# Patient Record
Sex: FEMALE | Race: WHITE | Employment: OTHER | URBAN - NONMETROPOLITAN AREA
[De-identification: names, ages, dates, MRNs, and addresses within clinical notes are randomized per-mention and may not be internally consistent; named-entity substitution may affect disease eponyms.]

---

## 2019-07-29 ENCOUNTER — APPOINTMENT (OUTPATIENT)
Dept: CT IMAGING | Age: 59
End: 2019-07-29
Payer: MEDICARE

## 2019-07-29 ENCOUNTER — APPOINTMENT (OUTPATIENT)
Dept: GENERAL RADIOLOGY | Age: 59
End: 2019-07-29
Payer: MEDICARE

## 2019-07-29 ENCOUNTER — HOSPITAL ENCOUNTER (EMERGENCY)
Age: 59
Discharge: ANOTHER HOSPITAL | End: 2019-07-30
Attending: EMERGENCY MEDICINE
Payer: MEDICARE

## 2019-07-29 DIAGNOSIS — D64.9 ANEMIA, UNSPECIFIED TYPE: ICD-10-CM

## 2019-07-29 DIAGNOSIS — L89.159 PRESSURE INJURY OF SKIN OF SACRAL REGION, UNSPECIFIED INJURY STAGE: ICD-10-CM

## 2019-07-29 DIAGNOSIS — M86.9 OSTEOMYELITIS, UNSPECIFIED SITE, UNSPECIFIED TYPE (HCC): ICD-10-CM

## 2019-07-29 DIAGNOSIS — A41.9 SEPTICEMIA (HCC): Primary | ICD-10-CM

## 2019-07-29 LAB
ALBUMIN SERPL-MCNC: 2.4 G/DL (ref 3.5–5.2)
ALP BLD-CCNC: 233 U/L (ref 35–104)
ALT SERPL-CCNC: 7 U/L (ref 5–33)
AMORPHOUS: ABNORMAL /HPF
ANION GAP SERPL CALCULATED.3IONS-SCNC: 15 MMOL/L (ref 7–19)
ANISOCYTOSIS: ABNORMAL
APTT: 21.2 SEC (ref 26–36.2)
AST SERPL-CCNC: 12 U/L (ref 5–32)
BACTERIA: ABNORMAL /HPF
BASOPHILS ABSOLUTE: 0.1 K/UL (ref 0–0.2)
BASOPHILS RELATIVE PERCENT: 0.5 % (ref 0–1)
BILIRUB SERPL-MCNC: 0.4 MG/DL (ref 0.2–1.2)
BILIRUBIN URINE: NEGATIVE
BLOOD, URINE: NEGATIVE
BUN BLDV-MCNC: 29 MG/DL (ref 6–20)
C-REACTIVE PROTEIN: 19.61 MG/DL (ref 0–0.5)
CALCIUM SERPL-MCNC: 8.4 MG/DL (ref 8.6–10)
CHLORIDE BLD-SCNC: 102 MMOL/L (ref 98–111)
CLARITY: ABNORMAL
CO2: 16 MMOL/L (ref 22–29)
COLOR: YELLOW
CREAT SERPL-MCNC: 1.1 MG/DL (ref 0.5–0.9)
CRYSTALS, UA: ABNORMAL
EOSINOPHILS ABSOLUTE: 0 K/UL (ref 0–0.6)
EOSINOPHILS RELATIVE PERCENT: 0.1 % (ref 0–5)
EPITHELIAL CELLS, UA: 3 /HPF (ref 0–5)
GFR NON-AFRICAN AMERICAN: 51
GLUCOSE BLD-MCNC: 151 MG/DL (ref 74–109)
GLUCOSE URINE: NEGATIVE MG/DL
HCT VFR BLD CALC: 25.8 % (ref 37–47)
HEMOGLOBIN: 7 G/DL (ref 12–16)
HYALINE CASTS: 32 /HPF (ref 0–8)
HYPOCHROMIA: ABNORMAL
INR BLD: 1.38 (ref 0.88–1.18)
KETONES, URINE: NEGATIVE MG/DL
LACTIC ACID: 1.8 MMOL/L (ref 0.5–1.9)
LEUKOCYTE ESTERASE, URINE: ABNORMAL
LYMPHOCYTES ABSOLUTE: 2.3 K/UL (ref 1.1–4.5)
LYMPHOCYTES RELATIVE PERCENT: 13.7 % (ref 20–40)
MCH RBC QN AUTO: 21.8 PG (ref 27–31)
MCHC RBC AUTO-ENTMCNC: 27.1 G/DL (ref 33–37)
MCV RBC AUTO: 80.4 FL (ref 81–99)
MICROCYTES: ABNORMAL
MONOCYTES ABSOLUTE: 1.3 K/UL (ref 0–0.9)
MONOCYTES RELATIVE PERCENT: 7.9 % (ref 0–10)
NEUTROPHILS ABSOLUTE: 12.8 K/UL (ref 1.5–7.5)
NEUTROPHILS RELATIVE PERCENT: 75.8 % (ref 50–65)
NITRITE, URINE: NEGATIVE
PDW BLD-RTO: 22.2 % (ref 11.5–14.5)
PH UA: 8.5 (ref 5–8)
PLATELET # BLD: 554 K/UL (ref 130–400)
PLATELET SLIDE REVIEW: ABNORMAL
PMV BLD AUTO: 9.9 FL (ref 9.4–12.3)
POLYCHROMASIA: ABNORMAL
POTASSIUM SERPL-SCNC: 5.1 MMOL/L (ref 3.5–5)
PROTEIN UA: 30 MG/DL
PROTHROMBIN TIME: 16.3 SEC (ref 12–14.6)
RBC # BLD: 3.21 M/UL (ref 4.2–5.4)
RBC UA: 2 /HPF (ref 0–4)
SEDIMENTATION RATE, ERYTHROCYTE: 113 MM/HR (ref 0–25)
SODIUM BLD-SCNC: 133 MMOL/L (ref 136–145)
SPECIFIC GRAVITY UA: 1.03 (ref 1–1.03)
TARGET CELLS: ABNORMAL
TOTAL PROTEIN: 6.3 G/DL (ref 6.6–8.7)
URINE REFLEX TO CULTURE: YES
UROBILINOGEN, URINE: 0.2 E.U./DL
WBC # BLD: 16.9 K/UL (ref 4.8–10.8)
WBC UA: 82 /HPF (ref 0–5)
WBC UA: ABNORMAL /HPF (ref 0–5)

## 2019-07-29 PROCEDURE — 86140 C-REACTIVE PROTEIN: CPT

## 2019-07-29 PROCEDURE — 87040 BLOOD CULTURE FOR BACTERIA: CPT

## 2019-07-29 PROCEDURE — 6360000002 HC RX W HCPCS: Performed by: EMERGENCY MEDICINE

## 2019-07-29 PROCEDURE — 87186 SC STD MICRODIL/AGAR DIL: CPT

## 2019-07-29 PROCEDURE — 87077 CULTURE AEROBIC IDENTIFY: CPT

## 2019-07-29 PROCEDURE — 36415 COLL VENOUS BLD VENIPUNCTURE: CPT

## 2019-07-29 PROCEDURE — 99285 EMERGENCY DEPT VISIT HI MDM: CPT

## 2019-07-29 PROCEDURE — 74177 CT ABD & PELVIS W/CONTRAST: CPT

## 2019-07-29 PROCEDURE — 36430 TRANSFUSION BLD/BLD COMPNT: CPT

## 2019-07-29 PROCEDURE — 83605 ASSAY OF LACTIC ACID: CPT

## 2019-07-29 PROCEDURE — 96368 THER/DIAG CONCURRENT INF: CPT

## 2019-07-29 PROCEDURE — 86403 PARTICLE AGGLUT ANTBDY SCRN: CPT

## 2019-07-29 PROCEDURE — 85610 PROTHROMBIN TIME: CPT

## 2019-07-29 PROCEDURE — 85025 COMPLETE CBC W/AUTO DIFF WBC: CPT

## 2019-07-29 PROCEDURE — 2580000003 HC RX 258: Performed by: EMERGENCY MEDICINE

## 2019-07-29 PROCEDURE — 96366 THER/PROPH/DIAG IV INF ADDON: CPT

## 2019-07-29 PROCEDURE — 71045 X-RAY EXAM CHEST 1 VIEW: CPT

## 2019-07-29 PROCEDURE — 96365 THER/PROPH/DIAG IV INF INIT: CPT

## 2019-07-29 PROCEDURE — 85730 THROMBOPLASTIN TIME PARTIAL: CPT

## 2019-07-29 PROCEDURE — 85652 RBC SED RATE AUTOMATED: CPT

## 2019-07-29 PROCEDURE — 96367 TX/PROPH/DG ADDL SEQ IV INF: CPT

## 2019-07-29 PROCEDURE — 6360000004 HC RX CONTRAST MEDICATION: Performed by: EMERGENCY MEDICINE

## 2019-07-29 PROCEDURE — 80053 COMPREHEN METABOLIC PANEL: CPT

## 2019-07-29 RX ORDER — 0.9 % SODIUM CHLORIDE 0.9 %
1000 INTRAVENOUS SOLUTION INTRAVENOUS ONCE
Status: DISCONTINUED | OUTPATIENT
Start: 2019-07-29 | End: 2019-07-29

## 2019-07-29 RX ORDER — PROMETHAZINE HYDROCHLORIDE 25 MG/1
25 TABLET ORAL EVERY 6 HOURS PRN
COMMUNITY
End: 2019-08-08 | Stop reason: DRUGHIGH

## 2019-07-29 RX ORDER — ACETAMINOPHEN 325 MG/1
650 TABLET ORAL ONCE
Status: DISCONTINUED | OUTPATIENT
Start: 2019-07-29 | End: 2019-07-30 | Stop reason: HOSPADM

## 2019-07-29 RX ORDER — 0.9 % SODIUM CHLORIDE 0.9 %
30 INTRAVENOUS SOLUTION INTRAVENOUS ONCE
Status: COMPLETED | OUTPATIENT
Start: 2019-07-29 | End: 2019-07-30

## 2019-07-29 RX ORDER — 0.9 % SODIUM CHLORIDE 0.9 %
250 INTRAVENOUS SOLUTION INTRAVENOUS ONCE
Status: COMPLETED | OUTPATIENT
Start: 2019-07-29 | End: 2019-07-30

## 2019-07-29 RX ORDER — BACLOFEN 10 MG/1
10 TABLET ORAL 3 TIMES DAILY
COMMUNITY

## 2019-07-29 RX ADMIN — IOPAMIDOL 90 ML: 755 INJECTION, SOLUTION INTRAVENOUS at 22:26

## 2019-07-29 RX ADMIN — VANCOMYCIN HYDROCHLORIDE 1250 MG: 10 INJECTION, POWDER, LYOPHILIZED, FOR SOLUTION INTRAVENOUS at 22:27

## 2019-07-29 RX ADMIN — PIPERACILLIN SODIUM,TAZOBACTAM SODIUM 3.38 G: 3; .375 INJECTION, POWDER, FOR SOLUTION INTRAVENOUS at 22:27

## 2019-07-29 RX ADMIN — SODIUM CHLORIDE 2000 ML: 9 INJECTION, SOLUTION INTRAVENOUS at 22:27

## 2019-07-29 ASSESSMENT — ENCOUNTER SYMPTOMS
BACK PAIN: 0
SORE THROAT: 0
SHORTNESS OF BREATH: 0
NAUSEA: 0
RHINORRHEA: 0
DIARRHEA: 0
VOMITING: 0
ABDOMINAL PAIN: 0

## 2019-07-30 VITALS
BODY MASS INDEX: 30.31 KG/M2 | TEMPERATURE: 98.6 F | HEIGHT: 68 IN | RESPIRATION RATE: 18 BRPM | SYSTOLIC BLOOD PRESSURE: 117 MMHG | OXYGEN SATURATION: 100 % | WEIGHT: 200 LBS | DIASTOLIC BLOOD PRESSURE: 55 MMHG | HEART RATE: 96 BPM

## 2019-07-30 LAB
ABO/RH: NORMAL
ABO/RH: NORMAL
ANTIBODY SCREEN: NORMAL
BLOOD BANK DISPENSE STATUS: NORMAL
BLOOD BANK DISPENSE STATUS: NORMAL
BLOOD BANK PRODUCT CODE: NORMAL
BLOOD BANK PRODUCT CODE: NORMAL
BPU ID: NORMAL
BPU ID: NORMAL
DESCRIPTION BLOOD BANK: NORMAL
DESCRIPTION BLOOD BANK: NORMAL
LACTIC ACID: 1.6 MMOL/L (ref 0.5–1.9)

## 2019-07-30 PROCEDURE — P9016 RBC LEUKOCYTES REDUCED: HCPCS

## 2019-07-30 PROCEDURE — 87077 CULTURE AEROBIC IDENTIFY: CPT

## 2019-07-30 PROCEDURE — 96366 THER/PROPH/DIAG IV INF ADDON: CPT

## 2019-07-30 PROCEDURE — 36600 WITHDRAWAL OF ARTERIAL BLOOD: CPT

## 2019-07-30 PROCEDURE — 86923 COMPATIBILITY TEST ELECTRIC: CPT

## 2019-07-30 PROCEDURE — 6360000002 HC RX W HCPCS: Performed by: EMERGENCY MEDICINE

## 2019-07-30 PROCEDURE — 99285 EMERGENCY DEPT VISIT HI MDM: CPT | Performed by: EMERGENCY MEDICINE

## 2019-07-30 PROCEDURE — 6370000000 HC RX 637 (ALT 250 FOR IP): Performed by: EMERGENCY MEDICINE

## 2019-07-30 PROCEDURE — 87205 SMEAR GRAM STAIN: CPT

## 2019-07-30 PROCEDURE — 36415 COLL VENOUS BLD VENIPUNCTURE: CPT

## 2019-07-30 PROCEDURE — 2580000003 HC RX 258: Performed by: EMERGENCY MEDICINE

## 2019-07-30 PROCEDURE — 86901 BLOOD TYPING SEROLOGIC RH(D): CPT

## 2019-07-30 PROCEDURE — 86850 RBC ANTIBODY SCREEN: CPT

## 2019-07-30 PROCEDURE — 86403 PARTICLE AGGLUT ANTBDY SCRN: CPT

## 2019-07-30 PROCEDURE — 87186 SC STD MICRODIL/AGAR DIL: CPT

## 2019-07-30 PROCEDURE — 87070 CULTURE OTHR SPECIMN AEROBIC: CPT

## 2019-07-30 PROCEDURE — 83605 ASSAY OF LACTIC ACID: CPT

## 2019-07-30 PROCEDURE — 81001 URINALYSIS AUTO W/SCOPE: CPT

## 2019-07-30 PROCEDURE — 86900 BLOOD TYPING SEROLOGIC ABO: CPT

## 2019-07-30 PROCEDURE — 87086 URINE CULTURE/COLONY COUNT: CPT

## 2019-07-30 RX ORDER — 0.9 % SODIUM CHLORIDE 0.9 %
250 INTRAVENOUS SOLUTION INTRAVENOUS ONCE
Status: COMPLETED | OUTPATIENT
Start: 2019-07-30 | End: 2019-07-30

## 2019-07-30 RX ORDER — PROMETHAZINE HYDROCHLORIDE 25 MG/ML
12.5 INJECTION, SOLUTION INTRAMUSCULAR; INTRAVENOUS ONCE
Status: DISCONTINUED | OUTPATIENT
Start: 2019-07-30 | End: 2019-07-30 | Stop reason: HOSPADM

## 2019-07-30 RX ORDER — PROMETHAZINE HYDROCHLORIDE 25 MG/1
12.5 TABLET ORAL ONCE
Status: COMPLETED | OUTPATIENT
Start: 2019-07-30 | End: 2019-07-30

## 2019-07-30 RX ADMIN — HYOSCYAMINE SULFATE: 16 SOLUTION at 02:14

## 2019-07-30 RX ADMIN — PROMETHAZINE HYDROCHLORIDE 12.5 MG: 25 TABLET ORAL at 05:39

## 2019-07-30 RX ADMIN — SODIUM CHLORIDE 250 ML: 9 INJECTION, SOLUTION INTRAVENOUS at 04:37

## 2019-07-30 RX ADMIN — SODIUM CHLORIDE 250 ML: 9 INJECTION, SOLUTION INTRAVENOUS at 02:14

## 2019-07-30 RX ADMIN — Medication 1000 MG: at 06:58

## 2019-07-30 NOTE — ED PROVIDER NOTES
[facility]  eMERGENCY dEPARTMENT eNCOUnter      Pt Name: Erik Lambert  MRN: [id number]  [place] 1960  Date of evaluation: 7/29/2019  Provider: Hannah Morrow MD    [address] [place] [address]       Chief Complaint   Patient presents with    Skin Ulcer         HISTORY OF PRESENT ILLNESS   (Location/Symptom, Timing/Onset,Context/Setting, Quality, Duration, Modifying Factors, Severity)  Note limiting factors. Erik Lambert is a 64 y.o. female who presents to the emergency department with large sacral decubitus ulcers that are tunneling. Patient also has had a fever to 101. She has a complicated past medical history. 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 She states that her urostomy tubes were leaking back in November and she developed an open wound on her sacrum. She then had bladder diversion surgery and has been having issues with her esophagus. She has frequent coughing to the point of vomiting and is supposed to have an upper GI study to evaluate this. The coughing and vomiting has irritated the wounds on her buttocks and they have significantly worsened. Really just treating with Dakin's solution. She has been managed in [place]. Has not been feeling well as she is had increased weakness and unable to do her transfers recently. She thinks she has a urinary tract infection as her urine in her urostomy bag is now dark and milky. HPI    NursingNotes were reviewed. REVIEW OF SYSTEMS    (2-9 systems for level 4, 10 or more for level 5)     Review of Systems   Constitutional: Positive for activity change, appetite change, chills, fatigue and fever. HENT: Negative for rhinorrhea and sore throat. Respiratory: Negative for shortness of breath. Cardiovascular: Positive for leg swelling. Negative for chest pain. Gastrointestinal: Negative for abdominal pain, diarrhea, nausea and vomiting. Genitourinary: Negative for difficulty urinating. following components:    Sodium 133 (*)     Potassium 5.1 (*)     CO2 16 (*)     Glucose 151 (*)     BUN 29 (*)     CREATININE 1.1 (*)     GFR Non-African American 51 (*)     Calcium 8.4 (*)     Total Protein 6.3 (*)     Alb 2.4 (*)     Alkaline Phosphatase 233 (*)     All other components within normal limits   URINE RT REFLEX TO CULTURE - Abnormal; Notable for the following components:    Clarity, UA TURBID (*)     pH, UA 8.5 (*)     Protein, UA 30 (*)     Leukocyte Esterase, Urine SMALL (*)     All other components within normal limits   SEDIMENTATION RATE - Abnormal; Notable for the following components:    Sed Rate 113 (*)     All other components within normal limits   C-REACTIVE PROTEIN - Abnormal; Notable for the following components:    CRP 19.61 (*)     All other components within normal limits   PROTIME-INR - Abnormal; Notable for the following components:    Protime 16.3 (*)     INR 1.38 (*)     All other components within normal limits   APTT - Abnormal; Notable for the following components:    aPTT 21.2 (*)     All other components within normal limits   CULTURE BLOOD #1   CULTURE BLOOD #2   WOUND CULTURE   URINE CULTURE   LACTIC ACID, PLASMA   LACTIC ACID, PLASMA   MICROSCOPIC URINALYSIS   TYPE AND SCREEN   PREPARE RBC (CROSSMATCH)       All other labs were within normal range or not returned as of this dictation. EMERGENCY DEPARTMENT COURSE and DIFFERENTIALDIAGNOSIS/MDM:   Vitals:    Vitals:    07/29/19 2014 07/29/19 2101 07/29/19 2224 07/29/19 2304   BP: 124/67 135/69 95/78 122/64   Pulse: 112 120 118 102   Resp: 18 20 20 18   Temp: 101.1 °F (38.4 °C) 98.6 °F (37 °C) 98.7 °F (37.1 °C)    TempSrc: Temporal Oral Oral    SpO2: 94%  97% 100%   Weight: 200 lb (90.7 kg)      Height: 5' 8\" (1.727 m)          MDM   Pt's Ct shows extensive decubitus ulcer with fistulas, osteomyelitis, necrotic mass. Has previously been treated by [facility]. D/w Simba Esquivel with [facility].  Pt will be accepted by

## 2019-07-30 NOTE — ED NOTES
Contacted [facility] to check weather with [employer].       Jasiel Hinojosa RN  07/30/19 8805

## 2019-07-30 NOTE — ED NOTES
Spoke with Eris Zhang at [facility].  Still does not have [address], RN  07/30/19 5825

## 2019-07-30 NOTE — ED NOTES
Wounds dressed with dakins solution and gauze. ABD pads placed over wounds.       Yunior Dunn RN  07/30/19 6073

## 2019-08-01 LAB
CULTURE, BLOOD 2: ABNORMAL
CULTURE, BLOOD 2: ABNORMAL
ORGANISM: ABNORMAL

## 2019-08-02 LAB
BLOOD CULTURE, ROUTINE: ABNORMAL
GRAM STAIN RESULT: ABNORMAL
ORGANISM: ABNORMAL
URINE CULTURE, ROUTINE: ABNORMAL
WOUND/ABSCESS: ABNORMAL

## 2019-08-08 ENCOUNTER — HOSPITAL ENCOUNTER (OUTPATIENT)
Dept: WOUND CARE | Age: 59
Discharge: HOME | End: 2019-08-08
Payer: MEDICARE

## 2019-08-08 VITALS
WEIGHT: 195 LBS | SYSTOLIC BLOOD PRESSURE: 125 MMHG | TEMPERATURE: 98.6 F | HEIGHT: 68 IN | BODY MASS INDEX: 29.55 KG/M2 | HEART RATE: 80 BPM | RESPIRATION RATE: 18 BRPM | DIASTOLIC BLOOD PRESSURE: 66 MMHG

## 2019-08-08 DIAGNOSIS — L89.154 PRESSURE INJURY OF SACRAL REGION, STAGE 4 (HCC): Primary | ICD-10-CM

## 2019-08-08 DIAGNOSIS — L89.613 PRESSURE INJURY OF RIGHT HEEL, STAGE 3 (HCC): Chronic | ICD-10-CM

## 2019-08-08 DIAGNOSIS — L89.324 PRESSURE INJURY OF LEFT PERINEAL ISCHIAL REGION, STAGE 4 (HCC): Chronic | ICD-10-CM

## 2019-08-08 DIAGNOSIS — L89.314 PRESSURE INJURY OF RIGHT ISCHIUM, STAGE 4 (HCC): Chronic | ICD-10-CM

## 2019-08-08 DIAGNOSIS — M86.49 CHRONIC OSTEOMYELITIS WITH DRAINING SINUS OF MULTIPLE SITES (HCC): Chronic | ICD-10-CM

## 2019-08-08 DIAGNOSIS — G82.20 PARAPLEGIA (HCC): Chronic | ICD-10-CM

## 2019-08-08 DIAGNOSIS — L89.324 DECUBITUS ULCER OF ISCHIAL AREA, LEFT, STAGE IV (HCC): Chronic | ICD-10-CM

## 2019-08-08 PROCEDURE — 11046 DBRDMT MUSC&/FSCA EA ADDL: CPT | Performed by: SURGERY

## 2019-08-08 PROCEDURE — 99215 OFFICE O/P EST HI 40 MIN: CPT

## 2019-08-08 PROCEDURE — 11043 DBRDMT MUSC&/FSCA 1ST 20/<: CPT | Performed by: SURGERY

## 2019-08-08 PROCEDURE — 11045 DBRDMT SUBQ TISS EACH ADDL: CPT

## 2019-08-08 PROCEDURE — 99203 OFFICE O/P NEW LOW 30 MIN: CPT | Performed by: SURGERY

## 2019-08-08 PROCEDURE — 11042 DBRDMT SUBQ TIS 1ST 20SQCM/<: CPT

## 2019-08-08 RX ORDER — M-VIT,TX,IRON,MINS/CALC/FOLIC 27MG-0.4MG
1 TABLET ORAL DAILY
COMMUNITY
End: 2021-03-29 | Stop reason: ALTCHOICE

## 2019-08-08 RX ORDER — OMEPRAZOLE 20 MG/1
20 CAPSULE, DELAYED RELEASE ORAL DAILY PRN
COMMUNITY

## 2019-08-08 RX ORDER — LEVOFLOXACIN 500 MG/1
500 TABLET, FILM COATED ORAL DAILY
COMMUNITY
Start: 2019-08-06 | End: 2019-08-15

## 2019-08-08 RX ORDER — DOXYCYCLINE HYCLATE 100 MG/1
100 CAPSULE ORAL 2 TIMES DAILY
COMMUNITY
Start: 2019-08-05 | End: 2019-08-14

## 2019-08-08 RX ORDER — PROMETHAZINE HYDROCHLORIDE 12.5 MG/1
12.5 TABLET ORAL EVERY 6 HOURS PRN
COMMUNITY
End: 2020-11-24

## 2019-08-08 RX ORDER — FOLIC ACID 1 MG/1
1 TABLET ORAL DAILY
COMMUNITY
End: 2020-11-24

## 2019-08-08 ASSESSMENT — PAIN DESCRIPTION - FREQUENCY: FREQUENCY: CONTINUOUS

## 2019-08-08 ASSESSMENT — PAIN DESCRIPTION - PAIN TYPE: TYPE: CHRONIC PAIN

## 2019-08-08 ASSESSMENT — PAIN SCALES - GENERAL: PAINLEVEL_OUTOF10: 5

## 2019-08-08 ASSESSMENT — PAIN DESCRIPTION - ORIENTATION: ORIENTATION: RIGHT

## 2019-08-08 ASSESSMENT — PAIN DESCRIPTION - LOCATION: LOCATION: SHOULDER

## 2019-08-08 NOTE — PROGRESS NOTES
Onset    Other Mother          of sepsis    Cancer Father         Lung Cancer    Diabetes Paternal Grandmother     Heart Attack Paternal Grandfather      Social History     Tobacco Use    Smoking status: Never Smoker    Smokeless tobacco: Never Used   Substance Use Topics    Alcohol use: Not Currently         Review of Systems    Review of Systems    All other review of systems are negative. Physical Exam    /66   Pulse 80   Temp 98.6 °F (37 °C) (Temporal)   Resp 18   Ht 5' 8\" (1.727 m)   Wt 195 lb (88.5 kg)   BMI 29.65 kg/m²     Physical Exam        Post Debridement Measurements and Assessment:  Wound 19 Heel Left;Posterior Wound 7, Pressure Stage 3, L. Posterior Heel (Active)   Wound Image     2019  2:51 PM   Wound Pressure Stage  3 2019  2:51 PM   Dressing Status Old drainage 2019  2:51 PM   Wound Cleansed Rinsed/Irrigated with saline 2019  2:51 PM   Wound Length (cm) 4 cm 2019  2:51 PM   Wound Width (cm) 3.3 cm 2019  2:51 PM   Wound Depth (cm) 0.1 cm 2019  2:51 PM   Wound Surface Area (cm^2) 13.2 cm^2 2019  2:51 PM   Wound Volume (cm^3) 1.32 cm^3 2019  2:51 PM   Distance Tunneling (cm) 0 cm 2019  2:51 PM   Tunneling Position ___ O'Clock 0 2019  2:51 PM   Undermining Starts ___ O'Clock 0 2019  2:51 PM   Undermining Ends___ O'Clock 0 2019  2:51 PM   Undermining Maxium Distance (cm) 0 2019  2:51 PM   Wound Assessment Slough; Yellow;Red;Pink;Black 2019  2:51 PM   Drainage Amount Small 2019  2:51 PM   Drainage Description Serosanguinous 2019  2:51 PM   Odor None 2019  2:51 PM   Margins Attached edges 2019  2:51 PM   Nellie-wound Assessment Ecchymosis; Red;Swelling 2019  2:51 PM   Non-staged Wound Description Not applicable 7566  3:26 PM   [place]%Wound Bed 20 2019  2:51 PM   Red%Wound Bed 30 2019  2:51 PM   Yellow%Wound Bed 40 2019  2:51 PM   Black%Wound Bed 10 2019  2:51 PM   Purple%Wound Bed Dry 8/8/2019  2:51 PM   Non-staged Wound Description Not applicable 1/0/3259  5:81 PM   [place]%Wound Bed 30 8/8/2019  2:51 PM   Red%Wound Bed 30 8/8/2019  2:51 PM   Yellow%Wound Bed 30 8/8/2019  2:51 PM   Black%Wound Bed 10 8/8/2019  2:51 PM   Number of days: 0      The patientspain isPain Level: 5 Pain Type: Chronic pain. Please refer to nursing measurements and assessment regarding wound pre and postdebridement. Wound 08/08/19 Heel Left;Posterior Wound 7, Pressure Stage 3, L. Posterior Heel (Active)   Wound Image     8/8/2019  2:51 PM   Wound Pressure Stage  3 8/8/2019  2:51 PM   Dressing Status Old drainage 8/8/2019  2:51 PM   Wound Cleansed Rinsed/Irrigated with saline 8/8/2019  2:51 PM   Wound Length (cm) 4 cm 8/8/2019  2:51 PM   Wound Width (cm) 3.3 cm 8/8/2019  2:51 PM   Wound Depth (cm) 0.1 cm 8/8/2019  2:51 PM   Wound Surface Area (cm^2) 13.2 cm^2 8/8/2019  2:51 PM   Wound Volume (cm^3) 1.32 cm^3 8/8/2019  2:51 PM   Distance Tunneling (cm) 0 cm 8/8/2019  2:51 PM   Tunneling Position ___ O'Clock 0 8/8/2019  2:51 PM   Undermining Starts ___ O'Clock 0 8/8/2019  2:51 PM   Undermining Ends___ O'Clock 0 8/8/2019  2:51 PM   Undermining Maxium Distance (cm) 0 8/8/2019  2:51 PM   Wound Assessment Slough; Yellow;Red;Pink;Black 8/8/2019  2:51 PM   Drainage Amount Small 8/8/2019  2:51 PM   Drainage Description Serosanguinous 8/8/2019  2:51 PM   Odor None 8/8/2019  2:51 PM   Margins Attached edges 8/8/2019  2:51 PM   Nellie-wound Assessment Ecchymosis; Red;Swelling 8/8/2019  2:51 PM   Non-staged Wound Description Not applicable 0/1/6835  5:58 PM   [place]%Wound Bed 20 8/8/2019  2:51 PM   Red%Wound Bed 30 8/8/2019  2:51 PM   Yellow%Wound Bed 40 8/8/2019  2:51 PM   Black%Wound Bed 10 8/8/2019  2:51 PM   Purple%Wound Bed 0 8/8/2019  2:51 PM   Other%Wound Bed 0 8/8/2019  2:51 PM   Number of days: 0       Wound 08/08/19 Foot Right;Plantar;Medial Wound 8, Neuropathic, R. Lateral Plantar Foot Medial (Active)   Wound Image per physician order  Treatment:     Compression : No   Offloading : Yes   Dressing : see AVS  Therapy : none   Discussed appropriate home care of this wound. Wound redressed. Patient instructions were given. Follow up: 2 week.   Recommend no smoking  Offloading instructions given

## 2019-08-09 ENCOUNTER — HOSPITAL ENCOUNTER (EMERGENCY)
Age: 59
Discharge: HOME | End: 2019-08-09
Attending: EMERGENCY MEDICINE
Payer: MEDICARE

## 2019-08-09 ENCOUNTER — LAB REQUISITION (OUTPATIENT)
Dept: LAB | Facility: HOSPITAL | Age: 59
End: 2019-08-09

## 2019-08-09 VITALS
RESPIRATION RATE: 19 BRPM | DIASTOLIC BLOOD PRESSURE: 63 MMHG | OXYGEN SATURATION: 96 % | SYSTOLIC BLOOD PRESSURE: 121 MMHG | TEMPERATURE: 98.2 F | HEART RATE: 84 BPM

## 2019-08-09 DIAGNOSIS — M86.9 OSTEOMYELITIS OF OTHER SITE, UNSPECIFIED TYPE (HCC): ICD-10-CM

## 2019-08-09 DIAGNOSIS — G82.20 PARAPLEGIA (HCC): ICD-10-CM

## 2019-08-09 DIAGNOSIS — Z00.00 ENCOUNTER FOR GENERAL ADULT MEDICAL EXAMINATION WITHOUT ABNORMAL FINDINGS: ICD-10-CM

## 2019-08-09 DIAGNOSIS — L89.159 PRESSURE INJURY OF SKIN OF SACRAL REGION, UNSPECIFIED INJURY STAGE: ICD-10-CM

## 2019-08-09 DIAGNOSIS — D64.9 ANEMIA, UNSPECIFIED TYPE: Primary | ICD-10-CM

## 2019-08-09 LAB
ABO/RH: NORMAL
ALBUMIN SERPL-MCNC: 1.9 G/DL (ref 3.5–5)
ALBUMIN SERPL-MCNC: 2.5 G/DL (ref 3.5–5.2)
ALP BLD-CCNC: 143 U/L (ref 35–104)
ALP SERPL-CCNC: 108 U/L (ref 24–120)
ALT SERPL W P-5'-P-CCNC: 22 U/L (ref 0–54)
ALT SERPL-CCNC: 6 U/L (ref 5–33)
ANION GAP SERPL CALCULATED.3IONS-SCNC: 11 MMOL/L (ref 7–19)
ANION GAP SERPL CALCULATED.3IONS-SCNC: 6 MMOL/L (ref 4–13)
ANISOCYTOSIS BLD QL: ABNORMAL
ANTIBODY SCREEN: NORMAL
ARTICHOKE IGE QN: 61 MG/DL (ref 0–99)
AST SERPL-CCNC: 10 U/L (ref 7–45)
AST SERPL-CCNC: 9 U/L (ref 5–32)
BILIRUB CONJ SERPL-MCNC: 0 MG/DL (ref 0–0.3)
BILIRUB INDIRECT SERPL-MCNC: 0 MG/DL (ref 0–1.1)
BILIRUB SERPL-MCNC: 0.3 MG/DL (ref 0.1–1)
BILIRUB SERPL-MCNC: <0.2 MG/DL (ref 0.2–1.2)
BILIRUBIN DIRECT: 0.1 MG/DL (ref 0–0.3)
BILIRUBIN, INDIRECT: 0.1 MG/DL (ref 0.1–1)
BUN BLD-MCNC: 33 MG/DL (ref 5–21)
BUN BLDV-MCNC: 33 MG/DL (ref 6–20)
BUN/CREAT SERPL: 41.3 (ref 7–25)
BURR CELLS BLD QL SMEAR: ABNORMAL
CALCIUM SERPL-MCNC: 8.6 MG/DL (ref 8.6–10)
CALCIUM SPEC-SCNC: 8.1 MG/DL (ref 8.4–10.4)
CHLORIDE BLD-SCNC: 115 MMOL/L (ref 98–111)
CHLORIDE SERPL-SCNC: 120 MMOL/L (ref 98–110)
CHOLEST SERPL-MCNC: 93 MG/DL (ref 130–200)
CO2 SERPL-SCNC: 15 MMOL/L (ref 24–31)
CO2: 17 MMOL/L (ref 22–29)
CREAT BLD-MCNC: 0.8 MG/DL (ref 0.5–1.4)
CREAT SERPL-MCNC: 0.9 MG/DL (ref 0.5–0.9)
DEPRECATED RDW RBC AUTO: 69 FL (ref 37–54)
ERYTHROCYTE [DISTWIDTH] IN BLOOD BY AUTOMATED COUNT: 23.9 % (ref 12.3–15.4)
GFR NON-AFRICAN AMERICAN: >60
GFR SERPL CREATININE-BSD FRML MDRD: 74 ML/MIN/1.73
GIANT PLATELETS: ABNORMAL
GLUCOSE BLD-MCNC: 107 MG/DL (ref 74–109)
GLUCOSE BLD-MCNC: 87 MG/DL (ref 70–100)
HBA1C MFR BLD: 5.5 % (ref 4.8–5.9)
HCT VFR BLD AUTO: 21 % (ref 34–46.6)
HCT VFR BLD CALC: 26.4 % (ref 37–47)
HDLC SERPL-MCNC: 28 MG/DL
HEMOGLOBIN: 7.5 G/DL (ref 12–16)
HGB BLD-MCNC: 6.4 G/DL (ref 12–15.9)
HYPOCHROMIA BLD QL: ABNORMAL
LDLC/HDLC SERPL: 1.62 {RATIO}
LYMPHOCYTES # BLD MANUAL: 1.65 10*3/MM3 (ref 0.7–3.1)
LYMPHOCYTES NFR BLD MANUAL: 3.6 % (ref 5–12)
LYMPHOCYTES NFR BLD MANUAL: 37.4 % (ref 19.6–45.3)
MCH RBC QN AUTO: 23.9 PG (ref 27–31)
MCH RBC QN AUTO: 24.2 PG (ref 26.6–33)
MCHC RBC AUTO-ENTMCNC: 28.4 G/DL (ref 33–37)
MCHC RBC AUTO-ENTMCNC: 30.5 G/DL (ref 31.5–35.7)
MCV RBC AUTO: 79.5 FL (ref 79–97)
MCV RBC AUTO: 84.1 FL (ref 81–99)
MICROCYTES BLD QL: ABNORMAL
MONOCYTES # BLD AUTO: 0.16 10*3/MM3 (ref 0.1–0.9)
NEUTROPHILS # BLD AUTO: 2.6 10*3/MM3 (ref 1.7–7)
NEUTROPHILS NFR BLD MANUAL: 57.9 % (ref 42.7–76)
NEUTS BAND NFR BLD MANUAL: 1 % (ref 0–5)
PDW BLD-RTO: 23.9 % (ref 11.5–14.5)
PLATELET # BLD AUTO: 274 10*3/MM3 (ref 140–450)
PLATELET # BLD: 315 K/UL (ref 130–400)
PMV BLD AUTO: 9 FL (ref 9.4–12.3)
PMV BLD AUTO: 9.2 FL (ref 6–12)
POIKILOCYTOSIS BLD QL SMEAR: ABNORMAL
POLYCHROMASIA BLD QL SMEAR: ABNORMAL
POTASSIUM BLD-SCNC: 4 MMOL/L (ref 3.5–5.3)
POTASSIUM REFLEX MAGNESIUM: 3.9 MMOL/L (ref 3.5–5)
PREALB SERPL-MCNC: 7.9 MG/DL (ref 18–36)
PROT SERPL-MCNC: 4.9 G/DL (ref 6.3–8.7)
RBC # BLD AUTO: 2.64 10*6/MM3 (ref 3.77–5.28)
RBC # BLD: 3.14 M/UL (ref 4.2–5.4)
SCHISTOCYTES BLD QL SMEAR: ABNORMAL
SODIUM BLD-SCNC: 141 MMOL/L (ref 135–145)
SODIUM BLD-SCNC: 143 MMOL/L (ref 136–145)
TOTAL PROTEIN: 5.6 G/DL (ref 6.6–8.7)
TRIGL SERPL-MCNC: 98 MG/DL (ref 0–149)
TSH SERPL DL<=0.05 MIU/L-ACNC: 6.35 MIU/ML (ref 0.47–4.68)
VIT B12 BLD-MCNC: 345 PG/ML (ref 239–931)
WBC # BLD: 6.5 K/UL (ref 4.8–10.8)
WBC MORPH BLD: NORMAL
WBC NRBC COR # BLD: 4.41 10*3/MM3 (ref 3.4–10.8)

## 2019-08-09 PROCEDURE — 80076 HEPATIC FUNCTION PANEL: CPT | Performed by: NURSE PRACTITIONER

## 2019-08-09 PROCEDURE — 85027 COMPLETE CBC AUTOMATED: CPT

## 2019-08-09 PROCEDURE — 86900 BLOOD TYPING SEROLOGIC ABO: CPT

## 2019-08-09 PROCEDURE — 84134 ASSAY OF PREALBUMIN: CPT | Performed by: NURSE PRACTITIONER

## 2019-08-09 PROCEDURE — 82607 VITAMIN B-12: CPT | Performed by: NURSE PRACTITIONER

## 2019-08-09 PROCEDURE — 36415 COLL VENOUS BLD VENIPUNCTURE: CPT

## 2019-08-09 PROCEDURE — 80048 BASIC METABOLIC PNL TOTAL CA: CPT | Performed by: NURSE PRACTITIONER

## 2019-08-09 PROCEDURE — 80076 HEPATIC FUNCTION PANEL: CPT

## 2019-08-09 PROCEDURE — 99284 EMERGENCY DEPT VISIT MOD MDM: CPT | Performed by: EMERGENCY MEDICINE

## 2019-08-09 PROCEDURE — 85025 COMPLETE CBC W/AUTO DIFF WBC: CPT | Performed by: NURSE PRACTITIONER

## 2019-08-09 PROCEDURE — 84443 ASSAY THYROID STIM HORMONE: CPT | Performed by: NURSE PRACTITIONER

## 2019-08-09 PROCEDURE — 36415 COLL VENOUS BLD VENIPUNCTURE: CPT | Performed by: NURSE PRACTITIONER

## 2019-08-09 PROCEDURE — 80061 LIPID PANEL: CPT | Performed by: NURSE PRACTITIONER

## 2019-08-09 PROCEDURE — 86901 BLOOD TYPING SEROLOGIC RH(D): CPT

## 2019-08-09 PROCEDURE — 83036 HEMOGLOBIN GLYCOSYLATED A1C: CPT | Performed by: NURSE PRACTITIONER

## 2019-08-09 PROCEDURE — 99283 EMERGENCY DEPT VISIT LOW MDM: CPT

## 2019-08-09 PROCEDURE — 86850 RBC ANTIBODY SCREEN: CPT

## 2019-08-09 PROCEDURE — 80048 BASIC METABOLIC PNL TOTAL CA: CPT

## 2019-08-09 ASSESSMENT — ENCOUNTER SYMPTOMS
VOICE CHANGE: 0
SHORTNESS OF BREATH: 0
EYE PAIN: 0
VOMITING: 0
DIARRHEA: 0
COUGH: 0
RHINORRHEA: 0
ABDOMINAL PAIN: 0
EYE REDNESS: 0

## 2019-08-09 NOTE — ED PROVIDER NOTES
Other Mother          of sepsis    Cancer Father         Lung Cancer    Diabetes Paternal Grandmother     Heart Attack Paternal Grandfather           SOCIAL HISTORY       Social History     Socioeconomic History    Marital status:      Spouse name: None    Number of children: None    Years of education: None    Highest education level: None   Occupational History    None   Social Needs    Financial resource strain: None    Food insecurity:     Worry: None     Inability: None    Transportation needs:     Medical: None     Non-medical: None   Tobacco Use    Smoking status: Never Smoker    Smokeless tobacco: Never Used   Substance and Sexual Activity    Alcohol use: Not Currently    Drug use: Never    Sexual activity: None   Lifestyle    Physical activity:     Days per week: None     Minutes per session: None    Stress: None   Relationships    Social connections:     Talks on phone: None     Gets together: None     Attends Shinto service: None     Active member of club or organization: None     Attends meetings of clubs or organizations: None     Relationship status: None    Intimate partner violence:     Fear of current or ex partner: None     Emotionally abused: None     Physically abused: None     Forced sexual activity: None   Other Topics Concern    None   Social History Narrative    None       SCREENINGS             PHYSICAL EXAM    (up to 7 for level 4, 8 or more for level 5)     ED Triage Vitals   BP Temp Temp src Pulse Resp SpO2 Height Weight   -- -- -- -- -- -- -- --       Physical Exam   Constitutional: She is oriented to person, place, and time. She appears well-developed and well-nourished. Non-toxic appearance. No distress. HENT:   Head: Normocephalic and atraumatic. Mouth/Throat: Oropharynx is clear and moist.   Eyes: Pupils are equal, round, and reactive to light. EOM are normal. Right eye exhibits no discharge. Left eye exhibits no discharge.  No scleral icterus. Neck: Normal range of motion. Cardiovascular: Normal rate and regular rhythm. Pulmonary/Chest: Effort normal. No stridor. No respiratory distress. Abdominal: She exhibits no distension. There is no tenderness. Urostomy present with no blood or other complications   Musculoskeletal: Normal range of motion. She exhibits no deformity. Neurological: She is alert and oriented to person, place, and time. No cranial nerve deficit. She exhibits abnormal muscle tone. GCS eye subscore is 4. GCS verbal subscore is 5. GCS motor subscore is 6. Atrophy of bilateral lower extremities with expected chronic weakness   Skin: Skin is warm and dry. She is not diaphoretic. Psychiatric: She has a normal mood and affect. Her behavior is normal. Judgment and thought content normal.   Nursing note and vitals reviewed. DIAGNOSTIC RESULTS       No orders to display         ED BEDSIDE ULTRASOUND:   Performed by ED Physician - none    LABS:  Labs Reviewed   CBC - Abnormal; Notable for the following components:       Result Value    RBC 3.14 (*)     Hemoglobin 7.5 (*)     Hematocrit 26.4 (*)     MCH 23.9 (*)     MCHC 28.4 (*)     RDW 23.9 (*)     MPV 9.0 (*)     All other components within normal limits   BASIC METABOLIC PANEL W/ REFLEX TO MG FOR LOW K - Abnormal; Notable for the following components:    Chloride 115 (*)     CO2 17 (*)     BUN 33 (*)     All other components within normal limits   HEPATIC FUNCTION PANEL - Abnormal; Notable for the following components: Total Protein 5.6 (*)     Alb 2.5 (*)     Alkaline Phosphatase 143 (*)     All other components within normal limits   TYPE AND SCREEN       All other labs were within normal range or not returned as of this dictation.     Medications - No data to display    EMERGENCY DEPARTMENT COURSE and DIFFERENTIALDIAGNOSIS/MDM:   Vitals:    Vitals:    08/09/19 1220 08/09/19 1400   BP: (!) 126/57 121/63   Pulse: 88 84   Resp: 19 19   Temp: 98.2 °F (36.8 °C) SpO2: 96% 96%       [facility]         CONSULTS:  None    PROCEDURES:  Unless otherwise notedbelow, none     Procedures    Labs here show hemoglobin of 7.5. No other findings to suggest patient requires transfusion or other intervention at this time. FINAL IMPRESSION     1. Anemia, unspecified type    2. Osteomyelitis of other site, unspecified type ([facility] [address]    3. Paraplegia ([facility] [address]    4. Pressure injury of skin of sacral region, unspecified injury stage          DISPOSITION/PLAN   DISPOSITION Decision To Discharge 08/09/2019 01:21:27 PM  Evaluation and work-up here revealed no signs of emergent or life-threatening pathology that would necessitate admission for further work-up or management at this time. It is felt to be stable for discharge home with return precautions for worsening of the condition or development of new concerning symptoms. Patient was encouraged to follow-up with her primary care doctor in the appropriate timeframe. Necessary prescriptions and information have been provided for treatment at home. Patient voices understanding and agreement with the plan.       PATIENT REFERRED TO:  [address] [place] [address] EMERGENCY DEPT  [facility]  [phone]    If symptoms worsen    Yanni Polanco [id number]      As needed      DISCHARGE MEDICATIONS:  Discharge Medication List as of 8/9/2019  3:19 PM             (Please note that portions of this note were completed with a voice recognition program.  Efforts were made to edit the dictations butoccasionally words are mis-transcribed.)    Angelica Watson MD (electronically signed)  AttendingEmergency Physician         Angelica Gomes MD  08/09/19 8070

## 2019-08-12 PROBLEM — L89.613 PRESSURE INJURY OF RIGHT HEEL, STAGE 3 (HCC): Chronic | Status: ACTIVE | Noted: 2019-08-12

## 2019-08-12 PROBLEM — L89.324: Chronic | Status: ACTIVE | Noted: 2019-08-12

## 2019-08-12 PROBLEM — G82.20 PARAPLEGIA (HCC): Chronic | Status: ACTIVE | Noted: 2019-08-12

## 2019-08-12 PROBLEM — M86.49 CHRONIC OSTEOMYELITIS WITH DRAINING SINUS OF MULTIPLE SITES (HCC): Chronic | Status: ACTIVE | Noted: 2019-08-12

## 2019-08-12 PROBLEM — L89.324 DECUBITUS ULCER OF ISCHIAL AREA, LEFT, STAGE IV (HCC): Chronic | Status: ACTIVE | Noted: 2019-08-12

## 2019-08-12 PROBLEM — L89.314 PRESSURE INJURY OF RIGHT ISCHIUM, STAGE 4 (HCC): Chronic | Status: ACTIVE | Noted: 2019-08-12

## 2019-08-14 ENCOUNTER — LAB REQUISITION (OUTPATIENT)
Dept: LAB | Facility: HOSPITAL | Age: 59
End: 2019-08-14

## 2019-08-14 DIAGNOSIS — Z00.00 ENCOUNTER FOR GENERAL ADULT MEDICAL EXAMINATION WITHOUT ABNORMAL FINDINGS: ICD-10-CM

## 2019-08-14 LAB
BASOPHILS # BLD AUTO: 0.09 10*3/MM3 (ref 0–0.2)
BASOPHILS NFR BLD AUTO: 1.4 % (ref 0–1.5)
DEPRECATED RDW RBC AUTO: 71.1 FL (ref 37–54)
EOSINOPHIL # BLD AUTO: 0.01 10*3/MM3 (ref 0–0.4)
EOSINOPHIL NFR BLD AUTO: 0.2 % (ref 0.3–6.2)
ERYTHROCYTE [DISTWIDTH] IN BLOOD BY AUTOMATED COUNT: 24.3 % (ref 12.3–15.4)
HCT VFR BLD AUTO: 23.3 % (ref 34–46.6)
HGB BLD-MCNC: 7 G/DL (ref 12–15.9)
IMM GRANULOCYTES # BLD AUTO: 0.03 10*3/MM3 (ref 0–0.05)
IMM GRANULOCYTES NFR BLD AUTO: 0.5 % (ref 0–0.5)
LYMPHOCYTES # BLD AUTO: 1.83 10*3/MM3 (ref 0.7–3.1)
LYMPHOCYTES NFR BLD AUTO: 28.8 % (ref 19.6–45.3)
MCH RBC QN AUTO: 24.4 PG (ref 26.6–33)
MCHC RBC AUTO-ENTMCNC: 30 G/DL (ref 31.5–35.7)
MCV RBC AUTO: 81.2 FL (ref 79–97)
MONOCYTES # BLD AUTO: 0.49 10*3/MM3 (ref 0.1–0.9)
MONOCYTES NFR BLD AUTO: 7.7 % (ref 5–12)
NEUTROPHILS # BLD AUTO: 3.9 10*3/MM3 (ref 1.7–7)
NEUTROPHILS NFR BLD AUTO: 61.4 % (ref 42.7–76)
NRBC BLD AUTO-RTO: 0 /100 WBC (ref 0–0.2)
PLATELET # BLD AUTO: 246 10*3/MM3 (ref 140–450)
PMV BLD AUTO: 9.7 FL (ref 6–12)
RBC # BLD AUTO: 2.87 10*6/MM3 (ref 3.77–5.28)
T4 FREE SERPL-MCNC: 1.39 NG/DL (ref 0.78–2.19)
TSH SERPL DL<=0.05 MIU/L-ACNC: 3.64 MIU/ML (ref 0.47–4.68)
WBC NRBC COR # BLD: 6.35 10*3/MM3 (ref 3.4–10.8)

## 2019-08-14 PROCEDURE — 84443 ASSAY THYROID STIM HORMONE: CPT | Performed by: NURSE PRACTITIONER

## 2019-08-14 PROCEDURE — 36415 COLL VENOUS BLD VENIPUNCTURE: CPT | Performed by: NURSE PRACTITIONER

## 2019-08-14 PROCEDURE — 84439 ASSAY OF FREE THYROXINE: CPT | Performed by: NURSE PRACTITIONER

## 2019-08-14 PROCEDURE — 85025 COMPLETE CBC W/AUTO DIFF WBC: CPT | Performed by: NURSE PRACTITIONER

## 2019-08-18 ENCOUNTER — APPOINTMENT (OUTPATIENT)
Dept: GENERAL RADIOLOGY | Age: 59
DRG: 981 | End: 2019-08-18
Payer: MEDICARE

## 2019-08-18 ENCOUNTER — HOSPITAL ENCOUNTER (EMERGENCY)
Age: 59
Discharge: HOME | DRG: 981 | End: 2019-08-18
Attending: EMERGENCY MEDICINE
Payer: MEDICARE

## 2019-08-18 VITALS
OXYGEN SATURATION: 95 % | HEART RATE: 89 BPM | SYSTOLIC BLOOD PRESSURE: 149 MMHG | TEMPERATURE: 97.8 F | DIASTOLIC BLOOD PRESSURE: 69 MMHG | RESPIRATION RATE: 18 BRPM

## 2019-08-18 DIAGNOSIS — R11.2 NAUSEA AND VOMITING, INTRACTABILITY OF VOMITING NOT SPECIFIED, UNSPECIFIED VOMITING TYPE: Primary | ICD-10-CM

## 2019-08-18 LAB
ALBUMIN SERPL-MCNC: 2.4 G/DL (ref 3.5–5.2)
ALP BLD-CCNC: 244 U/L (ref 35–104)
ALT SERPL-CCNC: 5 U/L (ref 5–33)
ANION GAP SERPL CALCULATED.3IONS-SCNC: 11 MMOL/L (ref 7–19)
ANISOCYTOSIS: ABNORMAL
AST SERPL-CCNC: 18 U/L (ref 5–32)
BASOPHILS ABSOLUTE: 0.1 K/UL (ref 0–0.2)
BASOPHILS RELATIVE PERCENT: 1.2 % (ref 0–1)
BILIRUB SERPL-MCNC: 0.5 MG/DL (ref 0.2–1.2)
BUN BLDV-MCNC: 32 MG/DL (ref 6–20)
CALCIUM SERPL-MCNC: 9 MG/DL (ref 8.6–10)
CHLORIDE BLD-SCNC: 107 MMOL/L (ref 98–111)
CO2: 15 MMOL/L (ref 22–29)
CREAT SERPL-MCNC: 1.1 MG/DL (ref 0.5–0.9)
EOSINOPHILS ABSOLUTE: 0 K/UL (ref 0–0.6)
EOSINOPHILS RELATIVE PERCENT: 0.1 % (ref 0–5)
GFR NON-AFRICAN AMERICAN: 51
GLUCOSE BLD-MCNC: 121 MG/DL (ref 74–109)
HCT VFR BLD CALC: 35.8 % (ref 37–47)
HEMOGLOBIN: 10.3 G/DL (ref 12–16)
HYPOCHROMIA: ABNORMAL
IMMATURE GRANULOCYTES #: 0.1 K/UL
LIPASE: 12 U/L (ref 13–60)
LYMPHOCYTES ABSOLUTE: 2.5 K/UL (ref 1.1–4.5)
LYMPHOCYTES RELATIVE PERCENT: 26.9 % (ref 20–40)
MCH RBC QN AUTO: 24.1 PG (ref 27–31)
MCHC RBC AUTO-ENTMCNC: 28.8 G/DL (ref 33–37)
MCV RBC AUTO: 83.8 FL (ref 81–99)
MONOCYTES ABSOLUTE: 0.9 K/UL (ref 0–0.9)
MONOCYTES RELATIVE PERCENT: 9.6 % (ref 0–10)
NEUTROPHILS ABSOLUTE: 5.8 K/UL (ref 1.5–7.5)
NEUTROPHILS RELATIVE PERCENT: 61.7 % (ref 50–65)
PDW BLD-RTO: 23.9 % (ref 11.5–14.5)
PLATELET # BLD: 327 K/UL (ref 130–400)
PLATELET SLIDE REVIEW: ADEQUATE
PMV BLD AUTO: 9.7 FL (ref 9.4–12.3)
POLYCHROMASIA: ABNORMAL
POTASSIUM SERPL-SCNC: 4.9 MMOL/L (ref 3.5–5)
RBC # BLD: 4.27 M/UL (ref 4.2–5.4)
SODIUM BLD-SCNC: 133 MMOL/L (ref 136–145)
TOTAL PROTEIN: 6.9 G/DL (ref 6.6–8.7)
WBC # BLD: 9.4 K/UL (ref 4.8–10.8)

## 2019-08-18 PROCEDURE — 80053 COMPREHEN METABOLIC PANEL: CPT

## 2019-08-18 PROCEDURE — 83690 ASSAY OF LIPASE: CPT

## 2019-08-18 PROCEDURE — 36415 COLL VENOUS BLD VENIPUNCTURE: CPT

## 2019-08-18 PROCEDURE — 71045 X-RAY EXAM CHEST 1 VIEW: CPT

## 2019-08-18 PROCEDURE — 99283 EMERGENCY DEPT VISIT LOW MDM: CPT

## 2019-08-18 PROCEDURE — 85025 COMPLETE CBC W/AUTO DIFF WBC: CPT

## 2019-08-18 RX ORDER — 0.9 % SODIUM CHLORIDE 0.9 %
500 INTRAVENOUS SOLUTION INTRAVENOUS ONCE
Status: DISCONTINUED | OUTPATIENT
Start: 2019-08-18 | End: 2019-08-18 | Stop reason: HOSPADM

## 2019-08-18 ASSESSMENT — ENCOUNTER SYMPTOMS
SORE THROAT: 0
VOMITING: 1
COUGH: 1
ABDOMINAL PAIN: 0
BACK PAIN: 0
DIARRHEA: 0
RHINORRHEA: 0
SHORTNESS OF BREATH: 0
NAUSEA: 1

## 2019-08-18 NOTE — ED PROVIDER NOTES
[facility]  eMERGENCY dEPARTMENT eNCOUnter      Pt Name: Shravan Brower  MRN: [id number]  [place] 1960  Date of evaluation: 8/18/2019  Provider: Amberly Barker MD    [address] [place] [address]       Chief Complaint   Patient presents with    Nausea     started yesterday, has had it intermittent x8 months         HISTORY OF PRESENT ILLNESS   (Location/Symptom, Timing/Onset,Context/Setting, Quality, Duration, Modifying Factors, Severity)  Note limiting factors. Shravan Brower is a 64 y.o. female who presents to the emergency department for concern of nausea and vomiting. The patient has been paraplegic for 40+ years after having a spinal tumor. Patient has a urostomy and she has chronic pressure ulcers she is currently residing at [facility] for wound care. She presents today because of a 8-month ongoing problem of posttussive emesis. The patient states that she gets to coughing so forcefully it but it causes her to vomit. She denies any abdominal pain. No diarrhea. She tells me that she had an appointment with GI on August 13 but was hospitalized at [address] [place] [address] but she is concerned that she is having progressive difficulty swallowing and decreased appetite. Appears she was hospitalized at [address] [place] [address] for her chronic wounds and osteomyelitis has recently been on oral antibiotic treatment and recently established with our wound care and has seen Dr. Norberto Rincon. She took herself off lisinopril 8 months ago with no relief in her symptoms. She is unsure if she has any history of reflux. Denies any chest pain or shortness of breath. No fevers. She has already had Phenergan and does not want any other medications for her nausea currently. HPI    NursingNotes were reviewed. REVIEW OF SYSTEMS    (2-9 systems for level 4, 10 or more for level 5)     Review of Systems   Constitutional: Positive for appetite change and fatigue. Negative for chills and fever. HENT: Negative for rhinorrhea and sore throat. Anisocytosis 1+ (*)     Polychromasia 1+ (*)     Hypochromia 2+ (*)     All other components within normal limits   COMPREHENSIVE METABOLIC PANEL - Abnormal; Notable for the following components:    Sodium 133 (*)     CO2 15 (*)     Glucose 121 (*)     BUN 32 (*)     CREATININE 1.1 (*)     GFR Non- 51 (*)     Alb 2.4 (*)     Alkaline Phosphatase 244 (*)     All other components within normal limits   LIPASE - Abnormal; Notable for the following components:    Lipase 12 (*)     All other components within normal limits       All other labs were within normal range or not returned as of this dictation. EMERGENCY DEPARTMENT COURSE and DIFFERENTIAL DIAGNOSIS/MDM:   Vitals:    Vitals:    08/18/19 1059   BP: (!) 149/69   Pulse: 89   Resp: 18   Temp: 97.8 °F (36.6 °C)   SpO2: 95%       MDM  Number of Diagnoses or Management Options     Amount and/or Complexity of Data Reviewed  Clinical lab tests: ordered and reviewed  Tests in the radiology section of CPT®: ordered and reviewed  Independent visualization of images, tracings, or specimens: yes      Here for chronic nausea and post tussive emesis, no episodes while here, work up reassuring today, ongoing for past 8 months, family arrived after patient was discharged were hoping she could get endoscopy done, patient had no sign of esophageal obstruction, patient has been in the hospital several times and hasnt been addressed family states her wounds seem to always take priority, family understanding, provided phone number to them for Dr. Dot Mckeon and can call office tomorrow      CONSULTS:  None    PROCEDURES:  Unless otherwise noted below, none     Procedures    FINAL IMPRESSION      1.  Nausea and vomiting, intractability of vomiting not specified, unspecified vomiting type          DISPOSITION/PLAN   DISPOSITION Decision To Discharge 08/18/2019 12:55:27 PM      PATIENT REFERRED TO:  Kayla Patel  [address] [place] [id number]  [phone]    Call in 1

## 2019-08-18 NOTE — ED TRIAGE NOTES
Pt from [facility]; here for nausea that has gotten worse over the past 8 months. Pt has multiple tunneling pressure sores to left ischium and pressure sores to bilateral heels.

## 2019-08-20 ENCOUNTER — APPOINTMENT (OUTPATIENT)
Dept: CT IMAGING | Age: 59
DRG: 981 | End: 2019-08-20
Payer: MEDICARE

## 2019-08-20 ENCOUNTER — HOSPITAL ENCOUNTER (INPATIENT)
Age: 59
LOS: 17 days | Discharge: ANOTHER HOSPITAL | DRG: 981 | End: 2019-09-06
Attending: EMERGENCY MEDICINE | Admitting: FAMILY MEDICINE
Payer: MEDICARE

## 2019-08-20 DIAGNOSIS — D64.9 ANEMIA, UNSPECIFIED TYPE: Primary | ICD-10-CM

## 2019-08-20 DIAGNOSIS — K92.2 GASTROINTESTINAL HEMORRHAGE, UNSPECIFIED GASTROINTESTINAL HEMORRHAGE TYPE: ICD-10-CM

## 2019-08-20 DIAGNOSIS — K92.2 GI BLEED: ICD-10-CM

## 2019-08-20 PROBLEM — E83.51 HYPOCALCEMIA: Status: ACTIVE | Noted: 2019-08-20

## 2019-08-20 PROBLEM — G93.40 ACUTE ENCEPHALOPATHY: Status: ACTIVE | Noted: 2019-08-20

## 2019-08-20 PROBLEM — E43 SEVERE PROTEIN-CALORIE MALNUTRITION (HCC): Status: ACTIVE | Noted: 2019-08-20

## 2019-08-20 PROBLEM — N17.9 ACUTE KIDNEY INJURY (HCC): Status: ACTIVE | Noted: 2019-08-20

## 2019-08-20 PROBLEM — D62 ANEMIA DUE TO BLOOD LOSS, ACUTE: Status: ACTIVE | Noted: 2019-08-20

## 2019-08-20 PROBLEM — E87.1 HYPONATREMIA: Status: ACTIVE | Noted: 2019-08-20

## 2019-08-20 PROBLEM — L89.629 PRESSURE INJURY OF SKIN OF LEFT HEEL: Status: ACTIVE | Noted: 2019-08-20

## 2019-08-20 LAB
ABO/RH: NORMAL
ALBUMIN SERPL-MCNC: 1.9 G/DL (ref 3.5–5.2)
ALP BLD-CCNC: 205 U/L (ref 35–104)
ALT SERPL-CCNC: <5 U/L (ref 5–33)
ANION GAP SERPL CALCULATED.3IONS-SCNC: 13 MMOL/L (ref 7–19)
ANISOCYTOSIS: ABNORMAL
ANTIBODY SCREEN: NORMAL
APTT: 32.9 SEC (ref 26–36.2)
AST SERPL-CCNC: 11 U/L (ref 5–32)
BACTERIA: ABNORMAL /HPF
BASOPHILS ABSOLUTE: 0.1 K/UL (ref 0–0.2)
BASOPHILS RELATIVE PERCENT: 0.9 % (ref 0–1)
BILIRUB SERPL-MCNC: 0.4 MG/DL (ref 0.2–1.2)
BILIRUBIN URINE: NEGATIVE
BLOOD, URINE: ABNORMAL
BUN BLDV-MCNC: 30 MG/DL (ref 6–20)
CALCIUM SERPL-MCNC: 8.3 MG/DL (ref 8.6–10)
CHLORIDE BLD-SCNC: 106 MMOL/L (ref 98–111)
CLARITY: ABNORMAL
CO2: 14 MMOL/L (ref 22–29)
COLOR: YELLOW
CREAT SERPL-MCNC: 1.1 MG/DL (ref 0.5–0.9)
EOSINOPHILS ABSOLUTE: 0 K/UL (ref 0–0.6)
EOSINOPHILS RELATIVE PERCENT: 0.1 % (ref 0–5)
EPITHELIAL CELLS, UA: ABNORMAL /HPF
GFR NON-AFRICAN AMERICAN: 51
GLUCOSE BLD-MCNC: 109 MG/DL (ref 74–109)
GLUCOSE URINE: NEGATIVE MG/DL
HCT VFR BLD CALC: 28 % (ref 37–47)
HEMOGLOBIN: 8.4 G/DL (ref 12–16)
HYPOCHROMIA: ABNORMAL
IMMATURE GRANULOCYTES #: 0 K/UL
INR BLD: 1.68 (ref 0.88–1.18)
KETONES, URINE: NEGATIVE MG/DL
LEUKOCYTE ESTERASE, URINE: NEGATIVE
LYMPHOCYTES ABSOLUTE: 2.3 K/UL (ref 1.1–4.5)
LYMPHOCYTES RELATIVE PERCENT: 31.1 % (ref 20–40)
MCH RBC QN AUTO: 25.1 PG (ref 27–31)
MCHC RBC AUTO-ENTMCNC: 30 G/DL (ref 33–37)
MCV RBC AUTO: 83.6 FL (ref 81–99)
MONOCYTES ABSOLUTE: 1.2 K/UL (ref 0–0.9)
MONOCYTES RELATIVE PERCENT: 16.1 % (ref 0–10)
NEUTROPHILS ABSOLUTE: 3.8 K/UL (ref 1.5–7.5)
NEUTROPHILS RELATIVE PERCENT: 51.4 % (ref 50–65)
NITRITE, URINE: NEGATIVE
PDW BLD-RTO: 22.5 % (ref 11.5–14.5)
PH UA: 7 (ref 5–8)
PLATELET # BLD: 277 K/UL (ref 130–400)
PMV BLD AUTO: 8.9 FL (ref 9.4–12.3)
POTASSIUM REFLEX MAGNESIUM: 4.4 MMOL/L (ref 3.5–5)
PROTEIN UA: NEGATIVE MG/DL
PROTHROMBIN TIME: 19.1 SEC (ref 12–14.6)
RBC # BLD: 3.35 M/UL (ref 4.2–5.4)
REASON FOR REJECTION: NORMAL
REJECTED TEST: NORMAL
SODIUM BLD-SCNC: 133 MMOL/L (ref 136–145)
SPECIFIC GRAVITY UA: 1.02 (ref 1–1.03)
TOTAL PROTEIN: 5.5 G/DL (ref 6.6–8.7)
TROPONIN: <0.01 NG/ML (ref 0–0.03)
URINE REFLEX TO CULTURE: ABNORMAL
UROBILINOGEN, URINE: 0.2 E.U./DL
WBC # BLD: 7.4 K/UL (ref 4.8–10.8)
WBC UA: ABNORMAL /HPF (ref 0–5)

## 2019-08-20 PROCEDURE — 96374 THER/PROPH/DIAG INJ IV PUSH: CPT

## 2019-08-20 PROCEDURE — C9113 INJ PANTOPRAZOLE SODIUM, VIA: HCPCS | Performed by: EMERGENCY MEDICINE

## 2019-08-20 PROCEDURE — 86850 RBC ANTIBODY SCREEN: CPT

## 2019-08-20 PROCEDURE — 6360000004 HC RX CONTRAST MEDICATION: Performed by: EMERGENCY MEDICINE

## 2019-08-20 PROCEDURE — 85610 PROTHROMBIN TIME: CPT

## 2019-08-20 PROCEDURE — 74177 CT ABD & PELVIS W/CONTRAST: CPT

## 2019-08-20 PROCEDURE — 81001 URINALYSIS AUTO W/SCOPE: CPT

## 2019-08-20 PROCEDURE — 86901 BLOOD TYPING SEROLOGIC RH(D): CPT

## 2019-08-20 PROCEDURE — 2580000003 HC RX 258: Performed by: FAMILY MEDICINE

## 2019-08-20 PROCEDURE — 85730 THROMBOPLASTIN TIME PARTIAL: CPT

## 2019-08-20 PROCEDURE — 6370000000 HC RX 637 (ALT 250 FOR IP): Performed by: FAMILY MEDICINE

## 2019-08-20 PROCEDURE — 6360000002 HC RX W HCPCS: Performed by: EMERGENCY MEDICINE

## 2019-08-20 PROCEDURE — 71260 CT THORAX DX C+: CPT

## 2019-08-20 PROCEDURE — 80053 COMPREHEN METABOLIC PANEL: CPT

## 2019-08-20 PROCEDURE — 70450 CT HEAD/BRAIN W/O DYE: CPT

## 2019-08-20 PROCEDURE — 93005 ELECTROCARDIOGRAM TRACING: CPT

## 2019-08-20 PROCEDURE — 2580000003 HC RX 258: Performed by: EMERGENCY MEDICINE

## 2019-08-20 PROCEDURE — 85025 COMPLETE CBC W/AUTO DIFF WBC: CPT

## 2019-08-20 PROCEDURE — 86900 BLOOD TYPING SEROLOGIC ABO: CPT

## 2019-08-20 PROCEDURE — 84484 ASSAY OF TROPONIN QUANT: CPT

## 2019-08-20 PROCEDURE — 2500000003 HC RX 250 WO HCPCS: Performed by: FAMILY MEDICINE

## 2019-08-20 PROCEDURE — 1210000000 HC MED SURG R&B

## 2019-08-20 PROCEDURE — 99285 EMERGENCY DEPT VISIT HI MDM: CPT

## 2019-08-20 PROCEDURE — 36415 COLL VENOUS BLD VENIPUNCTURE: CPT

## 2019-08-20 RX ORDER — MORPHINE SULFATE 2 MG/ML
2 INJECTION, SOLUTION INTRAMUSCULAR; INTRAVENOUS
Status: DISCONTINUED | OUTPATIENT
Start: 2019-08-20 | End: 2019-08-30

## 2019-08-20 RX ORDER — BACLOFEN 10 MG/1
10 TABLET ORAL 3 TIMES DAILY
Status: DISCONTINUED | OUTPATIENT
Start: 2019-08-20 | End: 2019-08-30

## 2019-08-20 RX ORDER — ONDANSETRON 2 MG/ML
4 INJECTION INTRAMUSCULAR; INTRAVENOUS EVERY 6 HOURS PRN
Status: DISCONTINUED | OUTPATIENT
Start: 2019-08-20 | End: 2019-08-29

## 2019-08-20 RX ORDER — ACETAMINOPHEN 325 MG/1
650 TABLET ORAL EVERY 4 HOURS PRN
Status: DISCONTINUED | OUTPATIENT
Start: 2019-08-20 | End: 2019-09-06 | Stop reason: HOSPADM

## 2019-08-20 RX ORDER — SODIUM CHLORIDE 0.9 % (FLUSH) 0.9 %
10 SYRINGE (ML) INJECTION PRN
Status: DISCONTINUED | OUTPATIENT
Start: 2019-08-20 | End: 2019-09-06 | Stop reason: HOSPADM

## 2019-08-20 RX ORDER — LORAZEPAM 2 MG/ML
1 INJECTION INTRAMUSCULAR ONCE
Status: COMPLETED | OUTPATIENT
Start: 2019-08-20 | End: 2019-08-20

## 2019-08-20 RX ORDER — ZINC SULFATE 50(220)MG
220 CAPSULE ORAL DAILY
Status: DISCONTINUED | OUTPATIENT
Start: 2019-08-20 | End: 2019-08-27

## 2019-08-20 RX ORDER — ASCORBIC ACID 500 MG
1000 TABLET ORAL 2 TIMES DAILY
Status: DISCONTINUED | OUTPATIENT
Start: 2019-08-20 | End: 2019-08-27

## 2019-08-20 RX ORDER — SODIUM CHLORIDE 9 MG/ML
INJECTION, SOLUTION INTRAVENOUS CONTINUOUS
Status: DISCONTINUED | OUTPATIENT
Start: 2019-08-20 | End: 2019-08-22

## 2019-08-20 RX ORDER — 0.9 % SODIUM CHLORIDE 0.9 %
1000 INTRAVENOUS SOLUTION INTRAVENOUS ONCE
Status: COMPLETED | OUTPATIENT
Start: 2019-08-20 | End: 2019-08-20

## 2019-08-20 RX ORDER — M-VIT,TX,IRON,MINS/CALC/FOLIC 27MG-0.4MG
1 TABLET ORAL DAILY
Status: DISCONTINUED | OUTPATIENT
Start: 2019-08-20 | End: 2019-09-06 | Stop reason: HOSPADM

## 2019-08-20 RX ORDER — SODIUM CHLORIDE 0.9 % (FLUSH) 0.9 %
10 SYRINGE (ML) INJECTION EVERY 12 HOURS SCHEDULED
Status: DISCONTINUED | OUTPATIENT
Start: 2019-08-20 | End: 2019-08-21 | Stop reason: SDUPTHER

## 2019-08-20 RX ORDER — MORPHINE SULFATE 2 MG/ML
4 INJECTION, SOLUTION INTRAMUSCULAR; INTRAVENOUS
Status: DISCONTINUED | OUTPATIENT
Start: 2019-08-20 | End: 2019-08-30

## 2019-08-20 RX ORDER — FOLIC ACID 1 MG/1
1 TABLET ORAL DAILY
Status: DISCONTINUED | OUTPATIENT
Start: 2019-08-20 | End: 2019-08-27

## 2019-08-20 RX ADMIN — SODIUM CHLORIDE 8 MG/HR: 9 INJECTION, SOLUTION INTRAVENOUS at 18:18

## 2019-08-20 RX ADMIN — IOPAMIDOL 90 ML: 755 INJECTION, SOLUTION INTRAVENOUS at 16:26

## 2019-08-20 RX ADMIN — OXYCODONE HYDROCHLORIDE AND ACETAMINOPHEN 1000 MG: 500 TABLET ORAL at 23:49

## 2019-08-20 RX ADMIN — SILVER SULFADIAZINE: 10 CREAM TOPICAL at 23:50

## 2019-08-20 RX ADMIN — LORAZEPAM 1 MG: 2 INJECTION INTRAMUSCULAR; INTRAVENOUS at 18:36

## 2019-08-20 RX ADMIN — FOLIC ACID 1 MG: 1 TABLET ORAL at 23:49

## 2019-08-20 RX ADMIN — Medication 220 MG: at 23:49

## 2019-08-20 RX ADMIN — SODIUM CHLORIDE: 9 INJECTION, SOLUTION INTRAVENOUS at 23:50

## 2019-08-20 RX ADMIN — MULTIPLE VITAMINS W/ MINERALS TAB 1 TABLET: TAB at 23:50

## 2019-08-20 RX ADMIN — BACLOFEN 10 MG: 10 TABLET ORAL at 23:49

## 2019-08-20 RX ADMIN — SODIUM CHLORIDE 1000 ML: 9 INJECTION, SOLUTION INTRAVENOUS at 14:14

## 2019-08-20 ASSESSMENT — ENCOUNTER SYMPTOMS
PHOTOPHOBIA: 0
DIARRHEA: 0
COUGH: 0
CHEST TIGHTNESS: 0
ABDOMINAL PAIN: 0
SHORTNESS OF BREATH: 0
VOMITING: 0
RHINORRHEA: 0
NAUSEA: 0
BACK PAIN: 0
EYE PAIN: 0
SORE THROAT: 0

## 2019-08-21 ENCOUNTER — ANESTHESIA (OUTPATIENT)
Dept: ONCOLOGY | Age: 59
DRG: 981 | End: 2019-08-21
Payer: MEDICARE

## 2019-08-21 ENCOUNTER — ANESTHESIA EVENT (OUTPATIENT)
Dept: ONCOLOGY | Age: 59
DRG: 981 | End: 2019-08-21
Payer: MEDICARE

## 2019-08-21 VITALS
OXYGEN SATURATION: 97 % | DIASTOLIC BLOOD PRESSURE: 43 MMHG | SYSTOLIC BLOOD PRESSURE: 91 MMHG | RESPIRATION RATE: 20 BRPM

## 2019-08-21 PROBLEM — K92.2 GI BLEED: Status: ACTIVE | Noted: 2019-08-20

## 2019-08-21 LAB
AMMONIA: 21 UMOL/L (ref 11–51)
ANION GAP SERPL CALCULATED.3IONS-SCNC: 17 MMOL/L (ref 7–19)
BUN BLDV-MCNC: 28 MG/DL (ref 6–20)
CALCIUM SERPL-MCNC: 8.6 MG/DL (ref 8.6–10)
CHLORIDE BLD-SCNC: 112 MMOL/L (ref 98–111)
CO2: 12 MMOL/L (ref 22–29)
CREAT SERPL-MCNC: 1.1 MG/DL (ref 0.5–0.9)
EKG P AXIS: 42 DEGREES
EKG P-R INTERVAL: 140 MS
EKG Q-T INTERVAL: 356 MS
EKG QRS DURATION: 78 MS
EKG QTC CALCULATION (BAZETT): 396 MS
EKG T AXIS: 19 DEGREES
GFR NON-AFRICAN AMERICAN: 51
GLUCOSE BLD-MCNC: 87 MG/DL (ref 74–109)
HCT VFR BLD CALC: 28.8 % (ref 37–47)
HCT VFR BLD CALC: 29.7 % (ref 37–47)
HCT VFR BLD CALC: 29.8 % (ref 37–47)
HCT VFR BLD CALC: 30.6 % (ref 37–47)
HCT VFR BLD CALC: 31 % (ref 37–47)
HEMOGLOBIN: 8.4 G/DL (ref 12–16)
HEMOGLOBIN: 8.5 G/DL (ref 12–16)
HEMOGLOBIN: 8.9 G/DL (ref 12–16)
HEMOGLOBIN: 9 G/DL (ref 12–16)
IRON SATURATION: 11 % (ref 14–50)
IRON: 11 UG/DL (ref 37–145)
MCH RBC QN AUTO: 24.5 PG (ref 27–31)
MCH RBC QN AUTO: 24.7 PG (ref 27–31)
MCH RBC QN AUTO: 24.9 PG (ref 27–31)
MCH RBC QN AUTO: 25 PG (ref 27–31)
MCHC RBC AUTO-ENTMCNC: 28.6 G/DL (ref 33–37)
MCHC RBC AUTO-ENTMCNC: 29 G/DL (ref 33–37)
MCHC RBC AUTO-ENTMCNC: 29.1 G/DL (ref 33–37)
MCHC RBC AUTO-ENTMCNC: 29.2 G/DL (ref 33–37)
MCV RBC AUTO: 84.8 FL (ref 81–99)
MCV RBC AUTO: 85.5 FL (ref 81–99)
MCV RBC AUTO: 85.6 FL (ref 81–99)
MCV RBC AUTO: 86.1 FL (ref 81–99)
OCCULT BLOOD QC: NORMAL
OCCULT BLOOD SCREENING: NORMAL
PDW BLD-RTO: 22.4 % (ref 11.5–14.5)
PDW BLD-RTO: 22.4 % (ref 11.5–14.5)
PDW BLD-RTO: 22.5 % (ref 11.5–14.5)
PDW BLD-RTO: 22.6 % (ref 11.5–14.5)
PLATELET # BLD: 303 K/UL (ref 130–400)
PLATELET # BLD: 312 K/UL (ref 130–400)
PLATELET # BLD: 320 K/UL (ref 130–400)
PLATELET # BLD: 351 K/UL (ref 130–400)
PMV BLD AUTO: 9.6 FL (ref 9.4–12.3)
PMV BLD AUTO: 9.8 FL (ref 9.4–12.3)
PMV BLD AUTO: 9.8 FL (ref 9.4–12.3)
PMV BLD AUTO: 9.9 FL (ref 9.4–12.3)
POTASSIUM REFLEX MAGNESIUM: 4.4 MMOL/L (ref 3.5–5)
RBC # BLD: 3.37 M/UL (ref 4.2–5.4)
RBC # BLD: 3.47 M/UL (ref 4.2–5.4)
RBC # BLD: 3.6 M/UL (ref 4.2–5.4)
RBC # BLD: 3.61 M/UL (ref 4.2–5.4)
RETICULOCYTE ABSOLUTE COUNT: 0.1 M/UL (ref 0.03–0.12)
RETICULOCYTE COUNT PCT: 2.98 % (ref 0.5–1.5)
SODIUM BLD-SCNC: 141 MMOL/L (ref 136–145)
TOTAL IRON BINDING CAPACITY: 96 UG/DL (ref 250–400)
TSH SERPL DL<=0.05 MIU/L-ACNC: 4.53 UIU/ML (ref 0.27–4.2)
VITAMIN B-12: 1095 PG/ML (ref 211–946)
WBC # BLD: 6 K/UL (ref 4.8–10.8)
WBC # BLD: 6.3 K/UL (ref 4.8–10.8)
WBC # BLD: 6.6 K/UL (ref 4.8–10.8)
WBC # BLD: 8 K/UL (ref 4.8–10.8)

## 2019-08-21 PROCEDURE — 82607 VITAMIN B-12: CPT

## 2019-08-21 PROCEDURE — 1210000000 HC MED SURG R&B

## 2019-08-21 PROCEDURE — 36569 INSJ PICC 5 YR+ W/O IMAGING: CPT

## 2019-08-21 PROCEDURE — C1751 CATH, INF, PER/CENT/MIDLINE: HCPCS

## 2019-08-21 PROCEDURE — 6360000002 HC RX W HCPCS: Performed by: FAMILY MEDICINE

## 2019-08-21 PROCEDURE — 6360000002 HC RX W HCPCS: Performed by: INTERNAL MEDICINE

## 2019-08-21 PROCEDURE — 0DJ08ZZ INSPECTION OF UPPER INTESTINAL TRACT, VIA NATURAL OR ARTIFICIAL OPENING ENDOSCOPIC: ICD-10-PCS | Performed by: INTERNAL MEDICINE

## 2019-08-21 PROCEDURE — 85045 AUTOMATED RETICULOCYTE COUNT: CPT

## 2019-08-21 PROCEDURE — 83550 IRON BINDING TEST: CPT

## 2019-08-21 PROCEDURE — G0328 FECAL BLOOD SCRN IMMUNOASSAY: HCPCS

## 2019-08-21 PROCEDURE — C9113 INJ PANTOPRAZOLE SODIUM, VIA: HCPCS | Performed by: FAMILY MEDICINE

## 2019-08-21 PROCEDURE — 76937 US GUIDE VASCULAR ACCESS: CPT

## 2019-08-21 PROCEDURE — 83540 ASSAY OF IRON: CPT

## 2019-08-21 PROCEDURE — 43235 EGD DIAGNOSTIC BRUSH WASH: CPT | Performed by: INTERNAL MEDICINE

## 2019-08-21 PROCEDURE — 7100000001 HC PACU RECOVERY - ADDTL 15 MIN: Performed by: INTERNAL MEDICINE

## 2019-08-21 PROCEDURE — 82140 ASSAY OF AMMONIA: CPT

## 2019-08-21 PROCEDURE — 6370000000 HC RX 637 (ALT 250 FOR IP): Performed by: INTERNAL MEDICINE

## 2019-08-21 PROCEDURE — 2500000003 HC RX 250 WO HCPCS: Performed by: HOSPITALIST

## 2019-08-21 PROCEDURE — 85027 COMPLETE CBC AUTOMATED: CPT

## 2019-08-21 PROCEDURE — B548ZZA ULTRASONOGRAPHY OF SUPERIOR VENA CAVA, GUIDANCE: ICD-10-PCS | Performed by: HOSPITALIST

## 2019-08-21 PROCEDURE — 36415 COLL VENOUS BLD VENIPUNCTURE: CPT

## 2019-08-21 PROCEDURE — 3609012800 HC EGD DIAGNOSTIC ONLY: Performed by: INTERNAL MEDICINE

## 2019-08-21 PROCEDURE — 99221 1ST HOSP IP/OBS SF/LOW 40: CPT | Performed by: INTERNAL MEDICINE

## 2019-08-21 PROCEDURE — 2500000003 HC RX 250 WO HCPCS: Performed by: NURSE ANESTHETIST, CERTIFIED REGISTERED

## 2019-08-21 PROCEDURE — 02HV33Z INSERTION OF INFUSION DEVICE INTO SUPERIOR VENA CAVA, PERCUTANEOUS APPROACH: ICD-10-PCS | Performed by: HOSPITALIST

## 2019-08-21 PROCEDURE — 6360000002 HC RX W HCPCS: Performed by: NURSE ANESTHETIST, CERTIFIED REGISTERED

## 2019-08-21 PROCEDURE — 6370000000 HC RX 637 (ALT 250 FOR IP): Performed by: HOSPITALIST

## 2019-08-21 PROCEDURE — 2580000003 HC RX 258: Performed by: FAMILY MEDICINE

## 2019-08-21 PROCEDURE — 84443 ASSAY THYROID STIM HORMONE: CPT

## 2019-08-21 PROCEDURE — 7100000000 HC PACU RECOVERY - FIRST 15 MIN: Performed by: INTERNAL MEDICINE

## 2019-08-21 PROCEDURE — 80048 BASIC METABOLIC PNL TOTAL CA: CPT

## 2019-08-21 PROCEDURE — 2580000003 HC RX 258: Performed by: NURSE ANESTHETIST, CERTIFIED REGISTERED

## 2019-08-21 PROCEDURE — 36592 COLLECT BLOOD FROM PICC: CPT

## 2019-08-21 PROCEDURE — 3700000000 HC ANESTHESIA ATTENDED CARE

## 2019-08-21 RX ORDER — ALPRAZOLAM 1 MG/1
1 TABLET ORAL 3 TIMES DAILY PRN
COMMUNITY

## 2019-08-21 RX ORDER — PROPOFOL 10 MG/ML
INJECTION, EMULSION INTRAVENOUS PRN
Status: DISCONTINUED | OUTPATIENT
Start: 2019-08-21 | End: 2019-08-21 | Stop reason: SDUPTHER

## 2019-08-21 RX ORDER — HYDROCODONE BITARTRATE AND ACETAMINOPHEN 10; 325 MG/1; MG/1
1 TABLET ORAL EVERY 6 HOURS PRN
COMMUNITY

## 2019-08-21 RX ORDER — LIDOCAINE HYDROCHLORIDE 10 MG/ML
INJECTION, SOLUTION EPIDURAL; INFILTRATION; INTRACAUDAL; PERINEURAL PRN
Status: DISCONTINUED | OUTPATIENT
Start: 2019-08-21 | End: 2019-08-21 | Stop reason: SDUPTHER

## 2019-08-21 RX ORDER — LORAZEPAM 0.5 MG/1
0.5 TABLET ORAL ONCE
Status: COMPLETED | OUTPATIENT
Start: 2019-08-21 | End: 2019-08-21

## 2019-08-21 RX ORDER — PHYTONADIONE 10 MG/ML
10 INJECTION, EMULSION INTRAMUSCULAR; INTRAVENOUS; SUBCUTANEOUS ONCE
Status: DISCONTINUED | OUTPATIENT
Start: 2019-08-21 | End: 2019-08-27

## 2019-08-21 RX ORDER — SODIUM CHLORIDE 0.9 % (FLUSH) 0.9 %
10 SYRINGE (ML) INJECTION EVERY 12 HOURS SCHEDULED
Status: DISCONTINUED | OUTPATIENT
Start: 2019-08-21 | End: 2019-08-21 | Stop reason: SDUPTHER

## 2019-08-21 RX ORDER — SODIUM CHLORIDE, SODIUM LACTATE, POTASSIUM CHLORIDE, CALCIUM CHLORIDE 600; 310; 30; 20 MG/100ML; MG/100ML; MG/100ML; MG/100ML
INJECTION, SOLUTION INTRAVENOUS CONTINUOUS
Status: DISCONTINUED | OUTPATIENT
Start: 2019-08-21 | End: 2019-08-21

## 2019-08-21 RX ORDER — SODIUM CHLORIDE, SODIUM LACTATE, POTASSIUM CHLORIDE, CALCIUM CHLORIDE 600; 310; 30; 20 MG/100ML; MG/100ML; MG/100ML; MG/100ML
INJECTION, SOLUTION INTRAVENOUS CONTINUOUS PRN
Status: DISCONTINUED | OUTPATIENT
Start: 2019-08-21 | End: 2019-08-21 | Stop reason: SDUPTHER

## 2019-08-21 RX ORDER — SODIUM HYPOCHLORITE 1.25 MG/ML
SOLUTION TOPICAL DAILY
Status: DISCONTINUED | OUTPATIENT
Start: 2019-08-21 | End: 2019-08-23

## 2019-08-21 RX ORDER — PANTOPRAZOLE SODIUM 40 MG/1
40 TABLET, DELAYED RELEASE ORAL
Status: DISCONTINUED | OUTPATIENT
Start: 2019-08-21 | End: 2019-08-29

## 2019-08-21 RX ORDER — 0.9 % SODIUM CHLORIDE 0.9 %
10 VIAL (ML) INJECTION PRN
Status: DISCONTINUED | OUTPATIENT
Start: 2019-08-21 | End: 2019-08-25 | Stop reason: SDUPTHER

## 2019-08-21 RX ORDER — SODIUM CHLORIDE 0.9 % (FLUSH) 0.9 %
10 SYRINGE (ML) INJECTION EVERY 12 HOURS SCHEDULED
Status: DISCONTINUED | OUTPATIENT
Start: 2019-08-21 | End: 2019-09-06 | Stop reason: HOSPADM

## 2019-08-21 RX ORDER — SODIUM CHLORIDE 0.9 % (FLUSH) 0.9 %
10 SYRINGE (ML) INJECTION PRN
Status: DISCONTINUED | OUTPATIENT
Start: 2019-08-21 | End: 2019-09-06 | Stop reason: HOSPADM

## 2019-08-21 RX ORDER — LIDOCAINE HYDROCHLORIDE 10 MG/ML
5 INJECTION, SOLUTION EPIDURAL; INFILTRATION; INTRACAUDAL; PERINEURAL ONCE
Status: COMPLETED | OUTPATIENT
Start: 2019-08-21 | End: 2019-08-21

## 2019-08-21 RX ADMIN — LIDOCAINE HYDROCHLORIDE 50 MG: 10 INJECTION, SOLUTION EPIDURAL; INFILTRATION; INTRACAUDAL; PERINEURAL at 14:53

## 2019-08-21 RX ADMIN — COLLAGENASE SANTYL: 250 OINTMENT TOPICAL at 17:09

## 2019-08-21 RX ADMIN — PROPOFOL 50 MG: 10 INJECTION, EMULSION INTRAVENOUS at 14:58

## 2019-08-21 RX ADMIN — SODIUM CHLORIDE 8 MG/HR: 9 INJECTION, SOLUTION INTRAVENOUS at 11:52

## 2019-08-21 RX ADMIN — OXYCODONE HYDROCHLORIDE AND ACETAMINOPHEN 1000 MG: 500 TABLET ORAL at 21:19

## 2019-08-21 RX ADMIN — PROPOFOL 50 MG: 10 INJECTION, EMULSION INTRAVENOUS at 15:01

## 2019-08-21 RX ADMIN — LORAZEPAM 0.5 MG: 0.5 TABLET ORAL at 00:49

## 2019-08-21 RX ADMIN — BACLOFEN 10 MG: 10 TABLET ORAL at 21:19

## 2019-08-21 RX ADMIN — PROPOFOL 50 MG: 10 INJECTION, EMULSION INTRAVENOUS at 14:53

## 2019-08-21 RX ADMIN — PANTOPRAZOLE SODIUM 40 MG: 40 TABLET, DELAYED RELEASE ORAL at 17:49

## 2019-08-21 RX ADMIN — MORPHINE SULFATE 2 MG: 2 INJECTION, SOLUTION INTRAMUSCULAR; INTRAVENOUS at 07:40

## 2019-08-21 RX ADMIN — SODIUM CHLORIDE, SODIUM LACTATE, POTASSIUM CHLORIDE, AND CALCIUM CHLORIDE: 600; 310; 30; 20 INJECTION, SOLUTION INTRAVENOUS at 14:51

## 2019-08-21 RX ADMIN — DAKIN'S SOLUTION 0.125% (QUARTER STRENGTH): 0.12 SOLUTION at 17:05

## 2019-08-21 RX ADMIN — LIDOCAINE HYDROCHLORIDE 5 ML: 10 INJECTION, SOLUTION EPIDURAL; INFILTRATION; INTRACAUDAL; PERINEURAL at 12:03

## 2019-08-21 ASSESSMENT — PAIN SCALES - GENERAL
PAINLEVEL_OUTOF10: 6
PAINLEVEL_OUTOF10: 0
PAINLEVEL_OUTOF10: 3
PAINLEVEL_OUTOF10: 0

## 2019-08-21 NOTE — ANESTHESIA PRE PROCEDURE
Department of Anesthesiology  Preprocedure Note       Name:  Chayo Newby   Age:  64 y.o.  :  1960                                          MRN:  [id number]         Date:  2019      Surgeon: Jessie Cummings):  Francoise Ibrahim MD    Procedure: EGD DIAGNOSTIC ONLY (N/A )    Medications prior to admission:   Prior to Admission medications    Medication Sig Start Date End Date Taking? Authorizing Provider   ALPRAZolam [place]) 1 MG tablet Take 1 mg by mouth 3 times daily as needed for Anxiety. Yes Historical Provider, MD   HYDROcodone-acetaminophen (NORCO)  MG per tablet Take 1 tablet by mouth every 6 hours as needed for Pain. Yes Historical Provider, MD   sodium hypochlorite (DAKINS) 0.125 % SOLN Irrigate with as directed 2 times daily   Yes Historical Provider, MD   ascorbic acid (VITAMIN C) 1000 MG tablet Take 1,000 mg by mouth 2 times daily   Yes Historical Provider, MD   folic acid (FOLVITE) 1 MG tablet Take 1 mg by mouth daily   Yes Historical Provider, MD   Multiple Vitamins-Minerals (THERAPEUTIC MULTIVITAMIN-MINERALS) tablet Take 1 tablet by mouth daily   Yes Historical Provider, MD   omeprazole (PRILOSEC) 20 MG delayed release capsule Take 20 mg by mouth daily   Yes Historical Provider, MD   promethazine (PHENERGAN) 12.5 MG tablet Take 12.5 mg by mouth every 6 hours as needed for Nausea   Yes Historical Provider, MD   silver sulfADIAZINE (SILVADENE) 1 % cream Apply topically daily Apply topically daily.    Yes Historical Provider, MD   zinc 50 MG CAPS Take 1 capsule by mouth daily   Yes Historical Provider, MD   baclofen (LIORESAL) 10 MG tablet Take 10 mg by mouth 3 times daily    Yes Historical Provider, MD       Current medications:    Current Facility-Administered Medications   Medication Dose Route Frequency Provider Last Rate Last Dose    [MAR Hold] sodium chloride flush 0.9 % injection 10 mL  10 mL Intravenous PRN Cat Dudley MD       [facility] Hold] sodium chloride flush 0.9 %

## 2019-08-21 NOTE — PROGRESS NOTES
4 Eyes Skin Assessment    Trino Casey is being assessed upon: Admission    I agree that I, [place], along with Karolyn Hatchet (either 2 RN's or 1 LPN and 1 RN) have performed a thorough Head to Toe Skin Assessment on the patient. ALL assessment sites listed below have been assessed. Areas assessed by both nurses:     [x]   Head, Face, and Ears   [x]   Shoulders, Back, and Chest  [x]   Arms, Elbows, and Hands   [x]   Coccyx, Sacrum, and Ischium  [x]   Legs, Feet, and Heels    Does the Patient have Skin Breakdown? Yes, wound(s) noted upon assessment. It is the responsibility of the Primary Nurse to assure that the following documentation, preventions, orders, and consults are complete on the above noted wound(s): Wound LDA initiated. LDA Flowsheet Documentation includes the Nellie-wound, Wound Assessment, Measurements, Dressing Treatment, Drainage, and Color.     Roger Prevention initiated: Yes  Wound Care Orders initiated: Yes    [address]  nurse consulted for Pressure Injury (Stage 3,4, Unstageable, DTI, NWPT, and Complex wounds) and New or Established Ostomies: Yes        Primary Nurse eSignature: [address], RN on 8/21/2019 at 6:17 AM      Co-Signer eSignature: Electronically signed by Miguel Aguero RN on 8/25/19 at 1:50 AM

## 2019-08-21 NOTE — BRIEF OP NOTE
Brief Postoperative Note  ______________________________________________________________    Patient: Chayo Newby  YOB: 1960  MRN: [id number]  Date of Procedure: 8/21/2019    Pre-Op Diagnosis: GI Bleed    Post-Op Diagnosis: Same       Procedure(s):  EGD DIAGNOSTIC ONLY    Anesthesia: TIVA    Surgeon(s):  Francoise Ibrahim MD    Assistant: Michelle JEFFERS    Estimated Blood Loss (mL): none    Complications: none    Specimens:   * No specimens in log *    Implants:  * No implants in log *      Drains: * No LDAs found *    Findings: Erosive esophagitis, no ulcer disease, no bleeding    Francoise Ibrahim MD  Date: 8/21/2019  Time: 3:41 PM

## 2019-08-21 NOTE — PROGRESS NOTES
Dressing changed with [facility] wound care nurse. New orders initiated. Pt transferred to [facility] bed without difficulty are dressing change.

## 2019-08-22 ENCOUNTER — APPOINTMENT (OUTPATIENT)
Dept: NUCLEAR MEDICINE | Age: 59
DRG: 981 | End: 2019-08-22
Payer: MEDICARE

## 2019-08-22 LAB
ANION GAP SERPL CALCULATED.3IONS-SCNC: 13 MMOL/L (ref 7–19)
BUN BLDV-MCNC: 25 MG/DL (ref 6–20)
CALCIUM SERPL-MCNC: 8.2 MG/DL (ref 8.6–10)
CHLORIDE BLD-SCNC: 112 MMOL/L (ref 98–111)
CO2: 14 MMOL/L (ref 22–29)
CREAT SERPL-MCNC: 0.8 MG/DL (ref 0.5–0.9)
GFR NON-AFRICAN AMERICAN: >60
GLUCOSE BLD-MCNC: 83 MG/DL (ref 74–109)
HCT VFR BLD CALC: 26.2 % (ref 37–47)
HCT VFR BLD CALC: 27 % (ref 37–47)
HCT VFR BLD CALC: 27.4 % (ref 37–47)
HCT VFR BLD CALC: 28.4 % (ref 37–47)
HEMOGLOBIN: 7.5 G/DL (ref 12–16)
HEMOGLOBIN: 7.9 G/DL (ref 12–16)
HEMOGLOBIN: 7.9 G/DL (ref 12–16)
HEMOGLOBIN: 8.2 G/DL (ref 12–16)
INR BLD: 1.58 (ref 0.88–1.18)
MCH RBC QN AUTO: 24.5 PG (ref 27–31)
MCH RBC QN AUTO: 24.6 PG (ref 27–31)
MCH RBC QN AUTO: 24.6 PG (ref 27–31)
MCH RBC QN AUTO: 24.8 PG (ref 27–31)
MCHC RBC AUTO-ENTMCNC: 28.6 G/DL (ref 33–37)
MCHC RBC AUTO-ENTMCNC: 28.8 G/DL (ref 33–37)
MCHC RBC AUTO-ENTMCNC: 28.9 G/DL (ref 33–37)
MCHC RBC AUTO-ENTMCNC: 29.3 G/DL (ref 33–37)
MCV RBC AUTO: 83.9 FL (ref 81–99)
MCV RBC AUTO: 85.4 FL (ref 81–99)
MCV RBC AUTO: 85.9 FL (ref 81–99)
MCV RBC AUTO: 86.1 FL (ref 81–99)
PDW BLD-RTO: 22 % (ref 11.5–14.5)
PDW BLD-RTO: 22.1 % (ref 11.5–14.5)
PDW BLD-RTO: 22.2 % (ref 11.5–14.5)
PDW BLD-RTO: 22.3 % (ref 11.5–14.5)
PLATELET # BLD: 266 K/UL (ref 130–400)
PLATELET # BLD: 291 K/UL (ref 130–400)
PLATELET # BLD: 314 K/UL (ref 130–400)
PLATELET # BLD: 327 K/UL (ref 130–400)
PMV BLD AUTO: 9.2 FL (ref 9.4–12.3)
PMV BLD AUTO: 9.6 FL (ref 9.4–12.3)
PMV BLD AUTO: 9.6 FL (ref 9.4–12.3)
PMV BLD AUTO: 9.7 FL (ref 9.4–12.3)
POTASSIUM REFLEX MAGNESIUM: 4.3 MMOL/L (ref 3.5–5)
PROTHROMBIN TIME: 18.2 SEC (ref 12–14.6)
RBC # BLD: 3.05 M/UL (ref 4.2–5.4)
RBC # BLD: 3.21 M/UL (ref 4.2–5.4)
RBC # BLD: 3.22 M/UL (ref 4.2–5.4)
RBC # BLD: 3.3 M/UL (ref 4.2–5.4)
SODIUM BLD-SCNC: 139 MMOL/L (ref 136–145)
WBC # BLD: 4.9 K/UL (ref 4.8–10.8)
WBC # BLD: 5.3 K/UL (ref 4.8–10.8)
WBC # BLD: 5.8 K/UL (ref 4.8–10.8)
WBC # BLD: 7.1 K/UL (ref 4.8–10.8)

## 2019-08-22 PROCEDURE — 2580000003 HC RX 258: Performed by: INTERNAL MEDICINE

## 2019-08-22 PROCEDURE — 3430000000 HC RX DIAGNOSTIC RADIOPHARMACEUTICAL: Performed by: INTERNAL MEDICINE

## 2019-08-22 PROCEDURE — 87040 BLOOD CULTURE FOR BACTERIA: CPT

## 2019-08-22 PROCEDURE — 6370000000 HC RX 637 (ALT 250 FOR IP): Performed by: INTERNAL MEDICINE

## 2019-08-22 PROCEDURE — 6370000000 HC RX 637 (ALT 250 FOR IP): Performed by: HOSPITALIST

## 2019-08-22 PROCEDURE — 78264 GASTRIC EMPTYING IMG STUDY: CPT

## 2019-08-22 PROCEDURE — 80048 BASIC METABOLIC PNL TOTAL CA: CPT

## 2019-08-22 PROCEDURE — 99232 SBSQ HOSP IP/OBS MODERATE 35: CPT | Performed by: INTERNAL MEDICINE

## 2019-08-22 PROCEDURE — 1210000000 HC MED SURG R&B

## 2019-08-22 PROCEDURE — 85610 PROTHROMBIN TIME: CPT

## 2019-08-22 PROCEDURE — 85027 COMPLETE CBC AUTOMATED: CPT

## 2019-08-22 PROCEDURE — 36592 COLLECT BLOOD FROM PICC: CPT

## 2019-08-22 PROCEDURE — A9541 TC99M SULFUR COLLOID: HCPCS | Performed by: INTERNAL MEDICINE

## 2019-08-22 RX ORDER — PHYTONADIONE 5 MG/1
10 TABLET ORAL DAILY
Status: DISCONTINUED | OUTPATIENT
Start: 2019-08-22 | End: 2019-08-27

## 2019-08-22 RX ORDER — 0.9 % SODIUM CHLORIDE 0.9 %
10 VIAL (ML) INJECTION PRN
Status: DISCONTINUED | OUTPATIENT
Start: 2019-08-22 | End: 2019-08-25 | Stop reason: SDUPTHER

## 2019-08-22 RX ORDER — SODIUM CHLORIDE 0.9 % (FLUSH) 0.9 %
10 SYRINGE (ML) INJECTION EVERY 12 HOURS SCHEDULED
Status: DISCONTINUED | OUTPATIENT
Start: 2019-08-22 | End: 2019-08-25 | Stop reason: SDUPTHER

## 2019-08-22 RX ADMIN — PANTOPRAZOLE SODIUM 40 MG: 40 TABLET, DELAYED RELEASE ORAL at 10:34

## 2019-08-22 RX ADMIN — SILVER SULFADIAZINE: 10 CREAM TOPICAL at 10:30

## 2019-08-22 RX ADMIN — BACLOFEN 10 MG: 10 TABLET ORAL at 10:29

## 2019-08-22 RX ADMIN — COLLAGENASE SANTYL: 250 OINTMENT TOPICAL at 03:17

## 2019-08-22 RX ADMIN — BISACODYL 10 MG: 5 TABLET, COATED ORAL at 21:33

## 2019-08-22 RX ADMIN — BACLOFEN 10 MG: 10 TABLET ORAL at 21:11

## 2019-08-22 RX ADMIN — BACLOFEN 10 MG: 10 TABLET ORAL at 15:21

## 2019-08-22 RX ADMIN — POLYETHYLENE GLYCOL-3350 AND ELECTROLYTES 4000 ML: 236; 6.74; 5.86; 2.97; 22.74 POWDER, FOR SOLUTION ORAL at 16:10

## 2019-08-22 RX ADMIN — DAKIN'S SOLUTION 0.125% (QUARTER STRENGTH): 0.12 SOLUTION at 10:31

## 2019-08-22 RX ADMIN — BISACODYL 10 MG: 5 TABLET, COATED ORAL at 15:21

## 2019-08-22 RX ADMIN — PANTOPRAZOLE SODIUM 40 MG: 40 TABLET, DELAYED RELEASE ORAL at 15:21

## 2019-08-22 RX ADMIN — FOLIC ACID 1 MG: 1 TABLET ORAL at 10:29

## 2019-08-22 RX ADMIN — Medication 10 ML: at 21:34

## 2019-08-22 RX ADMIN — Medication 220 MG: at 10:29

## 2019-08-22 RX ADMIN — Medication 2 MILLICURIE: at 09:20

## 2019-08-22 RX ADMIN — MULTIPLE VITAMINS W/ MINERALS TAB 1 TABLET: TAB at 10:29

## 2019-08-22 RX ADMIN — COLLAGENASE SANTYL: 250 OINTMENT TOPICAL at 15:22

## 2019-08-22 ASSESSMENT — PAIN SCALES - GENERAL: PAINLEVEL_OUTOF10: 0

## 2019-08-22 NOTE — PROGRESS NOTES
Value Ref Range Status   07/29/2019 (A)  Final     Bottle volume = <5 ml  Quality of results might be  affected with low volume. Bottle volume = 6 ml     07/29/2019   Final    No further workup  Refer to (Blood [email or web address] [email or web address]) for sensitivity results  Isolated from Aerobic and Anaerobic bottle     07/29/2019 (A)  Final    CONTACT PRECAUTIONS INDICATED  Isolated from Anaerobic bottle     , No components found for: LABURINE  Patient Active Problem List    Diagnosis Date Noted    Anemia     Gastrointestinal hemorrhage 08/20/2019    Pressure injury of skin of left heel 08/20/2019    Acute encephalopathy 08/20/2019    Anemia due to blood loss, acute 08/20/2019    Hyponatremia 08/20/2019    Hypocalcemia 08/20/2019    Severe protein-calorie malnutrition ([address] 08/20/2019    Acute kidney injury ([address] 08/20/2019    GI bleed 08/20/2019     Added automatically from request for surgery [id number]      Paraplegia ([address] 08/12/2019    Pressure injury of right ischium, stage 4 ([address] 08/12/2019    Decubitus ulcer of ischial area, left, stage IV ([address] 08/12/2019    Pressure injury of left perineal ischial region, stage 4 ([address] 08/12/2019    Chronic osteomyelitis with draining sinus of multiple sites ([address] 08/12/2019    Pressure injury of right heel, stage 3 ([address] 08/12/2019       Assessment/plan:   Principal Problem:    Gastrointestinal hemorrhage  Active Problems:    Paraplegia (HCC)    Pressure injury of right ischium, stage 4 (HCC)    Chronic osteomyelitis with draining sinus of multiple sites (HCC)    Pressure injury of right heel, stage 3 (HCC)    Pressure injury of skin of left heel    Acute encephalopathy    Anemia due to blood loss, acute    Hyponatremia    Hypocalcemia    Severe protein-calorie malnutrition ([address]    Acute kidney injury ([address]    GI bleed    Anemia  Resolved Problems:    * No resolved hospital problems.  *      Plan:     Admit to med/surg.     Gastroenterology contacted in ED,

## 2019-08-23 LAB
ANION GAP SERPL CALCULATED.3IONS-SCNC: 15 MMOL/L (ref 7–19)
BUN BLDV-MCNC: 21 MG/DL (ref 6–20)
CALCIUM SERPL-MCNC: 8 MG/DL (ref 8.6–10)
CHLORIDE BLD-SCNC: 111 MMOL/L (ref 98–111)
CO2: 13 MMOL/L (ref 22–29)
CREAT SERPL-MCNC: 0.8 MG/DL (ref 0.5–0.9)
GFR NON-AFRICAN AMERICAN: >60
GLUCOSE BLD-MCNC: 86 MG/DL (ref 74–109)
HCT VFR BLD CALC: 25.6 % (ref 37–47)
HCT VFR BLD CALC: 26 % (ref 37–47)
HCT VFR BLD CALC: 27.3 % (ref 37–47)
HEMOGLOBIN: 7.5 G/DL (ref 12–16)
HEMOGLOBIN: 7.5 G/DL (ref 12–16)
HEMOGLOBIN: 8 G/DL (ref 12–16)
MCH RBC QN AUTO: 24.4 PG (ref 27–31)
MCH RBC QN AUTO: 24.7 PG (ref 27–31)
MCH RBC QN AUTO: 24.8 PG (ref 27–31)
MCHC RBC AUTO-ENTMCNC: 28.8 G/DL (ref 33–37)
MCHC RBC AUTO-ENTMCNC: 29.3 G/DL (ref 33–37)
MCHC RBC AUTO-ENTMCNC: 29.3 G/DL (ref 33–37)
MCV RBC AUTO: 84.2 FL (ref 81–99)
MCV RBC AUTO: 84.5 FL (ref 81–99)
MCV RBC AUTO: 84.7 FL (ref 81–99)
PDW BLD-RTO: 21.6 % (ref 11.5–14.5)
PDW BLD-RTO: 21.7 % (ref 11.5–14.5)
PDW BLD-RTO: 21.7 % (ref 11.5–14.5)
PLATELET # BLD: 296 K/UL (ref 130–400)
PLATELET # BLD: 319 K/UL (ref 130–400)
PLATELET # BLD: 319 K/UL (ref 130–400)
PMV BLD AUTO: 8.9 FL (ref 9.4–12.3)
PMV BLD AUTO: 9.4 FL (ref 9.4–12.3)
PMV BLD AUTO: 9.7 FL (ref 9.4–12.3)
POTASSIUM REFLEX MAGNESIUM: 4 MMOL/L (ref 3.5–5)
RBC # BLD: 3.04 M/UL (ref 4.2–5.4)
RBC # BLD: 3.07 M/UL (ref 4.2–5.4)
RBC # BLD: 3.23 M/UL (ref 4.2–5.4)
SODIUM BLD-SCNC: 139 MMOL/L (ref 136–145)
WBC # BLD: 5.9 K/UL (ref 4.8–10.8)
WBC # BLD: 6 K/UL (ref 4.8–10.8)
WBC # BLD: 6.2 K/UL (ref 4.8–10.8)

## 2019-08-23 PROCEDURE — 1210000000 HC MED SURG R&B

## 2019-08-23 PROCEDURE — 6370000000 HC RX 637 (ALT 250 FOR IP): Performed by: INTERNAL MEDICINE

## 2019-08-23 PROCEDURE — 85027 COMPLETE CBC AUTOMATED: CPT

## 2019-08-23 PROCEDURE — 6370000000 HC RX 637 (ALT 250 FOR IP): Performed by: HOSPITALIST

## 2019-08-23 PROCEDURE — 99232 SBSQ HOSP IP/OBS MODERATE 35: CPT | Performed by: INTERNAL MEDICINE

## 2019-08-23 PROCEDURE — 2580000003 HC RX 258: Performed by: INTERNAL MEDICINE

## 2019-08-23 PROCEDURE — 36592 COLLECT BLOOD FROM PICC: CPT

## 2019-08-23 PROCEDURE — 80048 BASIC METABOLIC PNL TOTAL CA: CPT

## 2019-08-23 RX ORDER — SODIUM HYPOCHLORITE 1.25 MG/ML
SOLUTION TOPICAL 2 TIMES DAILY
Status: DISCONTINUED | OUTPATIENT
Start: 2019-08-23 | End: 2019-09-06 | Stop reason: HOSPADM

## 2019-08-23 RX ADMIN — BACLOFEN 10 MG: 10 TABLET ORAL at 17:34

## 2019-08-23 RX ADMIN — PANTOPRAZOLE SODIUM 40 MG: 40 TABLET, DELAYED RELEASE ORAL at 06:19

## 2019-08-23 RX ADMIN — BACLOFEN 10 MG: 10 TABLET ORAL at 10:51

## 2019-08-23 RX ADMIN — PANTOPRAZOLE SODIUM 40 MG: 40 TABLET, DELAYED RELEASE ORAL at 17:34

## 2019-08-23 RX ADMIN — FOLIC ACID 1 MG: 1 TABLET ORAL at 10:56

## 2019-08-23 RX ADMIN — Medication 10 ML: at 10:53

## 2019-08-23 RX ADMIN — Medication 220 MG: at 10:50

## 2019-08-23 RX ADMIN — DAKIN'S SOLUTION 0.125% (QUARTER STRENGTH): 0.12 SOLUTION at 23:52

## 2019-08-23 RX ADMIN — Medication 10 ML: at 10:55

## 2019-08-23 NOTE — PROGRESS NOTES
Orders: None  · ONS intake: Unable to assess  · Anthropometric Measures:  · Ht: 5' 8\" (172.7 cm)   · Current Body Wt:    · Admission Body Wt: 190 lb (86.2 kg)  · Usual Body Wt: 220 lb (99.8 kg)  · % Weight Change:  ,  13.6% wt loss  · Ideal Body Wt: 140 lb (63.5 kg),   · BMI Classification: BMI 25.0 - 29.9 Overweight    Nutrition Interventions:   Continue current diet, Continue current ONS  Continued Inpatient Monitoring    Nutrition Evaluation:   · Evaluation: Progressing toward goals   · Goals: PO 50% or more, wound improvement    · Monitoring: Nutrition Progression, Meal Intake, Supplement Intake, Weight, Pertinent Labs, Wound Healing      Electronically signed by Lonnie Casarez, MS, RD, LD on 8/23/19 at 1:34 PM    Contact Number: [phone]

## 2019-08-23 NOTE — PROGRESS NOTES
Pt continues to refuse Vitamin K. Pharmacy phoned me regarding this issue. Physician messaged at this time.

## 2019-08-23 NOTE — PROGRESS NOTES
[facility]      Patient:  Sherman Rosas  YOB: 1960  Date of Service: 8/23/2019  MRN: [id number]   Acct: [de-identified]   Primary Care Physician: Alonzo Franco  Advance Directive: Full Code  Admit Date: 8/20/2019       Hospital Day: 3    Chief Complaint:   Chief Complaint   Patient presents with    Altered Mental Status     lethargy       64year old white female brought to the emergency department with complaint of nausea with vomiting and poor appetite since her cystectomy and nephrostomy more than 2 months ago. She has reportedly lost greater than 40 pounds over this period of time due to inability to tolerate anything except water and 7-Up. She is a paraplegic secondary to spinal cord injury in the distant past. Early this morning, the patient woke in a panic convinced that her son had been in an accident. She called him at about 0300. She did not know where she was at the time and admits to some confusion this morning. She has chronic osteomyelitis at multiple sites as well as sacral and bilateral heel pressure ulcers. She has been referred to infectious diseases but has been unable to make it to an appointment. The patient had a transfusion about a week and a half ago and two days ago her hemoglobin was up to 10.3 g. It was down to 8.4 g today and thus a fecal occult blood test was performed, which was positive. Takes promethazine and PPI at SNF at which she resides, without relief of her nausea. No obvious source of bleeding according to the patient.  No exacerbating factors  8/21/19  Nursing home pt with paraplegia 2 to spinal cord injury with h/o weight loss,chronic osteomyelitis at multiple sites as well as sacral and bilateral heel pressure ulcers, admitted for MS change, anemia, Guaiac +ve,started PPI  GI on board, EGD today  , wound care, ID consulted  , CT head chest abdomen -ve, HB 9, UA not impressive, check B12 ammonia TSH   8/22/19  Appreciate GI, s/p EGD found Erosive

## 2019-08-23 NOTE — PROGRESS NOTES
looks comfortable  Abdomen nontender  Wounds not unpacked today    Lab Results:  CBC:   Recent Labs     08/22/19  1810 08/22/19  2335 08/23/19  0553   WBC 5.3 5.8 5.9   HGB 7.9* 7.9* 8.0*    291 296     BMP:  Recent Labs     08/21/19  0935 08/22/19  0307 08/23/19  0546    139 139   K 4.4 4.3 4.0   * 112* 111   CO2 12* 14* 13*   BUN 28* 25* 21*   CREATININE 1.1* 0.8 0.8   GLUCOSE 87 83 86     CultureResults: Blood cultures yesterday no growth    Radiology: None    Additional Studies Reviewed:  None    Impression:  1. Large ischial/sacral decubitus ulcerations  2. Extensive chronic osteomyelitis involving pelvis and likely femur  3. Paraplegia  4. History vesicovaginal fistula with cystectomy and urostomy  5. Dysphasia-erosive esophagitis on EGD  6. Weight loss  7. Anemia of chronic disease. Undergoing evaluation for GI blood loss as well. 8. MRSA bacteremia late July per chart review    Recommendations:  She is stable from ID standpoint at this time without fevers, chills, hemodynamic instability, or leukocytosis. She has extensive sacral/ischial wounds. Feel she needs a conference of approach to wound management for possibility of resolution. Feel necessary components would likely include debridement, deep culture, IV antibiotic treatment, nutritional support via feeding tube if she cannot maintain oral intake, offloading, local wound care, diverting colostomy, and eventual plan for flap closure. Feel ideally she could receive care at an LTAC or possibly a rehab facility that has all of the debridement/plastic surgery support and IV antibiotic treatment options to address all aspects of her care. She indicates colostomy was recommended to her as far back is January. From what she describes she had seen plastic surgery/wound care surgeon at [address] [place] [address] (Dr. Carlos Santizo she believes was his name). Would see if their records/recommnedations can be located.   Do not feel IV antibiotic treatment at present necessary unless it is combined with a more  comprehensive wound care plan.   Discussed with Dr. Thiago Henriquez  Will continue to follow    Eddie Young MD

## 2019-08-24 PROCEDURE — 6370000000 HC RX 637 (ALT 250 FOR IP): Performed by: INTERNAL MEDICINE

## 2019-08-24 PROCEDURE — 6370000000 HC RX 637 (ALT 250 FOR IP): Performed by: HOSPITALIST

## 2019-08-24 PROCEDURE — 1210000000 HC MED SURG R&B

## 2019-08-24 PROCEDURE — 6360000002 HC RX W HCPCS: Performed by: INTERNAL MEDICINE

## 2019-08-24 PROCEDURE — 2580000003 HC RX 258: Performed by: INTERNAL MEDICINE

## 2019-08-24 RX ADMIN — PANTOPRAZOLE SODIUM 40 MG: 40 TABLET, DELAYED RELEASE ORAL at 08:24

## 2019-08-24 RX ADMIN — ONDANSETRON 4 MG: 2 INJECTION INTRAMUSCULAR; INTRAVENOUS at 06:28

## 2019-08-24 RX ADMIN — BACLOFEN 10 MG: 10 TABLET ORAL at 21:35

## 2019-08-24 RX ADMIN — BACLOFEN 10 MG: 10 TABLET ORAL at 10:39

## 2019-08-24 RX ADMIN — DAKIN'S SOLUTION 0.125% (QUARTER STRENGTH): 0.12 SOLUTION at 16:55

## 2019-08-24 RX ADMIN — PANTOPRAZOLE SODIUM 40 MG: 40 TABLET, DELAYED RELEASE ORAL at 17:40

## 2019-08-24 RX ADMIN — FOLIC ACID 1 MG: 1 TABLET ORAL at 10:39

## 2019-08-24 RX ADMIN — SILVER SULFADIAZINE: 10 CREAM TOPICAL at 16:54

## 2019-08-24 RX ADMIN — MORPHINE SULFATE 2 MG: 2 INJECTION, SOLUTION INTRAMUSCULAR; INTRAVENOUS at 06:21

## 2019-08-24 RX ADMIN — Medication 10 ML: at 21:41

## 2019-08-24 RX ADMIN — BACLOFEN 10 MG: 10 TABLET ORAL at 13:53

## 2019-08-24 RX ADMIN — Medication 10 ML: at 10:40

## 2019-08-24 ASSESSMENT — PAIN SCALES - GENERAL
PAINLEVEL_OUTOF10: 2
PAINLEVEL_OUTOF10: 6

## 2019-08-24 NOTE — PROGRESS NOTES
intrathoracic abnormality. No parenchymal consolidation or   suspicious nodularity. 2. Nonunion of the posterior elements of the thoracolumbar vertebra   suggesting spina bifida. Hyperkyphosis and rotoscoliosis of the   thoracolumbar spine. 3. Multinodular thyroid with no dominant solid nodule visualized on CT   exam.   Signed by Dr Marah Lundberg on 8/20/2019 5:04 PM      CT ABDOMEN PELVIS W IV CONTRAST Additional Contrast? None   Final Result   1. Essentially stable appearance of the abdomen and pelvis compared   with the study from 7/29/2019. There is no evidence of bowel   obstruction or findings to explain the patient's persistent vomiting. Based on the volume of stool within the colon, constipation is likely. Signed by Dr Valencia Jorgensen on 8/20/2019 5:08 PM      CT Head WO Contrast   Final Result   Impression: No acute intracranial abnormality. Signed by Dr Valencia Jorgensen on 8/20/2019 4:57 PM        Micro:   Blood Culture, Routine   Date Value Ref Range Status   08/22/2019   Preliminary    No Growth to date.   Any change in status will be called.   , No components found for: LABURINE  Patient Active Problem List    Diagnosis Date Noted    Anemia     Gastrointestinal hemorrhage 08/20/2019    Pressure injury of skin of left heel 08/20/2019    Acute encephalopathy 08/20/2019    Anemia due to blood loss, acute 08/20/2019    Hyponatremia 08/20/2019    Hypocalcemia 08/20/2019    Severe protein-calorie malnutrition ([address] 08/20/2019    Acute kidney injury ([address] 08/20/2019    GI bleed 08/20/2019     Added automatically from request for surgery [id number]      Paraplegia ([address] 08/12/2019    Pressure injury of right ischium, stage 4 ([address] 08/12/2019    Decubitus ulcer of ischial area, left, stage IV ([address] 08/12/2019    Pressure injury of left perineal ischial region, stage 4 ([address] 08/12/2019    Chronic osteomyelitis with draining sinus of multiple sites ([address] 08/12/2019    Pressure injury

## 2019-08-25 LAB
ABO/RH: NORMAL
ANION GAP SERPL CALCULATED.3IONS-SCNC: 12 MMOL/L (ref 7–19)
ANTIBODY SCREEN: NORMAL
BUN BLDV-MCNC: 22 MG/DL (ref 6–20)
CALCIUM SERPL-MCNC: 7.7 MG/DL (ref 8.6–10)
CHLORIDE BLD-SCNC: 111 MMOL/L (ref 98–111)
CO2: 15 MMOL/L (ref 22–29)
CREAT SERPL-MCNC: 0.8 MG/DL (ref 0.5–0.9)
GFR NON-AFRICAN AMERICAN: >60
GLUCOSE BLD-MCNC: 86 MG/DL (ref 74–109)
HCT VFR BLD CALC: 24 % (ref 37–47)
HCT VFR BLD CALC: 28.8 % (ref 37–47)
HEMOGLOBIN: 6.9 G/DL (ref 12–16)
HEMOGLOBIN: 8.7 G/DL (ref 12–16)
MCH RBC QN AUTO: 24.4 PG (ref 27–31)
MCHC RBC AUTO-ENTMCNC: 28.8 G/DL (ref 33–37)
MCV RBC AUTO: 84.8 FL (ref 81–99)
PDW BLD-RTO: 21.2 % (ref 11.5–14.5)
PLATELET # BLD: 276 K/UL (ref 130–400)
PMV BLD AUTO: 9.2 FL (ref 9.4–12.3)
POTASSIUM REFLEX MAGNESIUM: 3.7 MMOL/L (ref 3.5–5)
RBC # BLD: 2.83 M/UL (ref 4.2–5.4)
SODIUM BLD-SCNC: 138 MMOL/L (ref 136–145)
WBC # BLD: 4.4 K/UL (ref 4.8–10.8)

## 2019-08-25 PROCEDURE — 85014 HEMATOCRIT: CPT

## 2019-08-25 PROCEDURE — 2580000003 HC RX 258: Performed by: HOSPITALIST

## 2019-08-25 PROCEDURE — P9016 RBC LEUKOCYTES REDUCED: HCPCS

## 2019-08-25 PROCEDURE — 6370000000 HC RX 637 (ALT 250 FOR IP): Performed by: INTERNAL MEDICINE

## 2019-08-25 PROCEDURE — 86900 BLOOD TYPING SEROLOGIC ABO: CPT

## 2019-08-25 PROCEDURE — 2580000003 HC RX 258: Performed by: INTERNAL MEDICINE

## 2019-08-25 PROCEDURE — 36430 TRANSFUSION BLD/BLD COMPNT: CPT

## 2019-08-25 PROCEDURE — 86901 BLOOD TYPING SEROLOGIC RH(D): CPT

## 2019-08-25 PROCEDURE — 36415 COLL VENOUS BLD VENIPUNCTURE: CPT

## 2019-08-25 PROCEDURE — 1210000000 HC MED SURG R&B

## 2019-08-25 PROCEDURE — 86923 COMPATIBILITY TEST ELECTRIC: CPT

## 2019-08-25 PROCEDURE — 85018 HEMOGLOBIN: CPT

## 2019-08-25 PROCEDURE — 6370000000 HC RX 637 (ALT 250 FOR IP): Performed by: HOSPITALIST

## 2019-08-25 PROCEDURE — 86850 RBC ANTIBODY SCREEN: CPT

## 2019-08-25 PROCEDURE — 80048 BASIC METABOLIC PNL TOTAL CA: CPT

## 2019-08-25 PROCEDURE — 85027 COMPLETE CBC AUTOMATED: CPT

## 2019-08-25 RX ORDER — 0.9 % SODIUM CHLORIDE 0.9 %
250 INTRAVENOUS SOLUTION INTRAVENOUS ONCE
Status: COMPLETED | OUTPATIENT
Start: 2019-08-25 | End: 2019-08-25

## 2019-08-25 RX ADMIN — DAKIN'S SOLUTION 0.125% (QUARTER STRENGTH) 473 ML: 0.12 SOLUTION at 00:30

## 2019-08-25 RX ADMIN — BACLOFEN 10 MG: 10 TABLET ORAL at 12:09

## 2019-08-25 RX ADMIN — BACLOFEN 10 MG: 10 TABLET ORAL at 17:47

## 2019-08-25 RX ADMIN — DAKIN'S SOLUTION 0.125% (QUARTER STRENGTH): 0.12 SOLUTION at 11:36

## 2019-08-25 RX ADMIN — COLLAGENASE SANTYL: 250 OINTMENT TOPICAL at 11:36

## 2019-08-25 RX ADMIN — PANTOPRAZOLE SODIUM 40 MG: 40 TABLET, DELAYED RELEASE ORAL at 17:47

## 2019-08-25 RX ADMIN — PANTOPRAZOLE SODIUM 40 MG: 40 TABLET, DELAYED RELEASE ORAL at 06:07

## 2019-08-25 RX ADMIN — FOLIC ACID 1 MG: 1 TABLET ORAL at 09:22

## 2019-08-25 RX ADMIN — COLLAGENASE SANTYL: 250 OINTMENT TOPICAL at 00:30

## 2019-08-25 RX ADMIN — SODIUM CHLORIDE 250 ML: 9 INJECTION, SOLUTION INTRAVENOUS at 12:49

## 2019-08-25 RX ADMIN — Medication 10 ML: at 09:23

## 2019-08-25 RX ADMIN — SILVER SULFADIAZINE: 10 CREAM TOPICAL at 11:36

## 2019-08-25 NOTE — PROGRESS NOTES
Infectious Diseases Progress Note    Patient:  Shefali Solorzano  YOB: 1960  MRN: [id number]   Admit date: 8/20/2019   Admitting Physician: Adela Mcneil MD  Primary Care Physician: Yanni Das    Chief Complaint/Interval History: She is without fever. Hemodynamically stable. She ate better today. She ate some Annika's. She feels her current combination of medicines is allowing her to eat. She is not describing dysphagia or odynophagia. She feels nausea that she had had previously is improved. I talked with her again at length with her nurse Jhonny Argueta present. Jhonny Argueta had done her dressing change short while ago. She felt the very large sacral/ischial wounds were clean. No odor. Wounds are in very close proximity to rectum. She felt her heel wound was clean as well without cellulitis. Patient indicates she had been followed by wound care/plastic surgery at [facility]. She had seen wound care closer to her home in [place] which indicates was a fairly small operation per her indication and was not able to manage her complexity. She is seen [facility] wound care on one occasion. She indicates she saw Dr. Luca Costa. She indicates [place] had talked with her about a colostomy at least back in January. She indicates she did not feel it was necessary as she was maintaining a bowel regimen. She is not sure when she was supposed to return to plastic surgery/wound care at [facility]. She indicated she would have to check her records. She indicates at her last appointment with them in June they had essentially released her to local wound care until she needed \"bone shaving\" or additional surgery.     In/Out    Intake/Output Summary (Last 24 hours) at 8/25/2019 1328  Last data filed at 8/24/2019 2349  Gross per 24 hour   Intake 300 ml   Output 600 ml   Net -300 ml     Allergies: No Active Allergies  Current Meds:   0.9 % sodium chloride bolus Once   sodium hypochlorite (DAKINS) 0.125 %

## 2019-08-26 LAB
ANION GAP SERPL CALCULATED.3IONS-SCNC: 12 MMOL/L (ref 7–19)
BUN BLDV-MCNC: 23 MG/DL (ref 6–20)
CALCIUM SERPL-MCNC: 7.7 MG/DL (ref 8.6–10)
CHLORIDE BLD-SCNC: 112 MMOL/L (ref 98–111)
CO2: 15 MMOL/L (ref 22–29)
CREAT SERPL-MCNC: 0.9 MG/DL (ref 0.5–0.9)
FERRITIN: 554.7 NG/ML (ref 13–150)
FOLATE: >20 NG/ML (ref 4.8–37.3)
GFR NON-AFRICAN AMERICAN: >60
GLUCOSE BLD-MCNC: 92 MG/DL (ref 74–109)
HCT VFR BLD CALC: 28.5 % (ref 37–47)
HEMOGLOBIN: 8.7 G/DL (ref 12–16)
IRON SATURATION: 18 % (ref 14–50)
IRON: 13 UG/DL (ref 37–145)
LACTIC ACID: 0.8 MMOL/L (ref 0.5–1.9)
MCH RBC QN AUTO: 25.9 PG (ref 27–31)
MCHC RBC AUTO-ENTMCNC: 30.5 G/DL (ref 33–37)
MCV RBC AUTO: 84.8 FL (ref 81–99)
PDW BLD-RTO: 20.3 % (ref 11.5–14.5)
PLATELET # BLD: 310 K/UL (ref 130–400)
PMV BLD AUTO: 9.4 FL (ref 9.4–12.3)
POTASSIUM REFLEX MAGNESIUM: 3.7 MMOL/L (ref 3.5–5)
RBC # BLD: 3.36 M/UL (ref 4.2–5.4)
SODIUM BLD-SCNC: 139 MMOL/L (ref 136–145)
TOTAL IRON BINDING CAPACITY: 72 UG/DL (ref 250–400)
VITAMIN B-12: 1339 PG/ML (ref 211–946)
WBC # BLD: 5.4 K/UL (ref 4.8–10.8)

## 2019-08-26 PROCEDURE — 83550 IRON BINDING TEST: CPT

## 2019-08-26 PROCEDURE — 83540 ASSAY OF IRON: CPT

## 2019-08-26 PROCEDURE — 6370000000 HC RX 637 (ALT 250 FOR IP): Performed by: INTERNAL MEDICINE

## 2019-08-26 PROCEDURE — 82728 ASSAY OF FERRITIN: CPT

## 2019-08-26 PROCEDURE — 6370000000 HC RX 637 (ALT 250 FOR IP): Performed by: HOSPITALIST

## 2019-08-26 PROCEDURE — 82746 ASSAY OF FOLIC ACID SERUM: CPT

## 2019-08-26 PROCEDURE — 83605 ASSAY OF LACTIC ACID: CPT

## 2019-08-26 PROCEDURE — 80048 BASIC METABOLIC PNL TOTAL CA: CPT

## 2019-08-26 PROCEDURE — 85027 COMPLETE CBC AUTOMATED: CPT

## 2019-08-26 PROCEDURE — 2580000003 HC RX 258: Performed by: INTERNAL MEDICINE

## 2019-08-26 PROCEDURE — 82607 VITAMIN B-12: CPT

## 2019-08-26 PROCEDURE — 1210000000 HC MED SURG R&B

## 2019-08-26 RX ORDER — SODIUM BICARBONATE 650 MG/1
650 TABLET ORAL 4 TIMES DAILY
Status: DISCONTINUED | OUTPATIENT
Start: 2019-08-26 | End: 2019-08-30

## 2019-08-26 RX ORDER — LORAZEPAM 0.5 MG/1
0.5 TABLET ORAL EVERY 4 HOURS PRN
Status: DISCONTINUED | OUTPATIENT
Start: 2019-08-26 | End: 2019-08-30

## 2019-08-26 RX ADMIN — PANTOPRAZOLE SODIUM 40 MG: 40 TABLET, DELAYED RELEASE ORAL at 06:16

## 2019-08-26 RX ADMIN — SODIUM BICARBONATE 650 MG: 650 TABLET ORAL at 09:31

## 2019-08-26 RX ADMIN — SODIUM BICARBONATE 650 MG: 650 TABLET ORAL at 12:27

## 2019-08-26 RX ADMIN — Medication 10 ML: at 09:36

## 2019-08-26 RX ADMIN — DAKIN'S SOLUTION 0.125% (QUARTER STRENGTH): 0.12 SOLUTION at 21:13

## 2019-08-26 RX ADMIN — BACLOFEN 10 MG: 10 TABLET ORAL at 17:05

## 2019-08-26 RX ADMIN — BACLOFEN 10 MG: 10 TABLET ORAL at 12:28

## 2019-08-26 RX ADMIN — DAKIN'S SOLUTION 0.125% (QUARTER STRENGTH): 0.12 SOLUTION at 09:37

## 2019-08-26 RX ADMIN — COLLAGENASE SANTYL: 250 OINTMENT TOPICAL at 09:37

## 2019-08-26 RX ADMIN — SILVER SULFADIAZINE: 10 CREAM TOPICAL at 09:39

## 2019-08-26 RX ADMIN — COLLAGENASE SANTYL: 250 OINTMENT TOPICAL at 21:13

## 2019-08-26 RX ADMIN — LORAZEPAM 0.5 MG: 0.5 TABLET ORAL at 15:35

## 2019-08-26 RX ADMIN — FOLIC ACID 1 MG: 1 TABLET ORAL at 09:33

## 2019-08-26 RX ADMIN — BACLOFEN 10 MG: 10 TABLET ORAL at 21:12

## 2019-08-26 RX ADMIN — PANTOPRAZOLE SODIUM 40 MG: 40 TABLET, DELAYED RELEASE ORAL at 17:06

## 2019-08-26 NOTE — PLAN OF CARE
Problem: HH SN OPEN WOUND CARE  Intervention: PROVIDE DAILY DRESSING CHANGES/WOUND CARE AS ORDER  Note:   Wound care provided as directed on MAR. Wound photos updated in epic.

## 2019-08-26 NOTE — PROGRESS NOTES
leukocytosis and remains hemodynamically stable. We get opinion from wound care nurse and physician as to their recommendations for comprehensive management. Spoke with Jaxson Martinez this morning. Patient is seen Dr. Vincent Banks on one occasion as an outpatient. If patient agreeable, feel diverting colostomy would be indicated and appears to have been recommended by her surgeons in [place] previously. If patient elects to proceed with colostomy, once completed she could potentially have transfer back to [place] facilitated where she could undergo additional debridement, antibiotic treatment, local care, offloading, nutritional support and eventual plan for closure-possibly care at an LTAC where they have comprehensive wound care/plastic surgery available.   Will continue to follow    Eddie Young MD

## 2019-08-26 NOTE — PROGRESS NOTES
Nutrition Supplement (ONS) Orders: Standard High Calorie Oral Supplement, Wound Healing Oral Supplement  · ONS intake: Unable to assess  · Anthropometric Measures:  · Ht: 5' 8\" (172.7 cm)   · Admission Body Wt: 190 lb (86.2 kg)  · Usual Body Wt: 220 lb (99.8 kg)  · % Weight Change:  ,  13.6% wt loss  · Ideal Body Wt: 140 lb (63.5 kg),  · BMI Classification: BMI 25.0 - 29.9 Overweight    Nutrition Interventions:   Continue current diet, Continue current ONS  Continued Inpatient Monitoring    Nutrition Evaluation:   · Evaluation: Progressing toward goals   · Goals: PO 50% or more, wound improvement    · Monitoring: Nutrition Progression, Meal Intake, Supplement Intake, Weight, Pertinent Labs, Wound Healing      Electronically signed by Randi Carlson, MS, RD, LD on 8/26/19 at 9:43 AM    Contact Number: [phone]

## 2019-08-26 NOTE — PLAN OF CARE
Problem: Nutrition  Goal: Optimal nutrition therapy  Description  Nutrition Problem: Severe malnutrition, In context of chronic illness  Intervention: Food and/or Nutrient Delivery: Continue current diet, Continue current ONS  Nutritional Goals: PO 50% or more, wound improvement           Outcome: Ongoing

## 2019-08-27 LAB
ANION GAP SERPL CALCULATED.3IONS-SCNC: 10 MMOL/L (ref 7–19)
APTT: 30.2 SEC (ref 26–36.2)
BLOOD CULTURE, ROUTINE: NORMAL
BUN BLDV-MCNC: 25 MG/DL (ref 6–20)
CALCIUM SERPL-MCNC: 7.7 MG/DL (ref 8.6–10)
CHLORIDE BLD-SCNC: 111 MMOL/L (ref 98–111)
CO2: 18 MMOL/L (ref 22–29)
CREAT SERPL-MCNC: 1.2 MG/DL (ref 0.5–0.9)
CULTURE, BLOOD 2: NORMAL
GFR NON-AFRICAN AMERICAN: 46
GLUCOSE BLD-MCNC: 101 MG/DL (ref 74–109)
HCT VFR BLD CALC: 26.2 % (ref 37–47)
HEMOGLOBIN: 7.8 G/DL (ref 12–16)
INR BLD: 1.22 (ref 0.88–1.18)
MCH RBC QN AUTO: 25.6 PG (ref 27–31)
MCHC RBC AUTO-ENTMCNC: 29.8 G/DL (ref 33–37)
MCV RBC AUTO: 85.9 FL (ref 81–99)
PDW BLD-RTO: 20.5 % (ref 11.5–14.5)
PLATELET # BLD: 263 K/UL (ref 130–400)
PMV BLD AUTO: 9 FL (ref 9.4–12.3)
POTASSIUM REFLEX MAGNESIUM: 3.6 MMOL/L (ref 3.5–5)
PROTHROMBIN TIME: 14.8 SEC (ref 12–14.6)
RBC # BLD: 3.05 M/UL (ref 4.2–5.4)
SODIUM BLD-SCNC: 139 MMOL/L (ref 136–145)
WBC # BLD: 5.7 K/UL (ref 4.8–10.8)

## 2019-08-27 PROCEDURE — 85027 COMPLETE CBC AUTOMATED: CPT

## 2019-08-27 PROCEDURE — 99222 1ST HOSP IP/OBS MODERATE 55: CPT | Performed by: SURGERY

## 2019-08-27 PROCEDURE — 2580000003 HC RX 258: Performed by: INTERNAL MEDICINE

## 2019-08-27 PROCEDURE — 6370000000 HC RX 637 (ALT 250 FOR IP): Performed by: INTERNAL MEDICINE

## 2019-08-27 PROCEDURE — 80048 BASIC METABOLIC PNL TOTAL CA: CPT

## 2019-08-27 PROCEDURE — 2580000003 HC RX 258: Performed by: HOSPITALIST

## 2019-08-27 PROCEDURE — 85610 PROTHROMBIN TIME: CPT

## 2019-08-27 PROCEDURE — 6370000000 HC RX 637 (ALT 250 FOR IP): Performed by: HOSPITALIST

## 2019-08-27 PROCEDURE — 85730 THROMBOPLASTIN TIME PARTIAL: CPT

## 2019-08-27 PROCEDURE — 1210000000 HC MED SURG R&B

## 2019-08-27 PROCEDURE — 6360000002 HC RX W HCPCS: Performed by: INTERNAL MEDICINE

## 2019-08-27 RX ORDER — SODIUM CHLORIDE 9 MG/ML
INJECTION, SOLUTION INTRAVENOUS CONTINUOUS
Status: DISCONTINUED | OUTPATIENT
Start: 2019-08-27 | End: 2019-08-30

## 2019-08-27 RX ADMIN — DAKIN'S SOLUTION 0.125% (QUARTER STRENGTH) 473 ML: 0.12 SOLUTION at 20:24

## 2019-08-27 RX ADMIN — SODIUM BICARBONATE 650 MG: 650 TABLET ORAL at 12:50

## 2019-08-27 RX ADMIN — LORAZEPAM 0.5 MG: 0.5 TABLET ORAL at 15:03

## 2019-08-27 RX ADMIN — OXYCODONE HYDROCHLORIDE AND ACETAMINOPHEN 1000 MG: 500 TABLET ORAL at 09:31

## 2019-08-27 RX ADMIN — Medication 10 ML: at 20:26

## 2019-08-27 RX ADMIN — SODIUM CHLORIDE: 9 INJECTION, SOLUTION INTRAVENOUS at 20:25

## 2019-08-27 RX ADMIN — BACLOFEN 10 MG: 10 TABLET ORAL at 20:26

## 2019-08-27 RX ADMIN — MORPHINE SULFATE 2 MG: 2 INJECTION, SOLUTION INTRAMUSCULAR; INTRAVENOUS at 22:10

## 2019-08-27 RX ADMIN — MULTIPLE VITAMINS W/ MINERALS TAB 1 TABLET: TAB at 09:31

## 2019-08-27 RX ADMIN — LORAZEPAM 0.5 MG: 0.5 TABLET ORAL at 09:31

## 2019-08-27 RX ADMIN — PANTOPRAZOLE SODIUM 40 MG: 40 TABLET, DELAYED RELEASE ORAL at 04:38

## 2019-08-27 RX ADMIN — COLLAGENASE SANTYL: 250 OINTMENT TOPICAL at 20:28

## 2019-08-27 RX ADMIN — BACLOFEN 10 MG: 10 TABLET ORAL at 16:55

## 2019-08-27 RX ADMIN — FOLIC ACID 1 MG: 1 TABLET ORAL at 09:33

## 2019-08-27 RX ADMIN — Medication 10 ML: at 09:36

## 2019-08-27 RX ADMIN — PANTOPRAZOLE SODIUM 40 MG: 40 TABLET, DELAYED RELEASE ORAL at 16:55

## 2019-08-27 RX ADMIN — SODIUM BICARBONATE 650 MG: 650 TABLET ORAL at 20:25

## 2019-08-27 RX ADMIN — SODIUM BICARBONATE 650 MG: 650 TABLET ORAL at 09:31

## 2019-08-27 RX ADMIN — SODIUM BICARBONATE 650 MG: 650 TABLET ORAL at 16:55

## 2019-08-27 RX ADMIN — BACLOFEN 10 MG: 10 TABLET ORAL at 12:50

## 2019-08-27 ASSESSMENT — PAIN SCALES - GENERAL
PAINLEVEL_OUTOF10: 2
PAINLEVEL_OUTOF10: 9

## 2019-08-27 NOTE — CONSULTS
normal.  Sclera nonicteric. Conjunctiva normal  Oropharynx without masses or lesions. Neck: Neck is supple without masses or thyromegaly    Chest: Lungs are clear to auscultation. Respiratory effort normal    Cardiac: Regular rate and rhythm without rubs, murmurs, or gallops    Abdomen: The abdomen is soft and nontender with no hepatosplenomegaly. There are no abdominal hernias noted. Urostomy appliance in the right lower quadrant. Extremities: She is paraplegic and has been so for 40 years. She has the skin changes appropriate for this. She has multiple huge decubiti on both sides of her buttocks and perineum. IMPRESSION: Paraplegic                           Multiple decubiti with fecal soilage                           Previous right lower quadrant urostomy  PLAN: Diverting colostomy. We can do this later today or 1 of my partners can do it in the next few days. She can also have this done at [facility] when she goes down for further wound management. She is thinking about it. I will plan to do this later today understanding that this may not occur.

## 2019-08-27 NOTE — PROGRESS NOTES
accumulation    Objective Information:  · Nutrition-Focused Physical Findings:    · Wound Type: Multiple, Pressure Ulcer, Stage II, Stage III, Stage IV  · Current Nutrition Therapies:  · Oral Diet Orders: General   · Oral Diet intake: 51-75%  · Oral Nutrition Supplement (ONS) Orders: Wound Healing Oral Supplement  · ONS intake: Unable to assess  · Anthropometric Measures:  · Ht: 5' 8\" (172.7 cm)   · Admission Body Wt: 190 lb (86.2 kg)  · Usual Body Wt: 220 lb (99.8 kg)  · % Weight Change:  ,  13.6% wt loss  · Ideal Body Wt: 140 lb (63.5 kg),   · BMI Classification: BMI 25.0 - 29.9 Overweight    Nutrition Interventions:   Continue current diet, Continue current ONS  Continued Inpatient Monitoring    Nutrition Evaluation:   · Evaluation: Progressing toward goals   · Goals: PO 50% or more, wound improvement    · Monitoring: Nutrition Progression, Meal Intake, Supplement Intake, Weight, Pertinent Labs, Wound Healing      Electronically signed by Rylie Freedman MS, RD, LD on 8/27/19 at 11:14 AM    Contact Number: [phone]

## 2019-08-28 ENCOUNTER — ANESTHESIA EVENT (OUTPATIENT)
Dept: OPERATING ROOM | Age: 59
DRG: 981 | End: 2019-08-28
Payer: MEDICARE

## 2019-08-28 ENCOUNTER — ANESTHESIA (OUTPATIENT)
Dept: OPERATING ROOM | Age: 59
DRG: 981 | End: 2019-08-28
Payer: MEDICARE

## 2019-08-28 VITALS
SYSTOLIC BLOOD PRESSURE: 105 MMHG | DIASTOLIC BLOOD PRESSURE: 57 MMHG | OXYGEN SATURATION: 100 % | RESPIRATION RATE: 2 BRPM | TEMPERATURE: 96.1 F

## 2019-08-28 LAB
ANION GAP SERPL CALCULATED.3IONS-SCNC: 10 MMOL/L (ref 7–19)
BUN BLDV-MCNC: 24 MG/DL (ref 6–20)
CALCIUM SERPL-MCNC: 7.8 MG/DL (ref 8.6–10)
CHLORIDE BLD-SCNC: 110 MMOL/L (ref 98–111)
CO2: 17 MMOL/L (ref 22–29)
CREAT SERPL-MCNC: 0.8 MG/DL (ref 0.5–0.9)
GFR NON-AFRICAN AMERICAN: >60
GLUCOSE BLD-MCNC: 89 MG/DL (ref 74–109)
HCT VFR BLD CALC: 29 % (ref 37–47)
HEMOGLOBIN: 8.7 G/DL (ref 12–16)
MCH RBC QN AUTO: 25.8 PG (ref 27–31)
MCHC RBC AUTO-ENTMCNC: 30 G/DL (ref 33–37)
MCV RBC AUTO: 86.1 FL (ref 81–99)
PDW BLD-RTO: 20.6 % (ref 11.5–14.5)
PLATELET # BLD: 319 K/UL (ref 130–400)
PMV BLD AUTO: 8.9 FL (ref 9.4–12.3)
POTASSIUM REFLEX MAGNESIUM: 3.9 MMOL/L (ref 3.5–5)
RBC # BLD: 3.37 M/UL (ref 4.2–5.4)
SODIUM BLD-SCNC: 137 MMOL/L (ref 136–145)
WBC # BLD: 7.7 K/UL (ref 4.8–10.8)

## 2019-08-28 PROCEDURE — 3600000014 HC SURGERY LEVEL 4 ADDTL 15MIN: Performed by: SURGERY

## 2019-08-28 PROCEDURE — 6360000002 HC RX W HCPCS: Performed by: SURGERY

## 2019-08-28 PROCEDURE — 2500000003 HC RX 250 WO HCPCS: Performed by: SURGERY

## 2019-08-28 PROCEDURE — 6360000002 HC RX W HCPCS: Performed by: INTERNAL MEDICINE

## 2019-08-28 PROCEDURE — 6370000000 HC RX 637 (ALT 250 FOR IP): Performed by: SURGERY

## 2019-08-28 PROCEDURE — 2580000003 HC RX 258: Performed by: ANESTHESIOLOGY

## 2019-08-28 PROCEDURE — 2709999900 HC NON-CHARGEABLE SUPPLY: Performed by: SURGERY

## 2019-08-28 PROCEDURE — 44320 COLOSTOMY: CPT | Performed by: SURGERY

## 2019-08-28 PROCEDURE — 1210000000 HC MED SURG R&B

## 2019-08-28 PROCEDURE — 85027 COMPLETE CBC AUTOMATED: CPT

## 2019-08-28 PROCEDURE — 6360000002 HC RX W HCPCS: Performed by: ANESTHESIOLOGY

## 2019-08-28 PROCEDURE — 7100000000 HC PACU RECOVERY - FIRST 15 MIN: Performed by: SURGERY

## 2019-08-28 PROCEDURE — 80048 BASIC METABOLIC PNL TOTAL CA: CPT

## 2019-08-28 PROCEDURE — 3700000001 HC ADD 15 MINUTES (ANESTHESIA): Performed by: SURGERY

## 2019-08-28 PROCEDURE — C1729 CATH, DRAINAGE: HCPCS | Performed by: SURGERY

## 2019-08-28 PROCEDURE — 2500000003 HC RX 250 WO HCPCS: Performed by: NURSE ANESTHETIST, CERTIFIED REGISTERED

## 2019-08-28 PROCEDURE — 3700000000 HC ANESTHESIA ATTENDED CARE: Performed by: SURGERY

## 2019-08-28 PROCEDURE — 7100000001 HC PACU RECOVERY - ADDTL 15 MIN: Performed by: SURGERY

## 2019-08-28 PROCEDURE — 3600000004 HC SURGERY LEVEL 4 BASE: Performed by: SURGERY

## 2019-08-28 PROCEDURE — 2580000003 HC RX 258: Performed by: SURGERY

## 2019-08-28 PROCEDURE — 44320 COLOSTOMY: CPT | Performed by: PHYSICIAN ASSISTANT

## 2019-08-28 PROCEDURE — 0D1N0Z4 BYPASS SIGMOID COLON TO CUTANEOUS, OPEN APPROACH: ICD-10-PCS | Performed by: SURGERY

## 2019-08-28 PROCEDURE — 6360000002 HC RX W HCPCS: Performed by: NURSE ANESTHETIST, CERTIFIED REGISTERED

## 2019-08-28 RX ORDER — SODIUM CHLORIDE 0.9 % (FLUSH) 0.9 %
10 SYRINGE (ML) INJECTION PRN
Status: DISCONTINUED | OUTPATIENT
Start: 2019-08-28 | End: 2019-08-28 | Stop reason: HOSPADM

## 2019-08-28 RX ORDER — ALPRAZOLAM 1 MG/1
1 TABLET ORAL 3 TIMES DAILY PRN
Status: DISCONTINUED | OUTPATIENT
Start: 2019-08-28 | End: 2019-08-30

## 2019-08-28 RX ORDER — MEPERIDINE HYDROCHLORIDE 50 MG/ML
12.5 INJECTION INTRAMUSCULAR; INTRAVENOUS; SUBCUTANEOUS EVERY 5 MIN PRN
Status: DISCONTINUED | OUTPATIENT
Start: 2019-08-28 | End: 2019-08-28 | Stop reason: HOSPADM

## 2019-08-28 RX ORDER — LABETALOL 20 MG/4 ML (5 MG/ML) INTRAVENOUS SYRINGE
5 EVERY 10 MIN PRN
Status: DISCONTINUED | OUTPATIENT
Start: 2019-08-28 | End: 2019-08-28 | Stop reason: HOSPADM

## 2019-08-28 RX ORDER — MIDAZOLAM HYDROCHLORIDE 1 MG/ML
2 INJECTION INTRAMUSCULAR; INTRAVENOUS
Status: COMPLETED | OUTPATIENT
Start: 2019-08-28 | End: 2019-08-28

## 2019-08-28 RX ORDER — MORPHINE SULFATE 2 MG/ML
4 INJECTION, SOLUTION INTRAMUSCULAR; INTRAVENOUS EVERY 5 MIN PRN
Status: DISCONTINUED | OUTPATIENT
Start: 2019-08-28 | End: 2019-08-28 | Stop reason: HOSPADM

## 2019-08-28 RX ORDER — ONDANSETRON 2 MG/ML
INJECTION INTRAMUSCULAR; INTRAVENOUS PRN
Status: DISCONTINUED | OUTPATIENT
Start: 2019-08-28 | End: 2019-08-28 | Stop reason: SDUPTHER

## 2019-08-28 RX ORDER — DIPHENHYDRAMINE HYDROCHLORIDE 50 MG/ML
12.5 INJECTION INTRAMUSCULAR; INTRAVENOUS
Status: DISCONTINUED | OUTPATIENT
Start: 2019-08-28 | End: 2019-08-28 | Stop reason: HOSPADM

## 2019-08-28 RX ORDER — CEFAZOLIN SODIUM 1 G/3ML
INJECTION, POWDER, FOR SOLUTION INTRAMUSCULAR; INTRAVENOUS PRN
Status: DISCONTINUED | OUTPATIENT
Start: 2019-08-28 | End: 2019-08-28 | Stop reason: SDUPTHER

## 2019-08-28 RX ORDER — BUPIVACAINE HYDROCHLORIDE 2.5 MG/ML
INJECTION, SOLUTION INFILTRATION; PERINEURAL PRN
Status: DISCONTINUED | OUTPATIENT
Start: 2019-08-28 | End: 2019-08-28 | Stop reason: HOSPADM

## 2019-08-28 RX ORDER — PROPOFOL 10 MG/ML
INJECTION, EMULSION INTRAVENOUS PRN
Status: DISCONTINUED | OUTPATIENT
Start: 2019-08-28 | End: 2019-08-28 | Stop reason: SDUPTHER

## 2019-08-28 RX ORDER — MORPHINE SULFATE 2 MG/ML
2 INJECTION, SOLUTION INTRAMUSCULAR; INTRAVENOUS EVERY 5 MIN PRN
Status: DISCONTINUED | OUTPATIENT
Start: 2019-08-28 | End: 2019-08-28 | Stop reason: HOSPADM

## 2019-08-28 RX ORDER — PROMETHAZINE HYDROCHLORIDE 25 MG/ML
6.25 INJECTION, SOLUTION INTRAMUSCULAR; INTRAVENOUS
Status: DISCONTINUED | OUTPATIENT
Start: 2019-08-28 | End: 2019-08-28 | Stop reason: HOSPADM

## 2019-08-28 RX ORDER — HYDRALAZINE HYDROCHLORIDE 20 MG/ML
5 INJECTION INTRAMUSCULAR; INTRAVENOUS EVERY 10 MIN PRN
Status: DISCONTINUED | OUTPATIENT
Start: 2019-08-28 | End: 2019-08-28 | Stop reason: HOSPADM

## 2019-08-28 RX ORDER — FENTANYL CITRATE 50 UG/ML
50 INJECTION, SOLUTION INTRAMUSCULAR; INTRAVENOUS
Status: DISCONTINUED | OUTPATIENT
Start: 2019-08-28 | End: 2019-08-28 | Stop reason: HOSPADM

## 2019-08-28 RX ORDER — ROCURONIUM BROMIDE 10 MG/ML
INJECTION, SOLUTION INTRAVENOUS PRN
Status: DISCONTINUED | OUTPATIENT
Start: 2019-08-28 | End: 2019-08-28 | Stop reason: SDUPTHER

## 2019-08-28 RX ORDER — LIDOCAINE HYDROCHLORIDE 10 MG/ML
1 INJECTION, SOLUTION EPIDURAL; INFILTRATION; INTRACAUDAL; PERINEURAL
Status: DISCONTINUED | OUTPATIENT
Start: 2019-08-28 | End: 2019-08-28 | Stop reason: HOSPADM

## 2019-08-28 RX ORDER — DEXAMETHASONE SODIUM PHOSPHATE 10 MG/ML
INJECTION INTRAMUSCULAR; INTRAVENOUS PRN
Status: DISCONTINUED | OUTPATIENT
Start: 2019-08-28 | End: 2019-08-28 | Stop reason: SDUPTHER

## 2019-08-28 RX ORDER — LIDOCAINE HYDROCHLORIDE 10 MG/ML
INJECTION, SOLUTION EPIDURAL; INFILTRATION; INTRACAUDAL; PERINEURAL PRN
Status: DISCONTINUED | OUTPATIENT
Start: 2019-08-28 | End: 2019-08-28 | Stop reason: SDUPTHER

## 2019-08-28 RX ORDER — SODIUM CHLORIDE, SODIUM LACTATE, POTASSIUM CHLORIDE, CALCIUM CHLORIDE 600; 310; 30; 20 MG/100ML; MG/100ML; MG/100ML; MG/100ML
INJECTION, SOLUTION INTRAVENOUS CONTINUOUS
Status: DISCONTINUED | OUTPATIENT
Start: 2019-08-28 | End: 2019-08-28

## 2019-08-28 RX ORDER — SODIUM CHLORIDE 0.9 % (FLUSH) 0.9 %
10 SYRINGE (ML) INJECTION EVERY 12 HOURS SCHEDULED
Status: DISCONTINUED | OUTPATIENT
Start: 2019-08-28 | End: 2019-08-28 | Stop reason: HOSPADM

## 2019-08-28 RX ORDER — HYDROCODONE BITARTRATE AND ACETAMINOPHEN 10; 325 MG/1; MG/1
1 TABLET ORAL EVERY 6 HOURS PRN
Status: DISCONTINUED | OUTPATIENT
Start: 2019-08-28 | End: 2019-08-30

## 2019-08-28 RX ORDER — MORPHINE SULFATE 4 MG/ML
4 INJECTION, SOLUTION INTRAMUSCULAR; INTRAVENOUS
Status: DISCONTINUED | OUTPATIENT
Start: 2019-08-28 | End: 2019-08-28 | Stop reason: HOSPADM

## 2019-08-28 RX ORDER — SCOLOPAMINE TRANSDERMAL SYSTEM 1 MG/1
1 PATCH, EXTENDED RELEASE TRANSDERMAL ONCE
Status: DISCONTINUED | OUTPATIENT
Start: 2019-08-28 | End: 2019-08-28 | Stop reason: HOSPADM

## 2019-08-28 RX ORDER — FENTANYL CITRATE 50 UG/ML
INJECTION, SOLUTION INTRAMUSCULAR; INTRAVENOUS PRN
Status: DISCONTINUED | OUTPATIENT
Start: 2019-08-28 | End: 2019-08-28 | Stop reason: SDUPTHER

## 2019-08-28 RX ORDER — EPHEDRINE SULFATE 50 MG/ML
INJECTION, SOLUTION INTRAVENOUS PRN
Status: DISCONTINUED | OUTPATIENT
Start: 2019-08-28 | End: 2019-08-28 | Stop reason: SDUPTHER

## 2019-08-28 RX ORDER — METOCLOPRAMIDE HYDROCHLORIDE 5 MG/ML
10 INJECTION INTRAMUSCULAR; INTRAVENOUS
Status: DISCONTINUED | OUTPATIENT
Start: 2019-08-28 | End: 2019-08-28 | Stop reason: HOSPADM

## 2019-08-28 RX ADMIN — SODIUM CHLORIDE, SODIUM LACTATE, POTASSIUM CHLORIDE, AND CALCIUM CHLORIDE: 600; 310; 30; 20 INJECTION, SOLUTION INTRAVENOUS at 10:10

## 2019-08-28 RX ADMIN — HYDROMORPHONE HYDROCHLORIDE 0.25 MG: 1 INJECTION, SOLUTION INTRAMUSCULAR; INTRAVENOUS; SUBCUTANEOUS at 13:03

## 2019-08-28 RX ADMIN — EPHEDRINE SULFATE 10 MG: 50 INJECTION INTRAMUSCULAR; INTRAVENOUS; SUBCUTANEOUS at 11:47

## 2019-08-28 RX ADMIN — PROPOFOL 150 MG: 10 INJECTION, EMULSION INTRAVENOUS at 11:27

## 2019-08-28 RX ADMIN — DAKIN'S SOLUTION 0.125% (QUARTER STRENGTH): 0.12 SOLUTION at 22:00

## 2019-08-28 RX ADMIN — SODIUM BICARBONATE 650 MG: 650 TABLET ORAL at 17:28

## 2019-08-28 RX ADMIN — MORPHINE SULFATE 4 MG: 2 INJECTION, SOLUTION INTRAMUSCULAR; INTRAVENOUS at 21:27

## 2019-08-28 RX ADMIN — MIDAZOLAM 2 MG: 1 INJECTION INTRAMUSCULAR; INTRAVENOUS at 10:16

## 2019-08-28 RX ADMIN — MORPHINE SULFATE 4 MG: 2 INJECTION, SOLUTION INTRAMUSCULAR; INTRAVENOUS at 15:16

## 2019-08-28 RX ADMIN — FENTANYL CITRATE 50 MCG: 50 INJECTION INTRAMUSCULAR; INTRAVENOUS at 11:27

## 2019-08-28 RX ADMIN — SUGAMMADEX 200 MG: 100 INJECTION, SOLUTION INTRAVENOUS at 12:35

## 2019-08-28 RX ADMIN — LIDOCAINE HYDROCHLORIDE 50 MG: 10 INJECTION, SOLUTION EPIDURAL; INFILTRATION; INTRACAUDAL; PERINEURAL at 11:27

## 2019-08-28 RX ADMIN — EPHEDRINE SULFATE 20 MG: 50 INJECTION INTRAMUSCULAR; INTRAVENOUS; SUBCUTANEOUS at 11:32

## 2019-08-28 RX ADMIN — DEXAMETHASONE SODIUM PHOSPHATE 10 MG: 10 INJECTION INTRAMUSCULAR; INTRAVENOUS at 11:45

## 2019-08-28 RX ADMIN — SODIUM CHLORIDE, SODIUM LACTATE, POTASSIUM CHLORIDE, AND CALCIUM CHLORIDE: 600; 310; 30; 20 INJECTION, SOLUTION INTRAVENOUS at 12:31

## 2019-08-28 RX ADMIN — MORPHINE SULFATE 4 MG: 2 INJECTION, SOLUTION INTRAMUSCULAR; INTRAVENOUS at 17:27

## 2019-08-28 RX ADMIN — BACLOFEN 10 MG: 10 TABLET ORAL at 17:28

## 2019-08-28 RX ADMIN — PANTOPRAZOLE SODIUM 40 MG: 40 TABLET, DELAYED RELEASE ORAL at 17:28

## 2019-08-28 RX ADMIN — EPHEDRINE SULFATE 20 MG: 50 INJECTION INTRAMUSCULAR; INTRAVENOUS; SUBCUTANEOUS at 11:50

## 2019-08-28 RX ADMIN — COLLAGENASE SANTYL: 250 OINTMENT TOPICAL at 22:00

## 2019-08-28 RX ADMIN — ONDANSETRON HYDROCHLORIDE 4 MG: 2 INJECTION, SOLUTION INTRAMUSCULAR; INTRAVENOUS at 12:32

## 2019-08-28 RX ADMIN — CEFAZOLIN 2000 MG: 330 INJECTION, POWDER, FOR SOLUTION INTRAMUSCULAR; INTRAVENOUS at 11:42

## 2019-08-28 RX ADMIN — ROCURONIUM BROMIDE 30 MG: 10 INJECTION INTRAVENOUS at 11:27

## 2019-08-28 RX ADMIN — PHENYLEPHRINE HYDROCHLORIDE 200 MCG: 10 INJECTION INTRAVENOUS at 12:08

## 2019-08-28 RX ADMIN — HYDROMORPHONE HYDROCHLORIDE 0.25 MG: 1 INJECTION, SOLUTION INTRAMUSCULAR; INTRAVENOUS; SUBCUTANEOUS at 13:17

## 2019-08-28 RX ADMIN — HYDROCODONE BITARTRATE AND ACETAMINOPHEN 1 TABLET: 10; 325 TABLET ORAL at 14:30

## 2019-08-28 RX ADMIN — ONDANSETRON 4 MG: 2 INJECTION INTRAMUSCULAR; INTRAVENOUS at 20:38

## 2019-08-28 RX ADMIN — MORPHINE SULFATE 2 MG: 2 INJECTION, SOLUTION INTRAMUSCULAR; INTRAVENOUS at 05:43

## 2019-08-28 ASSESSMENT — PAIN DESCRIPTION - ORIENTATION: ORIENTATION: LEFT

## 2019-08-28 ASSESSMENT — PAIN SCALES - GENERAL
PAINLEVEL_OUTOF10: 8
PAINLEVEL_OUTOF10: 10
PAINLEVEL_OUTOF10: 8
PAINLEVEL_OUTOF10: 9
PAINLEVEL_OUTOF10: 8
PAINLEVEL_OUTOF10: 8
PAINLEVEL_OUTOF10: 6
PAINLEVEL_OUTOF10: 2

## 2019-08-28 ASSESSMENT — PAIN DESCRIPTION - PAIN TYPE: TYPE: SURGICAL PAIN

## 2019-08-28 ASSESSMENT — LIFESTYLE VARIABLES: SMOKING_STATUS: 0

## 2019-08-28 ASSESSMENT — PAIN DESCRIPTION - PROGRESSION: CLINICAL_PROGRESSION: NOT CHANGED

## 2019-08-28 ASSESSMENT — ENCOUNTER SYMPTOMS: SHORTNESS OF BREATH: 0

## 2019-08-28 ASSESSMENT — PAIN - FUNCTIONAL ASSESSMENT: PAIN_FUNCTIONAL_ASSESSMENT: ACTIVITIES ARE NOT PREVENTED

## 2019-08-28 ASSESSMENT — PAIN DESCRIPTION - DESCRIPTORS: DESCRIPTORS: STABBING;SQUEEZING

## 2019-08-28 ASSESSMENT — PAIN DESCRIPTION - LOCATION: LOCATION: ABDOMEN

## 2019-08-28 ASSESSMENT — PAIN DESCRIPTION - ONSET: ONSET: ON-GOING

## 2019-08-28 ASSESSMENT — PAIN DESCRIPTION - FREQUENCY: FREQUENCY: CONTINUOUS

## 2019-08-28 NOTE — ANESTHESIA PRE PROCEDURE
°F (36.4 °C)   TempSrc: Temporal Temporal Temporal Temporal   SpO2: 96% 97% 99% 99%   Weight:       Height:                                                  BP Readings from Last 3 Encounters:   08/28/19 113/60   08/21/19 (!) 91/43   08/18/19 (!) 149/69       NPO Status: Time of last liquid consumption: 2350                        Time of last solid consumption: 2350                        Date of last liquid consumption: 08/20/19                        Date of last solid food consumption: 08/20/19    BMI:   Wt Readings from Last 3 Encounters:   08/20/19 190 lb (86.2 kg)   08/08/19 195 lb (88.5 kg)   07/29/19 200 lb (90.7 kg)     Body mass index is 28.89 kg/m². CBC:   Lab Results   Component Value Date    WBC 7.7 08/28/2019    RBC 3.37 08/28/2019    HGB 8.7 08/28/2019    HCT 29.0 08/28/2019    MCV 86.1 08/28/2019    RDW 20.6 08/28/2019     08/28/2019       CMP:   Lab Results   Component Value Date     08/28/2019    K 3.9 08/28/2019     08/28/2019    CO2 17 08/28/2019    BUN 24 08/28/2019    CREATININE 0.8 08/28/2019    LABGLOM >60 08/28/2019    GLUCOSE 89 08/28/2019    PROT 5.5 08/20/2019    CALCIUM 7.8 08/28/2019    BILITOT 0.4 08/20/2019    ALKPHOS 205 08/20/2019    AST 11 08/20/2019    ALT <5 08/20/2019       POC Tests: No results for input(s): POCGLU, POCNA, POCK, POCCL, POCBUN, POCHEMO, POCHCT in the last 72 hours.     Coags:   Lab Results   Component Value Date    PROTIME 14.8 08/27/2019    INR 1.22 08/27/2019    APTT 30.2 08/27/2019       HCG (If Applicable): No results found for: PREGTESTUR, PREGSERUM, HCG, HCGQUANT     ABGs: No results found for: PHART, PO2ART, YFI6EQN, XJP4RDK, BEART, L3QAMEWV     Type & Screen (If Applicable):  No results found for: LABABO, [address]    Anesthesia Evaluation  Patient summary reviewed and Nursing notes reviewed no history of anesthetic complications:   Airway: Mallampati: II  TM distance: >3 FB   Neck ROM: full  Mouth opening: > = 3 FB Dental: normal exam

## 2019-08-28 NOTE — BRIEF OP NOTE
Brief Postoperative Note      DATE OF PROCEDURE: 8/28/2019     SURGEON: Dahlia Mendes MD    PREOPERATIVE DIAGNOSIS: severe ischial decubiti . POSTOPERATIVE DIAGNOSIS: Same     OPERATION: Procedure(s): DIVERTING LOOP COLOSTOMY    ANESTHESIA: General    ESTIMATED BLOOD LOSS: Minimal    COMPLICATIONS: None. SPECIMENS: * No specimens in log *    DRAINS: None    The patient tolerated the procedure well.     Electronically signed by Dahlia Mendes MD  on 8/28/2019 at 1:01 PM

## 2019-08-29 LAB
ANION GAP SERPL CALCULATED.3IONS-SCNC: 12 MMOL/L (ref 7–19)
BLOOD BANK DISPENSE STATUS: NORMAL
BLOOD BANK DISPENSE STATUS: NORMAL
BLOOD BANK PRODUCT CODE: NORMAL
BLOOD BANK PRODUCT CODE: NORMAL
BPU ID: NORMAL
BPU ID: NORMAL
BUN BLDV-MCNC: 22 MG/DL (ref 6–20)
CALCIUM SERPL-MCNC: 8.1 MG/DL (ref 8.6–10)
CHLORIDE BLD-SCNC: 110 MMOL/L (ref 98–111)
CO2: 16 MMOL/L (ref 22–29)
CREAT SERPL-MCNC: 0.6 MG/DL (ref 0.5–0.9)
DESCRIPTION BLOOD BANK: NORMAL
DESCRIPTION BLOOD BANK: NORMAL
GFR NON-AFRICAN AMERICAN: >60
GLUCOSE BLD-MCNC: 130 MG/DL (ref 74–109)
HCT VFR BLD CALC: 29.9 % (ref 37–47)
HEMOGLOBIN: 8.9 G/DL (ref 12–16)
MCH RBC QN AUTO: 26 PG (ref 27–31)
MCHC RBC AUTO-ENTMCNC: 29.8 G/DL (ref 33–37)
MCV RBC AUTO: 87.4 FL (ref 81–99)
PDW BLD-RTO: 20.6 % (ref 11.5–14.5)
PLATELET # BLD: 329 K/UL (ref 130–400)
PMV BLD AUTO: 9.4 FL (ref 9.4–12.3)
POTASSIUM REFLEX MAGNESIUM: 4.3 MMOL/L (ref 3.5–5)
RBC # BLD: 3.42 M/UL (ref 4.2–5.4)
SODIUM BLD-SCNC: 138 MMOL/L (ref 136–145)
WBC # BLD: 8.9 K/UL (ref 4.8–10.8)

## 2019-08-29 PROCEDURE — 2580000003 HC RX 258: Performed by: SURGERY

## 2019-08-29 PROCEDURE — 99024 POSTOP FOLLOW-UP VISIT: CPT | Performed by: SURGERY

## 2019-08-29 PROCEDURE — 6360000002 HC RX W HCPCS: Performed by: SURGERY

## 2019-08-29 PROCEDURE — 6370000000 HC RX 637 (ALT 250 FOR IP): Performed by: SURGERY

## 2019-08-29 PROCEDURE — C9113 INJ PANTOPRAZOLE SODIUM, VIA: HCPCS | Performed by: HOSPITALIST

## 2019-08-29 PROCEDURE — 6360000002 HC RX W HCPCS: Performed by: HOSPITALIST

## 2019-08-29 PROCEDURE — 80048 BASIC METABOLIC PNL TOTAL CA: CPT

## 2019-08-29 PROCEDURE — 85027 COMPLETE CBC AUTOMATED: CPT

## 2019-08-29 PROCEDURE — 1210000000 HC MED SURG R&B

## 2019-08-29 RX ORDER — 0.9 % SODIUM CHLORIDE 0.9 %
10 VIAL (ML) INJECTION 2 TIMES DAILY
Status: DISCONTINUED | OUTPATIENT
Start: 2019-08-29 | End: 2019-09-06 | Stop reason: HOSPADM

## 2019-08-29 RX ORDER — PANTOPRAZOLE SODIUM 40 MG/10ML
40 INJECTION, POWDER, LYOPHILIZED, FOR SOLUTION INTRAVENOUS 2 TIMES DAILY
Status: DISCONTINUED | OUTPATIENT
Start: 2019-08-29 | End: 2019-09-06 | Stop reason: HOSPADM

## 2019-08-29 RX ORDER — METOCLOPRAMIDE HYDROCHLORIDE 5 MG/ML
5 INJECTION INTRAMUSCULAR; INTRAVENOUS EVERY 6 HOURS
Status: DISCONTINUED | OUTPATIENT
Start: 2019-08-29 | End: 2019-08-30

## 2019-08-29 RX ADMIN — MORPHINE SULFATE 4 MG: 2 INJECTION, SOLUTION INTRAMUSCULAR; INTRAVENOUS at 09:27

## 2019-08-29 RX ADMIN — MORPHINE SULFATE 4 MG: 2 INJECTION, SOLUTION INTRAMUSCULAR; INTRAVENOUS at 07:38

## 2019-08-29 RX ADMIN — DAKIN'S SOLUTION 0.125% (QUARTER STRENGTH) 473 ML: 0.12 SOLUTION at 07:35

## 2019-08-29 RX ADMIN — SODIUM CHLORIDE: 9 INJECTION, SOLUTION INTRAVENOUS at 03:13

## 2019-08-29 RX ADMIN — MORPHINE SULFATE 2 MG: 2 INJECTION, SOLUTION INTRAMUSCULAR; INTRAVENOUS at 20:00

## 2019-08-29 RX ADMIN — METOCLOPRAMIDE 5 MG: 5 INJECTION, SOLUTION INTRAMUSCULAR; INTRAVENOUS at 22:36

## 2019-08-29 RX ADMIN — Medication 10 ML: at 07:39

## 2019-08-29 RX ADMIN — Medication 10 ML: at 22:36

## 2019-08-29 RX ADMIN — DAKIN'S SOLUTION 0.125% (QUARTER STRENGTH) 473 ML: 0.12 SOLUTION at 22:37

## 2019-08-29 RX ADMIN — HYDROCODONE BITARTRATE AND ACETAMINOPHEN 1 TABLET: 10; 325 TABLET ORAL at 01:15

## 2019-08-29 RX ADMIN — PANTOPRAZOLE SODIUM 40 MG: 40 TABLET, DELAYED RELEASE ORAL at 07:34

## 2019-08-29 RX ADMIN — SILVER SULFADIAZINE: 10 CREAM TOPICAL at 07:34

## 2019-08-29 RX ADMIN — BACLOFEN 10 MG: 10 TABLET ORAL at 22:37

## 2019-08-29 RX ADMIN — SODIUM BICARBONATE 650 MG: 650 TABLET ORAL at 22:37

## 2019-08-29 RX ADMIN — ONDANSETRON 4 MG: 2 INJECTION INTRAMUSCULAR; INTRAVENOUS at 09:33

## 2019-08-29 RX ADMIN — BACLOFEN 10 MG: 10 TABLET ORAL at 16:47

## 2019-08-29 RX ADMIN — PANTOPRAZOLE SODIUM 40 MG: 40 INJECTION, POWDER, FOR SOLUTION INTRAVENOUS at 22:36

## 2019-08-29 RX ADMIN — MORPHINE SULFATE 4 MG: 2 INJECTION, SOLUTION INTRAMUSCULAR; INTRAVENOUS at 23:24

## 2019-08-29 RX ADMIN — SODIUM BICARBONATE 650 MG: 650 TABLET ORAL at 07:34

## 2019-08-29 RX ADMIN — SODIUM BICARBONATE 650 MG: 650 TABLET ORAL at 12:40

## 2019-08-29 RX ADMIN — COLLAGENASE SANTYL: 250 OINTMENT TOPICAL at 07:35

## 2019-08-29 RX ADMIN — BACLOFEN 10 MG: 10 TABLET ORAL at 01:15

## 2019-08-29 RX ADMIN — BACLOFEN 10 MG: 10 TABLET ORAL at 12:40

## 2019-08-29 RX ADMIN — MULTIPLE VITAMINS W/ MINERALS TAB 1 TABLET: TAB at 07:34

## 2019-08-29 RX ADMIN — METOCLOPRAMIDE 5 MG: 5 INJECTION, SOLUTION INTRAMUSCULAR; INTRAVENOUS at 16:48

## 2019-08-29 RX ADMIN — COLLAGENASE SANTYL: 250 OINTMENT TOPICAL at 22:38

## 2019-08-29 RX ADMIN — SODIUM BICARBONATE 650 MG: 650 TABLET ORAL at 16:47

## 2019-08-29 RX ADMIN — MORPHINE SULFATE 4 MG: 2 INJECTION, SOLUTION INTRAMUSCULAR; INTRAVENOUS at 03:13

## 2019-08-29 ASSESSMENT — PAIN SCALES - GENERAL
PAINLEVEL_OUTOF10: 6
PAINLEVEL_OUTOF10: 6
PAINLEVEL_OUTOF10: 3
PAINLEVEL_OUTOF10: 8
PAINLEVEL_OUTOF10: 6
PAINLEVEL_OUTOF10: 7
PAINLEVEL_OUTOF10: 1
PAINLEVEL_OUTOF10: 5

## 2019-08-29 ASSESSMENT — PAIN DESCRIPTION - FREQUENCY
FREQUENCY: CONTINUOUS

## 2019-08-29 ASSESSMENT — PAIN DESCRIPTION - ONSET: ONSET: ON-GOING

## 2019-08-29 ASSESSMENT — PAIN DESCRIPTION - LOCATION
LOCATION: ABDOMEN

## 2019-08-29 ASSESSMENT — PAIN DESCRIPTION - PAIN TYPE
TYPE: SURGICAL PAIN

## 2019-08-29 ASSESSMENT — PAIN DESCRIPTION - ORIENTATION
ORIENTATION: LEFT

## 2019-08-29 ASSESSMENT — PAIN DESCRIPTION - DESCRIPTORS
DESCRIPTORS: ACHING;SORE
DESCRIPTORS: SORE
DESCRIPTORS: SORE

## 2019-08-29 ASSESSMENT — PAIN - FUNCTIONAL ASSESSMENT: PAIN_FUNCTIONAL_ASSESSMENT: ACTIVITIES ARE NOT PREVENTED

## 2019-08-29 ASSESSMENT — PAIN DESCRIPTION - PROGRESSION: CLINICAL_PROGRESSION: NOT CHANGED

## 2019-08-29 NOTE — PROGRESS NOTES
area, left, stage IV (HCC)    Pressure injury of left perineal ischial region, stage 4 (HCC)    Chronic osteomyelitis with draining sinus of multiple sites (HCC)    Pressure injury of right heel, stage 3 (HCC)    Gastrointestinal hemorrhage    Pressure injury of skin of left heel    Acute encephalopathy    Anemia due to blood loss, acute    Hyponatremia    Hypocalcemia    Severe protein-calorie malnutrition (HCC)    Acute kidney injury ([facility]    GI bleed    Anemia   2. PLAN:     1. MAU  2. Work on placement  3. Deloris Miles M.D.  FACS  Electronically signed 8/29/2019 at 7:43 AM

## 2019-08-29 NOTE — CARE COORDINATION
Pt has been accepted at [address] [place] [address] Pending Pre-Cert approval. LTACH admissions will notify SW once Pre-Cert has cleared.    [facility]   X   F  Electronically signed by Pablo Petty on 8/29/2019 at 10:26 AM

## 2019-08-29 NOTE — PROGRESS NOTES
Infectious Diseases Progress Note    Patient:  Bethany Cano  YOB: 1960  MRN: [id number]   Admit date: 8/20/2019   Admitting Physician: Yvrose Yadav, *  Primary Care Physician: Summer Chaparro    Chief Complaint/Interval History: She is without fever. She underwent diverting colostomy yesterday. She is stable postoperatively. Interval notes reviewed. Discussed with Dr. Marek Gudino. In/Out    Intake/Output Summary (Last 24 hours) at 8/29/2019 1337  Last data filed at 8/29/2019 6286  Gross per 24 hour   Intake 543 ml   Output 750 ml   Net -207 ml     Allergies: No Known Allergies  Current Meds:   ALPRAZolam (XANAX) tablet 1 mg TID PRN   HYDROcodone-acetaminophen (NORCO)  MG per tablet 1 tablet Q6H PRN   0.9 % sodium chloride infusion Continuous   sodium bicarbonate tablet 650 mg 4x Daily   LORazepam (ATIVAN) tablet 0.5 mg Q4H PRN   sodium hypochlorite (DAKINS) 0.125 % external solution BID   sodium chloride flush 0.9 % injection 10 mL PRN   sodium chloride flush 0.9 % injection 10 mL 2 times per day   pantoprazole (PROTONIX) tablet 40 mg BID AC   collagenase ointment BID   baclofen (LIORESAL) tablet 10 mg TID   therapeutic multivitamin-minerals 1 tablet Daily   silver sulfADIAZINE (SILVADENE) 1 % cream Daily   sodium chloride flush 0.9 % injection 10 mL PRN   acetaminophen (TYLENOL) tablet 650 mg Q4H PRN   ondansetron (ZOFRAN) injection 4 mg Q6H PRN   morphine (PF) injection 2 mg Q2H PRN   Or    morphine (PF) injection 4 mg Q2H PRN     Review of Systems see HPI    VitalSigns:  /70   Pulse 87   Temp 98 °F (36.7 °C) (Temporal)   Resp 18   Ht 5' 8\" (1.727 m)   Wt 190 lb (86.2 kg)   SpO2 98%   BMI 28.89 kg/m²      Physical Exam  Line/IV site: No erythema, warmth, induration, or tenderness.   Ostomy pink  Lungs clear without crackles    Lab Results:  CBC:   Recent Labs     08/27/19  0420 08/28/19  0440 08/29/19  0305   WBC 5.7 7.7 8.9   HGB 7.8* 8.7* 8.9*    828 004 BMP:  Recent Labs     08/27/19  0420 08/28/19  0440 08/29/19  0305    137 138   K 3.6 3.9 4.3    110 110   CO2 18* 17* 16*   BUN 25* 24* 22*   CREATININE 1.2* 0.8 0.6   GLUCOSE 101 89 130*     Impression:  Large ischial/sacral decubitus ulceration. Wounds were in very close proximity to rectum. She has had diverting colostomy which will help minimize wound contamination. Feel to have a chance for cure she would need debridement, deep culture, ongoing local care, offloading, IV antibiotics, nutritional support if not maintaining adequate oral intake, and eventual plan for flap or some other type of closure. Feel she will need an opinion from wound surgeon who could manage a very large complex she will/sacral wound. Ideally she would be able to get care at a center where all of the above services could be offered. Nursing home or LTAC locally in [place] could likely manage her antibiotic treatment and wound care. I do not believe the LTAC in [place] has a plastic surgeon/wound surgeon that could plan for the possibility of flap or other type of closure. Recommendations:  Feel would be helpful to get an opinion from her wound surgeon at [facility] as to how to proceed at this point. She has had her diverting colostomy. We need some additional direction at this point as to how to manage antibiotics, wound care, debridement, nutritional support in conjunction with some type of plan for eventual attempted closure.     Stefani Rose MD

## 2019-08-29 NOTE — PROGRESS NOTES
dressing to left foot clean and intact  Lymphatic: No significant lymph node enlargement papable  Neurologic: Mental status: Alert, oriented, thought content appropriate        Assessment & Plan:    · Chronic ischial wounds- per ID  · Chronic left heel wound- -per ID  · Multiple debutitus with fecal soilage- s/p diverting colostomy   · Paraplegia (HCC)  · Recurrent anemia   · Metabolic acidosis- on bicarb     She will need offloading, local wound care, and muscle flap or other plan for closure- needs to be discussed with her plastic surgeon in [place] - need his name        Disposition: CARLIE Cruz

## 2019-08-30 ENCOUNTER — APPOINTMENT (OUTPATIENT)
Dept: GENERAL RADIOLOGY | Age: 59
DRG: 981 | End: 2019-08-30
Payer: MEDICARE

## 2019-08-30 ENCOUNTER — APPOINTMENT (OUTPATIENT)
Dept: CT IMAGING | Age: 59
DRG: 981 | End: 2019-08-30
Payer: MEDICARE

## 2019-08-30 LAB
AMMONIA: 19 UMOL/L (ref 11–51)
AMORPHOUS: ABNORMAL /HPF
ANION GAP SERPL CALCULATED.3IONS-SCNC: 13 MMOL/L (ref 7–19)
BACTERIA: ABNORMAL /HPF
BASE EXCESS ARTERIAL: -11.6 MMOL/L (ref -2–2)
BASE EXCESS ARTERIAL: -9.6 MMOL/L (ref -2–2)
BILIRUBIN URINE: NEGATIVE
BLOOD, URINE: NEGATIVE
BUN BLDV-MCNC: 25 MG/DL (ref 6–20)
CALCIUM SERPL-MCNC: 8.1 MG/DL (ref 8.6–10)
CARBOXYHEMOGLOBIN ARTERIAL: 2 % (ref 0–5)
CARBOXYHEMOGLOBIN ARTERIAL: 2.2 % (ref 0–5)
CHLORIDE BLD-SCNC: 111 MMOL/L (ref 98–111)
CLARITY: ABNORMAL
CO2: 15 MMOL/L (ref 22–29)
COLOR: ABNORMAL
CREAT SERPL-MCNC: 1.1 MG/DL (ref 0.5–0.9)
EKG P AXIS: -2 DEGREES
EKG P-R INTERVAL: 120 MS
EKG Q-T INTERVAL: 330 MS
EKG QRS DURATION: 106 MS
EKG QTC CALCULATION (BAZETT): 417 MS
EKG T AXIS: 37 DEGREES
EPITHELIAL CELLS, UA: ABNORMAL /HPF
GFR NON-AFRICAN AMERICAN: 51
GLUCOSE BLD-MCNC: 86 MG/DL (ref 70–99)
GLUCOSE BLD-MCNC: 87 MG/DL (ref 74–109)
GLUCOSE URINE: NEGATIVE MG/DL
HCO3 ARTERIAL: 14.4 MMOL/L (ref 22–26)
HCO3 ARTERIAL: 16.5 MMOL/L (ref 22–26)
HCT VFR BLD CALC: 29.3 % (ref 37–47)
HCT VFR BLD CALC: 33 % (ref 37–47)
HEMOGLOBIN, ART, EXTENDED: 10.2 G/DL (ref 12–16)
HEMOGLOBIN, ART, EXTENDED: 9.5 G/DL (ref 12–16)
HEMOGLOBIN: 8.7 G/DL (ref 12–16)
HEMOGLOBIN: 9.7 G/DL (ref 12–16)
KETONES, URINE: NEGATIVE MG/DL
LACTIC ACID: 0.7 MMOL/L (ref 0.5–1.9)
LEUKOCYTE ESTERASE, URINE: ABNORMAL
LV EF: 58 %
LVEF MODALITY: NORMAL
MCH RBC QN AUTO: 25.9 PG (ref 27–31)
MCH RBC QN AUTO: 26.2 PG (ref 27–31)
MCHC RBC AUTO-ENTMCNC: 29.4 G/DL (ref 33–37)
MCHC RBC AUTO-ENTMCNC: 29.7 G/DL (ref 33–37)
MCV RBC AUTO: 88.2 FL (ref 81–99)
MCV RBC AUTO: 88.3 FL (ref 81–99)
METHEMOGLOBIN ARTERIAL: 0.8 %
METHEMOGLOBIN ARTERIAL: 1.5 %
NITRITE, URINE: NEGATIVE
O2 CONTENT ARTERIAL: 12.8 ML/DL
O2 CONTENT ARTERIAL: 14 ML/DL
O2 SAT, ARTERIAL: 95.1 %
O2 SAT, ARTERIAL: 96.4 %
O2 THERAPY: ABNORMAL
O2 THERAPY: ABNORMAL
PCO2 ARTERIAL: 32 MMHG (ref 35–45)
PCO2 ARTERIAL: 36 MMHG (ref 35–45)
PDW BLD-RTO: 20.7 % (ref 11.5–14.5)
PDW BLD-RTO: 20.8 % (ref 11.5–14.5)
PERFORMED ON: NORMAL
PH ARTERIAL: 7.26 (ref 7.35–7.45)
PH ARTERIAL: 7.27 (ref 7.35–7.45)
PH UA: 7.5 (ref 5–8)
PLATELET # BLD: 214 K/UL (ref 130–400)
PLATELET # BLD: 306 K/UL (ref 130–400)
PMV BLD AUTO: 9 FL (ref 9.4–12.3)
PMV BLD AUTO: 9.3 FL (ref 9.4–12.3)
PO2 ARTERIAL: 87 MMHG (ref 80–100)
PO2 ARTERIAL: 96 MMHG (ref 80–100)
POTASSIUM REFLEX MAGNESIUM: 4.2 MMOL/L (ref 3.5–5)
POTASSIUM, WHOLE BLOOD: 4
POTASSIUM, WHOLE BLOOD: 4.3
PROTEIN UA: 100 MG/DL
RBC # BLD: 3.32 M/UL (ref 4.2–5.4)
RBC # BLD: 3.74 M/UL (ref 4.2–5.4)
RBC UA: ABNORMAL /HPF (ref 0–2)
SODIUM BLD-SCNC: 139 MMOL/L (ref 136–145)
SPECIFIC GRAVITY UA: 1.02 (ref 1–1.03)
TROPONIN: <0.01 NG/ML (ref 0–0.03)
URINE REFLEX TO CULTURE: YES
UROBILINOGEN, URINE: 0.2 E.U./DL
WBC # BLD: 15.9 K/UL (ref 4.8–10.8)
WBC # BLD: 17.3 K/UL (ref 4.8–10.8)
WBC UA: ABNORMAL /HPF (ref 0–5)

## 2019-08-30 PROCEDURE — 2580000003 HC RX 258: Performed by: SURGERY

## 2019-08-30 PROCEDURE — 6360000002 HC RX W HCPCS: Performed by: INTERNAL MEDICINE

## 2019-08-30 PROCEDURE — 99222 1ST HOSP IP/OBS MODERATE 55: CPT | Performed by: PSYCHIATRY & NEUROLOGY

## 2019-08-30 PROCEDURE — 70450 CT HEAD/BRAIN W/O DYE: CPT

## 2019-08-30 PROCEDURE — 81001 URINALYSIS AUTO W/SCOPE: CPT

## 2019-08-30 PROCEDURE — 6360000002 HC RX W HCPCS: Performed by: SURGERY

## 2019-08-30 PROCEDURE — 6370000000 HC RX 637 (ALT 250 FOR IP): Performed by: SURGERY

## 2019-08-30 PROCEDURE — 6360000002 HC RX W HCPCS: Performed by: HOSPITALIST

## 2019-08-30 PROCEDURE — 2100000000 HC CCU R&B

## 2019-08-30 PROCEDURE — 36600 WITHDRAWAL OF ARTERIAL BLOOD: CPT

## 2019-08-30 PROCEDURE — 6360000002 HC RX W HCPCS

## 2019-08-30 PROCEDURE — 83605 ASSAY OF LACTIC ACID: CPT

## 2019-08-30 PROCEDURE — 87086 URINE CULTURE/COLONY COUNT: CPT

## 2019-08-30 PROCEDURE — 82948 REAGENT STRIP/BLOOD GLUCOSE: CPT

## 2019-08-30 PROCEDURE — 71045 X-RAY EXAM CHEST 1 VIEW: CPT

## 2019-08-30 PROCEDURE — 2500000003 HC RX 250 WO HCPCS: Performed by: HOSPITALIST

## 2019-08-30 PROCEDURE — 93306 TTE W/DOPPLER COMPLETE: CPT

## 2019-08-30 PROCEDURE — 80048 BASIC METABOLIC PNL TOTAL CA: CPT

## 2019-08-30 PROCEDURE — 87040 BLOOD CULTURE FOR BACTERIA: CPT

## 2019-08-30 PROCEDURE — 84484 ASSAY OF TROPONIN QUANT: CPT

## 2019-08-30 PROCEDURE — C9113 INJ PANTOPRAZOLE SODIUM, VIA: HCPCS | Performed by: HOSPITALIST

## 2019-08-30 PROCEDURE — 36592 COLLECT BLOOD FROM PICC: CPT

## 2019-08-30 PROCEDURE — 74018 RADEX ABDOMEN 1 VIEW: CPT

## 2019-08-30 PROCEDURE — 87081 CULTURE SCREEN ONLY: CPT

## 2019-08-30 PROCEDURE — 85027 COMPLETE CBC AUTOMATED: CPT

## 2019-08-30 PROCEDURE — 93005 ELECTROCARDIOGRAM TRACING: CPT

## 2019-08-30 PROCEDURE — 2580000003 HC RX 258: Performed by: INTERNAL MEDICINE

## 2019-08-30 PROCEDURE — 2580000003 HC RX 258: Performed by: HOSPITALIST

## 2019-08-30 PROCEDURE — 82140 ASSAY OF AMMONIA: CPT

## 2019-08-30 PROCEDURE — 82803 BLOOD GASES ANY COMBINATION: CPT

## 2019-08-30 PROCEDURE — 84132 ASSAY OF SERUM POTASSIUM: CPT

## 2019-08-30 RX ORDER — ONDANSETRON 2 MG/ML
4 INJECTION INTRAMUSCULAR; INTRAVENOUS EVERY 6 HOURS PRN
Status: DISCONTINUED | OUTPATIENT
Start: 2019-08-30 | End: 2019-09-06 | Stop reason: HOSPADM

## 2019-08-30 RX ORDER — NALOXONE HYDROCHLORIDE 0.4 MG/ML
INJECTION, SOLUTION INTRAMUSCULAR; INTRAVENOUS; SUBCUTANEOUS
Status: DISCONTINUED
Start: 2019-08-30 | End: 2019-08-30

## 2019-08-30 RX ORDER — NALOXONE HYDROCHLORIDE 0.4 MG/ML
0.4 INJECTION, SOLUTION INTRAMUSCULAR; INTRAVENOUS; SUBCUTANEOUS PRN
Status: DISCONTINUED | OUTPATIENT
Start: 2019-08-30 | End: 2019-08-30

## 2019-08-30 RX ORDER — DIAZEPAM 2 MG/1
2 TABLET ORAL 3 TIMES DAILY
Status: DISCONTINUED | OUTPATIENT
Start: 2019-08-30 | End: 2019-08-30

## 2019-08-30 RX ORDER — LORAZEPAM 2 MG/ML
0.5 INJECTION INTRAMUSCULAR ONCE
Status: COMPLETED | OUTPATIENT
Start: 2019-08-30 | End: 2019-08-30

## 2019-08-30 RX ORDER — BACLOFEN 10 MG/1
5 TABLET ORAL 3 TIMES DAILY
Status: DISCONTINUED | OUTPATIENT
Start: 2019-08-30 | End: 2019-09-06 | Stop reason: HOSPADM

## 2019-08-30 RX ORDER — LORAZEPAM 2 MG/ML
INJECTION INTRAMUSCULAR
Status: COMPLETED
Start: 2019-08-30 | End: 2019-08-30

## 2019-08-30 RX ORDER — HYDROCODONE BITARTRATE AND ACETAMINOPHEN 5; 325 MG/1; MG/1
1 TABLET ORAL EVERY 4 HOURS PRN
Status: DISCONTINUED | OUTPATIENT
Start: 2019-08-30 | End: 2019-08-30

## 2019-08-30 RX ORDER — METOCLOPRAMIDE HYDROCHLORIDE 5 MG/ML
10 INJECTION INTRAMUSCULAR; INTRAVENOUS EVERY 6 HOURS
Status: DISCONTINUED | OUTPATIENT
Start: 2019-08-30 | End: 2019-08-30

## 2019-08-30 RX ADMIN — Medication 25 MEQ: at 20:51

## 2019-08-30 RX ADMIN — MEROPENEM 1 G: 1 INJECTION, POWDER, FOR SOLUTION INTRAVENOUS at 14:54

## 2019-08-30 RX ADMIN — Medication 10 ML: at 09:03

## 2019-08-30 RX ADMIN — COLLAGENASE SANTYL: 250 OINTMENT TOPICAL at 22:20

## 2019-08-30 RX ADMIN — Medication: at 22:19

## 2019-08-30 RX ADMIN — Medication 10 ML: at 09:02

## 2019-08-30 RX ADMIN — Medication: at 14:54

## 2019-08-30 RX ADMIN — SILVER SULFADIAZINE: 10 CREAM TOPICAL at 09:01

## 2019-08-30 RX ADMIN — COLLAGENASE SANTYL: 250 OINTMENT TOPICAL at 09:01

## 2019-08-30 RX ADMIN — Medication 10 ML: at 20:51

## 2019-08-30 RX ADMIN — DAKIN'S SOLUTION 0.125% (QUARTER STRENGTH): 0.12 SOLUTION at 22:20

## 2019-08-30 RX ADMIN — METOCLOPRAMIDE 5 MG: 5 INJECTION, SOLUTION INTRAMUSCULAR; INTRAVENOUS at 11:35

## 2019-08-30 RX ADMIN — MEROPENEM 1 G: 1 INJECTION, POWDER, FOR SOLUTION INTRAVENOUS at 22:19

## 2019-08-30 RX ADMIN — LORAZEPAM 0.5 MG: 2 INJECTION INTRAMUSCULAR; INTRAVENOUS at 13:48

## 2019-08-30 RX ADMIN — MORPHINE SULFATE 2 MG: 2 INJECTION, SOLUTION INTRAMUSCULAR; INTRAVENOUS at 11:36

## 2019-08-30 RX ADMIN — LORAZEPAM 0.5 MG: 2 INJECTION INTRAMUSCULAR at 13:48

## 2019-08-30 RX ADMIN — PANTOPRAZOLE SODIUM 40 MG: 40 INJECTION, POWDER, FOR SOLUTION INTRAVENOUS at 20:51

## 2019-08-30 RX ADMIN — DAKIN'S SOLUTION 0.125% (QUARTER STRENGTH) 473 ML: 0.12 SOLUTION at 09:01

## 2019-08-30 RX ADMIN — MORPHINE SULFATE 4 MG: 2 INJECTION, SOLUTION INTRAMUSCULAR; INTRAVENOUS at 06:48

## 2019-08-30 RX ADMIN — MORPHINE SULFATE 4 MG: 2 INJECTION, SOLUTION INTRAMUSCULAR; INTRAVENOUS at 04:27

## 2019-08-30 RX ADMIN — Medication 10 ML: at 21:00

## 2019-08-30 RX ADMIN — NALXONE HYDROCHLORIDE: 0.4 INJECTION INTRAMUSCULAR; INTRAVENOUS; SUBCUTANEOUS at 13:22

## 2019-08-30 RX ADMIN — ONDANSETRON 4 MG: 2 INJECTION INTRAMUSCULAR; INTRAVENOUS at 13:01

## 2019-08-30 RX ADMIN — METOCLOPRAMIDE 5 MG: 5 INJECTION, SOLUTION INTRAMUSCULAR; INTRAVENOUS at 11:24

## 2019-08-30 RX ADMIN — PANTOPRAZOLE SODIUM 40 MG: 40 INJECTION, POWDER, FOR SOLUTION INTRAVENOUS at 09:01

## 2019-08-30 RX ADMIN — MORPHINE SULFATE 4 MG: 2 INJECTION, SOLUTION INTRAMUSCULAR; INTRAVENOUS at 09:08

## 2019-08-30 RX ADMIN — MULTIPLE VITAMINS W/ MINERALS TAB 1 TABLET: TAB at 09:00

## 2019-08-30 RX ADMIN — Medication 25 MEQ: at 17:00

## 2019-08-30 RX ADMIN — METOCLOPRAMIDE 5 MG: 5 INJECTION, SOLUTION INTRAMUSCULAR; INTRAVENOUS at 04:26

## 2019-08-30 RX ADMIN — SODIUM CHLORIDE: 9 INJECTION, SOLUTION INTRAVENOUS at 05:53

## 2019-08-30 RX ADMIN — SODIUM BICARBONATE 650 MG: 650 TABLET ORAL at 09:01

## 2019-08-30 ASSESSMENT — PAIN DESCRIPTION - DESCRIPTORS: DESCRIPTORS: ACHING;DISCOMFORT;STABBING

## 2019-08-30 ASSESSMENT — PAIN SCALES - GENERAL
PAINLEVEL_OUTOF10: 0
PAINLEVEL_OUTOF10: 7
PAINLEVEL_OUTOF10: 8
PAINLEVEL_OUTOF10: 0
PAINLEVEL_OUTOF10: 7
PAINLEVEL_OUTOF10: 6

## 2019-08-30 ASSESSMENT — PAIN DESCRIPTION - LOCATION: LOCATION: BACK

## 2019-08-30 ASSESSMENT — PAIN DESCRIPTION - PAIN TYPE: TYPE: CHRONIC PAIN;SURGICAL PAIN

## 2019-08-30 ASSESSMENT — PAIN DESCRIPTION - PROGRESSION: CLINICAL_PROGRESSION: NOT CHANGED

## 2019-08-30 ASSESSMENT — PAIN DESCRIPTION - ORIENTATION: ORIENTATION: RIGHT

## 2019-08-30 ASSESSMENT — PAIN DESCRIPTION - FREQUENCY: FREQUENCY: CONTINUOUS

## 2019-08-30 ASSESSMENT — PAIN - FUNCTIONAL ASSESSMENT: PAIN_FUNCTIONAL_ASSESSMENT: ACTIVITIES ARE NOT PREVENTED

## 2019-08-30 ASSESSMENT — PAIN DESCRIPTION - ONSET: ONSET: ON-GOING

## 2019-08-30 NOTE — PROGRESS NOTES
Wound Care  Reviewed Dr. Shruthi Castillo progress note 8/29/19. Review care everywhere and noted Surgeon at [facility] Dr. Anil Cook and Jerman Zaidi APRN, no phone numbers were listed.

## 2019-08-30 NOTE — CONSULTS
[facility] Neurology Consult  ? ? Patient: Bart Newell  MR#: [id number]  Account Number: [de-identified]   Room: 36/-200   YOB: 1960  Date of Progress Note: 8/30/2019  Time of Note 3:09 PM  Attending Physician: Flaco Cohen, *  Consulting Physician: Ramos Alvarez M.D.  ?  ? CHIEF COMPLAINT: Altered mental status and tremors  ? HISTORY OF PRESENT ILLNESS:   This 64 y.o. female who has been admitted with a large sacral decubitus and left heel wound. Also has a colostomy and paraplegia. She has been found to have a leukocytosis over the last 24 hours and a metabolic acidosis with a pH of 7.2 today. She has been getting Dilaudid and morphine intermittently. She had a episode where her eyes rolled back into her head and she had some tremors of her arms but no true seizure activity. This seemed to improve with some Narcan. She was given some Ativan just prior to going on for CT. The latter came back unremarkable. Her pH is 7.2 this afternoon. Narcotics have been discontinued. She has been drowsy with a low respiratory rate but easily awakened and is oriented although is a little confused. REVIEW OF SYSTEMS:  Constitutional - No fever or chills. No diaphoresis or significant fatigue. HENT - No tinnitus or significant hearing loss. Eyes - no sudden vision change or eye pain  Respiratory - no significant shortness of breath or cough  Cardiovascular - no chest pain No palpitations or significant leg swelling  Gastrointestinal - no abdominal swelling or pain. Genitourinary - No difficulty urinating, dysuria  Musculoskeletal - no back pain or myalgia. Skin - no color change or rash  Neurologic - No seizures. No lateralizing weakness. Hematologic - no easy bruising or excessive bleeding. Psychiatric - no severe anxiety or nervousness. All other review of systems are negative. ?   Past Medical History:      Diagnosis Date    Blood circulation, collateral     Chronic kidney disease noted, no bony deformities  Extremities-no clubbing, cyanosis or edema  Skin - warm, dry, and intact. No rash, erythema, or pallor. Psychiatric - mood, affect, and behavior appear normal.   Neurological exam  Sleepy but alerts easily to voice. Follows simple commands but has some trouble with complex commands. She is oriented to place and time. Cranial Nerve Exam   CN II- Visual fields grossly unremarkable  CN III, IV,VI-EOMI, No nystagmus, conjugate eye movements, no ptosis  CN V-sensation intact to LT over face  CN VII-no facial assymetry  CN VIII-Hearing intact to voice  CN IX and X- Palate elevates in midline  CN XI-good shoulder shrug  CN XII-Tongue midline with no fasciculations or fibrillations  Motor Exam  Normal in the arms. No movement in the legs    Reflexes   Absent throughout  Tremors-occasional postural tremor noted in the arms  Gait  Not tested  Coordination  Finger to nose-unremarkable  ? ?  CBC:   Recent Labs     08/28/19  0440 08/29/19  0305 08/30/19  0400   WBC 7.7 8.9 17.3*   HGB 8.7* 8.9* 9.7*    329 306     BMP:   Recent Labs     08/28/19  0440 08/29/19  0305 08/30/19  0400 08/30/19  1350    138 139  --    K 3.9 4.3 4.2 4.3    110 111  --    CO2 17* 16* 15*  --    BUN 24* 22* 25*  --    CREATININE 0.8 0.6 1.1*  --    GLUCOSE 89 130* 87  --      Hepatic: No results for input(s): AST, ALT, ALB, BILITOT, ALKPHOS in the last 72 hours. Lipids: No results for input(s): CHOL, HDL in the last 72 hours. Invalid input(s): LDLCALCU  INR:   Recent Labs     08/27/19  1810   INR 1.22*     ?  ? Assessment and Plan   1. Suspect altered mental status and tremors are due to acidosis and/or narcotic medications. Agree with discontinuing the latter and with sodium bicarbonate. We will follow along with you. ?  ?       John Cerda MD  Board Certified in Neurology

## 2019-08-30 NOTE — PROGRESS NOTES
General Surgery -    Daily Progress Note    Pt Name: Cecilia Cordero  Medical Record Number: [id number]  Date of Birth 1960   Today's Date: 8/30/2019        SUBJECTIVE:   Patient has no new complaints. She has some flatus in her ostomy. MEDS:     Scheduled Meds:   metoclopramide  5 mg Intravenous Q6H    pantoprazole  40 mg Intravenous BID    And    sodium chloride (PF)  10 mL Intravenous BID    sodium bicarbonate  650 mg Oral 4x Daily    sodium hypochlorite   Irrigation BID    sodium chloride flush  10 mL Intravenous 2 times per day    collagenase   Topical BID    baclofen  10 mg Oral TID    therapeutic multivitamin-minerals  1 tablet Oral Daily    silver sulfADIAZINE   Topical Daily     Continuous Infusions:   sodium chloride 75 mL/hr at 08/30/19 0553     PRN Meds:  ALPRAZolam 1 mg TID PRN   HYDROcodone-acetaminophen 1 tablet Q6H PRN   LORazepam 0.5 mg Q4H PRN   sodium chloride flush 10 mL PRN   sodium chloride flush 10 mL PRN   acetaminophen 650 mg Q4H PRN   morphine 2 mg Q2H PRN   Or     morphine 4 mg Q2H PRN       OBJECTIVE:     Patient Vitals for the past 24 hrs:   BP Temp Temp src Pulse Resp SpO2   08/30/19 1051 110/60 95.9 °F (35.5 °C) Temporal 101 16 99 %   08/30/19 0917 -- -- -- 95 -- --   08/30/19 0717 113/70 96.2 °F (35.7 °C) Temporal 95 16 100 %   08/30/19 0254 124/68 96.4 °F (35.8 °C) Temporal 103 16 94 %   08/29/19 2357 118/64 96.9 °F (36.1 °C) Temporal 95 16 97 %   08/29/19 1903 95/60 96.8 °F (36 °C) Temporal 91 16 100 %         Intake/Output Summary (Last 24 hours) at 8/30/2019 1143  Last data filed at 8/30/2019 0955  Gross per 24 hour   Intake 520 ml   Output --   Net 520 ml       In: 320 [P.O.:320]  Out: -     I/O last 3 completed shifts:   In: 400 [P.O.:400]  Out: 200 [Urine:200]     Date 08/30/19 0000 - 08/30/19 2359   Shift 3298-5801 7900-3228 2833-0430 24 Hour Total   INTAKE   P.O.  120  120   Shift Total(mL/kg)  120(1.4)  120(1.4)   OUTPUT   Shift Total(mL/kg)

## 2019-08-30 NOTE — PROGRESS NOTES
Infectious Diseases Progress Note    Patient:  Alexis Jay  YOB: 1960  MRN: [id number]   Admit date: 8/20/2019   Admitting Physician: Hayley Sweeney, *  Primary Care Physician: Sung Mcdaniels    Chief Complaint/Interval History: She has not had fever. She is hemodynamically stable. She does not feel very good today. She feels a little woozy. No cardiopulmonary symptoms. Dressings change this morning per discussion with nurse. She was not given any report of purulence or worsening. Interval notes reviewed. Discussed with hospitalist again today.     In/Out    Intake/Output Summary (Last 24 hours) at 8/30/2019 1222  Last data filed at 8/30/2019 0955  Gross per 24 hour   Intake 520 ml   Output --   Net 520 ml     Allergies: No Known Allergies  Current Meds:   metoclopramide (REGLAN) injection 10 mg Q6H   ondansetron (ZOFRAN) injection 4 mg Q6H PRN   diazepam (VALIUM) tablet 2 mg TID   pantoprazole (PROTONIX) injection 40 mg BID   And    sodium chloride (PF) 0.9 % injection 10 mL BID   ALPRAZolam (XANAX) tablet 1 mg TID PRN   HYDROcodone-acetaminophen (NORCO)  MG per tablet 1 tablet Q6H PRN   0.9 % sodium chloride infusion Continuous   sodium bicarbonate tablet 650 mg 4x Daily   sodium hypochlorite (DAKINS) 0.125 % external solution BID   sodium chloride flush 0.9 % injection 10 mL PRN   sodium chloride flush 0.9 % injection 10 mL 2 times per day   collagenase ointment BID   baclofen (LIORESAL) tablet 10 mg TID   therapeutic multivitamin-minerals 1 tablet Daily   silver sulfADIAZINE (SILVADENE) 1 % cream Daily   sodium chloride flush 0.9 % injection 10 mL PRN   acetaminophen (TYLENOL) tablet 650 mg Q4H PRN   morphine (PF) injection 2 mg Q2H PRN   Or    morphine (PF) injection 4 mg Q2H PRN     Review of Systems see HPI    VitalSigns:  /60   Pulse 101   Temp 95.9 °F (35.5 °C) (Temporal)   Resp 16   Ht 5' 8\" (1.727 m)   Wt 190 lb (86.2 kg)   SpO2 99%   BMI 28.89 kg/m²

## 2019-08-30 NOTE — PROGRESS NOTES
Occupational Therapy  Dr. Kimi Conway asks PT/OT to hold for now due to recent medical issues. Patient now getting an EKG.   Electronically signed by Stan Melvin OT on 8/30/2019 at 2:10 PM

## 2019-08-31 ENCOUNTER — APPOINTMENT (OUTPATIENT)
Dept: ULTRASOUND IMAGING | Age: 59
DRG: 981 | End: 2019-08-31
Payer: MEDICARE

## 2019-08-31 LAB
ANION GAP SERPL CALCULATED.3IONS-SCNC: 14 MMOL/L (ref 7–19)
BACTERIA: ABNORMAL /HPF
BASE EXCESS ARTERIAL: -8.9 MMOL/L (ref -2–2)
BASE EXCESS ARTERIAL: -9.6 MMOL/L (ref -2–2)
BILIRUBIN URINE: NEGATIVE
BLOOD, URINE: ABNORMAL
BUN BLDV-MCNC: 26 MG/DL (ref 6–20)
C-REACTIVE PROTEIN: 16.69 MG/DL (ref 0–0.5)
CALCIUM SERPL-MCNC: 7.8 MG/DL (ref 8.6–10)
CARBOXYHEMOGLOBIN ARTERIAL: 2.1 % (ref 0–5)
CARBOXYHEMOGLOBIN ARTERIAL: 2.1 % (ref 0–5)
CHLORIDE BLD-SCNC: 112 MMOL/L (ref 98–111)
CLARITY: ABNORMAL
CO2: 17 MMOL/L (ref 22–29)
COLOR: YELLOW
CREAT SERPL-MCNC: 1.4 MG/DL (ref 0.5–0.9)
CREATININE URINE: 101.1 MG/DL (ref 4.2–622)
EOSINOPHIL,URINE: NORMAL
EPITHELIAL CELLS, UA: ABNORMAL /HPF
GFR NON-AFRICAN AMERICAN: 39
GLUCOSE BLD-MCNC: 87 MG/DL (ref 74–109)
GLUCOSE URINE: NEGATIVE MG/DL
HCO3 ARTERIAL: 16.5 MMOL/L (ref 22–26)
HCO3 ARTERIAL: 16.7 MMOL/L (ref 22–26)
HCT VFR BLD CALC: 30.5 % (ref 37–47)
HEMOGLOBIN, ART, EXTENDED: 9.1 G/DL (ref 12–16)
HEMOGLOBIN, ART, EXTENDED: 9.5 G/DL (ref 12–16)
HEMOGLOBIN: 8.9 G/DL (ref 12–16)
KETONES, URINE: ABNORMAL MG/DL
LEUKOCYTE ESTERASE, URINE: ABNORMAL
MCH RBC QN AUTO: 25.6 PG (ref 27–31)
MCHC RBC AUTO-ENTMCNC: 29.2 G/DL (ref 33–37)
MCV RBC AUTO: 87.6 FL (ref 81–99)
METHEMOGLOBIN ARTERIAL: 1.2 %
METHEMOGLOBIN ARTERIAL: 1.2 %
NITRITE, URINE: NEGATIVE
O2 CONTENT ARTERIAL: 12.2 ML/DL
O2 CONTENT ARTERIAL: 12.8 ML/DL
O2 SAT, ARTERIAL: 94.9 %
O2 SAT, ARTERIAL: 95.1 %
O2 THERAPY: ABNORMAL
O2 THERAPY: ABNORMAL
PCO2 ARTERIAL: 34 MMHG (ref 35–45)
PCO2 ARTERIAL: 36 MMHG (ref 35–45)
PDW BLD-RTO: 20.8 % (ref 11.5–14.5)
PH ARTERIAL: 7.27 (ref 7.35–7.45)
PH ARTERIAL: 7.3 (ref 7.35–7.45)
PH UA: 7 (ref 5–8)
PLATELET # BLD: 246 K/UL (ref 130–400)
PMV BLD AUTO: 9.4 FL (ref 9.4–12.3)
PO2 ARTERIAL: 78 MMHG (ref 80–100)
PO2 ARTERIAL: 82 MMHG (ref 80–100)
POTASSIUM REFLEX MAGNESIUM: 4.2 MMOL/L (ref 3.5–5)
POTASSIUM, WHOLE BLOOD: 3.9
POTASSIUM, WHOLE BLOOD: 4
PROTEIN PROTEIN: 178 MG/DL (ref 15–45)
PROTEIN UA: 100 MG/DL
RBC # BLD: 3.48 M/UL (ref 4.2–5.4)
RBC UA: ABNORMAL /HPF (ref 0–2)
SEDIMENTATION RATE, ERYTHROCYTE: 26 MM/HR (ref 0–25)
SODIUM BLD-SCNC: 143 MMOL/L (ref 136–145)
SODIUM URINE: 85 MMOL/L
SPECIFIC GRAVITY UA: 1.02 (ref 1–1.03)
UREA NITROGEN, UR: 204 MG/DL
UROBILINOGEN, URINE: 0.2 E.U./DL
WBC # BLD: 17.1 K/UL (ref 4.8–10.8)
WBC UA: ABNORMAL /HPF (ref 0–5)

## 2019-08-31 PROCEDURE — 84540 ASSAY OF URINE/UREA-N: CPT

## 2019-08-31 PROCEDURE — 84300 ASSAY OF URINE SODIUM: CPT

## 2019-08-31 PROCEDURE — 2500000003 HC RX 250 WO HCPCS: Performed by: INTERNAL MEDICINE

## 2019-08-31 PROCEDURE — 6360000002 HC RX W HCPCS: Performed by: INTERNAL MEDICINE

## 2019-08-31 PROCEDURE — 95816 EEG AWAKE AND DROWSY: CPT | Performed by: PSYCHIATRY & NEUROLOGY

## 2019-08-31 PROCEDURE — 84156 ASSAY OF PROTEIN URINE: CPT

## 2019-08-31 PROCEDURE — 81001 URINALYSIS AUTO W/SCOPE: CPT

## 2019-08-31 PROCEDURE — 2580000003 HC RX 258: Performed by: HOSPITALIST

## 2019-08-31 PROCEDURE — 87205 SMEAR GRAM STAIN: CPT

## 2019-08-31 PROCEDURE — 82803 BLOOD GASES ANY COMBINATION: CPT

## 2019-08-31 PROCEDURE — 84132 ASSAY OF SERUM POTASSIUM: CPT

## 2019-08-31 PROCEDURE — 80048 BASIC METABOLIC PNL TOTAL CA: CPT

## 2019-08-31 PROCEDURE — 36592 COLLECT BLOOD FROM PICC: CPT

## 2019-08-31 PROCEDURE — 2580000003 HC RX 258: Performed by: INTERNAL MEDICINE

## 2019-08-31 PROCEDURE — 85652 RBC SED RATE AUTOMATED: CPT

## 2019-08-31 PROCEDURE — 82570 ASSAY OF URINE CREATININE: CPT

## 2019-08-31 PROCEDURE — 95816 EEG AWAKE AND DROWSY: CPT

## 2019-08-31 PROCEDURE — 2100000000 HC CCU R&B

## 2019-08-31 PROCEDURE — 2580000003 HC RX 258: Performed by: SURGERY

## 2019-08-31 PROCEDURE — 6370000000 HC RX 637 (ALT 250 FOR IP): Performed by: HOSPITALIST

## 2019-08-31 PROCEDURE — 76770 US EXAM ABDO BACK WALL COMP: CPT

## 2019-08-31 PROCEDURE — 99232 SBSQ HOSP IP/OBS MODERATE 35: CPT | Performed by: PSYCHIATRY & NEUROLOGY

## 2019-08-31 PROCEDURE — 6360000002 HC RX W HCPCS: Performed by: HOSPITALIST

## 2019-08-31 PROCEDURE — 85027 COMPLETE CBC AUTOMATED: CPT

## 2019-08-31 PROCEDURE — 2500000003 HC RX 250 WO HCPCS: Performed by: HOSPITALIST

## 2019-08-31 PROCEDURE — C9113 INJ PANTOPRAZOLE SODIUM, VIA: HCPCS | Performed by: HOSPITALIST

## 2019-08-31 PROCEDURE — 86140 C-REACTIVE PROTEIN: CPT

## 2019-08-31 PROCEDURE — 36600 WITHDRAWAL OF ARTERIAL BLOOD: CPT

## 2019-08-31 RX ADMIN — MEROPENEM 1 G: 1 INJECTION, POWDER, FOR SOLUTION INTRAVENOUS at 14:42

## 2019-08-31 RX ADMIN — ONDANSETRON 4 MG: 2 INJECTION INTRAMUSCULAR; INTRAVENOUS at 20:31

## 2019-08-31 RX ADMIN — Medication 25 MEQ: at 01:58

## 2019-08-31 RX ADMIN — Medication: at 06:11

## 2019-08-31 RX ADMIN — MEROPENEM 1 G: 1 INJECTION, POWDER, FOR SOLUTION INTRAVENOUS at 21:30

## 2019-08-31 RX ADMIN — PANTOPRAZOLE SODIUM 40 MG: 40 INJECTION, POWDER, FOR SOLUTION INTRAVENOUS at 20:40

## 2019-08-31 RX ADMIN — Medication 10 ML: at 20:40

## 2019-08-31 RX ADMIN — PANTOPRAZOLE SODIUM 40 MG: 40 INJECTION, POWDER, FOR SOLUTION INTRAVENOUS at 10:14

## 2019-08-31 RX ADMIN — ONDANSETRON 4 MG: 2 INJECTION INTRAMUSCULAR; INTRAVENOUS at 14:43

## 2019-08-31 RX ADMIN — DAKIN'S SOLUTION 0.125% (QUARTER STRENGTH): 0.12 SOLUTION at 20:35

## 2019-08-31 RX ADMIN — COLLAGENASE SANTYL: 250 OINTMENT TOPICAL at 20:35

## 2019-08-31 RX ADMIN — Medication: at 19:00

## 2019-08-31 RX ADMIN — Medication 10 ML: at 10:15

## 2019-08-31 RX ADMIN — MEROPENEM 1 G: 1 INJECTION, POWDER, FOR SOLUTION INTRAVENOUS at 06:11

## 2019-08-31 RX ADMIN — BACLOFEN 5 MG: 10 TABLET ORAL at 21:29

## 2019-08-31 RX ADMIN — Medication 10 ML: at 20:37

## 2019-08-31 ASSESSMENT — PAIN SCALES - GENERAL
PAINLEVEL_OUTOF10: 4
PAINLEVEL_OUTOF10: 0
PAINLEVEL_OUTOF10: 0

## 2019-08-31 ASSESSMENT — PAIN DESCRIPTION - PAIN TYPE: TYPE: CHRONIC PAIN

## 2019-08-31 ASSESSMENT — PAIN DESCRIPTION - DESCRIPTORS: DESCRIPTORS: ACHING

## 2019-08-31 ASSESSMENT — PAIN DESCRIPTION - LOCATION: LOCATION: BACK

## 2019-08-31 NOTE — PROGRESS NOTES
4 Eyes Skin Assessment    Perla Jones is being assessed upon: Transfer to New Unit    I agree that Young Bernstein, along with Rocio Parra RN (either 2 RN's or 1 LPN and 1 RN) have performed a thorough Head to Toe Skin Assessment on the patient. ALL assessment sites listed below have been assessed. Areas assessed by both nurses:     [x]   Head, Face, and Ears   [x]   Shoulders, Back, and Chest  [x]   Arms, Elbows, and Hands   [x]   Coccyx, Sacrum, and Ischium  [x]   Legs, Feet, and Heels    Does the Patient have Skin Breakdown? Yes, wound(s) noted upon assessment. It is the responsibility of the Primary Nurse to assure that the following documentation, preventions, orders, and consults are complete on the above noted wound(s): Wound LDA initiated. LDA Flowsheet Documentation includes the Nellie-wound, Wound Assessment, Measurements, Dressing Treatment, Drainage, and Color.     Roger Prevention initiated: Yes  Wound Care Orders initiated: Yes    [address]  nurse consulted for Pressure Injury (Stage 3,4, Unstageable, DTI, NWPT, and Complex wounds) and New or Established Ostomies: Yes        Primary Nurse eSignature: May Archer RN on 8/30/2019 at 10:56 PM      Co-Signer eSignature: Electronically signed by Rocio Parra RN on 8/31/19 at 5:49 AM

## 2019-08-31 NOTE — PROGRESS NOTES
Patient bathed and dressing changed at [phone], re-assessment completed at 0000 patient resting with no signs of distress. ABG's repeated at 0100, pH 7.27 result called to  orders received and placed, medications administered as ordered. Will continue to monitor.   Electronically signed by Sosa Rowley RN on 8/31/2019 at 2:31 AM

## 2019-08-31 NOTE — PROGRESS NOTES
8/30/2019  8:17 PM - [facility] Incoming Lab Results From [facility]     Component Value Ref Range & Units Status Collected Lab   pH, Arterial 7.270Low Panic   7.350 - 7.450 Final 08/30/2019  8:14 PM [address] [place] [address] Lab   pCO2, Arterial 36.0  35.0 - 45.0 mmHg Final 08/30/2019  8:14 PM [facility] Lab   pO2, Arterial 87.0  80.0 - 100.0 mmHg Final 08/30/2019  8:14 PM [facility] Lab   HCO3, Arterial 16.5Low   22.0 - 26.0 mmol/L Final 08/30/2019  8:14 PM [facility] Excess, Arterial -9.6Low   -2.0 - 2.0 mmol/L Final 08/30/2019  8:14 PM [address] [place] [address] Lab   Hemoglobin, Art, Extended 9.5Low   12.0 - 16.0 g/dL Final 08/30/2019  8:14 PM [address] [place] [address] Lab   O2 Sat, Arterial 95.1  >92 % Final 08/30/2019  8:14 PM [address] [place] [address] Lab   Carboxyhgb, Arterial 2.0  0.0 - 5.0 % Final 08/30/2019  8:14 PM [facility] Lab        0.0-1.5   (Smokers 1.5-5.0)    Methemoglobin, Arterial 1.5  <1.5 % Final 08/30/2019  8:14 PM [address] [place] [address] Lab   O2 Content, Arterial 12.8  Not Established mL/dL Final 08/30/2019  8:14 PM [address] [place] [address] Lab   O2 Therapy Unknown   Final 08/30/2019  8:14 PM [address] [place] [address] Lab   Testing Performed By     [address] Name Director Address Valid Date Range   [address] - [address] LAB Vidya Eduardo  [facility],[address]  [address] [facility] [address] 08/30/17 0733-Present   Narrative   Performed by: Kim   tel. [phone], give to Ivanna Li RN  give to Ivanna Li RN, 08/30/2019 20:17, by FUOGR   Lab and Collection      air [address] AT+.

## 2019-09-01 LAB
ALBUMIN SERPL-MCNC: 1.8 G/DL (ref 3.5–5.2)
ALP BLD-CCNC: 186 U/L (ref 35–104)
ALT SERPL-CCNC: <5 U/L (ref 5–33)
ANION GAP SERPL CALCULATED.3IONS-SCNC: 15 MMOL/L (ref 7–19)
AST SERPL-CCNC: 14 U/L (ref 5–32)
BACTERIA: ABNORMAL /HPF
BACTERIA: NEGATIVE /HPF
BASOPHILS ABSOLUTE: 0 K/UL (ref 0–0.2)
BASOPHILS RELATIVE PERCENT: 0.2 % (ref 0–1)
BILIRUB SERPL-MCNC: 0.3 MG/DL (ref 0.2–1.2)
BILIRUBIN URINE: NEGATIVE
BLOOD, URINE: NEGATIVE
BUN BLDV-MCNC: 27 MG/DL (ref 6–20)
CALCIUM SERPL-MCNC: 7.6 MG/DL (ref 8.6–10)
CHLORIDE BLD-SCNC: 111 MMOL/L (ref 98–111)
CLARITY: ABNORMAL
CO2: 18 MMOL/L (ref 22–29)
COLOR: YELLOW
CREAT SERPL-MCNC: 1.2 MG/DL (ref 0.5–0.9)
EOSINOPHILS ABSOLUTE: 0 K/UL (ref 0–0.6)
EOSINOPHILS RELATIVE PERCENT: 0.2 % (ref 0–5)
EPITHELIAL CELLS, UA: ABNORMAL /HPF
EPITHELIAL CELLS, UA: ABNORMAL /HPF
GFR NON-AFRICAN AMERICAN: 46
GLUCOSE BLD-MCNC: 113 MG/DL (ref 74–109)
GLUCOSE URINE: NEGATIVE MG/DL
HCT VFR BLD CALC: 28 % (ref 37–47)
HEMOGLOBIN: 8.2 G/DL (ref 12–16)
IMMATURE GRANULOCYTES #: 0.2 K/UL
KETONES, URINE: NEGATIVE MG/DL
LEUKOCYTE ESTERASE, URINE: ABNORMAL
LYMPHOCYTES ABSOLUTE: 1.4 K/UL (ref 1.1–4.5)
LYMPHOCYTES RELATIVE PERCENT: 10.7 % (ref 20–40)
MAGNESIUM: 1.6 MG/DL (ref 1.6–2.6)
MCH RBC QN AUTO: 25.4 PG (ref 27–31)
MCHC RBC AUTO-ENTMCNC: 29.3 G/DL (ref 33–37)
MCV RBC AUTO: 86.7 FL (ref 81–99)
MONOCYTES ABSOLUTE: 0.9 K/UL (ref 0–0.9)
MONOCYTES RELATIVE PERCENT: 6.9 % (ref 0–10)
MRSA CULTURE ONLY: ABNORMAL
NEUTROPHILS ABSOLUTE: 10.8 K/UL (ref 1.5–7.5)
NEUTROPHILS RELATIVE PERCENT: 80.9 % (ref 50–65)
NITRITE, URINE: NEGATIVE
ORGANISM: ABNORMAL
ORGANISM: ABNORMAL
PARATHYROID HORMONE INTACT: 101.6 PG/ML (ref 15–65)
PDW BLD-RTO: 20.9 % (ref 11.5–14.5)
PH UA: 7 (ref 5–8)
PHOSPHORUS: 4.5 MG/DL (ref 2.5–4.5)
PLATELET # BLD: 200 K/UL (ref 130–400)
PMV BLD AUTO: 9.5 FL (ref 9.4–12.3)
POTASSIUM REFLEX MAGNESIUM: 3.8 MMOL/L (ref 3.5–5)
PROTEIN UA: 30 MG/DL
RBC # BLD: 3.23 M/UL (ref 4.2–5.4)
SODIUM BLD-SCNC: 144 MMOL/L (ref 136–145)
SPECIFIC GRAVITY UA: 1.02 (ref 1–1.03)
TOTAL PROTEIN: 4.9 G/DL (ref 6.6–8.7)
URIC ACID, SERUM: 7.7 MG/DL (ref 2.4–5.7)
URINE CULTURE, ROUTINE: ABNORMAL
URINE CULTURE, ROUTINE: ABNORMAL
UROBILINOGEN, URINE: 0.2 E.U./DL
VITAMIN D 25-HYDROXY: <5 NG/ML
WBC # BLD: 13.4 K/UL (ref 4.8–10.8)
WBC UA: ABNORMAL /HPF (ref 0–5)
WBC UA: ABNORMAL /HPF (ref 0–5)

## 2019-09-01 PROCEDURE — 2580000003 HC RX 258: Performed by: SURGERY

## 2019-09-01 PROCEDURE — 2100000000 HC CCU R&B

## 2019-09-01 PROCEDURE — 6360000002 HC RX W HCPCS: Performed by: INTERNAL MEDICINE

## 2019-09-01 PROCEDURE — 6370000000 HC RX 637 (ALT 250 FOR IP): Performed by: SURGERY

## 2019-09-01 PROCEDURE — 6370000000 HC RX 637 (ALT 250 FOR IP): Performed by: INTERNAL MEDICINE

## 2019-09-01 PROCEDURE — 2580000003 HC RX 258: Performed by: INTERNAL MEDICINE

## 2019-09-01 PROCEDURE — 6370000000 HC RX 637 (ALT 250 FOR IP): Performed by: HOSPITALIST

## 2019-09-01 PROCEDURE — 82306 VITAMIN D 25 HYDROXY: CPT

## 2019-09-01 PROCEDURE — 99232 SBSQ HOSP IP/OBS MODERATE 35: CPT | Performed by: PSYCHIATRY & NEUROLOGY

## 2019-09-01 PROCEDURE — 2500000003 HC RX 250 WO HCPCS: Performed by: INTERNAL MEDICINE

## 2019-09-01 PROCEDURE — 36592 COLLECT BLOOD FROM PICC: CPT

## 2019-09-01 PROCEDURE — 84550 ASSAY OF BLOOD/URIC ACID: CPT

## 2019-09-01 PROCEDURE — 36415 COLL VENOUS BLD VENIPUNCTURE: CPT

## 2019-09-01 PROCEDURE — 2580000003 HC RX 258: Performed by: HOSPITALIST

## 2019-09-01 PROCEDURE — 6370000000 HC RX 637 (ALT 250 FOR IP): Performed by: PSYCHIATRY & NEUROLOGY

## 2019-09-01 PROCEDURE — 80053 COMPREHEN METABOLIC PANEL: CPT

## 2019-09-01 PROCEDURE — 81001 URINALYSIS AUTO W/SCOPE: CPT

## 2019-09-01 PROCEDURE — 6360000002 HC RX W HCPCS: Performed by: HOSPITALIST

## 2019-09-01 PROCEDURE — 84100 ASSAY OF PHOSPHORUS: CPT

## 2019-09-01 PROCEDURE — 6360000002 HC RX W HCPCS: Performed by: PSYCHIATRY & NEUROLOGY

## 2019-09-01 PROCEDURE — 83735 ASSAY OF MAGNESIUM: CPT

## 2019-09-01 PROCEDURE — C9113 INJ PANTOPRAZOLE SODIUM, VIA: HCPCS | Performed by: HOSPITALIST

## 2019-09-01 PROCEDURE — 85025 COMPLETE CBC W/AUTO DIFF WBC: CPT

## 2019-09-01 PROCEDURE — 87086 URINE CULTURE/COLONY COUNT: CPT

## 2019-09-01 PROCEDURE — 83970 ASSAY OF PARATHORMONE: CPT

## 2019-09-01 RX ORDER — ALPRAZOLAM 0.5 MG/1
0.5 TABLET ORAL 3 TIMES DAILY PRN
Status: DISCONTINUED | OUTPATIENT
Start: 2019-09-01 | End: 2019-09-06 | Stop reason: HOSPADM

## 2019-09-01 RX ORDER — ERGOCALCIFEROL 1.25 MG/1
50000 CAPSULE ORAL WEEKLY
Status: DISCONTINUED | OUTPATIENT
Start: 2019-09-01 | End: 2019-09-06 | Stop reason: HOSPADM

## 2019-09-01 RX ORDER — LORAZEPAM 2 MG/ML
1 INJECTION INTRAMUSCULAR
Status: COMPLETED | OUTPATIENT
Start: 2019-09-01 | End: 2019-09-01

## 2019-09-01 RX ADMIN — ALPRAZOLAM 0.5 MG: 0.5 TABLET ORAL at 22:54

## 2019-09-01 RX ADMIN — DAKIN'S SOLUTION 0.125% (QUARTER STRENGTH) 473 ML: 0.12 SOLUTION at 18:11

## 2019-09-01 RX ADMIN — SILVER SULFADIAZINE: 10 CREAM TOPICAL at 18:10

## 2019-09-01 RX ADMIN — ALPRAZOLAM 0.5 MG: 0.5 TABLET ORAL at 08:11

## 2019-09-01 RX ADMIN — DAKIN'S SOLUTION 0.125% (QUARTER STRENGTH): 0.12 SOLUTION at 20:05

## 2019-09-01 RX ADMIN — ONDANSETRON 4 MG: 2 INJECTION INTRAMUSCULAR; INTRAVENOUS at 02:30

## 2019-09-01 RX ADMIN — ONDANSETRON 4 MG: 2 INJECTION INTRAMUSCULAR; INTRAVENOUS at 08:18

## 2019-09-01 RX ADMIN — Medication: at 20:02

## 2019-09-01 RX ADMIN — PANTOPRAZOLE SODIUM 40 MG: 40 INJECTION, POWDER, FOR SOLUTION INTRAVENOUS at 20:05

## 2019-09-01 RX ADMIN — MEROPENEM 1 G: 1 INJECTION, POWDER, FOR SOLUTION INTRAVENOUS at 14:10

## 2019-09-01 RX ADMIN — COLLAGENASE SANTYL: 250 OINTMENT TOPICAL at 20:05

## 2019-09-01 RX ADMIN — ERGOCALCIFEROL 50000 UNITS: 1.25 CAPSULE ORAL at 20:02

## 2019-09-01 RX ADMIN — Medication: at 02:30

## 2019-09-01 RX ADMIN — Medication 10 ML: at 20:05

## 2019-09-01 RX ADMIN — MEROPENEM 1 G: 1 INJECTION, POWDER, FOR SOLUTION INTRAVENOUS at 05:56

## 2019-09-01 RX ADMIN — BACLOFEN 5 MG: 10 TABLET ORAL at 22:44

## 2019-09-01 RX ADMIN — PANTOPRAZOLE SODIUM 40 MG: 40 INJECTION, POWDER, FOR SOLUTION INTRAVENOUS at 09:00

## 2019-09-01 RX ADMIN — MEROPENEM 1 G: 1 INJECTION, POWDER, FOR SOLUTION INTRAVENOUS at 22:54

## 2019-09-01 RX ADMIN — Medication: at 10:51

## 2019-09-01 RX ADMIN — Medication 10 ML: at 08:00

## 2019-09-01 RX ADMIN — ONDANSETRON 4 MG: 2 INJECTION INTRAMUSCULAR; INTRAVENOUS at 19:09

## 2019-09-01 RX ADMIN — Medication 10 ML: at 09:00

## 2019-09-01 RX ADMIN — COLLAGENASE SANTYL: 250 OINTMENT TOPICAL at 18:10

## 2019-09-01 RX ADMIN — LORAZEPAM 1 MG: 2 INJECTION INTRAMUSCULAR; INTRAVENOUS at 12:36

## 2019-09-01 ASSESSMENT — PAIN SCALES - GENERAL
PAINLEVEL_OUTOF10: 0

## 2019-09-01 NOTE — PROGRESS NOTES
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  Buttock years ago more than 20 years ago   [place] SMALL INTESTINE SURGERY N/A 2019    DIVERTING LOOP COLOSTOMY performed by Rian Blount MD at [address] History  Family History   Problem Relation Age of Onset    Other Mother          of sepsis    Cancer Father         Lung Cancer    Diabetes Paternal Grandmother     Heart Attack Paternal Grandfather        Social History  Social History     Socioeconomic History    Marital status:      Spouse name: Not on file    Number of children: Not on file    Years of education: Not on file    Highest education level: Not on file   Occupational History    Not on file   Social Needs    Financial resource strain: Not on file    Food insecurity:     Worry: Not on file     Inability: Not on file    Transportation needs:     Medical: Not on file     Non-medical: Not on file   Tobacco Use    Smoking status: Never Smoker    Smokeless tobacco: Never Used   Substance and Sexual Activity    Alcohol use: Not Currently    Drug use: Never    Sexual activity: Not on file   Lifestyle    Physical activity:     Days per week: Not on file     Minutes per session: Not on file    Stress: Not on file   Relationships    Social connections:     Talks on phone: Not on file     Gets together: Not on file     Attends Jew service: Not on file     Active member of club or organization: Not on file     Attends meetings of clubs or organizations: Not on file     Relationship status: Not on file    Intimate partner violence:     Fear of current or ex partner: Not on file     Emotionally abused: Not on file     Physically abused: Not on file     Forced sexual activity: Not on file   Other Topics Concern    Not on file   Social History Narrative    Not on file at 9/1/2019 1154  Last data filed at 9/1/2019 1000  Gross per 24 hour   Intake 3760 ml   Output 560 ml   Net 3200 ml     General: awake/alert   Chest:  clear to auscultation bilaterally without respiratory distress  CVS: IRRR  Abdominal: soft, nontender, normal bowel sounds  Extremities: Bilateral lower extremity pitting edema  Skin: warm and dry without rash      Labs:  BMP:   Recent Labs     08/30/19  0400  08/31/19  0113 08/31/19  0829 09/01/19  0210     --  143  --  144   K 4.2   < > 4.2 3.9 3.8     --  112*  --  111   CO2 15*  --  17*  --  18*   PHOS  --   --   --   --  4.5   BUN 25*  --  26*  --  27*   CREATININE 1.1*  --  1.4*  --  1.2*   CALCIUM 8.1*  --  7.8*  --  7.6*    < > = values in this interval not displayed. CBC:   Recent Labs     08/30/19  1921 08/31/19  0113 09/01/19 0210   WBC 15.9* 17.1* 13.4*   HGB 8.7* 8.9* 8.2*   HCT 29.3* 30.5* 28.0*   MCV 88.3 87.6 86.7    246 200     LIVER PROFILE:   Recent Labs     09/01/19 0210   AST 14   ALT <5*   BILITOT 0.3   ALKPHOS 186*     PT/INR: No results for input(s): PROTIME, INR in the last 72 hours. APTT: No results for input(s): APTT in the last 72 hours. BNP:  No results for input(s): BNP in the last 72 hours. Ionized Calcium:No results for input(s): IONCA in the last 72 hours. Magnesium:  Recent Labs     09/01/19  0210   MG 1.6     Phosphorus:  Recent Labs     09/01/19 0210   PHOS 4.5     HgbA1C: No results for input(s): LABA1C in the last 72 hours. Hepatic:   Recent Labs     09/01/19 0210   ALKPHOS 186*   ALT <5*   AST 14   PROT 4.9*   BILITOT 0.3   LABALBU 1.8*     Lactic Acid:   Recent Labs     08/30/19  1900   LACTA 0.7     Troponin: No results for input(s): CKTOTAL, CKMB, TROPONINT in the last 72 hours. ABGs: No results for input(s): PH, PCO2, PO2, HCO3, O2SAT in the last 72 hours.   CRP:    Recent Labs     08/31/19  0113   CRP 16.69*     Sed Rate:    Recent Labs     08/31/19 0113   SEDRATE 26*         Cultures:   No osteomyelitis  Status post diverting ostomies  Vitamin D deficiency  Secondary hyperparathyroidism  Anemia    Plan:  Continue IV fluids with bicarbonate for now, will need to wean soon  Recheck urinalysis today to monitor pH  Monitor labs  Add vitamin D  Discussed with patient, family, nursing, hospitalist      Thank you for the consult, we appreciate the opportunity to provide care to your patients. Feel free to contact me if I can be of any further assistance.       María Palacios MD  09/01/19  11:54 AM

## 2019-09-01 NOTE — PROGRESS NOTES
Patient:   Erik Lambert  MR#:    [id number]   Room:    [facility]   YOB: 1960  Date of Progress Note: 9/1/2019  Time of Note                           8:26 AM  Consulting Physician:   David Barrett M.D. Attending Physician:  Álvaro Jenkins MD     Chief complaint altered mental status  Interval history/subjective-patient admitted with large sacral decubitus and left heel wound. Has colostomy and paraplegia. Has leukocytosis and metabolic acidosis. Had altered mental status 8/30 felt due to morphine and Dilaudid. Remains lucid but now having panic attacks. Takes Xanax at home. Family in the room. REVIEW OF SYSTEMS:  Constitutional: No fevers No chills  Neck:No stiffness  Respiratory: No shortness of breath  Cardiovascular: No chest pain No palpitations  Gastrointestinal: No abdominal pain    Genitourinary: No Dysuria  Neurological: No headache, no confusion      PHYSICAL EXAM:  BP (!) 148/69   Pulse 94   Temp 97.1 °F (36.2 °C) (Temporal)   Resp 19   Ht 5' 8\" (1.727 m)   Wt 184 lb 3.2 oz (83.6 kg)   SpO2 97%   BMI 28.01 kg/m²     Constitutional: she appears well-developed and well-nourished. Eyes - conjunctiva normal.  Pupils react to light  Ear, nose, throat -hearing intact to voice. No scars, masses, or lesions over external nose or ears, no atrophy of tongue  Neck-symmetric, no masses noted, no jugular vein distension  Respiration- chest wall appears symmetric, good expansion,   normal effort without use of accessory muscles  Cardiovascular- RRR  Musculoskeletal - no significant wasting of muscles noted, no bony deformities, gait no gross ataxia  Extremities-no clubbing, cyanosis or edema  Skin - warm, dry, and intact. No rash, erythema, or pallor.   Psychiatric - mood, affect, and behavior appear normal.      Neurology  NEUROLOGICAL EXAM:      Mental status   Awake, alert, fluent oriented x 3 appropriate affect  Has difficulty following complex commands  Speech normal

## 2019-09-01 NOTE — PROGRESS NOTES
Pt refusing to let RN do head to toe assessment at this time. Only able to complete portions documented at this time in flowsheet. Will attempt again at later time when pt can tolerate and allows. Pt anxious and continues to state, \"please hurry\" repeatedly and \"cover me up. \"  Vital signs stable. Will continue to monitor.

## 2019-09-01 NOTE — PROGRESS NOTES
INFECTIOUS DISEASES PROGRESS NOTE    Patient:  Shravan Brower  YOB: 1960  MRN: [id number]   Admit date: 2019   Admitting Physician: Loren Roper MD  Primary Care Physician: Ruth Chavez    CHIEF COMPLAINT : \"I am anxious\"      Interval History: I spoke with the night shift nurse prior to going on. She reports that the patient complains of being nervous. She was convinced that she was going to have to have surgery. Her son is at bedside. When the patient is left alone she falls back to sleep. She does wake up startled at times. Patient was able to be calm down fairly easily when I was at bedside. Allergies: Allergies   Allergen Reactions    Morphine And Related Nausea Only     Caused severe altered mental status       Current Meds:   sodium bicarbonate 50 mEq in dextrose 5 % and 0.45 % NaCl 1,000 mL infusion Continuous   ondansetron (ZOFRAN) injection 4 mg Q6H PRN   baclofen (LIORESAL) tablet 5 mg TID   meropenem (MERREM) 1 g in sodium chloride 0.9 % 100 mL IVPB (mini-bag) Q8H   pantoprazole (PROTONIX) injection 40 mg BID   And    sodium chloride (PF) 0.9 % injection 10 mL BID   sodium hypochlorite (DAKINS) 0.125 % external solution BID   sodium chloride flush 0.9 % injection 10 mL PRN   sodium chloride flush 0.9 % injection 10 mL 2 times per day   collagenase ointment BID   therapeutic multivitamin-minerals 1 tablet Daily   silver sulfADIAZINE (SILVADENE) 1 % cream Daily   sodium chloride flush 0.9 % injection 10 mL PRN   acetaminophen (TYLENOL) tablet 650 mg Q4H PRN       Review of Systems   Constitutional: Negative for fever. Psychiatric/Behavioral: The patient is nervous/anxious.         Vital Signs:  BP (!) 148/69   Pulse 94   Temp 97.1 °F (36.2 °C) (Temporal)   Resp 19   Ht 5' 8\" (1.727 m)   Wt 184 lb 3.2 oz (83.6 kg)   SpO2 97%   BMI 28.01 kg/m²   Temp (24hrs), Av.2 °F (36.2 °C), Min:96.9 °F (36.1 °C), Max:97.3 °F (36.3 °C)      Physical Exam   General: Patient is nontoxic-appearing but somewhat anxious appearing in no respiratory distress  HEENT: Sclera anicteric and noninjected  Neck: Supple  Abdomen: Soft, bowel sounds are positive, only evaluated the stoma on the right as I did not realize patient had 2 separate diverting stomas. The one on the right was pink. Extremities-patient does have edema bilaterally  Psych somewhat anxious but easily calm down  Neuro: She was alert but when she was calm down she did appear to drift off to sleep fairly easily. Line/IV site: No erythema,warmth, induration, or tenderness. PICC line right upper extremity        LAB RESULTS:    CBC with DIFF:  Recent Labs     08/30/19  1921 08/31/19  0113 09/01/19  0210   WBC 15.9* 17.1* 13.4*   RBC 3.32* 3.48* 3.23*   HGB 8.7* 8.9* 8.2*   HCT 29.3* 30.5* 28.0*   MCV 88.3 87.6 86.7   MCH 26.2* 25.6* 25.4*   MCHC 29.7* 29.2* 29.3*   RDW 20.8* 20.8* 20.9*    246 200   MPV 9.0* 9.4 9.5   NEUTOPHILPCT  --   --  80.9*   LYMPHOPCT  --   --  10.7*   MONOPCT  --   --  6.9   EOSRELPCT  --   --  0.2   BASOPCT  --   --  0.2   NEUTROABS  --   --  10.8*   LYMPHSABS  --   --  1.4   MONOSABS  --   --  0.90   EOSABS  --   --  0.00   BASOSABS  --   --  0.00       CMP/BMP:  Recent Labs     08/30/19  0400  08/31/19  0113 08/31/19  0829 09/01/19  0210     --  143  --  144   K 4.2   < > 4.2 3.9 3.8     --  112*  --  111   CO2 15*  --  17*  --  18*   ANIONGAP 13  --  14  --  15   GLUCOSE 87  --  87  --  113*   BUN 25*  --  26*  --  27*   CREATININE 1.1*  --  1.4*  --  1.2*   LABGLOM 51*  --  39*  --  46*   CALCIUM 8.1*  --  7.8*  --  7.6*   PROT  --   --   --   --  4.9*   LABALBU  --   --   --   --  1.8*   BILITOT  --   --   --   --  0.3   ALKPHOS  --   --   --   --  186*   ALT  --   --   --   --  <5*   AST  --   --   --   --  14    < > = values in this interval not displayed. Culture Results:    No results for input(s): CXSURG in the last 720 hours.     Blood Culture Recent:  RECEIVED :  08/30/19 20:16        US RENAL COMPLETE [[id number]] Resulted: 08/31/19 1636     Order Status: Completed Updated: 08/31/19 1638     Narrative:       EXAM: US RENAL COMPLETE -- 8/31/2019 12:45 PM  HISTORY: 64 years, Female, acute kidney failure  COMPARISON: 7/29/2019  TECHNIQUE: Real-time ultrasound performed with representative images  saved to PACS. FINDINGS:  RIGHT KIDNEY. Not demonstrated by ultrasound. Previously demonstrated  to be severely atrophic on CT 7/29/2019. LEFT KIDNEY. Measures 12.3 x 5.1 x 5.9 cm. There appears to be renal  cortical scarring. No hydronephrosis. There is a simple cyst  demonstrated at the lower pole measuring 1.9 x 1.7 x 2.5 cm. The upper  pole renal lesions seen on prior CT 7/29/2019, are not demonstrated on  today's exam.  URINARY BLADDER. Nonvisualized. Correlate with surgical history.     Impression:       1. Right kidney are not visualized, atrophic on prior CT. 2. Left renal cortical scarring. No hydronephrosis. Signed by Dr Greg Palacio on 8/31/2019 4:36 PM           RADIOLOGY        Ct Head Wo Contrast    Result Date: 8/20/2019  EXAMINATION: CT HEAD WO CONTRAST 8/20/2019 4:56 PM HISTORY: Acute encephalopathy, twitching. DOSE: 726 mGycm. Automatic exposure control was utilized in an effort to use as little radiation as possible, without compromising image quality. REPORT: Spiral CT of the head was performed without intravenous contrast. Reconstructed coronal and sagittal images were also obtained. There are no comparison studies. There is mild patient motion artifact which degrades the examination. No evidence of intracranial hemorrhage, mass or mass-effect is identified. The ventricles and basal cisterns appear within normal limits. Bone windows are unremarkable. There is normal aeration of the visualized paranasal sinuses and mastoid air cells. Impression: No acute intracranial abnormality. Signed by Dr Omid Duncan.  Jameel on 8/20/2019 4:57 PM    Ct Chest

## 2019-09-02 ENCOUNTER — APPOINTMENT (OUTPATIENT)
Dept: GENERAL RADIOLOGY | Age: 59
DRG: 981 | End: 2019-09-02
Payer: MEDICARE

## 2019-09-02 LAB
ALBUMIN SERPL-MCNC: 1.5 G/DL (ref 3.5–5.2)
ALP BLD-CCNC: 158 U/L (ref 35–104)
ALT SERPL-CCNC: <5 U/L (ref 5–33)
ANION GAP SERPL CALCULATED.3IONS-SCNC: 10 MMOL/L (ref 7–19)
AST SERPL-CCNC: 13 U/L (ref 5–32)
BASE EXCESS ARTERIAL: 0.5 MMOL/L (ref -2–2)
BASOPHILS ABSOLUTE: 0 K/UL (ref 0–0.2)
BASOPHILS RELATIVE PERCENT: 0.1 % (ref 0–1)
BILIRUB SERPL-MCNC: <0.2 MG/DL (ref 0.2–1.2)
BUN BLDV-MCNC: 27 MG/DL (ref 6–20)
CALCIUM SERPL-MCNC: 7.2 MG/DL (ref 8.6–10)
CARBOXYHEMOGLOBIN ARTERIAL: 2.2 % (ref 0–5)
CHLORIDE BLD-SCNC: 112 MMOL/L (ref 98–111)
CO2: 22 MMOL/L (ref 22–29)
CREAT SERPL-MCNC: 1 MG/DL (ref 0.5–0.9)
EOSINOPHILS ABSOLUTE: 0.1 K/UL (ref 0–0.6)
EOSINOPHILS RELATIVE PERCENT: 1 % (ref 0–5)
GFR NON-AFRICAN AMERICAN: 57
GLUCOSE BLD-MCNC: 114 MG/DL (ref 74–109)
GLUCOSE BLD-MCNC: 89 MG/DL (ref 70–99)
GLUCOSE BLD-MCNC: 92 MG/DL (ref 70–99)
HCO3 ARTERIAL: 24.3 MMOL/L (ref 22–26)
HCT VFR BLD CALC: 23.9 % (ref 37–47)
HEMOGLOBIN, ART, EXTENDED: 9.3 G/DL (ref 12–16)
HEMOGLOBIN: 7.2 G/DL (ref 12–16)
IMMATURE GRANULOCYTES #: 0.1 K/UL
LYMPHOCYTES ABSOLUTE: 0.7 K/UL (ref 1.1–4.5)
LYMPHOCYTES RELATIVE PERCENT: 10.9 % (ref 20–40)
MAGNESIUM: 1.5 MG/DL (ref 1.6–2.6)
MCH RBC QN AUTO: 25.7 PG (ref 27–31)
MCHC RBC AUTO-ENTMCNC: 30.1 G/DL (ref 33–37)
MCV RBC AUTO: 85.4 FL (ref 81–99)
METHEMOGLOBIN ARTERIAL: 1.4 %
MONOCYTES ABSOLUTE: 0.3 K/UL (ref 0–0.9)
MONOCYTES RELATIVE PERCENT: 5.1 % (ref 0–10)
NEUTROPHILS ABSOLUTE: 5.5 K/UL (ref 1.5–7.5)
NEUTROPHILS RELATIVE PERCENT: 82.2 % (ref 50–65)
O2 CONTENT ARTERIAL: 12.4 ML/DL
O2 SAT, ARTERIAL: 94.2 %
O2 THERAPY: ABNORMAL
PCO2 ARTERIAL: 35 MMHG (ref 35–45)
PDW BLD-RTO: 21.2 % (ref 11.5–14.5)
PERFORMED ON: NORMAL
PERFORMED ON: NORMAL
PH ARTERIAL: 7.45 (ref 7.35–7.45)
PLATELET # BLD: 142 K/UL (ref 130–400)
PMV BLD AUTO: 9 FL (ref 9.4–12.3)
PO2 ARTERIAL: 72 MMHG (ref 80–100)
POTASSIUM REFLEX MAGNESIUM: 3.3 MMOL/L (ref 3.5–5)
POTASSIUM SERPL-SCNC: 3.3 MMOL/L (ref 3.5–5)
POTASSIUM, WHOLE BLOOD: 3.1
RBC # BLD: 2.8 M/UL (ref 4.2–5.4)
SODIUM BLD-SCNC: 144 MMOL/L (ref 136–145)
TOTAL PROTEIN: 4.1 G/DL (ref 6.6–8.7)
WBC # BLD: 6.7 K/UL (ref 4.8–10.8)

## 2019-09-02 PROCEDURE — 84132 ASSAY OF SERUM POTASSIUM: CPT

## 2019-09-02 PROCEDURE — 83735 ASSAY OF MAGNESIUM: CPT

## 2019-09-02 PROCEDURE — 36592 COLLECT BLOOD FROM PICC: CPT

## 2019-09-02 PROCEDURE — 97166 OT EVAL MOD COMPLEX 45 MIN: CPT

## 2019-09-02 PROCEDURE — 2500000003 HC RX 250 WO HCPCS: Performed by: CLINICAL NURSE SPECIALIST

## 2019-09-02 PROCEDURE — 97530 THERAPEUTIC ACTIVITIES: CPT

## 2019-09-02 PROCEDURE — 6370000000 HC RX 637 (ALT 250 FOR IP): Performed by: PSYCHIATRY & NEUROLOGY

## 2019-09-02 PROCEDURE — 2100000000 HC CCU R&B

## 2019-09-02 PROCEDURE — 6360000002 HC RX W HCPCS: Performed by: INTERNAL MEDICINE

## 2019-09-02 PROCEDURE — 2580000003 HC RX 258: Performed by: SURGERY

## 2019-09-02 PROCEDURE — 71045 X-RAY EXAM CHEST 1 VIEW: CPT

## 2019-09-02 PROCEDURE — C9113 INJ PANTOPRAZOLE SODIUM, VIA: HCPCS | Performed by: HOSPITALIST

## 2019-09-02 PROCEDURE — 2580000003 HC RX 258: Performed by: INTERNAL MEDICINE

## 2019-09-02 PROCEDURE — 2580000003 HC RX 258: Performed by: CLINICAL NURSE SPECIALIST

## 2019-09-02 PROCEDURE — 97162 PT EVAL MOD COMPLEX 30 MIN: CPT

## 2019-09-02 PROCEDURE — 2580000003 HC RX 258: Performed by: HOSPITALIST

## 2019-09-02 PROCEDURE — 6360000002 HC RX W HCPCS: Performed by: HOSPITALIST

## 2019-09-02 PROCEDURE — 85025 COMPLETE CBC W/AUTO DIFF WBC: CPT

## 2019-09-02 PROCEDURE — 97110 THERAPEUTIC EXERCISES: CPT

## 2019-09-02 PROCEDURE — 36600 WITHDRAWAL OF ARTERIAL BLOOD: CPT

## 2019-09-02 PROCEDURE — 97750 PHYSICAL PERFORMANCE TEST: CPT

## 2019-09-02 PROCEDURE — 99232 SBSQ HOSP IP/OBS MODERATE 35: CPT | Performed by: PSYCHIATRY & NEUROLOGY

## 2019-09-02 PROCEDURE — 82948 REAGENT STRIP/BLOOD GLUCOSE: CPT

## 2019-09-02 PROCEDURE — 82803 BLOOD GASES ANY COMBINATION: CPT

## 2019-09-02 PROCEDURE — 6370000000 HC RX 637 (ALT 250 FOR IP): Performed by: SURGERY

## 2019-09-02 PROCEDURE — 80053 COMPREHEN METABOLIC PANEL: CPT

## 2019-09-02 RX ORDER — BUSPIRONE HYDROCHLORIDE 10 MG/1
10 TABLET ORAL 3 TIMES DAILY
Status: DISCONTINUED | OUTPATIENT
Start: 2019-09-02 | End: 2019-09-06 | Stop reason: HOSPADM

## 2019-09-02 RX ADMIN — MEROPENEM 1 G: 1 INJECTION, POWDER, FOR SOLUTION INTRAVENOUS at 06:19

## 2019-09-02 RX ADMIN — Medication 10 ML: at 21:06

## 2019-09-02 RX ADMIN — PANTOPRAZOLE SODIUM 40 MG: 40 INJECTION, POWDER, FOR SOLUTION INTRAVENOUS at 21:06

## 2019-09-02 RX ADMIN — DAKIN'S SOLUTION 0.125% (QUARTER STRENGTH) 473 ML: 0.12 SOLUTION at 10:45

## 2019-09-02 RX ADMIN — Medication 10 ML: at 08:06

## 2019-09-02 RX ADMIN — MEROPENEM 1 G: 1 INJECTION, POWDER, FOR SOLUTION INTRAVENOUS at 13:56

## 2019-09-02 RX ADMIN — ONDANSETRON 4 MG: 2 INJECTION INTRAMUSCULAR; INTRAVENOUS at 13:57

## 2019-09-02 RX ADMIN — BUMETANIDE 1 MG/HR: 0.25 INJECTION INTRAMUSCULAR; INTRAVENOUS at 00:57

## 2019-09-02 RX ADMIN — DAKIN'S SOLUTION 0.125% (QUARTER STRENGTH) 473 ML: 0.12 SOLUTION at 21:07

## 2019-09-02 RX ADMIN — BUSPIRONE HYDROCHLORIDE 10 MG: 10 TABLET ORAL at 13:56

## 2019-09-02 RX ADMIN — SILVER SULFADIAZINE: 10 CREAM TOPICAL at 10:45

## 2019-09-02 RX ADMIN — COLLAGENASE SANTYL: 250 OINTMENT TOPICAL at 21:06

## 2019-09-02 RX ADMIN — Medication 10 ML: at 21:08

## 2019-09-02 RX ADMIN — COLLAGENASE SANTYL: 250 OINTMENT TOPICAL at 10:44

## 2019-09-02 RX ADMIN — BUMETANIDE 1 MG/HR: 0.25 INJECTION INTRAMUSCULAR; INTRAVENOUS at 23:01

## 2019-09-02 RX ADMIN — MULTIPLE VITAMINS W/ MINERALS TAB 1 TABLET: TAB at 08:05

## 2019-09-02 RX ADMIN — BUMETANIDE 1 MG/HR: 0.25 INJECTION INTRAMUSCULAR; INTRAVENOUS at 12:15

## 2019-09-02 RX ADMIN — MEROPENEM 1 G: 1 INJECTION, POWDER, FOR SOLUTION INTRAVENOUS at 22:53

## 2019-09-02 RX ADMIN — PANTOPRAZOLE SODIUM 40 MG: 40 INJECTION, POWDER, FOR SOLUTION INTRAVENOUS at 08:05

## 2019-09-02 RX ADMIN — BUSPIRONE HYDROCHLORIDE 10 MG: 10 TABLET ORAL at 08:05

## 2019-09-02 RX ADMIN — BUSPIRONE HYDROCHLORIDE 10 MG: 10 TABLET ORAL at 21:06

## 2019-09-02 ASSESSMENT — PAIN DESCRIPTION - LOCATION
LOCATION: GENERALIZED
LOCATION: BACK;LEG

## 2019-09-02 ASSESSMENT — PAIN DESCRIPTION - PAIN TYPE
TYPE: CHRONIC PAIN

## 2019-09-02 ASSESSMENT — PAIN SCALES - GENERAL
PAINLEVEL_OUTOF10: 0
PAINLEVEL_OUTOF10: 0

## 2019-09-02 ASSESSMENT — PAIN SCALES - WONG BAKER
WONGBAKER_NUMERICALRESPONSE: 4
WONGBAKER_NUMERICALRESPONSE: 6
WONGBAKER_NUMERICALRESPONSE: 4
WONGBAKER_NUMERICALRESPONSE: 6
WONGBAKER_NUMERICALRESPONSE: 4
WONGBAKER_NUMERICALRESPONSE: 2
WONGBAKER_NUMERICALRESPONSE: 4
WONGBAKER_NUMERICALRESPONSE: 2
WONGBAKER_NUMERICALRESPONSE: 4
WONGBAKER_NUMERICALRESPONSE: 2
WONGBAKER_NUMERICALRESPONSE: 2

## 2019-09-02 ASSESSMENT — PAIN DESCRIPTION - DESCRIPTORS
DESCRIPTORS: SORE;TIGHTNESS
DESCRIPTORS: ACHING

## 2019-09-02 ASSESSMENT — ENCOUNTER SYMPTOMS
DIARRHEA: 0
SHORTNESS OF BREATH: 0
BACK PAIN: 0
COUGH: 0
NAUSEA: 0
CONSTIPATION: 0
VOMITING: 0

## 2019-09-02 ASSESSMENT — PAIN DESCRIPTION - ORIENTATION: ORIENTATION: LEFT;RIGHT

## 2019-09-02 NOTE — PROGRESS NOTES
Patient:   Oneil Saldana  MR#:    [id number]   Room:    [address] [place] [address]   YOB: 1960  Date of Progress Note: 9/2/2019  Time of Note                           7:09 AM  Consulting Physician:   Denisha Cronin M.D. Attending Physician:  Kailey Vegas DO     Chief complaint altered mental status  Interval history/subjective-patient admitted with large sacral decubitus and left heel wound. Has colostomy and paraplegia. Has leukocytosis and metabolic acidosis. Had altered mental status 8/30 felt due to morphine and Dilaudid. Still anxious. Trouble tolerating ativan d/t low bp. REVIEW OF SYSTEMS:  Constitutional: No fevers No chills  Neck:No stiffness  Respiratory: No shortness of breath  Cardiovascular: No chest pain No palpitations  Gastrointestinal: No abdominal pain    Genitourinary: No Dysuria  Neurological: No headache, no confusion      PHYSICAL EXAM:  /74   Pulse 104   Temp 97.1 °F (36.2 °C) (Temporal)   Resp 16   Ht 5' 8\" (1.727 m)   Wt 191 lb 1.6 oz (86.7 kg)   SpO2 99%   BMI 29.06 kg/m²     Constitutional: she appears well-developed and well-nourished. Eyes - conjunctiva normal.  Pupils react to light  Ear, nose, throat -hearing intact to voice. No scars, masses, or lesions over external nose or ears, no atrophy of tongue  Neck-symmetric, no masses noted, no jugular vein distension  Respiration- chest wall appears symmetric, good expansion,   normal effort without use of accessory muscles  Cardiovascular- RRR  Musculoskeletal - no significant wasting of muscles noted, no bony deformities, gait no gross ataxia  Extremities-no clubbing, cyanosis or edema  Skin - warm, dry, and intact. No rash, erythema, or pallor.   Psychiatric - mood, affect, and behavior appear normal.      Neurology  NEUROLOGICAL EXAM:      Mental status   Awake, alert, fluent oriented x 3 appropriate affect  Has difficulty following complex commands  Speech normal without dysarthria  No clear

## 2019-09-02 NOTE — PROGRESS NOTES
Weekly Renita Osorio MD   50,000 Units at 09/01/19 2002    sodium bicarbonate 50 mEq in dextrose 5 % and 0.45 % NaCl 1,000 mL infusion   Intravenous Continuous NELLIE Vidal 100 mL/hr at 09/02/19 0057      ondansetron (ZOFRAN) injection 4 mg  4 mg Intravenous Q6H PRN Hilary Stanford MD   4 mg at 09/01/19 1909    baclofen (LIORESAL) tablet 5 mg  5 mg Oral TID Hilary Stanford MD   5 mg at 09/01/19 2244    meropenem (MERREM) 1 g in sodium chloride 0.9 % 100 mL IVPB (mini-bag)  1 g Intravenous Q8H Urbano Ramirez MD   Stopped at 09/02/19 0649    pantoprazole (PROTONIX) injection 40 mg  40 mg Intravenous BID Hilary Stanford MD   40 mg at 09/02/19 0805    And    sodium chloride (PF) 0.9 % injection 10 mL  10 mL Intravenous BID Hilary Stanford MD   10 mL at 09/02/19 0806    sodium hypochlorite (DAKINS) 0.125 % external solution   Irrigation BID Erendira Abarca MD   473 mL at 09/02/19 1045    sodium chloride flush 0.9 % injection 10 mL  10 mL Intravenous PRN Erendira Abarca MD        sodium chloride flush 0.9 % injection 10 mL  10 mL Intravenous 2 times per day Erendira Abarca MD   10 mL at 09/02/19 0806    collagenase ointment   Topical BID Erendira Abarca MD        therapeutic multivitamin-minerals 1 tablet  1 tablet Oral Daily Erendira Abarca MD   1 tablet at 09/02/19 0805    silver sulfADIAZINE (SILVADENE) 1 % cream   Topical Daily Erendira Abarca MD        sodium chloride flush 0.9 % injection 10 mL  10 mL Intravenous PRN Erendira Abarca MD        acetaminophen (TYLENOL) tablet 650 mg  650 mg Oral Q4H PRN Erendira Abarca MD           Past Medical History:  Past Medical History:   Diagnosis Date    Blood circulation, collateral     Chronic kidney disease     GERD (gastroesophageal reflux disease)     History of blood transfusion     History of urostomy     [facility]rom[facility] disorder [address] [address]     Osteomyelitis ([facility] [address]     Pressure ulcer     to left ischium and bilat heels       Past Surgical History:  Past Surgical History:   Procedure Laterality Date    BACK SURGERY  1979    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 Mendieta Later removed part of bone    OTHER SURGICAL HISTORY Left     Skin Flap to L.  Buttock years ago more than 20 years ago   Lissa Lemon SMALL INTESTINE SURGERY N/A 2019    DIVERTING LOOP COLOSTOMY performed by Verner Ripper, MD at [address] History  Family History   Problem Relation Age of Onset    Other Mother          of sepsis    Cancer Father         Lung Cancer    Diabetes Paternal Grandmother     Heart Attack Paternal Grandfather        Social History  Social History     Socioeconomic History    Marital status:      Spouse name: Not on file    Number of children: Not on file    Years of education: Not on file    Highest education level: Not on file   Occupational History    Not on file   Social Needs    Financial resource strain: Not on file    Food insecurity:     Worry: Not on file     Inability: Not on file    Transportation needs:     Medical: Not on file     Non-medical: Not on file   Tobacco Use    Smoking status: Never Smoker    Smokeless tobacco: Never Used   Substance and Sexual Activity    Alcohol use: Not Currently    Drug use: Never    Sexual activity: Not on file   Lifestyle    Physical activity:     Days per week: Not on file     Minutes per session: Not on file    Stress: Not on file   Relationships    Social connections:     Talks on phone: Not on file     Gets together: Not on file     Attends Yazidism service: Not on file     Active member of club or organization: Not on file     Attends meetings of clubs or

## 2019-09-02 NOTE — PROGRESS NOTES
more than 20 years ago   Vance Lara SMALL INTESTINE SURGERY N/A 2019    DIVERTING LOOP COLOSTOMY performed by Marie Giron MD at [address] History  Family History   Problem Relation Age of Onset    Other Mother          of sepsis    Cancer Father         Lung Cancer    Diabetes Paternal Grandmother     Heart Attack Paternal Grandfather        Social History  Social History     Socioeconomic History    Marital status:      Spouse name: Not on file    Number of children: Not on file    Years of education: Not on file    Highest education level: Not on file   Occupational History    Not on file   Social Needs    Financial resource strain: Not on file    Food insecurity:     Worry: Not on file     Inability: Not on file    Transportation needs:     Medical: Not on file     Non-medical: Not on file   Tobacco Use    Smoking status: Never Smoker    Smokeless tobacco: Never Used   Substance and Sexual Activity    Alcohol use: Not Currently    Drug use: Never    Sexual activity: Not on file   Lifestyle    Physical activity:     Days per week: Not on file     Minutes per session: Not on file    Stress: Not on file   Relationships    Social connections:     Talks on phone: Not on file     Gets together: Not on file     Attends [employer] service: Not on file     Active member of club or organization: Not on file     Attends meetings of clubs or organizations: Not on file     Relationship status: Not on file    Intimate partner violence:     Fear of current or ex partner: Not on file     Emotionally abused: Not on file     Physically abused: Not on file     Forced sexual activity: Not on file   Other Topics Concern    Not on file   Social History Narrative    Not on file         Review of Systems:    Review of Systems   Constitutional: Negative for activity change and fatigue. Respiratory: Negative for cough and shortness of breath. Cardiovascular: Positive for leg swelling.

## 2019-09-02 NOTE — PROGRESS NOTES
Physical Therapy    Facility/Department: [facility] CORONARY CARE UNIT  Initial Assessment    NAME: Trino Casey  : 1960  MRN: [id number]    Date of Service: 2019    Discharge Recommendations:  Continue to assess pending progress, Patient would benefit from continued therapy after discharge        Assessment   Body structures, Functions, Activity limitations: Decreased functional mobility ; Decreased ROM; Decreased balance;Decreased cognition;Decreased safe awareness;Decreased sensation;Decreased endurance;Decreased strength; Increased Pain  Assessment: Pt. will benefit from PT to decrease impairments. Pt. has a lot of swelling, is lethargic, has a lot of pain at this point, and multiple wounds limiting pt's tolerance of therapy. Anticipate pt. will benefit from [facility] stay to cont. therapy. Will begin with ROM, positioning and rolling and progress to EOB sitting as able. Treatment Diagnosis: impaired mobility  Prognosis: Guarded  Decision Making: Medium Complexity  PT Education: Goals;PT Role;Plan of Care;General Safety;Precautions; Family Education; Functional Mobility Training  Patient Education: bed mobility, rolling  Barriers to Learning: lethargy  REQUIRES PT FOLLOW UP: Yes  Activity Tolerance  Activity Tolerance: Patient limited by fatigue;Patient limited by cognitive status; Patient limited by endurance; Patient limited by pain;Treatment limited secondary to medical complications (free text)  Activity Tolerance: wounds       Patient Diagnosis(es): The primary encounter diagnosis was Anemia, unspecified type. Diagnoses of Gastrointestinal hemorrhage, unspecified gastrointestinal hemorrhage type and GI bleed were also pertinent to this visit. has a past medical history of Blood circulation, collateral, Chronic kidney disease, GERD (gastroesophageal reflux disease), History of blood transfusion, History of urostomy, Neuromuscular disorder ([facility] [address] Osteomyelitis ([facility] [address] and Pressure ulcer.    has a past surgical history that includes Bladder surgery (2019);  section (); Leg Surgery (Right, ); other surgical history (Left); IVC filter insertion (); back surgery (1979); Endoscopy, colon, diagnostic; and Small intestine surgery (N/A, 2019). Restrictions  Restrictions/Precautions  Restrictions/Precautions: Fall Risk, Contact Precautions  Position Activity Restriction  Other position/activity restrictions: MRSA, pt. is a paraplegic  Vision/Hearing  Vision: Within Functional Limits  Hearing: Within functional limits     Subjective  General  Chart Reviewed: Yes  Patient assessed for rehabilitation services?: Yes  Additional Pertinent Hx: pt. recently at [facility] with multiple wounds  Response To Previous Treatment: Not applicable  Family / Caregiver Present: Yes(pt's son and pt's sister present)  Referring Practitioner: Carole Sheikh MD  Referral Date : 19  Diagnosis: chronic OM, paraplegic, chronic ischial wounds, AMS, acute encephalopathy due to overdose with morphine  Follows Commands: Impaired  Other (Comment): needs extra time and repeptition of v. cues, pt. falling asleep during PT. General Comment  Comments: RN okayed PT. diverting loop colostomy 19  Subjective  Subjective: Pt. states she likes a pillow down to her low back. States she is NOT going to [facility]. She states they did not take good care of her there.   Pain Screening  Patient Currently in Pain: Yes  Pain Assessment  Pain Assessment: Faces  Benites-Baker Pain Rating: Hurts even more  Pain Type: Chronic pain  Pain Location: Back;Leg  Pain Orientation: Left;Right  Pain Descriptors: Sore;Tightness  Non-Pharmaceutical Pain Intervention(s): Repositioned  Response to Pain Intervention: Patient Satisfied  Vital Signs  Patient Currently in Pain: Yes  Pre Treatment Pain Screening  Intervention List: Patient able to continue with treatment  Comments / Details: pt. drifts off to sleep during

## 2019-09-03 LAB
ALBUMIN SERPL-MCNC: 1.8 G/DL (ref 3.5–5.2)
ALP BLD-CCNC: 195 U/L (ref 35–104)
ALT SERPL-CCNC: 6 U/L (ref 5–33)
ANION GAP SERPL CALCULATED.3IONS-SCNC: 13 MMOL/L (ref 7–19)
AST SERPL-CCNC: 17 U/L (ref 5–32)
BASOPHILS ABSOLUTE: 0 K/UL (ref 0–0.2)
BASOPHILS RELATIVE PERCENT: 0.5 % (ref 0–1)
BILIRUB SERPL-MCNC: 0.3 MG/DL (ref 0.2–1.2)
BUN BLDV-MCNC: 24 MG/DL (ref 6–20)
CALCIUM SERPL-MCNC: 7.3 MG/DL (ref 8.6–10)
CHLORIDE BLD-SCNC: 106 MMOL/L (ref 98–111)
CO2: 25 MMOL/L (ref 22–29)
CREAT SERPL-MCNC: 0.8 MG/DL (ref 0.5–0.9)
EOSINOPHILS ABSOLUTE: 0.2 K/UL (ref 0–0.6)
EOSINOPHILS RELATIVE PERCENT: 2.1 % (ref 0–5)
GFR NON-AFRICAN AMERICAN: >60
GLUCOSE BLD-MCNC: 116 MG/DL (ref 74–109)
HCT VFR BLD CALC: 27.6 % (ref 37–47)
HEMOGLOBIN: 8.5 G/DL (ref 12–16)
IMMATURE GRANULOCYTES #: 0.1 K/UL
LYMPHOCYTES ABSOLUTE: 1.5 K/UL (ref 1.1–4.5)
LYMPHOCYTES RELATIVE PERCENT: 20.5 % (ref 20–40)
MAGNESIUM: 1.2 MG/DL (ref 1.6–2.6)
MCH RBC QN AUTO: 25.8 PG (ref 27–31)
MCHC RBC AUTO-ENTMCNC: 30.8 G/DL (ref 33–37)
MCV RBC AUTO: 83.6 FL (ref 81–99)
MONOCYTES ABSOLUTE: 0.5 K/UL (ref 0–0.9)
MONOCYTES RELATIVE PERCENT: 7.4 % (ref 0–10)
NEUTROPHILS ABSOLUTE: 5 K/UL (ref 1.5–7.5)
NEUTROPHILS RELATIVE PERCENT: 68.3 % (ref 50–65)
ORGANISM: ABNORMAL
PDW BLD-RTO: 20.9 % (ref 11.5–14.5)
PLATELET # BLD: 162 K/UL (ref 130–400)
PMV BLD AUTO: 9.6 FL (ref 9.4–12.3)
POTASSIUM SERPL-SCNC: 2.7 MMOL/L (ref 3.5–5)
POTASSIUM SERPL-SCNC: 2.9 MMOL/L (ref 3.5–5)
RBC # BLD: 3.3 M/UL (ref 4.2–5.4)
SODIUM BLD-SCNC: 144 MMOL/L (ref 136–145)
TOTAL PROTEIN: 4.7 G/DL (ref 6.6–8.7)
URINE CULTURE, ROUTINE: ABNORMAL
URINE CULTURE, ROUTINE: ABNORMAL
WBC # BLD: 7.3 K/UL (ref 4.8–10.8)

## 2019-09-03 PROCEDURE — 6360000002 HC RX W HCPCS: Performed by: INTERNAL MEDICINE

## 2019-09-03 PROCEDURE — 2100000000 HC CCU R&B

## 2019-09-03 PROCEDURE — 6360000002 HC RX W HCPCS: Performed by: HOSPITALIST

## 2019-09-03 PROCEDURE — 84132 ASSAY OF SERUM POTASSIUM: CPT

## 2019-09-03 PROCEDURE — 2500000003 HC RX 250 WO HCPCS: Performed by: INTERNAL MEDICINE

## 2019-09-03 PROCEDURE — 85025 COMPLETE CBC W/AUTO DIFF WBC: CPT

## 2019-09-03 PROCEDURE — 6370000000 HC RX 637 (ALT 250 FOR IP): Performed by: HOSPITALIST

## 2019-09-03 PROCEDURE — 6370000000 HC RX 637 (ALT 250 FOR IP): Performed by: SURGERY

## 2019-09-03 PROCEDURE — 36415 COLL VENOUS BLD VENIPUNCTURE: CPT

## 2019-09-03 PROCEDURE — 83735 ASSAY OF MAGNESIUM: CPT

## 2019-09-03 PROCEDURE — C9113 INJ PANTOPRAZOLE SODIUM, VIA: HCPCS | Performed by: HOSPITALIST

## 2019-09-03 PROCEDURE — 99232 SBSQ HOSP IP/OBS MODERATE 35: CPT | Performed by: PSYCHIATRY & NEUROLOGY

## 2019-09-03 PROCEDURE — 6370000000 HC RX 637 (ALT 250 FOR IP): Performed by: INTERNAL MEDICINE

## 2019-09-03 PROCEDURE — 6370000000 HC RX 637 (ALT 250 FOR IP): Performed by: PSYCHIATRY & NEUROLOGY

## 2019-09-03 PROCEDURE — 2580000003 HC RX 258: Performed by: SURGERY

## 2019-09-03 PROCEDURE — 2580000003 HC RX 258: Performed by: INTERNAL MEDICINE

## 2019-09-03 PROCEDURE — 97530 THERAPEUTIC ACTIVITIES: CPT

## 2019-09-03 PROCEDURE — 80053 COMPREHEN METABOLIC PANEL: CPT

## 2019-09-03 PROCEDURE — 2580000003 HC RX 258: Performed by: HOSPITALIST

## 2019-09-03 RX ORDER — BUMETANIDE 0.25 MG/ML
1 INJECTION, SOLUTION INTRAMUSCULAR; INTRAVENOUS 2 TIMES DAILY
Status: DISCONTINUED | OUTPATIENT
Start: 2019-09-03 | End: 2019-09-04

## 2019-09-03 RX ORDER — POTASSIUM CHLORIDE 7.45 MG/ML
10 INJECTION INTRAVENOUS
Status: COMPLETED | OUTPATIENT
Start: 2019-09-03 | End: 2019-09-03

## 2019-09-03 RX ORDER — SPIRONOLACTONE 25 MG/1
50 TABLET ORAL DAILY
Status: DISCONTINUED | OUTPATIENT
Start: 2019-09-03 | End: 2019-09-04

## 2019-09-03 RX ORDER — SODIUM CHLORIDE 9 MG/ML
INJECTION, SOLUTION INTRAVENOUS CONTINUOUS
Status: DISCONTINUED | OUTPATIENT
Start: 2019-09-03 | End: 2019-09-03

## 2019-09-03 RX ORDER — MAGNESIUM SULFATE IN WATER 40 MG/ML
4 INJECTION, SOLUTION INTRAVENOUS ONCE
Status: COMPLETED | OUTPATIENT
Start: 2019-09-03 | End: 2019-09-03

## 2019-09-03 RX ORDER — MAGNESIUM SULFATE IN WATER 40 MG/ML
2 INJECTION, SOLUTION INTRAVENOUS ONCE
Status: COMPLETED | OUTPATIENT
Start: 2019-09-03 | End: 2019-09-03

## 2019-09-03 RX ADMIN — MAGNESIUM SULFATE HEPTAHYDRATE 4 G: 40 INJECTION, SOLUTION INTRAVENOUS at 15:44

## 2019-09-03 RX ADMIN — PANTOPRAZOLE SODIUM 40 MG: 40 INJECTION, POWDER, FOR SOLUTION INTRAVENOUS at 07:48

## 2019-09-03 RX ADMIN — SPIRONOLACTONE 50 MG: 25 TABLET ORAL at 13:25

## 2019-09-03 RX ADMIN — MEROPENEM 1 G: 1 INJECTION, POWDER, FOR SOLUTION INTRAVENOUS at 05:56

## 2019-09-03 RX ADMIN — BUSPIRONE HYDROCHLORIDE 10 MG: 10 TABLET ORAL at 20:49

## 2019-09-03 RX ADMIN — POTASSIUM CHLORIDE 10 MEQ: 7.46 INJECTION, SOLUTION INTRAVENOUS at 06:37

## 2019-09-03 RX ADMIN — DAKIN'S SOLUTION 0.125% (QUARTER STRENGTH): 0.12 SOLUTION at 11:00

## 2019-09-03 RX ADMIN — PANTOPRAZOLE SODIUM 40 MG: 40 INJECTION, POWDER, FOR SOLUTION INTRAVENOUS at 20:50

## 2019-09-03 RX ADMIN — Medication 10 ML: at 20:50

## 2019-09-03 RX ADMIN — SODIUM CHLORIDE: 9 INJECTION, SOLUTION INTRAVENOUS at 04:15

## 2019-09-03 RX ADMIN — COLLAGENASE SANTYL: 250 OINTMENT TOPICAL at 11:00

## 2019-09-03 RX ADMIN — COLLAGENASE SANTYL: 250 OINTMENT TOPICAL at 20:51

## 2019-09-03 RX ADMIN — BUMETANIDE 1 MG: 0.25 INJECTION INTRAMUSCULAR; INTRAVENOUS at 11:26

## 2019-09-03 RX ADMIN — BACLOFEN 5 MG: 10 TABLET ORAL at 13:25

## 2019-09-03 RX ADMIN — BUSPIRONE HYDROCHLORIDE 10 MG: 10 TABLET ORAL at 14:57

## 2019-09-03 RX ADMIN — MEROPENEM 1 G: 1 INJECTION, POWDER, FOR SOLUTION INTRAVENOUS at 22:52

## 2019-09-03 RX ADMIN — Medication 10 ML: at 07:49

## 2019-09-03 RX ADMIN — POTASSIUM CHLORIDE 10 MEQ: 7.46 INJECTION, SOLUTION INTRAVENOUS at 07:49

## 2019-09-03 RX ADMIN — ALPRAZOLAM 0.5 MG: 0.5 TABLET ORAL at 21:32

## 2019-09-03 RX ADMIN — BUSPIRONE HYDROCHLORIDE 10 MG: 10 TABLET ORAL at 07:48

## 2019-09-03 RX ADMIN — SILVER SULFADIAZINE: 10 CREAM TOPICAL at 11:00

## 2019-09-03 RX ADMIN — BUMETANIDE 1 MG: 0.25 INJECTION INTRAMUSCULAR; INTRAVENOUS at 20:49

## 2019-09-03 RX ADMIN — MEROPENEM 1 G: 1 INJECTION, POWDER, FOR SOLUTION INTRAVENOUS at 14:57

## 2019-09-03 RX ADMIN — POTASSIUM BICARBONATE 40 MEQ: 782 TABLET, EFFERVESCENT ORAL at 17:14

## 2019-09-03 RX ADMIN — DAKIN'S SOLUTION 0.125% (QUARTER STRENGTH): 0.12 SOLUTION at 20:51

## 2019-09-03 RX ADMIN — ACETAMINOPHEN 650 MG: 325 TABLET ORAL at 06:42

## 2019-09-03 RX ADMIN — BACLOFEN 5 MG: 10 TABLET ORAL at 20:49

## 2019-09-03 RX ADMIN — Medication 10 ML: at 07:50

## 2019-09-03 RX ADMIN — POTASSIUM CHLORIDE 10 MEQ: 7.46 INJECTION, SOLUTION INTRAVENOUS at 04:16

## 2019-09-03 RX ADMIN — MAGNESIUM SULFATE HEPTAHYDRATE 2 G: 40 INJECTION, SOLUTION INTRAVENOUS at 05:31

## 2019-09-03 ASSESSMENT — ENCOUNTER SYMPTOMS
NAUSEA: 0
BACK PAIN: 0
DIARRHEA: 0
SHORTNESS OF BREATH: 0
COUGH: 0
CONSTIPATION: 0
VOMITING: 0

## 2019-09-03 ASSESSMENT — PAIN SCALES - GENERAL
PAINLEVEL_OUTOF10: 0
PAINLEVEL_OUTOF10: 5

## 2019-09-03 ASSESSMENT — PAIN SCALES - WONG BAKER: WONGBAKER_NUMERICALRESPONSE: 4

## 2019-09-03 NOTE — PROGRESS NOTES
slightly improved. Sagittal images demonstrate severe hyperlordosis of the thoracolumbar spine. This is stable. 1. Essentially stable appearance of the abdomen and pelvis compared with the study from 7/29/2019. There is no evidence of bowel obstruction or findings to explain the patient's persistent vomiting. Based on the volume of stool within the colon, constipation is likely. Signed by Dr Shamika Carlson. Jameel on 8/20/2019 5:08 PM       Assessment   Status post diverting colostomy. Acute kidney injury-resolved. Hypokalemia. Continue replacement. Hypomagnesemia. Plan:  Continue with nutritional support. Replace potassium and magnesium. Replace vitamin D. Continue supportive care.     Summer Galvin,

## 2019-09-03 NOTE — CARE COORDINATION
RADHA spoke with Ad Stephen at [facility] to confirm Dr Kendra Tam at [facility] can provide surgery services to pt, which Ad Stephen reports she has confirmed with Dr Kendra Tam ability to provide bedside debridement services for pt while at [facility]. Ophelia agreed to reach out to Dr Benjie Pedroza to inform of services available.

## 2019-09-03 NOTE — PROGRESS NOTES
Assumed care of pt at [address]. Assessment is completed and medications are administered. Pt's dressing to phi bottom and L heel were changed per ordered and pt tolerated well. Pt refused dressing change to her R foot stating \"it's healing now\". Residual in gastric was checked to be 0ml around 2250, tube feeding was advanced 25ml/h to 35ml/hr. Pt now lying in bed comfortably and denies any needs or issues. Call light placed within reach, bed alarm on. Will continue to monitor.

## 2019-09-03 NOTE — PROGRESS NOTES
intestine surgery (N/A, 8/28/2019). Restrictions  Restrictions/Precautions  Restrictions/Precautions: Contact Precautions, Fall Risk  Position Activity Restriction  Other position/activity restrictions: MRSA; pt has multiple stage IV pressure ulcers at DENISSE ischial tuberosities and sacrum requiring full skin clearance with mobility; T10 SCI/paraplegia  Subjective   General  Chart Reviewed: Yes  Subjective  Subjective: Pt semi-supine in bed upon arrival, agreeable to PT with moderate encouragement. Pt appears drowsy. NG tube in place. General Comment  Comments: RN aware of and okays therapy session.   Pain Screening  Patient Currently in Pain: Denies  Pain Assessment  Pain Assessment: Faces  Benites-Baker Pain Rating: Hurts little more  Non-Pharmaceutical Pain Intervention(s): Repositioned  Vital Signs  Pulse: 86  Heart Rate Source: Monitor  Patient Currently in Pain: Denies  Oxygen Therapy  SpO2: 97 %       Orientation  Orientation  Overall Orientation Status: Impaired  Orientation Level: Oriented X4(lethargic)  Cognition      Objective   Bed mobility  Rolling to Left: Maximum assistance;2 Person assistance  Rolling to Right: Maximum assistance;2 Person assistance  Comment: semi-supine to long sit transition: max A of 2           Balance  Posture: Poor  Sitting - Static: Poor  Comments: long sitting balance, x2 reps for 30-60 sec each in bed with moderate A of 2 to maintain position; pt encouraged to lean forward, lift head                           G-Code     OutComes Score                                                     AM-PAC Score             Goals  Short term goals  Time Frame for Short term goals: 2 wks  Short term goal 1: supine to sit Min A (if cleared by skin care nurse), ongoing  Short term goal 2: rolling to R Min A, ongoing  Short term goal 3: rolling to L Min A, ongoing  Short term goal 4: tolerate sitting EOB x 10 mins CGA, ongoing  Patient Goals   Patient goals : go get therapy, eventually get

## 2019-09-03 NOTE — PROGRESS NOTES
Nephrology ([address]) Progress Note      Patient:  Otis Jackson  YOB: 1960  Date of Service: 9/3/2019  MRN: [id number]   Acct: [de-identified]   Primary Care Physician: Shereen Hicks  Advance Directive: Full Code  Admit Date: 8/20/2019       Hospital Day: 14  Referring Provider: Nela Maynard DO    Patient independently seen and examined, Chart, Consults, Notes, Operative notes, Labs, Cardiology, and Radiology studies reviewed as able. Subjective:  Otis Jackson is a 64 y.o. female  whom we were consulted for metabolic acidosis. Chart review shows the patient has never had a normal CO2 level on her serum chemistries. Only blood gases are during the last 24 hours prior to consult which show a mild acidemia. We have 2 urinalyses in the system both of which have a inappropriately elevated urine pH. She is also noted to have an acute kidney injury. There is history of chronic paraplegia with sacral wounds being followed by general surgery. Patient had morphine with an episode of decreased responsiveness was taken to the ICU for further evaluation. At this point she complains of weakness, fatigue, malaise. She notes no prior renal issues. She denies chest pain, shortness of air. She recalls no NSAID usage. Today, patient was awake and stronger. She had an NGTube placed and was started on feeding.      Allergies:  Morphine and related    Medicines:  Current Facility-Administered Medications   Medication Dose Route Frequency Provider Last Rate Last Dose    spironolactone (ALDACTONE) tablet 50 mg  50 mg Per NG tube Daily Mary Montilla MD        [facility]) injection 1 mg  1 mg Intravenous BID Mary Montilla MD        busPIRone (BUSPAR) tablet 10 mg  10 mg Oral TID Susan Root MD   10 mg at 09/03/19 0791    ALPRAZolam Margorie Clunes) tablet 0.5 mg  0.5 mg Oral TID PRN Susan Root MD   0.5 mg at 09/01/19 7866    vitamin D (ERGOCALCIFEROL) capsule 50,000 Units  50,000 Units Oral Weekly Nicole Castellanos MD   50,000 Units at 09/01/19 2002    [facility]ron [facility]) injection 4 mg  4 mg Intravenous Q6H PRN Arturo George MD   4 mg at 09/02/19 1357    baclofen (LIORESAL) tablet 5 mg  5 mg Oral TID Arturo George MD   Stopped at 09/02/19 1253    meropenem (MERREM) 1 g in sodium chloride 0.9 % 100 mL IVPB (mini-bag)  1 g Intravenous Q8H Hebert Rockwell MD   Stopped at 09/03/19 0630    pantoprazole (PROTONIX) injection 40 mg  40 mg Intravenous BID Arturo George MD   40 mg at 09/03/19 8281    And    sodium chloride (PF) 0.9 % injection 10 mL  10 mL Intravenous BID Arturo George MD   10 mL at 09/03/19 0750    sodium hypochlorite (DAKINS) 0.125 % external solution   Irrigation BID Talita Bertrand MD        sodium chloride flush 0.9 % injection 10 mL  10 mL Intravenous PRN Talita Bertrand MD        sodium chloride flush 0.9 % injection 10 mL  10 mL Intravenous 2 times per day Talita Bertrand MD   10 mL at 09/03/19 0749    collagenase ointment   Topical BID Talita Bertrand MD        therapeutic multivitamin-minerals 1 tablet  1 tablet Oral Daily Talita Bertrand MD   1 tablet at 09/02/19 0805    silver sulfADIAZINE (SILVADENE) 1 % cream   Topical Daily Talita Bertrand MD        sodium chloride flush 0.9 % injection 10 mL  10 mL Intravenous PRN Talita Bertrand MD        acetaminophen (TYLENOL) tablet 650 mg  650 mg Oral Q4H PRN Talita Bertrand MD   650 mg at 09/03/19 4279       Past Medical History:  Past Medical History:   Diagnosis Date    Blood circulation, collateral     Chronic kidney disease     GERD (gastroesophageal reflux disease)     History of blood transfusion     History of urostomy     [facility]rom[facility] disorder ([facility] [address]     Osteomyelitis ([facility] [address]     Pressure ulcer     to left ischium and bilat heels       Past Surgical History:  Past Surgical History:   Procedure Laterality Date    BACK partner: Not on file     Emotionally abused: Not on file     Physically abused: Not on file     Forced sexual activity: Not on file   Other Topics Concern    Not on file   Social History Narrative    Not on file         Review of Systems:  History obtained from chart review and the patient  General ROS: No fever or chills  Respiratory ROS: No cough,+ shortness of breath,- wheezing  Cardiovascular ROS: No chest pain or palpitations  Gastrointestinal ROS: No abdominal pain or melena  Genito-Urinary ROS: No dysuria or hematuria  Musculoskeletal ROS: No joint pain but swelling       Objective:  Patient Vitals for the past 24 hrs:   BP Temp Temp src Pulse Resp SpO2 Weight   09/03/19 1100 129/69 -- -- 99 13 97 % --   09/03/19 1000 (!) 130/56 -- -- 92 14 96 % --   09/03/19 0900 125/62 -- -- 93 15 97 % --   09/03/19 0800 126/62 -- -- 92 15 95 % --   09/03/19 0700 (!) 110/47 96.8 °F (36 °C) Oral 93 14 94 % --   09/03/19 0600 (!) 141/61 -- -- 98 18 96 % 183 lb 1.6 oz (83.1 kg)   09/03/19 0500 (!) 119/56 -- -- 102 16 98 % --   09/03/19 0400 138/65 97.2 °F (36.2 °C) Temporal 98 17 98 % --   09/03/19 0000 -- 98.7 °F (37.1 °C) -- -- -- -- --   09/02/19 2000 (!) 128/56 97.8 °F (36.6 °C) -- 89 15 98 % --   09/02/19 1800 (!) 124/59 -- -- 91 12 98 % --   09/02/19 1700 (!) 118/44 -- -- 92 13 98 % --   09/02/19 1600 119/62 97.2 °F (36.2 °C) Temporal 97 15 98 % --   09/02/19 1500 (!) 105/55 -- -- 94 13 99 % --   09/02/19 1400 118/60 -- -- 99 14 99 % --   09/02/19 1300 113/65 -- -- 102 15 98 % --   09/02/19 1200 112/70 97.8 °F (36.6 °C) Temporal 106 18 99 % --       Intake/Output Summary (Last 24 hours) at 9/3/2019 1103  Last data filed at 9/3/2019 0959  Gross per 24 hour   Intake 1723 ml   Output 6675 ml   Net -4952 ml     General: awake/alert   Chest:  clear to auscultation bilaterally without respiratory distress  CVS: +/- reg S1S2  Abdominal: soft, nontender, normal bowel sounds  Extremities: Bilateral lower extremity 2+ leg

## 2019-09-03 NOTE — PROGRESS NOTES
New feeding tube supplies changed at this time with new bottle of feeding solution. Residual checked twice during shift and both volumes 0ml.   Electronically signed by Isabela Lagunas RN on 9/3/2019 at 6:23 PM

## 2019-09-03 NOTE — PROGRESS NOTES
Nutrition Assessment (Enteral Nutrition)    Type and Reason for Visit: Reassess    Nutrition Recommendations: work toward goal rate of 59ml/hr    Nutrition Assessment: Pt now receiving TF-Vital AF 1.2 at 45ml/hr. Also sending Yoandy in 120ml water as tf flush BID. No residuals noted and pt not c/o any nausea or abd. discomfort. Continues to remain at severe malnutrition    Malnutrition Assessment:  · Malnutrition Status: Meets the criteria for severe malnutrition  · Context: Chronic illness  · Findings of the 6 clinical characteristics of malnutrition (Minimum of 2 out of 6 clinical characteristics is required to make the diagnosis of moderate or severe Protein Calorie Malnutrition based on AND/ASPEN Guidelines):  1. Energy Intake-Less than or equal to 75% of estimated energy requirement, Greater than or equal to 1 month    2. Weight Loss-10% loss or greater, in 3 months  3. Fat Loss-Mild subcutaneous fat loss,    4. Muscle Loss-No significant muscle mass loss,    5. Fluid Accumulation-No significant fluid accumulation, Extremities  6.   Strength-Not measured    Nutrition Risk Level: High    Nutrition Needs:  · Estimated Daily Total Kcal: 8636-7685(20-25 kcal/kg)  · Estimated Daily Protein (g): 76-95(1.2-1.5 g/kg)  · Estimated Daily Fluid (ml/day): 1461-5042    Nutrition Diagnosis:   · Problem: Severe malnutrition, In context of chronic illness  · Etiology: related to Nausea, Early satiety, Insufficient energy/nutrient consumption     Signs and symptoms:  as evidenced by Diet history of poor intake, Weight loss greater than or equal to 10% in 6 months, Localized or generalized fluid accumulation    Objective Information:  · Nutrition-Focused Physical Findings:    · Wound Type: Multiple, Pressure Ulcer, Stage II, Stage III, Stage IV  · Current Nutrition Therapies:  · Oral Diet Orders: NPO   · Oral Diet intake: NPO  · Oral Nutrition Supplement (ONS) Orders: None  · ONS intake: NPO  · Tube Feeding (TF)

## 2019-09-03 NOTE — PROGRESS NOTES
°F (37.1 °C)      August 22nd 2019        Physical Exam   General: Patient is nontoxic-appearing and somewhat sleepy appearing with friend at bedside. Neck: Supple  Abdomen: Soft, bowel sounds are positive  Extremities-patient does have edema bilaterally. Lower extremities are dressed. I did unwrap the right foot since she declined with the nurse to earlier. There is no definite wound on the heel. Patient does have a small hole in the lateral edge of the plantar surface. There is no surrounding erythema or drainage. It probes about 2 mm. Psych does not and cooperative  Neuro: She was alert, somewhat slow to answer questions but speech was clear. Line/IV site: No erythema,warmth, induration, or tenderness.   PICC line right upper extremity        LAB RESULTS:    CBC with DIFF:  Recent Labs     09/01/19 0210 09/02/19 0120 09/03/19 0222   WBC 13.4* 6.7 7.3   RBC 3.23* 2.80* 3.30*   HGB 8.2* 7.2* 8.5*   HCT 28.0* 23.9* 27.6*   MCV 86.7 85.4 83.6   MCH 25.4* 25.7* 25.8*   MCHC 29.3* 30.1* 30.8*   RDW 20.9* 21.2* 20.9*    142 162   MPV 9.5 9.0* 9.6   NEUTOPHILPCT 80.9* 82.2* 68.3*   LYMPHOPCT 10.7* 10.9* 20.5   MONOPCT 6.9 5.1 7.4   EOSRELPCT 0.2 1.0 2.1   BASOPCT 0.2 0.1 0.5   NEUTROABS 10.8* 5.5 5.0   LYMPHSABS 1.4 0.7* 1.5   MONOSABS 0.90 0.30 0.50   EOSABS 0.00 0.10 0.20   BASOSABS 0.00 0.00 0.00       CMP/BMP:  Recent Labs     09/01/19 0210 09/02/19  0120 09/02/19  1205 09/03/19  0222 09/03/19  1210    144  --  144  --    K 3.8 3.3*  3.3* 3.1 2.7* 2.9*    112*  --  106  --    CO2 18* 22  --  25  --    ANIONGAP 15 10  --  13  --    GLUCOSE 113* 114*  --  116*  --    BUN 27* 27*  --  24*  --    CREATININE 1.2* 1.0*  --  0.8  --    LABGLOM 46* 57*  --  >60  --    CALCIUM 7.6* 7.2*  --  7.3*  --    PROT 4.9* 4.1*  --  4.7*  --    LABALBU 1.8* 1.5*  --  1.8*  --    BILITOT 0.3 <0.2  --  0.3  --    ALKPHOS 186* 158*  --  195*  --    ALT <5* <5*  --  6  --    AST 14 13  --  17  -- Culture Results:  Urine Culture [[id number]] (Abnormal) Collected: 09/01/19 1945   Order Status: Completed Specimen: Urine, clean catch Updated: 09/03/19 1154    Urine Culture, Routine >100,000 CFU/mlAbnormal     Organism Yeast, not C. albicansAbnormal     Urine Culture, Routine --    Moderate growth   No further workup    Narrative:     ORDER#: [id number]                          ORDERED BY: PORSCHE COWAN  SOURCE: Urine Clean Catch                  COLLECTED:  09/01/19 19:45  ANTIBIOTICS AT [facility]. :                      RECEIVED :  09/01/19 22:13   MRSA Screening Culture Only [[id number]] (Abnormal) Collected: 08/30/19 1958   Order Status: Completed Specimen: Nares Updated: 09/01/19 0724    Organism Staph aureus MRSAAbnormal     MRSA Culture Only --Abnormal     POSITIVE FOR   CONTACT PRECAUTIONS INDICATED   Abnormal    Narrative:     ORDER#: [id number]                          ORDERED BY: Jan Bryant  SOURCE: Nares                              COLLECTED:  08/30/19 19:58  ANTIBIOTICS AT [facility]. :                      RECEIVED :  08/30/19 21:00   Culture Blood #1 [[id number]] Collected: 08/30/19 1900   Order Status: Completed Specimen: Blood Updated: 08/31/19 2114    Blood Culture, Routine No Growth to date.  Any change in status will be called. Narrative:     ORDER#: [id number]                          ORDERED BY: Jan Bryant  SOURCE: Blood LT AC                        COLLECTED:  08/30/19 19:00  ANTIBIOTICS AT [facility]. :                      RECEIVED :  08/30/19 19:44   Culture Blood #2 [[id number]] Collected: 08/30/19 1900   Order Status: Completed Specimen: Blood Updated: 08/31/19 2114    Culture, Blood 2 No Growth to date.  Any change in status will be called. Narrative:     ORDER#: [id number]                          ORDERED BY: Jan Bryant  SOURCE: Blood Picc                         COLLECTED:  08/30/19 19:00  ANTIBIOTICS AT [facility]. :                      RECEIVED :  08/30/19 19:21 unremarkable. There is normal aeration of the visualized paranasal sinuses and mastoid air cells. Impression: No acute intracranial abnormality. Signed by Dr Severiano Jorgensen on 8/20/2019 4:57 PM    Ct Chest W Contrast    Result Date: 8/20/2019  CT chest 8/20/2019 1:00 PM HISTORY: Cough, vomiting TECHNIQUE: Axial images of the chest were obtained following IV contrast. . Coronal and sagittal reformatted images are reconstructed and reviewed. COMPARISON: None. DLP: 1508 mGy cm Automated exposure control was utilized to minimize patient radiation dose. FINDINGS: Multinodular thyroid gland is noted with no dominant thyroid nodule localized. There is no pathologic intrathoracic or axillary lymphadenopathy. Heart is upper limits of normal in size. There are no pericardial or pleural effusions. There is mild thoracic aortic calcification but no aneurysm or dissection. There is no pericardial or pleural effusions. Limited images the upper abdomen demonstrate no adrenal nodules. Please refer to CT abdomen pelvis report for potential findings below the hemidiaphragms. There is no pulmonary consolidation. There are no suspicious pulmonary nodules. There is no pneumothorax. There are no endobronchial lesions. Laminectomy changes versus spina bifida of the thoracolumbar spine. Developmental buckling are old anterior rib injuries are considered. There is a rotoscoliosis as well as hyperkyphosis of the thoracolumbar spine. 1. No acute intrathoracic abnormality. No parenchymal consolidation or suspicious nodularity. 2. Nonunion of the posterior elements of the thoracolumbar vertebra suggesting spina bifida. Hyperkyphosis and rotoscoliosis of the thoracolumbar spine.  3. Multinodular thyroid with no dominant solid nodule visualized on CT exam. Signed by Dr Jaqueline Rangel on 8/20/2019 5:04 PM    Ct Abdomen Pelvis W Iv Contrast Additional Contrast? None    Result Date: 8/20/2019  EXAMINATION: CT ABDOMEN PELVIS W IV CONTRAST looking into either [place] or LTAC at [facility]. I do not think they would have the plastic surgical/debridement services necessary to manage her at [facility], but she could potentially get wound care and IV antibiotics there until she sees plastic surgery at another location.    Would verify with Dr. Emerita Salvador at [facility] regarding this plan    René Luna MD

## 2019-09-03 NOTE — CARE COORDINATION
Spoke with Ophelia from [facility] who reports pt received precert approval last week, but the pt was not stable for dc at that time. The pt is now transferred to [facility] is to review clinicals and will refax to insurance for approval to dc to the facility this week. Awaiting further response at this time.      [facility]  Ph: [phone] ([address] [place] [address])  Fa: [phone]

## 2019-09-04 LAB
ALBUMIN SERPL-MCNC: 1.6 G/DL (ref 3.5–5.2)
ALP BLD-CCNC: 190 U/L (ref 35–104)
ALT SERPL-CCNC: <5 U/L (ref 5–33)
ANION GAP SERPL CALCULATED.3IONS-SCNC: 12 MMOL/L (ref 7–19)
AST SERPL-CCNC: 12 U/L (ref 5–32)
BASOPHILS ABSOLUTE: 0 K/UL (ref 0–0.2)
BASOPHILS RELATIVE PERCENT: 0.3 % (ref 0–1)
BILIRUB SERPL-MCNC: <0.2 MG/DL (ref 0.2–1.2)
BLOOD CULTURE, ROUTINE: NORMAL
BUN BLDV-MCNC: 26 MG/DL (ref 6–20)
CALCIUM SERPL-MCNC: 7 MG/DL (ref 8.6–10)
CHLORIDE BLD-SCNC: 105 MMOL/L (ref 98–111)
CO2: 29 MMOL/L (ref 22–29)
CREAT SERPL-MCNC: 0.7 MG/DL (ref 0.5–0.9)
CULTURE, BLOOD 2: NORMAL
EOSINOPHILS ABSOLUTE: 0.2 K/UL (ref 0–0.6)
EOSINOPHILS RELATIVE PERCENT: 2.5 % (ref 0–5)
GFR NON-AFRICAN AMERICAN: >60
GLUCOSE BLD-MCNC: 135 MG/DL (ref 74–109)
HCT VFR BLD CALC: 25.1 % (ref 37–47)
HEMOGLOBIN: 7.8 G/DL (ref 12–16)
IMMATURE GRANULOCYTES #: 0.1 K/UL
LYMPHOCYTES ABSOLUTE: 1.8 K/UL (ref 1.1–4.5)
LYMPHOCYTES RELATIVE PERCENT: 26.5 % (ref 20–40)
MAGNESIUM: 2.2 MG/DL (ref 1.6–2.6)
MCH RBC QN AUTO: 25.9 PG (ref 27–31)
MCHC RBC AUTO-ENTMCNC: 31.1 G/DL (ref 33–37)
MCV RBC AUTO: 83.4 FL (ref 81–99)
MONOCYTES ABSOLUTE: 0.6 K/UL (ref 0–0.9)
MONOCYTES RELATIVE PERCENT: 9 % (ref 0–10)
NEUTROPHILS ABSOLUTE: 4.1 K/UL (ref 1.5–7.5)
NEUTROPHILS RELATIVE PERCENT: 60.2 % (ref 50–65)
PDW BLD-RTO: 20.4 % (ref 11.5–14.5)
PLATELET # BLD: 115 K/UL (ref 130–400)
PMV BLD AUTO: 9.1 FL (ref 9.4–12.3)
POTASSIUM SERPL-SCNC: 2.6 MMOL/L (ref 3.5–5)
RBC # BLD: 3.01 M/UL (ref 4.2–5.4)
SODIUM BLD-SCNC: 146 MMOL/L (ref 136–145)
TOTAL PROTEIN: 4.3 G/DL (ref 6.6–8.7)
WBC # BLD: 6.8 K/UL (ref 4.8–10.8)

## 2019-09-04 PROCEDURE — 80053 COMPREHEN METABOLIC PANEL: CPT

## 2019-09-04 PROCEDURE — 36592 COLLECT BLOOD FROM PICC: CPT | Performed by: NURSE PRACTITIONER

## 2019-09-04 PROCEDURE — 83735 ASSAY OF MAGNESIUM: CPT

## 2019-09-04 PROCEDURE — 2580000003 HC RX 258: Performed by: INTERNAL MEDICINE

## 2019-09-04 PROCEDURE — 6360000002 HC RX W HCPCS: Performed by: INTERNAL MEDICINE

## 2019-09-04 PROCEDURE — 6370000000 HC RX 637 (ALT 250 FOR IP): Performed by: INTERNAL MEDICINE

## 2019-09-04 PROCEDURE — C9113 INJ PANTOPRAZOLE SODIUM, VIA: HCPCS | Performed by: INTERNAL MEDICINE

## 2019-09-04 PROCEDURE — 1210000000 HC MED SURG R&B

## 2019-09-04 PROCEDURE — 97530 THERAPEUTIC ACTIVITIES: CPT

## 2019-09-04 PROCEDURE — 97535 SELF CARE MNGMENT TRAINING: CPT

## 2019-09-04 PROCEDURE — 6370000000 HC RX 637 (ALT 250 FOR IP): Performed by: PSYCHIATRY & NEUROLOGY

## 2019-09-04 PROCEDURE — 85025 COMPLETE CBC W/AUTO DIFF WBC: CPT

## 2019-09-04 PROCEDURE — 97110 THERAPEUTIC EXERCISES: CPT

## 2019-09-04 RX ORDER — SPIRONOLACTONE 25 MG/1
50 TABLET ORAL 2 TIMES DAILY
Status: DISCONTINUED | OUTPATIENT
Start: 2019-09-04 | End: 2019-09-06 | Stop reason: HOSPADM

## 2019-09-04 RX ORDER — POTASSIUM CHLORIDE 29.8 MG/ML
20 INJECTION INTRAVENOUS
Status: COMPLETED | OUTPATIENT
Start: 2019-09-04 | End: 2019-09-04

## 2019-09-04 RX ORDER — POTASSIUM CHLORIDE 29.8 MG/ML
20 INJECTION INTRAVENOUS ONCE
Status: DISCONTINUED | OUTPATIENT
Start: 2019-09-04 | End: 2019-09-04

## 2019-09-04 RX ORDER — POTASSIUM CHLORIDE 29.8 MG/ML
20 INJECTION INTRAVENOUS
Status: DISCONTINUED | OUTPATIENT
Start: 2019-09-04 | End: 2019-09-04

## 2019-09-04 RX ORDER — POTASSIUM CHLORIDE 7.45 MG/ML
10 INJECTION INTRAVENOUS
Status: COMPLETED | OUTPATIENT
Start: 2019-09-04 | End: 2019-09-04

## 2019-09-04 RX ADMIN — BUSPIRONE HYDROCHLORIDE 10 MG: 10 TABLET ORAL at 16:17

## 2019-09-04 RX ADMIN — Medication 10 ML: at 09:19

## 2019-09-04 RX ADMIN — POTASSIUM CHLORIDE 20 MEQ: 400 INJECTION, SOLUTION INTRAVENOUS at 20:29

## 2019-09-04 RX ADMIN — POTASSIUM BICARBONATE 40 MEQ: 782 TABLET, EFFERVESCENT ORAL at 16:17

## 2019-09-04 RX ADMIN — PANTOPRAZOLE SODIUM 40 MG: 40 INJECTION, POWDER, FOR SOLUTION INTRAVENOUS at 09:19

## 2019-09-04 RX ADMIN — Medication 10 ML: at 20:35

## 2019-09-04 RX ADMIN — POTASSIUM BICARBONATE 40 MEQ: 782 TABLET, EFFERVESCENT ORAL at 09:18

## 2019-09-04 RX ADMIN — SPIRONOLACTONE 50 MG: 25 TABLET ORAL at 09:18

## 2019-09-04 RX ADMIN — COLLAGENASE SANTYL: 250 OINTMENT TOPICAL at 09:18

## 2019-09-04 RX ADMIN — BACLOFEN 5 MG: 10 TABLET ORAL at 16:17

## 2019-09-04 RX ADMIN — POTASSIUM CHLORIDE 20 MEQ: 400 INJECTION, SOLUTION INTRAVENOUS at 22:39

## 2019-09-04 RX ADMIN — SILVER SULFADIAZINE: 10 CREAM TOPICAL at 09:18

## 2019-09-04 RX ADMIN — ALPRAZOLAM 0.5 MG: 0.5 TABLET ORAL at 05:46

## 2019-09-04 RX ADMIN — DAKIN'S SOLUTION 0.125% (QUARTER STRENGTH): 0.12 SOLUTION at 09:18

## 2019-09-04 RX ADMIN — POTASSIUM CHLORIDE 10 MEQ: 7.46 INJECTION, SOLUTION INTRAVENOUS at 03:54

## 2019-09-04 RX ADMIN — POTASSIUM CHLORIDE 20 MEQ: 400 INJECTION, SOLUTION INTRAVENOUS at 23:16

## 2019-09-04 RX ADMIN — MULTIPLE VITAMINS W/ MINERALS TAB 1 TABLET: TAB at 09:18

## 2019-09-04 RX ADMIN — BUSPIRONE HYDROCHLORIDE 10 MG: 10 TABLET ORAL at 09:18

## 2019-09-04 RX ADMIN — MEROPENEM 1 G: 1 INJECTION, POWDER, FOR SOLUTION INTRAVENOUS at 05:46

## 2019-09-04 RX ADMIN — BUSPIRONE HYDROCHLORIDE 10 MG: 10 TABLET ORAL at 20:28

## 2019-09-04 RX ADMIN — POTASSIUM CHLORIDE 10 MEQ: 7.46 INJECTION, SOLUTION INTRAVENOUS at 05:47

## 2019-09-04 RX ADMIN — BACLOFEN 5 MG: 10 TABLET ORAL at 20:28

## 2019-09-04 RX ADMIN — POTASSIUM CHLORIDE 20 MEQ: 400 INJECTION, SOLUTION INTRAVENOUS at 21:01

## 2019-09-04 RX ADMIN — MEROPENEM 1 G: 1 INJECTION, POWDER, FOR SOLUTION INTRAVENOUS at 18:54

## 2019-09-04 RX ADMIN — SPIRONOLACTONE 50 MG: 25 TABLET ORAL at 17:12

## 2019-09-04 RX ADMIN — DAKIN'S SOLUTION 0.125% (QUARTER STRENGTH) 473 ML: 0.12 SOLUTION at 20:36

## 2019-09-04 RX ADMIN — COLLAGENASE SANTYL: 250 OINTMENT TOPICAL at 20:36

## 2019-09-04 RX ADMIN — PANTOPRAZOLE SODIUM 40 MG: 40 INJECTION, POWDER, FOR SOLUTION INTRAVENOUS at 20:28

## 2019-09-04 ASSESSMENT — ENCOUNTER SYMPTOMS
CONSTIPATION: 0
DIARRHEA: 0
VOMITING: 0
BACK PAIN: 0
COUGH: 0
NAUSEA: 0
SHORTNESS OF BREATH: 0

## 2019-09-04 ASSESSMENT — PAIN DESCRIPTION - LOCATION: LOCATION: ABDOMEN

## 2019-09-04 ASSESSMENT — PAIN SCALES - GENERAL
PAINLEVEL_OUTOF10: 0
PAINLEVEL_OUTOF10: 3

## 2019-09-04 NOTE — CARE COORDINATION
Confirmed with Dr Kelsea Bradshaw the pt is to transfer to floor today and is ready to dc when precer tis approved. SW requested Ophelia with LTAC request precert and Ophelia confirmed.      Spoke with pt to inform and will keep posted on insurance precert approval.     [facility]  Ph: [phone] (Javad Wilson)  Fa: [phone]

## 2019-09-04 NOTE — PROGRESS NOTES
General: awake/alert   Chest:  clear to auscultation bilaterally without respiratory distress  CVS: +/- reg S1S2  Abdominal: soft, nontender, normal bowel sounds  Extremities: Bilateral lower extremity 2+ leg edema  Skin: skin erythema over left leg      Labs:  BMP:   Recent Labs     09/02/19  0120 09/03/19 0222 09/03/19  1210 09/04/19 0218     --  144  --  146*   K 3.3*  3.3*   < > 2.7* 2.9* 2.6*   *  --  106  --  105   CO2 22  --  25  --  29   BUN 27*  --  24*  --  26*   CREATININE 1.0*  --  0.8  --  0.7   CALCIUM 7.2*  --  7.3*  --  7.0*    < > = values in this interval not displayed. CBC:   Recent Labs     09/02/19 0120 09/03/19 0222 09/04/19 0218   WBC 6.7 7.3 6.8   HGB 7.2* 8.5* 7.8*   HCT 23.9* 27.6* 25.1*   MCV 85.4 83.6 83.4    162 115*     LIVER PROFILE:   Recent Labs     09/02/19 0120 09/03/19 0222 09/04/19 0218   AST 13 17 12   ALT <5* 6 <5*   BILITOT <0.2 0.3 <0.2   ALKPHOS 158* 195* 190*     PT/INR: No results for input(s): PROTIME, INR in the last 72 hours. APTT: No results for input(s): APTT in the last 72 hours. BNP:  No results for input(s): BNP in the last 72 hours. Ionized Calcium:No results for input(s): IONCA in the last 72 hours. Magnesium:  Recent Labs     09/02/19 0120 09/03/19 0222 09/04/19 0218   MG 1.5* 1.2* 2.2     Phosphorus:  No results for input(s): PHOS in the last 72 hours. HgbA1C: No results for input(s): LABA1C in the last 72 hours. Hepatic:   Recent Labs     09/02/19  0120 09/03/19  0222 09/04/19  0218   ALKPHOS 158* 195* 190*   ALT <5* 6 <5*   AST 13 17 12   PROT 4.1* 4.7* 4.3*   BILITOT <0.2 0.3 <0.2   LABALBU 1.5* 1.8* 1.6*     Lactic Acid:   No results for input(s): LACTA in the last 72 hours. Troponin: No results for input(s): CKTOTAL, CKMB, TROPONINT in the last 72 hours. ABGs: No results for input(s): PH, PCO2, PO2, HCO3, O2SAT in the last 72 hours. CRP:    No results for input(s): CRP in the last 72 hours.   Sed Rate:    No are within normal limits. There is severe atrophy of the right kidney. Left renal cortex is a lobular contour as before. There is a small cyst in the medial cortex of the left kidney appears benign, measuring approximately 2 cm. This is stable. There is mild fullness of the collecting structures of the left kidney, as well as the left renal pelvis, however the left ureter is decompressed. Previous bladder resection is noted, an ileostomy is present in the right anterior abdominal wall. Bowel loops are normal in caliber, moderate amount stool is present without evidence of bowel obstruction. There are large decubitus ulcers over the sacral and gluteal regions, with destructive changes of the posterior ischia as before. This also involves the left femoral head with destruction and gas is noted within the left hip joint as before. There are destructive changes of the pubic symphysis compatible with chronic osteomyelitis. No drainable abscess is identified. Enhancing venous collaterals are noted within the abdominal wall, there is persistent thrombus in the left external iliac vein. This appears slightly improved. Sagittal images demonstrate severe hyperlordosis of the thoracolumbar spine. This is stable. 1. Essentially stable appearance of the abdomen and pelvis compared with the study from 7/29/2019. There is no evidence of bowel obstruction or findings to explain the patient's persistent vomiting. Based on the volume of stool within the colon, constipation is likely. Signed by Dr Ken Carter.  Jameel on 8/20/2019 5:08 PM       Assessment   -Acute kidney injury-resolved  -Metabolic acidosis-suspect combination of renal tubular acidosis and bicarbonate loss from ongoing GI issues including history of diarrhea and surgical anatomy with acute worsening of the compensatory respiratory mechanisms from respiratory suppression of narcotics  -Multiple pressure wounds  -Chronic osteomyelitis  -Status post diverting

## 2019-09-04 NOTE — PROGRESS NOTES
exam.  URINARY BLADDER. Nonvisualized. Correlate with surgical history.     Impression:       1. Right kidney are not visualized, atrophic on prior CT. 2. Left renal cortical scarring. No hydronephrosis. Signed by Dr Lauren Ambrose on 8/31/2019 4:36 PM           RADIOLOGY        Ct Head Wo Contrast    Result Date: 8/20/2019  EXAMINATION: CT HEAD WO CONTRAST 8/20/2019 4:56 PM HISTORY: Acute encephalopathy, twitching. DOSE: 726 mGycm. Automatic exposure control was utilized in an effort to use as little radiation as possible, without compromising image quality. REPORT: Spiral CT of the head was performed without intravenous contrast. Reconstructed coronal and sagittal images were also obtained. There are no comparison studies. There is mild patient motion artifact which degrades the examination. No evidence of intracranial hemorrhage, mass or mass-effect is identified. The ventricles and basal cisterns appear within normal limits. Bone windows are unremarkable. There is normal aeration of the visualized paranasal sinuses and mastoid air cells. Impression: No acute intracranial abnormality. Signed by Dr Sally Garcia. [place] on 8/20/2019 4:57 PM    Ct Chest W Contrast    Result Date: 8/20/2019  CT chest 8/20/2019 1:00 PM HISTORY: Cough, vomiting TECHNIQUE: Axial images of the chest were obtained following IV contrast. . Coronal and sagittal reformatted images are reconstructed and reviewed. COMPARISON: None. DLP: 1508 mGy cm Automated exposure control was utilized to minimize patient radiation dose. FINDINGS: Multinodular thyroid gland is noted with no dominant thyroid nodule localized. There is no pathologic intrathoracic or axillary lymphadenopathy. Heart is upper limits of normal in size. There are no pericardial or pleural effusions. There is mild thoracic aortic calcification but no aneurysm or dissection. There is no pericardial or pleural effusions.  Limited images the upper abdomen demonstrate no adrenal

## 2019-09-04 NOTE — PROGRESS NOTES
osteomyelitis surgery and MRSA Dr. Anuradha Cornell removed part of bone    OTHER SURGICAL HISTORY Left     Skin Flap to L.  Buttock years ago more than 20 years ago   [employer] SMALL INTESTINE SURGERY N/A 2019    DIVERTING LOOP COLOSTOMY performed by Shira Reinoso MD at [address] History  Family History   Problem Relation Age of Onset    Other Mother          of sepsis    Cancer Father         Lung Cancer    Diabetes Paternal Grandmother     Heart Attack Paternal Grandfather        Social History  Social History     Socioeconomic History    Marital status:      Spouse name: Not on file    Number of children: Not on file    Years of education: Not on file    Highest education level: Not on file   Occupational History    Not on file   Social Needs    Financial resource strain: Not on file    Food insecurity:     Worry: Not on file     Inability: Not on file    Transportation needs:     Medical: Not on file     Non-medical: Not on file   Tobacco Use    Smoking status: Never Smoker    Smokeless tobacco: Never Used   Substance and Sexual Activity    Alcohol use: Not Currently    Drug use: Never    Sexual activity: Not on file   Lifestyle    Physical activity:     Days per week: Not on file     Minutes per session: Not on file    Stress: Not on file   Relationships    Social connections:     Talks on phone: Not on file     Gets together: Not on file     Attends Protestant service: Not on file     Active member of club or organization: Not on file     Attends meetings of clubs or organizations: Not on file     Relationship status: Not on file    Intimate partner violence:     Fear of current or ex partner: Not on file     Emotionally abused: Not on file     Physically abused: Not on file     Forced sexual activity: Not on file   Other Topics Concern    Not on file   Social History Narrative    Not on file         Review of Systems:    Review of Systems   Constitutional: Negative for which degrades the examination. No evidence of intracranial hemorrhage, mass or mass-effect is identified. The ventricles and basal cisterns appear within normal limits. Bone windows are unremarkable. There is normal aeration of the visualized paranasal sinuses and mastoid air cells. Impression: No acute intracranial abnormality. Signed by Dr Dilcia Jorgensen on 8/20/2019 4:57 PM    Ct Chest W Contrast    Result Date: 8/20/2019  CT chest 8/20/2019 1:00 PM HISTORY: Cough, vomiting TECHNIQUE: Axial images of the chest were obtained following IV contrast. . Coronal and sagittal reformatted images are reconstructed and reviewed. COMPARISON: None. DLP: 1508 mGy cm Automated exposure control was utilized to minimize patient radiation dose. FINDINGS: Multinodular thyroid gland is noted with no dominant thyroid nodule localized. There is no pathologic intrathoracic or axillary lymphadenopathy. Heart is upper limits of normal in size. There are no pericardial or pleural effusions. There is mild thoracic aortic calcification but no aneurysm or dissection. There is no pericardial or pleural effusions. Limited images the upper abdomen demonstrate no adrenal nodules. Please refer to CT abdomen pelvis report for potential findings below the hemidiaphragms. There is no pulmonary consolidation. There are no suspicious pulmonary nodules. There is no pneumothorax. There are no endobronchial lesions. Laminectomy changes versus spina bifida of the thoracolumbar spine. Developmental buckling are old anterior rib injuries are considered. There is a rotoscoliosis as well as hyperkyphosis of the thoracolumbar spine. 1. No acute intrathoracic abnormality. No parenchymal consolidation or suspicious nodularity. 2. Nonunion of the posterior elements of the thoracolumbar vertebra suggesting spina bifida. Hyperkyphosis and rotoscoliosis of the thoracolumbar spine.  3. Multinodular thyroid with no dominant solid nodule visualized on CT

## 2019-09-04 NOTE — PROGRESS NOTES
Physical Therapy  Name: Ruben Lopes  MRN:  [id number]  Date of service:  9/4/2019 09/04/19 1434   Restrictions/Precautions   Restrictions/Precautions Contact Precautions; Fall Risk   Position Activity Restriction   Other position/activity restrictions MRSA; pt has multiple stage IV pressure ulcers at DENISSE ischial tuberosities and sacrum requiring full skin clearance with mobility; T10 SCI/paraplegia   Subjective   Subjective Patient agreeable to work with therapy   Pain Screening   Patient Currently in Pain Yes   Pain Assessment   Pain Assessment 0-10   Pain Level 3   Pain Location Abdomen   Non-Pharmaceutical Pain Intervention(s) Repositioned  (modified activity)   Response to Pain Intervention Patient Satisfied   Transfers   Comment pt. does not stand   Ambulation   Ambulation? No   Balance   Comments Worked on long sitting in the bed. Patient able to maintain balance, at first by holding onto the bedrails, but also able to let go of the bedrails and use UE's without losing balance. Performed reaching activities and arm stretches without LOB. Patient doing better with this. Exercises   Comments PROM bilateral LE's, patient very tight, limited range   Patient Goals    Patient goals  go get therapy, eventually get back home   Short term goals   Time Frame for Short term goals 2 wks   Short term goal 1 supine to sit Min A (if cleared by skin care nurse), ongoing   Short term goal 2 rolling to R Min A, ongoing   Short term goal 3 rolling to L Min A, ongoing   Short term goal 4 tolerate sitting EOB x 10 mins CGA, ongoing   Conditions Requiring Skilled Therapeutic Intervention   Body structures, Functions, Activity limitations Decreased functional mobility ; Decreased ROM; Decreased balance;Decreased cognition;Decreased safe awareness;Decreased sensation;Decreased endurance;Decreased strength; Increased Pain   Assessment Assisted patient back into semi fowlers, worked on positioning to achieve maximum comfort. Patient likes for bilateral heels to be floated.    Treatment Diagnosis impaired mobility   Prognosis Guarded       Electronically signed by Conrado Bocanegra PTA on 9/4/2019 at 5:33 PM

## 2019-09-05 LAB
ALBUMIN SERPL-MCNC: 1.7 G/DL (ref 3.5–5.2)
ALP BLD-CCNC: 189 U/L (ref 35–104)
ALT SERPL-CCNC: <5 U/L (ref 5–33)
ANION GAP SERPL CALCULATED.3IONS-SCNC: 7 MMOL/L (ref 7–19)
AST SERPL-CCNC: 13 U/L (ref 5–32)
BASOPHILS ABSOLUTE: 0 K/UL (ref 0–0.2)
BASOPHILS RELATIVE PERCENT: 0.4 % (ref 0–1)
BILIRUB SERPL-MCNC: 0.3 MG/DL (ref 0.2–1.2)
BUN BLDV-MCNC: 30 MG/DL (ref 6–20)
CALCIUM SERPL-MCNC: 7.7 MG/DL (ref 8.6–10)
CHLORIDE BLD-SCNC: 103 MMOL/L (ref 98–111)
CO2: 33 MMOL/L (ref 22–29)
CREAT SERPL-MCNC: 0.6 MG/DL (ref 0.5–0.9)
EOSINOPHILS ABSOLUTE: 0.2 K/UL (ref 0–0.6)
EOSINOPHILS RELATIVE PERCENT: 2.6 % (ref 0–5)
GFR NON-AFRICAN AMERICAN: >60
GLUCOSE BLD-MCNC: 121 MG/DL (ref 74–109)
HCT VFR BLD CALC: 26.2 % (ref 37–47)
HEMOGLOBIN: 7.8 G/DL (ref 12–16)
IMMATURE GRANULOCYTES #: 0.1 K/UL
LYMPHOCYTES ABSOLUTE: 2.3 K/UL (ref 1.1–4.5)
LYMPHOCYTES RELATIVE PERCENT: 24.8 % (ref 20–40)
MCH RBC QN AUTO: 25 PG (ref 27–31)
MCHC RBC AUTO-ENTMCNC: 29.8 G/DL (ref 33–37)
MCV RBC AUTO: 84 FL (ref 81–99)
MONOCYTES ABSOLUTE: 0.7 K/UL (ref 0–0.9)
MONOCYTES RELATIVE PERCENT: 7.6 % (ref 0–10)
NEUTROPHILS ABSOLUTE: 5.7 K/UL (ref 1.5–7.5)
NEUTROPHILS RELATIVE PERCENT: 63.1 % (ref 50–65)
PDW BLD-RTO: 20.3 % (ref 11.5–14.5)
PLATELET # BLD: 125 K/UL (ref 130–400)
PMV BLD AUTO: 10 FL (ref 9.4–12.3)
POTASSIUM SERPL-SCNC: 4 MMOL/L (ref 3.5–5)
RBC # BLD: 3.12 M/UL (ref 4.2–5.4)
SODIUM BLD-SCNC: 143 MMOL/L (ref 136–145)
TOTAL PROTEIN: 4.7 G/DL (ref 6.6–8.7)
WBC # BLD: 9.1 K/UL (ref 4.8–10.8)

## 2019-09-05 PROCEDURE — 6370000000 HC RX 637 (ALT 250 FOR IP): Performed by: INTERNAL MEDICINE

## 2019-09-05 PROCEDURE — 6360000002 HC RX W HCPCS: Performed by: INTERNAL MEDICINE

## 2019-09-05 PROCEDURE — 2580000003 HC RX 258: Performed by: INTERNAL MEDICINE

## 2019-09-05 PROCEDURE — C9113 INJ PANTOPRAZOLE SODIUM, VIA: HCPCS | Performed by: INTERNAL MEDICINE

## 2019-09-05 PROCEDURE — 1210000000 HC MED SURG R&B

## 2019-09-05 PROCEDURE — 36592 COLLECT BLOOD FROM PICC: CPT

## 2019-09-05 PROCEDURE — 85025 COMPLETE CBC W/AUTO DIFF WBC: CPT

## 2019-09-05 PROCEDURE — 80053 COMPREHEN METABOLIC PANEL: CPT

## 2019-09-05 RX ADMIN — SPIRONOLACTONE 50 MG: 25 TABLET ORAL at 08:53

## 2019-09-05 RX ADMIN — Medication 10 ML: at 08:53

## 2019-09-05 RX ADMIN — ONDANSETRON 4 MG: 2 INJECTION INTRAMUSCULAR; INTRAVENOUS at 12:49

## 2019-09-05 RX ADMIN — BUSPIRONE HYDROCHLORIDE 10 MG: 10 TABLET ORAL at 08:53

## 2019-09-05 RX ADMIN — PANTOPRAZOLE SODIUM 40 MG: 40 INJECTION, POWDER, FOR SOLUTION INTRAVENOUS at 21:53

## 2019-09-05 RX ADMIN — Medication 10 ML: at 21:53

## 2019-09-05 RX ADMIN — MEROPENEM 1 G: 1 INJECTION, POWDER, FOR SOLUTION INTRAVENOUS at 03:47

## 2019-09-05 RX ADMIN — Medication 10 ML: at 08:57

## 2019-09-05 RX ADMIN — PANTOPRAZOLE SODIUM 40 MG: 40 INJECTION, POWDER, FOR SOLUTION INTRAVENOUS at 08:53

## 2019-09-05 RX ADMIN — BACLOFEN 5 MG: 10 TABLET ORAL at 18:00

## 2019-09-05 RX ADMIN — ONDANSETRON 4 MG: 2 INJECTION INTRAMUSCULAR; INTRAVENOUS at 08:56

## 2019-09-05 RX ADMIN — BUSPIRONE HYDROCHLORIDE 10 MG: 10 TABLET ORAL at 21:48

## 2019-09-05 RX ADMIN — DAKIN'S SOLUTION 0.125% (QUARTER STRENGTH): 0.12 SOLUTION at 13:24

## 2019-09-05 RX ADMIN — COLLAGENASE SANTYL: 250 OINTMENT TOPICAL at 13:26

## 2019-09-05 RX ADMIN — SILVER SULFADIAZINE: 10 CREAM TOPICAL at 13:26

## 2019-09-05 RX ADMIN — SPIRONOLACTONE 50 MG: 25 TABLET ORAL at 17:59

## 2019-09-05 RX ADMIN — DAKIN'S SOLUTION 0.125% (QUARTER STRENGTH) 473 ML: 0.12 SOLUTION at 21:49

## 2019-09-05 RX ADMIN — COLLAGENASE SANTYL: 250 OINTMENT TOPICAL at 21:49

## 2019-09-05 RX ADMIN — MULTIPLE VITAMINS W/ MINERALS TAB 1 TABLET: TAB at 08:53

## 2019-09-05 RX ADMIN — MEROPENEM 1 G: 1 INJECTION, POWDER, FOR SOLUTION INTRAVENOUS at 17:59

## 2019-09-05 RX ADMIN — BACLOFEN 5 MG: 10 TABLET ORAL at 21:48

## 2019-09-05 RX ADMIN — MEROPENEM 1 G: 1 INJECTION, POWDER, FOR SOLUTION INTRAVENOUS at 13:24

## 2019-09-05 NOTE — PROGRESS NOTES
Nephrology ([facility]) Progress Note      Patient:  Shefali Solorzano  YOB: 1960  Date of Service: 9/5/2019  MRN: [id number]   Acct: [de-identified]   Primary Care Physician: Yanni Das  Advance Directive: Full Code  Admit Date: 8/20/2019       Hospital Day: 16  Referring Provider: [facility], DO    Patient independently seen and examined, Chart, Consults, Notes, Operative notes, Labs, Cardiology, and Radiology studies reviewed as able. Subjective:  Shefali Solorzano is a 64 y.o. female  whom we were consulted for metabolic acidosis. Chart review shows the patient has never had a normal CO2 level on her serum chemistries. Only blood gases are during the last 24 hours prior to consult which show a mild acidemia. We have 2 urinalyses in the system both of which have a inappropriately elevated urine pH. She is also noted to have an acute kidney injury. There is history of chronic paraplegia with sacral wounds being followed by general surgery. Patient had morphine with an episode of decreased responsiveness was taken to the ICU for further evaluation. At this point she complains of weakness, fatigue, malaise. She notes no prior renal issues. She denies chest pain, shortness of air. She recalls no NSAID usage. Ppatient was transferred to the medical floor. She has an NGTube and is getting feeding. She was nauseated today.      Allergies:  Morphine and related    Medicines:  Current Facility-Administered Medications   Medication Dose Route Frequency Provider Last Rate Last Dose    spironolactone (ALDACTONE) tablet 50 mg  50 mg Per NG tube BID Cabrera Curia, DO   50 mg at 09/05/19 2136    busPIRone (BUSPAR) tablet 10 mg  10 mg Oral TID Cabrera Curia, DO   10 mg at 09/05/19 9659    ALPRAZolam Donato Hail) tablet 0.5 mg  0.5 mg Oral TID PRN Cabrera Curia, DO   0.5 mg at 09/04/19 0546    vitamin D (ERGOCALCIFEROL) capsule 50,000 Units  50,000 Units Oral partner: Not on file     Emotionally abused: Not on file     Physically abused: Not on file     Forced sexual activity: Not on file   Other Topics Concern    Not on file   Social History Narrative    Not on file         Review of Systems:  History obtained from chart review and the patient  General ROS: No fever or chills  Respiratory ROS: No cough,+ shortness of breath,- wheezing  Cardiovascular ROS: No chest pain or palpitations  Gastrointestinal ROS: No abdominal pain or melena  Genito-Urinary ROS: No dysuria or hematuria  Musculoskeletal ROS: No joint pain but swelling       Objective:  Patient Vitals for the past 24 hrs:   BP Temp Temp src Pulse Resp SpO2   09/05/19 0650 102/65 97.3 °F (36.3 °C) Temporal 96 14 95 %   09/04/19 1834 106/68 97.3 °F (36.3 °C) Temporal 88 18 97 %   09/04/19 1350 122/77 97.3 °F (36.3 °C) Temporal 98 18 97 %       Intake/Output Summary (Last 24 hours) at 9/5/2019 1005  Last data filed at 9/5/2019 0528  Gross per 24 hour   Intake 1030 ml   Output 2150 ml   Net -1120 ml     General: awake/alert   Chest:  clear to auscultation bilaterally without respiratory distress  CVS: +/- reg S1S2  Abdominal: soft, nontender, normal bowel sounds  Extremities: Bilateral lower extremity 2+ leg edema  Skin: skin erythema over left leg      Labs:  BMP:   Recent Labs     09/03/19 0222 09/03/19  1210 09/04/19 0218 09/05/19  0445     --  146* 143   K 2.7* 2.9* 2.6* 4.0     --  105 103   CO2 25  --  29 33*   BUN 24*  --  26* 30*   CREATININE 0.8  --  0.7 0.6   CALCIUM 7.3*  --  7.0* 7.7*     CBC:   Recent Labs     09/03/19 0222 09/04/19 0218 09/05/19  0445   WBC 7.3 6.8 9.1   HGB 8.5* 7.8* 7.8*   HCT 27.6* 25.1* 26.2*   MCV 83.6 83.4 84.0    115* 125*     LIVER PROFILE:   Recent Labs     09/03/19 0222 09/04/19 0218 09/05/19  0445   AST 17 12 13   ALT 6 <5* <5*   BILITOT 0.3 <0.2 0.3   ALKPHOS 195* 190* 189*     PT/INR: No results for input(s): PROTIME, INR in the last 72

## 2019-09-05 NOTE — PROGRESS NOTES
Irrigation BID Mozella , DO        sodium chloride flush 0.9 % injection 10 mL  10 mL Intravenous PRN Mozella , DO        sodium chloride flush 0.9 % injection 10 mL  10 mL Intravenous 2 times per day Mozella , DO   10 mL at 09/05/19 1269    collagenase ointment   Topical BID Mozella , DO        therapeutic multivitamin-minerals 1 tablet  1 tablet Oral Daily Mozella , DO   1 tablet at 09/05/19 7044    silver sulfADIAZINE (SILVADENE) 1 % cream   Topical Daily Mozella , DO        sodium chloride flush 0.9 % injection 10 mL  10 mL Intravenous PRN Mozella , DO        acetaminophen (TYLENOL) tablet 650 mg  650 mg Oral Q4H PRN Mozella , DO   650 mg at 09/03/19 5552        Labs:     Recent Labs     09/03/19 0222 09/04/19 0218 09/05/19  0445   WBC 7.3 6.8 9.1   RBC 3.30* 3.01* 3.12*   HGB 8.5* 7.8* 7.8*   HCT 27.6* 25.1* 26.2*   MCV 83.6 83.4 84.0   MCH 25.8* 25.9* 25.0*   MCHC 30.8* 31.1* 29.8*    115* 125*     Recent Labs     09/03/19 0222 09/03/19  1210 09/04/19 0218 09/05/19  0445     --  146* 143   K 2.7* 2.9* 2.6* 4.0   ANIONGAP 13  --  12 7     --  105 103   CO2 25  --  29 33*   BUN 24*  --  26* 30*   CREATININE 0.8  --  0.7 0.6   GLUCOSE 116*  --  135* 121*   CALCIUM 7.3*  --  7.0* 7.7*     Recent Labs     09/03/19 0222 09/04/19  0218   MG 1.2* 2.2     Recent Labs     09/03/19 0222 09/04/19  0218 09/05/19  0445   AST 17 12 13   ALT 6 <5* <5*   BILITOT 0.3 <0.2 0.3   ALKPHOS 195* 190* 189*     ABGs:No results for input(s): PH, PO2, PCO2, HCO3, BE, O2SAT in the last 72 hours. Troponin T: No results for input(s): TROPONINI in the last 72 hours. INR: No results for input(s): INR in the last 72 hours. Lactic Acid: No results for input(s): LACTA in the last 72 hours.     Objective:   Vitals: /65   Pulse 96   Temp 97.3 °F (36.3 °C) (Temporal)   Resp 14   Ht 5' 8\" (1.727 m)   Wt 183 lb 1.6 oz

## 2019-09-06 ENCOUNTER — HOSPITAL ENCOUNTER (OUTPATIENT)
Facility: HOSPITAL | Age: 59
Discharge: HOME HEALTH | End: 2019-10-24
Attending: INTERNAL MEDICINE | Admitting: INTERNAL MEDICINE

## 2019-09-06 ENCOUNTER — APPOINTMENT (OUTPATIENT)
Dept: GENERAL RADIOLOGY | Facility: HOSPITAL | Age: 59
End: 2019-09-06

## 2019-09-06 VITALS
RESPIRATION RATE: 18 BRPM | SYSTOLIC BLOOD PRESSURE: 114 MMHG | HEART RATE: 78 BPM | HEIGHT: 68 IN | WEIGHT: 183.5 LBS | TEMPERATURE: 98.4 F | OXYGEN SATURATION: 93 % | DIASTOLIC BLOOD PRESSURE: 60 MMHG | BODY MASS INDEX: 27.81 KG/M2

## 2019-09-06 DIAGNOSIS — Z74.09 IMPAIRED MOBILITY AND ADLS: ICD-10-CM

## 2019-09-06 DIAGNOSIS — Z78.9 IMPAIRED MOBILITY AND ADLS: ICD-10-CM

## 2019-09-06 PROBLEM — N17.9 ACUTE KIDNEY INJURY (HCC): Status: RESOLVED | Noted: 2019-08-20 | Resolved: 2019-09-06

## 2019-09-06 PROBLEM — E87.1 HYPONATREMIA: Status: RESOLVED | Noted: 2019-08-20 | Resolved: 2019-09-06

## 2019-09-06 LAB
ALBUMIN SERPL-MCNC: 1.6 G/DL (ref 3.5–5.2)
ALP BLD-CCNC: 156 U/L (ref 35–104)
ALT SERPL-CCNC: <5 U/L (ref 5–33)
ANION GAP SERPL CALCULATED.3IONS-SCNC: 6 MMOL/L (ref 7–19)
AST SERPL-CCNC: 10 U/L (ref 5–32)
BASOPHILS ABSOLUTE: 0.1 K/UL (ref 0–0.2)
BASOPHILS RELATIVE PERCENT: 0.8 % (ref 0–1)
BILIRUB SERPL-MCNC: <0.2 MG/DL (ref 0.2–1.2)
BUN BLDV-MCNC: 33 MG/DL (ref 6–20)
CALCIUM SERPL-MCNC: 7.9 MG/DL (ref 8.6–10)
CHLORIDE BLD-SCNC: 105 MMOL/L (ref 98–111)
CO2: 34 MMOL/L (ref 22–29)
CREAT SERPL-MCNC: 0.6 MG/DL (ref 0.5–0.9)
EOSINOPHILS ABSOLUTE: 0.3 K/UL (ref 0–0.6)
EOSINOPHILS RELATIVE PERCENT: 3.3 % (ref 0–5)
GFR NON-AFRICAN AMERICAN: >60
GLUCOSE BLD-MCNC: 122 MG/DL (ref 74–109)
HCT VFR BLD CALC: 23.2 % (ref 37–47)
HEMOGLOBIN: 6.8 G/DL (ref 12–16)
IMMATURE GRANULOCYTES #: 0.1 K/UL
LYMPHOCYTES ABSOLUTE: 2.1 K/UL (ref 1.1–4.5)
LYMPHOCYTES RELATIVE PERCENT: 27.7 % (ref 20–40)
MCH RBC QN AUTO: 25.4 PG (ref 27–31)
MCHC RBC AUTO-ENTMCNC: 29.3 G/DL (ref 33–37)
MCV RBC AUTO: 86.6 FL (ref 81–99)
MONOCYTES ABSOLUTE: 0.7 K/UL (ref 0–0.9)
MONOCYTES RELATIVE PERCENT: 8.8 % (ref 0–10)
NEUTROPHILS ABSOLUTE: 4.4 K/UL (ref 1.5–7.5)
NEUTROPHILS RELATIVE PERCENT: 58 % (ref 50–65)
PDW BLD-RTO: 20.3 % (ref 11.5–14.5)
PLATELET # BLD: 109 K/UL (ref 130–400)
PMV BLD AUTO: 11 FL (ref 9.4–12.3)
POTASSIUM SERPL-SCNC: 3.8 MMOL/L (ref 3.5–5)
RBC # BLD: 2.68 M/UL (ref 4.2–5.4)
SODIUM BLD-SCNC: 145 MMOL/L (ref 136–145)
TOTAL PROTEIN: 4.5 G/DL (ref 6.6–8.7)
WBC # BLD: 7.6 K/UL (ref 4.8–10.8)

## 2019-09-06 PROCEDURE — C9113 INJ PANTOPRAZOLE SODIUM, VIA: HCPCS | Performed by: INTERNAL MEDICINE

## 2019-09-06 PROCEDURE — 2580000003 HC RX 258: Performed by: INTERNAL MEDICINE

## 2019-09-06 PROCEDURE — 74018 RADEX ABDOMEN 1 VIEW: CPT

## 2019-09-06 PROCEDURE — 6370000000 HC RX 637 (ALT 250 FOR IP): Performed by: INTERNAL MEDICINE

## 2019-09-06 PROCEDURE — 6360000002 HC RX W HCPCS: Performed by: INTERNAL MEDICINE

## 2019-09-06 PROCEDURE — 85025 COMPLETE CBC W/AUTO DIFF WBC: CPT

## 2019-09-06 PROCEDURE — 25010000002 MEROPENEM: Performed by: INTERNAL MEDICINE

## 2019-09-06 PROCEDURE — 82962 GLUCOSE BLOOD TEST: CPT

## 2019-09-06 PROCEDURE — 80053 COMPREHEN METABOLIC PANEL: CPT

## 2019-09-06 RX ORDER — SPIRONOLACTONE 50 MG/1
50 TABLET, FILM COATED ORAL
Status: DISCONTINUED | OUTPATIENT
Start: 2019-09-06 | End: 2019-10-10

## 2019-09-06 RX ORDER — FOLIC ACID 1 MG/1
1 TABLET ORAL DAILY
Status: DISCONTINUED | OUTPATIENT
Start: 2019-09-07 | End: 2019-10-24 | Stop reason: HOSPADM

## 2019-09-06 RX ORDER — SACCHAROMYCES BOULARDII 250 MG
250 CAPSULE ORAL 2 TIMES DAILY
Status: DISCONTINUED | OUTPATIENT
Start: 2019-09-06 | End: 2019-10-24 | Stop reason: HOSPADM

## 2019-09-06 RX ORDER — MULTIVIT,CALC,MINS/IRON/FOLIC 9MG-400MCG
1 TABLET ORAL DAILY
Status: DISCONTINUED | OUTPATIENT
Start: 2019-09-07 | End: 2019-10-24 | Stop reason: HOSPADM

## 2019-09-06 RX ORDER — ERGOCALCIFEROL 1.25 MG/1
50000 CAPSULE ORAL
Status: DISCONTINUED | OUTPATIENT
Start: 2019-09-08 | End: 2019-10-24 | Stop reason: HOSPADM

## 2019-09-06 RX ORDER — BUSPIRONE HYDROCHLORIDE 10 MG/1
10 TABLET ORAL
Status: DISCONTINUED | OUTPATIENT
Start: 2019-09-06 | End: 2019-10-24 | Stop reason: HOSPADM

## 2019-09-06 RX ORDER — ONDANSETRON 4 MG/1
4 TABLET, FILM COATED ORAL EVERY 6 HOURS PRN
Status: DISCONTINUED | OUTPATIENT
Start: 2019-09-06 | End: 2019-10-24 | Stop reason: HOSPADM

## 2019-09-06 RX ORDER — SODIUM CHLORIDE 0.9 % (FLUSH) 0.9 %
20 SYRINGE (ML) INJECTION AS NEEDED
Status: DISCONTINUED | OUTPATIENT
Start: 2019-09-06 | End: 2019-10-24 | Stop reason: HOSPADM

## 2019-09-06 RX ORDER — SPIRONOLACTONE 50 MG/1
50 TABLET, FILM COATED ORAL 2 TIMES DAILY
Qty: 30 TABLET | Refills: 0 | OUTPATIENT
Start: 2019-09-06 | End: 2020-11-24

## 2019-09-06 RX ORDER — ASCORBIC ACID 500 MG
1000 TABLET ORAL DAILY
Status: DISCONTINUED | OUTPATIENT
Start: 2019-09-07 | End: 2019-10-24 | Stop reason: HOSPADM

## 2019-09-06 RX ORDER — SODIUM CHLORIDE 0.9 % (FLUSH) 0.9 %
10 SYRINGE (ML) INJECTION AS NEEDED
Status: DISCONTINUED | OUTPATIENT
Start: 2019-09-06 | End: 2019-10-24 | Stop reason: HOSPADM

## 2019-09-06 RX ORDER — PANTOPRAZOLE SODIUM 40 MG/1
40 TABLET, DELAYED RELEASE ORAL
Status: DISCONTINUED | OUTPATIENT
Start: 2019-09-07 | End: 2019-09-24

## 2019-09-06 RX ORDER — ZINC GLUCONATE 50 MG
50 TABLET ORAL DAILY
Status: DISCONTINUED | OUTPATIENT
Start: 2019-09-07 | End: 2019-10-24 | Stop reason: HOSPADM

## 2019-09-06 RX ORDER — ACETAMINOPHEN 650 MG/1
650 SUPPOSITORY RECTAL EVERY 4 HOURS PRN
Status: DISCONTINUED | OUTPATIENT
Start: 2019-09-06 | End: 2019-10-24 | Stop reason: HOSPADM

## 2019-09-06 RX ORDER — ERGOCALCIFEROL (VITAMIN D2) 1250 MCG
50000 CAPSULE ORAL WEEKLY
Qty: 5 CAPSULE | Refills: 0 | OUTPATIENT
Start: 2019-09-08 | End: 2020-11-24

## 2019-09-06 RX ORDER — ONDANSETRON 2 MG/ML
4 INJECTION INTRAMUSCULAR; INTRAVENOUS EVERY 6 HOURS PRN
Status: DISCONTINUED | OUTPATIENT
Start: 2019-09-06 | End: 2019-10-24 | Stop reason: HOSPADM

## 2019-09-06 RX ORDER — ACETAMINOPHEN 325 MG/1
650 TABLET ORAL EVERY 4 HOURS PRN
Status: DISCONTINUED | OUTPATIENT
Start: 2019-09-06 | End: 2019-10-24 | Stop reason: HOSPADM

## 2019-09-06 RX ORDER — SODIUM CHLORIDE 0.9 % (FLUSH) 0.9 %
10 SYRINGE (ML) INJECTION EVERY 12 HOURS SCHEDULED
Status: DISCONTINUED | OUTPATIENT
Start: 2019-09-06 | End: 2019-10-24 | Stop reason: HOSPADM

## 2019-09-06 RX ORDER — ALPRAZOLAM 0.5 MG/1
1 TABLET ORAL 3 TIMES DAILY PRN
Status: ACTIVE | OUTPATIENT
Start: 2019-09-06 | End: 2019-09-16

## 2019-09-06 RX ORDER — BUSPIRONE HYDROCHLORIDE 10 MG/1
10 TABLET ORAL 3 TIMES DAILY
Qty: 90 TABLET | Refills: 0 | OUTPATIENT
Start: 2019-09-06 | End: 2020-11-24

## 2019-09-06 RX ORDER — BACLOFEN 10 MG/1
5 TABLET ORAL 3 TIMES DAILY
Status: DISCONTINUED | OUTPATIENT
Start: 2019-09-06 | End: 2019-09-09

## 2019-09-06 RX ADMIN — Medication 10 ML: at 09:09

## 2019-09-06 RX ADMIN — BUSPIRONE HYDROCHLORIDE 10 MG: 10 TABLET ORAL at 09:09

## 2019-09-06 RX ADMIN — BACLOFEN 5 MG: 10 TABLET ORAL at 12:07

## 2019-09-06 RX ADMIN — Medication 10 ML: at 09:00

## 2019-09-06 RX ADMIN — PANTOPRAZOLE SODIUM 40 MG: 40 INJECTION, POWDER, FOR SOLUTION INTRAVENOUS at 09:09

## 2019-09-06 RX ADMIN — MEROPENEM 1 G: 1 INJECTION, POWDER, FOR SOLUTION INTRAVENOUS at 03:37

## 2019-09-06 RX ADMIN — SPIRONOLACTONE 50 MG: 25 TABLET ORAL at 09:09

## 2019-09-06 RX ADMIN — MEROPENEM 1 G: 1 INJECTION, POWDER, FOR SOLUTION INTRAVENOUS at 12:07

## 2019-09-07 LAB
ABO GROUP BLD: NORMAL
ALBUMIN SERPL-MCNC: 1.8 G/DL (ref 3.5–5)
ALBUMIN/GLOB SERPL: 0.8 G/DL (ref 1.1–2.5)
ALP SERPL-CCNC: 144 U/L (ref 24–120)
ALT SERPL W P-5'-P-CCNC: 18 U/L (ref 0–54)
ANION GAP SERPL CALCULATED.3IONS-SCNC: 3 MMOL/L (ref 4–13)
AST SERPL-CCNC: 16 U/L (ref 7–45)
BASOPHILS # BLD AUTO: 0.04 10*3/MM3 (ref 0–0.2)
BASOPHILS # BLD AUTO: 0.06 10*3/MM3 (ref 0–0.2)
BASOPHILS NFR BLD AUTO: 0.5 % (ref 0–1.5)
BASOPHILS NFR BLD AUTO: 0.7 % (ref 0–1.5)
BILIRUB SERPL-MCNC: 0.2 MG/DL (ref 0.1–1)
BLD GP AB SCN SERPL QL: NEGATIVE
BUN BLD-MCNC: 30 MG/DL (ref 5–21)
BUN/CREAT SERPL: 50.8 (ref 7–25)
CALCIUM SPEC-SCNC: 7.9 MG/DL (ref 8.4–10.4)
CHLORIDE SERPL-SCNC: 103 MMOL/L (ref 98–110)
CO2 SERPL-SCNC: 33 MMOL/L (ref 24–31)
CREAT BLD-MCNC: 0.59 MG/DL (ref 0.5–1.4)
DEPRECATED RDW RBC AUTO: 53 FL (ref 37–54)
DEPRECATED RDW RBC AUTO: 60.8 FL (ref 37–54)
EOSINOPHIL # BLD AUTO: 0.28 10*3/MM3 (ref 0–0.4)
EOSINOPHIL # BLD AUTO: 0.36 10*3/MM3 (ref 0–0.4)
EOSINOPHIL NFR BLD AUTO: 3.2 % (ref 0.3–6.2)
EOSINOPHIL NFR BLD AUTO: 4.2 % (ref 0.3–6.2)
ERYTHROCYTE [DISTWIDTH] IN BLOOD BY AUTOMATED COUNT: 17.4 % (ref 12.3–15.4)
ERYTHROCYTE [DISTWIDTH] IN BLOOD BY AUTOMATED COUNT: 19.9 % (ref 12.3–15.4)
GFR SERPL CREATININE-BSD FRML MDRD: 105 ML/MIN/1.73
GLOBULIN UR ELPH-MCNC: 2.4 GM/DL
GLUCOSE BLD-MCNC: 107 MG/DL (ref 70–100)
GLUCOSE BLDC GLUCOMTR-MCNC: 132 MG/DL (ref 70–130)
HCT VFR BLD AUTO: 22.2 % (ref 34–46.6)
HCT VFR BLD AUTO: 29.6 % (ref 34–46.6)
HGB BLD-MCNC: 6.7 G/DL (ref 12–15.9)
HGB BLD-MCNC: 9.5 G/DL (ref 12–15.9)
IMM GRANULOCYTES # BLD AUTO: 0.09 10*3/MM3 (ref 0–0.05)
IMM GRANULOCYTES # BLD AUTO: 0.1 10*3/MM3 (ref 0–0.05)
IMM GRANULOCYTES NFR BLD AUTO: 1.1 % (ref 0–0.5)
IMM GRANULOCYTES NFR BLD AUTO: 1.1 % (ref 0–0.5)
LYMPHOCYTES # BLD AUTO: 1.91 10*3/MM3 (ref 0.7–3.1)
LYMPHOCYTES # BLD AUTO: 2.04 10*3/MM3 (ref 0.7–3.1)
LYMPHOCYTES NFR BLD AUTO: 21.9 % (ref 19.6–45.3)
LYMPHOCYTES NFR BLD AUTO: 23.8 % (ref 19.6–45.3)
MCH RBC QN AUTO: 25.4 PG (ref 26.6–33)
MCH RBC QN AUTO: 26.7 PG (ref 26.6–33)
MCHC RBC AUTO-ENTMCNC: 30.2 G/DL (ref 31.5–35.7)
MCHC RBC AUTO-ENTMCNC: 32.1 G/DL (ref 31.5–35.7)
MCV RBC AUTO: 83.1 FL (ref 79–97)
MCV RBC AUTO: 84.1 FL (ref 79–97)
MONOCYTES # BLD AUTO: 0.62 10*3/MM3 (ref 0.1–0.9)
MONOCYTES # BLD AUTO: 0.7 10*3/MM3 (ref 0.1–0.9)
MONOCYTES NFR BLD AUTO: 7.2 % (ref 5–12)
MONOCYTES NFR BLD AUTO: 8 % (ref 5–12)
NEUTROPHILS # BLD AUTO: 5.41 10*3/MM3 (ref 1.7–7)
NEUTROPHILS # BLD AUTO: 5.67 10*3/MM3 (ref 1.7–7)
NEUTROPHILS NFR BLD AUTO: 63.2 % (ref 42.7–76)
NEUTROPHILS NFR BLD AUTO: 65.1 % (ref 42.7–76)
NRBC BLD AUTO-RTO: 0 /100 WBC (ref 0–0.2)
NRBC BLD AUTO-RTO: 0 /100 WBC (ref 0–0.2)
PLATELET # BLD AUTO: 130 10*3/MM3 (ref 140–450)
PLATELET # BLD AUTO: 142 10*3/MM3 (ref 140–450)
PMV BLD AUTO: 10.1 FL (ref 6–12)
PMV BLD AUTO: 10.6 FL (ref 6–12)
POTASSIUM BLD-SCNC: 3.9 MMOL/L (ref 3.5–5.3)
PREALB SERPL-MCNC: 11.3 MG/DL (ref 18–36)
PROT SERPL-MCNC: 4.2 G/DL (ref 6.3–8.7)
RBC # BLD AUTO: 2.64 10*6/MM3 (ref 3.77–5.28)
RBC # BLD AUTO: 3.56 10*6/MM3 (ref 3.77–5.28)
RH BLD: POSITIVE
SODIUM BLD-SCNC: 139 MMOL/L (ref 135–145)
T&S EXPIRATION DATE: NORMAL
WBC NRBC COR # BLD: 8.56 10*3/MM3 (ref 3.4–10.8)
WBC NRBC COR # BLD: 8.72 10*3/MM3 (ref 3.4–10.8)

## 2019-09-07 PROCEDURE — 85025 COMPLETE CBC W/AUTO DIFF WBC: CPT | Performed by: INTERNAL MEDICINE

## 2019-09-07 PROCEDURE — 86901 BLOOD TYPING SEROLOGIC RH(D): CPT | Performed by: INTERNAL MEDICINE

## 2019-09-07 PROCEDURE — 25010000002 MEROPENEM: Performed by: INTERNAL MEDICINE

## 2019-09-07 PROCEDURE — 86920 COMPATIBILITY TEST SPIN: CPT

## 2019-09-07 PROCEDURE — 88304 TISSUE EXAM BY PATHOLOGIST: CPT | Performed by: PLASTIC SURGERY

## 2019-09-07 PROCEDURE — 86850 RBC ANTIBODY SCREEN: CPT | Performed by: INTERNAL MEDICINE

## 2019-09-07 PROCEDURE — 97606 NEG PRS WND THER DME>50 SQCM: CPT | Performed by: PHYSICAL THERAPIST

## 2019-09-07 PROCEDURE — 86900 BLOOD TYPING SEROLOGIC ABO: CPT

## 2019-09-07 PROCEDURE — 80053 COMPREHEN METABOLIC PANEL: CPT | Performed by: INTERNAL MEDICINE

## 2019-09-07 PROCEDURE — 86900 BLOOD TYPING SEROLOGIC ABO: CPT | Performed by: INTERNAL MEDICINE

## 2019-09-07 PROCEDURE — P9016 RBC LEUKOCYTES REDUCED: HCPCS

## 2019-09-07 PROCEDURE — 97163 PT EVAL HIGH COMPLEX 45 MIN: CPT | Performed by: PHYSICAL THERAPIST

## 2019-09-07 PROCEDURE — 84134 ASSAY OF PREALBUMIN: CPT | Performed by: INTERNAL MEDICINE

## 2019-09-07 RX ORDER — DIAPER,BRIEF,INFANT-TODD,DISP
EACH MISCELLANEOUS
Status: DISCONTINUED | OUTPATIENT
Start: 2019-09-07 | End: 2019-09-24

## 2019-09-07 RX ORDER — GINSENG 100 MG
CAPSULE ORAL EVERY 12 HOURS SCHEDULED
Status: DISCONTINUED | OUTPATIENT
Start: 2019-09-07 | End: 2019-10-22

## 2019-09-08 LAB
ABO + RH BLD: NORMAL
ABO + RH BLD: NORMAL
BH BB BLOOD EXPIRATION DATE: NORMAL
BH BB BLOOD EXPIRATION DATE: NORMAL
BH BB BLOOD TYPE BARCODE: 6200
BH BB BLOOD TYPE BARCODE: 6200
BH BB DISPENSE STATUS: NORMAL
BH BB DISPENSE STATUS: NORMAL
BH BB PRODUCT CODE: NORMAL
BH BB PRODUCT CODE: NORMAL
BH BB UNIT NUMBER: NORMAL
BH BB UNIT NUMBER: NORMAL
CROSSMATCH INTERPRETATION: NORMAL
CROSSMATCH INTERPRETATION: NORMAL
UNIT  ABO: NORMAL
UNIT  ABO: NORMAL
UNIT  RH: NORMAL
UNIT  RH: NORMAL

## 2019-09-08 PROCEDURE — 25010000002 FUROSEMIDE PER 20 MG: Performed by: INTERNAL MEDICINE

## 2019-09-08 PROCEDURE — 25010000002 MEROPENEM: Performed by: INTERNAL MEDICINE

## 2019-09-08 RX ORDER — FUROSEMIDE 10 MG/ML
20 INJECTION INTRAMUSCULAR; INTRAVENOUS ONCE
Status: DISCONTINUED | OUTPATIENT
Start: 2019-09-08 | End: 2019-09-13

## 2019-09-09 LAB
ANION GAP SERPL CALCULATED.3IONS-SCNC: 0 MMOL/L (ref 4–13)
BASOPHILS # BLD AUTO: 0.08 10*3/MM3 (ref 0–0.2)
BASOPHILS NFR BLD AUTO: 1.1 % (ref 0–1.5)
BUN BLD-MCNC: 28 MG/DL (ref 5–21)
BUN/CREAT SERPL: 48.3 (ref 7–25)
CALCIUM SPEC-SCNC: 8.3 MG/DL (ref 8.4–10.4)
CHLORIDE SERPL-SCNC: 101 MMOL/L (ref 98–110)
CO2 SERPL-SCNC: 36 MMOL/L (ref 24–31)
CREAT BLD-MCNC: 0.58 MG/DL (ref 0.5–1.4)
CRP SERPL-MCNC: 4.32 MG/DL (ref 0–0.99)
DEPRECATED RDW RBC AUTO: 55.5 FL (ref 37–54)
EOSINOPHIL # BLD AUTO: 0.23 10*3/MM3 (ref 0–0.4)
EOSINOPHIL NFR BLD AUTO: 3.2 % (ref 0.3–6.2)
ERYTHROCYTE [DISTWIDTH] IN BLOOD BY AUTOMATED COUNT: 17.9 % (ref 12.3–15.4)
ERYTHROCYTE [SEDIMENTATION RATE] IN BLOOD: 30 MM/HR (ref 0–20)
GFR SERPL CREATININE-BSD FRML MDRD: 107 ML/MIN/1.73
GLUCOSE BLD-MCNC: 91 MG/DL (ref 70–100)
HCT VFR BLD AUTO: 29 % (ref 34–46.6)
HGB BLD-MCNC: 9.3 G/DL (ref 12–15.9)
IMM GRANULOCYTES # BLD AUTO: 0.06 10*3/MM3 (ref 0–0.05)
IMM GRANULOCYTES NFR BLD AUTO: 0.8 % (ref 0–0.5)
IRON 24H UR-MRATE: 19 MCG/DL (ref 42–180)
IRON SATN MFR SERPL: 13 % (ref 20–45)
LYMPHOCYTES # BLD AUTO: 1.76 10*3/MM3 (ref 0.7–3.1)
LYMPHOCYTES NFR BLD AUTO: 24.2 % (ref 19.6–45.3)
MCH RBC QN AUTO: 27.5 PG (ref 26.6–33)
MCHC RBC AUTO-ENTMCNC: 32.1 G/DL (ref 31.5–35.7)
MCV RBC AUTO: 85.8 FL (ref 79–97)
MONOCYTES # BLD AUTO: 0.59 10*3/MM3 (ref 0.1–0.9)
MONOCYTES NFR BLD AUTO: 8.1 % (ref 5–12)
NEUTROPHILS # BLD AUTO: 4.54 10*3/MM3 (ref 1.7–7)
NEUTROPHILS NFR BLD AUTO: 62.6 % (ref 42.7–76)
NRBC BLD AUTO-RTO: 0 /100 WBC (ref 0–0.2)
PLATELET # BLD AUTO: 185 10*3/MM3 (ref 140–450)
PMV BLD AUTO: 9.9 FL (ref 6–12)
POTASSIUM BLD-SCNC: 4.2 MMOL/L (ref 3.5–5.3)
PREALB SERPL-MCNC: 12.5 MG/DL (ref 18–36)
RBC # BLD AUTO: 3.38 10*6/MM3 (ref 3.77–5.28)
SODIUM BLD-SCNC: 137 MMOL/L (ref 135–145)
TIBC SERPL-MCNC: 141 MCG/DL (ref 225–420)
WBC NRBC COR # BLD: 7.26 10*3/MM3 (ref 3.4–10.8)

## 2019-09-09 PROCEDURE — 97605 NEG PRS WND THER DME<=50SQCM: CPT

## 2019-09-09 PROCEDURE — 80048 BASIC METABOLIC PNL TOTAL CA: CPT | Performed by: INTERNAL MEDICINE

## 2019-09-09 PROCEDURE — 97166 OT EVAL MOD COMPLEX 45 MIN: CPT | Performed by: OCCUPATIONAL THERAPIST

## 2019-09-09 PROCEDURE — 83540 ASSAY OF IRON: CPT | Performed by: INTERNAL MEDICINE

## 2019-09-09 PROCEDURE — 83550 IRON BINDING TEST: CPT | Performed by: INTERNAL MEDICINE

## 2019-09-09 PROCEDURE — 84134 ASSAY OF PREALBUMIN: CPT | Performed by: INTERNAL MEDICINE

## 2019-09-09 PROCEDURE — 86140 C-REACTIVE PROTEIN: CPT | Performed by: INTERNAL MEDICINE

## 2019-09-09 PROCEDURE — 85651 RBC SED RATE NONAUTOMATED: CPT | Performed by: INTERNAL MEDICINE

## 2019-09-09 PROCEDURE — 97162 PT EVAL MOD COMPLEX 30 MIN: CPT | Performed by: PHYSICAL THERAPIST

## 2019-09-09 PROCEDURE — 85025 COMPLETE CBC W/AUTO DIFF WBC: CPT | Performed by: INTERNAL MEDICINE

## 2019-09-09 PROCEDURE — 25010000002 IRON SUCROSE PER 1 MG: Performed by: NURSE PRACTITIONER

## 2019-09-09 PROCEDURE — 25010000002 MEROPENEM: Performed by: INTERNAL MEDICINE

## 2019-09-09 RX ORDER — BACLOFEN 10 MG/1
5 TABLET ORAL 3 TIMES DAILY
Status: DISCONTINUED | OUTPATIENT
Start: 2019-09-09 | End: 2019-09-20

## 2019-09-10 LAB
CYTO UR: NORMAL
LAB AP CASE REPORT: NORMAL
PATH REPORT.FINAL DX SPEC: NORMAL
PATH REPORT.GROSS SPEC: NORMAL

## 2019-09-10 PROCEDURE — 97110 THERAPEUTIC EXERCISES: CPT

## 2019-09-10 PROCEDURE — 25010000002 MEROPENEM: Performed by: INTERNAL MEDICINE

## 2019-09-10 PROCEDURE — 97530 THERAPEUTIC ACTIVITIES: CPT

## 2019-09-10 PROCEDURE — 25010000002 IRON SUCROSE PER 1 MG: Performed by: NURSE PRACTITIONER

## 2019-09-11 PROCEDURE — 25010000002 MEROPENEM: Performed by: INTERNAL MEDICINE

## 2019-09-11 PROCEDURE — 97605 NEG PRS WND THER DME<=50SQCM: CPT

## 2019-09-11 PROCEDURE — 97168 OT RE-EVAL EST PLAN CARE: CPT

## 2019-09-11 PROCEDURE — 25010000002 IRON SUCROSE PER 1 MG: Performed by: NURSE PRACTITIONER

## 2019-09-11 RX ORDER — DIAPER,BRIEF,INFANT-TODD,DISP
EACH MISCELLANEOUS EVERY 8 HOURS SCHEDULED
Status: DISCONTINUED | OUTPATIENT
Start: 2019-09-11 | End: 2019-09-27

## 2019-09-12 ENCOUNTER — HOSPITAL ENCOUNTER (OUTPATIENT)
Dept: WOUND CARE | Age: 59
Discharge: HOME | End: 2019-09-12

## 2019-09-12 LAB
ANION GAP SERPL CALCULATED.3IONS-SCNC: 7 MMOL/L (ref 5–15)
BASOPHILS # BLD AUTO: 0.11 10*3/MM3 (ref 0–0.2)
BASOPHILS NFR BLD AUTO: 1.5 % (ref 0–1.5)
BUN BLD-MCNC: 20 MG/DL (ref 6–20)
BUN/CREAT SERPL: 40 (ref 7–25)
CALCIUM SPEC-SCNC: 7.8 MG/DL (ref 8.6–10.5)
CHLORIDE SERPL-SCNC: 108 MMOL/L (ref 98–107)
CO2 SERPL-SCNC: 28 MMOL/L (ref 22–29)
CREAT BLD-MCNC: 0.5 MG/DL (ref 0.57–1)
CRP SERPL-MCNC: 5.22 MG/DL (ref 0–0.5)
DEPRECATED RDW RBC AUTO: 56.6 FL (ref 37–54)
EOSINOPHIL # BLD AUTO: 0.52 10*3/MM3 (ref 0–0.4)
EOSINOPHIL NFR BLD AUTO: 7.3 % (ref 0.3–6.2)
ERYTHROCYTE [DISTWIDTH] IN BLOOD BY AUTOMATED COUNT: 17.7 % (ref 12.3–15.4)
ERYTHROCYTE [SEDIMENTATION RATE] IN BLOOD: 28 MM/HR (ref 0–20)
GFR SERPL CREATININE-BSD FRML MDRD: 127 ML/MIN/1.73
GLUCOSE BLD-MCNC: 97 MG/DL (ref 65–99)
HCT VFR BLD AUTO: 28.8 % (ref 34–46.6)
HGB BLD-MCNC: 8.9 G/DL (ref 12–15.9)
IMM GRANULOCYTES # BLD AUTO: 0.04 10*3/MM3 (ref 0–0.05)
IMM GRANULOCYTES NFR BLD AUTO: 0.6 % (ref 0–0.5)
LYMPHOCYTES # BLD AUTO: 1.62 10*3/MM3 (ref 0.7–3.1)
LYMPHOCYTES NFR BLD AUTO: 22.8 % (ref 19.6–45.3)
MCH RBC QN AUTO: 27 PG (ref 26.6–33)
MCHC RBC AUTO-ENTMCNC: 30.9 G/DL (ref 31.5–35.7)
MCV RBC AUTO: 87.3 FL (ref 79–97)
MONOCYTES # BLD AUTO: 0.66 10*3/MM3 (ref 0.1–0.9)
MONOCYTES NFR BLD AUTO: 9.3 % (ref 5–12)
NEUTROPHILS # BLD AUTO: 4.15 10*3/MM3 (ref 1.7–7)
NEUTROPHILS NFR BLD AUTO: 58.5 % (ref 42.7–76)
NRBC BLD AUTO-RTO: 0 /100 WBC (ref 0–0.2)
PLATELET # BLD AUTO: 292 10*3/MM3 (ref 140–450)
PMV BLD AUTO: 9.8 FL (ref 6–12)
POTASSIUM BLD-SCNC: 4.3 MMOL/L (ref 3.5–5.2)
RBC # BLD AUTO: 3.3 10*6/MM3 (ref 3.77–5.28)
SODIUM BLD-SCNC: 143 MMOL/L (ref 136–145)
WBC NRBC COR # BLD: 7.1 10*3/MM3 (ref 3.4–10.8)

## 2019-09-12 PROCEDURE — 86140 C-REACTIVE PROTEIN: CPT | Performed by: INTERNAL MEDICINE

## 2019-09-12 PROCEDURE — 85651 RBC SED RATE NONAUTOMATED: CPT | Performed by: INTERNAL MEDICINE

## 2019-09-12 PROCEDURE — 25010000002 MEROPENEM: Performed by: INTERNAL MEDICINE

## 2019-09-12 PROCEDURE — 88304 TISSUE EXAM BY PATHOLOGIST: CPT | Performed by: PLASTIC SURGERY

## 2019-09-12 PROCEDURE — 80048 BASIC METABOLIC PNL TOTAL CA: CPT | Performed by: INTERNAL MEDICINE

## 2019-09-12 PROCEDURE — 85025 COMPLETE CBC W/AUTO DIFF WBC: CPT | Performed by: INTERNAL MEDICINE

## 2019-09-12 PROCEDURE — 97535 SELF CARE MNGMENT TRAINING: CPT

## 2019-09-12 PROCEDURE — 97530 THERAPEUTIC ACTIVITIES: CPT

## 2019-09-12 PROCEDURE — 25010000002 IRON SUCROSE PER 1 MG: Performed by: NURSE PRACTITIONER

## 2019-09-12 RX ORDER — FERROUS GLUCONATE 324(37.5)
324 TABLET ORAL 2 TIMES DAILY WITH MEALS
Status: DISCONTINUED | OUTPATIENT
Start: 2019-09-13 | End: 2019-09-12

## 2019-09-12 RX ORDER — FERROUS SULFATE 325(65) MG
325 TABLET ORAL 2 TIMES DAILY WITH MEALS
Status: DISCONTINUED | OUTPATIENT
Start: 2019-09-13 | End: 2019-10-24 | Stop reason: HOSPADM

## 2019-09-13 PROCEDURE — 87205 SMEAR GRAM STAIN: CPT | Performed by: PLASTIC SURGERY

## 2019-09-13 PROCEDURE — 63710000001 DIPHENHYDRAMINE PER 50 MG: Performed by: INTERNAL MEDICINE

## 2019-09-13 PROCEDURE — 97110 THERAPEUTIC EXERCISES: CPT

## 2019-09-13 PROCEDURE — 87070 CULTURE OTHR SPECIMN AEROBIC: CPT | Performed by: PLASTIC SURGERY

## 2019-09-13 PROCEDURE — 97605 NEG PRS WND THER DME<=50SQCM: CPT

## 2019-09-13 PROCEDURE — 25010000002 MEROPENEM: Performed by: INTERNAL MEDICINE

## 2019-09-13 PROCEDURE — 97606 NEG PRS WND THER DME>50 SQCM: CPT

## 2019-09-13 PROCEDURE — 25010000002 IRON SUCROSE PER 1 MG: Performed by: NURSE PRACTITIONER

## 2019-09-13 RX ORDER — DIPHENHYDRAMINE HCL 25 MG
25 CAPSULE ORAL EVERY 6 HOURS PRN
Status: DISCONTINUED | OUTPATIENT
Start: 2019-09-13 | End: 2019-10-24 | Stop reason: HOSPADM

## 2019-09-14 PROCEDURE — 97110 THERAPEUTIC EXERCISES: CPT

## 2019-09-14 PROCEDURE — 97530 THERAPEUTIC ACTIVITIES: CPT

## 2019-09-14 PROCEDURE — 63710000001 DIPHENHYDRAMINE PER 50 MG: Performed by: INTERNAL MEDICINE

## 2019-09-15 LAB
BACTERIA SPEC AEROBE CULT: NORMAL
GRAM STN SPEC: NORMAL

## 2019-09-15 PROCEDURE — 63710000001 DIPHENHYDRAMINE PER 50 MG: Performed by: INTERNAL MEDICINE

## 2019-09-15 PROCEDURE — 97530 THERAPEUTIC ACTIVITIES: CPT

## 2019-09-16 LAB
ANION GAP SERPL CALCULATED.3IONS-SCNC: 7 MMOL/L (ref 5–15)
BASOPHILS # BLD AUTO: 0.1 10*3/MM3 (ref 0–0.2)
BASOPHILS NFR BLD AUTO: 1.6 % (ref 0–1.5)
BUN BLD-MCNC: 21 MG/DL (ref 6–20)
BUN/CREAT SERPL: 33.3 (ref 7–25)
CALCIUM SPEC-SCNC: 8.1 MG/DL (ref 8.6–10.5)
CHLORIDE SERPL-SCNC: 111 MMOL/L (ref 98–107)
CO2 SERPL-SCNC: 25 MMOL/L (ref 22–29)
CREAT BLD-MCNC: 0.63 MG/DL (ref 0.57–1)
CRP SERPL-MCNC: 3.99 MG/DL (ref 0–0.5)
CYTO UR: NORMAL
DEPRECATED RDW RBC AUTO: 57.4 FL (ref 37–54)
EOSINOPHIL # BLD AUTO: 0.6 10*3/MM3 (ref 0–0.4)
EOSINOPHIL NFR BLD AUTO: 9.6 % (ref 0.3–6.2)
ERYTHROCYTE [DISTWIDTH] IN BLOOD BY AUTOMATED COUNT: 17.5 % (ref 12.3–15.4)
ERYTHROCYTE [SEDIMENTATION RATE] IN BLOOD: 32 MM/HR (ref 0–20)
GFR SERPL CREATININE-BSD FRML MDRD: 97 ML/MIN/1.73
GLUCOSE BLD-MCNC: 99 MG/DL (ref 65–99)
HCT VFR BLD AUTO: 27.9 % (ref 34–46.6)
HGB BLD-MCNC: 8.6 G/DL (ref 12–15.9)
IMM GRANULOCYTES # BLD AUTO: 0.06 10*3/MM3 (ref 0–0.05)
IMM GRANULOCYTES NFR BLD AUTO: 1 % (ref 0–0.5)
LAB AP CASE REPORT: NORMAL
LAB AP CLINICAL INFORMATION: NORMAL
LYMPHOCYTES # BLD AUTO: 1.79 10*3/MM3 (ref 0.7–3.1)
LYMPHOCYTES NFR BLD AUTO: 28.6 % (ref 19.6–45.3)
MCH RBC QN AUTO: 27.5 PG (ref 26.6–33)
MCHC RBC AUTO-ENTMCNC: 30.8 G/DL (ref 31.5–35.7)
MCV RBC AUTO: 89.1 FL (ref 79–97)
MONOCYTES # BLD AUTO: 0.49 10*3/MM3 (ref 0.1–0.9)
MONOCYTES NFR BLD AUTO: 7.8 % (ref 5–12)
NEUTROPHILS # BLD AUTO: 3.21 10*3/MM3 (ref 1.7–7)
NEUTROPHILS NFR BLD AUTO: 51.4 % (ref 42.7–76)
NRBC BLD AUTO-RTO: 0 /100 WBC (ref 0–0.2)
NT-PROBNP SERPL-MCNC: 735.3 PG/ML (ref 5–900)
PATH REPORT.FINAL DX SPEC: NORMAL
PATH REPORT.GROSS SPEC: NORMAL
PLATELET # BLD AUTO: 361 10*3/MM3 (ref 140–450)
PMV BLD AUTO: 9.8 FL (ref 6–12)
POTASSIUM BLD-SCNC: 4.4 MMOL/L (ref 3.5–5.2)
RBC # BLD AUTO: 3.13 10*6/MM3 (ref 3.77–5.28)
SODIUM BLD-SCNC: 143 MMOL/L (ref 136–145)
WBC NRBC COR # BLD: 6.25 10*3/MM3 (ref 3.4–10.8)

## 2019-09-16 PROCEDURE — 97605 NEG PRS WND THER DME<=50SQCM: CPT

## 2019-09-16 PROCEDURE — 86140 C-REACTIVE PROTEIN: CPT | Performed by: NURSE PRACTITIONER

## 2019-09-16 PROCEDURE — 63710000001 DIPHENHYDRAMINE PER 50 MG: Performed by: INTERNAL MEDICINE

## 2019-09-16 PROCEDURE — 85651 RBC SED RATE NONAUTOMATED: CPT | Performed by: NURSE PRACTITIONER

## 2019-09-16 PROCEDURE — 88307 TISSUE EXAM BY PATHOLOGIST: CPT | Performed by: PLASTIC SURGERY

## 2019-09-16 PROCEDURE — 80048 BASIC METABOLIC PNL TOTAL CA: CPT | Performed by: NURSE PRACTITIONER

## 2019-09-16 PROCEDURE — 88311 DECALCIFY TISSUE: CPT | Performed by: PLASTIC SURGERY

## 2019-09-16 PROCEDURE — 97608 NEG PRS WND THER NDME>50SQCM: CPT

## 2019-09-16 PROCEDURE — 97110 THERAPEUTIC EXERCISES: CPT

## 2019-09-16 PROCEDURE — 85025 COMPLETE CBC W/AUTO DIFF WBC: CPT | Performed by: NURSE PRACTITIONER

## 2019-09-16 PROCEDURE — 83880 ASSAY OF NATRIURETIC PEPTIDE: CPT | Performed by: NURSE PRACTITIONER

## 2019-09-16 PROCEDURE — 97535 SELF CARE MNGMENT TRAINING: CPT

## 2019-09-17 LAB
CYTO UR: NORMAL
LAB AP CASE REPORT: NORMAL
LAB AP CLINICAL INFORMATION: NORMAL
PATH REPORT.FINAL DX SPEC: NORMAL
PATH REPORT.GROSS SPEC: NORMAL

## 2019-09-17 PROCEDURE — 25010000002 MEROPENEM: Performed by: INTERNAL MEDICINE

## 2019-09-17 PROCEDURE — 63710000001 DIPHENHYDRAMINE PER 50 MG: Performed by: INTERNAL MEDICINE

## 2019-09-17 PROCEDURE — 97164 PT RE-EVAL EST PLAN CARE: CPT | Performed by: PHYSICAL THERAPIST

## 2019-09-17 PROCEDURE — 97110 THERAPEUTIC EXERCISES: CPT

## 2019-09-17 PROCEDURE — 97605 NEG PRS WND THER DME<=50SQCM: CPT

## 2019-09-17 PROCEDURE — 97535 SELF CARE MNGMENT TRAINING: CPT

## 2019-09-17 RX ORDER — ALPRAZOLAM 0.5 MG/1
1 TABLET ORAL 3 TIMES DAILY PRN
Status: ACTIVE | OUTPATIENT
Start: 2019-09-17 | End: 2019-09-27

## 2019-09-18 PROCEDURE — 97110 THERAPEUTIC EXERCISES: CPT

## 2019-09-18 PROCEDURE — 97606 NEG PRS WND THER DME>50 SQCM: CPT

## 2019-09-18 PROCEDURE — 63710000001 DIPHENHYDRAMINE PER 50 MG: Performed by: INTERNAL MEDICINE

## 2019-09-18 PROCEDURE — 25010000002 MEROPENEM: Performed by: INTERNAL MEDICINE

## 2019-09-18 PROCEDURE — 97605 NEG PRS WND THER DME<=50SQCM: CPT

## 2019-09-19 LAB
ANION GAP SERPL CALCULATED.3IONS-SCNC: 8 MMOL/L (ref 5–15)
BASOPHILS # BLD AUTO: 0.1 10*3/MM3 (ref 0–0.2)
BASOPHILS NFR BLD AUTO: 2 % (ref 0–1.5)
BUN BLD-MCNC: 18 MG/DL (ref 6–20)
BUN/CREAT SERPL: 28.1 (ref 7–25)
CALCIUM SPEC-SCNC: 8.1 MG/DL (ref 8.6–10.5)
CHLORIDE SERPL-SCNC: 110 MMOL/L (ref 98–107)
CO2 SERPL-SCNC: 25 MMOL/L (ref 22–29)
CREAT BLD-MCNC: 0.64 MG/DL (ref 0.57–1)
CRP SERPL-MCNC: 8.02 MG/DL (ref 0–0.5)
DEPRECATED RDW RBC AUTO: 56.2 FL (ref 37–54)
EOSINOPHIL # BLD AUTO: 0.52 10*3/MM3 (ref 0–0.4)
EOSINOPHIL NFR BLD AUTO: 10.7 % (ref 0.3–6.2)
ERYTHROCYTE [DISTWIDTH] IN BLOOD BY AUTOMATED COUNT: 17.5 % (ref 12.3–15.4)
ERYTHROCYTE [SEDIMENTATION RATE] IN BLOOD: 52 MM/HR (ref 0–20)
GFR SERPL CREATININE-BSD FRML MDRD: 96 ML/MIN/1.73
GLUCOSE BLD-MCNC: 122 MG/DL (ref 65–99)
HCT VFR BLD AUTO: 28.6 % (ref 34–46.6)
HGB BLD-MCNC: 8.9 G/DL (ref 12–15.9)
IMM GRANULOCYTES # BLD AUTO: 0.03 10*3/MM3 (ref 0–0.05)
IMM GRANULOCYTES NFR BLD AUTO: 0.6 % (ref 0–0.5)
LYMPHOCYTES # BLD AUTO: 1.57 10*3/MM3 (ref 0.7–3.1)
LYMPHOCYTES NFR BLD AUTO: 32.2 % (ref 19.6–45.3)
MCH RBC QN AUTO: 27.2 PG (ref 26.6–33)
MCHC RBC AUTO-ENTMCNC: 31.1 G/DL (ref 31.5–35.7)
MCV RBC AUTO: 87.5 FL (ref 79–97)
MONOCYTES # BLD AUTO: 0.54 10*3/MM3 (ref 0.1–0.9)
MONOCYTES NFR BLD AUTO: 11.1 % (ref 5–12)
NEUTROPHILS # BLD AUTO: 2.12 10*3/MM3 (ref 1.7–7)
NEUTROPHILS NFR BLD AUTO: 43.4 % (ref 42.7–76)
NRBC BLD AUTO-RTO: 0 /100 WBC (ref 0–0.2)
PLATELET # BLD AUTO: 352 10*3/MM3 (ref 140–450)
PMV BLD AUTO: 9.5 FL (ref 6–12)
POTASSIUM BLD-SCNC: 4.3 MMOL/L (ref 3.5–5.2)
RBC # BLD AUTO: 3.27 10*6/MM3 (ref 3.77–5.28)
SODIUM BLD-SCNC: 143 MMOL/L (ref 136–145)
WBC NRBC COR # BLD: 4.88 10*3/MM3 (ref 3.4–10.8)

## 2019-09-19 PROCEDURE — 85651 RBC SED RATE NONAUTOMATED: CPT | Performed by: INTERNAL MEDICINE

## 2019-09-19 PROCEDURE — 25010000002 MEROPENEM: Performed by: INTERNAL MEDICINE

## 2019-09-19 PROCEDURE — 97605 NEG PRS WND THER DME<=50SQCM: CPT

## 2019-09-19 PROCEDURE — 86140 C-REACTIVE PROTEIN: CPT | Performed by: INTERNAL MEDICINE

## 2019-09-19 PROCEDURE — 97110 THERAPEUTIC EXERCISES: CPT

## 2019-09-19 PROCEDURE — 97530 THERAPEUTIC ACTIVITIES: CPT

## 2019-09-19 PROCEDURE — 80048 BASIC METABOLIC PNL TOTAL CA: CPT | Performed by: INTERNAL MEDICINE

## 2019-09-19 PROCEDURE — 85025 COMPLETE CBC W/AUTO DIFF WBC: CPT | Performed by: INTERNAL MEDICINE

## 2019-09-20 PROCEDURE — 63710000001 DIPHENHYDRAMINE PER 50 MG: Performed by: INTERNAL MEDICINE

## 2019-09-20 PROCEDURE — 25010000002 MEROPENEM: Performed by: INTERNAL MEDICINE

## 2019-09-20 PROCEDURE — 97606 NEG PRS WND THER DME>50 SQCM: CPT | Performed by: PHYSICAL THERAPIST

## 2019-09-20 PROCEDURE — 25010000002 VANCOMYCIN: Performed by: INTERNAL MEDICINE

## 2019-09-20 PROCEDURE — 97605 NEG PRS WND THER DME<=50SQCM: CPT

## 2019-09-20 PROCEDURE — 97164 PT RE-EVAL EST PLAN CARE: CPT | Performed by: PHYSICAL THERAPIST

## 2019-09-20 PROCEDURE — 97110 THERAPEUTIC EXERCISES: CPT

## 2019-09-20 RX ORDER — BACLOFEN 10 MG/1
10 TABLET ORAL 3 TIMES DAILY
Status: DISCONTINUED | OUTPATIENT
Start: 2019-09-20 | End: 2019-10-24 | Stop reason: HOSPADM

## 2019-09-21 PROCEDURE — 25010000002 MEROPENEM: Performed by: INTERNAL MEDICINE

## 2019-09-21 PROCEDURE — 25010000002 VANCOMYCIN: Performed by: INTERNAL MEDICINE

## 2019-09-22 LAB
CREAT BLD-MCNC: 0.54 MG/DL (ref 0.57–1)
GFR SERPL CREATININE-BSD FRML MDRD: 116 ML/MIN/1.73
VANCOMYCIN TROUGH SERPL-MCNC: 25.5 MCG/ML (ref 5–20)

## 2019-09-22 PROCEDURE — 80202 ASSAY OF VANCOMYCIN: CPT | Performed by: INTERNAL MEDICINE

## 2019-09-22 PROCEDURE — 25010000002 MEROPENEM: Performed by: INTERNAL MEDICINE

## 2019-09-22 PROCEDURE — 25010000002 VANCOMYCIN: Performed by: INTERNAL MEDICINE

## 2019-09-22 PROCEDURE — 82565 ASSAY OF CREATININE: CPT | Performed by: INTERNAL MEDICINE

## 2019-09-22 PROCEDURE — 25010000002 ONDANSETRON PER 1 MG: Performed by: INTERNAL MEDICINE

## 2019-09-22 PROCEDURE — 97530 THERAPEUTIC ACTIVITIES: CPT

## 2019-09-22 PROCEDURE — 97605 NEG PRS WND THER DME<=50SQCM: CPT

## 2019-09-23 LAB
ALBUMIN SERPL-MCNC: 2.2 G/DL (ref 3.5–5.2)
ALBUMIN/GLOB SERPL: 0.6 G/DL
ALP SERPL-CCNC: 116 U/L (ref 39–117)
ALT SERPL W P-5'-P-CCNC: 9 U/L (ref 1–33)
ANION GAP SERPL CALCULATED.3IONS-SCNC: 8 MMOL/L (ref 5–15)
AST SERPL-CCNC: 10 U/L (ref 1–32)
BASOPHILS # BLD AUTO: 0.08 10*3/MM3 (ref 0–0.2)
BASOPHILS NFR BLD AUTO: 1.5 % (ref 0–1.5)
BILIRUB SERPL-MCNC: 0.2 MG/DL (ref 0.2–1.2)
BUN BLD-MCNC: 16 MG/DL (ref 6–20)
BUN/CREAT SERPL: 30.8 (ref 7–25)
CALCIUM SPEC-SCNC: 8.3 MG/DL (ref 8.6–10.5)
CHLORIDE SERPL-SCNC: 111 MMOL/L (ref 98–107)
CO2 SERPL-SCNC: 25 MMOL/L (ref 22–29)
CREAT BLD-MCNC: 0.52 MG/DL (ref 0.57–1)
CRP SERPL-MCNC: 2.69 MG/DL (ref 0–0.5)
DEPRECATED RDW RBC AUTO: 54.3 FL (ref 37–54)
EOSINOPHIL # BLD AUTO: 0.8 10*3/MM3 (ref 0–0.4)
EOSINOPHIL NFR BLD AUTO: 14.6 % (ref 0.3–6.2)
ERYTHROCYTE [DISTWIDTH] IN BLOOD BY AUTOMATED COUNT: 16.8 % (ref 12.3–15.4)
ERYTHROCYTE [SEDIMENTATION RATE] IN BLOOD: 64 MM/HR (ref 0–20)
GFR SERPL CREATININE-BSD FRML MDRD: 122 ML/MIN/1.73
GLOBULIN UR ELPH-MCNC: 3.5 GM/DL
GLUCOSE BLD-MCNC: 108 MG/DL (ref 65–99)
HCT VFR BLD AUTO: 29.6 % (ref 34–46.6)
HGB BLD-MCNC: 9.1 G/DL (ref 12–15.9)
IMM GRANULOCYTES # BLD AUTO: 0.1 10*3/MM3 (ref 0–0.05)
IMM GRANULOCYTES NFR BLD AUTO: 1.8 % (ref 0–0.5)
LYMPHOCYTES # BLD AUTO: 1.97 10*3/MM3 (ref 0.7–3.1)
LYMPHOCYTES NFR BLD AUTO: 36 % (ref 19.6–45.3)
MCH RBC QN AUTO: 27.2 PG (ref 26.6–33)
MCHC RBC AUTO-ENTMCNC: 30.7 G/DL (ref 31.5–35.7)
MCV RBC AUTO: 88.6 FL (ref 79–97)
MONOCYTES # BLD AUTO: 0.45 10*3/MM3 (ref 0.1–0.9)
MONOCYTES NFR BLD AUTO: 8.2 % (ref 5–12)
NEUTROPHILS # BLD AUTO: 2.07 10*3/MM3 (ref 1.7–7)
NEUTROPHILS NFR BLD AUTO: 37.9 % (ref 42.7–76)
NRBC BLD AUTO-RTO: 0 /100 WBC (ref 0–0.2)
NT-PROBNP SERPL-MCNC: 300.9 PG/ML (ref 5–900)
PLATELET # BLD AUTO: 346 10*3/MM3 (ref 140–450)
PMV BLD AUTO: 9.8 FL (ref 6–12)
POTASSIUM BLD-SCNC: 5 MMOL/L (ref 3.5–5.2)
PROT SERPL-MCNC: 5.7 G/DL (ref 6–8.5)
RBC # BLD AUTO: 3.34 10*6/MM3 (ref 3.77–5.28)
SODIUM BLD-SCNC: 144 MMOL/L (ref 136–145)
WBC NRBC COR # BLD: 5.47 10*3/MM3 (ref 3.4–10.8)

## 2019-09-23 PROCEDURE — 85025 COMPLETE CBC W/AUTO DIFF WBC: CPT | Performed by: INTERNAL MEDICINE

## 2019-09-23 PROCEDURE — 25010000002 ONDANSETRON PER 1 MG: Performed by: INTERNAL MEDICINE

## 2019-09-23 PROCEDURE — 97605 NEG PRS WND THER DME<=50SQCM: CPT

## 2019-09-23 PROCEDURE — 83880 ASSAY OF NATRIURETIC PEPTIDE: CPT | Performed by: INTERNAL MEDICINE

## 2019-09-23 PROCEDURE — 80053 COMPREHEN METABOLIC PANEL: CPT | Performed by: INTERNAL MEDICINE

## 2019-09-23 PROCEDURE — 25010000002 MEROPENEM: Performed by: INTERNAL MEDICINE

## 2019-09-23 PROCEDURE — 85651 RBC SED RATE NONAUTOMATED: CPT | Performed by: INTERNAL MEDICINE

## 2019-09-23 PROCEDURE — 97606 NEG PRS WND THER DME>50 SQCM: CPT

## 2019-09-23 PROCEDURE — 25010000002 VANCOMYCIN: Performed by: INTERNAL MEDICINE

## 2019-09-23 PROCEDURE — 86140 C-REACTIVE PROTEIN: CPT | Performed by: INTERNAL MEDICINE

## 2019-09-24 LAB — VANCOMYCIN TROUGH SERPL-MCNC: 30.2 MCG/ML (ref 5–20)

## 2019-09-24 PROCEDURE — 25010000002 ONDANSETRON PER 1 MG: Performed by: INTERNAL MEDICINE

## 2019-09-24 PROCEDURE — 97168 OT RE-EVAL EST PLAN CARE: CPT | Performed by: OCCUPATIONAL THERAPIST

## 2019-09-24 PROCEDURE — 97530 THERAPEUTIC ACTIVITIES: CPT

## 2019-09-24 PROCEDURE — 25010000002 VANCOMYCIN: Performed by: INTERNAL MEDICINE

## 2019-09-24 PROCEDURE — 80202 ASSAY OF VANCOMYCIN: CPT | Performed by: INTERNAL MEDICINE

## 2019-09-24 PROCEDURE — 97535 SELF CARE MNGMENT TRAINING: CPT | Performed by: OCCUPATIONAL THERAPIST

## 2019-09-24 PROCEDURE — 25010000002 MEROPENEM: Performed by: INTERNAL MEDICINE

## 2019-09-24 RX ORDER — PANTOPRAZOLE SODIUM 40 MG/1
40 TABLET, DELAYED RELEASE ORAL
Status: DISCONTINUED | OUTPATIENT
Start: 2019-09-24 | End: 2019-10-24 | Stop reason: HOSPADM

## 2019-09-25 LAB — VANCOMYCIN SERPL-MCNC: 19.5 MCG/ML (ref 5–40)

## 2019-09-25 PROCEDURE — 25010000002 ONDANSETRON PER 1 MG: Performed by: INTERNAL MEDICINE

## 2019-09-25 PROCEDURE — 97110 THERAPEUTIC EXERCISES: CPT

## 2019-09-25 PROCEDURE — 80202 ASSAY OF VANCOMYCIN: CPT | Performed by: INTERNAL MEDICINE

## 2019-09-25 PROCEDURE — 97606 NEG PRS WND THER DME>50 SQCM: CPT

## 2019-09-25 PROCEDURE — 97535 SELF CARE MNGMENT TRAINING: CPT

## 2019-09-25 PROCEDURE — 97605 NEG PRS WND THER DME<=50SQCM: CPT

## 2019-09-25 PROCEDURE — 25010000002 VANCOMYCIN: Performed by: INTERNAL MEDICINE

## 2019-09-25 PROCEDURE — 25010000002 MEROPENEM: Performed by: INTERNAL MEDICINE

## 2019-09-26 LAB
ALBUMIN SERPL-MCNC: 2.4 G/DL (ref 3.5–5.2)
ANION GAP SERPL CALCULATED.3IONS-SCNC: 11 MMOL/L (ref 5–15)
ANION GAP SERPL CALCULATED.3IONS-SCNC: 7 MMOL/L (ref 5–15)
BASOPHILS # BLD AUTO: 0.11 10*3/MM3 (ref 0–0.2)
BASOPHILS NFR BLD AUTO: 1.7 % (ref 0–1.5)
BUN BLD-MCNC: 13 MG/DL (ref 6–20)
BUN BLD-MCNC: 15 MG/DL (ref 6–20)
BUN/CREAT SERPL: 19.7 (ref 7–25)
BUN/CREAT SERPL: 28.8 (ref 7–25)
CALCIUM SPEC-SCNC: 8.6 MG/DL (ref 8.6–10.5)
CALCIUM SPEC-SCNC: 8.9 MG/DL (ref 8.6–10.5)
CHLORIDE SERPL-SCNC: 105 MMOL/L (ref 98–107)
CHLORIDE SERPL-SCNC: 105 MMOL/L (ref 98–107)
CO2 SERPL-SCNC: 25 MMOL/L (ref 22–29)
CO2 SERPL-SCNC: 29 MMOL/L (ref 22–29)
CREAT BLD-MCNC: 0.52 MG/DL (ref 0.57–1)
CREAT BLD-MCNC: 0.66 MG/DL (ref 0.57–1)
CRP SERPL-MCNC: 4.39 MG/DL (ref 0–0.5)
DEPRECATED RDW RBC AUTO: 51.8 FL (ref 37–54)
EOSINOPHIL # BLD AUTO: 0.81 10*3/MM3 (ref 0–0.4)
EOSINOPHIL NFR BLD AUTO: 12.4 % (ref 0.3–6.2)
ERYTHROCYTE [DISTWIDTH] IN BLOOD BY AUTOMATED COUNT: 16.3 % (ref 12.3–15.4)
ERYTHROCYTE [SEDIMENTATION RATE] IN BLOOD: 54 MM/HR (ref 0–20)
GFR SERPL CREATININE-BSD FRML MDRD: 122 ML/MIN/1.73
GFR SERPL CREATININE-BSD FRML MDRD: 92 ML/MIN/1.73
GLUCOSE BLD-MCNC: 106 MG/DL (ref 65–99)
GLUCOSE BLD-MCNC: 117 MG/DL (ref 65–99)
HCT VFR BLD AUTO: 29.4 % (ref 34–46.6)
HGB BLD-MCNC: 9.3 G/DL (ref 12–15.9)
IMM GRANULOCYTES # BLD AUTO: 0.11 10*3/MM3 (ref 0–0.05)
IMM GRANULOCYTES NFR BLD AUTO: 1.7 % (ref 0–0.5)
LYMPHOCYTES # BLD AUTO: 2.08 10*3/MM3 (ref 0.7–3.1)
LYMPHOCYTES NFR BLD AUTO: 31.9 % (ref 19.6–45.3)
MCH RBC QN AUTO: 27.5 PG (ref 26.6–33)
MCHC RBC AUTO-ENTMCNC: 31.6 G/DL (ref 31.5–35.7)
MCV RBC AUTO: 87 FL (ref 79–97)
MONOCYTES # BLD AUTO: 0.62 10*3/MM3 (ref 0.1–0.9)
MONOCYTES NFR BLD AUTO: 9.5 % (ref 5–12)
NEUTROPHILS # BLD AUTO: 2.8 10*3/MM3 (ref 1.7–7)
NEUTROPHILS NFR BLD AUTO: 42.8 % (ref 42.7–76)
NRBC BLD AUTO-RTO: 0 /100 WBC (ref 0–0.2)
PHOSPHATE SERPL-MCNC: 3.5 MG/DL (ref 2.5–4.5)
PLATELET # BLD AUTO: 299 10*3/MM3 (ref 140–450)
PMV BLD AUTO: 9.7 FL (ref 6–12)
POTASSIUM BLD-SCNC: 4.7 MMOL/L (ref 3.5–5.2)
POTASSIUM BLD-SCNC: 4.9 MMOL/L (ref 3.5–5.2)
RBC # BLD AUTO: 3.38 10*6/MM3 (ref 3.77–5.28)
SODIUM BLD-SCNC: 141 MMOL/L (ref 136–145)
SODIUM BLD-SCNC: 141 MMOL/L (ref 136–145)
VANCOMYCIN SERPL-MCNC: 17.2 MCG/ML (ref 5–40)
WBC NRBC COR # BLD: 6.53 10*3/MM3 (ref 3.4–10.8)

## 2019-09-26 PROCEDURE — 85025 COMPLETE CBC W/AUTO DIFF WBC: CPT | Performed by: INTERNAL MEDICINE

## 2019-09-26 PROCEDURE — 97530 THERAPEUTIC ACTIVITIES: CPT

## 2019-09-26 PROCEDURE — 80069 RENAL FUNCTION PANEL: CPT | Performed by: INTERNAL MEDICINE

## 2019-09-26 PROCEDURE — 85651 RBC SED RATE NONAUTOMATED: CPT | Performed by: INTERNAL MEDICINE

## 2019-09-26 PROCEDURE — 25010000002 VANCOMYCIN: Performed by: INTERNAL MEDICINE

## 2019-09-26 PROCEDURE — 25010000002 MEROPENEM: Performed by: INTERNAL MEDICINE

## 2019-09-26 PROCEDURE — 86140 C-REACTIVE PROTEIN: CPT | Performed by: INTERNAL MEDICINE

## 2019-09-26 PROCEDURE — 80048 BASIC METABOLIC PNL TOTAL CA: CPT | Performed by: INTERNAL MEDICINE

## 2019-09-26 PROCEDURE — 80202 ASSAY OF VANCOMYCIN: CPT | Performed by: INTERNAL MEDICINE

## 2019-09-27 PROCEDURE — 97535 SELF CARE MNGMENT TRAINING: CPT

## 2019-09-27 PROCEDURE — 25010000002 MEROPENEM: Performed by: INTERNAL MEDICINE

## 2019-09-27 PROCEDURE — 97606 NEG PRS WND THER DME>50 SQCM: CPT

## 2019-09-27 PROCEDURE — 97530 THERAPEUTIC ACTIVITIES: CPT

## 2019-09-27 PROCEDURE — 97605 NEG PRS WND THER DME<=50SQCM: CPT

## 2019-09-27 PROCEDURE — 25010000002 VANCOMYCIN: Performed by: INTERNAL MEDICINE

## 2019-09-28 LAB — VANCOMYCIN TROUGH SERPL-MCNC: 16.1 MCG/ML (ref 5–20)

## 2019-09-28 PROCEDURE — 25010000002 MEROPENEM: Performed by: INTERNAL MEDICINE

## 2019-09-28 PROCEDURE — 97530 THERAPEUTIC ACTIVITIES: CPT

## 2019-09-28 PROCEDURE — 80202 ASSAY OF VANCOMYCIN: CPT | Performed by: INTERNAL MEDICINE

## 2019-09-28 PROCEDURE — 25010000002 VANCOMYCIN: Performed by: INTERNAL MEDICINE

## 2019-09-28 RX ORDER — SENNA AND DOCUSATE SODIUM 50; 8.6 MG/1; MG/1
1 TABLET, FILM COATED ORAL 2 TIMES DAILY PRN
Status: DISCONTINUED | OUTPATIENT
Start: 2019-09-28 | End: 2019-10-24 | Stop reason: HOSPADM

## 2019-09-28 RX ORDER — ALPRAZOLAM 0.5 MG/1
1 TABLET ORAL 3 TIMES DAILY PRN
Status: DISPENSED | OUTPATIENT
Start: 2019-09-28 | End: 2019-10-08

## 2019-09-29 PROCEDURE — 97535 SELF CARE MNGMENT TRAINING: CPT

## 2019-09-29 PROCEDURE — 97606 NEG PRS WND THER DME>50 SQCM: CPT

## 2019-09-29 PROCEDURE — 25010000002 MEROPENEM: Performed by: INTERNAL MEDICINE

## 2019-09-29 PROCEDURE — 25010000002 VANCOMYCIN: Performed by: INTERNAL MEDICINE

## 2019-09-30 LAB
ANION GAP SERPL CALCULATED.3IONS-SCNC: 9 MMOL/L (ref 5–15)
BASOPHILS # BLD AUTO: 0.07 10*3/MM3 (ref 0–0.2)
BASOPHILS NFR BLD AUTO: 1.1 % (ref 0–1.5)
BUN BLD-MCNC: 21 MG/DL (ref 6–20)
BUN/CREAT SERPL: 31.3 (ref 7–25)
CALCIUM SPEC-SCNC: 8.7 MG/DL (ref 8.6–10.5)
CHLORIDE SERPL-SCNC: 107 MMOL/L (ref 98–107)
CO2 SERPL-SCNC: 27 MMOL/L (ref 22–29)
CREAT BLD-MCNC: 0.67 MG/DL (ref 0.57–1)
CRP SERPL-MCNC: 3.38 MG/DL (ref 0–0.5)
DEPRECATED RDW RBC AUTO: 51.4 FL (ref 37–54)
EOSINOPHIL # BLD AUTO: 0.6 10*3/MM3 (ref 0–0.4)
EOSINOPHIL NFR BLD AUTO: 9.5 % (ref 0.3–6.2)
ERYTHROCYTE [DISTWIDTH] IN BLOOD BY AUTOMATED COUNT: 15.9 % (ref 12.3–15.4)
ERYTHROCYTE [SEDIMENTATION RATE] IN BLOOD: 43 MM/HR (ref 0–20)
GFR SERPL CREATININE-BSD FRML MDRD: 91 ML/MIN/1.73
GLUCOSE BLD-MCNC: 112 MG/DL (ref 65–99)
HCT VFR BLD AUTO: 27.9 % (ref 34–46.6)
HGB BLD-MCNC: 8.7 G/DL (ref 12–15.9)
IMM GRANULOCYTES # BLD AUTO: 0.04 10*3/MM3 (ref 0–0.05)
IMM GRANULOCYTES NFR BLD AUTO: 0.6 % (ref 0–0.5)
LYMPHOCYTES # BLD AUTO: 2.18 10*3/MM3 (ref 0.7–3.1)
LYMPHOCYTES NFR BLD AUTO: 34.6 % (ref 19.6–45.3)
MCH RBC QN AUTO: 27.4 PG (ref 26.6–33)
MCHC RBC AUTO-ENTMCNC: 31.2 G/DL (ref 31.5–35.7)
MCV RBC AUTO: 88 FL (ref 79–97)
MONOCYTES # BLD AUTO: 0.51 10*3/MM3 (ref 0.1–0.9)
MONOCYTES NFR BLD AUTO: 8.1 % (ref 5–12)
NEUTROPHILS # BLD AUTO: 2.9 10*3/MM3 (ref 1.7–7)
NEUTROPHILS NFR BLD AUTO: 46.1 % (ref 42.7–76)
NRBC BLD AUTO-RTO: 0 /100 WBC (ref 0–0.2)
PLATELET # BLD AUTO: 234 10*3/MM3 (ref 140–450)
PMV BLD AUTO: 9.4 FL (ref 6–12)
POTASSIUM BLD-SCNC: 4.8 MMOL/L (ref 3.5–5.2)
RBC # BLD AUTO: 3.17 10*6/MM3 (ref 3.77–5.28)
SODIUM BLD-SCNC: 143 MMOL/L (ref 136–145)
WBC NRBC COR # BLD: 6.3 10*3/MM3 (ref 3.4–10.8)

## 2019-09-30 PROCEDURE — 25010000002 MEROPENEM: Performed by: INTERNAL MEDICINE

## 2019-09-30 PROCEDURE — 25010000002 VANCOMYCIN: Performed by: INTERNAL MEDICINE

## 2019-09-30 PROCEDURE — 97535 SELF CARE MNGMENT TRAINING: CPT

## 2019-09-30 PROCEDURE — 97605 NEG PRS WND THER DME<=50SQCM: CPT

## 2019-09-30 PROCEDURE — 80048 BASIC METABOLIC PNL TOTAL CA: CPT | Performed by: INTERNAL MEDICINE

## 2019-09-30 PROCEDURE — 86140 C-REACTIVE PROTEIN: CPT | Performed by: INTERNAL MEDICINE

## 2019-09-30 PROCEDURE — 63710000001 DIPHENHYDRAMINE PER 50 MG: Performed by: INTERNAL MEDICINE

## 2019-09-30 PROCEDURE — 85025 COMPLETE CBC W/AUTO DIFF WBC: CPT | Performed by: INTERNAL MEDICINE

## 2019-09-30 PROCEDURE — 85651 RBC SED RATE NONAUTOMATED: CPT | Performed by: INTERNAL MEDICINE

## 2019-09-30 PROCEDURE — 97606 NEG PRS WND THER DME>50 SQCM: CPT

## 2019-10-01 LAB
CREAT BLD-MCNC: 0.85 MG/DL (ref 0.57–1)
GFR SERPL CREATININE-BSD FRML MDRD: 69 ML/MIN/1.73
VANCOMYCIN TROUGH SERPL-MCNC: 18.4 MCG/ML (ref 5–20)

## 2019-10-01 PROCEDURE — 97530 THERAPEUTIC ACTIVITIES: CPT | Performed by: OCCUPATIONAL THERAPIST

## 2019-10-01 PROCEDURE — 97530 THERAPEUTIC ACTIVITIES: CPT

## 2019-10-01 PROCEDURE — 82565 ASSAY OF CREATININE: CPT | Performed by: INTERNAL MEDICINE

## 2019-10-01 PROCEDURE — 97535 SELF CARE MNGMENT TRAINING: CPT | Performed by: OCCUPATIONAL THERAPIST

## 2019-10-01 PROCEDURE — 80202 ASSAY OF VANCOMYCIN: CPT | Performed by: INTERNAL MEDICINE

## 2019-10-01 PROCEDURE — 25010000002 MEROPENEM: Performed by: INTERNAL MEDICINE

## 2019-10-01 PROCEDURE — 25010000002 VANCOMYCIN: Performed by: INTERNAL MEDICINE

## 2019-10-01 NOTE — PROGRESS NOTES
[facility]  CONNOR Rivera M.D.  MAREN Hernandez APRN      Internal Medicine Progress Note    10/1/2019   1:47 PM    Name:  Jessica Alvarenga  MRN:    [id number]     Acct:     [id number]   Room:  [address] [place] [address] Day: 0     Admit Date: 9/6/2019  5:34 PM  PCP: Ponce Orozco MD    Subjective:     C/C: wound care and antibiotic therapy    Interval History: Status: Improved. Resting in bed. No family at bedside. Tolerating wound care. Afebrile. Nausea decreased.      Review of Systems   Constitution: Positive for weakness and weight loss. Negative for decreased appetite and malaise/fatigue.   HENT: Negative for congestion, ear pain, hoarse voice, nosebleeds and sore throat.    Eyes: Negative for blurred vision, double vision and pain.   Cardiovascular: Negative for chest pain, dyspnea on exertion, leg swelling and near-syncope.   Respiratory: Negative for cough, shortness of breath and sputum production.    Skin: Positive for poor wound healing. Negative for dry skin, itching and rash.   Musculoskeletal: Positive for muscle weakness. Negative for back pain, joint pain and joint swelling.   Gastrointestinal: Negative for abdominal pain, constipation, diarrhea and vomiting.   Genitourinary: Negative for flank pain and hematuria.   Neurological: Positive for paresthesias (paraplegia ). Negative for difficulty with concentration, dizziness, focal weakness, headaches and seizures.   Psychiatric/Behavioral: Negative for altered mental status and depression. The patient is nervous/anxious. The patient does not have insomnia.        Medications:     Allergies:   Allergies   Allergen Reactions   • Morphine Mental Status Change       Current Meds:   Current Facility-Administered Medications:   •  acetaminophen (TYLENOL) tablet 650 mg, 650 mg, Oral, Q4H PRN **OR** acetaminophen (TYLENOL) suppository 650 mg, 650 mg, Rectal, Q4H PRN, Joseph Dawkins MD  •  ALPRAZolam  (XANAX) tablet 1 mg, 1 mg, Oral, TID PRN, Joseph Dawkins MD  •  bacitracin ointment, , Topical, Q12H, Lisa Emerson, APRN  •  baclofen (LIORESAL) tablet 10 mg, 10 mg, Oral, TID, Joseph Dawkins MD  •  busPIRone (BUSPAR) tablet 10 mg, 10 mg, Oral, TID With Meals, Joseph Dawkins MD  •  diphenhydrAMINE (BENADRYL) capsule 25 mg, 25 mg, Oral, Q6H PRN, Joseph Dawkins MD  •  ferrous sulfate tablet 325 mg, 325 mg, Oral, BID With Meals, Joseph Dawkins MD  •  folic acid (FOLVITE) tablet 1 mg, 1 mg, Oral, Daily, Joseph Dawkins MD  •  meropenem (MERREM) 1 g/100 mL 0.9% NS VTB (mbp), 1 g, Intravenous, Q8H, Mendoza Herrera MD  •  multivitamin w/minerals tablet 1 tablet, 1 tablet, Oral, Daily, Joseph Dawkins MD  •  ondansetron (ZOFRAN) tablet 4 mg, 4 mg, Oral, Q6H PRN **OR** ondansetron (ZOFRAN) injection 4 mg, 4 mg, Intravenous, Q6H PRN, Joseph Dawkins MD  •  pantoprazole (PROTONIX) EC tablet 40 mg, 40 mg, Oral, BID TREY, Maricruz Bruner, APRN  •  saccharomyces boulardii (FLORASTOR) capsule 250 mg, 250 mg, Oral, BID, Joseph Dawkins MD  •  sennosides-docusate sodium (SENOKOT-S) 8.6-50 MG tablet 1 tablet, 1 tablet, Oral, BID PRN, Joseph Dawkins MD  •  sodium chloride 0.9 % flush 10 mL, 10 mL, Intravenous, Q12H, Jsoeph Dawkins MD  •  sodium chloride 0.9 % flush 10 mL, 10 mL, Intravenous, PRN, Joseph Dawkins MD  •  sodium chloride 0.9 % flush 20 mL, 20 mL, Intravenous, PRN, Joseph Dawkins MD  •  spironolactone (ALDACTONE) tablet 50 mg, 50 mg, Oral, BID, Joseph Dawkins MD  •  vancomycin 750 mg/250 mL 0.9% NS IVPB ([facility]), 750 mg, Intravenous, Q24H, Mendoza Herrera MD  •  vitamin C (ASCORBIC ACID) tablet 1,000 mg, 1,000 mg, Oral, Daily, Joseph Dawkins MD  •  vitamin D (ERGOCALCIFEROL) capsule 50,000 Units, 50,000 Units, Oral, Q7 Days, Joseph Dawkins MD  •  Salina's amazing butt cream, , Topical, TID,  "Lisa Emerson APRN  •  Salina's amazing butt cream, , Topical, PRN, Lisa Emerson APRN  •  zinc gluconate tablet 50 mg, 50 mg, Oral, Daily, Joseph Dawkins MD    Data:     Code Status:    There are no questions and answers to display.       No family history on file.    Social History     Socioeconomic History   • Marital status: Unknown     Spouse name: Not on file   • Number of children: Not on file   • Years of education: Not on file   • Highest education level: Not on file       Vitals:  Ht 172.7 cm (68\")   Wt 76.2 kg (168 lb)   BMI 25.54 kg/m²     T 98.0 P 78 R 16 /51 Sp02 97% (room air)      I/O (24Hr):  No intake or output data in the 24 hours ending 10/01/19 1347    Labs and imaging:      Lab Results (last 24 hours)     Procedure Component Value Units Date/Time    Vancomycin, Trough Please call results to Pharmacy prior to administering next dose [[id number]] Collected:  10/01/19 1333    Specimen:  Blood Updated:  10/01/19 1343    Creatinine, Serum [[id number]] Collected:  10/01/19 1333    Specimen:  Blood Updated:  10/01/19 1343            Xr Abdomen Kub    Result Date: 9/6/2019  Narrative: EXAMINATION:  XR ABDOMEN KUB-  9/6/2019 7:13 PM CDT  HISTORY: NG placement verification  COMPARISON: No comparison study.  TECHNIQUE: Single view: KUB  FINDINGS:  Feeding tube is identified with the tip projecting in the proximal stomach.   This report was finalized on 09/06/2019 19:29 by Dr. Rahul Childs MD.      Physical Examination:        Physical Exam   Constitutional: She is oriented to person, place, and time. She appears well-developed and well-nourished. No distress.   HENT:   Head: Normocephalic and atraumatic.   Right Ear: External ear normal.   Left Ear: External ear normal.   Nose: Nose normal.   Mouth/Throat: Oropharynx is clear and moist.   Eyes: Conjunctivae and EOM are normal. Pupils are equal, round, and reactive to light.   Neck: Normal range of motion. Neck supple. "   Cardiovascular: Normal rate, regular rhythm, normal heart sounds and intact distal pulses.   Pulmonary/Chest: Effort normal and breath sounds normal.   Abdominal: Soft. Bowel sounds are normal.   urostomy appliance intact  Colostomy appliance intact   Genitourinary:   Genitourinary Comments: Urostomy  Colostomy   Musculoskeletal: Normal range of motion.   Paraplegia      Neurological: She is alert and oriented to person, place, and time.   paraplegia   Skin: Skin is warm and dry.   Wound VAC intact  Dressing c.d.i bilateral feet  Bruising to right forearm   Psychiatric: She has a normal mood and affect. Her behavior is normal. Judgment and thought content normal.   Anxious at times    Nursing note and vitals reviewed.        Assessment:             * No active hospital problems. *    No past medical history on file.     Plan:        1. Stage 4 Pressure wound Right ischium  2. Stage 3 pressure wound right heel  3. Pressure wound left heel  4. Chronic osteomyelitis with MRSA positive drainage to multiple sites  5. Gastrointestinal hemorrhage  6. Paraplegia, long term from previous Spinal tumor  7. Anemia due to blood loss  8. Severe protein calorie malnutrition, tolerating tube feedings and oral intake  9. Insomnia  10. Anxiety  11. Iron deficiency Anemia    Continue current treatment. Labs Thursday. Monitor counts. Encourage adequate intake.  Wound care per wound care team.  D/w surgeon on going plan.    Electronically signed by Joseph Dawkins MD on 10/1/2019 at 1:47 PM

## 2019-10-02 ENCOUNTER — TELEPHONE (OUTPATIENT)
Dept: SURGERY | Age: 59
End: 2019-10-02

## 2019-10-02 PROCEDURE — 63710000001 DIPHENHYDRAMINE PER 50 MG: Performed by: INTERNAL MEDICINE

## 2019-10-02 PROCEDURE — 97605 NEG PRS WND THER DME<=50SQCM: CPT

## 2019-10-02 PROCEDURE — 25010000002 ONDANSETRON PER 1 MG: Performed by: INTERNAL MEDICINE

## 2019-10-02 PROCEDURE — 25010000002 VANCOMYCIN: Performed by: INTERNAL MEDICINE

## 2019-10-02 PROCEDURE — 97606 NEG PRS WND THER DME>50 SQCM: CPT

## 2019-10-02 PROCEDURE — 25010000002 MEROPENEM: Performed by: INTERNAL MEDICINE

## 2019-10-02 NOTE — PROGRESS NOTES
[facility]  CONNOR Rivera M.D.  MAREN Hernandez APRN      Internal Medicine Progress Note    10/2/2019   12:39 PM    Name:  Jessica Alvarenga  MRN:    [id number]     Acct:     [id number]   Room:  [address] [place] [address] Day: 0     Admit Date: 9/6/2019  5:34 PM  PCP: Ponce Orozco MD    Subjective:     C/C: wound care and antibiotic therapy    Interval History: Status: Improved. Resting in bed. No family at bedside. Woke from sleep. Tolerating wound care. Afebrile. Tolerating treatment thus far. No new concerns.      Review of Systems   Constitution: Positive for weakness and weight loss. Negative for decreased appetite and malaise/fatigue.   HENT: Negative for congestion, ear pain, hoarse voice, nosebleeds and sore throat.    Eyes: Negative for blurred vision, double vision and pain.   Cardiovascular: Negative for chest pain, dyspnea on exertion, leg swelling and near-syncope.   Respiratory: Negative for cough, shortness of breath and sputum production.    Skin: Positive for poor wound healing. Negative for dry skin, itching and rash.   Musculoskeletal: Positive for muscle weakness. Negative for back pain, joint pain and joint swelling.   Gastrointestinal: Negative for abdominal pain, constipation, diarrhea and vomiting.   Genitourinary: Negative for flank pain and hematuria.   Neurological: Positive for paresthesias (paraplegia ). Negative for difficulty with concentration, dizziness, focal weakness, headaches and seizures.   Psychiatric/Behavioral: Negative for altered mental status and depression. The patient is nervous/anxious. The patient does not have insomnia.        Medications:     Allergies:   Allergies   Allergen Reactions   • Morphine Mental Status Change       Current Meds:   Current Facility-Administered Medications:   •  acetaminophen (TYLENOL) tablet 650 mg, 650 mg, Oral, Q4H PRN **OR** acetaminophen (TYLENOL) suppository 650 mg, 650 mg, Rectal, Q4H  PRN, Joseph Dawkins MD  •  ALPRAZolam (XANAX) tablet 1 mg, 1 mg, Oral, TID PRN, Joseph Dawkins MD  •  bacitracin ointment, , Topical, Q12H, Lisa Emerson APRN  •  baclofen (LIORESAL) tablet 10 mg, 10 mg, Oral, TID, Joseph Dawkins MD  •  busPIRone (BUSPAR) tablet 10 mg, 10 mg, Oral, TID With Meals, Joseph Dawkins MD  •  diphenhydrAMINE (BENADRYL) capsule 25 mg, 25 mg, Oral, Q6H PRN, Joseph Dawkins MD  •  ferrous sulfate tablet 325 mg, 325 mg, Oral, BID With Meals, Joseph Dawkins MD  •  folic acid (FOLVITE) tablet 1 mg, 1 mg, Oral, Daily, Joseph Dawkins MD  •  meropenem (MERREM) 1 g/100 mL 0.9% NS VTB (mbp), 1 g, Intravenous, Q8H, Mendoza Herrera MD  •  multivitamin w/minerals tablet 1 tablet, 1 tablet, Oral, Daily, Joseph Dawkins MD  •  ondansetron (ZOFRAN) tablet 4 mg, 4 mg, Oral, Q6H PRN **OR** ondansetron (ZOFRAN) injection 4 mg, 4 mg, Intravenous, Q6H PRN, Joseph Dawkins MD  •  pantoprazole (PROTONIX) EC tablet 40 mg, 40 mg, Oral, BID TREY, Maricruz Bruner, APRN  •  saccharomyces boulardii (FLORASTOR) capsule 250 mg, 250 mg, Oral, BID, Joseph Dawkins MD  •  sennosides-docusate sodium (SENOKOT-S) 8.6-50 MG tablet 1 tablet, 1 tablet, Oral, BID PRN, Joseph Dawkins MD  •  sodium chloride 0.9 % flush 10 mL, 10 mL, Intravenous, Q12H, Joseph Dawkins MD  •  sodium chloride 0.9 % flush 10 mL, 10 mL, Intravenous, PRN, Joseph Dawkins MD  •  sodium chloride 0.9 % flush 20 mL, 20 mL, Intravenous, PRN, Joseph Dawkins MD  •  spironolactone (ALDACTONE) tablet 50 mg, 50 mg, Oral, BID, Joseph Dawkins MD  •  vancomycin 750 mg/250 mL 0.9% NS IVPB ([facility]), 750 mg, Intravenous, Q24H, Joseph Dawkins MD  •  vitamin C (ASCORBIC ACID) tablet 1,000 mg, 1,000 mg, Oral, Daily, Joseph Dawkins MD  •  vitamin D (ERGOCALCIFEROL) capsule 50,000 Units, 50,000 Units, Oral, Q7 Days, Joseph Dawkins,  "MD  •  Salina's amazing butt cream, , Topical, TID, Lisa Emerson APRN  •  Salina's amazing butt cream, , Topical, PRN, Lisa Emerson APRN  •  zinc gluconate tablet 50 mg, 50 mg, Oral, Daily, Joseph Dawkins MD    Data:     Code Status:    There are no questions and answers to display.       No family history on file.    Social History     Socioeconomic History   • Marital status: Unknown     Spouse name: Not on file   • Number of children: Not on file   • Years of education: Not on file   • Highest education level: Not on file       Vitals:  Ht 172.7 cm (68\")   Wt 76.2 kg (168 lb)   BMI 25.54 kg/m²     T 98.1 P 84 R 16 /62 Sp02 97% (room air)      I/O (24Hr):  No intake or output data in the 24 hours ending 10/02/19 1239    Labs and imaging:      Lab Results (last 24 hours)     Procedure Component Value Units Date/Time    Vancomycin, Trough Please call results to Pharmacy prior to administering next dose [[id number]]  (Normal) Collected:  10/01/19 1333    Specimen:  Blood Updated:  10/01/19 1439     Vancomycin Trough 18.40 mcg/mL     Creatinine, Serum [[id number]]  (Normal) Collected:  10/01/19 1333    Specimen:  Blood Updated:  10/01/19 1433     Creatinine 0.85 mg/dL      eGFR Non African Amer 69 mL/min/1.73     Narrative:       GFR Normal >60  Chronic Kidney Disease <60  Kidney Failure <15            Xr Abdomen Kub    Result Date: 9/6/2019  Narrative: EXAMINATION:  XR ABDOMEN KUB-  9/6/2019 7:13 PM CDT  HISTORY: NG placement verification  COMPARISON: No comparison study.  TECHNIQUE: Single view: KUB  FINDINGS:  Feeding tube is identified with the tip projecting in the proximal stomach.   This report was finalized on 09/06/2019 19:29 by Dr. Rahul Childs MD.      Physical Examination:        Physical Exam   Constitutional: She is oriented to person, place, and time. She appears well-developed and well-nourished. No distress.   HENT:   Head: Normocephalic and atraumatic.   Right Ear: " External ear normal.   Left Ear: External ear normal.   Nose: Nose normal.   Mouth/Throat: Oropharynx is clear and moist.   Eyes: Conjunctivae and EOM are normal. Pupils are equal, round, and reactive to light.   Neck: Normal range of motion. Neck supple.   Cardiovascular: Normal rate, regular rhythm, normal heart sounds and intact distal pulses.   Pulmonary/Chest: Effort normal and breath sounds normal.   Abdominal: Soft. Bowel sounds are normal.   urostomy appliance intact  Colostomy appliance intact  Drain intact   Genitourinary:   Genitourinary Comments: Urostomy  Colostomy   Musculoskeletal: Normal range of motion.   Paraplegia      Neurological: She is alert and oriented to person, place, and time.   paraplegia   Skin: Skin is warm and dry.   Wound VAC intact  Dressing c.d.i bilateral feet  Bruising to right forearm   Psychiatric: She has a normal mood and affect. Her behavior is normal. Judgment and thought content normal.   Anxious at times    Nursing note and vitals reviewed.        Assessment:             * No active hospital problems. *    No past medical history on file.     Plan:        1. Stage 4 Pressure wound Right ischium  2. Stage 3 pressure wound right heel  3. Pressure wound left heel  4. Chronic osteomyelitis with MRSA positive drainage to multiple sites  5. Gastrointestinal hemorrhage  6. Paraplegia, long term from previous Spinal tumor  7. Anemia due to blood loss  8. Severe protein calorie malnutrition, tolerating tube feedings and oral intake  9. Insomnia  10. Anxiety  11. Iron deficiency Anemia    Continue current treatment. Labs in am. Monitor counts. Encourage adequate intake.  Wound care per wound care team.    Electronically signed by MAREN Jackson on 10/2/2019 at 12:39 PM   I have discussed the care of Jessica Alvarenga, including pertinent history and exam findings, with the nurse practitioner.    I have seen and examined the patient and the key elements of all parts of the  encounter have been performed by me.  I agree with the assessment, plan and orders as documented by MAREN Hernandez, after I modified the exam findings and the plan of treatments and the final version is my approved version of the assessment.        Electronically signed by Joseph Dawkins MD on 10/2/2019 at 5:51 PM

## 2019-10-02 NOTE — PROGRESS NOTES
"Infectious Diseases Progress Note    Patient:  Jessica Alvarenga  YOB: 1960  MRN: [id number]   Admit date: 9/6/2019   Admitting Physician: Joseph Dawkins MD  Primary Care Physician: Ponce Orozco MD    Chief Complaint/Interval History: She is without new symptoms.  She is without fevers or chills.  Ostomy functioning.  Some partially formed stool in bag.  No rash or skin itching with antibiotic treatment.  No pain at PICC line site.    No intake or output data in the 24 hours ending 10/02/19 1344  Allergies:   Allergies   Allergen Reactions   • Morphine Mental Status Change     Current Scheduled Medications:     bacitracin  Topical Q12H   baclofen 10 mg Oral TID   busPIRone 10 mg Oral TID With Meals   ferrous sulfate 325 mg Oral BID With Meals   folic acid 1 mg Oral Daily   meropenem 1 g Intravenous Q8H   multivitamin w/minerals 1 tablet Oral Daily   pantoprazole 40 mg Oral BID AC   saccharomyces boulardii 250 mg Oral BID   sodium chloride 10 mL Intravenous Q12H   spironolactone 50 mg Oral BID   vancomycin 750 mg Intravenous Q24H   vitamin C 1,000 mg Oral Daily   vitamin D 50,000 Units Oral Q7 Days   jennifer's amazing butt  Topical TID   zinc gluconate 50 mg Oral Daily     Current PRN Medications:  •  acetaminophen **OR** acetaminophen  •  ALPRAZolam  •  diphenhydrAMINE  •  ondansetron **OR** ondansetron  •  sennosides-docusate sodium  •  sodium chloride  •  sodium chloride  •  jennifer's amazing butt    Review of Systems see HPI    Vital Signs:  Ht 172.7 cm (68\")   Wt 76.2 kg (168 lb)   BMI 25.54 kg/m²     Physical Exam  Vital signs - reviewed.  Line/IV site - No erythema, warmth, induration, or tenderness.  Urostomy and colostomy functioning  Ischio wound with wound VAC in place    Lab Results:  CBC: Results from last 7 days   Lab Units 09/30/19  0402 09/26/19  0435   WBC 10*3/mm3 6.30 6.53   HEMOGLOBIN g/dL 8.7* 9.3*   HEMATOCRIT % 27.9* 29.4*   PLATELETS 10*3/mm3 234 299     BMP:  Results " from last 7 days   Lab Units 10/01/19  1333 09/30/19  0402 09/26/19  1326 09/26/19  0435   SODIUM mmol/L  --  143 141 141   POTASSIUM mmol/L  --  4.8 4.9 4.7   CHLORIDE mmol/L  --  107 105 105   CO2 mmol/L  --  27.0 29.0 25.0   BUN mg/dL  --  21* 13 15   CREATININE mg/dL 0.85 0.67 0.66 0.52*   GLUCOSE mg/dL  --  112* 106* 117*   CALCIUM mg/dL  --  8.7 8.9 8.6     Vancomycin trough yesterday 18.4  C-reactive protein September 30, 2019 was 3.3  Sedimentation rate September 30, 2019 was 43    Culture Results: No new culture results    Radiology: None    Additional Studies Reviewed: None    Impression:   Bilateral ischial decubitus ulcers  Pelvic osteomyelitis  Paraplegia  Diverting colostomy  Urostomy    Recommendations:   She has now completed greater than 4 weeks of antibiotic treatment  She previously had seen plastic surgery/wound care at [facility]  Their last appointment was in June  At that appointment they indicated she did not need follow-up with them until she had had diverting colostomy  She has had her diverting colostomy, urostomy, and is approaching 6 weeks of intravenous broad antimicrobial therapy  Feel it would be an appropriate time to get her back to plastic surgery at [facility] to help them evaluate additional recommendations for wound management and possible closure  Discussed with hospitalist service and discharge coordinator on floor    Mendoza Lyon MD

## 2019-10-03 LAB
ANION GAP SERPL CALCULATED.3IONS-SCNC: 9 MMOL/L (ref 5–15)
BASOPHILS # BLD AUTO: 0.1 10*3/MM3 (ref 0–0.2)
BASOPHILS NFR BLD AUTO: 1.3 % (ref 0–1.5)
BUN BLD-MCNC: 23 MG/DL (ref 6–20)
BUN/CREAT SERPL: 33.3 (ref 7–25)
CALCIUM SPEC-SCNC: 8.5 MG/DL (ref 8.6–10.5)
CHLORIDE SERPL-SCNC: 108 MMOL/L (ref 98–107)
CO2 SERPL-SCNC: 25 MMOL/L (ref 22–29)
CREAT BLD-MCNC: 0.69 MG/DL (ref 0.57–1)
CRP SERPL-MCNC: 2.93 MG/DL (ref 0–0.5)
DEPRECATED RDW RBC AUTO: 50.2 FL (ref 37–54)
EOSINOPHIL # BLD AUTO: 0.66 10*3/MM3 (ref 0–0.4)
EOSINOPHIL NFR BLD AUTO: 8.8 % (ref 0.3–6.2)
ERYTHROCYTE [DISTWIDTH] IN BLOOD BY AUTOMATED COUNT: 15.9 % (ref 12.3–15.4)
ERYTHROCYTE [SEDIMENTATION RATE] IN BLOOD: 49 MM/HR (ref 0–20)
GFR SERPL CREATININE-BSD FRML MDRD: 88 ML/MIN/1.73
GLUCOSE BLD-MCNC: 98 MG/DL (ref 65–99)
HCT VFR BLD AUTO: 28.2 % (ref 34–46.6)
HGB BLD-MCNC: 8.9 G/DL (ref 12–15.9)
IMM GRANULOCYTES # BLD AUTO: 0.05 10*3/MM3 (ref 0–0.05)
IMM GRANULOCYTES NFR BLD AUTO: 0.7 % (ref 0–0.5)
LYMPHOCYTES # BLD AUTO: 1.73 10*3/MM3 (ref 0.7–3.1)
LYMPHOCYTES NFR BLD AUTO: 22.9 % (ref 19.6–45.3)
MCH RBC QN AUTO: 27.5 PG (ref 26.6–33)
MCHC RBC AUTO-ENTMCNC: 31.6 G/DL (ref 31.5–35.7)
MCV RBC AUTO: 87 FL (ref 79–97)
MONOCYTES # BLD AUTO: 0.59 10*3/MM3 (ref 0.1–0.9)
MONOCYTES NFR BLD AUTO: 7.8 % (ref 5–12)
NEUTROPHILS # BLD AUTO: 4.41 10*3/MM3 (ref 1.7–7)
NEUTROPHILS NFR BLD AUTO: 58.5 % (ref 42.7–76)
NRBC BLD AUTO-RTO: 0 /100 WBC (ref 0–0.2)
PLATELET # BLD AUTO: 243 10*3/MM3 (ref 140–450)
PMV BLD AUTO: 9.7 FL (ref 6–12)
POTASSIUM BLD-SCNC: 5.7 MMOL/L (ref 3.5–5.2)
RBC # BLD AUTO: 3.24 10*6/MM3 (ref 3.77–5.28)
SODIUM BLD-SCNC: 142 MMOL/L (ref 136–145)
WBC NRBC COR # BLD: 7.54 10*3/MM3 (ref 3.4–10.8)

## 2019-10-03 PROCEDURE — 97164 PT RE-EVAL EST PLAN CARE: CPT | Performed by: PHYSICAL THERAPIST

## 2019-10-03 PROCEDURE — 85025 COMPLETE CBC W/AUTO DIFF WBC: CPT | Performed by: INTERNAL MEDICINE

## 2019-10-03 PROCEDURE — 25010000002 VANCOMYCIN: Performed by: INTERNAL MEDICINE

## 2019-10-03 PROCEDURE — 86140 C-REACTIVE PROTEIN: CPT | Performed by: INTERNAL MEDICINE

## 2019-10-03 PROCEDURE — 85651 RBC SED RATE NONAUTOMATED: CPT | Performed by: INTERNAL MEDICINE

## 2019-10-03 PROCEDURE — 97530 THERAPEUTIC ACTIVITIES: CPT

## 2019-10-03 PROCEDURE — 97530 THERAPEUTIC ACTIVITIES: CPT | Performed by: PHYSICAL THERAPIST

## 2019-10-03 PROCEDURE — 97535 SELF CARE MNGMENT TRAINING: CPT

## 2019-10-03 PROCEDURE — 25010000002 MEROPENEM: Performed by: INTERNAL MEDICINE

## 2019-10-03 PROCEDURE — 80048 BASIC METABOLIC PNL TOTAL CA: CPT | Performed by: INTERNAL MEDICINE

## 2019-10-03 RX ORDER — SODIUM POLYSTYRENE SULFONATE 15 G/60ML
30 SUSPENSION ORAL; RECTAL
Status: DISPENSED | OUTPATIENT
Start: 2019-10-03 | End: 2019-10-04

## 2019-10-03 NOTE — PROGRESS NOTES
[facility]  CONNOR Rivera M.D.  MAREN Hernandez APRN      Internal Medicine Progress Note    10/3/2019   12:03 PM    Name:  eJssica Alvarenga  MRN:    [id number]     Acct:     [id number]   Room:  [address] [place] [address] Day: 0     Admit Date: 9/6/2019  5:34 PM  PCP: Ponce Orozco MD    Subjective:     C/C: wound care and antibiotic therapy    Interval History: Status: Improved. Resting in bed. No family at bedside. Tolerating wound care. Afebrile. Tolerating treatment thus far. Had some increased pain with drain and drain removal yesterday. Potassium elevated. No new concerns.      Review of Systems   Constitution: Positive for weakness and weight loss. Negative for decreased appetite and malaise/fatigue.   HENT: Negative for congestion, ear pain, hoarse voice, nosebleeds and sore throat.    Eyes: Negative for blurred vision, double vision and pain.   Cardiovascular: Negative for chest pain, dyspnea on exertion, leg swelling and near-syncope.   Respiratory: Negative for cough, shortness of breath and sputum production.    Skin: Positive for poor wound healing. Negative for dry skin, itching and rash.   Musculoskeletal: Positive for muscle weakness. Negative for back pain, joint pain and joint swelling.   Gastrointestinal: Negative for abdominal pain, constipation, diarrhea and vomiting.   Genitourinary: Negative for flank pain and hematuria.   Neurological: Positive for paresthesias (paraplegia ). Negative for difficulty with concentration, dizziness, focal weakness, headaches and seizures.   Psychiatric/Behavioral: Negative for altered mental status and depression. The patient is nervous/anxious. The patient does not have insomnia.        Medications:     Allergies:   Allergies   Allergen Reactions   • Morphine Mental Status Change       Current Meds:   Current Facility-Administered Medications:   •  acetaminophen (TYLENOL) tablet 650 mg, 650 mg, Oral, Q4H PRN **OR**  acetaminophen (TYLENOL) suppository 650 mg, 650 mg, Rectal, Q4H PRN, Joseph Dawkins MD  •  ALPRAZolam (XANAX) tablet 1 mg, 1 mg, Oral, TID PRN, Joseph Dawkins MD  •  bacitracin ointment, , Topical, Q12H, Lisa Emerson APRN  •  baclofen (LIORESAL) tablet 10 mg, 10 mg, Oral, TID, Joseph Dawkins MD  •  busPIRone (BUSPAR) tablet 10 mg, 10 mg, Oral, TID With Meals, Joseph Dawkins MD  •  diphenhydrAMINE (BENADRYL) capsule 25 mg, 25 mg, Oral, Q6H PRN, Joseph Dawkins MD  •  ferrous sulfate tablet 325 mg, 325 mg, Oral, BID With Meals, Joseph Dawkins MD  •  folic acid (FOLVITE) tablet 1 mg, 1 mg, Oral, Daily, Joseph Dawkins MD  •  meropenem (MERREM) 1 g/100 mL 0.9% NS VTB (mbp), 1 g, Intravenous, Q8H, Mendoza Herrera MD  •  multivitamin w/minerals tablet 1 tablet, 1 tablet, Oral, Daily, Joseph Dawkins MD  •  ondansetron (ZOFRAN) tablet 4 mg, 4 mg, Oral, Q6H PRN **OR** ondansetron (ZOFRAN) injection 4 mg, 4 mg, Intravenous, Q6H PRN, Joseph Dawkins MD  •  pantoprazole (PROTONIX) EC tablet 40 mg, 40 mg, Oral, BID TREY, Maricruz Bruner, MAREN  •  saccharomyces boulardii (FLORASTOR) capsule 250 mg, 250 mg, Oral, BID, Joseph Dawkins MD  •  sennosides-docusate sodium (SENOKOT-S) 8.6-50 MG tablet 1 tablet, 1 tablet, Oral, BID PRN, Joseph Dawkins MD  •  sodium chloride 0.9 % flush 10 mL, 10 mL, Intravenous, Q12H, Joseph Dawkins MD  •  sodium chloride 0.9 % flush 10 mL, 10 mL, Intravenous, PRN, Joseph Dawkins MD  •  sodium chloride 0.9 % flush 20 mL, 20 mL, Intravenous, PRN, Joseph Dawkins MD  •  spironolactone (ALDACTONE) tablet 50 mg, 50 mg, Oral, BID, Joseph Dawkins MD  •  vancomycin 750 mg/250 mL 0.9% NS IVPB ([facility]), 750 mg, Intravenous, Q24H, Joseph Dawkins MD  •  vitamin C (ASCORBIC ACID) tablet 1,000 mg, 1,000 mg, Oral, Daily, Joseph Dawkins MD  •  vitamin D (ERGOCALCIFEROL) capsule  "50,000 Units, 50,000 Units, Oral, Q7 Days, Joseph Dawkins MD  •  Salina's amazing butt cream, , Topical, TID, Lisa Emerson, APRN  •  Salina's amazing butt cream, , Topical, PRN, Lisa Emerson, APRN  •  Salina's amazing butt cream, , Topical, TID, Lisa Emerson, APRN  •  Salina's amazing butt cream, , Topical, PRN, Lisa Emerson, APRN  •  zinc gluconate tablet 50 mg, 50 mg, Oral, Daily, Joseph Dawkins MD    Data:     Code Status:    There are no questions and answers to display.       No family history on file.    Social History     Socioeconomic History   • Marital status: Unknown     Spouse name: Not on file   • Number of children: Not on file   • Years of education: Not on file   • Highest education level: Not on file       Vitals:  Ht 172.7 cm (68\")   Wt 76.2 kg (168 lb)   BMI 25.54 kg/m²     T 98.2 P 86 R 18 /53 Sp02 97% (room air)      I/O (24Hr):  No intake or output data in the 24 hours ending 10/03/19 1203    Labs and imaging:      Lab Results (last 24 hours)     Procedure Component Value Units Date/Time    Basic Metabolic Panel [[id number]]  (Abnormal) Collected:  10/03/19 0751    Specimen:  Blood Updated:  10/03/19 0835     Glucose 98 mg/dL      BUN 23 mg/dL      Creatinine 0.69 mg/dL      Sodium 142 mmol/L      Potassium 5.7 mmol/L      Chloride 108 mmol/L      CO2 25.0 mmol/L      Calcium 8.5 mg/dL      eGFR Non African Amer 88 mL/min/1.73      BUN/Creatinine Ratio 33.3     Anion Gap 9.0 mmol/L     Narrative:       GFR Normal >60  Chronic Kidney Disease <60  Kidney Failure <15    C-reactive Protein [[id number]]  (Abnormal) Collected:  10/03/19 0751    Specimen:  Blood Updated:  10/03/19 0835     C-Reactive Protein 2.93 mg/dL     Sedimentation Rate [[id number]]  (Abnormal) Collected:  10/03/19 0751    Specimen:  Blood Updated:  10/03/19 0817     Sed Rate 49 mm/hr     CBC & Differential [[id number]] Collected:  10/03/19 0751    Specimen:  Blood Updated:  10/03/19 " 0809    Narrative:       The following orders were created for panel order CBC & Differential.  Procedure                               Abnormality         Status                     ---------                               -----------         ------                     CBC Auto Differential[[id number]]        Abnormal            Final result                 Please view results for these tests on the individual orders.    CBC Auto Differential [[id number]]  (Abnormal) Collected:  10/03/19 0751    Specimen:  Blood Updated:  10/03/19 0809     WBC 7.54 10*3/mm3      RBC 3.24 10*6/mm3      Hemoglobin 8.9 g/dL      Hematocrit 28.2 %      MCV 87.0 fL      MCH 27.5 pg      MCHC 31.6 g/dL      RDW 15.9 %      RDW-SD 50.2 fl      MPV 9.7 fL      Platelets 243 10*3/mm3      Neutrophil % 58.5 %      Lymphocyte % 22.9 %      Monocyte % 7.8 %      Eosinophil % 8.8 %      Basophil % 1.3 %      Immature Grans % 0.7 %      Neutrophils, Absolute 4.41 10*3/mm3      Lymphocytes, Absolute 1.73 10*3/mm3      Monocytes, Absolute 0.59 10*3/mm3      Eosinophils, Absolute 0.66 10*3/mm3      Basophils, Absolute 0.10 10*3/mm3      Immature Grans, Absolute 0.05 10*3/mm3      nRBC 0.0 /100 WBC             Xr Abdomen Kub    Result Date: 9/6/2019  Narrative: EXAMINATION:  XR ABDOMEN KUB-  9/6/2019 7:13 PM CDT  HISTORY: NG placement verification  COMPARISON: No comparison study.  TECHNIQUE: Single view: KUB  FINDINGS:  Feeding tube is identified with the tip projecting in the proximal stomach.   This report was finalized on 09/06/2019 19:29 by Dr. Rahul Childs MD.      Physical Examination:        Physical Exam   Constitutional: She is oriented to person, place, and time. She appears well-developed and well-nourished. No distress.   HENT:   Head: Normocephalic and atraumatic.   Right Ear: External ear normal.   Left Ear: External ear normal.   Nose: Nose normal.   Mouth/Throat: Oropharynx is clear and moist.   Eyes: Conjunctivae and EOM are normal.  Pupils are equal, round, and reactive to light.   Neck: Normal range of motion. Neck supple.   Cardiovascular: Normal rate, regular rhythm, normal heart sounds and intact distal pulses.   Pulmonary/Chest: Effort normal and breath sounds normal.   Abdominal: Soft. Bowel sounds are normal.   urostomy appliance intact  Colostomy appliance intact     Genitourinary:   Genitourinary Comments: Urostomy  Colostomy   Musculoskeletal: Normal range of motion.   Paraplegia      Neurological: She is alert and oriented to person, place, and time.   paraplegia   Skin: Skin is warm and dry.   Wound VAC intact  Dressing c.d.i bilateral feet  Bruising to right forearm   Psychiatric: She has a normal mood and affect. Her behavior is normal. Judgment and thought content normal.   Anxious at times    Nursing note and vitals reviewed.        Assessment:             * No active hospital problems. *    No past medical history on file.     Plan:        1. Stage 4 Pressure wound Right ischium  2. Stage 3 pressure wound right heel  3. Pressure wound left heel  4. Chronic osteomyelitis with MRSA positive drainage to multiple sites  5. Gastrointestinal hemorrhage  6. Paraplegia, long term from previous Spinal tumor  7. Anemia due to blood loss  8. Severe protein calorie malnutrition, tolerating tube feedings and oral intake  9. Insomnia  10. Anxiety  11. Iron deficiency Anemia    Continue current treatment. Labs Monday. Monitor counts. Encourage adequate intake.  Wound care per wound care team.  Kayexalate.   Electronically signed by MAREN Jackson on 10/3/2019 at 12:03 PM   I have discussed the care of Jessica Alvarenga, including pertinent history and exam findings, with the nurse practitioner.    I have seen and examined the patient and the key elements of all parts of the encounter have been performed by me.  I agree with the assessment, plan and orders as documented by MAREN Hernandez, after I modified the exam findings and  the plan of treatments and the final version is my approved version of the assessment.        Electronically signed by Joseph Dawkins MD on 10/3/2019 at 10:19 PM

## 2019-10-04 LAB
ANION GAP SERPL CALCULATED.3IONS-SCNC: 8 MMOL/L (ref 5–15)
BUN BLD-MCNC: 19 MG/DL (ref 6–20)
BUN/CREAT SERPL: 33.9 (ref 7–25)
CALCIUM SPEC-SCNC: 8.5 MG/DL (ref 8.6–10.5)
CHLORIDE SERPL-SCNC: 108 MMOL/L (ref 98–107)
CO2 SERPL-SCNC: 28 MMOL/L (ref 22–29)
CREAT BLD-MCNC: 0.56 MG/DL (ref 0.57–1)
GFR SERPL CREATININE-BSD FRML MDRD: 112 ML/MIN/1.73
GLUCOSE BLD-MCNC: 103 MG/DL (ref 65–99)
POTASSIUM BLD-SCNC: 5.1 MMOL/L (ref 3.5–5.2)
SODIUM BLD-SCNC: 144 MMOL/L (ref 136–145)

## 2019-10-04 PROCEDURE — 25010000002 VANCOMYCIN: Performed by: INTERNAL MEDICINE

## 2019-10-04 PROCEDURE — 25010000002 MEROPENEM: Performed by: INTERNAL MEDICINE

## 2019-10-04 PROCEDURE — 63710000001 DIPHENHYDRAMINE PER 50 MG: Performed by: INTERNAL MEDICINE

## 2019-10-04 PROCEDURE — 97110 THERAPEUTIC EXERCISES: CPT

## 2019-10-04 PROCEDURE — 97605 NEG PRS WND THER DME<=50SQCM: CPT | Performed by: PHYSICAL THERAPIST

## 2019-10-04 PROCEDURE — 80048 BASIC METABOLIC PNL TOTAL CA: CPT | Performed by: INTERNAL MEDICINE

## 2019-10-04 PROCEDURE — 97606 NEG PRS WND THER DME>50 SQCM: CPT

## 2019-10-04 PROCEDURE — 97164 PT RE-EVAL EST PLAN CARE: CPT | Performed by: PHYSICAL THERAPIST

## 2019-10-04 RX ORDER — GABAPENTIN 100 MG/1
100 CAPSULE ORAL NIGHTLY
Status: DISCONTINUED | OUTPATIENT
Start: 2019-10-04 | End: 2019-10-06

## 2019-10-04 RX ORDER — GABAPENTIN 100 MG/1
100 CAPSULE ORAL DAILY PRN
Status: DISCONTINUED | OUTPATIENT
Start: 2019-10-04 | End: 2019-10-19

## 2019-10-04 NOTE — PROGRESS NOTES
"Infectious Diseases Progress Note    Patient:  Jessica Alvarenga  YOB: 1960  MRN: [id number]   Admit date: 9/6/2019   Admitting Physician: Joseph Dawkins MD  Primary Care Physician: Ponce Orozco MD    Chief Complaint/Interval History: She seems to be improving.  Oral intake remains improved.  She is without fever.  She is very pleased with progress.    No intake or output data in the 24 hours ending 10/04/19 1257  Allergies:   Allergies   Allergen Reactions   • Morphine Mental Status Change     Current Scheduled Medications:     bacitracin  Topical Q12H   baclofen 10 mg Oral TID   busPIRone 10 mg Oral TID With Meals   ferrous sulfate 325 mg Oral BID With Meals   folic acid 1 mg Oral Daily   meropenem 1 g Intravenous Q8H   multivitamin w/minerals 1 tablet Oral Daily   pantoprazole 40 mg Oral BID AC   saccharomyces boulardii 250 mg Oral BID   sodium chloride 10 mL Intravenous Q12H   spironolactone 50 mg Oral BID   vancomycin 750 mg Intravenous Q24H   vitamin C 1,000 mg Oral Daily   vitamin D 50,000 Units Oral Q7 Days   jennifer's amazing butt  Topical TID   jennifer's amazing butt  Topical TID   zinc gluconate 50 mg Oral Daily     Current PRN Medications:  •  acetaminophen **OR** acetaminophen  •  ALPRAZolam  •  diphenhydrAMINE  •  ondansetron **OR** ondansetron  •  sennosides-docusate sodium  •  sodium chloride  •  sodium chloride  •  jennifer's amazing butt  •  jennifer's amazing butt    Review of Systems her urostomy and colostomy are functioning.    Vital Signs:  Ht 172.7 cm (68\")   Wt 76.2 kg (168 lb)   BMI 25.54 kg/m²     Physical Exam  Vital signs - reviewed.  Line/IV (PIC) site - No erythema, warmth, induration, or tenderness.  Wound VAC remains in place  Lungs clear to auscultation without crackles  Abdomen soft and nontender    Lab Results:  CBC: Results from last 7 days   Lab Units 10/03/19  0751 09/30/19  0402   WBC 10*3/mm3 7.54 6.30   HEMOGLOBIN g/dL 8.9* 8.7*   HEMATOCRIT % 28.2* 27.9* "   PLATELETS 10*3/mm3 243 234     BMP:  Results from last 7 days   Lab Units 10/04/19  0505 10/03/19  0751 10/01/19  1333 09/30/19  0402   SODIUM mmol/L 144 142  --  143   POTASSIUM mmol/L 5.1 5.7*  --  4.8   CHLORIDE mmol/L 108* 108*  --  107   CO2 mmol/L 28.0 25.0  --  27.0   BUN mg/dL 19 23*  --  21*   CREATININE mg/dL 0.56* 0.69 0.85 0.67   GLUCOSE mg/dL 103* 98  --  112*   CALCIUM mg/dL 8.5* 8.5*  --  8.7     Culture Results: No new results  Radiology: None  Additional Studies Reviewed: None    Impression:   Bilateral ischial decubitus ulcers-improving  Pelvic osteomyelitis  Paraplegia  Diverting colostomy  Urostomy    Recommendations:   We will continue treatment with vancomycin and meropenem  Would suggest continuing antibiotic therapy through October 14, 2019  She was completed greater than 6 weeks of IV antibiotic therapy at that time  Would suggest arrangements for her to see plastic surgeon she is seen in [place] previously  Hopefully she could see them sometime the week after next for further planning for any attempts at surgical management/closure of her wounds  Will see again early next week  Please call in the interim if any questions for infectious diseases    Mendoza Lyon MD

## 2019-10-04 NOTE — PROGRESS NOTES
"[facility]  Avel Dawkins M.D.  CHRISTOPHER Dawkins M.D.  MAREN Hernandez APRN      Internal Medicine Progress Note    10/4/2019   11:43 AM    Name:  Jessica Alvarenga  MRN:    [id number]     Acct:     [id number]   Room:  [address] [place] [address] Day: 0     Admit Date: 9/6/2019  5:34 PM  PCP: Ponce Orozco MD    Subjective:     C/C: wound care and antibiotic therapy    Interval History: Status: Improved. Resting in bed. No family at bedside. Tolerating wound care - undergoing wound vac change.  Afebrile. Tolerating treatment thus far. Potassium corrected. Patient with increased \"electric shock\" pains across her abdomen. Has had similar pains before near level of loss of sensation, but not this intense or prolonged.       Review of Systems   Constitution: Positive for weakness and weight loss. Negative for decreased appetite and malaise/fatigue.   HENT: Negative for congestion, ear pain, hoarse voice, nosebleeds and sore throat.    Eyes: Negative for blurred vision, double vision and pain.   Cardiovascular: Negative for chest pain, dyspnea on exertion, leg swelling and near-syncope.   Respiratory: Negative for cough, shortness of breath and sputum production.    Skin: Positive for poor wound healing. Negative for dry skin, itching and rash.   Musculoskeletal: Positive for muscle weakness. Negative for back pain, joint pain and joint swelling.   Gastrointestinal: Negative for abdominal pain, constipation, diarrhea and vomiting.   Genitourinary: Negative for flank pain and hematuria.   Neurological: Positive for paresthesias (paraplegia ). Negative for difficulty with concentration, dizziness, focal weakness, headaches and seizures.   Psychiatric/Behavioral: Negative for altered mental status and depression. The patient is nervous/anxious. The patient does not have insomnia.        Medications:     Allergies:   Allergies   Allergen Reactions   • Morphine Mental Status Change       Current Meds: "   Current Facility-Administered Medications:   •  acetaminophen (TYLENOL) tablet 650 mg, 650 mg, Oral, Q4H PRN **OR** acetaminophen (TYLENOL) suppository 650 mg, 650 mg, Rectal, Q4H PRN, Joseph Dawkins MD  •  ALPRAZolam (XANAX) tablet 1 mg, 1 mg, Oral, TID PRN, Joseph Dawkins MD  •  bacitracin ointment, , Topical, Q12H, Lisa Emerson, APRN  •  baclofen (LIORESAL) tablet 10 mg, 10 mg, Oral, TID, Joseph Dawkins MD  •  busPIRone (BUSPAR) tablet 10 mg, 10 mg, Oral, TID With Meals, Joseph Dawkins MD  •  diphenhydrAMINE (BENADRYL) capsule 25 mg, 25 mg, Oral, Q6H PRN, Joseph Dawkins MD  •  ferrous sulfate tablet 325 mg, 325 mg, Oral, BID With Meals, Joseph Dawkins MD  •  folic acid (FOLVITE) tablet 1 mg, 1 mg, Oral, Daily, Joseph Dawkins MD  •  meropenem (MERREM) 1 g/100 mL 0.9% NS VTB (mbp), 1 g, Intravenous, Q8H, Mendoza Herrera MD  •  multivitamin w/minerals tablet 1 tablet, 1 tablet, Oral, Daily, Joseph Dawkins MD  •  ondansetron (ZOFRAN) tablet 4 mg, 4 mg, Oral, Q6H PRN **OR** ondansetron (ZOFRAN) injection 4 mg, 4 mg, Intravenous, Q6H PRN, Joseph Dawkins MD  •  pantoprazole (PROTONIX) EC tablet 40 mg, 40 mg, Oral, BID TREY, Maricruz Bruner, APRN  •  saccharomyces boulardii (FLORASTOR) capsule 250 mg, 250 mg, Oral, BID, Joseph Dawkins MD  •  sennosides-docusate sodium (SENOKOT-S) 8.6-50 MG tablet 1 tablet, 1 tablet, Oral, BID PRN, Joseph Dawkins MD  •  sodium chloride 0.9 % flush 10 mL, 10 mL, Intravenous, Q12H, Joseph Dawkins MD  •  sodium chloride 0.9 % flush 10 mL, 10 mL, Intravenous, PRN, Joseph Dawkins MD  •  sodium chloride 0.9 % flush 20 mL, 20 mL, Intravenous, PRN, Joseph Dawkins MD  •  spironolactone (ALDACTONE) tablet 50 mg, 50 mg, Oral, BID, Joseph Dawkins MD  •  vancomycin 750 mg/250 mL 0.9% NS IVPB ([facility]), 750 mg, Intravenous, Q24H, Joseph Dawkins MD  •  vitamin C  "(ASCORBIC ACID) tablet 1,000 mg, 1,000 mg, Oral, Daily, Joseph Dawkins MD  •  vitamin D (ERGOCALCIFEROL) capsule 50,000 Units, 50,000 Units, Oral, Q7 Days, Joseph Dawkins MD  •  Salina's amazing butt cream, , Topical, TID, Ki, Crystal L, APRN  •  Salina's amazing butt cream, , Topical, PRN, Ki, Crystal L, APRN  •  Salina's amazing butt cream, , Topical, TID, Ki, Crystal L, APRN  •  Salina's amazing butt cream, , Topical, PRN, Ki, Crystal L, APRN  •  zinc gluconate tablet 50 mg, 50 mg, Oral, Daily, Joseph Dawkins MD    Data:     Code Status:    There are no questions and answers to display.       No family history on file.    Social History     Socioeconomic History   • Marital status: Unknown     Spouse name: Not on file   • Number of children: Not on file   • Years of education: Not on file   • Highest education level: Not on file       Vitals:  Ht 172.7 cm (68\")   Wt 76.2 kg (168 lb)   BMI 25.54 kg/m²     T 98.2 P 86 R 18 /53 Sp02 97% (room air)      I/O (24Hr):  No intake or output data in the 24 hours ending 10/04/19 1143    Labs and imaging:      Lab Results (last 24 hours)     Procedure Component Value Units Date/Time    Basic Metabolic Panel [[id number]]  (Abnormal) Collected:  10/04/19 0505    Specimen:  Blood Updated:  10/04/19 0607     Glucose 103 mg/dL      BUN 19 mg/dL      Creatinine 0.56 mg/dL      Sodium 144 mmol/L      Potassium 5.1 mmol/L      Chloride 108 mmol/L      CO2 28.0 mmol/L      Calcium 8.5 mg/dL      eGFR Non African Amer 112 mL/min/1.73      BUN/Creatinine Ratio 33.9     Anion Gap 8.0 mmol/L     Narrative:       GFR Normal >60  Chronic Kidney Disease <60  Kidney Failure <15            Xr Abdomen Kub    Result Date: 9/6/2019  Narrative: EXAMINATION:  XR ABDOMEN KUB-  9/6/2019 7:13 PM CDT  HISTORY: NG placement verification  COMPARISON: No comparison study.  TECHNIQUE: Single view: KUB  FINDINGS:  Feeding tube is identified with the tip " projecting in the proximal stomach.   This report was finalized on 09/06/2019 19:29 by Dr. Rahul Childs MD.      Physical Examination:        Physical Exam   Constitutional: She is oriented to person, place, and time. She appears well-developed and well-nourished. No distress.   HENT:   Head: Normocephalic and atraumatic.   Right Ear: External ear normal.   Left Ear: External ear normal.   Nose: Nose normal.   Mouth/Throat: Oropharynx is clear and moist.   Eyes: Conjunctivae and EOM are normal. Pupils are equal, round, and reactive to light.   Neck: Normal range of motion. Neck supple.   Cardiovascular: Normal rate, regular rhythm, normal heart sounds and intact distal pulses.   Pulmonary/Chest: Effort normal and breath sounds normal.   Abdominal: Soft. Bowel sounds are normal.   urostomy appliance intact  Colostomy appliance intact     Genitourinary:   Genitourinary Comments: Urostomy  Colostomy   Musculoskeletal: Normal range of motion.   Paraplegia      Neurological: She is alert and oriented to person, place, and time.   paraplegia   Skin: Skin is warm and dry.   Sacral wound with moist, pink wound bed - no surrounding erythema or drainage  Dressing c.d.i bilateral feet  Bruising to right forearm   Psychiatric: She has a normal mood and affect. Her behavior is normal. Judgment and thought content normal.   Anxious at times    Nursing note and vitals reviewed.        Assessment:             * No active hospital problems. *    No past medical history on file.     Plan:        1. Stage 4 Pressure wound Right ischium  2. Stage 3 pressure wound right heel  3. Pressure wound left heel  4. Chronic osteomyelitis with MRSA positive drainage to multiple sites  5. Gastrointestinal hemorrhage  6. Paraplegia, long term from previous Spinal tumor  7. Anemia due to blood loss  8. Severe protein calorie malnutrition, tolerating tube feedings and oral intake  9. Insomnia  10. Anxiety  11. Iron deficiency Anemia    Continue  current treatment. Labs Monday. Monitor counts. Encourage adequate intake. Wound care per wound care team. Begin low dose gabapentin.   Electronically signed by MAREN Jackson on 10/4/2019 at 11:43 AM

## 2019-10-05 LAB
CREAT BLD-MCNC: 0.69 MG/DL (ref 0.57–1)
GFR SERPL CREATININE-BSD FRML MDRD: 88 ML/MIN/1.73
VANCOMYCIN TROUGH SERPL-MCNC: 17.6 MCG/ML (ref 5–20)

## 2019-10-05 PROCEDURE — 80202 ASSAY OF VANCOMYCIN: CPT | Performed by: INTERNAL MEDICINE

## 2019-10-05 PROCEDURE — 97530 THERAPEUTIC ACTIVITIES: CPT

## 2019-10-05 PROCEDURE — 25010000002 MEROPENEM: Performed by: INTERNAL MEDICINE

## 2019-10-05 PROCEDURE — 82565 ASSAY OF CREATININE: CPT | Performed by: INTERNAL MEDICINE

## 2019-10-05 PROCEDURE — 25010000002 VANCOMYCIN: Performed by: INTERNAL MEDICINE

## 2019-10-05 PROCEDURE — 63710000001 DIPHENHYDRAMINE PER 50 MG: Performed by: INTERNAL MEDICINE

## 2019-10-05 NOTE — PROGRESS NOTES
"Pharmacy Dosing Service  Pharmacokinetics  Vancomycin Follow-up Evaluation    Assessment/Action/Plan:  Vancomycin trough evaluated 10-1-19; no changes made. Ordered a follow-up trough & SCr value prior to tonight's dose. Pharmacy will continue to monitor renal function and adjust dose accordingly.     Subjective:  Jessica Alvarenga is a 59 y.o. female currently on Vancomycin 750 mg IV every 24 hours for the treatment of bone/joint infection, day 16 of therapy (Current vancomycin end date: 10-10-19).    Objective:  Ht: 172.7 cm (68\"); Wt: 76.2 kg (168 lb)  Estimated Creatinine Clearance: 133.3 mL/min (A) (by C-G formula based on SCr of 0.56 mg/dL (L)).   Lab Results   Component Value Date    CREATININE 0.56 (L) 10/04/2019    CREATININE 0.69 10/03/2019    CREATININE 0.85 10/01/2019    CREATININE 0.6 09/06/2019    CREATININE 0.6 09/05/2019    CREATININE 0.7 09/04/2019      Lab Results   Component Value Date    WBC 7.54 10/03/2019    WBC 6.30 09/30/2019    WBC 6.53 09/26/2019         Lab Results   Component Value Date    VANCOTROUGH 18.40 10/01/2019    VANCORANDOM 17.20 09/26/2019       Culture Results:  Microbiology Results (last 10 days)       ** No results found for the last 240 hours. **            Abhishek Quinones, PharmD   10/05/19 8:44 AM    "

## 2019-10-05 NOTE — PROGRESS NOTES
[facility]  CONNOR Rivera M.D.  MAREN Hernandez APRN      Internal Medicine Progress Note    10/5/2019   2:45 PM    Name:  Jessica Alvarenga  MRN:    [id number]     Acct:     [id number]   Room:  [address] [place] [address] Day: 0     Admit Date: 9/6/2019  5:34 PM  PCP: Ponce Orozco MD    Subjective:     C/C: wound care and antibiotic therapy    Interval History: Status: Improved. Resting in bed. No family at bedside. Woke from sleep.  Afebrile. Tolerating treatment thus far. Pain seems to be improved with gabapentin.       Review of Systems   Constitution: Positive for weakness and weight loss. Negative for decreased appetite and malaise/fatigue.   HENT: Negative for congestion, ear pain, hoarse voice, nosebleeds and sore throat.    Eyes: Negative for blurred vision, double vision and pain.   Cardiovascular: Negative for chest pain, dyspnea on exertion, leg swelling and near-syncope.   Respiratory: Negative for cough, shortness of breath and sputum production.    Skin: Positive for poor wound healing. Negative for dry skin, itching and rash.   Musculoskeletal: Positive for muscle weakness. Negative for back pain, joint pain and joint swelling.   Gastrointestinal: Negative for abdominal pain, constipation, diarrhea and vomiting.   Genitourinary: Negative for flank pain and hematuria.   Neurological: Positive for paresthesias (paraplegia ). Negative for difficulty with concentration, dizziness, focal weakness, headaches and seizures.   Psychiatric/Behavioral: Negative for altered mental status and depression. The patient is nervous/anxious. The patient does not have insomnia.        Medications:     Allergies:   Allergies   Allergen Reactions   • Morphine Mental Status Change       Current Meds:   Current Facility-Administered Medications:   •  acetaminophen (TYLENOL) tablet 650 mg, 650 mg, Oral, Q4H PRN **OR** acetaminophen (TYLENOL) suppository 650 mg, 650 mg, Rectal,  Q4H PRN, Joseph Dawkins MD  •  ALPRAZolam (XANAX) tablet 1 mg, 1 mg, Oral, TID PRN, Joseph Dawkins MD  •  bacitracin ointment, , Topical, Q12H, Lisa Emerson APRN  •  baclofen (LIORESAL) tablet 10 mg, 10 mg, Oral, TID, Joseph Dawkins MD  •  busPIRone (BUSPAR) tablet 10 mg, 10 mg, Oral, TID With Meals, Joseph Dawkins MD  •  diphenhydrAMINE (BENADRYL) capsule 25 mg, 25 mg, Oral, Q6H PRN, Joseph Dawkins MD  •  ferrous sulfate tablet 325 mg, 325 mg, Oral, BID With Meals, Joseph Dawkins MD  •  folic acid (FOLVITE) tablet 1 mg, 1 mg, Oral, Daily, Joseph Dawkins MD  •  gabapentin (NEURONTIN) capsule 100 mg, 100 mg, Oral, Nightly, Joseph Dawkins MD  •  gabapentin (NEURONTIN) capsule 100 mg, 100 mg, Oral, Daily PRN, Joseph Dawkins MD  •  meropenem (MERREM) 1 g/100 mL 0.9% NS VTB (mbp), 1 g, Intravenous, Q8H, Mendoza Herrera MD  •  multivitamin w/minerals tablet 1 tablet, 1 tablet, Oral, Daily, Joseph Dawkins MD  •  ondansetron (ZOFRAN) tablet 4 mg, 4 mg, Oral, Q6H PRN **OR** ondansetron (ZOFRAN) injection 4 mg, 4 mg, Intravenous, Q6H PRN, Joseph Dawkins MD  •  pantoprazole (PROTONIX) EC tablet 40 mg, 40 mg, Oral, BID AC, Maricruz Bruner, APRSIL  •  saccharomyces boulardii (FLORASTOR) capsule 250 mg, 250 mg, Oral, BID, Joseph Dawkins MD  •  sennosides-docusate sodium (SENOKOT-S) 8.6-50 MG tablet 1 tablet, 1 tablet, Oral, BID PRN, Joseph Dawkins MD  •  sodium chloride 0.9 % flush 10 mL, 10 mL, Intravenous, Q12H, Joseph Dawkins MD  •  sodium chloride 0.9 % flush 10 mL, 10 mL, Intravenous, PRN, Joseph Dawkins MD  •  sodium chloride 0.9 % flush 20 mL, 20 mL, Intravenous, PRN, Joseph Dawkins MD  •  spironolactone (ALDACTONE) tablet 50 mg, 50 mg, Oral, BID, Joseph Dawkins MD  •  vancomycin 750 mg/250 mL 0.9% NS IVPB (BHS), 750 mg, Intravenous, Q24H, Joseph Dawkins MD  •  vitamin C  "(ASCORBIC ACID) tablet 1,000 mg, 1,000 mg, Oral, Daily, Joseph Dawkins MD  •  vitamin D (ERGOCALCIFEROL) capsule 50,000 Units, 50,000 Units, Oral, Q7 Days, Joseph Dawkins MD  •  Salina's amazing butt cream, , Topical, TID, Lisa Emerson L, APRN  •  Salina's amazing butt cream, , Topical, PRN, Ki, Lisa L, APRN  •  Salina's amazing butt cream, , Topical, TID, Lisa Emerson L, APRN  •  Salina's amazing butt cream, , Topical, PRN, Ki, Lisa L, APRN  •  zinc gluconate tablet 50 mg, 50 mg, Oral, Daily, Joseph Dawkins MD    Data:     Code Status:    There are no questions and answers to display.       No family history on file.    Social History     Socioeconomic History   • Marital status: Unknown     Spouse name: Not on file   • Number of children: Not on file   • Years of education: Not on file   • Highest education level: Not on file       Vitals:  Ht 172.7 cm (68\")   Wt 76.2 kg (168 lb)   BMI 25.54 kg/m²     T 98 P 91 R 16 /60 Sp02 97% (room air)      I/O (24Hr):  No intake or output data in the 24 hours ending 10/05/19 1445    Labs and imaging:      Lab Results (last 24 hours)     ** No results found for the last 24 hours. **            Xr Abdomen Kub    Result Date: 9/6/2019  Narrative: EXAMINATION:  XR ABDOMEN KUB-  9/6/2019 7:13 PM CDT  HISTORY: NG placement verification  COMPARISON: No comparison study.  TECHNIQUE: Single view: KUB  FINDINGS:  Feeding tube is identified with the tip projecting in the proximal stomach.   This report was finalized on 09/06/2019 19:29 by Dr. Rahul Childs MD.      Physical Examination:        Physical Exam   Constitutional: She is oriented to person, place, and time. She appears well-developed and well-nourished. No distress.   HENT:   Head: Normocephalic and atraumatic.   Right Ear: External ear normal.   Left Ear: External ear normal.   Nose: Nose normal.   Mouth/Throat: Oropharynx is clear and moist.   Eyes: Conjunctivae and " EOM are normal. Pupils are equal, round, and reactive to light.   Neck: Normal range of motion. Neck supple.   Cardiovascular: Normal rate, regular rhythm, normal heart sounds and intact distal pulses.   Pulmonary/Chest: Effort normal and breath sounds normal.   Abdominal: Soft. Bowel sounds are normal.   urostomy appliance intact  Colostomy appliance intact     Genitourinary:   Genitourinary Comments: Urostomy  Colostomy   Musculoskeletal: Normal range of motion.   Paraplegia      Neurological: She is alert and oriented to person, place, and time.   paraplegia   Skin: Skin is warm and dry.   Sacral wound with moist, pink wound bed - no surrounding erythema or drainage  Dressing c.d.i bilateral feet  Bruising to right forearm   Psychiatric: She has a normal mood and affect. Her behavior is normal. Judgment and thought content normal.   Anxious at times    Nursing note and vitals reviewed.        Assessment:             * No active hospital problems. *    No past medical history on file.     Plan:        1. Stage 4 Pressure wound Right ischium  2. Stage 3 pressure wound right heel  3. Pressure wound left heel  4. Chronic osteomyelitis with MRSA positive drainage to multiple sites  5. Gastrointestinal hemorrhage  6. Paraplegia, long term from previous Spinal tumor  7. Anemia due to blood loss  8. Severe protein calorie malnutrition, tolerating tube feedings and oral intake  9. Insomnia  10. Anxiety  11. Iron deficiency Anemia    Continue current treatment. Labs Monday. Monitor counts. Encourage adequate intake. Wound care per wound care team. Continue gabapentin.    Electronically signed by MAREN Jackson on 10/5/2019 at 2:45 PM   I have discussed the care of Jessica Alvarenga, including pertinent history and exam findings, with the nurse practitioner.    I have seen and examined the patient and the key elements of all parts of the encounter have been performed by me.  I agree with the assessment, plan and  orders as documented by MAREN Hernandez, after I modified the exam findings and the plan of treatments and the final version is my approved version of the assessment.        Electronically signed by Joseph Dawkins MD on 10/5/2019 at 9:11 PM

## 2019-10-06 PROCEDURE — 63710000001 DIPHENHYDRAMINE PER 50 MG: Performed by: INTERNAL MEDICINE

## 2019-10-06 PROCEDURE — 25010000002 VANCOMYCIN: Performed by: INTERNAL MEDICINE

## 2019-10-06 PROCEDURE — 97530 THERAPEUTIC ACTIVITIES: CPT

## 2019-10-06 PROCEDURE — 25010000002 MEROPENEM: Performed by: INTERNAL MEDICINE

## 2019-10-06 RX ORDER — GABAPENTIN 300 MG/1
300 CAPSULE ORAL NIGHTLY
Status: DISCONTINUED | OUTPATIENT
Start: 2019-10-06 | End: 2019-10-24 | Stop reason: HOSPADM

## 2019-10-06 NOTE — PROGRESS NOTES
[facility]  CONNOR Rivera M.D.  MAREN Hernandez APRN      Internal Medicine Progress Note    10/6/2019   12:14 PM    Name:  Jessica Alvarenga  MRN:    [id number]     Acct:     [id number]   Room:  [address] [place] [address] Day: 0     Admit Date: 9/6/2019  5:34 PM  PCP: Ponce Orozco MD    Subjective:     C/C: wound care and antibiotic therapy    Interval History: Status: Improved. Resting in bed. No family at bedside.  Afebrile. Tolerating treatment thus far. Patient states that she hasn't noticed much improvement with neurontin Otherwise, no new concerns.       Review of Systems   Constitution: Positive for weakness and weight loss. Negative for decreased appetite and malaise/fatigue.   HENT: Negative for congestion, ear pain, hoarse voice, nosebleeds and sore throat.    Eyes: Negative for blurred vision, double vision and pain.   Cardiovascular: Negative for chest pain, dyspnea on exertion, leg swelling and near-syncope.   Respiratory: Negative for cough, shortness of breath and sputum production.    Skin: Positive for poor wound healing. Negative for dry skin, itching and rash.   Musculoskeletal: Positive for muscle weakness. Negative for back pain, joint pain and joint swelling.   Gastrointestinal: Negative for abdominal pain, constipation, diarrhea and vomiting.   Genitourinary: Negative for flank pain and hematuria.   Neurological: Positive for paresthesias (paraplegia ). Negative for difficulty with concentration, dizziness, focal weakness, headaches and seizures.   Psychiatric/Behavioral: Negative for altered mental status and depression. The patient is nervous/anxious. The patient does not have insomnia.        Medications:     Allergies:   Allergies   Allergen Reactions   • Morphine Mental Status Change       Current Meds:   Current Facility-Administered Medications:   •  acetaminophen (TYLENOL) tablet 650 mg, 650 mg, Oral, Q4H PRN **OR** acetaminophen  (TYLENOL) suppository 650 mg, 650 mg, Rectal, Q4H PRN, Joseph Dawkins MD  •  ALPRAZolam (XANAX) tablet 1 mg, 1 mg, Oral, TID PRN, Joseph Dawkins MD  •  bacitracin ointment, , Topical, Q12H, Lisa Emerson, APRN  •  baclofen (LIORESAL) tablet 10 mg, 10 mg, Oral, TID, Joseph Dawkins MD  •  busPIRone (BUSPAR) tablet 10 mg, 10 mg, Oral, TID With Meals, Joseph Dawkins MD  •  diphenhydrAMINE (BENADRYL) capsule 25 mg, 25 mg, Oral, Q6H PRN, Joseph Dawkins MD  •  ferrous sulfate tablet 325 mg, 325 mg, Oral, BID With Meals, Joseph Dawkins MD  •  folic acid (FOLVITE) tablet 1 mg, 1 mg, Oral, Daily, Joseph Dawkins MD  •  gabapentin (NEURONTIN) capsule 100 mg, 100 mg, Oral, Daily PRN, Joseph Dawkins MD  •  gabapentin (NEURONTIN) capsule 300 mg, 300 mg, Oral, Nightly, Maricruz Bruner, APRN  •  meropenem (MERREM) 1 g/100 mL 0.9% NS VTB (mbp), 1 g, Intravenous, Q8H, Mendoza Herrera MD  •  multivitamin w/minerals tablet 1 tablet, 1 tablet, Oral, Daily, Joseph Dawkins MD  •  ondansetron (ZOFRAN) tablet 4 mg, 4 mg, Oral, Q6H PRN **OR** ondansetron (ZOFRAN) injection 4 mg, 4 mg, Intravenous, Q6H PRN, Joseph Dawkins MD  •  pantoprazole (PROTONIX) EC tablet 40 mg, 40 mg, Oral, BID AC, Maricruz Bruner, MAREN  •  saccharomyces boulardii (FLORASTOR) capsule 250 mg, 250 mg, Oral, BID, Joseph Dawkins MD  •  sennosides-docusate sodium (SENOKOT-S) 8.6-50 MG tablet 1 tablet, 1 tablet, Oral, BID PRN, Joseph Dawkins MD  •  sodium chloride 0.9 % flush 10 mL, 10 mL, Intravenous, Q12H, Joseph Dawkins MD  •  sodium chloride 0.9 % flush 10 mL, 10 mL, Intravenous, PRN, Joseph Dawkins MD  •  sodium chloride 0.9 % flush 20 mL, 20 mL, Intravenous, PRN, Joseph Dawkins MD  •  spironolactone (ALDACTONE) tablet 50 mg, 50 mg, Oral, BID, Joseph Dawkins MD  •  vancomycin 750 mg/250 mL 0.9% NS IVPB ([facility]), 750 mg,  "Intravenous, Q24H, Mendoza Herrera MD  •  vitamin C (ASCORBIC ACID) tablet 1,000 mg, 1,000 mg, Oral, Daily, Joseph Dawkins MD  •  vitamin D (ERGOCALCIFEROL) capsule 50,000 Units, 50,000 Units, Oral, Q7 Days, Joseph Dawkins MD  •  Salina's amazing butt cream, , Topical, TID, Ki, Crystal L, APRN  •  Salina's amazing butt cream, , Topical, PRN, Ki, Crystal L, APRN  •  Salina's amazing butt cream, , Topical, TID, Ki, Crystal L, APRN  •  Salina's amazing butt cream, , Topical, PRN, Ki, Crystal L, APRN  •  zinc gluconate tablet 50 mg, 50 mg, Oral, Daily, Joseph Dawkins MD    Data:     Code Status:    There are no questions and answers to display.       No family history on file.    Social History     Socioeconomic History   • Marital status: Unknown     Spouse name: Not on file   • Number of children: Not on file   • Years of education: Not on file   • Highest education level: Not on file       Vitals:  Ht 172.7 cm (68\")   Wt 76.2 kg (168 lb)   BMI 25.54 kg/m²     T 97.2 P 82 R 16 /54 Sp02 96% (room air)      I/O (24Hr):  No intake or output data in the 24 hours ending 10/06/19 1214    Labs and imaging:      Lab Results (last 24 hours)     Procedure Component Value Units Date/Time    Vancomycin, Trough Please call results to Pharmacy prior to administering the next dose [[id number]]  (Normal) Collected:  10/05/19 1731    Specimen:  Blood Updated:  10/05/19 1809     Vancomycin Trough 17.60 mcg/mL     Creatinine, Serum [[id number]]  (Normal) Collected:  10/05/19 1731    Specimen:  Blood Updated:  10/05/19 1758     Creatinine 0.69 mg/dL      eGFR Non African Amer 88 mL/min/1.73     Narrative:       GFR Normal >60  Chronic Kidney Disease <60  Kidney Failure <15            Xr Abdomen Kub    Result Date: 9/6/2019  Narrative: EXAMINATION:  XR ABDOMEN KUB-  9/6/2019 7:13 PM CDT  HISTORY: NG placement verification  COMPARISON: No comparison study.  TECHNIQUE: Single view: KUB  " FINDINGS:  Feeding tube is identified with the tip projecting in the proximal stomach.   This report was finalized on 09/06/2019 19:29 by Dr. Rahul Childs MD.      Physical Examination:        Physical Exam   Constitutional: She is oriented to person, place, and time. She appears well-developed and well-nourished. No distress.   HENT:   Head: Normocephalic and atraumatic.   Right Ear: External ear normal.   Left Ear: External ear normal.   Nose: Nose normal.   Mouth/Throat: Oropharynx is clear and moist.   Eyes: Conjunctivae and EOM are normal. Pupils are equal, round, and reactive to light.   Neck: Normal range of motion. Neck supple.   Cardiovascular: Normal rate, regular rhythm, normal heart sounds and intact distal pulses.   Pulmonary/Chest: Effort normal and breath sounds normal.   Abdominal: Soft. Bowel sounds are normal.   urostomy appliance intact  Colostomy appliance intact     Genitourinary:   Genitourinary Comments: Urostomy  Colostomy   Musculoskeletal: Normal range of motion.   Paraplegia      Neurological: She is alert and oriented to person, place, and time.   paraplegia   Skin: Skin is warm and dry.   Wound vac intact  Dressing c.d.i bilateral feet     Psychiatric: She has a normal mood and affect. Her behavior is normal. Judgment and thought content normal.   Anxious at times    Nursing note and vitals reviewed.        Assessment:             * No active hospital problems. *    No past medical history on file.     Plan:        1. Stage 4 Pressure wound Right ischium  2. Stage 3 pressure wound right heel  3. Pressure wound left heel  4. Chronic osteomyelitis with MRSA positive drainage to multiple sites  5. Gastrointestinal hemorrhage  6. Paraplegia, long term from previous Spinal tumor  7. Anemia due to blood loss  8. Severe protein calorie malnutrition, tolerating tube feedings and oral intake  9. Insomnia  10. Anxiety  11. Iron deficiency Anemia    Continue current treatment. Labs in am. Monitor  counts. Encourage adequate intake. Wound care per wound care team. Continue gabapentin - increase dose.     Electronically signed by MAREN Jackson on 10/6/2019 at 12:14 PM

## 2019-10-07 LAB
ALBUMIN SERPL-MCNC: 2.7 G/DL (ref 3.5–5.2)
ANION GAP SERPL CALCULATED.3IONS-SCNC: 8 MMOL/L (ref 5–15)
BASOPHILS # BLD AUTO: 0.08 10*3/MM3 (ref 0–0.2)
BASOPHILS NFR BLD AUTO: 1.4 % (ref 0–1.5)
BUN BLD-MCNC: 24 MG/DL (ref 6–20)
BUN/CREAT SERPL: 47.1 (ref 7–25)
CALCIUM SPEC-SCNC: 9 MG/DL (ref 8.6–10.5)
CHLORIDE SERPL-SCNC: 107 MMOL/L (ref 98–107)
CO2 SERPL-SCNC: 28 MMOL/L (ref 22–29)
CREAT BLD-MCNC: 0.51 MG/DL (ref 0.57–1)
CRP SERPL-MCNC: 4.41 MG/DL (ref 0–0.5)
DEPRECATED RDW RBC AUTO: 49.1 FL (ref 37–54)
EOSINOPHIL # BLD AUTO: 0.55 10*3/MM3 (ref 0–0.4)
EOSINOPHIL NFR BLD AUTO: 9.3 % (ref 0.3–6.2)
ERYTHROCYTE [DISTWIDTH] IN BLOOD BY AUTOMATED COUNT: 15.4 % (ref 12.3–15.4)
ERYTHROCYTE [SEDIMENTATION RATE] IN BLOOD: 44 MM/HR (ref 0–20)
GFR SERPL CREATININE-BSD FRML MDRD: 124 ML/MIN/1.73
GLUCOSE BLD-MCNC: 102 MG/DL (ref 65–99)
HCT VFR BLD AUTO: 27.8 % (ref 34–46.6)
HGB BLD-MCNC: 8.6 G/DL (ref 12–15.9)
IMM GRANULOCYTES # BLD AUTO: 0.02 10*3/MM3 (ref 0–0.05)
IMM GRANULOCYTES NFR BLD AUTO: 0.3 % (ref 0–0.5)
LYMPHOCYTES # BLD AUTO: 1.99 10*3/MM3 (ref 0.7–3.1)
LYMPHOCYTES NFR BLD AUTO: 33.7 % (ref 19.6–45.3)
MCH RBC QN AUTO: 27.3 PG (ref 26.6–33)
MCHC RBC AUTO-ENTMCNC: 30.9 G/DL (ref 31.5–35.7)
MCV RBC AUTO: 88.3 FL (ref 79–97)
MONOCYTES # BLD AUTO: 0.6 10*3/MM3 (ref 0.1–0.9)
MONOCYTES NFR BLD AUTO: 10.2 % (ref 5–12)
NEUTROPHILS # BLD AUTO: 2.67 10*3/MM3 (ref 1.7–7)
NEUTROPHILS NFR BLD AUTO: 45.1 % (ref 42.7–76)
NRBC BLD AUTO-RTO: 0 /100 WBC (ref 0–0.2)
PLATELET # BLD AUTO: 247 10*3/MM3 (ref 140–450)
PMV BLD AUTO: 9.9 FL (ref 6–12)
POTASSIUM BLD-SCNC: 5.1 MMOL/L (ref 3.5–5.2)
PREALB SERPL-MCNC: 17 MG/DL (ref 20–40)
RBC # BLD AUTO: 3.15 10*6/MM3 (ref 3.77–5.28)
SODIUM BLD-SCNC: 143 MMOL/L (ref 136–145)
WBC NRBC COR # BLD: 5.91 10*3/MM3 (ref 3.4–10.8)

## 2019-10-07 PROCEDURE — 82040 ASSAY OF SERUM ALBUMIN: CPT | Performed by: INTERNAL MEDICINE

## 2019-10-07 PROCEDURE — 80048 BASIC METABOLIC PNL TOTAL CA: CPT | Performed by: INTERNAL MEDICINE

## 2019-10-07 PROCEDURE — 85651 RBC SED RATE NONAUTOMATED: CPT | Performed by: INTERNAL MEDICINE

## 2019-10-07 PROCEDURE — 97606 NEG PRS WND THER DME>50 SQCM: CPT

## 2019-10-07 PROCEDURE — 97530 THERAPEUTIC ACTIVITIES: CPT | Performed by: OCCUPATIONAL THERAPIST

## 2019-10-07 PROCEDURE — 84134 ASSAY OF PREALBUMIN: CPT | Performed by: INTERNAL MEDICINE

## 2019-10-07 PROCEDURE — 97168 OT RE-EVAL EST PLAN CARE: CPT | Performed by: OCCUPATIONAL THERAPIST

## 2019-10-07 PROCEDURE — 85025 COMPLETE CBC W/AUTO DIFF WBC: CPT | Performed by: INTERNAL MEDICINE

## 2019-10-07 PROCEDURE — 86140 C-REACTIVE PROTEIN: CPT | Performed by: INTERNAL MEDICINE

## 2019-10-07 PROCEDURE — 63710000001 DIPHENHYDRAMINE PER 50 MG: Performed by: INTERNAL MEDICINE

## 2019-10-07 PROCEDURE — 25010000002 VANCOMYCIN: Performed by: INTERNAL MEDICINE

## 2019-10-07 PROCEDURE — 25010000002 MEROPENEM: Performed by: INTERNAL MEDICINE

## 2019-10-07 NOTE — PROGRESS NOTES
"Infectious Diseases Progress Note    Patient:  Jessica Alvarenga  YOB: 1960  MRN: [id number]   Admit date: 9/6/2019   Admitting Physician: Joseph Dawkins MD  Primary Care Physician: Ponce Orozco MD    Chief Complaint/Interval History: She seems to be continuing to do well.  She is eating.  Her ostomy is functioning.  She is having no rash or skin itching.  No pain at right arm PICC line site.  She has no new problems.  She is in good spirits.  Discussed with Dr. Mathis this morning.    No intake or output data in the 24 hours ending 10/07/19 1152  Allergies:   Allergies   Allergen Reactions   • Morphine Mental Status Change     Current Scheduled Medications:     bacitracin  Topical Q12H   baclofen 10 mg Oral TID   busPIRone 10 mg Oral TID With Meals   ferrous sulfate 325 mg Oral BID With Meals   folic acid 1 mg Oral Daily   gabapentin 300 mg Oral Nightly   meropenem 1 g Intravenous Q8H   multivitamin w/minerals 1 tablet Oral Daily   pantoprazole 40 mg Oral BID AC   saccharomyces boulardii 250 mg Oral BID   sodium chloride 10 mL Intravenous Q12H   spironolactone 50 mg Oral BID   vancomycin 750 mg Intravenous Q24H   vitamin C 1,000 mg Oral Daily   vitamin D 50,000 Units Oral Q7 Days   jennifer's amazing butt  Topical TID   jennifer's amazing butt  Topical TID   zinc gluconate 50 mg Oral Daily     Current PRN Medications:  •  acetaminophen **OR** acetaminophen  •  ALPRAZolam  •  diphenhydrAMINE  •  gabapentin  •  ondansetron **OR** ondansetron  •  sennosides-docusate sodium  •  sodium chloride  •  sodium chloride  •  jennifer's amazing butt  •  jennifer's amazing butt    Review of Systems see HPI    Vital Signs:  Ht 172.7 cm (68\")   Wt 79.7 kg (175 lb 11.2 oz)   BMI 26.72 kg/m²      Physical Exam  Vital signs - reviewed.  Line/IV (right arm PICC line) site - No erythema, warmth, induration, or tenderness.  Ischial decubitus ulcer with wound vacs in place  Abdomen soft and nontender  Colostomy and " urostomy functioning.    Lab Results:  CBC: Results from last 7 days   Lab Units 10/07/19  0508 10/03/19  0751   WBC 10*3/mm3 5.91 7.54   HEMOGLOBIN g/dL 8.6* 8.9*   HEMATOCRIT % 27.8* 28.2*   PLATELETS 10*3/mm3 247 243     BMP:  Results from last 7 days   Lab Units 10/07/19  0508 10/05/19  1731 10/04/19  0505 10/03/19  0751  10/01/19  1333   SODIUM mmol/L 143  --  144 142  --   --    POTASSIUM mmol/L 5.1  --  5.1 5.7*  --   --    CHLORIDE mmol/L 107  --  108* 108*  --   --    CO2 mmol/L 28.0  --  28.0 25.0  --   --    BUN mg/dL 24*  --  19 23*  --   --    CREATININE mg/dL 0.51* 0.69 0.56* 0.69  --  0.85   GLUCOSE mg/dL 102*  --  103* 98   < >  --    CALCIUM mg/dL 9.0  --  8.5* 8.5*  --   --     < > = values in this interval not displayed.   C-reactive protein 4.4  Sedimentation rate 44    Vancomycin trough done on 10/5/19 17.6    She has seen plastic surgery at [facility] previously.  Their last appointment with her was in June.  They had suggested follow-up with a local wound care center until she decided to have diverting colostomy.  She last saw a nurse practitioner at that clinic.  She has seen a surgeon there previously.  Contact number for plastic surgery at [facility] as outlined below.  06/13/2019 Follow-Up [facility]    [address]    [address]    [place], [place] [id number] [phone]   Willie Menon, APRN    [address] [place] [address] [phone] (Fax)   Pressure injury of left ischium, stage 4 (CMS/HCC) (Primary Dx);   Pressure injury of right ischium, stage 4 (CMS/HCC)     Impression:   1. Ischial decubitus ulcers-she has had diverting colostomy.  She has had over 6 weeks of meropenem treatment.  She has had close to 6 weeks of IV vancomycin therapy in addition to treatment with meropenem.  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  2.  Pelvic  osteomyelitis  3.  Paraplegia  4.  Diverting colostomy  5.  Urostomy     Recommendations:   Continue treatment with vancomycin and meropenem  With suggest contacting [facility].  Contact phone numbers are located just above the impression portion of this note.  Feel it would be beneficial to get their input as to how to coordinate follow-up with them in terms of a plan for any debridement and flap closure.  Per their last note in June 2019 they were going to see her back until she underwent diverting colostomy.  That was completed late August at [facility] and she is at the end of 6 weeks of empiric vancomycin/meropenem treatment.  She seems to be eating much better.  She seems better from a nutritional standpoint.  Have added albumin and prealbumin laboratory work done today.  Okay with me to work toward discharge home or transfer to [facility].    Mendoza Lyon MD

## 2019-10-07 NOTE — PROGRESS NOTES
Adult Nutrition  Assessment/PES    Patient Name:  Jessica Alvarenga  YOB: 1960  MRN: [id number]  Admit Date:  9/6/2019    Assessment Date:  10/7/2019    Comments:  PO intake is good, 92% avg of 3 meals. No new complaints. Monitor pts wt, 10.5# loss compared to admission wt. Will continue to follow.     Reason for Assessment     Row Name 10/07/19 1504          Reason for Assessment    Reason For Assessment  follow-up protocol         Nutrition/Diet History     Row Name 10/07/19 1504          Nutrition/Diet History    Typical Food/Fluid Intake  pt continues to eat well. no issues noted. supplements were adjusted at last assessment.          Anthropometrics     Row Name 10/07/19 1506          Usual Body Weight (UBW)    Weight Loss Time Frame  10.5# loss compared to admission wt        Body Mass Index (BMI)    BMI Assessment  BMI 25-29.9: overweight         Labs/Tests/Procedures/Meds     Row Name 10/07/19 1507          Labs/Procedures/Meds    Lab Results Reviewed  reviewed, pertinent     Lab Results Comments  BUN; Cr; albumin; CRP        Diagnostic Tests/Procedures    Diagnostic Test/Procedure Reviewed  reviewed        Medications    Pertinent Medications Reviewed  reviewed, pertinent         Physical Findings     Row Name 10/07/19 1507          Physical Findings    Overall Physical Appearance  generalized wasting;loss of muscle mass;loss of subcutaneous fat     Gastrointestinal  colostomy urostomy     Skin  pressure injury;non-healing wound(s);other (see comments) roberto carlos 18           Nutrition Prescription Ordered     Row Name 10/07/19 1504          Nutrition Prescription PO    Current PO Diet  Regular     Supplement  Other (comment);Boost Plus (Ensure Enlive, Ensure Plus) milkshake     Supplement Frequency  Daily         Evaluation of Received Nutrient/Fluid Intake     Row Name 10/07/19 1502          Nutrient/Fluid Evaluation    Number of Days Evaluated  2 days     Additional Documentation  Fluid Intake  Evaluation (Group)        Fluid Intake Evaluation    Oral Fluid (mL)  1260     IV Fluid (mL)  375        PO Evaluation    Number of Days PO Intake Evaluated  1 day     Number of Meals  3     % PO Intake  92               Problem/Interventions:  Problem 1     Row Name 10/07/19 1509          Nutrition Diagnoses Problem 1    Problem 1  Malnutrition     Etiology (related to)  Medical Diagnosis     Nutrition related  Increased nutrition needs     Gastrointestinal  Colostomy     Infectious Disease  MRSA;Other (comment) osteomyelitis     Neurological  Paraparesis;Spinal cord injury     Skin  Non healing wound;Pressure injury;Surgical wound     Signs/Symptoms (evidenced by)  Unintended Weight Change;% UBW;Report/Observation                 Intervention Goal     Row Name 10/07/19 1510          Intervention Goal    General  Maintain nutrition;Reduce/improve symptoms;Meet nutritional needs for age/condition;Disease management/therapy     PO  Maintain intake;Continue positive trend;Meet estimated needs     Weight  Maintain weight         Nutrition Intervention     Row Name 10/07/19 1510          Nutrition Intervention    RD/Tech Action  Follow Tx progress;Care plan reviewd;Encourage intake           Education/Evaluation     Row Name 10/07/19 1510          Education    Education  No discharge needs identified at this time        Monitor/Evaluation    Monitor  Per protocol           Electronically signed by:  Amaris Dash  10/07/19 3:10 PM

## 2019-10-07 NOTE — PROGRESS NOTES
[facility]  CONNOR Rivera M.D.  MAREN Hernandez APRN      Internal Medicine Progress Note    10/7/2019   4:56 PM    Name:  Jessica Alvarenga  MRN:    [id number]     Acct:     [id number]   Room:  [address] [place] [address] Day: 0     Admit Date: 9/6/2019  5:34 PM  PCP: Ponce Orozco MD    Subjective:     C/C: wound care and antibiotic therapy    Interval History: Status: Improved. Resting in bed. No family at bedside. Tolerating treatment thus far. Patient states that she hasn't noticed much improvement with neurontin.  Left shoulder pain with movement and trying to pull self up. Labs stable.        Review of Systems   Constitution: Positive for weakness and weight loss. Negative for decreased appetite and malaise/fatigue.   HENT: Negative for congestion, ear pain, hoarse voice, nosebleeds and sore throat.    Eyes: Negative for blurred vision, double vision and pain.   Cardiovascular: Negative for chest pain, dyspnea on exertion, leg swelling and near-syncope.   Respiratory: Negative for cough, shortness of breath and sputum production.    Skin: Positive for poor wound healing. Negative for dry skin, itching and rash.   Musculoskeletal: Positive for muscle weakness. Negative for back pain, joint pain and joint swelling.   Gastrointestinal: Negative for abdominal pain, constipation, diarrhea and vomiting.   Genitourinary: Negative for flank pain and hematuria.   Neurological: Positive for paresthesias (paraplegia ). Negative for difficulty with concentration, dizziness, focal weakness, headaches and seizures.   Psychiatric/Behavioral: Negative for altered mental status and depression. The patient is nervous/anxious. The patient does not have insomnia.        Medications:     Allergies:   Allergies   Allergen Reactions   • Morphine Mental Status Change       Current Meds:   Current Facility-Administered Medications:   •  acetaminophen (TYLENOL) tablet 650 mg, 650 mg, Oral,  Q4H PRN **OR** acetaminophen (TYLENOL) suppository 650 mg, 650 mg, Rectal, Q4H PRN, Joseph Dawkins MD  •  ALPRAZolam (XANAX) tablet 1 mg, 1 mg, Oral, TID PRN, Joseph Dawkins MD  •  bacitracin ointment, , Topical, Q12H, Lisa Emerson, APRN  •  baclofen (LIORESAL) tablet 10 mg, 10 mg, Oral, TID, Joseph Dawkins MD  •  busPIRone (BUSPAR) tablet 10 mg, 10 mg, Oral, TID With Meals, Joseph Dawkins MD  •  diphenhydrAMINE (BENADRYL) capsule 25 mg, 25 mg, Oral, Q6H PRN, Joseph Dawkins MD  •  ferrous sulfate tablet 325 mg, 325 mg, Oral, BID With Meals, Joseph Dawkins MD  •  folic acid (FOLVITE) tablet 1 mg, 1 mg, Oral, Daily, Joseph Dawkins MD  •  gabapentin (NEURONTIN) capsule 100 mg, 100 mg, Oral, Daily PRN, Joseph Dawkins MD  •  gabapentin (NEURONTIN) capsule 300 mg, 300 mg, Oral, Nightly, Maricruz Bruner, APRN  •  meropenem (MERREM) 1 g/100 mL 0.9% NS VTB (mbp), 1 g, Intravenous, Q8H, Mendoza Herrera MD  •  multivitamin w/minerals tablet 1 tablet, 1 tablet, Oral, Daily, Joseph Dawkins MD  •  ondansetron (ZOFRAN) tablet 4 mg, 4 mg, Oral, Q6H PRN **OR** ondansetron (ZOFRAN) injection 4 mg, 4 mg, Intravenous, Q6H PRN, Joseph Dawkins MD  •  pantoprazole (PROTONIX) EC tablet 40 mg, 40 mg, Oral, BID AC, Maricruz Bruner, MAREN  •  saccharomyces boulardii (FLORASTOR) capsule 250 mg, 250 mg, Oral, BID, Joseph Dawkins MD  •  sennosides-docusate sodium (SENOKOT-S) 8.6-50 MG tablet 1 tablet, 1 tablet, Oral, BID PRN, Joseph Dawkins MD  •  sodium chloride 0.9 % flush 10 mL, 10 mL, Intravenous, Q12H, Joseph Dawkins MD  •  sodium chloride 0.9 % flush 10 mL, 10 mL, Intravenous, PRN, Joseph Dawkins MD  •  sodium chloride 0.9 % flush 20 mL, 20 mL, Intravenous, PRN, Joseph Dawkins MD  •  spironolactone (ALDACTONE) tablet 50 mg, 50 mg, Oral, BID, Joseph Dawkins MD  •  vancomycin 750 mg/250 mL 0.9% NS  "IVPB ([facility]), 750 mg, Intravenous, Q24H, Mendoza Herrera MD  •  vitamin C (ASCORBIC ACID) tablet 1,000 mg, 1,000 mg, Oral, Daily, Joseph Dawkins MD  •  vitamin D (ERGOCALCIFEROL) capsule 50,000 Units, 50,000 Units, Oral, Q7 Days, Joseph Dawkins MD  •  Salina's amazing butt cream, , Topical, TID, Lisa Emerson, APRN  •  Salina's amazing butt cream, , Topical, PRN, Lisa Emerson, APRN  •  Salina's amazing butt cream, , Topical, TID, Lisa Emerson, APRN  •  Salina's amazing butt cream, , Topical, PRN, Lisa Emerson, APRN  •  zinc gluconate tablet 50 mg, 50 mg, Oral, Daily, Joseph Dawkins MD    Data:     Code Status:    There are no questions and answers to display.       No family history on file.    Social History     Socioeconomic History   • Marital status: Unknown     Spouse name: Not on file   • Number of children: Not on file   • Years of education: Not on file   • Highest education level: Not on file       Vitals:  Ht 172.7 cm (68\")   Wt 79.7 kg (175 lb 11.2 oz)   BMI 26.72 kg/m²     T 97.6 P 71 R 18 /53 Sp02 98% (room air)      I/O (24Hr):  No intake or output data in the 24 hours ending 10/07/19 1656    Labs and imaging:      Lab Results (last 24 hours)     Procedure Component Value Units Date/Time    Albumin [[id number]]  (Abnormal) Collected:  10/07/19 1247    Specimen:  Blood Updated:  10/07/19 1336     Albumin 2.70 g/dL     Prealbumin [[id number]] Collected:  10/07/19 1247    Specimen:  Blood Updated:  10/07/19 1306    Basic Metabolic Panel [[id number]]  (Abnormal) Collected:  10/07/19 0508    Specimen:  Blood Updated:  10/07/19 0612     Glucose 102 mg/dL      BUN 24 mg/dL      Creatinine 0.51 mg/dL      Sodium 143 mmol/L      Potassium 5.1 mmol/L      Chloride 107 mmol/L      CO2 28.0 mmol/L      Calcium 9.0 mg/dL      eGFR Non African Amer 124 mL/min/1.73      BUN/Creatinine Ratio 47.1     Anion Gap 8.0 mmol/L     Narrative:       GFR Normal >60  Chronic " Kidney Disease <60  Kidney Failure <15    C-reactive Protein [[id number]]  (Abnormal) Collected:  10/07/19 0508    Specimen:  Blood Updated:  10/07/19 0612     C-Reactive Protein 4.41 mg/dL     Sedimentation Rate [[id number]]  (Abnormal) Collected:  10/07/19 0508    Specimen:  Blood Updated:  10/07/19 0608     Sed Rate 44 mm/hr     CBC & Differential [[id number]] Collected:  10/07/19 0508    Specimen:  Blood Updated:  10/07/19 0551    Narrative:       The following orders were created for panel order CBC & Differential.  Procedure                               Abnormality         Status                     ---------                               -----------         ------                     CBC Auto Differential[[id number]]        Abnormal            Final result                 Please view results for these tests on the individual orders.    CBC Auto Differential [[id number]]  (Abnormal) Collected:  10/07/19 0508    Specimen:  Blood Updated:  10/07/19 0551     WBC 5.91 10*3/mm3      RBC 3.15 10*6/mm3      Hemoglobin 8.6 g/dL      Hematocrit 27.8 %      MCV 88.3 fL      MCH 27.3 pg      MCHC 30.9 g/dL      RDW 15.4 %      RDW-SD 49.1 fl      MPV 9.9 fL      Platelets 247 10*3/mm3      Neutrophil % 45.1 %      Lymphocyte % 33.7 %      Monocyte % 10.2 %      Eosinophil % 9.3 %      Basophil % 1.4 %      Immature Grans % 0.3 %      Neutrophils, Absolute 2.67 10*3/mm3      Lymphocytes, Absolute 1.99 10*3/mm3      Monocytes, Absolute 0.60 10*3/mm3      Eosinophils, Absolute 0.55 10*3/mm3      Basophils, Absolute 0.08 10*3/mm3      Immature Grans, Absolute 0.02 10*3/mm3      nRBC 0.0 /100 WBC             No results found.    Physical Examination:        Physical Exam   Constitutional: She is oriented to person, place, and time. She appears well-developed and well-nourished. No distress.   HENT:   Head: Normocephalic and atraumatic.   Right Ear: External ear normal.   Left Ear: External ear normal.   Nose: Nose normal.    Mouth/Throat: Oropharynx is clear and moist.   Eyes: Conjunctivae and EOM are normal. Pupils are equal, round, and reactive to light.   Neck: Normal range of motion. Neck supple.   Cardiovascular: Normal rate, regular rhythm, normal heart sounds and intact distal pulses.   Pulmonary/Chest: Effort normal and breath sounds normal.   Abdominal: Soft. Bowel sounds are normal.   urostomy appliance intact  Colostomy appliance intact     Genitourinary:   Genitourinary Comments: Urostomy  Colostomy   Musculoskeletal: Normal range of motion.   Paraplegia      Neurological: She is alert and oriented to person, place, and time.   paraplegia   Skin: Skin is warm and dry.   Wound vac intact  Dressing c.d.i bilateral feet     Psychiatric: She has a normal mood and affect. Her behavior is normal. Judgment and thought content normal.   Anxious at times    Nursing note and vitals reviewed.        Assessment:             * No active hospital problems. *    No past medical history on file.     Plan:        1. Stage 4 Pressure wound Right ischium  2. Stage 3 pressure wound right heel  3. Pressure wound left heel  4. Chronic osteomyelitis with MRSA positive drainage to multiple sites  5. Gastrointestinal hemorrhage  6. Paraplegia, long term from previous Spinal tumor  7. Anemia due to blood loss  8. Severe protein calorie malnutrition, tolerating tube feedings and oral intake  9. Insomnia  10. Anxiety  11. Iron deficiency Anemia  12. Left shoulder strain     Continue current treatment. Labs Thursday. Monitor counts. Encourage adequate intake. Wound care per wound care team. Continue gabapentin.  Ibuprofen as needed twice daily for shoulder pain.     Electronically signed by MAREN Clinton on 10/7/2019 at 4:56 PM   I have discussed the care of Jessica Alvarenga, including pertinent history and exam findings, with the nurse practitioner.    I have seen and examined the patient and the key elements of all parts of the encounter  have been performed by me.  I agree with the assessment, plan and orders as documented by MAREN Clinton, after I modified the exam findings and the plan of treatments and the final version is my approved version of the assessment.        Electronically signed by Joseph Dawkins MD on 10/7/2019 at 7:09 PM

## 2019-10-08 PROCEDURE — 25010000002 VANCOMYCIN: Performed by: INTERNAL MEDICINE

## 2019-10-08 PROCEDURE — 97530 THERAPEUTIC ACTIVITIES: CPT

## 2019-10-08 PROCEDURE — 25010000002 MEROPENEM: Performed by: INTERNAL MEDICINE

## 2019-10-08 PROCEDURE — 97110 THERAPEUTIC EXERCISES: CPT

## 2019-10-08 PROCEDURE — 63710000001 DIPHENHYDRAMINE PER 50 MG: Performed by: INTERNAL MEDICINE

## 2019-10-08 RX ORDER — IBUPROFEN 400 MG/1
800 TABLET ORAL 2 TIMES DAILY PRN
Status: DISCONTINUED | OUTPATIENT
Start: 2019-10-08 | End: 2019-10-24 | Stop reason: HOSPADM

## 2019-10-08 RX ORDER — DOCUSATE SODIUM 100 MG/1
200 CAPSULE, LIQUID FILLED ORAL DAILY
Status: DISCONTINUED | OUTPATIENT
Start: 2019-10-08 | End: 2019-10-24 | Stop reason: HOSPADM

## 2019-10-08 NOTE — PROGRESS NOTES
[facility]  CONNOR Rivera M.D.  MAREN Hernandez APRN      Internal Medicine Progress Note    10/8/2019   11:50 AM    Name:  Jessica Alvarenga  MRN:    [id number]     Acct:     [id number]   Room:  [address] [place] [address] Day: 0     Admit Date: 9/6/2019  5:34 PM  PCP: Ponce Orozco MD    Subjective:     C/C: wound care and antibiotic therapy    Interval History: Status: Improved. Resting in bed. No family at bedside. Tolerating treatment thus far. Noted harder stools, irritating around stoma of colostomy.  Request stool softener.  Encouraged increased oral fluids.       Review of Systems   Constitution: Positive for weakness and weight loss. Negative for decreased appetite and malaise/fatigue.   HENT: Negative for congestion, ear pain, hoarse voice, nosebleeds and sore throat.    Eyes: Negative for blurred vision, double vision and pain.   Cardiovascular: Negative for chest pain, dyspnea on exertion, leg swelling and near-syncope.   Respiratory: Negative for cough, shortness of breath and sputum production.    Skin: Positive for poor wound healing. Negative for dry skin, itching and rash.   Musculoskeletal: Positive for muscle weakness. Negative for back pain, joint pain and joint swelling.   Gastrointestinal: Negative for abdominal pain, constipation, diarrhea and vomiting.   Genitourinary: Negative for flank pain and hematuria.   Neurological: Positive for paresthesias (paraplegia ). Negative for difficulty with concentration, dizziness, focal weakness, headaches and seizures.   Psychiatric/Behavioral: Negative for altered mental status and depression. The patient is nervous/anxious. The patient does not have insomnia.        Medications:     Allergies:   Allergies   Allergen Reactions   • Morphine Mental Status Change       Current Meds:   Current Facility-Administered Medications:   •  acetaminophen (TYLENOL) tablet 650 mg, 650 mg, Oral, Q4H PRN **OR** acetaminophen  (TYLENOL) suppository 650 mg, 650 mg, Rectal, Q4H PRN, Joseph Dawkins MD  •  ALPRAZolam (XANAX) tablet 1 mg, 1 mg, Oral, TID PRN, Joseph Dawkins MD  •  bacitracin ointment, , Topical, Q12H, Lisa Emerson, APRN  •  baclofen (LIORESAL) tablet 10 mg, 10 mg, Oral, TID, Joseph Dawkins MD  •  busPIRone (BUSPAR) tablet 10 mg, 10 mg, Oral, TID With Meals, Joseph Dawkins MD  •  diphenhydrAMINE (BENADRYL) capsule 25 mg, 25 mg, Oral, Q6H PRN, Joseph Dawkins MD  •  ferrous sulfate tablet 325 mg, 325 mg, Oral, BID With Meals, Joseph Dawkins MD  •  folic acid (FOLVITE) tablet 1 mg, 1 mg, Oral, Daily, Joseph Dawkins MD  •  gabapentin (NEURONTIN) capsule 100 mg, 100 mg, Oral, Daily PRN, Joseph Dawkins MD  •  gabapentin (NEURONTIN) capsule 300 mg, 300 mg, Oral, Nightly, Maricruz Bruner, APRN  •  ibuprofen (ADVIL,MOTRIN) tablet 800 mg, 800 mg, Oral, BID PRN, Brenda Christiansen, APRN  •  meropenem (MERREM) 1 g/100 mL 0.9% NS VTB (mbp), 1 g, Intravenous, Q8H, Mendoza Herrera MD  •  multivitamin w/minerals tablet 1 tablet, 1 tablet, Oral, Daily, Joseph Dawkins MD  •  ondansetron (ZOFRAN) tablet 4 mg, 4 mg, Oral, Q6H PRN **OR** ondansetron (ZOFRAN) injection 4 mg, 4 mg, Intravenous, Q6H PRN, Joseph Dawkins MD  •  pantoprazole (PROTONIX) EC tablet 40 mg, 40 mg, Oral, BID AC, Maricruz Bruner, APRN  •  saccharomyces boulardii (FLORASTOR) capsule 250 mg, 250 mg, Oral, BID, Joseph Dawkins MD  •  sennosides-docusate sodium (SENOKOT-S) 8.6-50 MG tablet 1 tablet, 1 tablet, Oral, BID PRN, Joseph Dawkins MD  •  sodium chloride 0.9 % flush 10 mL, 10 mL, Intravenous, Q12H, Joseph Dawkins MD  •  sodium chloride 0.9 % flush 10 mL, 10 mL, Intravenous, PRN, Joseph Dawkins MD  •  sodium chloride 0.9 % flush 20 mL, 20 mL, Intravenous, PRN, Joseph Dawkins MD  •  spironolactone (ALDACTONE) tablet 50 mg, 50 mg, Oral, BID,  "Joseph Dawkins MD  •  vancomycin 750 mg/250 mL 0.9% NS IVPB ([facility]), 750 mg, Intravenous, Q24H, Mendoza Herrera MD  •  vitamin C (ASCORBIC ACID) tablet 1,000 mg, 1,000 mg, Oral, Daily, Joseph Dawkins MD  •  vitamin D (ERGOCALCIFEROL) capsule 50,000 Units, 50,000 Units, Oral, Q7 Days, Joseph Dawkins MD  •  Salina's amazing butt cream, , Topical, TID, Lisa Emerson, APRN  •  Salina's amazing butt cream, , Topical, PRN, Lisa Emerson, APRN  •  Salina's amazing butt cream, , Topical, TID, Lisa Emerson, APRN  •  Salina's amazing butt cream, , Topical, PRN, Lisa Emerson, APRN  •  zinc gluconate tablet 50 mg, 50 mg, Oral, Daily, Joseph Dawkins MD    Data:     Code Status:    There are no questions and answers to display.       No family history on file.    Social History     Socioeconomic History   • Marital status: Unknown     Spouse name: Not on file   • Number of children: Not on file   • Years of education: Not on file   • Highest education level: Not on file       Vitals:  Ht 172.7 cm (68\")   Wt 79.7 kg (175 lb 11.2 oz)   BMI 26.72 kg/m²     T 97.8 P 75 R 17 /56 Sp02 98% (room air)      I/O (24Hr):  No intake or output data in the 24 hours ending 10/08/19 1150    Labs and imaging:      Lab Results (last 24 hours)     Procedure Component Value Units Date/Time    Prealbumin [[id number]]  (Abnormal) Collected:  10/07/19 1247    Specimen:  Blood Updated:  10/07/19 2048     Prealbumin 17.0 mg/dL     Albumin [[id number]]  (Abnormal) Collected:  10/07/19 1247    Specimen:  Blood Updated:  10/07/19 1336     Albumin 2.70 g/dL             No results found.    Physical Examination:        Physical Exam   Constitutional: She is oriented to person, place, and time. She appears well-developed and well-nourished. No distress.   HENT:   Head: Normocephalic and atraumatic.   Right Ear: External ear normal.   Left Ear: External ear normal.   Nose: Nose normal.   Mouth/Throat: " Oropharynx is clear and moist.   Eyes: Conjunctivae and EOM are normal. Pupils are equal, round, and reactive to light.   Neck: Normal range of motion. Neck supple.   Cardiovascular: Normal rate, regular rhythm, normal heart sounds and intact distal pulses.   Pulmonary/Chest: Effort normal and breath sounds normal.   Abdominal: Soft. Bowel sounds are normal.   urostomy appliance intact  Colostomy appliance intact     Genitourinary:   Genitourinary Comments: Urostomy  Colostomy   Musculoskeletal: Normal range of motion.   Paraplegia      Neurological: She is alert and oriented to person, place, and time.   paraplegia   Skin: Skin is warm and dry.   Wound vac intact  Dressing c.d.i bilateral feet     Psychiatric: She has a normal mood and affect. Her behavior is normal. Judgment and thought content normal.   Anxious at times    Nursing note and vitals reviewed.        Assessment:             * No active hospital problems. *    No past medical history on file.     Plan:        1. Stage 4 Pressure wound Right ischium  2. Stage 3 pressure wound right heel  3. Pressure wound left heel  4. Chronic osteomyelitis with MRSA positive drainage to multiple sites  5. Gastrointestinal hemorrhage  6. Paraplegia, long term from previous Spinal tumor  7. Anemia due to blood loss  8. Severe protein calorie malnutrition, tolerating tube feedings and oral intake  9. Insomnia  10. Anxiety  11. Iron deficiency Anemia  12. Left shoulder strain     Continue current treatment. Labs Thursday. Monitor counts. Encourage adequate intake. Wound care per wound care team. Continue gabapentin.  Colace daily.     Electronically signed by MAREN Clinton on 10/8/2019 at 11:50 AM

## 2019-10-09 LAB — VANCOMYCIN TROUGH SERPL-MCNC: 20.9 MCG/ML (ref 5–20)

## 2019-10-09 PROCEDURE — 80202 ASSAY OF VANCOMYCIN: CPT | Performed by: INTERNAL MEDICINE

## 2019-10-09 PROCEDURE — 97110 THERAPEUTIC EXERCISES: CPT

## 2019-10-09 PROCEDURE — 25010000002 MEROPENEM: Performed by: INTERNAL MEDICINE

## 2019-10-09 PROCEDURE — 97530 THERAPEUTIC ACTIVITIES: CPT

## 2019-10-09 PROCEDURE — 63710000001 DIPHENHYDRAMINE PER 50 MG: Performed by: INTERNAL MEDICINE

## 2019-10-09 PROCEDURE — 97606 NEG PRS WND THER DME>50 SQCM: CPT

## 2019-10-09 PROCEDURE — 25010000002 VANCOMYCIN: Performed by: INTERNAL MEDICINE

## 2019-10-09 NOTE — PROGRESS NOTES
[facility]  CONNOR Rivera M.D.  MAREN Hernandez APRN      Internal Medicine Progress Note    10/9/2019   11:50 AM    Name:  Jessica Alvarenga  MRN:    [id number]     Acct:     [id number]   Room:  [address] [place] [address] Day: 0     Admit Date: 9/6/2019  5:34 PM  PCP: Ponce Orozco MD    Subjective:     C/C: wound care and antibiotic therapy    Interval History: Status: Improved. Resting in bed. No family at bedside. Tolerating treatment thus far, currently having Wound vac changed.     Review of Systems   Constitution: Positive for weakness and weight loss. Negative for decreased appetite and malaise/fatigue.   HENT: Negative for congestion, ear pain, hoarse voice, nosebleeds and sore throat.    Eyes: Negative for blurred vision, double vision and pain.   Cardiovascular: Negative for chest pain, dyspnea on exertion, leg swelling and near-syncope.   Respiratory: Negative for cough, shortness of breath and sputum production.    Skin: Positive for poor wound healing. Negative for dry skin, itching and rash.   Musculoskeletal: Positive for muscle weakness. Negative for back pain, joint pain and joint swelling.   Gastrointestinal: Negative for abdominal pain, constipation, diarrhea and vomiting.   Genitourinary: Negative for flank pain and hematuria.   Neurological: Positive for paresthesias (paraplegia ). Negative for difficulty with concentration, dizziness, focal weakness, headaches and seizures.   Psychiatric/Behavioral: Negative for altered mental status and depression. The patient is nervous/anxious. The patient does not have insomnia.        Medications:     Allergies:   Allergies   Allergen Reactions   • Morphine Mental Status Change       Current Meds:   Current Facility-Administered Medications:   •  acetaminophen (TYLENOL) tablet 650 mg, 650 mg, Oral, Q4H PRN **OR** acetaminophen (TYLENOL) suppository 650 mg, 650 mg, Rectal, Q4H PRN, Joseph Dawkins MD  •   bacitracin ointment, , Topical, Q12H, Lisa Emerson, APRN  •  baclofen (LIORESAL) tablet 10 mg, 10 mg, Oral, TID, Joseph Dawkins MD  •  busPIRone (BUSPAR) tablet 10 mg, 10 mg, Oral, TID With Meals, Joseph Dawkins MD  •  diphenhydrAMINE (BENADRYL) capsule 25 mg, 25 mg, Oral, Q6H PRN, Joseph Dawkins MD  •  docusate sodium (COLACE) capsule 200 mg, 200 mg, Oral, Daily, Brenda Christiansen, APRN  •  ferrous sulfate tablet 325 mg, 325 mg, Oral, BID With Meals, Joseph Dawkins MD  •  folic acid (FOLVITE) tablet 1 mg, 1 mg, Oral, Daily, Joseph Dawkins MD  •  gabapentin (NEURONTIN) capsule 100 mg, 100 mg, Oral, Daily PRN, Joseph Dawkins MD  •  gabapentin (NEURONTIN) capsule 300 mg, 300 mg, Oral, Nightly, Maricruz Bruner, APRN  •  ibuprofen (ADVIL,MOTRIN) tablet 800 mg, 800 mg, Oral, BID PRN, Brenda Christiansen, APRN  •  meropenem (MERREM) 1 g/100 mL 0.9% NS VTB (mbp), 1 g, Intravenous, Q8H, Mendoza Herrera MD  •  multivitamin w/minerals tablet 1 tablet, 1 tablet, Oral, Daily, Joseph Dawkins MD  •  ondansetron (ZOFRAN) tablet 4 mg, 4 mg, Oral, Q6H PRN **OR** ondansetron (ZOFRAN) injection 4 mg, 4 mg, Intravenous, Q6H PRN, Joseph Dawkins MD  •  pantoprazole (PROTONIX) EC tablet 40 mg, 40 mg, Oral, BID AC, Maricruz Bruner, MAREN  •  saccharomyces boulardii (FLORASTOR) capsule 250 mg, 250 mg, Oral, BID, Joseph Dawkins MD  •  sennosides-docusate sodium (SENOKOT-S) 8.6-50 MG tablet 1 tablet, 1 tablet, Oral, BID PRN, Joseph Dawkins MD  •  sodium chloride 0.9 % flush 10 mL, 10 mL, Intravenous, Q12H, Joseph Dawkins MD  •  sodium chloride 0.9 % flush 10 mL, 10 mL, Intravenous, PRN, Joesph Dawkins MD  •  sodium chloride 0.9 % flush 20 mL, 20 mL, Intravenous, PRN, Joseph Dawkins MD  •  spironolactone (ALDACTONE) tablet 50 mg, 50 mg, Oral, BID, Joseph Dawkins MD  •  vancomycin 750 mg/250 mL 0.9% NS IVPB ([facility]), 750 mg,  "Intravenous, Q24H, Mendoza Herrera MD  •  vitamin C (ASCORBIC ACID) tablet 1,000 mg, 1,000 mg, Oral, Daily, Joseph Dawkins MD  •  vitamin D (ERGOCALCIFEROL) capsule 50,000 Units, 50,000 Units, Oral, Q7 Days, Joseph Dawkins MD  •  Salina's amazing butt cream, , Topical, TID, Ki, Crystal L, APRN  •  Salina's amazing butt cream, , Topical, PRN, Ki, Crystal L, APRN  •  Salina's amazing butt cream, , Topical, TID, Ki, Crystal L, APRN  •  Salina's amazing butt cream, , Topical, PRN, Ki, Crystal L, APRN  •  zinc gluconate tablet 50 mg, 50 mg, Oral, Daily, Joseph Dawkins MD    Data:     Code Status:    There are no questions and answers to display.       No family history on file.    Social History     Socioeconomic History   • Marital status: Unknown     Spouse name: Not on file   • Number of children: Not on file   • Years of education: Not on file   • Highest education level: Not on file       Vitals:  Ht 172.7 cm (68\")   Wt 79.7 kg (175 lb 11.2 oz)   BMI 26.72 kg/m²     T 97.3 P 71 R 16 /59 Sp02 100% (room air)      I/O (24Hr):  No intake or output data in the 24 hours ending 10/09/19 1150    Labs and imaging:      Lab Results (last 24 hours)     ** No results found for the last 24 hours. **            No results found.    Physical Examination:        Physical Exam   Constitutional: She is oriented to person, place, and time. She appears well-developed and well-nourished. No distress.   HENT:   Head: Normocephalic and atraumatic.   Right Ear: External ear normal.   Left Ear: External ear normal.   Nose: Nose normal.   Mouth/Throat: Oropharynx is clear and moist.   Eyes: Conjunctivae and EOM are normal. Pupils are equal, round, and reactive to light.   Neck: Normal range of motion. Neck supple.   Cardiovascular: Normal rate, regular rhythm, normal heart sounds and intact distal pulses.   Pulmonary/Chest: Effort normal and breath sounds normal.   Abdominal: Soft. Bowel " sounds are normal.   urostomy appliance intact  Colostomy appliance intact     Genitourinary:   Genitourinary Comments: Urostomy  Colostomy   Musculoskeletal: Normal range of motion.   Paraplegia      Neurological: She is alert and oriented to person, place, and time.   paraplegia   Skin: Skin is warm and dry.   Wound vac intact  Dressing c.d.i bilateral feet     Psychiatric: She has a normal mood and affect. Her behavior is normal. Judgment and thought content normal.   Anxious at times    Nursing note and vitals reviewed.        Assessment:             * No active hospital problems. *    No past medical history on file.     Plan:        1. Stage 4 Pressure wound Right ischium  2. Stage 3 pressure wound right heel  3. Pressure wound left heel  4. Chronic osteomyelitis with MRSA positive drainage to multiple sites  5. Gastrointestinal hemorrhage  6. Paraplegia, long term from previous Spinal tumor  7. Anemia due to blood loss  8. Severe protein calorie malnutrition, tolerating tube feedings and oral intake  9. Insomnia  10. Anxiety  11. Iron deficiency Anemia  12. Left shoulder strain     Continue current treatment. Labs in am. Monitor counts. Encourage adequate intake. Wound care per wound care team. Encourage activity.      Electronically signed by MAREN Clinton on 10/9/2019 at 11:50 AM   I have discussed the care of Jessica Alvarenga, including pertinent history and exam findings, with the nurse practitioner.    I have seen and examined the patient and the key elements of all parts of the encounter have been performed by me.  I agree with the assessment, plan and orders as documented by MAREN Clinton, after I modified the exam findings and the plan of treatments and the final version is my approved version of the assessment.        Electronically signed by Joseph Dawkins MD on 10/9/2019 at 5:03 PM

## 2019-10-10 LAB
ANION GAP SERPL CALCULATED.3IONS-SCNC: 10 MMOL/L (ref 5–15)
BASOPHILS # BLD AUTO: 0.08 10*3/MM3 (ref 0–0.2)
BASOPHILS NFR BLD AUTO: 1.4 % (ref 0–1.5)
BUN BLD-MCNC: 30 MG/DL (ref 6–20)
BUN/CREAT SERPL: 38.5 (ref 7–25)
CALCIUM SPEC-SCNC: 9.1 MG/DL (ref 8.6–10.5)
CHLORIDE SERPL-SCNC: 107 MMOL/L (ref 98–107)
CO2 SERPL-SCNC: 25 MMOL/L (ref 22–29)
CREAT BLD-MCNC: 0.78 MG/DL (ref 0.57–1)
CRP SERPL-MCNC: 2.63 MG/DL (ref 0–0.5)
DEPRECATED RDW RBC AUTO: 48.6 FL (ref 37–54)
EOSINOPHIL # BLD AUTO: 0.59 10*3/MM3 (ref 0–0.4)
EOSINOPHIL NFR BLD AUTO: 10 % (ref 0.3–6.2)
ERYTHROCYTE [DISTWIDTH] IN BLOOD BY AUTOMATED COUNT: 15.3 % (ref 12.3–15.4)
ERYTHROCYTE [SEDIMENTATION RATE] IN BLOOD: 54 MM/HR (ref 0–20)
GFR SERPL CREATININE-BSD FRML MDRD: 76 ML/MIN/1.73
GLUCOSE BLD-MCNC: 92 MG/DL (ref 65–99)
HCT VFR BLD AUTO: 29 % (ref 34–46.6)
HGB BLD-MCNC: 8.9 G/DL (ref 12–15.9)
IMM GRANULOCYTES # BLD AUTO: 0.03 10*3/MM3 (ref 0–0.05)
IMM GRANULOCYTES NFR BLD AUTO: 0.5 % (ref 0–0.5)
LYMPHOCYTES # BLD AUTO: 1.54 10*3/MM3 (ref 0.7–3.1)
LYMPHOCYTES NFR BLD AUTO: 26.1 % (ref 19.6–45.3)
MCH RBC QN AUTO: 26.9 PG (ref 26.6–33)
MCHC RBC AUTO-ENTMCNC: 30.7 G/DL (ref 31.5–35.7)
MCV RBC AUTO: 87.6 FL (ref 79–97)
MONOCYTES # BLD AUTO: 0.44 10*3/MM3 (ref 0.1–0.9)
MONOCYTES NFR BLD AUTO: 7.5 % (ref 5–12)
NEUTROPHILS # BLD AUTO: 3.22 10*3/MM3 (ref 1.7–7)
NEUTROPHILS NFR BLD AUTO: 54.5 % (ref 42.7–76)
NRBC BLD AUTO-RTO: 0 /100 WBC (ref 0–0.2)
PLATELET # BLD AUTO: 272 10*3/MM3 (ref 140–450)
PMV BLD AUTO: 9.8 FL (ref 6–12)
POTASSIUM BLD-SCNC: 6.3 MMOL/L (ref 3.5–5.2)
RBC # BLD AUTO: 3.31 10*6/MM3 (ref 3.77–5.28)
SODIUM BLD-SCNC: 142 MMOL/L (ref 136–145)
WBC NRBC COR # BLD: 5.9 10*3/MM3 (ref 3.4–10.8)

## 2019-10-10 PROCEDURE — 86140 C-REACTIVE PROTEIN: CPT | Performed by: INTERNAL MEDICINE

## 2019-10-10 PROCEDURE — 85025 COMPLETE CBC W/AUTO DIFF WBC: CPT | Performed by: INTERNAL MEDICINE

## 2019-10-10 PROCEDURE — 97606 NEG PRS WND THER DME>50 SQCM: CPT

## 2019-10-10 PROCEDURE — 25010000002 MEROPENEM: Performed by: INTERNAL MEDICINE

## 2019-10-10 PROCEDURE — 25010000002 VANCOMYCIN: Performed by: INTERNAL MEDICINE

## 2019-10-10 PROCEDURE — 63710000001 DIPHENHYDRAMINE PER 50 MG: Performed by: INTERNAL MEDICINE

## 2019-10-10 PROCEDURE — 80048 BASIC METABOLIC PNL TOTAL CA: CPT | Performed by: INTERNAL MEDICINE

## 2019-10-10 PROCEDURE — 85651 RBC SED RATE NONAUTOMATED: CPT | Performed by: INTERNAL MEDICINE

## 2019-10-10 NOTE — PROGRESS NOTES
"Pharmacy Dosing Service  Pharmacokinetics  Vancomycin Follow-up Evaluation    Assessment/Action/Plan:  Vancomycin level ordered for today that resulted back at 20.9. Patient's renal function hasn't changed since last BMP, another BMP is ordered for tomorrow. Although level seems to be slightly supratherapeutic, expect true trough to be less than 20. Due to patient's clinical status continuing to improve and nearing the end of therapy with Vancomycin will continue on with Vancomycin 750 mg IV every 24 hours. Will re-evaluate tomorrow's renal function and adjust dose accordingly if deemed appropriate. Pharmacy will continue to monitor renal function and adjust dose accordingly.     Subjective:  Jessica Alvarenga is a 59 y.o. female currently on Vancomycin 750 mg IV every 24 hours for the treatment of bone/joint infection (Current vancomycin end date: 10/15/2019).    Objective:  Ht: 172.7 cm (68\"); Wt: 79.7 kg (175 lb 11.2 oz)  Estimated Creatinine Clearance: 134.9 mL/min (A) (by C-G formula based on SCr of 0.51 mg/dL (L)).   Lab Results   Component Value Date    CREATININE 0.51 (L) 10/07/2019    CREATININE 0.69 10/05/2019    CREATININE 0.56 (L) 10/04/2019    CREATININE 0.6 09/06/2019    CREATININE 0.6 09/05/2019    CREATININE 0.7 09/04/2019      Lab Results   Component Value Date    WBC 5.91 10/07/2019    WBC 7.54 10/03/2019    WBC 6.30 09/30/2019         Lab Results   Component Value Date    VANCOTROUGH 20.90 (H) 10/09/2019    VANCORANDOM 17.20 09/26/2019       Culture Results:  Microbiology Results (last 10 days)       ** No results found for the last 240 hours. **            Julia Sen, PharmD   10/09/19 9:17 PM    "

## 2019-10-10 NOTE — PROGRESS NOTES
"Infectious Diseases Progress Note    Patient:  Jessica Alvarenga  YOB: 1960  MRN: [id number]   Admit date: 9/6/2019   Admitting Physician: Joseph Dawkins MD  Primary Care Physician: Ponce Orozco MD    Chief Complaint/Interval History: She feels okay.  She is without new symptoms.  She feels she continues to eat better than she had previously.  She has no change in ostomy output.  No rash or skin itching.  No pain at her PICC line site.    No intake or output data in the 24 hours ending 10/10/19 0554  Allergies:   Allergies   Allergen Reactions   • Morphine Mental Status Change     Current Scheduled Medications:     bacitracin  Topical Q12H   baclofen 10 mg Oral TID   busPIRone 10 mg Oral TID With Meals   docusate sodium 200 mg Oral Daily   ferrous sulfate 325 mg Oral BID With Meals   folic acid 1 mg Oral Daily   gabapentin 300 mg Oral Nightly   meropenem 1 g Intravenous Q8H   multivitamin w/minerals 1 tablet Oral Daily   pantoprazole 40 mg Oral BID AC   saccharomyces boulardii 250 mg Oral BID   sodium chloride 10 mL Intravenous Q12H   spironolactone 50 mg Oral BID   vancomycin 750 mg Intravenous Q24H   vitamin C 1,000 mg Oral Daily   vitamin D 50,000 Units Oral Q7 Days   jennifer's amazing butt  Topical TID   jennifer's amazing butt  Topical TID   zinc gluconate 50 mg Oral Daily     Current PRN Medications:  •  acetaminophen **OR** acetaminophen  •  diphenhydrAMINE  •  gabapentin  •  ibuprofen  •  ondansetron **OR** ondansetron  •  sennosides-docusate sodium  •  sodium chloride  •  sodium chloride  •  jennifer's amazing butt  •  jennifer's amazing butt    Review of Systems See HPI    Vital Signs:  Ht 172.7 cm (68\")   Wt 79.7 kg (175 lb 11.2 oz)   BMI 26.72 kg/m²     Physical Exam  Vital signs - reviewed.  Line/IV (PICC line) site - No erythema, warmth, induration, or tenderness.  Ischial wounds with wound VAC in place  Ostomy pink and functioning  Urostomy in place  Skin without drug rash    Lab " Results:  CBC: Results from last 7 days   Lab Units 10/07/19  0508 10/03/19  0751   WBC 10*3/mm3 5.91 7.54   HEMOGLOBIN g/dL 8.6* 8.9*   HEMATOCRIT % 27.8* 28.2*   PLATELETS 10*3/mm3 247 243     BMP:  Results from last 7 days   Lab Units 10/07/19  0508 10/05/19  1731 10/04/19  0505 10/03/19  0751   SODIUM mmol/L 143  --  144 142   POTASSIUM mmol/L 5.1  --  5.1 5.7*   CHLORIDE mmol/L 107  --  108* 108*   CO2 mmol/L 28.0  --  28.0 25.0   BUN mg/dL 24*  --  19 23*   CREATININE mg/dL 0.51* 0.69 0.56* 0.69   GLUCOSE mg/dL 102*  --  103* 98   CALCIUM mg/dL 9.0  --  8.5* 8.5*     CRP 2.6  Sedimentation rate 54  BUN 30 and creatinine 0.7  White blood cell count 5.9, hemoglobin 8.9, and platelet 272  Vancomycin trough yesterday 20.9    Last progress note from [facility] was in mid June 2019:  She has seen plastic surgery at [facility] previously.  Their last appointment with her was in June.  They had suggested follow-up with a local wound care center until she decided to have diverting colostomy.  She last saw a nurse practitioner at that clinic.  She has seen a surgeon there previously.  Contact number for plastic surgery at [facility] as outlined below.  06/13/2019 Follow-Up [facility]    [address]    [address]    [place] [address]    [phone]   Willie Menon, APRN    [address] [place] [id number] [phone] (Fax)   Pressure injury of left ischium, stage 4 (CMS/HCC) (Primary Dx);   Pressure injury of right ischium, stage 4 (CMS/HCC)     Impression:     1.  Bilateral ischial decubitus ulcers.  She has had diverting colostomy and has had previous urostomy placement.  She is stable to improving on antibiotic treatment.  2.  Pelvic osteomyelitis  3.  Paraplegia  4.  Diverting colostomy  5.  Urostomy    Recommendations:   Continue treatment with vancomycin and meropenem  We will plan discontinuation  of intravenous antibiotics after last doses on Tuesday, October 15, 2019  Suggest beginning arrangements for transition of care next week either to home, nursing home, or back to [facility] for evaluation.  She has seen [facility] previously.  Their contact phone number is above the impression section of this note.  Will likely plan transition to oral antibiotic therapy on Wednesday, October 16, 2019  We will see again Monday or Tuesday of next week  Dr. Parker covering-please call if new problems or questions for infectious diseases in the interim.    Mendoza Lyon MD

## 2019-10-10 NOTE — PROGRESS NOTES
[facility]  Avel Dawkins M.D.  CHRISTOPHER Dawkins M.D.  MAREN Hernandez APRN      Internal Medicine Progress Note    10/10/2019   10:47 AM    Name:  Jessica Alvarenga  MRN:    [id number]     Acct:     [id number]   Room:  [address] [place] [address] Day: 0     Admit Date: 9/6/2019  5:34 PM  PCP: Ponce Orozco MD    Subjective:     C/C: wound care and antibiotic therapy    Interval History: Status: Improved. Resting in bed. No family at bedside. Tolerating treatment thus far.  K+ 6.3, will DC Aldactone and monitor.  Improved counts.      Review of Systems   Constitution: Positive for weakness. Negative for decreased appetite and malaise/fatigue.   HENT: Negative for congestion, ear pain, hoarse voice, nosebleeds and sore throat.    Eyes: Negative for blurred vision, double vision and pain.   Cardiovascular: Negative for chest pain, dyspnea on exertion, leg swelling and near-syncope.   Respiratory: Negative for cough, shortness of breath and sputum production.    Skin: Positive for poor wound healing. Negative for dry skin, itching and rash.   Musculoskeletal: Positive for muscle weakness. Negative for back pain, joint pain and joint swelling.   Gastrointestinal: Negative for abdominal pain, constipation, diarrhea and vomiting.   Genitourinary: Negative for flank pain and hematuria.   Neurological: Positive for paresthesias (paraplegia ). Negative for difficulty with concentration, dizziness, focal weakness, headaches and seizures.   Psychiatric/Behavioral: Negative for altered mental status and depression. The patient does not have insomnia and is not nervous/anxious.        Medications:     Allergies:   Allergies   Allergen Reactions   • Morphine Mental Status Change       Current Meds:   Current Facility-Administered Medications:   •  acetaminophen (TYLENOL) tablet 650 mg, 650 mg, Oral, Q4H PRN **OR** acetaminophen (TYLENOL) suppository 650 mg, 650 mg, Rectal, Q4H PRN, Joseph Dawkins,  MD  •  bacitracin ointment, , Topical, Q12H, Lisa Emerson, APRN  •  baclofen (LIORESAL) tablet 10 mg, 10 mg, Oral, TID, Joseph Dawkins MD  •  busPIRone (BUSPAR) tablet 10 mg, 10 mg, Oral, TID With Meals, Joseph Dawkins MD  •  diphenhydrAMINE (BENADRYL) capsule 25 mg, 25 mg, Oral, Q6H PRN, Joseph Dawkins MD  •  docusate sodium (COLACE) capsule 200 mg, 200 mg, Oral, Daily, Brenda Christiansen, APRN  •  ferrous sulfate tablet 325 mg, 325 mg, Oral, BID With Meals, Joseph Dawkins MD  •  folic acid (FOLVITE) tablet 1 mg, 1 mg, Oral, Daily, Joseph Dawkins MD  •  gabapentin (NEURONTIN) capsule 100 mg, 100 mg, Oral, Daily PRN, Joseph Dawkins MD  •  gabapentin (NEURONTIN) capsule 300 mg, 300 mg, Oral, Nightly, Maricruz Bruner, APRN  •  ibuprofen (ADVIL,MOTRIN) tablet 800 mg, 800 mg, Oral, BID PRN, Brenda Christiansen, APRN  •  meropenem (MERREM) 1 g/100 mL 0.9% NS VTB (mbp), 1 g, Intravenous, Q8H, Mendoza Herrera MD  •  multivitamin w/minerals tablet 1 tablet, 1 tablet, Oral, Daily, Joseph Dawkins MD  •  ondansetron (ZOFRAN) tablet 4 mg, 4 mg, Oral, Q6H PRN **OR** ondansetron (ZOFRAN) injection 4 mg, 4 mg, Intravenous, Q6H PRN, Joseph Dawkins MD  •  pantoprazole (PROTONIX) EC tablet 40 mg, 40 mg, Oral, BID AC, Maricruz Bruner, APRSIL  •  saccharomyces boulardii (FLORASTOR) capsule 250 mg, 250 mg, Oral, BID, Joseph Dawkins MD  •  sennosides-docusate sodium (SENOKOT-S) 8.6-50 MG tablet 1 tablet, 1 tablet, Oral, BID PRN, Joseph Dawkins MD  •  sodium chloride 0.9 % flush 10 mL, 10 mL, Intravenous, Q12H, Joseph Dawkins MD  •  sodium chloride 0.9 % flush 10 mL, 10 mL, Intravenous, PRN, Joseph Dawkins MD  •  sodium chloride 0.9 % flush 20 mL, 20 mL, Intravenous, PRN, Joseph Dawkins MD  •  spironolactone (ALDACTONE) tablet 50 mg, 50 mg, Oral, BID, Joseph Dawkins MD  •  vancomycin 750 mg/250 mL 0.9% NS IVPB ([facility]),  "750 mg, Intravenous, Q24H, Mendoza Herrera MD  •  vitamin C (ASCORBIC ACID) tablet 1,000 mg, 1,000 mg, Oral, Daily, Joseph Dawkins MD  •  vitamin D (ERGOCALCIFEROL) capsule 50,000 Units, 50,000 Units, Oral, Q7 Days, Joseph Dawkins MD  •  Salina's amazing butt cream, , Topical, TID, Lisa Emerson, APRN  •  Salina's amazing butt cream, , Topical, PRN, Lisa Emerson, APRN  •  Salina's amazing butt cream, , Topical, TID, Lisa Emerson, APRN  •  Salina's amazing butt cream, , Topical, PRN, Lisa Emerson, APRN  •  zinc gluconate tablet 50 mg, 50 mg, Oral, Daily, Joseph Dawkins MD    Data:     Code Status:    There are no questions and answers to display.       No family history on file.    Social History     Socioeconomic History   • Marital status: Unknown     Spouse name: Not on file   • Number of children: Not on file   • Years of education: Not on file   • Highest education level: Not on file       Vitals:  Ht 172.7 cm (68\")   Wt 79.7 kg (175 lb 11.2 oz)   BMI 26.72 kg/m²     T 98.3 P 89 R 17 /57 Sp02 97% (room air)      I/O (24Hr):  No intake or output data in the 24 hours ending 10/10/19 1047    Labs and imaging:      Lab Results (last 24 hours)     Procedure Component Value Units Date/Time    Sedimentation Rate [[id number]]  (Abnormal) Collected:  10/10/19 0603    Specimen:  Blood Updated:  10/10/19 0715     Sed Rate 54 mm/hr     Basic Metabolic Panel [[id number]]  (Abnormal) Collected:  10/10/19 0603    Specimen:  Blood Updated:  10/10/19 0713     Glucose 92 mg/dL      BUN 30 mg/dL      Creatinine 0.78 mg/dL      Sodium 142 mmol/L      Potassium 6.3 mmol/L      Chloride 107 mmol/L      CO2 25.0 mmol/L      Calcium 9.1 mg/dL      eGFR Non African Amer 76 mL/min/1.73      BUN/Creatinine Ratio 38.5     Anion Gap 10.0 mmol/L     Narrative:       GFR Normal >60  Chronic Kidney Disease <60  Kidney Failure <15    C-reactive Protein [[id number]]  (Abnormal) Collected:  " 10/10/19 0603    Specimen:  Blood Updated:  10/10/19 0709     C-Reactive Protein 2.63 mg/dL     CBC & Differential [[id number]] Collected:  10/10/19 0603    Specimen:  Blood Updated:  10/10/19 0651    Narrative:       The following orders were created for panel order CBC & Differential.  Procedure                               Abnormality         Status                     ---------                               -----------         ------                     CBC Auto Differential[[id number]]        Abnormal            Final result                 Please view results for these tests on the individual orders.    CBC Auto Differential [[id number]]  (Abnormal) Collected:  10/10/19 0603    Specimen:  Blood Updated:  10/10/19 0651     WBC 5.90 10*3/mm3      RBC 3.31 10*6/mm3      Hemoglobin 8.9 g/dL      Hematocrit 29.0 %      MCV 87.6 fL      MCH 26.9 pg      MCHC 30.7 g/dL      RDW 15.3 %      RDW-SD 48.6 fl      MPV 9.8 fL      Platelets 272 10*3/mm3      Neutrophil % 54.5 %      Lymphocyte % 26.1 %      Monocyte % 7.5 %      Eosinophil % 10.0 %      Basophil % 1.4 %      Immature Grans % 0.5 %      Neutrophils, Absolute 3.22 10*3/mm3      Lymphocytes, Absolute 1.54 10*3/mm3      Monocytes, Absolute 0.44 10*3/mm3      Eosinophils, Absolute 0.59 10*3/mm3      Basophils, Absolute 0.08 10*3/mm3      Immature Grans, Absolute 0.03 10*3/mm3      nRBC 0.0 /100 WBC     Vancomycin, Trough [[id number]]  (Abnormal) Collected:  10/09/19 1713    Specimen:  Blood Updated:  10/09/19 1751     Vancomycin Trough 20.90 mcg/mL             No results found.    Physical Examination:        Physical Exam   Constitutional: She is oriented to person, place, and time. She appears well-developed and well-nourished. No distress.   HENT:   Head: Normocephalic and atraumatic.   Right Ear: External ear normal.   Left Ear: External ear normal.   Nose: Nose normal.   Mouth/Throat: Oropharynx is clear and moist.   Eyes: Conjunctivae and EOM are normal.  Pupils are equal, round, and reactive to light.   Neck: Normal range of motion. Neck supple.   Cardiovascular: Normal rate, regular rhythm, normal heart sounds and intact distal pulses.   Pulmonary/Chest: Effort normal and breath sounds normal.   Abdominal: Soft. Bowel sounds are normal.   urostomy appliance intact  Colostomy appliance intact     Genitourinary:   Genitourinary Comments: Urostomy  Colostomy   Musculoskeletal: Normal range of motion.   Paraplegia      Neurological: She is alert and oriented to person, place, and time.   paraplegia   Skin: Skin is warm and dry.   Wound vac intact  Dressing c.d.i bilateral feet     Psychiatric: She has a normal mood and affect. Her behavior is normal. Judgment and thought content normal.   Anxious at times    Nursing note and vitals reviewed.        Assessment:             * No active hospital problems. *    No past medical history on file.     Plan:        1. Stage 4 Pressure wound Right ischium  2. Stage 3 pressure wound right heel  3. Pressure wound left heel  4. Chronic osteomyelitis with MRSA positive drainage to multiple sites  5. Gastrointestinal hemorrhage  6. Paraplegia, long term from previous Spinal tumor  7. Anemia due to blood loss  8. Severe protein calorie malnutrition, tolerating tube feedings and oral intake  9. Insomnia  10. Anxiety  11. Iron deficiency Anemia  12. Left shoulder strain     Continue current treatment. Labs Monday. Monitor counts. Encourage adequate intake. Wound care per wound care team. Encourage activity.  Monitor potassium closely.  LOTUS Aldactone.  BMP in am.     Electronically signed by MAREN Clinton on 10/10/2019 at 10:47 AM

## 2019-10-11 LAB
ANION GAP SERPL CALCULATED.3IONS-SCNC: 9 MMOL/L (ref 5–15)
BUN BLD-MCNC: 35 MG/DL (ref 6–20)
BUN/CREAT SERPL: 49.3 (ref 7–25)
CALCIUM SPEC-SCNC: 9.1 MG/DL (ref 8.6–10.5)
CHLORIDE SERPL-SCNC: 109 MMOL/L (ref 98–107)
CO2 SERPL-SCNC: 25 MMOL/L (ref 22–29)
CREAT BLD-MCNC: 0.71 MG/DL (ref 0.57–1)
GFR SERPL CREATININE-BSD FRML MDRD: 85 ML/MIN/1.73
GLUCOSE BLD-MCNC: 105 MG/DL (ref 65–99)
POTASSIUM BLD-SCNC: 6.8 MMOL/L (ref 3.5–5.2)
SODIUM BLD-SCNC: 143 MMOL/L (ref 136–145)

## 2019-10-11 PROCEDURE — 97606 NEG PRS WND THER DME>50 SQCM: CPT

## 2019-10-11 PROCEDURE — 25010000002 VANCOMYCIN: Performed by: INTERNAL MEDICINE

## 2019-10-11 PROCEDURE — 25010000002 MEROPENEM: Performed by: INTERNAL MEDICINE

## 2019-10-11 PROCEDURE — 80048 BASIC METABOLIC PNL TOTAL CA: CPT | Performed by: INTERNAL MEDICINE

## 2019-10-11 PROCEDURE — 63710000001 DIPHENHYDRAMINE PER 50 MG: Performed by: INTERNAL MEDICINE

## 2019-10-11 RX ORDER — SODIUM POLYSTYRENE SULFONATE 15 G/60ML
15 SUSPENSION ORAL; RECTAL ONCE
Status: DISCONTINUED | OUTPATIENT
Start: 2019-10-11 | End: 2019-10-13 | Stop reason: SDUPTHER

## 2019-10-11 NOTE — PROGRESS NOTES
[facility]  CONNOR Rivera M.D.  MAREN Hernandez APRN      Internal Medicine Progress Note    10/11/2019   9:33 AM    Name:  Jessica Alvarenga  MRN:    [id number]     Acct:     [id number]   Room:  [address] [place] [address] Day: 0     Admit Date: 9/6/2019  5:34 PM  PCP: Ponce Orozco MD    Subjective:     C/C: wound care and antibiotic therapy    Interval History: Status: Improved. Resting in bed. No family at bedside. Tolerating treatment thus far.  K+ 6.8, will treat and monitor.       Review of Systems   Constitution: Positive for weakness. Negative for decreased appetite and malaise/fatigue.   HENT: Negative for congestion, ear pain, hoarse voice, nosebleeds and sore throat.    Eyes: Negative for blurred vision, double vision and pain.   Cardiovascular: Negative for chest pain, dyspnea on exertion, leg swelling and near-syncope.   Respiratory: Negative for cough, shortness of breath and sputum production.    Skin: Positive for poor wound healing. Negative for dry skin, itching and rash.   Musculoskeletal: Positive for muscle weakness. Negative for back pain, joint pain and joint swelling.   Gastrointestinal: Negative for abdominal pain, constipation, diarrhea and vomiting.   Genitourinary: Negative for flank pain and hematuria.   Neurological: Positive for paresthesias (paraplegia ). Negative for difficulty with concentration, dizziness, focal weakness, headaches and seizures.   Psychiatric/Behavioral: Negative for altered mental status and depression. The patient does not have insomnia and is not nervous/anxious.        Medications:     Allergies:   Allergies   Allergen Reactions   • Morphine Mental Status Change       Current Meds:   Current Facility-Administered Medications:   •  acetaminophen (TYLENOL) tablet 650 mg, 650 mg, Oral, Q4H PRN **OR** acetaminophen (TYLENOL) suppository 650 mg, 650 mg, Rectal, Q4H PRN, Joseph Dawkins MD  •  bacitracin ointment,  , Topical, Q12H, Lisa Emerson, APRN  •  baclofen (LIORESAL) tablet 10 mg, 10 mg, Oral, TID, Joseph Dawkins MD  •  busPIRone (BUSPAR) tablet 10 mg, 10 mg, Oral, TID With Meals, Joseph Dawkins MD  •  diphenhydrAMINE (BENADRYL) capsule 25 mg, 25 mg, Oral, Q6H PRN, Joseph Dawkins MD  •  docusate sodium (COLACE) capsule 200 mg, 200 mg, Oral, Daily, Brenda Christiansen, APRN  •  ferrous sulfate tablet 325 mg, 325 mg, Oral, BID With Meals, Joseph Dawkins MD  •  folic acid (FOLVITE) tablet 1 mg, 1 mg, Oral, Daily, Joseph Dawknis MD  •  gabapentin (NEURONTIN) capsule 100 mg, 100 mg, Oral, Daily PRN, Joseph Dawkins MD  •  gabapentin (NEURONTIN) capsule 300 mg, 300 mg, Oral, Nightly, Maricruz Bruner, APRN  •  ibuprofen (ADVIL,MOTRIN) tablet 800 mg, 800 mg, Oral, BID PRN, Brenda Christiansen, APRN  •  meropenem (MERREM) 1 g/100 mL 0.9% NS VTB (mbp), 1 g, Intravenous, Q8H, Mendoza Herrera MD  •  multivitamin w/minerals tablet 1 tablet, 1 tablet, Oral, Daily, Joseph Dawkins MD  •  ondansetron (ZOFRAN) tablet 4 mg, 4 mg, Oral, Q6H PRN **OR** ondansetron (ZOFRAN) injection 4 mg, 4 mg, Intravenous, Q6H PRN, Joseph Dawkins MD  •  pantoprazole (PROTONIX) EC tablet 40 mg, 40 mg, Oral, BID AC, Maricruz Bruner, MAREN  •  saccharomyces boulardii (FLORASTOR) capsule 250 mg, 250 mg, Oral, BID, Joseph Dawkins MD  •  sennosides-docusate sodium (SENOKOT-S) 8.6-50 MG tablet 1 tablet, 1 tablet, Oral, BID PRN, Joseph Dawkins MD  •  sodium chloride 0.9 % flush 10 mL, 10 mL, Intravenous, Q12H, Joseph Dawkins MD  •  sodium chloride 0.9 % flush 10 mL, 10 mL, Intravenous, PRN, Joseph Dawkins MD  •  sodium chloride 0.9 % flush 20 mL, 20 mL, Intravenous, PRN, Joseph Dawkins MD  •  vancomycin 750 mg/250 mL 0.9% NS IVPB ([facility]), 750 mg, Intravenous, Q24H, Mendoza Herrera MD  •  vitamin C (ASCORBIC ACID) tablet 1,000 mg, 1,000 mg, Oral, Daily,  "Joseph Dawkins MD  •  vitamin D (ERGOCALCIFEROL) capsule 50,000 Units, 50,000 Units, Oral, Q7 Days, Joseph Dawkins MD  •  Salina's amazing butt cream, , Topical, TID, Lisa Emerson, APRN  •  Salina's amazing butt cream, , Topical, PRN, Lisa Emerson, APRN  •  Salina's amazing butt cream, , Topical, TID, Lisa Emerson, APRN  •  Salina's amazing butt cream, , Topical, PRN, Lisa Emerson, APRN  •  zinc gluconate tablet 50 mg, 50 mg, Oral, Daily, Joseph Dawkins MD    Data:     Code Status:    There are no questions and answers to display.       No family history on file.    Social History     Socioeconomic History   • Marital status: Unknown     Spouse name: Not on file   • Number of children: Not on file   • Years of education: Not on file   • Highest education level: Not on file       Vitals:  Ht 172.7 cm (68\")   Wt 79.7 kg (175 lb 11.2 oz)   BMI 26.72 kg/m²     T 97.4 P 86 R 14 /56 Sp02 97% (room air)      I/O (24Hr):  No intake or output data in the 24 hours ending 10/11/19 0933    Labs and imaging:      Lab Results (last 24 hours)     Procedure Component Value Units Date/Time    Basic Metabolic Panel [[id number]]  (Abnormal) Collected:  10/11/19 0623    Specimen:  Blood Updated:  10/11/19 0659     Glucose 105 mg/dL      BUN 35 mg/dL      Creatinine 0.71 mg/dL      Sodium 143 mmol/L      Potassium 6.8 mmol/L      Chloride 109 mmol/L      CO2 25.0 mmol/L      Calcium 9.1 mg/dL      eGFR Non African Amer 85 mL/min/1.73      BUN/Creatinine Ratio 49.3     Anion Gap 9.0 mmol/L     Narrative:       GFR Normal >60  Chronic Kidney Disease <60  Kidney Failure <15            No results found.    Physical Examination:        Physical Exam   Constitutional: She is oriented to person, place, and time. She appears well-developed and well-nourished. No distress.   HENT:   Head: Normocephalic and atraumatic.   Right Ear: External ear normal.   Left Ear: External ear normal.   Nose: " Nose normal.   Mouth/Throat: Oropharynx is clear and moist.   Eyes: Conjunctivae and EOM are normal. Pupils are equal, round, and reactive to light.   Neck: Normal range of motion. Neck supple.   Cardiovascular: Normal rate, regular rhythm, normal heart sounds and intact distal pulses.   Pulmonary/Chest: Effort normal and breath sounds normal.   Abdominal: Soft. Bowel sounds are normal.   urostomy appliance intact  Colostomy appliance intact     Genitourinary:   Genitourinary Comments: Urostomy  Colostomy   Musculoskeletal: Normal range of motion.   Paraplegia      Neurological: She is alert and oriented to person, place, and time.   paraplegia   Skin: Skin is warm and dry.   Wound vac intact  Dressing c.d.i bilateral feet     Psychiatric: She has a normal mood and affect. Her behavior is normal. Judgment and thought content normal.   Anxious at times    Nursing note and vitals reviewed.        Assessment:             * No active hospital problems. *    No past medical history on file.     Plan:        1. Stage 4 Pressure wound Right ischium  2. Stage 3 pressure wound right heel  3. Pressure wound left heel  4. Chronic osteomyelitis with MRSA positive drainage to multiple sites  5. Gastrointestinal hemorrhage  6. Paraplegia, long term from previous Spinal tumor  7. Anemia due to blood loss  8. Severe protein calorie malnutrition, tolerating tube feedings and oral intake  9. Insomnia  10. Anxiety  11. Iron deficiency Anemia  12. Left shoulder strain     Continue current treatment. Labs Monday. Monitor counts. Encourage adequate intake. Wound care per wound care team. Encourage activity.  Monitor potassium closely. Kayexalate.     Electronically signed by MAREN Clinton on 10/11/2019 at 9:33 AM

## 2019-10-12 LAB
ANION GAP SERPL CALCULATED.3IONS-SCNC: 11 MMOL/L (ref 5–15)
BUN BLD-MCNC: 28 MG/DL (ref 6–20)
BUN/CREAT SERPL: 37.8 (ref 7–25)
CALCIUM SPEC-SCNC: 9.3 MG/DL (ref 8.6–10.5)
CHLORIDE SERPL-SCNC: 107 MMOL/L (ref 98–107)
CO2 SERPL-SCNC: 26 MMOL/L (ref 22–29)
CREAT BLD-MCNC: 0.74 MG/DL (ref 0.57–1)
GFR SERPL CREATININE-BSD FRML MDRD: 81 ML/MIN/1.73
GLUCOSE BLD-MCNC: 100 MG/DL (ref 65–99)
POTASSIUM BLD-SCNC: 6.1 MMOL/L (ref 3.5–5.2)
SODIUM BLD-SCNC: 144 MMOL/L (ref 136–145)

## 2019-10-12 PROCEDURE — 80048 BASIC METABOLIC PNL TOTAL CA: CPT | Performed by: INTERNAL MEDICINE

## 2019-10-12 PROCEDURE — 97530 THERAPEUTIC ACTIVITIES: CPT

## 2019-10-12 PROCEDURE — 93010 ELECTROCARDIOGRAM REPORT: CPT | Performed by: INTERNAL MEDICINE

## 2019-10-12 PROCEDURE — 25010000002 VANCOMYCIN: Performed by: INTERNAL MEDICINE

## 2019-10-12 PROCEDURE — 63710000001 DIPHENHYDRAMINE PER 50 MG: Performed by: INTERNAL MEDICINE

## 2019-10-12 PROCEDURE — 93005 ELECTROCARDIOGRAM TRACING: CPT | Performed by: INTERNAL MEDICINE

## 2019-10-12 PROCEDURE — 25010000002 MEROPENEM: Performed by: INTERNAL MEDICINE

## 2019-10-12 RX ORDER — SODIUM POLYSTYRENE SULFONATE 15 G/60ML
30 SUSPENSION ORAL; RECTAL
Status: DISPENSED | OUTPATIENT
Start: 2019-10-12 | End: 2019-10-13

## 2019-10-12 NOTE — PROGRESS NOTES
[facility]  CONNOR Rivera M.D.  MAREN Hernandez APRN      Internal Medicine Progress Note    10/12/2019   8:19 AM    Name:  Jessica Alvarenga  MRN:    [id number]     Acct:     [id number]   Room:  [address] [place] [address] Day: 0     Admit Date: 9/6/2019  5:34 PM  PCP: Ponce rOozco MD    Subjective:     C/C: wound care and antibiotic therapy    Interval History: Status: Improved. Resting in bed. No family at bedside. Tolerating treatment thus far.  K+ 6.1 after receiving Kayexalate x1, give additional dose of Kayexalate.  Asymptomatic denying any chest pain or shortness of air.  ECG documented as abnormal, revealed sinus rhythm with premature atrial complexes with inferior infarct of age-indeterminate and cannot rule out anterior infarct.  Jessica declined to wear a Holter monitor for routine monitoring.       Review of Systems   Constitution: Positive for weakness. Negative for decreased appetite and malaise/fatigue.   HENT: Negative for congestion, ear pain, hoarse voice, nosebleeds and sore throat.    Eyes: Negative for blurred vision, double vision and pain.   Cardiovascular: Negative for chest pain, dyspnea on exertion, leg swelling and near-syncope.   Respiratory: Negative for cough, shortness of breath and sputum production.    Skin: Positive for poor wound healing. Negative for dry skin, itching and rash.   Musculoskeletal: Positive for muscle weakness. Negative for back pain, joint pain and joint swelling.   Gastrointestinal: Negative for abdominal pain, constipation, diarrhea and vomiting.        Lower abdomen tenderness   Genitourinary: Negative for flank pain and hematuria.   Neurological: Positive for paresthesias (paraplegia ). Negative for difficulty with concentration, dizziness, focal weakness, headaches and seizures.   Psychiatric/Behavioral: Negative for altered mental status and depression. The patient does not have insomnia and is not  nervous/anxious.        Medications:     Allergies:   Allergies   Allergen Reactions   • Morphine Mental Status Change       Current Meds:   Current Facility-Administered Medications:   •  acetaminophen (TYLENOL) tablet 650 mg, 650 mg, Oral, Q4H PRN **OR** acetaminophen (TYLENOL) suppository 650 mg, 650 mg, Rectal, Q4H PRN, Joseph Dawkins MD  •  bacitracin ointment, , Topical, Q12H, Lisa Emerson, APRN  •  baclofen (LIORESAL) tablet 10 mg, 10 mg, Oral, TID, Joseph Dawkins MD  •  busPIRone (BUSPAR) tablet 10 mg, 10 mg, Oral, TID With Meals, Joseph Dawkins MD  •  diphenhydrAMINE (BENADRYL) capsule 25 mg, 25 mg, Oral, Q6H PRN, Joseph Dawkins MD  •  docusate sodium (COLACE) capsule 200 mg, 200 mg, Oral, Daily, Brenda Christiansen, APRN  •  ferrous sulfate tablet 325 mg, 325 mg, Oral, BID With Meals, Joseph Dawkins MD  •  folic acid (FOLVITE) tablet 1 mg, 1 mg, Oral, Daily, Joseph Dawkins MD  •  gabapentin (NEURONTIN) capsule 100 mg, 100 mg, Oral, Daily PRN, Joseph Dawkins MD  •  gabapentin (NEURONTIN) capsule 300 mg, 300 mg, Oral, Nightly, Maricruz Bruner, APRN  •  ibuprofen (ADVIL,MOTRIN) tablet 800 mg, 800 mg, Oral, BID PRN, Brenda Christiansen, APRN  •  meropenem (MERREM) 1 g/100 mL 0.9% NS VTB (mbp), 1 g, Intravenous, Q8H, Mendoza Herrera MD  •  multivitamin w/minerals tablet 1 tablet, 1 tablet, Oral, Daily, Joseph Dawkins MD  •  ondansetron (ZOFRAN) tablet 4 mg, 4 mg, Oral, Q6H PRN **OR** ondansetron (ZOFRAN) injection 4 mg, 4 mg, Intravenous, Q6H PRN, Joseph Dawkins MD  •  pantoprazole (PROTONIX) EC tablet 40 mg, 40 mg, Oral, BID AC, Maricruz Bruenr, APRN  •  saccharomyces boulardii (FLORASTOR) capsule 250 mg, 250 mg, Oral, BID, Joseph Dawkins MD  •  sennosides-docusate sodium (SENOKOT-S) 8.6-50 MG tablet 1 tablet, 1 tablet, Oral, BID PRN, Joseph Dawkins MD  •  sodium chloride 0.9 % flush 10 mL, 10 mL, Intravenous,  "Q12H, Joseph Dawkins MD  •  sodium chloride 0.9 % flush 10 mL, 10 mL, Intravenous, PRN, Joseph Dawkins MD  •  sodium chloride 0.9 % flush 20 mL, 20 mL, Intravenous, PRN, Joseph Dawkins MD  •  sodium polystyrene (KAYEXALATE) 15 GM/60ML suspension 15 g, 15 g, Oral, Once, Brenda Christiansen APRN  •  vancomycin 750 mg/250 mL 0.9% NS IVPB ([facility]), 750 mg, Intravenous, Q24H, Mendoza Herrera MD  •  vitamin C (ASCORBIC ACID) tablet 1,000 mg, 1,000 mg, Oral, Daily, Joseph Dawkins MD  •  vitamin D (ERGOCALCIFEROL) capsule 50,000 Units, 50,000 Units, Oral, Q7 Days, Joseph Dawkins MD  •  Salina's amazing butt cream, , Topical, TID, Ki Crystal L, APRN  •  Salina's amazing butt cream, , Topical, PRN, Ki Crystal L, APRN  •  Salina's amazing butt cream, , Topical, TID, Ki, Crystal L, APRN  •  Salina's amazing butt cream, , Topical, PRN, Ki Crystal L, APRN  •  zinc gluconate tablet 50 mg, 50 mg, Oral, Daily, Joseph Dawkins MD    Data:     Code Status:    There are no questions and answers to display.       No family history on file.    Social History     Socioeconomic History   • Marital status: Unknown     Spouse name: Not on file   • Number of children: Not on file   • Years of education: Not on file   • Highest education level: Not on file       Vitals:  Ht 172.7 cm (68\")   Wt 79.7 kg (175 lb 11.2 oz)   BMI 26.72 kg/m²     T 98.2 P 77 R 14 /59 Sp02 99% (room air)      I/O (24Hr):  No intake or output data in the 24 hours ending 10/12/19 0819    Labs and imaging:      Lab Results (last 24 hours)     ** No results found for the last 24 hours. **            No results found.    Physical Examination:        Physical Exam   Constitutional: She is oriented to person, place, and time. She appears well-developed and well-nourished. No distress.   HENT:   Head: Normocephalic and atraumatic.   Right Ear: External ear normal.   Left Ear: External ear normal.   Nose: " Nose normal.   Mouth/Throat: Oropharynx is clear and moist.   Eyes: Conjunctivae and EOM are normal. Pupils are equal, round, and reactive to light.   Neck: Normal range of motion. Neck supple.   Cardiovascular: Normal rate, regular rhythm, normal heart sounds and intact distal pulses.  Extrasystoles are present.   Pulmonary/Chest: Effort normal and breath sounds normal.   Abdominal: Soft. Bowel sounds are normal.   urostomy appliance intact  Colostomy appliance intact     Genitourinary:   Genitourinary Comments: Urostomy  Colostomy   Musculoskeletal: Normal range of motion.   Paraplegia      Neurological: She is alert and oriented to person, place, and time.   paraplegia   Skin: Skin is warm and dry.   Wound vac intact  Dressing c.d.i bilateral feet     Psychiatric: She has a normal mood and affect. Her behavior is normal. Judgment and thought content normal.   Anxious at times    Nursing note and vitals reviewed.        Assessment:             * No active hospital problems. *    No past medical history on file.     Plan:        1. Stage 4 Pressure wound Right ischium  2. Stage 3 pressure wound right heel  3. Pressure wound left heel  4. Chronic osteomyelitis with MRSA positive drainage to multiple sites  5. Gastrointestinal hemorrhage  6. Paraplegia, long term from previous Spinal tumor  7. Anemia due to blood loss  8. Severe protein calorie malnutrition, tolerating tube feedings and oral intake  9. Insomnia  10. Anxiety  11. Iron deficiency Anemia  12. Left shoulder strain     Continue current treatment. Labs Monday. Monitor counts. Encourage adequate intake. Wound care per wound care team. Encourage activity.  Monitor potassium closely. Kayexalate 30 g again today.  BMP in a.m.. Jessica declined cardiac Holter monitor for close monitoring.    Electronically signed by MAREN Gonzalez on 10/12/2019 at 8:19 AM     I have discussed the care of Jessica Alvarenga, including pertinent history and exam findings, with  the nurse practitioner.    I have seen and examined the patient and the key elements of all parts of the encounter have been performed by me.  I agree with the assessment, plan and orders as documented by MAREN Sosa, after I modified the exam findings and the plan of treatments and the final version is my approved version of the assessment.        Electronically signed by Joseph Dawkins MD on 10/12/2019 at 4:35 PM

## 2019-10-13 LAB
ANION GAP SERPL CALCULATED.3IONS-SCNC: 12 MMOL/L (ref 5–15)
BUN BLD-MCNC: 27 MG/DL (ref 6–20)
BUN/CREAT SERPL: 47.4 (ref 7–25)
CALCIUM SPEC-SCNC: 9.1 MG/DL (ref 8.6–10.5)
CHLORIDE SERPL-SCNC: 106 MMOL/L (ref 98–107)
CO2 SERPL-SCNC: 25 MMOL/L (ref 22–29)
CREAT BLD-MCNC: 0.57 MG/DL (ref 0.57–1)
GFR SERPL CREATININE-BSD FRML MDRD: 109 ML/MIN/1.73
GLUCOSE BLD-MCNC: 105 MG/DL (ref 65–99)
POTASSIUM BLD-SCNC: 5.8 MMOL/L (ref 3.5–5.2)
SODIUM BLD-SCNC: 143 MMOL/L (ref 136–145)

## 2019-10-13 PROCEDURE — 25010000002 MEROPENEM: Performed by: INTERNAL MEDICINE

## 2019-10-13 PROCEDURE — 80048 BASIC METABOLIC PNL TOTAL CA: CPT | Performed by: INTERNAL MEDICINE

## 2019-10-13 PROCEDURE — 63710000001 DIPHENHYDRAMINE PER 50 MG: Performed by: INTERNAL MEDICINE

## 2019-10-13 PROCEDURE — 25010000002 VANCOMYCIN: Performed by: INTERNAL MEDICINE

## 2019-10-13 RX ORDER — SODIUM POLYSTYRENE SULFONATE 15 G/60ML
30 SUSPENSION ORAL; RECTAL
Status: DISPENSED | OUTPATIENT
Start: 2019-10-13 | End: 2019-10-14

## 2019-10-13 NOTE — PROGRESS NOTES
[facility]  Avel Dawkins M.D.  CHRISTOPHER Dawkins M.D.  MAREN Hernandez APRN      Internal Medicine Progress Note    10/13/2019   10:01 AM    Name:  Jessica Alvarenga  MRN:    [id number]     Acct:     [id number]   Room:  [address] [place] [address] Day: 0     Admit Date: 9/6/2019  5:34 PM  PCP: Ponce Orozco MD    Subjective:     C/C: wound care and antibiotic therapy    Interval History: Status: Improved. Resting in bed. No family at bedside. Tolerating treatment thus far.  K+ 5.8 after receiving second dose of Kayexalate, will give additional dose of Kayexalate.  Asymptomatic denying any chest pain or shortness of air.  ECG on 10/12/2019 documented as abnormal, revealed sinus rhythm with premature atrial complexes with inferior infarct of age-indeterminate and cannot rule out anterior infarct.  Jessica team used to decline to wear a Holter monitor for routine monitoring.   No new complaints    Review of Systems   Constitution: Positive for weakness. Negative for decreased appetite and malaise/fatigue.   HENT: Negative for congestion, ear pain, hoarse voice, nosebleeds and sore throat.    Eyes: Negative for blurred vision, double vision and pain.   Cardiovascular: Negative for chest pain, dyspnea on exertion, leg swelling and near-syncope.   Respiratory: Negative for cough, shortness of breath and sputum production.    Skin: Positive for poor wound healing. Negative for dry skin, itching and rash.   Musculoskeletal: Positive for muscle weakness. Negative for back pain, joint pain and joint swelling.   Gastrointestinal: Negative for abdominal pain, constipation, diarrhea and vomiting.        Lower abdomen tenderness   Genitourinary: Negative for flank pain and hematuria.   Neurological: Positive for paresthesias (paraplegia ). Negative for difficulty with concentration, dizziness, focal weakness, headaches and seizures.   Psychiatric/Behavioral: Negative for altered mental status and depression.  The patient does not have insomnia and is not nervous/anxious.        Medications:     Allergies:   Allergies   Allergen Reactions   • Morphine Mental Status Change       Current Meds:   Current Facility-Administered Medications:   •  acetaminophen (TYLENOL) tablet 650 mg, 650 mg, Oral, Q4H PRN **OR** acetaminophen (TYLENOL) suppository 650 mg, 650 mg, Rectal, Q4H PRN, Joseph Dawkins MD  •  bacitracin ointment, , Topical, Q12H, Lisa Emerson, APRN  •  baclofen (LIORESAL) tablet 10 mg, 10 mg, Oral, TID, Joseph Dawkins MD  •  busPIRone (BUSPAR) tablet 10 mg, 10 mg, Oral, TID With Meals, Joseph Dawkins MD  •  diphenhydrAMINE (BENADRYL) capsule 25 mg, 25 mg, Oral, Q6H PRN, Joseph Dawkins MD  •  docusate sodium (COLACE) capsule 200 mg, 200 mg, Oral, Daily, Brenda Christiansen, APRN  •  ferrous sulfate tablet 325 mg, 325 mg, Oral, BID With Meals, Joseph Dawkins MD  •  folic acid (FOLVITE) tablet 1 mg, 1 mg, Oral, Daily, Joseph Dawkins MD  •  gabapentin (NEURONTIN) capsule 100 mg, 100 mg, Oral, Daily PRN, Joseph Dawkins MD  •  gabapentin (NEURONTIN) capsule 300 mg, 300 mg, Oral, Nightly, Maricruz Bruner, APRN  •  ibuprofen (ADVIL,MOTRIN) tablet 800 mg, 800 mg, Oral, BID PRN, Brenda Christiansen, APRSIL  •  meropenem (MERREM) 1 g/100 mL 0.9% NS VTB (mbp), 1 g, Intravenous, Q8H, Mendoza Herrera MD  •  multivitamin w/minerals tablet 1 tablet, 1 tablet, Oral, Daily, Joseph Dawkins MD  •  ondansetron (ZOFRAN) tablet 4 mg, 4 mg, Oral, Q6H PRN **OR** ondansetron (ZOFRAN) injection 4 mg, 4 mg, Intravenous, Q6H PRN, Joseph Dawkins MD  •  pantoprazole (PROTONIX) EC tablet 40 mg, 40 mg, Oral, BID AC, Maricruz Bruner, MAREN  •  saccharomyces boulardii (FLORASTOR) capsule 250 mg, 250 mg, Oral, BID, Joseph Dawkins MD  •  sennosides-docusate sodium (SENOKOT-S) 8.6-50 MG tablet 1 tablet, 1 tablet, Oral, BID PRN, Joseph Dawkins MD  •  sodium  "chloride 0.9 % flush 10 mL, 10 mL, Intravenous, Q12H, Joseph Dawkins MD  •  sodium chloride 0.9 % flush 10 mL, 10 mL, Intravenous, PRN, Joseph Dawkins MD  •  sodium chloride 0.9 % flush 20 mL, 20 mL, Intravenous, PRN, Joseph Dawkins MD  •  sodium polystyrene (KAYEXALATE) 15 GM/60ML suspension 15 g, 15 g, Oral, Once, Brenda Christiansen APRN  •  vancomycin 750 mg/250 mL 0.9% NS IVPB ([facility]), 750 mg, Intravenous, Q24H, Mendoza Herrera MD  •  vitamin C (ASCORBIC ACID) tablet 1,000 mg, 1,000 mg, Oral, Daily, Joseph Dawkins MD  •  vitamin D (ERGOCALCIFEROL) capsule 50,000 Units, 50,000 Units, Oral, Q7 Days, Joseph Dawkins MD  •  Salina's amazing butt cream, , Topical, TID, Lisa Emerson, APRN  •  Salina's amazing butt cream, , Topical, PRN, Lisa Emerson, APRN  •  Salina's amazing butt cream, , Topical, TID, Lisa Emerson, APRN  •  Salina's amazing butt cream, , Topical, PRN, Lisa Emerson, APRN  •  zinc gluconate tablet 50 mg, 50 mg, Oral, Daily, Joseph Dawkins MD    Data:     Code Status:    There are no questions and answers to display.       No family history on file.    Social History     Socioeconomic History   • Marital status: Unknown     Spouse name: Not on file   • Number of children: Not on file   • Years of education: Not on file   • Highest education level: Not on file       Vitals:  Ht 172.7 cm (68\")   Wt 79.7 kg (175 lb 11.2 oz)   BMI 26.72 kg/m²     T 97.4 P 79 R 18 /74 Sp02 100% (room air)      I/O (24Hr):  No intake or output data in the 24 hours ending 10/13/19 1001    Labs and imaging:      Lab Results (last 24 hours)     Procedure Component Value Units Date/Time    Basic Metabolic Panel [[id number]]  (Abnormal) Collected:  10/13/19 0718    Specimen:  Blood Updated:  10/13/19 0827     Glucose 105 mg/dL      BUN 27 mg/dL      Creatinine 0.57 mg/dL      Sodium 143 mmol/L      Potassium 5.8 mmol/L      Chloride 106 mmol/L      CO2 " 25.0 mmol/L      Calcium 9.1 mg/dL      eGFR Non African Amer 109 mL/min/1.73      BUN/Creatinine Ratio 47.4     Anion Gap 12.0 mmol/L     Narrative:       GFR Normal >60  Chronic Kidney Disease <60  Kidney Failure <15    Basic Metabolic Panel [[id number]]  (Abnormal) Collected:  10/12/19 0943    Specimen:  Blood Updated:  10/12/19 1104     Glucose 100 mg/dL      BUN 28 mg/dL      Creatinine 0.74 mg/dL      Sodium 144 mmol/L      Potassium 6.1 mmol/L      Chloride 107 mmol/L      CO2 26.0 mmol/L      Calcium 9.3 mg/dL      eGFR Non African Amer 81 mL/min/1.73      BUN/Creatinine Ratio 37.8     Anion Gap 11.0 mmol/L     Narrative:       GFR Normal >60  Chronic Kidney Disease <60  Kidney Failure <15            No results found.    Physical Examination:        Physical Exam   Constitutional: She is oriented to person, place, and time. She appears well-developed and well-nourished. No distress.   HENT:   Head: Normocephalic and atraumatic.   Right Ear: External ear normal.   Left Ear: External ear normal.   Nose: Nose normal.   Mouth/Throat: Oropharynx is clear and moist.   Eyes: Conjunctivae and EOM are normal. Pupils are equal, round, and reactive to light.   Neck: Normal range of motion. Neck supple.   Cardiovascular: Normal rate, regular rhythm, normal heart sounds and intact distal pulses.  Extrasystoles are present.   Pulmonary/Chest: Effort normal and breath sounds normal.   Abdominal: Soft. Bowel sounds are normal.   urostomy appliance intact  Colostomy appliance intact     Genitourinary:   Genitourinary Comments: Urostomy  Colostomy   Musculoskeletal: Normal range of motion.   Paraplegia      Neurological: She is alert and oriented to person, place, and time.   paraplegia   Skin: Skin is warm and dry.   Wound vac intact  Dressing c.d.i bilateral feet     Psychiatric: She has a normal mood and affect. Her behavior is normal. Judgment and thought content normal.   Anxious at times    Nursing note and vitals  reviewed.        Assessment:             * No active hospital problems. *    No past medical history on file.     Plan:        1. Stage 4 Pressure wound Right ischium  2. Stage 3 pressure wound right heel  3. Pressure wound left heel  4. Chronic osteomyelitis with MRSA positive drainage to multiple sites  5. Gastrointestinal hemorrhage  6. Paraplegia, long term from previous Spinal tumor  7. Anemia due to blood loss  8. Severe protein calorie malnutrition, tolerating tube feedings and oral intake  9. Insomnia  10. Anxiety  11. Iron deficiency Anemia  12. Left shoulder strain     Continue current treatment. Labs Monday. Monitor counts. Encourage adequate intake. Wound care per wound care team. Encourage activity.  Monitor potassium closely. Kayexalate 30 g again today.  Jessica whitlock cardiac Holter monitor for close monitoring again today.    Electronically signed by MAREN Gonzalez on 10/13/2019 at 10:01 AM

## 2019-10-14 VITALS — WEIGHT: 174.3 LBS | HEIGHT: 68 IN | BODY MASS INDEX: 26.42 KG/M2

## 2019-10-14 LAB
ANION GAP SERPL CALCULATED.3IONS-SCNC: 10 MMOL/L (ref 5–15)
BASOPHILS # BLD AUTO: 0.09 10*3/MM3 (ref 0–0.2)
BASOPHILS NFR BLD AUTO: 1.6 % (ref 0–1.5)
BUN BLD-MCNC: 25 MG/DL (ref 6–20)
BUN/CREAT SERPL: 37.3 (ref 7–25)
CALCIUM SPEC-SCNC: 8.9 MG/DL (ref 8.6–10.5)
CHLORIDE SERPL-SCNC: 105 MMOL/L (ref 98–107)
CO2 SERPL-SCNC: 27 MMOL/L (ref 22–29)
CREAT BLD-MCNC: 0.67 MG/DL (ref 0.57–1)
CRP SERPL-MCNC: 2.93 MG/DL (ref 0–0.5)
DEPRECATED RDW RBC AUTO: 47.8 FL (ref 37–54)
EOSINOPHIL # BLD AUTO: 0.5 10*3/MM3 (ref 0–0.4)
EOSINOPHIL NFR BLD AUTO: 8.8 % (ref 0.3–6.2)
ERYTHROCYTE [DISTWIDTH] IN BLOOD BY AUTOMATED COUNT: 14.9 % (ref 12.3–15.4)
ERYTHROCYTE [SEDIMENTATION RATE] IN BLOOD: 44 MM/HR (ref 0–20)
GFR SERPL CREATININE-BSD FRML MDRD: 91 ML/MIN/1.73
GLUCOSE BLD-MCNC: 116 MG/DL (ref 65–99)
HCT VFR BLD AUTO: 29.1 % (ref 34–46.6)
HGB BLD-MCNC: 9 G/DL (ref 12–15.9)
IMM GRANULOCYTES # BLD AUTO: 0.04 10*3/MM3 (ref 0–0.05)
IMM GRANULOCYTES NFR BLD AUTO: 0.7 % (ref 0–0.5)
LYMPHOCYTES # BLD AUTO: 1.71 10*3/MM3 (ref 0.7–3.1)
LYMPHOCYTES NFR BLD AUTO: 29.9 % (ref 19.6–45.3)
MCH RBC QN AUTO: 27.1 PG (ref 26.6–33)
MCHC RBC AUTO-ENTMCNC: 30.9 G/DL (ref 31.5–35.7)
MCV RBC AUTO: 87.7 FL (ref 79–97)
MONOCYTES # BLD AUTO: 0.48 10*3/MM3 (ref 0.1–0.9)
MONOCYTES NFR BLD AUTO: 8.4 % (ref 5–12)
NEUTROPHILS # BLD AUTO: 2.89 10*3/MM3 (ref 1.7–7)
NEUTROPHILS NFR BLD AUTO: 50.6 % (ref 42.7–76)
NRBC BLD AUTO-RTO: 0 /100 WBC (ref 0–0.2)
PLATELET # BLD AUTO: 297 10*3/MM3 (ref 140–450)
PMV BLD AUTO: 9.4 FL (ref 6–12)
POTASSIUM BLD-SCNC: 5.4 MMOL/L (ref 3.5–5.2)
RBC # BLD AUTO: 3.32 10*6/MM3 (ref 3.77–5.28)
SODIUM BLD-SCNC: 142 MMOL/L (ref 136–145)
WBC NRBC COR # BLD: 5.71 10*3/MM3 (ref 3.4–10.8)

## 2019-10-14 PROCEDURE — 63710000001 DIPHENHYDRAMINE PER 50 MG: Performed by: INTERNAL MEDICINE

## 2019-10-14 PROCEDURE — 85651 RBC SED RATE NONAUTOMATED: CPT | Performed by: INTERNAL MEDICINE

## 2019-10-14 PROCEDURE — 25010000002 MEROPENEM: Performed by: INTERNAL MEDICINE

## 2019-10-14 PROCEDURE — 25010000002 VANCOMYCIN: Performed by: INTERNAL MEDICINE

## 2019-10-14 PROCEDURE — 97535 SELF CARE MNGMENT TRAINING: CPT

## 2019-10-14 PROCEDURE — 80048 BASIC METABOLIC PNL TOTAL CA: CPT | Performed by: INTERNAL MEDICINE

## 2019-10-14 PROCEDURE — 97606 NEG PRS WND THER DME>50 SQCM: CPT

## 2019-10-14 PROCEDURE — 86140 C-REACTIVE PROTEIN: CPT | Performed by: INTERNAL MEDICINE

## 2019-10-14 PROCEDURE — 85025 COMPLETE CBC W/AUTO DIFF WBC: CPT | Performed by: INTERNAL MEDICINE

## 2019-10-14 RX ORDER — TROLAMINE SALICYLATE 10 G/100G
CREAM TOPICAL 2 TIMES DAILY
Status: DISCONTINUED | OUTPATIENT
Start: 2019-10-14 | End: 2019-10-24 | Stop reason: HOSPADM

## 2019-10-14 RX ORDER — SODIUM POLYSTYRENE SULFONATE 15 G/60ML
15 SUSPENSION ORAL; RECTAL
Status: DISPENSED | OUTPATIENT
Start: 2019-10-14 | End: 2019-10-15

## 2019-10-14 NOTE — PROGRESS NOTES
Adult Nutrition  Assessment/PES    Patient Name:  Jessica Alvarenga  YOB: 1960  MRN: [id number]  Admit Date:  9/6/2019    Assessment Date:  10/14/2019    Comments:  PO intake is good, 100% of last 5 documented meals. Adjusted supplements. Pertinent labs reviewed, K has been elevated. Will continue to follow.     Reason for Assessment     Row Name 10/14/19 1340          Reason for Assessment    Reason For Assessment  follow-up protocol           Anthropometrics     Row Name 10/14/19 1350          Usual Body Weight (UBW)    Weight Loss  unintentional     Weight Loss Time Frame  9# loss compared to admission wt        Body Mass Index (BMI)    BMI Assessment  BMI 25-29.9: overweight         Labs/Tests/Procedures/Meds     Row Name 10/14/19 1353          Labs/Procedures/Meds    Lab Results Reviewed  reviewed, pertinent     Lab Results Comments  K; CRP        Diagnostic Tests/Procedures    Diagnostic Test/Procedure Reviewed  reviewed        Medications    Pertinent Medications Reviewed  reviewed, pertinent         Physical Findings     Row Name 10/14/19 1353          Physical Findings    Overall Physical Appearance  generalized wasting;loss of muscle mass;loss of subcutaneous fat     Gastrointestinal  colostomy urostomy     Skin  pressure injury;non-healing wound(s);other (see comments) roberto carlos Salas           Nutrition Prescription Ordered     Row Name 10/14/19 1356          Nutrition Prescription PO    Current PO Diet  Regular     Supplement  Other (comment) milkshake     Supplement Frequency  Daily         Evaluation of Received Nutrient/Fluid Intake     Row Name 10/14/19 1357          Nutrient/Fluid Evaluation    Number of Days Evaluated  3 days     Additional Documentation  Fluid Intake Evaluation (Group)        Fluid Intake Evaluation    Oral Fluid (mL)  1353     IV Fluid (mL)  367        PO Evaluation    Number of Days PO Intake Evaluated  2 days     Number of Meals  5     % PO Intake  100                Problem/Interventions:  Problem 1     Row Name 10/14/19 1357          Nutrition Diagnoses Problem 1    Problem 1  Malnutrition     Etiology (related to)  Medical Diagnosis     Nutrition related  Increased nutrition needs     Gastrointestinal  Colostomy     Infectious Disease  MRSA;Other (comment) osteomyelitis     Neurological  Paraparesis;Spinal cord injury     Skin  Non healing wound;Pressure injury;Surgical wound     Signs/Symptoms (evidenced by)  Unintended Weight Change;% UBW;Report/Observation                 Intervention Goal     Row Name 10/14/19 3208          Intervention Goal    General  Maintain nutrition;Reduce/improve symptoms;Meet nutritional needs for age/condition     PO  Maintain intake;Continue positive trend;Meet estimated needs     Weight  Maintain weight         Nutrition Intervention     Row Name 10/14/19 1352          Nutrition Intervention    RD/Tech Action  Follow Tx progress;Care plan reviewd;Adjusted supplement         Nutrition Prescription     Row Name 10/14/19 1358          Nutrition Prescription PO    PO Prescription  Begin/change supplement     Supplement  Boost Plus     Supplement Frequency  Daily     New PO Prescription Ordered?  Yes         Education/Evaluation     Row Name 10/14/19 2585          Education    Education  No discharge needs identified at this time        Monitor/Evaluation    Monitor  Per protocol           Electronically signed by:  Amaris Dash  10/14/19 2:01 PM

## 2019-10-14 NOTE — PROGRESS NOTES
Infectious Diseases Progress Note    Patient:  Jessica Alvarenga  YOB: 1960  MRN: [id number]   Admit date: 9/6/2019   Admitting Physician: Joseph Dawkins MD  Primary Care Physician: Ponce Orozco MD    Chief Complaint/Interval History: She is without fever.  Discussed with wound care.  She has an appointment on the 24th in [place].  She will likely stay here until discharge.  They indicated [place] plastic surgery wanted to see her before making any additional plans for possible surgery.  She is without new symptoms.  She is without fever.  No pain at her PICC line site.  She was hoping to go home for a few days prior to [place].  Her care needs may be such that she will require nursing home placement.  Those details are being worked on with her at present.    No intake or output data in the 24 hours ending 10/14/19 1552  Allergies:   Allergies   Allergen Reactions   • Morphine Mental Status Change     Current Scheduled Medications:     bacitracin  Topical Q12H   baclofen 10 mg Oral TID   busPIRone 10 mg Oral TID With Meals   docusate sodium 200 mg Oral Daily   ferrous sulfate 325 mg Oral BID With Meals   folic acid 1 mg Oral Daily   gabapentin 300 mg Oral Nightly   meropenem 1 g Intravenous Q8H   multivitamin w/minerals 1 tablet Oral Daily   pantoprazole 40 mg Oral BID AC   saccharomyces boulardii 250 mg Oral BID   sodium chloride 10 mL Intravenous Q12H   vancomycin 750 mg Intravenous Q24H   vitamin C 1,000 mg Oral Daily   vitamin D 50,000 Units Oral Q7 Days   jennifer's amazing butt  Topical TID   jennifer's amazing butt  Topical TID   zinc gluconate 50 mg Oral Daily     Current PRN Medications:  •  acetaminophen **OR** acetaminophen  •  diphenhydrAMINE  •  gabapentin  •  ibuprofen  •  ondansetron **OR** ondansetron  •  sennosides-docusate sodium  •  sodium chloride  •  sodium chloride  •  jennifer's amazing butt  •  jennifer's amazing butt    Review of Systems see HPI    Vital Signs:  Ht 172.7  "cm (68\")   Wt 79.1 kg (174 lb 4.8 oz)   BMI 26.50 kg/m²      Physical Exam  Vital signs - reviewed.  Line/IV (PIC) site - No erythema, warmth, induration, or tenderness.  Both large ischial decubitus ulcers were examined.  No purulence or odor.  Both seem to extend to ischial bone.  There are some patchy areas of granulation.  No cellulitis.    Lab Results:  CBC: Results from last 7 days   Lab Units 10/14/19  0451 10/10/19  0603   WBC 10*3/mm3 5.71 5.90   HEMOGLOBIN g/dL 9.0* 8.9*   HEMATOCRIT % 29.1* 29.0*   PLATELETS 10*3/mm3 297 272     BMP:  Results from last 7 days   Lab Units 10/14/19  0451 10/13/19  0718 10/12/19  0943 10/11/19  0623 10/10/19  0603   SODIUM mmol/L 142 143 144 143 142   POTASSIUM mmol/L 5.4* 5.8* 6.1* 6.8* 6.3*   CHLORIDE mmol/L 105 106 107 109* 107   CO2 mmol/L 27.0 25.0 26.0 25.0 25.0   BUN mg/dL 25* 27* 28* 35* 30*   CREATININE mg/dL 0.67 0.57 0.74 0.71 0.78   GLUCOSE mg/dL 116* 105* 100* 105* 92   CALCIUM mg/dL 8.9 9.1 9.3 9.1 9.1     C-reactive protein 2.9  Sedimentation rate 44    Impression:   1.  Bilateral ischial decubitus ulcers.  Previous diverting colostomy and urostomy.  2.  Pelvic osteomyelitis  3.  Paraplegia    Recommendations:   Stable from ID standpoint  Per report she has an appointment scheduled to see plastic surgery and [place]  I am going to continue IV antibiotics while she remains hospitalized on LTAC  Plans per primary service, wound care, patient, and infectious diseases regarding discharge planning ongoing    Mendoza Lyon MD  "

## 2019-10-14 NOTE — PROGRESS NOTES
[facility]  CONNOR Rivera M.D.  MAREN Hernandez APRN      Internal Medicine Progress Note    10/14/2019   12:01 PM    Name:  Jessica Alvarenga  MRN:    [id number]     Acct:     [id number]   Room:  [address] [place] [address] Day: 0     Admit Date: 9/6/2019  5:34 PM  PCP: Ponce Orozco MD    Subjective:     C/C: wound care and antibiotic therapy    Interval History: Status: Improved. Resting in bed. No family at bedside. Patient with complaints of left shoulder pain - feels that it is muscle related and has worsened since working with physical therapy. Does not feel that ibuprofen is helping.     Review of Systems   Constitution: Positive for weakness. Negative for decreased appetite and malaise/fatigue.   HENT: Negative for congestion, ear pain, hoarse voice, nosebleeds and sore throat.    Eyes: Negative for blurred vision, double vision and pain.   Cardiovascular: Negative for chest pain, dyspnea on exertion, leg swelling and near-syncope.   Respiratory: Negative for cough, shortness of breath and sputum production.    Endocrine: Negative for cold intolerance.   Skin: Positive for poor wound healing. Negative for dry skin, itching and rash.   Musculoskeletal: Positive for joint pain and muscle weakness. Negative for back pain and joint swelling.   Gastrointestinal: Negative for abdominal pain, constipation, diarrhea and vomiting.        Lower abdomen tenderness   Genitourinary: Negative for flank pain and hematuria.   Neurological: Positive for paresthesias (paraplegia ). Negative for difficulty with concentration, dizziness, focal weakness, headaches and seizures.   Psychiatric/Behavioral: Negative for altered mental status and depression. The patient does not have insomnia and is not nervous/anxious.        Medications:     Allergies:   Allergies   Allergen Reactions   • Morphine Mental Status Change       Current Meds:   Current Facility-Administered Medications:   •   acetaminophen (TYLENOL) tablet 650 mg, 650 mg, Oral, Q4H PRN **OR** acetaminophen (TYLENOL) suppository 650 mg, 650 mg, Rectal, Q4H PRN, Joseph Dawkins MD  •  bacitracin 500 UNIT/GM ointment, , Topical, Q12H, Lisa Emerson, APRN  •  baclofen (LIORESAL) tablet 10 mg, 10 mg, Oral, TID, Joseph Dawkins MD  •  busPIRone (BUSPAR) tablet 10 mg, 10 mg, Oral, TID With Meals, Joseph Dawkins MD  •  diphenhydrAMINE (BENADRYL) capsule 25 mg, 25 mg, Oral, Q6H PRN, Joseph Dawkins MD  •  docusate sodium (COLACE) capsule 200 mg, 200 mg, Oral, Daily, Brenda Christiansen, APRN  •  ferrous sulfate tablet 325 mg, 325 mg, Oral, BID With Meals, Joseph Dawkins MD  •  folic acid (FOLVITE) tablet 1 mg, 1 mg, Oral, Daily, Joseph Dawkins MD  •  gabapentin (NEURONTIN) capsule 100 mg, 100 mg, Oral, Daily PRN, Joseph Dawkins MD  •  gabapentin (NEURONTIN) capsule 300 mg, 300 mg, Oral, Nightly, Maricruz Bruner, APRSIL  •  ibuprofen (ADVIL,MOTRIN) tablet 800 mg, 800 mg, Oral, BID PRN, Brenda Christiansen, APRN  •  meropenem (MERREM) 1 g/100 mL 0.9% NS VTB (mbp), 1 g, Intravenous, Q8H, Mendoza Herrera MD  •  multivitamin w/minerals tablet 1 tablet, 1 tablet, Oral, Daily, Joseph Dawkins MD  •  ondansetron (ZOFRAN) tablet 4 mg, 4 mg, Oral, Q6H PRN **OR** ondansetron (ZOFRAN) injection 4 mg, 4 mg, Intravenous, Q6H PRN, Joseph Dawkins MD  •  pantoprazole (PROTONIX) EC tablet 40 mg, 40 mg, Oral, BID AC, Maricruz Bruner, APRN  •  saccharomyces boulardii (FLORASTOR) capsule 250 mg, 250 mg, Oral, BID, Joseph Dawkins MD  •  sennosides-docusate sodium (SENOKOT-S) 8.6-50 MG tablet 1 tablet, 1 tablet, Oral, BID PRN, Joseph Dawkins MD  •  sodium chloride 0.9 % flush 10 mL, 10 mL, Intravenous, Q12H, Joseph Dawkins MD  •  sodium chloride 0.9 % flush 10 mL, 10 mL, Intravenous, PRN, Joseph Dawkins MD  •  sodium chloride 0.9 % flush 20 mL, 20 mL,  "Intravenous, PRN, Joseph Dawkins MD  •  vancomycin 750 mg/250 mL 0.9% NS IVPB ([facility]), 750 mg, Intravenous, Q24H, Mendoza Herrera MD  •  vitamin C (ASCORBIC ACID) tablet 1,000 mg, 1,000 mg, Oral, Daily, Joseph Dawkins MD  •  vitamin D (ERGOCALCIFEROL) capsule 50,000 Units, 50,000 Units, Oral, Q7 Days, Joseph Dawkins MD  •  Salina's amazing butt cream, , Topical, TID, Lisa Emerson, APRN  •  Salina's amazing butt cream, , Topical, PRN, Lisa Emerson, APRN  •  Salina's amazing butt cream, , Topical, TID, Lisa Emerson, APRN  •  Salina's amazing butt cream, , Topical, PRN, Lisa Emerson, APRN  •  zinc gluconate tablet 50 mg, 50 mg, Oral, Daily, Joseph Dawkins MD    Data:     Code Status:    There are no questions and answers to display.       No family history on file.    Social History     Socioeconomic History   • Marital status: Unknown     Spouse name: Not on file   • Number of children: Not on file   • Years of education: Not on file   • Highest education level: Not on file       Vitals:  Ht 172.7 cm (68\")   Wt 79.1 kg (174 lb 4.8 oz)   BMI 26.50 kg/m²     T 98.3 P 89 R 18 /56 Sp02 96% (room air)      I/O (24Hr):  No intake or output data in the 24 hours ending 10/14/19 1201    Labs and imaging:      Lab Results (last 24 hours)     Procedure Component Value Units Date/Time    Basic Metabolic Panel [[id number]]  (Abnormal) Collected:  10/14/19 0451    Specimen:  Blood Updated:  10/14/19 0547     Glucose 116 mg/dL      BUN 25 mg/dL      Creatinine 0.67 mg/dL      Sodium 142 mmol/L      Potassium 5.4 mmol/L      Chloride 105 mmol/L      CO2 27.0 mmol/L      Calcium 8.9 mg/dL      eGFR Non African Amer 91 mL/min/1.73      BUN/Creatinine Ratio 37.3     Anion Gap 10.0 mmol/L     Narrative:       GFR Normal >60  Chronic Kidney Disease <60  Kidney Failure <15    C-reactive Protein [[id number]]  (Abnormal) Collected:  10/14/19 0451    Specimen:  Blood Updated:  " 10/14/19 0547     C-Reactive Protein 2.93 mg/dL     Sedimentation Rate [[id number]]  (Abnormal) Collected:  10/14/19 0451    Specimen:  Blood Updated:  10/14/19 0538     Sed Rate 44 mm/hr     CBC & Differential [[id number]] Collected:  10/14/19 0451    Specimen:  Blood Updated:  10/14/19 0523    Narrative:       The following orders were created for panel order CBC & Differential.  Procedure                               Abnormality         Status                     ---------                               -----------         ------                     CBC Auto Differential[[id number]]        Abnormal            Final result                 Please view results for these tests on the individual orders.    CBC Auto Differential [[id number]]  (Abnormal) Collected:  10/14/19 0451    Specimen:  Blood Updated:  10/14/19 0523     WBC 5.71 10*3/mm3      RBC 3.32 10*6/mm3      Hemoglobin 9.0 g/dL      Hematocrit 29.1 %      MCV 87.7 fL      MCH 27.1 pg      MCHC 30.9 g/dL      RDW 14.9 %      RDW-SD 47.8 fl      MPV 9.4 fL      Platelets 297 10*3/mm3      Neutrophil % 50.6 %      Lymphocyte % 29.9 %      Monocyte % 8.4 %      Eosinophil % 8.8 %      Basophil % 1.6 %      Immature Grans % 0.7 %      Neutrophils, Absolute 2.89 10*3/mm3      Lymphocytes, Absolute 1.71 10*3/mm3      Monocytes, Absolute 0.48 10*3/mm3      Eosinophils, Absolute 0.50 10*3/mm3      Basophils, Absolute 0.09 10*3/mm3      Immature Grans, Absolute 0.04 10*3/mm3      nRBC 0.0 /100 WBC             No results found.    Physical Examination:        Physical Exam   Constitutional: She is oriented to person, place, and time. She appears well-developed and well-nourished. No distress.   HENT:   Head: Normocephalic and atraumatic.   Right Ear: External ear normal.   Left Ear: External ear normal.   Nose: Nose normal.   Mouth/Throat: Oropharynx is clear and moist.   Eyes: Conjunctivae and EOM are normal. Pupils are equal, round, and reactive to light.   Neck:  Normal range of motion. Neck supple.   Cardiovascular: Normal rate, regular rhythm, normal heart sounds and intact distal pulses.   Pulmonary/Chest: Effort normal and breath sounds normal.   Abdominal: Soft. Bowel sounds are normal.   urostomy appliance intact  Colostomy appliance intact     Genitourinary:   Genitourinary Comments: Urostomy  Colostomy   Musculoskeletal: Normal range of motion.   Paraplegia      Neurological: She is alert and oriented to person, place, and time.   paraplegia   Skin: Skin is warm and dry.   Wound vac intact  Dressing c.d.i bilateral feet     Psychiatric: She has a normal mood and affect. Her behavior is normal. Judgment and thought content normal.   Anxious at times    Nursing note and vitals reviewed.        Assessment:             * No active hospital problems. *    No past medical history on file.     Plan:        1. Stage 4 Pressure wound Right ischium  2. Stage 3 pressure wound right heel  3. Pressure wound left heel  4. Chronic osteomyelitis with MRSA positive drainage to multiple sites  5. Gastrointestinal hemorrhage  6. Paraplegia, long term from previous Spinal tumor  7. Anemia due to blood loss  8. Severe protein calorie malnutrition, tolerating tube feedings and oral intake  9. Insomnia  10. Anxiety  11. Iron deficiency Anemia  12. Left shoulder strain     Continue current treatment. Labs Thursday. Monitor counts. Encourage adequate intake. Wound care per wound care team. Encourage activity.  Monitor potassium closely. Change to low potassium diet. Aspercreme to lef t shoulder.     Electronically signed by MAREN Jackson on 10/14/2019 at 12:01 PM     I have discussed the care of Jessica Alvarenga, including pertinent history and exam findings, with the nurse practitioner.    I have seen and examined the patient and the key elements of all parts of the encounter have been performed by me.  I agree with the assessment, plan and orders as documented by Maricruz  MAREN Bruner, after I modified the exam findings and the plan of treatments and the final version is my approved version of the assessment.        Electronically signed by Joseph Dawkins MD on 10/14/2019 at 9:50 PM

## 2019-10-15 LAB
ANION GAP SERPL CALCULATED.3IONS-SCNC: 10 MMOL/L (ref 5–15)
BUN BLD-MCNC: 25 MG/DL (ref 6–20)
BUN/CREAT SERPL: 33.8 (ref 7–25)
CALCIUM SPEC-SCNC: 8.8 MG/DL (ref 8.6–10.5)
CHLORIDE SERPL-SCNC: 104 MMOL/L (ref 98–107)
CO2 SERPL-SCNC: 27 MMOL/L (ref 22–29)
CREAT BLD-MCNC: 0.74 MG/DL (ref 0.57–1)
GFR SERPL CREATININE-BSD FRML MDRD: 81 ML/MIN/1.73
GLUCOSE BLD-MCNC: 109 MG/DL (ref 65–99)
POTASSIUM BLD-SCNC: 5.2 MMOL/L (ref 3.5–5.2)
SODIUM BLD-SCNC: 141 MMOL/L (ref 136–145)

## 2019-10-15 PROCEDURE — 63710000001 DIPHENHYDRAMINE PER 50 MG: Performed by: INTERNAL MEDICINE

## 2019-10-15 PROCEDURE — 97110 THERAPEUTIC EXERCISES: CPT

## 2019-10-15 PROCEDURE — 25010000002 VANCOMYCIN: Performed by: INTERNAL MEDICINE

## 2019-10-15 PROCEDURE — 25010000002 MEROPENEM: Performed by: INTERNAL MEDICINE

## 2019-10-15 PROCEDURE — 97605 NEG PRS WND THER DME<=50SQCM: CPT

## 2019-10-15 PROCEDURE — 80048 BASIC METABOLIC PNL TOTAL CA: CPT | Performed by: INTERNAL MEDICINE

## 2019-10-15 PROCEDURE — 97530 THERAPEUTIC ACTIVITIES: CPT

## 2019-10-15 NOTE — PROGRESS NOTES
[facility]  CONNOR Rivera M.D.  MAREN Hernandez APRN      Internal Medicine Progress Note    10/15/2019   11:05 AM    Name:  Jessica Alvarenga  MRN:    [id number]     Acct:     [id number]   Room:  [address] [place] [address] Day: 0     Admit Date: 9/6/2019  5:34 PM  PCP: Ponce Orozco MD    Subjective:     C/C: wound care and antibiotic therapy    Interval History: Status: Improved. Resting in bed. No family at bedside. Patient taking benadryl - c/o itching. No rash noted. Concerned about potassium level - discussed low potassium diet that was ordered yesterday and patient with two orange juices and potatoes on breakfast tray. Potassium level at 5.3 today.     Review of Systems   Constitution: Positive for weakness. Negative for decreased appetite and malaise/fatigue.   HENT: Negative for congestion, ear pain, hoarse voice, nosebleeds and sore throat.    Eyes: Negative for blurred vision, double vision and pain.   Cardiovascular: Negative for chest pain, dyspnea on exertion, leg swelling and near-syncope.   Respiratory: Negative for cough, shortness of breath and sputum production.    Endocrine: Negative for cold intolerance.   Skin: Positive for poor wound healing. Negative for dry skin, itching and rash.   Musculoskeletal: Positive for joint pain and muscle weakness. Negative for back pain and joint swelling.   Gastrointestinal: Negative for abdominal pain, constipation, diarrhea and vomiting.        Lower abdomen tenderness   Genitourinary: Negative for flank pain and hematuria.   Neurological: Positive for paresthesias (paraplegia ). Negative for difficulty with concentration, dizziness, focal weakness, headaches and seizures.   Psychiatric/Behavioral: Negative for altered mental status and depression. The patient does not have insomnia and is not nervous/anxious.        Medications:     Allergies:   Allergies   Allergen Reactions   • Morphine Mental Status Change        Current Meds:   Current Facility-Administered Medications:   •  acetaminophen (TYLENOL) tablet 650 mg, 650 mg, Oral, Q4H PRN **OR** acetaminophen (TYLENOL) suppository 650 mg, 650 mg, Rectal, Q4H PRN, Joseph Dawkins MD  •  bacitracin 500 UNIT/GM ointment, , Topical, Q12H, Lisa Emerson APRN  •  baclofen (LIORESAL) tablet 10 mg, 10 mg, Oral, TID, Joseph Dawkins MD  •  busPIRone (BUSPAR) tablet 10 mg, 10 mg, Oral, TID With Meals, Joseph Dawkins MD  •  diphenhydrAMINE (BENADRYL) capsule 25 mg, 25 mg, Oral, Q6H PRN, oJseph Dawkins MD  •  docusate sodium (COLACE) capsule 200 mg, 200 mg, Oral, Daily, Brenda Christiansen, APRSIL  •  ferrous sulfate tablet 325 mg, 325 mg, Oral, BID With Meals, Joseph Dawkins MD  •  folic acid (FOLVITE) tablet 1 mg, 1 mg, Oral, Daily, Joseph Dawkins MD  •  gabapentin (NEURONTIN) capsule 100 mg, 100 mg, Oral, Daily PRN, Joseph Dawkins MD  •  gabapentin (NEURONTIN) capsule 300 mg, 300 mg, Oral, Nightly, Maricruz Bruner, MAREN  •  ibuprofen (ADVIL,MOTRIN) tablet 800 mg, 800 mg, Oral, BID PRN, Brenda Christiansen, MAREN  •  meropenem (MERREM) 1 g/100 mL 0.9% NS VTB (mbp), 1 g, Intravenous, Q8H, Mendoza Herrera MD  •  multivitamin w/minerals tablet 1 tablet, 1 tablet, Oral, Daily, Joseph Dawkins MD  •  ondansetron (ZOFRAN) tablet 4 mg, 4 mg, Oral, Q6H PRN **OR** ondansetron (ZOFRAN) injection 4 mg, 4 mg, Intravenous, Q6H PRN, Joseph Dawkins MD  •  pantoprazole (PROTONIX) EC tablet 40 mg, 40 mg, Oral, BID AC, Maricruz Bruner APRN  •  saccharomyces boulardii (FLORASTOR) capsule 250 mg, 250 mg, Oral, BID, Joseph Dawkins MD  •  sennosides-docusate sodium (SENOKOT-S) 8.6-50 MG tablet 1 tablet, 1 tablet, Oral, BID PRN, Joseph Dawkins MD  •  sodium chloride 0.9 % flush 10 mL, 10 mL, Intravenous, Q12H, Joseph Dawkins MD  •  sodium chloride 0.9 % flush 10 mL, 10 mL, Intravenous, PRN, Juancho,  "Joseph Purcell MD  •  sodium chloride 0.9 % flush 20 mL, 20 mL, Intravenous, PRN, Joseph Dawkins MD  •  trolamine salicylate (ASPERCREME) 10 % cream, , Topical, BID, Joseph Dawkins MD  •  vancomycin 750 mg/250 mL 0.9% NS IVPB ([facility]), 750 mg, Intravenous, Q24H, Mendoza Herrera MD  •  vancomycin 750 mg/250 mL 0.9% NS IVPB ([facility]), 750 mg, Intravenous, Q24H, Mendoza Herrera MD  •  vitamin C (ASCORBIC ACID) tablet 1,000 mg, 1,000 mg, Oral, Daily, Joseph Dawkins MD  •  vitamin D (ERGOCALCIFEROL) capsule 50,000 Units, 50,000 Units, Oral, Q7 Days, Joseph Dawkins MD  •  Salina's amazing butt cream, , Topical, TID, Ki, Crystal L, APRN  •  Salina's amazing butt cream, , Topical, PRN, Ki Crystal L, APRN  •  Salina's amazing butt cream, , Topical, TID, Ki, Crystal L, APRN  •  Salina's amazing butt cream, , Topical, PRN, Ki, Crystal L, APRN  •  zinc gluconate tablet 50 mg, 50 mg, Oral, Daily, Joseph Dawkins MD    Data:     Code Status:    There are no questions and answers to display.       No family history on file.    Social History     Socioeconomic History   • Marital status: Unknown     Spouse name: Not on file   • Number of children: Not on file   • Years of education: Not on file   • Highest education level: Not on file       Vitals:  Ht 172.7 cm (68\")   Wt 79.1 kg (174 lb 4.8 oz)   BMI 26.50 kg/m²     T 98.2 P 92 R 16 /57 Sp02 97% (room air)      I/O (24Hr):  No intake or output data in the 24 hours ending 10/15/19 1105    Labs and imaging:      Lab Results (last 24 hours)     Procedure Component Value Units Date/Time    Basic Metabolic Panel [[id number]]  (Abnormal) Collected:  10/15/19 0503    Specimen:  Blood Updated:  10/15/19 0548     Glucose 109 mg/dL      BUN 25 mg/dL      Creatinine 0.74 mg/dL      Sodium 141 mmol/L      Potassium 5.2 mmol/L      Chloride 104 mmol/L      CO2 27.0 mmol/L      Calcium 8.8 mg/dL      eGFR Non  Amer 81 " mL/min/1.73      BUN/Creatinine Ratio 33.8     Anion Gap 10.0 mmol/L     Narrative:       GFR Normal >60  Chronic Kidney Disease <60  Kidney Failure <15            No results found.    Physical Examination:        Physical Exam   Constitutional: She is oriented to person, place, and time. She appears well-developed and well-nourished. No distress.   HENT:   Head: Normocephalic and atraumatic.   Right Ear: External ear normal.   Left Ear: External ear normal.   Nose: Nose normal.   Mouth/Throat: Oropharynx is clear and moist.   Eyes: Conjunctivae and EOM are normal. Pupils are equal, round, and reactive to light.   Neck: Normal range of motion. Neck supple.   Cardiovascular: Normal rate, regular rhythm, normal heart sounds and intact distal pulses.   Pulmonary/Chest: Effort normal and breath sounds normal.   Abdominal: Soft. Bowel sounds are normal.   urostomy appliance intact  Colostomy appliance intact     Genitourinary:   Genitourinary Comments: Urostomy  Colostomy   Musculoskeletal: Normal range of motion.   Paraplegia      Neurological: She is alert and oriented to person, place, and time.   paraplegia   Skin: Skin is warm and dry.   Wound vac intact  Dressing c.d.i bilateral feet     Psychiatric: She has a normal mood and affect. Her behavior is normal. Judgment and thought content normal.   Anxious at times    Nursing note and vitals reviewed.        Assessment:             * No active hospital problems. *    No past medical history on file.     Plan:        1. Stage 4 Pressure wound Right ischium  2. Stage 3 pressure wound right heel  3. Pressure wound left heel  4. Chronic osteomyelitis with MRSA positive drainage to multiple sites  5. Gastrointestinal hemorrhage  6. Paraplegia, long term from previous Spinal tumor  7. Anemia due to blood loss  8. Severe protein calorie malnutrition, tolerating tube feedings and oral intake  9. Insomnia  10. Anxiety  11. Iron deficiency Anemia  12. Left shoulder strain      Continue current treatment. Labs Thursday. Monitor counts. Encourage adequate intake. Wound care per wound care team. Encourage activity. Monitor potassium closely.  Continue antibiotics under direction of ID (through 10/21).   Electronically signed by MAREN Jackson on 10/15/2019 at 11:05 AM

## 2019-10-15 NOTE — PROGRESS NOTES
"Pharmacy Dosing Service  Pharmacokinetics  Vancomycin Follow-up Evaluation    Assessment/Action/Plan:  Patient is currently receiving Vancomycin 750 mg IV every 24 hours for the treatment of osteomyelitis, day 25 of therapy. 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 Last vancomycin trough on 10/9/19 was 20.9 (~20.25 hours post-dose), expected to be therapeutic. Current vancomycin end date: 10/21/19. Labs/dose reviewed. Renal function remains around baseline. Will defer trough level at this time. Continue current dosing. Pharmacy will continue to monitor renal function and adjust dose accordingly.     Subjective:  Jessica Alvarenga is a 59 y.o. female    Objective:  Ht: 172.7 cm (68\"); Wt: 79.1 kg (174 lb 4.8 oz)  Estimated Creatinine Clearance: 92.7 mL/min (by C-G formula based on SCr of 0.74 mg/dL).   Lab Results   Component Value Date    CREATININE 0.74 10/15/2019    CREATININE 0.67 10/14/2019    CREATININE 0.57 10/13/2019    CREATININE 0.6 09/06/2019    CREATININE 0.6 09/05/2019    CREATININE 0.7 09/04/2019      Lab Results   Component Value Date    WBC 5.71 10/14/2019    WBC 5.90 10/10/2019    WBC 5.91 10/07/2019         Lab Results   Component Value Date    VANCOTROUGH 20.90 (H) 10/09/2019    VANCORANDOM 17.20 09/26/2019       Culture Results:  Microbiology Results (last 10 days)       ** No results found for the last 240 hours. **          Antimicrobials:  Anti-Infectives (From admission, onward)      Ordered     Dose/Rate Route Frequency Start Stop    10/15/19 0819  vancomycin 750 mg/250 mL 0.9% NS IVPB ([facility])     Ordering Provider:  Mendoza Herrera MD    750 mg  over 60 Minutes Intravenous Every 24 Hours 10/15/19 2100 10/22/19 2059    10/15/19 0819  meropenem (MERREM) 1 g/100 mL 0.9% NS VTB (mbp)     Ordering Provider:  Mendoza Herrera MD    1 g  over 3 Hours Intravenous Every 8 Hours 10/15/19 1000 10/29/19 0959    " 10/01/19 1606  vancomycin 750 mg/250 mL 0.9% NS IVPB ([facility])     Abhishek Quinones, PharmD let the order  on 10/06/19 0845.   Ordering Provider:  Mendoza Herrera MD    750 mg  over 60 Minutes Intravenous Every 24 Hours 10/01/19 1800 10/15/19 2059    19 1626  meropenem (MERREM) 1 g/100 mL 0.9% NS VTB (mbp)     Abhishek Quinones, PharmD let the order  on 10/06/19 0845.   Ordering Provider:  Mendoza Herrera MD    1 g  over 3 Hours Intravenous Every 8 Hours Scheduled 19 2300 10/14/19 2359    19 1752  meropenem (MERREM) 1 g/100 mL 0.9% NS VTB (mbp)     Ordering Provider:  Joseph Dawkins MD    1 g  over 3 Hours Intravenous Every 8 Hours 19 1900 19 1859            Vikas Ferro, CollinD   10/15/19 8:21 AM

## 2019-10-15 NOTE — PROGRESS NOTES
"Infectious Diseases Progress Note    Patient:  Jessica Alvarenga  YOB: 1960  MRN: [id number]   Admit date: 9/6/2019   Admitting Physician: Joseph Dawkins MD  Primary Care Physician: Ponce Orozco MD    Chief Complaint/Interval History: She is without fever.  No new symptoms.  She had been on Aldactone which likely contributed to elevated potassium.  Aldactone has been discontinued.  Potassium today 5.2.  She will likely remain in hospital until she goes to her outpatient plastic surgery evaluation at [place].  Going to resume her intravenous antibiotic therapy until discharge.  Discussed with hospitalist.    No intake or output data in the 24 hours ending 10/15/19 1306  Allergies:   Allergies   Allergen Reactions   • Morphine Mental Status Change     Current Scheduled Medications:     bacitracin  Topical Q12H   baclofen 10 mg Oral TID   busPIRone 10 mg Oral TID With Meals   docusate sodium 200 mg Oral Daily   ferrous sulfate 325 mg Oral BID With Meals   folic acid 1 mg Oral Daily   gabapentin 300 mg Oral Nightly   meropenem 1 g Intravenous Q8H   multivitamin w/minerals 1 tablet Oral Daily   pantoprazole 40 mg Oral BID AC   saccharomyces boulardii 250 mg Oral BID   sodium chloride 10 mL Intravenous Q12H   trolamine salicylate  Topical BID   vancomycin 750 mg Intravenous Q24H   vancomycin 750 mg Intravenous Q24H   vitamin C 1,000 mg Oral Daily   vitamin D 50,000 Units Oral Q7 Days   jennifer's amazing butt  Topical TID   jennifer's amazing butt  Topical TID   zinc gluconate 50 mg Oral Daily     Current PRN Medications:  •  acetaminophen **OR** acetaminophen  •  diphenhydrAMINE  •  gabapentin  •  ibuprofen  •  ondansetron **OR** ondansetron  •  sennosides-docusate sodium  •  sodium chloride  •  sodium chloride  •  jennifer's amazing butt  •  jennifer's amazing butt    Review of Systems no change in ostomy output    Vital Signs:  Ht 172.7 cm (68\")   Wt 79.1 kg (174 lb 4.8 oz)   BMI 26.50 kg/m² "     Physical Exam  Vital signs - reviewed.  Line/IV (PIC) site - No erythema, warmth, induration, or tenderness.  Ischial decubitus ulcers with wound VAC in place    Lab Results:  CBC: Results from last 7 days   Lab Units 10/14/19  0451 10/10/19  0603   WBC 10*3/mm3 5.71 5.90   HEMOGLOBIN g/dL 9.0* 8.9*   HEMATOCRIT % 29.1* 29.0*   PLATELETS 10*3/mm3 297 272     BMP:  Results from last 7 days   Lab Units 10/15/19  0509 10/14/19  0451 10/13/19  0718 10/12/19  0943 10/11/19  0623 10/10/19  0603   SODIUM mmol/L 141 142 143 144 143 142   POTASSIUM mmol/L 5.2 5.4* 5.8* 6.1* 6.8* 6.3*   CHLORIDE mmol/L 104 105 106 107 109* 107   CO2 mmol/L 27.0 27.0 25.0 26.0 25.0 25.0   BUN mg/dL 25* 25* 27* 28* 35* 30*   CREATININE mg/dL 0.74 0.67 0.57 0.74 0.71 0.78   GLUCOSE mg/dL 109* 116* 105* 100* 105* 92   CALCIUM mg/dL 8.8 8.9 9.1 9.3 9.1 9.1     Culture Results:    Radiology: None  Additional Studies Reviewed: None    Impression:   1.  Bilateral ischial decubitus ulcers  2.  Previous diverting colostomy/urostomy  3.  Pelvic osteomyelitis  4.  Paraplegia    Recommendations:   Remain stable from ID standpoint.  CBC and renal function stable.  Potassium has improved.  Continue current antibiotic treatment vancomycin and meropenem    Mendoza Lyon MD

## 2019-10-16 PROCEDURE — 63710000001 DIPHENHYDRAMINE PER 50 MG: Performed by: INTERNAL MEDICINE

## 2019-10-16 PROCEDURE — 25010000002 VANCOMYCIN: Performed by: INTERNAL MEDICINE

## 2019-10-16 PROCEDURE — 25010000002 MEROPENEM: Performed by: INTERNAL MEDICINE

## 2019-10-16 PROCEDURE — 97530 THERAPEUTIC ACTIVITIES: CPT

## 2019-10-16 PROCEDURE — 97605 NEG PRS WND THER DME<=50SQCM: CPT

## 2019-10-16 NOTE — PROGRESS NOTES
[facility]  CONNOR Rivera M.D.  MAREN Hernandez APRN      Internal Medicine Progress Note    10/16/2019   10:58 AM    Name:  Jessica Alvarenga  MRN:    [id number]     Acct:     [id number]   Room:  [address] [place] [address] Day: 0     Admit Date: 9/6/2019  5:34 PM  PCP: Ponce Orozco MD    Subjective:     C/C: wound care and antibiotic therapy    Interval History: Status: Improved. Up to chair. No family at bedside. Potassium level WNL. Tolerating treatment thus far. Denies pain at present. Slow progress with therapy.     Review of Systems   Constitution: Positive for weakness. Negative for decreased appetite and malaise/fatigue.   HENT: Negative for congestion, ear pain, hoarse voice, nosebleeds and sore throat.    Eyes: Negative for blurred vision, double vision and pain.   Cardiovascular: Negative for chest pain, dyspnea on exertion, leg swelling and near-syncope.   Respiratory: Negative for cough, shortness of breath and sputum production.    Endocrine: Negative for cold intolerance.   Skin: Positive for poor wound healing. Negative for dry skin, itching and rash.   Musculoskeletal: Positive for joint pain and muscle weakness. Negative for back pain and joint swelling.   Gastrointestinal: Negative for abdominal pain, constipation, diarrhea and vomiting.        Lower abdomen tenderness   Genitourinary: Negative for flank pain and hematuria.   Neurological: Positive for paresthesias (paraplegia ). Negative for difficulty with concentration, dizziness, focal weakness, headaches and seizures.   Psychiatric/Behavioral: Negative for altered mental status and depression. The patient does not have insomnia and is not nervous/anxious.        Medications:     Allergies:   Allergies   Allergen Reactions   • Morphine Mental Status Change       Current Meds:   Current Facility-Administered Medications:   •  acetaminophen (TYLENOL) tablet 650 mg, 650 mg, Oral, Q4H PRN **OR**  acetaminophen (TYLENOL) suppository 650 mg, 650 mg, Rectal, Q4H PRN, Joseph Dawkins MD  •  bacitracin 500 UNIT/GM ointment, , Topical, Q12H, Lisa Emerson, APRSIL  •  baclofen (LIORESAL) tablet 10 mg, 10 mg, Oral, TID, Joseph Dawkins MD  •  busPIRone (BUSPAR) tablet 10 mg, 10 mg, Oral, TID With Meals, Joseph Dawkins MD  •  diphenhydrAMINE (BENADRYL) capsule 25 mg, 25 mg, Oral, Q6H PRN, Joseph Dawkins MD  •  docusate sodium (COLACE) capsule 200 mg, 200 mg, Oral, Daily, Brenda Christiansen APRN  •  ferrous sulfate tablet 325 mg, 325 mg, Oral, BID With Meals, Joseph Dawkins MD  •  folic acid (FOLVITE) tablet 1 mg, 1 mg, Oral, Daily, Joseph Dawkins MD  •  gabapentin (NEURONTIN) capsule 100 mg, 100 mg, Oral, Daily PRN, Joseph Dawkins MD  •  gabapentin (NEURONTIN) capsule 300 mg, 300 mg, Oral, Nightly, Maricruz Bruner, APRN  •  ibuprofen (ADVIL,MOTRIN) tablet 800 mg, 800 mg, Oral, BID PRN, Brenda Christiansen, MAREN  •  meropenem (MERREM) 1 g/100 mL 0.9% NS VTB (mbp), 1 g, Intravenous, Q8H, Mendoza Herrera MD  •  multivitamin w/minerals tablet 1 tablet, 1 tablet, Oral, Daily, Joseph Dawkins MD  •  ondansetron (ZOFRAN) tablet 4 mg, 4 mg, Oral, Q6H PRN **OR** ondansetron (ZOFRAN) injection 4 mg, 4 mg, Intravenous, Q6H PRN, Joseph Dawkins MD  •  pantoprazole (PROTONIX) EC tablet 40 mg, 40 mg, Oral, BID AC, Maricruz Bruner, APRN  •  saccharomyces boulardii (FLORASTOR) capsule 250 mg, 250 mg, Oral, BID, Joseph Dawkins MD  •  sennosides-docusate sodium (SENOKOT-S) 8.6-50 MG tablet 1 tablet, 1 tablet, Oral, BID PRN, Joseph Dawkins MD  •  sodium chloride 0.9 % flush 10 mL, 10 mL, Intravenous, Q12H, Joseph Dawkins MD  •  sodium chloride 0.9 % flush 10 mL, 10 mL, Intravenous, PRN, Joseph Dawkins MD  •  sodium chloride 0.9 % flush 20 mL, 20 mL, Intravenous, PRN, Joseph Dawkins MD  •  trolamine salicylate  "(ASPERCREME) 10 % cream, , Topical, BID, Joseph Dawkins MD  •  vancomycin 750 mg/250 mL 0.9% NS IVPB ([facility]), 750 mg, Intravenous, Q24H, Mendoza Herrera MD  •  vitamin C (ASCORBIC ACID) tablet 1,000 mg, 1,000 mg, Oral, Daily, Joseph Dawkins MD  •  vitamin D (ERGOCALCIFEROL) capsule 50,000 Units, 50,000 Units, Oral, Q7 Days, Joseph Dawkins MD  •  Salina's amazing butt cream, , Topical, TID, Lisa Emerson, APRN  •  Salina's amazing butt cream, , Topical, PRN, Lisa Emerson, APRN  •  Salina's amazing butt cream, , Topical, TID, Lisa Emerson, APRN  •  Salina's amazing butt cream, , Topical, PRN, Lisa Emerson, APRN  •  zinc gluconate tablet 50 mg, 50 mg, Oral, Daily, Joseph Dawkins MD    Data:     Code Status:    There are no questions and answers to display.       No family history on file.    Social History     Socioeconomic History   • Marital status: Unknown     Spouse name: Not on file   • Number of children: Not on file   • Years of education: Not on file   • Highest education level: Not on file       Vitals:  Ht 172.7 cm (68\")   Wt 79.1 kg (174 lb 4.8 oz)   BMI 26.50 kg/m²     T 98 P 73 R 20 /44 Sp02 97% (room air)      I/O (24Hr):  No intake or output data in the 24 hours ending 10/16/19 1058    Labs and imaging:      Lab Results (last 24 hours)     ** No results found for the last 24 hours. **            No results found.    Physical Examination:        Physical Exam   Constitutional: She is oriented to person, place, and time. She appears well-developed and well-nourished. No distress.   HENT:   Head: Normocephalic and atraumatic.   Right Ear: External ear normal.   Left Ear: External ear normal.   Nose: Nose normal.   Mouth/Throat: Oropharynx is clear and moist.   Eyes: Conjunctivae and EOM are normal. Pupils are equal, round, and reactive to light.   Neck: Normal range of motion. Neck supple.   Cardiovascular: Normal rate, regular rhythm, normal " heart sounds and intact distal pulses.   Pulmonary/Chest: Effort normal and breath sounds normal.   Abdominal: Soft. Bowel sounds are normal.   urostomy appliance intact  Colostomy appliance intact     Genitourinary:   Genitourinary Comments: Urostomy  Colostomy   Musculoskeletal: Normal range of motion.   Paraplegia      Neurological: She is alert and oriented to person, place, and time.   paraplegia   Skin: Skin is warm and dry.   Wound vac intact  Dressing c.d.i bilateral feet     Psychiatric: She has a normal mood and affect. Her behavior is normal. Judgment and thought content normal.   Anxious at times    Nursing note and vitals reviewed.        Assessment:             * No active hospital problems. *    No past medical history on file.     Plan:        1. Stage 4 Pressure wound Right ischium  2. Stage 3 pressure wound right heel  3. Pressure wound left heel  4. Chronic osteomyelitis with MRSA positive drainage to multiple sites  5. Gastrointestinal hemorrhage  6. Paraplegia, long term from previous Spinal tumor  7. Anemia due to blood loss  8. Severe protein calorie malnutrition, tolerating tube feedings and oral intake  9. Insomnia  10. Anxiety  11. Iron deficiency Anemia  12. Left shoulder strain     Continue current treatment. Labs in am. Monitor counts. Encourage adequate intake. Wound care per wound care team. Encourage activity. Monitor potassium closely. Continue antibiotics under direction of ID.  Electronically signed by MAREN Jackson on 10/16/2019 at 10:58 AM     I have discussed the care of Jessica Alvarenga, including pertinent history and exam findings, with the nurse practitioner.    I have seen and examined the patient and the key elements of all parts of the encounter have been performed by me.  I agree with the assessment, plan and orders as documented by MAREN Hernandez, after I modified the exam findings and the plan of treatments and the final version is my approved  version of the assessment.        Electronically signed by Joseph Dawkins MD on 10/16/2019 at 9:09 PM

## 2019-10-17 ENCOUNTER — APPOINTMENT (OUTPATIENT)
Dept: GENERAL RADIOLOGY | Facility: HOSPITAL | Age: 59
End: 2019-10-17

## 2019-10-17 LAB
ANION GAP SERPL CALCULATED.3IONS-SCNC: 10 MMOL/L (ref 5–15)
BASOPHILS # BLD AUTO: 0.08 10*3/MM3 (ref 0–0.2)
BASOPHILS NFR BLD AUTO: 1.5 % (ref 0–1.5)
BUN BLD-MCNC: 30 MG/DL (ref 6–20)
BUN/CREAT SERPL: 53.6 (ref 7–25)
CALCIUM SPEC-SCNC: 9.2 MG/DL (ref 8.6–10.5)
CHLORIDE SERPL-SCNC: 106 MMOL/L (ref 98–107)
CO2 SERPL-SCNC: 28 MMOL/L (ref 22–29)
CREAT BLD-MCNC: 0.56 MG/DL (ref 0.57–1)
CRP SERPL-MCNC: 4.73 MG/DL (ref 0–0.5)
DEPRECATED RDW RBC AUTO: 47.4 FL (ref 37–54)
EOSINOPHIL # BLD AUTO: 0.58 10*3/MM3 (ref 0–0.4)
EOSINOPHIL NFR BLD AUTO: 10.5 % (ref 0.3–6.2)
ERYTHROCYTE [DISTWIDTH] IN BLOOD BY AUTOMATED COUNT: 14.6 % (ref 12.3–15.4)
ERYTHROCYTE [SEDIMENTATION RATE] IN BLOOD: 58 MM/HR (ref 0–20)
GFR SERPL CREATININE-BSD FRML MDRD: 112 ML/MIN/1.73
GLUCOSE BLD-MCNC: 102 MG/DL (ref 65–99)
HCT VFR BLD AUTO: 27.8 % (ref 34–46.6)
HGB BLD-MCNC: 8.6 G/DL (ref 12–15.9)
IMM GRANULOCYTES # BLD AUTO: 0.03 10*3/MM3 (ref 0–0.05)
IMM GRANULOCYTES NFR BLD AUTO: 0.5 % (ref 0–0.5)
LYMPHOCYTES # BLD AUTO: 1.63 10*3/MM3 (ref 0.7–3.1)
LYMPHOCYTES NFR BLD AUTO: 29.6 % (ref 19.6–45.3)
MCH RBC QN AUTO: 27 PG (ref 26.6–33)
MCHC RBC AUTO-ENTMCNC: 30.9 G/DL (ref 31.5–35.7)
MCV RBC AUTO: 87.4 FL (ref 79–97)
MONOCYTES # BLD AUTO: 0.45 10*3/MM3 (ref 0.1–0.9)
MONOCYTES NFR BLD AUTO: 8.2 % (ref 5–12)
NEUTROPHILS # BLD AUTO: 2.73 10*3/MM3 (ref 1.7–7)
NEUTROPHILS NFR BLD AUTO: 49.7 % (ref 42.7–76)
NRBC BLD AUTO-RTO: 0 /100 WBC (ref 0–0.2)
PLATELET # BLD AUTO: 258 10*3/MM3 (ref 140–450)
PMV BLD AUTO: 9.6 FL (ref 6–12)
POTASSIUM BLD-SCNC: 4.7 MMOL/L (ref 3.5–5.2)
RBC # BLD AUTO: 3.18 10*6/MM3 (ref 3.77–5.28)
SODIUM BLD-SCNC: 144 MMOL/L (ref 136–145)
WBC NRBC COR # BLD: 5.5 10*3/MM3 (ref 3.4–10.8)

## 2019-10-17 PROCEDURE — 25010000002 MEROPENEM: Performed by: INTERNAL MEDICINE

## 2019-10-17 PROCEDURE — 80048 BASIC METABOLIC PNL TOTAL CA: CPT | Performed by: INTERNAL MEDICINE

## 2019-10-17 PROCEDURE — 97164 PT RE-EVAL EST PLAN CARE: CPT | Performed by: PHYSICAL THERAPIST

## 2019-10-17 PROCEDURE — 85651 RBC SED RATE NONAUTOMATED: CPT | Performed by: INTERNAL MEDICINE

## 2019-10-17 PROCEDURE — 73030 X-RAY EXAM OF SHOULDER: CPT

## 2019-10-17 PROCEDURE — 86140 C-REACTIVE PROTEIN: CPT | Performed by: INTERNAL MEDICINE

## 2019-10-17 PROCEDURE — 63710000001 DIPHENHYDRAMINE PER 50 MG: Performed by: INTERNAL MEDICINE

## 2019-10-17 PROCEDURE — 97530 THERAPEUTIC ACTIVITIES: CPT | Performed by: PHYSICAL THERAPIST

## 2019-10-17 PROCEDURE — 85025 COMPLETE CBC W/AUTO DIFF WBC: CPT | Performed by: INTERNAL MEDICINE

## 2019-10-17 PROCEDURE — 97535 SELF CARE MNGMENT TRAINING: CPT

## 2019-10-17 PROCEDURE — 25010000002 VANCOMYCIN: Performed by: INTERNAL MEDICINE

## 2019-10-17 PROCEDURE — 97606 NEG PRS WND THER DME>50 SQCM: CPT

## 2019-10-17 RX ORDER — MELOXICAM 7.5 MG/1
15 TABLET ORAL DAILY
Status: DISCONTINUED | OUTPATIENT
Start: 2019-10-17 | End: 2019-10-24 | Stop reason: HOSPADM

## 2019-10-17 NOTE — PROGRESS NOTES
[facility]  CONNOR Rivera M.D.  MAREN Hernandez APRN      Internal Medicine Progress Note    10/17/2019   9:34 AM    Name:  Jessica Alvarenga  MRN:    [id number]     Acct:     [id number]   Room:  [address] [place] [address] Day: 0     Admit Date: 9/6/2019  5:34 PM  PCP: Ponce Orozco MD    Subjective:     C/C: wound care and antibiotic therapy    Interval History: Status: Improved. Resting in bed. No family at bedside. Potassium WNL. Requesting orange juice/liberating diet. Still with complaints of left shoulder pain. Counts stable.     Review of Systems   Constitution: Positive for weakness. Negative for decreased appetite and malaise/fatigue.   HENT: Negative for congestion, ear pain, hoarse voice, nosebleeds and sore throat.    Eyes: Negative for blurred vision, double vision and pain.   Cardiovascular: Negative for chest pain, dyspnea on exertion, leg swelling and near-syncope.   Respiratory: Negative for cough, shortness of breath and sputum production.    Endocrine: Negative for cold intolerance.   Skin: Positive for poor wound healing. Negative for dry skin, itching and rash.   Musculoskeletal: Positive for joint pain and muscle weakness. Negative for back pain and joint swelling.   Gastrointestinal: Negative for abdominal pain, constipation, diarrhea and vomiting.        Lower abdomen tenderness   Genitourinary: Negative for flank pain and hematuria.   Neurological: Positive for paresthesias (paraplegia ). Negative for difficulty with concentration, dizziness, focal weakness, headaches and seizures.   Psychiatric/Behavioral: Negative for altered mental status and depression. The patient does not have insomnia and is not nervous/anxious.        Medications:     Allergies:   Allergies   Allergen Reactions   • Morphine Mental Status Change       Current Meds:   Current Facility-Administered Medications:   •  acetaminophen (TYLENOL) tablet 650 mg, 650 mg, Oral, Q4H  PRN **OR** acetaminophen (TYLENOL) suppository 650 mg, 650 mg, Rectal, Q4H PRN, Joseph Dawkins MD  •  bacitracin 500 UNIT/GM ointment, , Topical, Q12H, Lisa Emerson APRN  •  baclofen (LIORESAL) tablet 10 mg, 10 mg, Oral, TID, Joseph Dawkins MD  •  busPIRone (BUSPAR) tablet 10 mg, 10 mg, Oral, TID With Meals, Joseph Dawkins MD  •  diphenhydrAMINE (BENADRYL) capsule 25 mg, 25 mg, Oral, Q6H PRN, Joseph Dawkins MD  •  docusate sodium (COLACE) capsule 200 mg, 200 mg, Oral, Daily, Brenda Christiansen APRN  •  ferrous sulfate tablet 325 mg, 325 mg, Oral, BID With Meals, Joseph Dawkins MD  •  folic acid (FOLVITE) tablet 1 mg, 1 mg, Oral, Daily, Joseph Dawkins MD  •  gabapentin (NEURONTIN) capsule 100 mg, 100 mg, Oral, Daily PRN, Joseph Dawkins MD  •  gabapentin (NEURONTIN) capsule 300 mg, 300 mg, Oral, Nightly, Maricruz Bruner, APRN  •  ibuprofen (ADVIL,MOTRIN) tablet 800 mg, 800 mg, Oral, BID PRN, Brenda Christiansen, MAREN  •  meropenem (MERREM) 1 g/100 mL 0.9% NS VTB (mbp), 1 g, Intravenous, Q8H, Mendoza Herrera MD  •  multivitamin w/minerals tablet 1 tablet, 1 tablet, Oral, Daily, Joseph Dawkins MD  •  ondansetron (ZOFRAN) tablet 4 mg, 4 mg, Oral, Q6H PRN **OR** ondansetron (ZOFRAN) injection 4 mg, 4 mg, Intravenous, Q6H PRN, Joseph Dawkins MD  •  pantoprazole (PROTONIX) EC tablet 40 mg, 40 mg, Oral, BID AC, Maricruz Bruner, APRN  •  saccharomyces boulardii (FLORASTOR) capsule 250 mg, 250 mg, Oral, BID, Joseph Dawkins MD  •  sennosides-docusate sodium (SENOKOT-S) 8.6-50 MG tablet 1 tablet, 1 tablet, Oral, BID PRN, Joseph Dawkins MD  •  sodium chloride 0.9 % flush 10 mL, 10 mL, Intravenous, Q12H, Joseph Dawkins MD  •  sodium chloride 0.9 % flush 10 mL, 10 mL, Intravenous, PRN, Joseph Dawkins MD  •  sodium chloride 0.9 % flush 20 mL, 20 mL, Intravenous, PRN, Joseph Dawkins MD  •  trolamine salicylate  "(ASPERCREME) 10 % cream, , Topical, BID, Joseph Dawkins MD  •  vancomycin 750 mg/250 mL 0.9% NS IVPB ([facility]), 750 mg, Intravenous, Q24H, Mendoza Herrera MD  •  vitamin C (ASCORBIC ACID) tablet 1,000 mg, 1,000 mg, Oral, Daily, Joseph Dawkins MD  •  vitamin D (ERGOCALCIFEROL) capsule 50,000 Units, 50,000 Units, Oral, Q7 Days, Joseph Dawkins MD  •  Salina's amazing butt cream, , Topical, TID, Lisa Emerson, APRN  •  Salina's amazing butt cream, , Topical, PRN, Lisa Emerson, APRN  •  Salina's amazing butt cream, , Topical, TID, Lisa Emerson, APRN  •  Salina's amazing butt cream, , Topical, PRN, Lisa Emerson, APRN  •  zinc gluconate tablet 50 mg, 50 mg, Oral, Daily, Joseph Dawkins MD    Data:     Code Status:    There are no questions and answers to display.       No family history on file.    Social History     Socioeconomic History   • Marital status: Unknown     Spouse name: Not on file   • Number of children: Not on file   • Years of education: Not on file   • Highest education level: Not on file       Vitals:  Ht 172.7 cm (68\")   Wt 79.1 kg (174 lb 4.8 oz)   BMI 26.50 kg/m²     T 97.7 P 79 R 14 /57 Sp02 100% (room air)      I/O (24Hr):  No intake or output data in the 24 hours ending 10/17/19 0934    Labs and imaging:      Lab Results (last 24 hours)     Procedure Component Value Units Date/Time    Basic Metabolic Panel [[id number]]  (Abnormal) Collected:  10/17/19 0659    Specimen:  Blood Updated:  10/17/19 0809     Glucose 102 mg/dL      BUN 30 mg/dL      Creatinine 0.56 mg/dL      Sodium 144 mmol/L      Potassium 4.7 mmol/L      Chloride 106 mmol/L      CO2 28.0 mmol/L      Calcium 9.2 mg/dL      eGFR Non African Amer 112 mL/min/1.73      BUN/Creatinine Ratio 53.6     Anion Gap 10.0 mmol/L     Narrative:       GFR Normal >60  Chronic Kidney Disease <60  Kidney Failure <15    C-reactive Protein [[id number]]  (Abnormal) Collected:  10/17/19 0659    " Specimen:  Blood Updated:  10/17/19 0809     C-Reactive Protein 4.73 mg/dL     Sedimentation Rate [[id number]]  (Abnormal) Collected:  10/17/19 0659    Specimen:  Blood Updated:  10/17/19 0755     Sed Rate 58 mm/hr     CBC & Differential [[id number]] Collected:  10/17/19 0659    Specimen:  Blood Updated:  10/17/19 0741    Narrative:       The following orders were created for panel order CBC & Differential.  Procedure                               Abnormality         Status                     ---------                               -----------         ------                     CBC Auto Differential[[id number]]        Abnormal            Final result                 Please view results for these tests on the individual orders.    CBC Auto Differential [[id number]]  (Abnormal) Collected:  10/17/19 0659    Specimen:  Blood Updated:  10/17/19 0741     WBC 5.50 10*3/mm3      RBC 3.18 10*6/mm3      Hemoglobin 8.6 g/dL      Hematocrit 27.8 %      MCV 87.4 fL      MCH 27.0 pg      MCHC 30.9 g/dL      RDW 14.6 %      RDW-SD 47.4 fl      MPV 9.6 fL      Platelets 258 10*3/mm3      Neutrophil % 49.7 %      Lymphocyte % 29.6 %      Monocyte % 8.2 %      Eosinophil % 10.5 %      Basophil % 1.5 %      Immature Grans % 0.5 %      Neutrophils, Absolute 2.73 10*3/mm3      Lymphocytes, Absolute 1.63 10*3/mm3      Monocytes, Absolute 0.45 10*3/mm3      Eosinophils, Absolute 0.58 10*3/mm3      Basophils, Absolute 0.08 10*3/mm3      Immature Grans, Absolute 0.03 10*3/mm3      nRBC 0.0 /100 WBC             No results found.    Physical Examination:        Physical Exam   Constitutional: She is oriented to person, place, and time. She appears well-developed and well-nourished. No distress.   HENT:   Head: Normocephalic and atraumatic.   Right Ear: External ear normal.   Left Ear: External ear normal.   Nose: Nose normal.   Mouth/Throat: Oropharynx is clear and moist.   Eyes: Conjunctivae and EOM are normal. Pupils are equal, round, and  reactive to light.   Neck: Normal range of motion. Neck supple.   Cardiovascular: Normal rate, regular rhythm, normal heart sounds and intact distal pulses.   Pulmonary/Chest: Effort normal and breath sounds normal.   Abdominal: Soft. Bowel sounds are normal.   urostomy appliance intact  Colostomy appliance intact     Genitourinary:   Genitourinary Comments: Urostomy  Colostomy   Musculoskeletal: Normal range of motion.   Paraplegia      Neurological: She is alert and oriented to person, place, and time.   paraplegia   Skin: Skin is warm and dry.   Wound vac intact  Dressing c.d.i bilateral feet     Psychiatric: She has a normal mood and affect. Her behavior is normal. Judgment and thought content normal.   Anxious at times    Nursing note and vitals reviewed.        Assessment:             * No active hospital problems. *    No past medical history on file.     Plan:        1. Stage 4 Pressure wound Right ischium  2. Stage 3 pressure wound right heel  3. Pressure wound left heel  4. Chronic osteomyelitis with MRSA positive drainage to multiple sites  5. Gastrointestinal hemorrhage  6. Paraplegia, long term from previous Spinal tumor  7. Anemia due to blood loss  8. Severe protein calorie malnutrition, tolerating tube feedings and oral intake  9. Insomnia  10. Anxiety  11. Iron deficiency Anemia  12. Left shoulder strain     Continue current treatment. Labs Monday. Monitor counts. Encourage adequate intake. Wound care per wound care team. Encourage activity. Monitor potassium closely. Continue antibiotics under direction of ID. Xr left shoulder. Add mobic. Ok for orange juice with breakfast, but continue potassium restricted diet, otherwise.   Electronically signed by MAREN Jackson on 10/17/2019 at 9:34 AM

## 2019-10-18 LAB — VANCOMYCIN TROUGH SERPL-MCNC: 17.6 MCG/ML (ref 5–20)

## 2019-10-18 PROCEDURE — 97110 THERAPEUTIC EXERCISES: CPT

## 2019-10-18 PROCEDURE — 97605 NEG PRS WND THER DME<=50SQCM: CPT

## 2019-10-18 PROCEDURE — 25010000002 MEROPENEM: Performed by: INTERNAL MEDICINE

## 2019-10-18 PROCEDURE — 80202 ASSAY OF VANCOMYCIN: CPT | Performed by: INTERNAL MEDICINE

## 2019-10-18 PROCEDURE — 63710000001 DIPHENHYDRAMINE PER 50 MG: Performed by: INTERNAL MEDICINE

## 2019-10-18 PROCEDURE — 97164 PT RE-EVAL EST PLAN CARE: CPT

## 2019-10-18 PROCEDURE — 25010000002 VANCOMYCIN: Performed by: INTERNAL MEDICINE

## 2019-10-18 NOTE — PROGRESS NOTES
Infectious Diseases Progress Note    Patient:  Jessica Alvarenga  YOB: 1960  MRN: [id number]   Admit date: 9/6/2019   Admitting Physician: Joseph Dawkins MD  Primary Care Physician: Ponce Orozco MD    Chief Complaint/Interval History: She is without new symptoms.  She indicates that she is going to [place] for plastic surgery/wound care appointment on Thursday of next week.  She indicates she would like to go home following that appointment.  She indicates she has support between a friend/neighbor and home health to be able to manage at home.  She is aware that there are concerns that wound management and care at home will be difficult and she may need skilled nursing facility.  She wants to try at home.  Nursing staff and discharge planning has put in a lot of effort to try and make sure things are in place for her at home.  She would like to transition to a general diet as opposed to a low potassium diet.  Reviewed with discharge planning-they reviewed what plans were in place for discharge.    No intake or output data in the 24 hours ending 10/18/19 0814  Allergies:   Allergies   Allergen Reactions   • Morphine Mental Status Change     Current Scheduled Medications:     bacitracin  Topical Q12H   baclofen 10 mg Oral TID   busPIRone 10 mg Oral TID With Meals   docusate sodium 200 mg Oral Daily   ferrous sulfate 325 mg Oral BID With Meals   folic acid 1 mg Oral Daily   gabapentin 300 mg Oral Nightly   meloxicam 15 mg Oral Daily   meropenem 1 g Intravenous Q8H   multivitamin w/minerals 1 tablet Oral Daily   pantoprazole 40 mg Oral BID AC   saccharomyces boulardii 250 mg Oral BID   sodium chloride 10 mL Intravenous Q12H   trolamine salicylate  Topical BID   vancomycin 750 mg Intravenous Q24H   vitamin C 1,000 mg Oral Daily   vitamin D 50,000 Units Oral Q7 Days   jennifer's amazing butt  Topical TID   jennifer's amazing butt  Topical TID   zinc gluconate 50 mg Oral Daily     Current PRN  "Medications:  •  acetaminophen **OR** acetaminophen  •  diphenhydrAMINE  •  gabapentin  •  ibuprofen  •  ondansetron **OR** ondansetron  •  sennosides-docusate sodium  •  sodium chloride  •  sodium chloride  •  jennifer's amazing butt  •  jennifer's amazing butt    Review of Systems see HPI     Vital Signs:  Ht 172.7 cm (68\")   Wt 79.1 kg (174 lb 4.8 oz)   BMI 26.50 kg/m²     Physical Exam  Vital signs - reviewed.  Line/IV (right arm PIC) site - No erythema, warmth, induration, or tenderness.  Bilateral ischio wound vacs in place    Lab Results:  CBC: Results from last 7 days   Lab Units 10/17/19  0659 10/14/19  0451   WBC 10*3/mm3 5.50 5.71   HEMOGLOBIN g/dL 8.6* 9.0*   HEMATOCRIT % 27.8* 29.1*   PLATELETS 10*3/mm3 258 297     BMP:  Results from last 7 days   Lab Units 10/17/19  0659 10/15/19  0509 10/14/19  0451 10/13/19  0718 10/12/19  0943   SODIUM mmol/L 144 141 142 143 144   POTASSIUM mmol/L 4.7 5.2 5.4* 5.8* 6.1*   CHLORIDE mmol/L 106 104 105 106 107   CO2 mmol/L 28.0 27.0 27.0 25.0 26.0   BUN mg/dL 30* 25* 25* 27* 28*   CREATININE mg/dL 0.56* 0.74 0.67 0.57 0.74   GLUCOSE mg/dL 102* 109* 116* 105* 100*   CALCIUM mg/dL 9.2 8.8 8.9 9.1 9.3     Radiology: None  Additional Studies Reviewed: None    Impression:   1.  Bilateral ischial decubitus ulcers  2.  Pelvic osteomyelitis  3.  Diverting colostomy  4.  Urostomy  5.  Paraplegia    Recommendations:   We will discontinue Vanco mycin and meropenem after the morning doses on October 23, 2019  Discontinue PICC line after completion of her antibiotic treatment  Okay with me for discharge for outpatient wound care/plastic surgery appointment in [place] on October 24, 2019  Would make sure she has arrangements for follow-up at a local wound care center after discharge (either [facility] or wound care center close to where she lives)  Will see again next week  Please call in the interim if new problems or additional questions for infectious diseases    Mendoza " KAYLEY Lyon MD

## 2019-10-18 NOTE — PROGRESS NOTES
[facility]  CONNOR Rivera M.D.  MAREN Hernandez APRN      Internal Medicine Progress Note    10/18/2019   2:28 PM    Name:  Jessica Alvarenga  MRN:    [id number]     Acct:     [id number]   Room:  [address] [place] [address] Day: 0     Admit Date: 9/6/2019  5:34 PM  PCP: Ponce Orozco MD    Subjective:     C/C: wound care and antibiotic therapy    Interval History: Status: Improved. Resting in bed. No family at bedside. Less shoulder pain and increased range of motion today. Tolerating treatment thus far. Discharge planning underway.     Review of Systems   Constitution: Positive for weakness. Negative for decreased appetite and malaise/fatigue.   HENT: Negative for congestion, ear pain, hoarse voice, nosebleeds and sore throat.    Eyes: Negative for blurred vision, double vision and pain.   Cardiovascular: Negative for chest pain, dyspnea on exertion, leg swelling and near-syncope.   Respiratory: Negative for cough, shortness of breath and sputum production.    Endocrine: Negative for cold intolerance.   Hematologic/Lymphatic: Negative for adenopathy.   Skin: Positive for poor wound healing. Negative for dry skin, itching and rash.   Musculoskeletal: Positive for joint pain and muscle weakness. Negative for back pain and joint swelling.   Gastrointestinal: Negative for abdominal pain, constipation, diarrhea and vomiting.        Lower abdomen tenderness   Genitourinary: Negative for flank pain and hematuria.   Neurological: Positive for paresthesias (paraplegia ). Negative for difficulty with concentration, dizziness, focal weakness, headaches and seizures.   Psychiatric/Behavioral: Negative for altered mental status and depression. The patient does not have insomnia and is not nervous/anxious.        Medications:     Allergies:   Allergies   Allergen Reactions   • Morphine Mental Status Change       Current Meds:   Current Facility-Administered Medications:   •   acetaminophen (TYLENOL) tablet 650 mg, 650 mg, Oral, Q4H PRN **OR** acetaminophen (TYLENOL) suppository 650 mg, 650 mg, Rectal, Q4H PRN, Joseph Dawkins MD  •  bacitracin 500 UNIT/GM ointment, , Topical, Q12H, Lisa Emerson, APRN  •  baclofen (LIORESAL) tablet 10 mg, 10 mg, Oral, TID, Joseph Dawkins MD  •  busPIRone (BUSPAR) tablet 10 mg, 10 mg, Oral, TID With Meals, Joseph Dawkins MD  •  diphenhydrAMINE (BENADRYL) capsule 25 mg, 25 mg, Oral, Q6H PRN, Joseph Dawkins MD  •  docusate sodium (COLACE) capsule 200 mg, 200 mg, Oral, Daily, Brenda Christiansen, APRN  •  ferrous sulfate tablet 325 mg, 325 mg, Oral, BID With Meals, Joseph Dawkins MD  •  folic acid (FOLVITE) tablet 1 mg, 1 mg, Oral, Daily, Joseph Dawkins MD  •  gabapentin (NEURONTIN) capsule 100 mg, 100 mg, Oral, Daily PRN, Joseph Dawkins MD  •  gabapentin (NEURONTIN) capsule 300 mg, 300 mg, Oral, Nightly, Maricruz Bruner, MAREN  •  ibuprofen (ADVIL,MOTRIN) tablet 800 mg, 800 mg, Oral, BID PRN, Brenda Christiansen, APRN  •  meloxicam (MOBIC) tablet 15 mg, 15 mg, Oral, Daily, oJseph Dawkins MD  •  meropenem (MERREM) 1 g/100 mL 0.9% NS VTB (mbp), 1 g, Intravenous, Q8H, Mendoza Herrera MD  •  multivitamin w/minerals tablet 1 tablet, 1 tablet, Oral, Daily, Joseph Dawkins MD  •  ondansetron (ZOFRAN) tablet 4 mg, 4 mg, Oral, Q6H PRN **OR** ondansetron (ZOFRAN) injection 4 mg, 4 mg, Intravenous, Q6H PRN, Joseph Dawkins MD  •  pantoprazole (PROTONIX) EC tablet 40 mg, 40 mg, Oral, BID AC, Maricruz Bruner, MAREN  •  saccharomyces boulardii (FLORASTOR) capsule 250 mg, 250 mg, Oral, BID, Joseph Dawkins MD  •  sennosides-docusate sodium (SENOKOT-S) 8.6-50 MG tablet 1 tablet, 1 tablet, Oral, BID PRN, Joseph Dawkins MD  •  sodium chloride 0.9 % flush 10 mL, 10 mL, Intravenous, Q12H, Joseph Dawkins MD  •  sodium chloride 0.9 % flush 10 mL, 10 mL, Intravenous, PRN,  "Joseph Dawkins MD  •  sodium chloride 0.9 % flush 20 mL, 20 mL, Intravenous, PRN, Joseph Dawkins MD  •  trolamine salicylate (ASPERCREME) 10 % cream, , Topical, BID, Joseph Dawkins MD  •  vancomycin 750 mg/250 mL 0.9% NS IVPB ([facility]), 750 mg, Intravenous, Q24H, Mendoza Herrera MD  •  vitamin C (ASCORBIC ACID) tablet 1,000 mg, 1,000 mg, Oral, Daily, Joseph Dawkins MD  •  vitamin D (ERGOCALCIFEROL) capsule 50,000 Units, 50,000 Units, Oral, Q7 Days, Joseph Dawkins MD  •  Salina's amazing butt cream, , Topical, TID, Lisa Emerson, APRN  •  Salina's amazing butt cream, , Topical, PRN, Lisa Emerson, APRN  •  Salina's amazing butt cream, , Topical, TID, Lisa Emerson, APRN  •  Salina's amazing butt cream, , Topical, PRN, Lisa Emerson, APRN  •  zinc gluconate tablet 50 mg, 50 mg, Oral, Daily, Joseph Dawkins MD    Data:     Code Status:    There are no questions and answers to display.       No family history on file.    Social History     Socioeconomic History   • Marital status: Unknown     Spouse name: Not on file   • Number of children: Not on file   • Years of education: Not on file   • Highest education level: Not on file       Vitals:  Ht 172.7 cm (68\")   Wt 79.1 kg (174 lb 4.8 oz)   BMI 26.50 kg/m²     T 97.6 P 72 R 16 /60 Sp02 100% (room air)      I/O (24Hr):  No intake or output data in the 24 hours ending 10/18/19 1428    Labs and imaging:      Lab Results (last 24 hours)     ** No results found for the last 24 hours. **            Xr Shoulder 2+ View Left    Result Date: 10/17/2019  Narrative: EXAMINATION:  XR SHOULDER 2+ VW LEFT-  10/17/2019 11:18 AM CDT  HISTORY: pain  COMPARISON: No comparison study.  TECHNIQUE: 3 views: AP imaging in internal and external rotation. Scapular Y-view.  FINDINGS:  The acromioclavicular and glenohumeral joints are maintained without fracture.  Mild AC joint arthropathy observed mild bony proliferation and " spurring.  There are no focal blastic or lytic lesions or periostitis.      Impression: 1. No acute osseous abnormality. This report was finalized on 10/17/2019 11:47 by Dr. Rahul Childs MD.      Physical Examination:        Physical Exam   Constitutional: She is oriented to person, place, and time. She appears well-developed and well-nourished. No distress.   HENT:   Head: Normocephalic and atraumatic.   Right Ear: External ear normal.   Left Ear: External ear normal.   Nose: Nose normal.   Mouth/Throat: Oropharynx is clear and moist.   Eyes: Conjunctivae and EOM are normal. Pupils are equal, round, and reactive to light.   Neck: Normal range of motion. Neck supple.   Cardiovascular: Normal rate, regular rhythm, normal heart sounds and intact distal pulses.   Pulmonary/Chest: Effort normal and breath sounds normal.   Abdominal: Soft. Bowel sounds are normal.   urostomy appliance intact  Colostomy appliance intact     Genitourinary:   Genitourinary Comments: Urostomy  Colostomy   Musculoskeletal: Normal range of motion.   Paraplegia      Neurological: She is alert and oriented to person, place, and time.   paraplegia   Skin: Skin is warm and dry.   Wound vac intact  Dressing c.d.i bilateral feet     Psychiatric: She has a normal mood and affect. Her behavior is normal. Judgment and thought content normal.   Anxious at times    Nursing note and vitals reviewed.        Assessment:             * No active hospital problems. *    No past medical history on file.     Plan:        1. Stage 4 Pressure wound Right ischium  2. Stage 3 pressure wound right heel  3. Pressure wound left heel  4. Chronic osteomyelitis with MRSA positive drainage to multiple sites  5. Gastrointestinal hemorrhage  6. Paraplegia, long term from previous Spinal tumor  7. Anemia due to blood loss  8. Severe protein calorie malnutrition, tolerating tube feedings and oral intake  9. Insomnia  10. Anxiety  11. Iron deficiency Anemia  12. Left shoulder  strain     Continue current treatment. Labs Monday. Monitor counts. Encourage adequate intake. Wound care per wound care team. Encourage activity. Monitor potassium closely. Continue antibiotics under direction of ID. Continue anti-inflammatories. Discharge planning.   Electronically signed by MAREN Jackson on 10/18/2019 at 2:28 PM

## 2019-10-19 PROCEDURE — 25010000002 VANCOMYCIN: Performed by: INTERNAL MEDICINE

## 2019-10-19 PROCEDURE — 63710000001 DIPHENHYDRAMINE PER 50 MG: Performed by: INTERNAL MEDICINE

## 2019-10-19 PROCEDURE — 25010000002 MEROPENEM: Performed by: INTERNAL MEDICINE

## 2019-10-19 PROCEDURE — 97530 THERAPEUTIC ACTIVITIES: CPT

## 2019-10-19 RX ORDER — GABAPENTIN 100 MG/1
100 CAPSULE ORAL 2 TIMES DAILY
Status: DISCONTINUED | OUTPATIENT
Start: 2019-10-19 | End: 2019-10-24 | Stop reason: HOSPADM

## 2019-10-19 NOTE — PROGRESS NOTES
[facility]  CONNOR Rivera M.D.  MAREN Hernandez APRN      Internal Medicine Progress Note    10/19/2019   8:45 AM    Name:  Jessica Alvarenga  MRN:    [id number]     Acct:     [id number]   Room:  [address] [place] [address] Day: 0     Admit Date: 9/6/2019  5:34 PM  PCP: Ponce Orozco MD    Subjective:     C/C: wound care and antibiotic therapy    Interval History: Status: Improved. Resting in bed. No family at bedside.  Reports shoulder pain has significantly improved with Mobic.  Continues to complain of nerve pain around ostomy site, reports no significant effect from Neurontin. Tolerating treatment thus far. Discharge planning underway.  Potassium 4.7 on 10/17/2019.    Review of Systems   Constitution: Positive for weakness. Negative for decreased appetite and malaise/fatigue.   HENT: Negative for congestion, ear pain, hoarse voice, nosebleeds and sore throat.    Eyes: Negative for blurred vision, double vision and pain.   Cardiovascular: Negative for chest pain, dyspnea on exertion, leg swelling and near-syncope.   Respiratory: Negative for cough, shortness of breath and sputum production.    Endocrine: Negative for cold intolerance.   Hematologic/Lymphatic: Negative for adenopathy.   Skin: Positive for poor wound healing. Negative for dry skin, itching and rash.   Musculoskeletal: Positive for joint pain and muscle weakness. Negative for back pain and joint swelling.   Gastrointestinal: Negative for abdominal pain, constipation, diarrhea and vomiting.        Lower abdomen tenderness   Genitourinary: Negative for flank pain and hematuria.   Neurological: Positive for paresthesias (paraplegia ). Negative for difficulty with concentration, dizziness, focal weakness, headaches and seizures.   Psychiatric/Behavioral: Negative for altered mental status and depression. The patient does not have insomnia and is not nervous/anxious.        Medications:     Allergies:    Allergies   Allergen Reactions   • Morphine Mental Status Change       Current Meds:   Current Facility-Administered Medications:   •  acetaminophen (TYLENOL) tablet 650 mg, 650 mg, Oral, Q4H PRN **OR** acetaminophen (TYLENOL) suppository 650 mg, 650 mg, Rectal, Q4H PRN, Joseph Dawkins MD  •  bacitracin 500 UNIT/GM ointment, , Topical, Q12H, Lisa Emerson, APRN  •  baclofen (LIORESAL) tablet 10 mg, 10 mg, Oral, TID, Joseph Dawkins MD  •  busPIRone (BUSPAR) tablet 10 mg, 10 mg, Oral, TID With Meals, Joseph Dawkins MD  •  diphenhydrAMINE (BENADRYL) capsule 25 mg, 25 mg, Oral, Q6H PRN, Joseph Dawkins MD  •  docusate sodium (COLACE) capsule 200 mg, 200 mg, Oral, Daily, Brenda Christiansen, APRN  •  ferrous sulfate tablet 325 mg, 325 mg, Oral, BID With Meals, Joseph Dawkins MD  •  folic acid (FOLVITE) tablet 1 mg, 1 mg, Oral, Daily, Joseph Dawkins MD  •  gabapentin (NEURONTIN) capsule 100 mg, 100 mg, Oral, Daily PRN, Joseph Dawkins MD  •  gabapentin (NEURONTIN) capsule 300 mg, 300 mg, Oral, Nightly, Maricruz Bruner, APRN  •  ibuprofen (ADVIL,MOTRIN) tablet 800 mg, 800 mg, Oral, BID PRN, Brenda Christiansen, APRN  •  meloxicam (MOBIC) tablet 15 mg, 15 mg, Oral, Daily, Joseph Dawkins MD  •  meropenem (MERREM) 1 g/100 mL 0.9% NS VTB (mbp), 1 g, Intravenous, Q8H, Mendoza Herrera MD  •  multivitamin w/minerals tablet 1 tablet, 1 tablet, Oral, Daily, Joseph Dawkins MD  •  ondansetron (ZOFRAN) tablet 4 mg, 4 mg, Oral, Q6H PRN **OR** ondansetron (ZOFRAN) injection 4 mg, 4 mg, Intravenous, Q6H PRN, Joseph Dawkins MD  •  pantoprazole (PROTONIX) EC tablet 40 mg, 40 mg, Oral, BID AC, Maricruz Bruner APRN  •  saccharomyces boulardii (FLORASTOR) capsule 250 mg, 250 mg, Oral, BID, Joseph Dawkins MD  •  sennosides-docusate sodium (SENOKOT-S) 8.6-50 MG tablet 1 tablet, 1 tablet, Oral, BID PRN, Joseph Dawkins MD  •  sodium chloride  "0.9 % flush 10 mL, 10 mL, Intravenous, Q12H, Joseph Dawkins MD  •  sodium chloride 0.9 % flush 10 mL, 10 mL, Intravenous, PRN, Joseph Dawkins MD  •  sodium chloride 0.9 % flush 20 mL, 20 mL, Intravenous, PRN, Joseph Dawkins MD  •  trolamine salicylate (ASPERCREME) 10 % cream, , Topical, BID, Joseph Dawkins MD  •  vancomycin 750 mg/250 mL 0.9% NS IVPB ([facility]), 750 mg, Intravenous, Q24H, Mendoza Herrera MD  •  vitamin C (ASCORBIC ACID) tablet 1,000 mg, 1,000 mg, Oral, Daily, Joseph Dawkins MD  •  vitamin D (ERGOCALCIFEROL) capsule 50,000 Units, 50,000 Units, Oral, Q7 Days, Joseph Dawkins MD  •  Salina's amazing butt cream, , Topical, TID, Lisa Emerson, APRN  •  Salina's amazing butt cream, , Topical, PRN, Lisa Emerson, APRN  •  Salina's amazing butt cream, , Topical, TID, Lisa Emerson, APRN  •  Salina's amazing butt cream, , Topical, PRN, Lisa Emerson, APRN  •  zinc gluconate tablet 50 mg, 50 mg, Oral, Daily, Joseph Dawkins MD    Data:     Code Status:    There are no questions and answers to display.       No family history on file.    Social History     Socioeconomic History   • Marital status: Unknown     Spouse name: Not on file   • Number of children: Not on file   • Years of education: Not on file   • Highest education level: Not on file       Vitals:  Ht 172.7 cm (68\")   Wt 79.1 kg (174 lb 4.8 oz)   BMI 26.50 kg/m²     T 97.6 P 67 R 18 /55 Sp02 100% (room air)      I/O (24Hr):  No intake or output data in the 24 hours ending 10/19/19 0845    Labs and imaging:      Lab Results (last 24 hours)     Procedure Component Value Units Date/Time    Vancomycin, Trough Please draw trough approximately 30-60 minutes prior to tonight's 2200 Vanc dose [[id number]]  (Normal) Collected:  10/18/19 2115    Specimen:  Blood Updated:  10/18/19 2143     Vancomycin Trough 17.60 mcg/mL             Xr Shoulder 2+ View Left    Result Date: " 10/17/2019  Narrative: EXAMINATION:  XR SHOULDER 2+ VW LEFT-  10/17/2019 11:18 AM CDT  HISTORY: pain  COMPARISON: No comparison study.  TECHNIQUE: 3 views: AP imaging in internal and external rotation. Scapular Y-view.  FINDINGS:  The acromioclavicular and glenohumeral joints are maintained without fracture.  Mild AC joint arthropathy observed mild bony proliferation and spurring.  There are no focal blastic or lytic lesions or periostitis.      Impression: 1. No acute osseous abnormality. This report was finalized on 10/17/2019 11:47 by Dr. Rahul Childs MD.      Physical Examination:        Physical Exam   Constitutional: She is oriented to person, place, and time. She appears well-developed and well-nourished. No distress.   HENT:   Head: Normocephalic and atraumatic.   Right Ear: External ear normal.   Left Ear: External ear normal.   Nose: Nose normal.   Mouth/Throat: Oropharynx is clear and moist.   Eyes: Conjunctivae and EOM are normal. Pupils are equal, round, and reactive to light.   Neck: Normal range of motion. Neck supple.   Cardiovascular: Normal rate, regular rhythm, normal heart sounds and intact distal pulses.   Pulmonary/Chest: Effort normal and breath sounds normal.   Abdominal: Soft. Bowel sounds are normal.   urostomy appliance intact  Colostomy appliance intact     Genitourinary:   Genitourinary Comments: Urostomy  Colostomy   Musculoskeletal: Normal range of motion.   Paraplegia      Neurological: She is alert and oriented to person, place, and time.   paraplegia   Skin: Skin is warm and dry.   Wound vac intact  Dressing c.d.i bilateral feet     Psychiatric: She has a normal mood and affect. Her behavior is normal. Judgment and thought content normal.   Anxious at times    Nursing note and vitals reviewed.        Assessment:             * No active hospital problems. *    No past medical history on file.     Plan:        1. Stage 4 Pressure wound Right ischium  2. Stage 3 pressure wound right  heel  3. Pressure wound left heel  4. Chronic osteomyelitis with MRSA positive drainage to multiple sites  5. Gastrointestinal hemorrhage  6. Paraplegia, long term from previous Spinal tumor  7. Anemia due to blood loss  8. Severe protein calorie malnutrition, tolerating tube feedings and oral intake  9. Insomnia  10. Anxiety  11. Iron deficiency Anemia  12. Left shoulder strain     Continue current treatment. Labs Monday. Monitor counts. Encourage adequate intake. Wound care per wound care team. Encourage activity. Monitor potassium closely. Continue antibiotics under direction of ID. Continue anti-inflammatories.  Change gabapentin to 100 mg at 9 AM and 1300 scheduled and continue 300 mg dose at 2100.  Discharge planning.     Electronically signed by MAREN Gonzalez on 10/19/2019 at 8:45 AM     I have discussed the care of Jessica Alvarenga, including pertinent history and exam findings, with the nurse practitioner.    I have seen and examined the patient and the key elements of all parts of the encounter have been performed by me.  I agree with the assessment, plan and orders as documented by MAREN Sosa, after I modified the exam findings and the plan of treatments and the final version is my approved version of the assessment.        Electronically signed by Joseph Dawkins MD on 10/19/2019 at 10:57 AM

## 2019-10-19 NOTE — PROGRESS NOTES
"Pharmacy Dosing Service  Antimicrobial  Vancomycin    Assessment/Action/Plan:  Current order: Vancomycin 750 mg IVPB every 24 hours  Current end date: 10/23/19    10/18/19 vancomycin trough returned within therapeutic range (= 17.6 mcg/mL @21:15). No dosage adjustment needed at this time.    Pharmacy will continue to follow daily.     Subjective:  Jessica Alvarenga is currently receiving Vancomcyin for the treatment of osteomyelitis, day #30. 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    Past Medical / Surgical / Social History reviewed.     Objective:  59 y.o. female Ht: 172.7 cm (68\"); Wt: 79.1 kg (174 lb 4.8 oz) Body mass index is 26.5 kg/m².  Estimated Creatinine Clearance: <60 mL/min (A) (by C-G formula based on SCr of 0.56 mg/dL (L)).    Lab Results   Component Value Date    BUN 30 (H) 10/17/2019    BUN 25 (H) 10/15/2019    BUN 25 (H) 10/14/2019    BUN 33 (H) 09/06/2019    BUN 30 (H) 09/05/2019    BUN 26 (H) 09/04/2019      Lab Results   Component Value Date    CREATININE 0.56 (L) 10/17/2019    CREATININE 0.74 10/15/2019    CREATININE 0.67 10/14/2019    CREATININE 0.6 09/06/2019    CREATININE 0.6 09/05/2019    CREATININE 0.7 09/04/2019      Lab Results   Component Value Date    WBC 5.50 10/17/2019    WBC 5.71 10/14/2019    WBC 5.90 10/10/2019        Lab Results   Component Value Date    VANCOTROUGH 17.60 10/18/2019    VANCORANDOM 17.20 09/26/2019         Culture Results:  Microbiology Results (last 10 days)     ** No results found for the last 240 hours. **          Grayson Vargas, PharmD  10/19/19 11:23 AM    "

## 2019-10-20 PROCEDURE — 25010000002 MEROPENEM: Performed by: INTERNAL MEDICINE

## 2019-10-20 PROCEDURE — 63710000001 DIPHENHYDRAMINE PER 50 MG: Performed by: INTERNAL MEDICINE

## 2019-10-20 PROCEDURE — 25010000002 VANCOMYCIN: Performed by: INTERNAL MEDICINE

## 2019-10-20 PROCEDURE — 97530 THERAPEUTIC ACTIVITIES: CPT

## 2019-10-20 NOTE — PROGRESS NOTES
[facility]  CONNOR Rivera M.D.  MAREN Hernandez APRN      Internal Medicine Progress Note    10/20/2019   9:57 AM    Name:  Jessica Alvarenga  MRN:    [id number]     Acct:     [id number]   Room:  [address] [place] [address] Day: 0     Admit Date: 9/6/2019  5:34 PM  PCP: Ponce Orozco MD    Subjective:     C/C: wound care and antibiotic therapy    Interval History: Status: Improved. Resting in bed. No family at bedside. Continues to complain of nerve pain around ostomy site, Neurontin seen changed on 10/19/2019. Tolerating treatment thus far. Discharge planning underway. Potassium 4.7 on 10/17/2019.    Review of Systems   Constitution: Positive for weakness. Negative for decreased appetite and malaise/fatigue.   HENT: Negative for congestion, ear pain, hoarse voice, nosebleeds and sore throat.    Eyes: Negative for blurred vision, double vision and pain.   Cardiovascular: Negative for chest pain, dyspnea on exertion, leg swelling and near-syncope.   Respiratory: Negative for cough, shortness of breath and sputum production.    Endocrine: Negative for cold intolerance.   Hematologic/Lymphatic: Negative for adenopathy.   Skin: Positive for poor wound healing. Negative for dry skin, itching and rash.   Musculoskeletal: Positive for joint pain and muscle weakness. Negative for back pain and joint swelling.   Gastrointestinal: Negative for abdominal pain, constipation, diarrhea and vomiting.        Lower abdomen tenderness   Genitourinary: Negative for flank pain and hematuria.   Neurological: Positive for paresthesias (paraplegia ). Negative for difficulty with concentration, dizziness, focal weakness, headaches and seizures.   Psychiatric/Behavioral: Negative for altered mental status and depression. The patient does not have insomnia and is not nervous/anxious.        Medications:     Allergies:   Allergies   Allergen Reactions   • Morphine Mental Status Change       Current  Meds:   Current Facility-Administered Medications:   •  acetaminophen (TYLENOL) tablet 650 mg, 650 mg, Oral, Q4H PRN **OR** acetaminophen (TYLENOL) suppository 650 mg, 650 mg, Rectal, Q4H PRN, Joseph Dawkins MD  •  bacitracin 500 UNIT/GM ointment, , Topical, Q12H, Lisa Emerson, APRN  •  baclofen (LIORESAL) tablet 10 mg, 10 mg, Oral, TID, Joseph Dawkins MD  •  busPIRone (BUSPAR) tablet 10 mg, 10 mg, Oral, TID With Meals, Joseph Dawkins MD  •  diphenhydrAMINE (BENADRYL) capsule 25 mg, 25 mg, Oral, Q6H PRN, Joseph Dawkins MD  •  docusate sodium (COLACE) capsule 200 mg, 200 mg, Oral, Daily, Brenda Christiansen, APRN  •  ferrous sulfate tablet 325 mg, 325 mg, Oral, BID With Meals, Joseph Dawkins MD  •  folic acid (FOLVITE) tablet 1 mg, 1 mg, Oral, Daily, Joseph Dawkins MD  •  gabapentin (NEURONTIN) capsule 100 mg, 100 mg, Oral, BID, Joseph Dawkins MD  •  gabapentin (NEURONTIN) capsule 300 mg, 300 mg, Oral, Nightly, Maricruz Bruner, MAREN  •  ibuprofen (ADVIL,MOTRIN) tablet 800 mg, 800 mg, Oral, BID PRN, Brenda Christiansen, APRN  •  meloxicam (MOBIC) tablet 15 mg, 15 mg, Oral, Daily, Joseph Dawkins MD  •  meropenem (MERREM) 1 g/100 mL 0.9% NS VTB (mbp), 1 g, Intravenous, Q8H, Mendoza Herrera MD  •  multivitamin w/minerals tablet 1 tablet, 1 tablet, Oral, Daily, Joseph Dawkins MD  •  ondansetron (ZOFRAN) tablet 4 mg, 4 mg, Oral, Q6H PRN **OR** ondansetron (ZOFRAN) injection 4 mg, 4 mg, Intravenous, Q6H PRN, Joseph Dawkins MD  •  pantoprazole (PROTONIX) EC tablet 40 mg, 40 mg, Oral, BID AC, Maricruz Bruner APRN  •  saccharomyces boulardii (FLORASTOR) capsule 250 mg, 250 mg, Oral, BID, Joseph Dawkins MD  •  sennosides-docusate sodium (SENOKOT-S) 8.6-50 MG tablet 1 tablet, 1 tablet, Oral, BID PRN, Joseph Dawkins MD  •  sodium chloride 0.9 % flush 10 mL, 10 mL, Intravenous, Q12H, Joseph Dawkins MD  •  sodium  "chloride 0.9 % flush 10 mL, 10 mL, Intravenous, PRN, Joseph Dawkins MD  •  sodium chloride 0.9 % flush 20 mL, 20 mL, Intravenous, PRN, Joseph Dawkins MD  •  trolamine salicylate (ASPERCREME) 10 % cream, , Topical, BID, Joseph Dawkins MD  •  vancomycin 750 mg/250 mL 0.9% NS IVPB ([facility]), 750 mg, Intravenous, Q24H, Mendoza Herrera MD  •  vitamin C (ASCORBIC ACID) tablet 1,000 mg, 1,000 mg, Oral, Daily, Joseph Dawkins MD  •  vitamin D (ERGOCALCIFEROL) capsule 50,000 Units, 50,000 Units, Oral, Q7 Days, Joseph Dawkins MD  •  Salina's amazing butt cream, , Topical, TID, Lisa Emerson L, APRN  •  Salina's amazing butt cream, , Topical, PRN, Lisa Emerson L, APRN  •  Salina's amazing butt cream, , Topical, TID, Lisa Emerson L, APRN  •  Salina's amazing butt cream, , Topical, PRN, Lisa Emerson L, APRN  •  zinc gluconate tablet 50 mg, 50 mg, Oral, Daily, Joseph Dawkins MD    Data:     Code Status:    There are no questions and answers to display.       No family history on file.    Social History     Socioeconomic History   • Marital status: Unknown     Spouse name: Not on file   • Number of children: Not on file   • Years of education: Not on file   • Highest education level: Not on file       Vitals:  Ht 172.7 cm (68\")   Wt 79.1 kg (174 lb 4.8 oz)   BMI 26.50 kg/m²     T 98.2 P 78 R 18 /52 Sp02 97% (room air)      I/O (24Hr):  No intake or output data in the 24 hours ending 10/20/19 0957    Labs and imaging:      Lab Results (last 24 hours)     ** No results found for the last 24 hours. **            Xr Shoulder 2+ View Left    Result Date: 10/17/2019  Narrative: EXAMINATION:  XR SHOULDER 2+ VW LEFT-  10/17/2019 11:18 AM CDT  HISTORY: pain  COMPARISON: No comparison study.  TECHNIQUE: 3 views: AP imaging in internal and external rotation. Scapular Y-view.  FINDINGS:  The acromioclavicular and glenohumeral joints are maintained without fracture.  Mild AC " joint arthropathy observed mild bony proliferation and spurring.  There are no focal blastic or lytic lesions or periostitis.      Impression: 1. No acute osseous abnormality. This report was finalized on 10/17/2019 11:47 by Dr. Rahul Childs MD.      Physical Examination:        Physical Exam   Constitutional: She is oriented to person, place, and time. She appears well-developed and well-nourished. No distress.   HENT:   Head: Normocephalic and atraumatic.   Right Ear: External ear normal.   Left Ear: External ear normal.   Nose: Nose normal.   Mouth/Throat: Oropharynx is clear and moist.   Eyes: Conjunctivae and EOM are normal. Pupils are equal, round, and reactive to light.   Neck: Normal range of motion. Neck supple.   Cardiovascular: Normal rate, regular rhythm, normal heart sounds and intact distal pulses.   Pulmonary/Chest: Effort normal and breath sounds normal.   Abdominal: Soft. Bowel sounds are normal.   urostomy appliance intact  Colostomy appliance intact     Genitourinary:   Genitourinary Comments: Urostomy  Colostomy   Musculoskeletal: Normal range of motion.   Paraplegia      Neurological: She is alert and oriented to person, place, and time.   paraplegia   Skin: Skin is warm and dry.   Wound vac intact  Dressing c.d.i bilateral feet     Psychiatric: She has a normal mood and affect. Her behavior is normal. Judgment and thought content normal.   Anxious at times    Nursing note and vitals reviewed.        Assessment:             * No active hospital problems. *    No past medical history on file.     Plan:        1. Stage 4 Pressure wound Right ischium  2. Stage 3 pressure wound right heel  3. Pressure wound left heel  4. Chronic osteomyelitis with MRSA positive drainage to multiple sites  5. Gastrointestinal hemorrhage  6. Paraplegia, long term from previous Spinal tumor  7. Anemia due to blood loss  8. Severe protein calorie malnutrition, tolerating tube feedings and oral  intake  9. Insomnia  10. Anxiety  11. Iron deficiency Anemia  12. Left shoulder strain     Continue current treatment. Labs Monday. Monitor counts. Encourage adequate intake. Wound care per wound care team. Encourage activity. Monitor potassium closely. Continue antibiotics under direction of ID. Continue anti-inflammatories. Discharge planning.     Electronically signed by MAREN Gonzalez on 10/20/2019 at 9:57 AM   I have discussed the care of Jessica Alvarenga, including pertinent history and exam findings, with the nurse practitioner.    I have seen and examined the patient and the key elements of all parts of the encounter have been performed by me.  I agree with the assessment, plan and orders as documented by MAREN Sosa, after I modified the exam findings and the plan of treatments and the final version is my approved version of the assessment.        Electronically signed by Joseph Dawkins MD on 10/20/2019 at 11:01 PM

## 2019-10-21 LAB
ANION GAP SERPL CALCULATED.3IONS-SCNC: 9 MMOL/L (ref 5–15)
BASOPHILS # BLD AUTO: 0.06 10*3/MM3 (ref 0–0.2)
BASOPHILS NFR BLD AUTO: 1.3 % (ref 0–1.5)
BUN BLD-MCNC: 35 MG/DL (ref 6–20)
BUN/CREAT SERPL: 36.1 (ref 7–25)
CALCIUM SPEC-SCNC: 9 MG/DL (ref 8.6–10.5)
CHLORIDE SERPL-SCNC: 112 MMOL/L (ref 98–107)
CO2 SERPL-SCNC: 27 MMOL/L (ref 22–29)
CREAT BLD-MCNC: 0.97 MG/DL (ref 0.57–1)
CRP SERPL-MCNC: 1.52 MG/DL (ref 0–0.5)
DEPRECATED RDW RBC AUTO: 45.4 FL (ref 37–54)
EOSINOPHIL # BLD AUTO: 0.49 10*3/MM3 (ref 0–0.4)
EOSINOPHIL NFR BLD AUTO: 10.7 % (ref 0.3–6.2)
ERYTHROCYTE [DISTWIDTH] IN BLOOD BY AUTOMATED COUNT: 14.5 % (ref 12.3–15.4)
ERYTHROCYTE [SEDIMENTATION RATE] IN BLOOD: 43 MM/HR (ref 0–20)
GFR SERPL CREATININE-BSD FRML MDRD: 59 ML/MIN/1.73
GLUCOSE BLD-MCNC: 107 MG/DL (ref 65–99)
HCT VFR BLD AUTO: 27 % (ref 34–46.6)
HGB BLD-MCNC: 8.5 G/DL (ref 12–15.9)
IMM GRANULOCYTES # BLD AUTO: 0.02 10*3/MM3 (ref 0–0.05)
IMM GRANULOCYTES NFR BLD AUTO: 0.4 % (ref 0–0.5)
LYMPHOCYTES # BLD AUTO: 1.54 10*3/MM3 (ref 0.7–3.1)
LYMPHOCYTES NFR BLD AUTO: 33.6 % (ref 19.6–45.3)
MCH RBC QN AUTO: 27.3 PG (ref 26.6–33)
MCHC RBC AUTO-ENTMCNC: 31.5 G/DL (ref 31.5–35.7)
MCV RBC AUTO: 86.8 FL (ref 79–97)
MONOCYTES # BLD AUTO: 0.37 10*3/MM3 (ref 0.1–0.9)
MONOCYTES NFR BLD AUTO: 8.1 % (ref 5–12)
NEUTROPHILS # BLD AUTO: 2.11 10*3/MM3 (ref 1.7–7)
NEUTROPHILS NFR BLD AUTO: 45.9 % (ref 42.7–76)
NRBC BLD AUTO-RTO: 0 /100 WBC (ref 0–0.2)
PLATELET # BLD AUTO: 258 10*3/MM3 (ref 140–450)
PMV BLD AUTO: 9.6 FL (ref 6–12)
POTASSIUM BLD-SCNC: 5.4 MMOL/L (ref 3.5–5.2)
RBC # BLD AUTO: 3.11 10*6/MM3 (ref 3.77–5.28)
SODIUM BLD-SCNC: 148 MMOL/L (ref 136–145)
WBC NRBC COR # BLD: 4.59 10*3/MM3 (ref 3.4–10.8)

## 2019-10-21 PROCEDURE — 25010000002 MEROPENEM: Performed by: INTERNAL MEDICINE

## 2019-10-21 PROCEDURE — 86140 C-REACTIVE PROTEIN: CPT | Performed by: INTERNAL MEDICINE

## 2019-10-21 PROCEDURE — 80048 BASIC METABOLIC PNL TOTAL CA: CPT | Performed by: INTERNAL MEDICINE

## 2019-10-21 PROCEDURE — 97605 NEG PRS WND THER DME<=50SQCM: CPT

## 2019-10-21 PROCEDURE — 97168 OT RE-EVAL EST PLAN CARE: CPT

## 2019-10-21 PROCEDURE — 85651 RBC SED RATE NONAUTOMATED: CPT | Performed by: INTERNAL MEDICINE

## 2019-10-21 PROCEDURE — 97530 THERAPEUTIC ACTIVITIES: CPT

## 2019-10-21 PROCEDURE — 63710000001 DIPHENHYDRAMINE PER 50 MG: Performed by: INTERNAL MEDICINE

## 2019-10-21 PROCEDURE — 85025 COMPLETE CBC W/AUTO DIFF WBC: CPT | Performed by: INTERNAL MEDICINE

## 2019-10-21 PROCEDURE — 25010000002 VANCOMYCIN: Performed by: INTERNAL MEDICINE

## 2019-10-21 RX ORDER — SODIUM POLYSTYRENE SULFONATE 15 G/60ML
15 SUSPENSION ORAL; RECTAL ONCE
Status: DISCONTINUED | OUTPATIENT
Start: 2019-10-21 | End: 2019-10-24 | Stop reason: HOSPADM

## 2019-10-21 NOTE — PROGRESS NOTES
[facility]  CONNOR Rivera M.D.  MAREN Hernandez APRN      Internal Medicine Progress Note    10/21/2019   11:00 AM    Name:  Jessica Alvarenga  MRN:    [id number]     Acct:     [id number]   Room:  [address] [place] [address] Day: 0     Admit Date: 9/6/2019  5:34 PM  PCP: Ponce Orozco MD    Subjective:     C/C: wound care and antibiotic therapy    Interval History: Status: Improved. Resting in bed. No family at bedside. Discharge planning underway. Hgb down 8.5, will monitor.  K+ 5.4, has been off Aldactone, Low potassium diet for several days.     Review of Systems   Constitution: Positive for weakness. Negative for decreased appetite and malaise/fatigue.   HENT: Negative for congestion, ear pain, hoarse voice, nosebleeds and sore throat.    Eyes: Negative for blurred vision, double vision and pain.   Cardiovascular: Negative for chest pain, dyspnea on exertion, leg swelling and near-syncope.   Respiratory: Negative for cough, shortness of breath and sputum production.    Endocrine: Negative for cold intolerance.   Hematologic/Lymphatic: Negative for adenopathy.   Skin: Positive for poor wound healing. Negative for dry skin, itching and rash.   Musculoskeletal: Positive for joint pain and muscle weakness. Negative for back pain and joint swelling.   Gastrointestinal: Negative for abdominal pain, constipation, diarrhea and vomiting.        Lower abdomen tenderness   Genitourinary: Negative for flank pain and hematuria.   Neurological: Positive for paresthesias (paraplegia ). Negative for difficulty with concentration, dizziness, focal weakness, headaches and seizures.   Psychiatric/Behavioral: Negative for altered mental status and depression. The patient does not have insomnia and is not nervous/anxious.        Medications:     Allergies:   Allergies   Allergen Reactions   • Morphine Mental Status Change       Current Meds:   Current Facility-Administered Medications:    •  acetaminophen (TYLENOL) tablet 650 mg, 650 mg, Oral, Q4H PRN **OR** acetaminophen (TYLENOL) suppository 650 mg, 650 mg, Rectal, Q4H PRN, Joseph Dawkins MD  •  bacitracin 500 UNIT/GM ointment, , Topical, Q12H, Lisa Emerson, APRN  •  baclofen (LIORESAL) tablet 10 mg, 10 mg, Oral, TID, Joseph Dawkins MD  •  busPIRone (BUSPAR) tablet 10 mg, 10 mg, Oral, TID With Meals, Joseph Dawkins MD  •  diphenhydrAMINE (BENADRYL) capsule 25 mg, 25 mg, Oral, Q6H PRN, Joseph Dawkins MD  •  docusate sodium (COLACE) capsule 200 mg, 200 mg, Oral, Daily, Brenda Christiansen, APRN  •  ferrous sulfate tablet 325 mg, 325 mg, Oral, BID With Meals, Joseph Dawkins MD  •  folic acid (FOLVITE) tablet 1 mg, 1 mg, Oral, Daily, Joseph Dawkins MD  •  gabapentin (NEURONTIN) capsule 100 mg, 100 mg, Oral, BID, Joseph Dawkins MD  •  gabapentin (NEURONTIN) capsule 300 mg, 300 mg, Oral, Nightly, Maricruz Bruner, MAREN  •  ibuprofen (ADVIL,MOTRIN) tablet 800 mg, 800 mg, Oral, BID PRN, Brenda Christiansen, APRN  •  meloxicam (MOBIC) tablet 15 mg, 15 mg, Oral, Daily, Joseph Dawkins MD  •  meropenem (MERREM) 1 g/100 mL 0.9% NS VTB (mbp), 1 g, Intravenous, Q8H, Mendoza Herrera MD  •  multivitamin w/minerals tablet 1 tablet, 1 tablet, Oral, Daily, Joseph Dawkins MD  •  ondansetron (ZOFRAN) tablet 4 mg, 4 mg, Oral, Q6H PRN **OR** ondansetron (ZOFRAN) injection 4 mg, 4 mg, Intravenous, Q6H PRN, Joseph Dawkins MD  •  pantoprazole (PROTONIX) EC tablet 40 mg, 40 mg, Oral, BID AC, Maricruz Bruner, MAREN  •  saccharomyces boulardii (FLORASTOR) capsule 250 mg, 250 mg, Oral, BID, Joseph Dawkins MD  •  sennosides-docusate sodium (SENOKOT-S) 8.6-50 MG tablet 1 tablet, 1 tablet, Oral, BID PRN, Joseph Dawkins MD  •  sodium chloride 0.9 % flush 10 mL, 10 mL, Intravenous, Q12H, Joseph Dawkins MD  •  sodium chloride 0.9 % flush 10 mL, 10 mL, Intravenous, PRN,  "Joseph Dawkins MD  •  sodium chloride 0.9 % flush 20 mL, 20 mL, Intravenous, PRN, Joseph Dawkins MD  •  trolamine salicylate (ASPERCREME) 10 % cream, , Topical, BID, Joseph Dawkins MD  •  vancomycin 750 mg/250 mL 0.9% NS IVPB ([facility]), 750 mg, Intravenous, Q24H, Mendoza Herrera MD  •  vitamin C (ASCORBIC ACID) tablet 1,000 mg, 1,000 mg, Oral, Daily, Joseph Dawkins MD  •  vitamin D (ERGOCALCIFEROL) capsule 50,000 Units, 50,000 Units, Oral, Q7 Days, Joseph Dawkins MD  •  Salina's amazing butt cream, , Topical, TID, Lisa Emerson, APRN  •  Salina's amazing butt cream, , Topical, PRN, Lisa Emerson, APRN  •  Salina's amazing butt cream, , Topical, TID, Lisa Emerson, APRN  •  Salina's amazing butt cream, , Topical, PRN, Lisa Emerson, APRN  •  zinc gluconate tablet 50 mg, 50 mg, Oral, Daily, Joseph Dawkins MD    Data:     Code Status:    There are no questions and answers to display.       No family history on file.    Social History     Socioeconomic History   • Marital status: Unknown     Spouse name: Not on file   • Number of children: Not on file   • Years of education: Not on file   • Highest education level: Not on file       Vitals:  Ht 172.7 cm (68\")   Wt 79.1 kg (174 lb 4.8 oz)   BMI 26.50 kg/m²     T 98.1 P 80 R 16 /60 Sp02 97% (room air)      I/O (24Hr):  No intake or output data in the 24 hours ending 10/21/19 1100    Labs and imaging:      Lab Results (last 24 hours)     Procedure Component Value Units Date/Time    Basic Metabolic Panel [[id number]]  (Abnormal) Collected:  10/21/19 0454    Specimen:  Blood Updated:  10/21/19 0533     Glucose 107 mg/dL      BUN 35 mg/dL      Creatinine 0.97 mg/dL      Sodium 148 mmol/L      Potassium 5.4 mmol/L      Chloride 112 mmol/L      CO2 27.0 mmol/L      Calcium 9.0 mg/dL      eGFR Non African Amer 59 mL/min/1.73      BUN/Creatinine Ratio 36.1     Anion Gap 9.0 mmol/L     Narrative:       GFR " Normal >60  Chronic Kidney Disease <60  Kidney Failure <15    Sedimentation Rate [[id number]]  (Abnormal) Collected:  10/21/19 0453    Specimen:  Blood Updated:  10/21/19 0551     Sed Rate 43 mm/hr     C-reactive Protein [[id number]]  (Abnormal) Collected:  10/21/19 0453    Specimen:  Blood Updated:  10/21/19 0544     C-Reactive Protein 1.52 mg/dL     CBC & Differential [[id number]] Collected:  10/21/19 0453    Specimen:  Blood Updated:  10/21/19 0527    Narrative:       The following orders were created for panel order CBC & Differential.  Procedure                               Abnormality         Status                     ---------                               -----------         ------                     CBC Auto Differential[[id number]]        Abnormal            Final result                 Please view results for these tests on the individual orders.    CBC Auto Differential [[id number]]  (Abnormal) Collected:  10/21/19 0453    Specimen:  Blood Updated:  10/21/19 0527     WBC 4.59 10*3/mm3      RBC 3.11 10*6/mm3      Hemoglobin 8.5 g/dL      Hematocrit 27.0 %      MCV 86.8 fL      MCH 27.3 pg      MCHC 31.5 g/dL      RDW 14.5 %      RDW-SD 45.4 fl      MPV 9.6 fL      Platelets 258 10*3/mm3      Neutrophil % 45.9 %      Lymphocyte % 33.6 %      Monocyte % 8.1 %      Eosinophil % 10.7 %      Basophil % 1.3 %      Immature Grans % 0.4 %      Neutrophils, Absolute 2.11 10*3/mm3      Lymphocytes, Absolute 1.54 10*3/mm3      Monocytes, Absolute 0.37 10*3/mm3      Eosinophils, Absolute 0.49 10*3/mm3      Basophils, Absolute 0.06 10*3/mm3      Immature Grans, Absolute 0.02 10*3/mm3      nRBC 0.0 /100 WBC             Xr Shoulder 2+ View Left    Result Date: 10/17/2019  Narrative: EXAMINATION:  XR SHOULDER 2+ VW LEFT-  10/17/2019 11:18 AM CDT  HISTORY: pain  COMPARISON: No comparison study.  TECHNIQUE: 3 views: AP imaging in internal and external rotation. Scapular Y-view.  FINDINGS:  The acromioclavicular and  glenohumeral joints are maintained without fracture.  Mild AC joint arthropathy observed mild bony proliferation and spurring.  There are no focal blastic or lytic lesions or periostitis.      Impression: 1. No acute osseous abnormality. This report was finalized on 10/17/2019 11:47 by Dr. Rahul Childs MD.      Physical Examination:        Physical Exam   Constitutional: She is oriented to person, place, and time. She appears well-developed and well-nourished. No distress.   HENT:   Head: Normocephalic and atraumatic.   Right Ear: External ear normal.   Left Ear: External ear normal.   Nose: Nose normal.   Mouth/Throat: Oropharynx is clear and moist.   Eyes: Conjunctivae and EOM are normal. Pupils are equal, round, and reactive to light.   Neck: Normal range of motion. Neck supple.   Cardiovascular: Normal rate, regular rhythm, normal heart sounds and intact distal pulses.   Pulmonary/Chest: Effort normal and breath sounds normal.   Abdominal: Soft. Bowel sounds are normal.   urostomy appliance intact  Colostomy appliance intact     Genitourinary:   Genitourinary Comments: Urostomy  Colostomy   Musculoskeletal: Normal range of motion.   Paraplegia      Neurological: She is alert and oriented to person, place, and time.   paraplegia   Skin: Skin is warm and dry.   Wound vac intact  Dressing c.d.i bilateral feet     Psychiatric: She has a normal mood and affect. Her behavior is normal. Judgment and thought content normal.   Anxious at times    Nursing note and vitals reviewed.        Assessment:             * No active hospital problems. *    No past medical history on file.     Plan:        1. Stage 4 Pressure wound Right ischium  2. Stage 3 pressure wound right heel  3. Pressure wound left heel  4. Chronic osteomyelitis with MRSA positive drainage to multiple sites  5. Gastrointestinal hemorrhage  6. Paraplegia, long term from previous Spinal tumor  7. Anemia due to blood loss  8. Severe protein calorie  malnutrition, tolerating tube feedings and oral intake  9. Insomnia  10. Anxiety  11. Iron deficiency Anemia  12. Left shoulder strain   13. Hyperkalemia    Continue current treatment. Labs Thursday. Monitor counts. Encourage adequate intake. Wound care per wound care team. Encourage activity. Monitor potassium closely. Continue antibiotics under direction of ID. Discharge planning. Kayexalate x 1 dose and low potassium diet initiated, had stopped due to request and improved levels, now remains elevated.     Electronically signed by MAREN Clinton on 10/21/2019 at 11:00 AM   I have discussed the care of Jessica Alvarenga, including pertinent history and exam findings, with the nurse practitioner.    I have seen and examined the patient and the key elements of all parts of the encounter have been performed by me.  I agree with the assessment, plan and orders as documented by MAREN Clinton, after I modified the exam findings and the plan of treatments and the final version is my approved version of the assessment.        Electronically signed by Joseph Dawkins MD on 10/21/2019 at 9:19 PM

## 2019-10-21 NOTE — PROGRESS NOTES
Adult Nutrition  Assessment/PES    Patient Name:  Jessica Alvarenga  YOB: 1960  MRN: [id number]  Admit Date:  9/6/2019    Assessment Date:  10/21/2019    Comments:  PO intake continues to be good, 100% of the last 5 documented meals. Labs reviewed, K remains elevated. MD ordered low K diet. Will continue to follow.     Reason for Assessment     Row Name 10/21/19 1350          Reason for Assessment    Reason For Assessment  follow-up protocol         Nutrition/Diet History     Row Name 10/21/19 1350          Nutrition/Diet History    Typical Food/Fluid Intake  pts K continues to be elevated. MD adjusted PO diet to Low K.          Anthropometrics     Row Name 10/21/19 1355          Usual Body Weight (UBW)    Weight Loss Time Frame  9# loss compared to admission wt        Body Mass Index (BMI)    BMI Assessment  BMI 25-29.9: overweight         Labs/Tests/Procedures/Meds     Row Name 10/21/19 6026          Labs/Procedures/Meds    Lab Results Reviewed  reviewed, pertinent     Lab Results Comments  Na; K; BUN; GFR 59; CRP        Diagnostic Tests/Procedures    Diagnostic Test/Procedure Reviewed  reviewed        Medications    Pertinent Medications Reviewed  reviewed, pertinent         Physical Findings     Row Name 10/21/19 1351          Physical Findings    Overall Physical Appearance  generalized wasting;loss of muscle mass;loss of subcutaneous fat     Gastrointestinal  colostomy urostomy     Skin  pressure injury;non-healing wound(s);other (see comments);edema roberto carlos 15           Nutrition Prescription Ordered     Row Name 10/21/19 1401          Nutrition Prescription PO    Current PO Diet  Regular     Supplement  Boost Plus (Ensure Enlive, Ensure Plus)     Supplement Frequency  Daily         Evaluation of Received Nutrient/Fluid Intake     Row Name 10/21/19 1402          Nutrient/Fluid Evaluation    Number of Days Evaluated  2 days     Additional Documentation  Fluid Intake Evaluation (Group)         Fluid Intake Evaluation    Oral Fluid (mL)  1620     IV Fluid (mL)  375        PO Evaluation    Number of Days PO Intake Evaluated  2 days     Number of Meals  5     % PO Intake  100               Problem/Interventions:  Problem 1     Row Name 10/21/19 1402          Nutrition Diagnoses Problem 1    Problem 1  Malnutrition     Etiology (related to)  Medical Diagnosis     Nutrition related  Increased nutrition needs     Gastrointestinal  Colostomy     Infectious Disease  MRSA;Other (comment) osteomyelitis     Neurological  Paraparesis;Spinal cord injury     Skin  Non healing wound;Pressure injury;Surgical wound     Signs/Symptoms (evidenced by)  Unintended Weight Change;% UBW;Report/Observation                 Intervention Goal     Row Name 10/21/19 1402          Intervention Goal    General  Maintain nutrition;Reduce/improve symptoms;Meet nutritional needs for age/condition;Improved nutrition related lab(s)     PO  Maintain intake;Continue positive trend;Meet estimated needs     Weight  Maintain weight         Nutrition Intervention     Row Name 10/21/19 1406          Nutrition Intervention    RD/Tech Action  Follow Tx progress;Care plan reviewd;Encourage intake           Education/Evaluation     Row Name 10/21/19 1409          Education    Education  No discharge needs identified at this time        Monitor/Evaluation    Monitor  Per protocol           Electronically signed by:  Amaris Dash  10/21/19 2:10 PM

## 2019-10-22 LAB
ANION GAP SERPL CALCULATED.3IONS-SCNC: 9 MMOL/L (ref 5–15)
BUN BLD-MCNC: 33 MG/DL (ref 6–20)
BUN/CREAT SERPL: 55.9 (ref 7–25)
CALCIUM SPEC-SCNC: 9.3 MG/DL (ref 8.6–10.5)
CHLORIDE SERPL-SCNC: 108 MMOL/L (ref 98–107)
CO2 SERPL-SCNC: 27 MMOL/L (ref 22–29)
CREAT BLD-MCNC: 0.59 MG/DL (ref 0.57–1)
GFR SERPL CREATININE-BSD FRML MDRD: 105 ML/MIN/1.73
GLUCOSE BLD-MCNC: 118 MG/DL (ref 65–99)
POTASSIUM BLD-SCNC: 4.8 MMOL/L (ref 3.5–5.2)
SODIUM BLD-SCNC: 144 MMOL/L (ref 136–145)

## 2019-10-22 PROCEDURE — 63710000001 DIPHENHYDRAMINE PER 50 MG: Performed by: INTERNAL MEDICINE

## 2019-10-22 PROCEDURE — 80048 BASIC METABOLIC PNL TOTAL CA: CPT | Performed by: INTERNAL MEDICINE

## 2019-10-22 PROCEDURE — 82088 ASSAY OF ALDOSTERONE: CPT | Performed by: INTERNAL MEDICINE

## 2019-10-22 PROCEDURE — 25010000002 MEROPENEM: Performed by: INTERNAL MEDICINE

## 2019-10-22 PROCEDURE — 97605 NEG PRS WND THER DME<=50SQCM: CPT

## 2019-10-22 PROCEDURE — 97535 SELF CARE MNGMENT TRAINING: CPT

## 2019-10-22 PROCEDURE — 25010000002 VANCOMYCIN: Performed by: INTERNAL MEDICINE

## 2019-10-22 NOTE — PROGRESS NOTES
[facility]  CONNOR Rivera M.D.  MAREN Hernandez APRN      Internal Medicine Progress Note    10/22/2019   12:15 PM    Name:  eJssica Alvarenga  MRN:    [id number]     Acct:     [id number]   Room:  [address] [place] [address] Day: 0     Admit Date: 9/6/2019  5:34 PM  PCP: Ponce Orozco MD    Subjective:     C/C: wound care and antibiotic therapy    Interval History: Status: Improved. Resting in bed. No family at bedside. Patient wanting diet liberated. Concerned about potassium level. Plans for discharge later this week with outpatient evaluation by wound care in [place].     Review of Systems   Constitution: Positive for weakness. Negative for decreased appetite and malaise/fatigue.   HENT: Negative for congestion, ear pain, hoarse voice, nosebleeds and sore throat.    Eyes: Negative for blurred vision, double vision and pain.   Cardiovascular: Negative for chest pain, dyspnea on exertion, leg swelling and near-syncope.   Respiratory: Negative for cough, shortness of breath and sputum production.    Endocrine: Negative for cold intolerance.   Hematologic/Lymphatic: Negative for adenopathy.   Skin: Positive for poor wound healing. Negative for dry skin, itching and rash.   Musculoskeletal: Positive for joint pain and muscle weakness. Negative for back pain and joint swelling.   Gastrointestinal: Negative for abdominal pain, constipation, diarrhea and vomiting.        Lower abdomen tenderness   Genitourinary: Negative for flank pain and hematuria.   Neurological: Positive for paresthesias (paraplegia ). Negative for difficulty with concentration, dizziness, focal weakness, headaches and seizures.   Psychiatric/Behavioral: Negative for altered mental status and depression. The patient does not have insomnia and is not nervous/anxious.        Medications:     Allergies:   Allergies   Allergen Reactions   • Morphine Mental Status Change       Current Meds:   Current  Facility-Administered Medications:   •  acetaminophen (TYLENOL) tablet 650 mg, 650 mg, Oral, Q4H PRN **OR** acetaminophen (TYLENOL) suppository 650 mg, 650 mg, Rectal, Q4H PRN, Joseph Dawkins MD  •  bacitracin 500 UNIT/GM ointment, , Topical, Q12H, Lisa Emerson, APRN  •  baclofen (LIORESAL) tablet 10 mg, 10 mg, Oral, TID, Joseph Dawkins MD  •  busPIRone (BUSPAR) tablet 10 mg, 10 mg, Oral, TID With Meals, Joseph Dawkins MD  •  diphenhydrAMINE (BENADRYL) capsule 25 mg, 25 mg, Oral, Q6H PRN, Joseph Dawkins MD  •  docusate sodium (COLACE) capsule 200 mg, 200 mg, Oral, Daily, Brenda Christiansen, APRN  •  ferrous sulfate tablet 325 mg, 325 mg, Oral, BID With Meals, Joseph Dawkins MD  •  folic acid (FOLVITE) tablet 1 mg, 1 mg, Oral, Daily, Joseph Dawkins MD  •  gabapentin (NEURONTIN) capsule 100 mg, 100 mg, Oral, BID, Joseph Dawkins MD  •  gabapentin (NEURONTIN) capsule 300 mg, 300 mg, Oral, Nightly, Maricruz Bruner, APRSIL  •  ibuprofen (ADVIL,MOTRIN) tablet 800 mg, 800 mg, Oral, BID PRN, Brenda Christiansen, APRN  •  meloxicam (MOBIC) tablet 15 mg, 15 mg, Oral, Daily, Joseph Dawkins MD  •  meropenem (MERREM) 1 g/100 mL 0.9% NS VTB (mbp), 1 g, Intravenous, Q8H, Mendoza Herrera MD  •  multivitamin w/minerals tablet 1 tablet, 1 tablet, Oral, Daily, Joseph Dawkins MD  •  ondansetron (ZOFRAN) tablet 4 mg, 4 mg, Oral, Q6H PRN **OR** ondansetron (ZOFRAN) injection 4 mg, 4 mg, Intravenous, Q6H PRN, Joseph Dawkins MD  •  pantoprazole (PROTONIX) EC tablet 40 mg, 40 mg, Oral, BID AC, Maricurz Bruner, MAREN  •  saccharomyces boulardii (FLORASTOR) capsule 250 mg, 250 mg, Oral, BID, Joseph Dawkins MD  •  sennosides-docusate sodium (SENOKOT-S) 8.6-50 MG tablet 1 tablet, 1 tablet, Oral, BID PRN, Joseph Dawkins MD  •  sodium chloride 0.9 % flush 10 mL, 10 mL, Intravenous, Q12H, Joseph Dawkins MD  •  sodium chloride 0.9 % flush  "10 mL, 10 mL, Intravenous, PRN, Joseph Dawkins MD  •  sodium chloride 0.9 % flush 20 mL, 20 mL, Intravenous, PRN, Joseph Dawkins MD  •  sodium polystyrene (KAYEXALATE) 15 GM/60ML suspension 15 g, 15 g, Oral, Once, Brenda Christiansen APRN  •  trolamine salicylate (ASPERCREME) 10 % cream, , Topical, BID, Joseph Dawkins MD  •  vancomycin 750 mg/250 mL 0.9% NS IVPB ([facility]), 750 mg, Intravenous, Q24H, Mendoza Herrera MD  •  vitamin C (ASCORBIC ACID) tablet 1,000 mg, 1,000 mg, Oral, Daily, Joseph Dawkins MD  •  vitamin D (ERGOCALCIFEROL) capsule 50,000 Units, 50,000 Units, Oral, Q7 Days, Joseph Dawkins MD  •  Salina's amazing butt cream, , Topical, TID, Lisa Emerson, APRN  •  Salina's amazing butt cream, , Topical, PRN, Lisa Emerson, APRN  •  Salina's amazing butt cream, , Topical, TID, Lisa Emerson, APRN  •  Salina's amazing butt cream, , Topical, PRN, Lisa Emerson, APRN  •  zinc gluconate tablet 50 mg, 50 mg, Oral, Daily, Joseph Dawkins MD    Data:     Code Status:    There are no questions and answers to display.       No family history on file.    Social History     Socioeconomic History   • Marital status: Unknown     Spouse name: Not on file   • Number of children: Not on file   • Years of education: Not on file   • Highest education level: Not on file       Vitals:  Ht 172.7 cm (68\")   Wt 79.1 kg (174 lb 4.8 oz)   BMI 26.50 kg/m²     T 97.9 P 74 R 16 /54 Sp02 98% (room air)      I/O (24Hr):  No intake or output data in the 24 hours ending 10/22/19 1215    Labs and imaging:      Lab Results (last 24 hours)     Procedure Component Value Units Date/Time    Aldosterone [[id number]] Collected:  10/22/19 0644    Specimen:  Blood Updated:  10/22/19 0702            Xr Shoulder 2+ View Left    Result Date: 10/17/2019  Narrative: EXAMINATION:  XR SHOULDER 2+ VW LEFT-  10/17/2019 11:18 AM CDT  HISTORY: pain  COMPARISON: No comparison study.  " TECHNIQUE: 3 views: AP imaging in internal and external rotation. Scapular Y-view.  FINDINGS:  The acromioclavicular and glenohumeral joints are maintained without fracture.  Mild AC joint arthropathy observed mild bony proliferation and spurring.  There are no focal blastic or lytic lesions or periostitis.      Impression: 1. No acute osseous abnormality. This report was finalized on 10/17/2019 11:47 by Dr. Rahul Childs MD.      Physical Examination:        Physical Exam   Constitutional: She is oriented to person, place, and time. She appears well-developed and well-nourished. No distress.   HENT:   Head: Normocephalic and atraumatic.   Right Ear: External ear normal.   Left Ear: External ear normal.   Nose: Nose normal.   Mouth/Throat: Oropharynx is clear and moist.   Eyes: Conjunctivae and EOM are normal. Pupils are equal, round, and reactive to light.   Neck: Normal range of motion. Neck supple.   Cardiovascular: Normal rate, regular rhythm, normal heart sounds and intact distal pulses.   Pulmonary/Chest: Effort normal and breath sounds normal.   Abdominal: Soft. Bowel sounds are normal.   urostomy appliance intact  Colostomy appliance intact     Genitourinary:   Genitourinary Comments: Urostomy  Colostomy   Musculoskeletal: Normal range of motion.   Paraplegia      Neurological: She is alert and oriented to person, place, and time.   paraplegia   Skin: Skin is warm and dry.   Wound vac intact  Dressing c.d.i bilateral feet     Psychiatric: She has a normal mood and affect. Her behavior is normal. Judgment and thought content normal.   Anxious at times    Nursing note and vitals reviewed.        Assessment:             * No active hospital problems. *    No past medical history on file.     Plan:        1. Stage 4 Pressure wound Right ischium  2. Stage 3 pressure wound right heel  3. Pressure wound left heel  4. Chronic osteomyelitis with MRSA positive drainage to multiple sites  5. Gastrointestinal  hemorrhage  6. Paraplegia, long term from previous Spinal tumor  7. Anemia due to blood loss  8. Severe protein calorie malnutrition, tolerating tube feedings and oral intake  9. Insomnia  10. Anxiety  11. Iron deficiency Anemia  12. Left shoulder strain   13. Hyperkalemia    Continue current treatment. Labs Thursday. Monitor counts. Encourage adequate intake. Wound care per wound care team. Encourage activity. Monitor potassium closely. Continue antibiotics under direction of ID. Discharge planning.     Electronically signed by MAREN Jackson on 10/22/2019 at 12:15 PM

## 2019-10-23 PROCEDURE — 25010000002 VANCOMYCIN: Performed by: INTERNAL MEDICINE

## 2019-10-23 PROCEDURE — 97530 THERAPEUTIC ACTIVITIES: CPT

## 2019-10-23 PROCEDURE — 63710000001 DIPHENHYDRAMINE PER 50 MG: Performed by: INTERNAL MEDICINE

## 2019-10-23 PROCEDURE — 25010000002 MEROPENEM: Performed by: INTERNAL MEDICINE

## 2019-10-23 NOTE — PROGRESS NOTES
[facility]  CONNOR Rivera M.D.  MAREN Hernandez APRN      Internal Medicine Progress Note    10/23/2019   11:57 AM    Name:  Jessica Alvarenga  MRN:    [id number]     Acct:     [id number]   Room:  [address] [place] [address] Day: 0     Admit Date: 9/6/2019  5:34 PM  PCP: Ponce Orozco MD    Subjective:     C/C: wound care and antibiotic therapy    Interval History: Status: Improved. Resting in bed. No family at bedside. Patient wanting diet liberated.  Plans for discharge tomorrow with outpatient evaluation by wound care in [place].     Review of Systems   Constitution: Positive for weakness. Negative for decreased appetite and malaise/fatigue.   HENT: Negative for congestion, ear pain, hoarse voice, nosebleeds and sore throat.    Eyes: Negative for blurred vision, double vision and pain.   Cardiovascular: Negative for chest pain, dyspnea on exertion, leg swelling and near-syncope.   Respiratory: Negative for cough, shortness of breath and sputum production.    Endocrine: Negative for cold intolerance.   Hematologic/Lymphatic: Negative for adenopathy.   Skin: Positive for poor wound healing. Negative for dry skin, itching and rash.   Musculoskeletal: Positive for joint pain and muscle weakness. Negative for back pain and joint swelling.   Gastrointestinal: Negative for abdominal pain, constipation, diarrhea and vomiting.        Lower abdomen tenderness   Genitourinary: Negative for flank pain and hematuria.   Neurological: Positive for paresthesias (paraplegia ). Negative for difficulty with concentration, dizziness, focal weakness, headaches and seizures.   Psychiatric/Behavioral: Negative for altered mental status and depression. The patient does not have insomnia and is not nervous/anxious.        Medications:     Allergies:   Allergies   Allergen Reactions   • Morphine Mental Status Change       Current Meds:   Current Facility-Administered Medications:   •   acetaminophen (TYLENOL) tablet 650 mg, 650 mg, Oral, Q4H PRN **OR** acetaminophen (TYLENOL) suppository 650 mg, 650 mg, Rectal, Q4H PRN, Joseph Dawkins MD  •  baclofen (LIORESAL) tablet 10 mg, 10 mg, Oral, TID, Joseph Dawkins MD  •  busPIRone (BUSPAR) tablet 10 mg, 10 mg, Oral, TID With Meals, Joseph Dawkins MD  •  diphenhydrAMINE (BENADRYL) capsule 25 mg, 25 mg, Oral, Q6H PRN, Joseph Dawkins MD  •  docusate sodium (COLACE) capsule 200 mg, 200 mg, Oral, Daily, Brenda Christiansen, MAREN  •  ferrous sulfate tablet 325 mg, 325 mg, Oral, BID With Meals, Joseph Dwakins MD  •  folic acid (FOLVITE) tablet 1 mg, 1 mg, Oral, Daily, Joseph Dawkisn MD  •  gabapentin (NEURONTIN) capsule 100 mg, 100 mg, Oral, BID, Joseph Dawkins MD  •  gabapentin (NEURONTIN) capsule 300 mg, 300 mg, Oral, Nightly, Maricruz Bruner, APRN  •  ibuprofen (ADVIL,MOTRIN) tablet 800 mg, 800 mg, Oral, BID PRN, Brenda Christiansen, MAREN  •  meloxicam (MOBIC) tablet 15 mg, 15 mg, Oral, Daily, Joseph Dawkins MD  •  meropenem (MERREM) 1 g/100 mL 0.9% NS VTB (mbp), 1 g, Intravenous, Q8H, Mendoza Herrera MD  •  multivitamin w/minerals tablet 1 tablet, 1 tablet, Oral, Daily, Joseph Dawkins MD  •  ondansetron (ZOFRAN) tablet 4 mg, 4 mg, Oral, Q6H PRN **OR** ondansetron (ZOFRAN) injection 4 mg, 4 mg, Intravenous, Q6H PRN, Joseph Dawkins MD  •  pantoprazole (PROTONIX) EC tablet 40 mg, 40 mg, Oral, BID AC, Maricruz Bruner, APRN  •  saccharomyces boulardii (FLORASTOR) capsule 250 mg, 250 mg, Oral, BID, Joseph Dawkins MD  •  sennosides-docusate sodium (SENOKOT-S) 8.6-50 MG tablet 1 tablet, 1 tablet, Oral, BID PRN, Joseph Dawkins MD  •  sodium chloride 0.9 % flush 10 mL, 10 mL, Intravenous, Q12H, Joseph Dawkins MD  •  sodium chloride 0.9 % flush 10 mL, 10 mL, Intravenous, PRN, Joseph Dawkins MD  •  sodium chloride 0.9 % flush 20 mL, 20 mL, Intravenous,  "PRN, Joseph Dawkins MD  •  sodium polystyrene (KAYEXALATE) 15 GM/60ML suspension 15 g, 15 g, Oral, Once, Brenda Christiansen APRN  •  trolamine salicylate (ASPERCREME) 10 % cream, , Topical, BID, Joseph Dawkins MD  •  vancomycin 750 mg/250 mL 0.9% NS IVPB ([facility]), 750 mg, Intravenous, Q24H, Mendoza Herrera MD  •  vitamin C (ASCORBIC ACID) tablet 1,000 mg, 1,000 mg, Oral, Daily, Joseph Dawkins MD  •  vitamin D (ERGOCALCIFEROL) capsule 50,000 Units, 50,000 Units, Oral, Q7 Days, Joseph Dawkins MD  •  Salina's amazing butt cream, , Topical, TID, Lisa Emerson, APRSIL  •  Salina's amazing butt cream, , Topical, PRN, Lisa Emerson, APRN  •  Salina's amazing butt cream, , Topical, TID, Lisa Emerson, APRN  •  Salina's amazing butt cream, , Topical, PRN, Lisa Emerson, APRN  •  zinc gluconate tablet 50 mg, 50 mg, Oral, Daily, Joseph Dawkins MD    Data:     Code Status:    There are no questions and answers to display.       No family history on file.    Social History     Socioeconomic History   • Marital status: Unknown     Spouse name: Not on file   • Number of children: Not on file   • Years of education: Not on file   • Highest education level: Not on file       Vitals:  Ht 172.7 cm (68\")   Wt 79.1 kg (174 lb 4.8 oz)   BMI 26.50 kg/m²     T 97.8 P 81 R 16 /62 Sp02 100% (room air)      I/O (24Hr):  No intake or output data in the 24 hours ending 10/23/19 1157    Labs and imaging:      Lab Results (last 24 hours)     Procedure Component Value Units Date/Time    Basic Metabolic Panel [[id number]]  (Abnormal) Collected:  10/22/19 2231    Specimen:  Blood Updated:  10/22/19 2253     Glucose 118 mg/dL      BUN 33 mg/dL      Creatinine 0.59 mg/dL      Sodium 144 mmol/L      Potassium 4.8 mmol/L      Chloride 108 mmol/L      CO2 27.0 mmol/L      Calcium 9.3 mg/dL      eGFR Non African Amer 105 mL/min/1.73      BUN/Creatinine Ratio 55.9     Anion Gap 9.0 mmol/L     " Narrative:       GFR Normal >60  Chronic Kidney Disease <60  Kidney Failure <15            Xr Shoulder 2+ View Left    Result Date: 10/17/2019  Narrative: EXAMINATION:  XR SHOULDER 2+ VW LEFT-  10/17/2019 11:18 AM CDT  HISTORY: pain  COMPARISON: No comparison study.  TECHNIQUE: 3 views: AP imaging in internal and external rotation. Scapular Y-view.  FINDINGS:  The acromioclavicular and glenohumeral joints are maintained without fracture.  Mild AC joint arthropathy observed mild bony proliferation and spurring.  There are no focal blastic or lytic lesions or periostitis.      Impression: 1. No acute osseous abnormality. This report was finalized on 10/17/2019 11:47 by Dr. Rahul Childs MD.      Physical Examination:        Physical Exam   Constitutional: She is oriented to person, place, and time. She appears well-developed and well-nourished. No distress.   HENT:   Head: Normocephalic and atraumatic.   Right Ear: External ear normal.   Left Ear: External ear normal.   Nose: Nose normal.   Mouth/Throat: Oropharynx is clear and moist.   Eyes: Conjunctivae and EOM are normal. Pupils are equal, round, and reactive to light.   Neck: Normal range of motion. Neck supple.   Cardiovascular: Normal rate, regular rhythm, normal heart sounds and intact distal pulses.   Pulmonary/Chest: Effort normal and breath sounds normal.   Abdominal: Soft. Bowel sounds are normal.   urostomy appliance intact  Colostomy appliance intact     Genitourinary:   Genitourinary Comments: Urostomy  Colostomy   Musculoskeletal: Normal range of motion.   Paraplegia      Neurological: She is alert and oriented to person, place, and time.   paraplegia   Skin: Skin is warm and dry.   Wound vac intact  Dressing c.d.i bilateral feet     Psychiatric: She has a normal mood and affect. Her behavior is normal. Judgment and thought content normal.   Anxious at times    Nursing note and vitals reviewed.        Assessment:             * No active hospital  problems. *    No past medical history on file.     Plan:        1. Stage 4 Pressure wound Right ischium  2. Stage 3 pressure wound right heel  3. Pressure wound left heel  4. Chronic osteomyelitis with MRSA positive drainage to multiple sites  5. Gastrointestinal hemorrhage  6. Paraplegia, long term from previous Spinal tumor  7. Anemia due to blood loss  8. Severe protein calorie malnutrition, tolerating tube feedings and oral intake  9. Insomnia  10. Anxiety  11. Iron deficiency Anemia  12. Left shoulder strain   13. Hyperkalemia    Continue current treatment. Labs in am. Monitor counts. Encourage adequate intake. Wound care per wound care team. Encourage activity. Monitor potassium closely. Continue antibiotics under direction of ID. Liberate diet. Discharge planning.     Electronically signed by MAREN Jackson on 10/23/2019 at 11:57 AM   I have discussed the care of Jessica Alvarenga, including pertinent history and exam findings, with the nurse practitioner.    I have seen and examined the patient and the key elements of all parts of the encounter have been performed by me.  I agree with the assessment, plan and orders as documented by MAREN Hernandez, after I modified the exam findings and the plan of treatments and the final version is my approved version of the assessment.        Electronically signed by Joseph Dawkins MD on 10/23/2019 at 8:27 PM

## 2019-10-23 NOTE — PROGRESS NOTES
Infectious Diseases Progress Note    Patient:  Jessica Alvarenga  YOB: 1960  MRN: [id number]   Admit date: 9/6/2019   Admitting Physician: Joseph Dawkins MD  Primary Care Physician: Ponce Orozco MD    Chief Complaint/Interval History: She is in good spirits this evening.  She is planning on discharge tomorrow morning.  She is going with family to an outpatient appointment at [facility].  She will then return home.  She feels ready for discharge.  She is excited to get home.  She is planning to follow-up with her primary physician in [place] Dr. Ponce Orozco.  She is also planning follow-up with wound care in [place].  In addition she will be following up with plastic surgery at [facility].  She is very pleased with her progress since she has been here.  She reports no change in stool habits, rash, or skin itching.  No cardiopulmonary complaints.  She reports good oral intake.  She has had greater than 6 weeks of broad antibiotic treatment to try and address pelvic osteomyelitis/chronic ischial wounds.      No intake or output data in the 24 hours ending 10/23/19 1806  Allergies:   Allergies   Allergen Reactions   • Morphine Mental Status Change     Current Scheduled Medications:     baclofen 10 mg Oral TID   busPIRone 10 mg Oral TID With Meals   docusate sodium 200 mg Oral Daily   ferrous sulfate 325 mg Oral BID With Meals   folic acid 1 mg Oral Daily   gabapentin 100 mg Oral BID   gabapentin 300 mg Oral Nightly   meloxicam 15 mg Oral Daily   meropenem 1 g Intravenous Q8H   multivitamin w/minerals 1 tablet Oral Daily   pantoprazole 40 mg Oral BID AC   saccharomyces boulardii 250 mg Oral BID   sodium chloride 10 mL Intravenous Q12H   sodium polystyrene 15 g Oral Once   trolamine salicylate  Topical BID   vancomycin 750 mg Intravenous Q24H   vitamin C 1,000 mg Oral Daily   vitamin D 50,000 Units Oral Q7 Days   jennifer's amazing butt  Topical TID   jennifer's amazing butt  Topical  "TID   zinc gluconate 50 mg Oral Daily     Current PRN Medications:  •  acetaminophen **OR** acetaminophen  •  diphenhydrAMINE  •  ibuprofen  •  ondansetron **OR** ondansetron  •  senna-docusate sodium  •  sodium chloride  •  sodium chloride  •  jennifer's amazing butt  •  jennifer's amazing butt    Review of Systems see HPI    Vital Signs:  Ht 172.7 cm (68\")   Wt 79.1 kg (174 lb 4.8 oz)   BMI 26.50 kg/m²     Physical Exam  Vital signs - reviewed.  Line/IV (right arm PICC line) site - No erythema, warmth, induration, or tenderness.  Abdomen soft and nontender  Heart without murmur  Lungs clear without crackles  Colostomy pink and functioning  Skin without rash  Pictures from October 21, 2019 wound VAC change reviewed.  There is granulation in the wound.  There is no purulence.  No surrounding erythema.  They appear to be improving.    Lab Results:  CBC: Results from last 7 days   Lab Units 10/21/19  0453 10/17/19  0659   WBC 10*3/mm3 4.59 5.50   HEMOGLOBIN g/dL 8.5* 8.6*   HEMATOCRIT % 27.0* 27.8*   PLATELETS 10*3/mm3 258 258     BMP:  Results from last 7 days   Lab Units 10/22/19  2231 10/21/19  0453 10/17/19  0659   SODIUM mmol/L 144 148* 144   POTASSIUM mmol/L 4.8 5.4* 4.7   CHLORIDE mmol/L 108* 112* 106   CO2 mmol/L 27.0 27.0 28.0   BUN mg/dL 33* 35* 30*   CREATININE mg/dL 0.59 0.97 0.56*   GLUCOSE mg/dL 118* 107* 102*   CALCIUM mg/dL 9.3 9.0 9.2     Culture Results: None  Radiology: None  Additional Studies Reviewed: None    Impression:   1.  Bilateral ischial decubitus ulcers-improving  2.  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  She is tolerated treatment well.  No signs of PICC line or antibiotic related to problem.  3.  Diverting colostomy-functioning well  4.  Previous urostomy  5.  Paraplegia    Recommendations:   She appears stable for discharge home tomorrow  Feel she is reached maximum benefit " with her intravenous antibiotic therapy  Agree with follow-up with plastic surgery at [facility].  They can assess her candidacy for any debridement/flap closure.  She seems to respond well to wound VAC treatment.  Hopefully she will continue to do so.  She will follow-up with her primary physician, [facility] and [facility]  I would be happy to reassess if any worsening or recurrent concerns for infection  She otherwise plans to follow-up with me as needed  Okay with me for discharge home if released by others tomorrow    Mendoza Lyon MD

## 2019-10-24 LAB
ANION GAP SERPL CALCULATED.3IONS-SCNC: 9 MMOL/L (ref 5–15)
BUN BLD-MCNC: 29 MG/DL (ref 6–20)
BUN/CREAT SERPL: 43.9 (ref 7–25)
CALCIUM SPEC-SCNC: 8.9 MG/DL (ref 8.6–10.5)
CHLORIDE SERPL-SCNC: 108 MMOL/L (ref 98–107)
CO2 SERPL-SCNC: 27 MMOL/L (ref 22–29)
CREAT BLD-MCNC: 0.66 MG/DL (ref 0.57–1)
CRP SERPL-MCNC: 1.49 MG/DL (ref 0–0.5)
DEPRECATED RDW RBC AUTO: 44.2 FL (ref 37–54)
ERYTHROCYTE [DISTWIDTH] IN BLOOD BY AUTOMATED COUNT: 14.4 % (ref 12.3–15.4)
ERYTHROCYTE [SEDIMENTATION RATE] IN BLOOD: 34 MM/HR (ref 0–20)
GFR SERPL CREATININE-BSD FRML MDRD: 92 ML/MIN/1.73
GLUCOSE BLD-MCNC: 97 MG/DL (ref 65–99)
HCT VFR BLD AUTO: 27.6 % (ref 34–46.6)
HGB BLD-MCNC: 8.7 G/DL (ref 12–15.9)
MCH RBC QN AUTO: 26.7 PG (ref 26.6–33)
MCHC RBC AUTO-ENTMCNC: 31.5 G/DL (ref 31.5–35.7)
MCV RBC AUTO: 84.7 FL (ref 79–97)
PLATELET # BLD AUTO: 256 10*3/MM3 (ref 140–450)
PMV BLD AUTO: 10 FL (ref 6–12)
POTASSIUM BLD-SCNC: 4.9 MMOL/L (ref 3.5–5.2)
RBC # BLD AUTO: 3.26 10*6/MM3 (ref 3.77–5.28)
SODIUM BLD-SCNC: 144 MMOL/L (ref 136–145)
WBC NRBC COR # BLD: 4.6 10*3/MM3 (ref 3.4–10.8)

## 2019-10-24 PROCEDURE — 86140 C-REACTIVE PROTEIN: CPT | Performed by: NURSE PRACTITIONER

## 2019-10-24 PROCEDURE — 97530 THERAPEUTIC ACTIVITIES: CPT

## 2019-10-24 PROCEDURE — 85651 RBC SED RATE NONAUTOMATED: CPT | Performed by: NURSE PRACTITIONER

## 2019-10-24 PROCEDURE — 85027 COMPLETE CBC AUTOMATED: CPT | Performed by: NURSE PRACTITIONER

## 2019-10-24 PROCEDURE — 80048 BASIC METABOLIC PNL TOTAL CA: CPT | Performed by: NURSE PRACTITIONER

## 2019-10-24 PROCEDURE — 63710000001 DIPHENHYDRAMINE PER 50 MG: Performed by: INTERNAL MEDICINE

## 2019-10-25 NOTE — DISCHARGE SUMMARY
[facility]  Avel Dawkins M.D.  CHRISTOPHER Dawkins M.D.  MAREN Hernandez APRN    Internal Medicine Discharge Summary    Patient ID: Jessica Alvarenga  MRN: [id number]     Acct:  [id number]       Patient's PCP: Ponce Orozco MD    Admit Date: 9/6/2019     Discharge Date: 10/24/2019      Admitting Physician: Joseph Dawkins MD    Discharge Physician: MAREN Jackson     Active Discharge Diagnoses:  1. Stage 4 Pressure wound Right ischium  2. Stage 3 pressure wound right heel  3. Pressure wound left heel  4. Chronic osteomyelitis with MRSA positive drainage to multiple sites  5. Gastrointestinal hemorrhage  6. Paraplegia, long term from previous Spinal tumor  7. Anemia due to blood loss  8. Severe protein calorie malnutrition, tolerating tube feedings and oral intake  9. Insomnia  10. Anxiety  11. Iron deficiency Anemia  12. Left shoulder strain   13. Hyperkalemia    Hospital Problems    * No active hospital problems. *   No past medical history on file.    The patient was seen and examined on the day of discharge and this discharge summary is in conjunction with any daily progress note from day of discharge.    Code Status:    There are no questions and answers to display.       Hospital Course: Jessica Alvarenga is a  59 y.o.  female who presented with multiple wounds; the largest to sacral area, and bilateral feet, needing continued IV antibiotic therapy.  Patient was initially admitted to [facility] after arriving to ER with nausea and vomiting with decreased appetite/po intake for approximately 2 months status/postcystectomy and nephrostomy. Patient had suffered reported 40 pound weight loss. She was seen in consultation by GI and ID due to anemia and chronic osteomyelitis and multiple pressure ulcers. Patient underwent colonoscopy with diverting colostomy with little difficulty. She required transfusion for worsening anemia. Post-procedure, she  developed respiratory distress likely due to oversedation vs allergic reaction to morphine. Patient received AMONs per NGT. Due to her need for continued wound care, nutrition support antibiotics and rehabilitation efforts, she transferred to our facility.  On intake, her hemoglobin was found to be 6.7 and she required 2 units PRBC. She began tolerating po diet without difficulty and was transitioned to strictly oral diet and NGT removed. She underwent wound care under the direction of Dr. Mathis and the wound care team. She underwent several bedside debridements. There were issues with ostomy appliance and ostomy nurses were consulted for input with improvement in adherence and surrounding skin condition following their assessment and recommendations. The patient had issues with intermittent nausea and vomiting as well as intermittent itching without rash. These were treated. The patient developed hyperkalemia. Aldactone was decreased and then discontinued and she was treated with kayexalate. She was placed on a low potassium diet with normalization of potassium. At her request, diet was liberated with subsequent increase in potassium level. She was encouraged to adhere to a potassium moderate diet and would recommend follow up per PCP. Patient may require sodium bicarbonate supplements if hyperkalemia continues without a contributing medication or decline in renal function. The patient stabilized and was felt appropriate for discharge. She declined SNF placement and opted to return to home with home health. An outpatient appointment was scheduled with [facility] on day of discharge.     Consults:   (ID)  Dr. Mathis (wound care)    Disposition: home health     Physical Exam   Constitutional: She is oriented to person, place, and time. She appears well-developed and well-nourished. No distress.   HENT:   Head: Normocephalic and atraumatic.   Right Ear: External ear normal.   Left Ear: External ear  normal.   Nose: Nose normal.   Mouth/Throat: Oropharynx is clear and moist.   Eyes: Conjunctivae and EOM are normal. Pupils are equal, round, and reactive to light.   Neck: Normal range of motion. Neck supple.   Cardiovascular: Normal rate, regular rhythm, normal heart sounds and intact distal pulses.   Pulmonary/Chest: Effort normal and breath sounds normal.   Abdominal: Soft. Bowel sounds are normal.   urostomy appliance intact  Colostomy appliance intact  Musculoskeletal: Normal range of motion.   Paraplegia   Neurological: She is alert and oriented to person, place, and time.   paraplegia   Skin: Skin is warm and dry.   Wound vac intact  Dressing c.d.i bilateral feet  Psychiatric: She has a normal mood and affect. Her behavior is normal. Judgment and thought content normal.   Anxious at times    Nursing note and vitals reviewed.    Discharged Condition: Stable    Follow Up: Ponce Orozco MD  [address] [place] [id number]  [phone]            Diet: Regular, low potassium    Discharge Medications:   See computer generated medication reconciliation form    Time Spent on discharge is  32 minutes in patient examination, evaluation, patient/family counseling as well as medication reconciliation, prescriptions for required medications, discharge plan and follow up.     Electronically signed by MAREN Jackson on 10/25/2019 at 1:02 PM   I have discussed the care of Jessica Alvarenga, including pertinent history and exam findings, with the nurse practitioner.    I have seen and examined the patient and the key elements of all parts of the encounter have been performed by me.  I agree with the assessment, plan and orders as documented by MAREN Hernandez, after I modified the exam findings and the plan of treatments and the final version is my approved version of the assessment.        Electronically signed by Joseph Dawkins MD on 10/25/2019 at 2:34 PM

## 2019-10-27 LAB — ALDOST SERPL-MCNC: 1.2 NG/DL (ref 0–30)

## 2020-07-16 ENCOUNTER — HOSPITAL ENCOUNTER (INPATIENT)
Facility: HOSPITAL | Age: 60
LOS: 33 days | Discharge: NURSING, REHAB OR LONG-TERM CARE | End: 2020-08-18
Attending: FAMILY MEDICINE | Admitting: SPECIALIST

## 2020-07-16 ENCOUNTER — OFFICE VISIT (OUTPATIENT)
Dept: WOUND CARE | Facility: HOSPITAL | Age: 60
End: 2020-07-16

## 2020-07-16 ENCOUNTER — APPOINTMENT (OUTPATIENT)
Dept: GENERAL RADIOLOGY | Facility: HOSPITAL | Age: 60
End: 2020-07-16

## 2020-07-16 DIAGNOSIS — L98.423 SKIN ULCER OF SACRUM WITH NECROSIS OF MUSCLE (HCC): Primary | ICD-10-CM

## 2020-07-16 DIAGNOSIS — T14.8XXA NONHEALING NONSURGICAL WOUND: ICD-10-CM

## 2020-07-16 DIAGNOSIS — F41.9 ANXIETY: ICD-10-CM

## 2020-07-16 DIAGNOSIS — L03.317 CELLULITIS OF MULTIPLE SITES OF BUTTOCK: ICD-10-CM

## 2020-07-16 DIAGNOSIS — Z74.09 IMPAIRED FUNCTIONAL MOBILITY, BALANCE, GAIT, AND ENDURANCE: ICD-10-CM

## 2020-07-16 DIAGNOSIS — Z74.09 IMPAIRED MOBILITY AND ADLS: ICD-10-CM

## 2020-07-16 DIAGNOSIS — G89.29 CHRONIC BACK PAIN, UNSPECIFIED BACK LOCATION, UNSPECIFIED BACK PAIN LATERALITY: ICD-10-CM

## 2020-07-16 DIAGNOSIS — M54.9 CHRONIC BACK PAIN, UNSPECIFIED BACK LOCATION, UNSPECIFIED BACK PAIN LATERALITY: ICD-10-CM

## 2020-07-16 DIAGNOSIS — Z78.9 IMPAIRED MOBILITY AND ADLS: ICD-10-CM

## 2020-07-16 LAB
ABO GROUP BLD: NORMAL
ALBUMIN SERPL-MCNC: 2.4 G/DL (ref 3.5–5.2)
ALBUMIN/GLOB SERPL: 0.5 G/DL
ALP SERPL-CCNC: 205 U/L (ref 39–117)
ALT SERPL W P-5'-P-CCNC: 5 U/L (ref 1–33)
ANION GAP SERPL CALCULATED.3IONS-SCNC: 13 MMOL/L (ref 5–15)
APTT PPP: 33.8 SECONDS (ref 24.1–35)
AST SERPL-CCNC: 9 U/L (ref 1–32)
BASOPHILS # BLD AUTO: 0.09 10*3/MM3 (ref 0–0.2)
BASOPHILS NFR BLD AUTO: 0.7 % (ref 0–1.5)
BILIRUB SERPL-MCNC: 0.4 MG/DL (ref 0–1.2)
BLD GP AB SCN SERPL QL: NEGATIVE
BUN SERPL-MCNC: 27 MG/DL (ref 6–20)
BUN/CREAT SERPL: 23.3 (ref 7–25)
CALCIUM SPEC-SCNC: 8.6 MG/DL (ref 8.6–10.5)
CHLORIDE SERPL-SCNC: 100 MMOL/L (ref 98–107)
CO2 SERPL-SCNC: 22 MMOL/L (ref 22–29)
CREAT SERPL-MCNC: 1.16 MG/DL (ref 0.57–1)
CRP SERPL-MCNC: 18.52 MG/DL (ref 0–0.5)
DEPRECATED RDW RBC AUTO: 53.5 FL (ref 37–54)
EOSINOPHIL # BLD AUTO: 0.08 10*3/MM3 (ref 0–0.4)
EOSINOPHIL NFR BLD AUTO: 0.6 % (ref 0.3–6.2)
ERYTHROCYTE [DISTWIDTH] IN BLOOD BY AUTOMATED COUNT: 18.7 % (ref 12.3–15.4)
GFR SERPL CREATININE-BSD FRML MDRD: 48 ML/MIN/1.73
GLOBULIN UR ELPH-MCNC: 4.9 GM/DL
GLUCOSE SERPL-MCNC: 115 MG/DL (ref 65–99)
HCT VFR BLD AUTO: 26.2 % (ref 34–46.6)
HGB BLD-MCNC: 7.6 G/DL (ref 12–15.9)
INR PPP: 1.45 (ref 0.91–1.09)
LYMPHOCYTES # BLD AUTO: 2.86 10*3/MM3 (ref 0.7–3.1)
LYMPHOCYTES NFR BLD AUTO: 21.6 % (ref 19.6–45.3)
MAGNESIUM SERPL-MCNC: 2.3 MG/DL (ref 1.6–2.6)
MCH RBC QN AUTO: 23 PG (ref 26.6–33)
MCHC RBC AUTO-ENTMCNC: 29 G/DL (ref 31.5–35.7)
MCV RBC AUTO: 79.2 FL (ref 79–97)
MONOCYTES # BLD AUTO: 1.37 10*3/MM3 (ref 0.1–0.9)
MONOCYTES NFR BLD AUTO: 10.3 % (ref 5–12)
NEUTROPHILS NFR BLD AUTO: 65.1 % (ref 42.7–76)
NEUTROPHILS NFR BLD AUTO: 8.63 10*3/MM3 (ref 1.7–7)
PLATELET # BLD AUTO: 456 10*3/MM3 (ref 140–450)
PMV BLD AUTO: 9.3 FL (ref 6–12)
POTASSIUM SERPL-SCNC: 5.1 MMOL/L (ref 3.5–5.2)
PROT SERPL-MCNC: 7.3 G/DL (ref 6–8.5)
PROTHROMBIN TIME: 17.3 SECONDS (ref 11.9–14.6)
RBC # BLD AUTO: 3.31 10*6/MM3 (ref 3.77–5.28)
RH BLD: POSITIVE
SARS-COV-2 RDRP RESP QL NAA+PROBE: NOT DETECTED
SODIUM SERPL-SCNC: 135 MMOL/L (ref 136–145)
T&S EXPIRATION DATE: NORMAL
T4 FREE SERPL-MCNC: 1.18 NG/DL (ref 0.93–1.7)
TSH SERPL DL<=0.05 MIU/L-ACNC: 8.18 UIU/ML (ref 0.27–4.2)
WBC # BLD AUTO: 13.26 10*3/MM3 (ref 3.4–10.8)

## 2020-07-16 PROCEDURE — G0463 HOSPITAL OUTPT CLINIC VISIT: HCPCS

## 2020-07-16 PROCEDURE — 99214 OFFICE O/P EST MOD 30 MIN: CPT | Performed by: NURSE PRACTITIONER

## 2020-07-16 PROCEDURE — 80053 COMPREHEN METABOLIC PANEL: CPT | Performed by: FAMILY MEDICINE

## 2020-07-16 PROCEDURE — 86850 RBC ANTIBODY SCREEN: CPT | Performed by: FAMILY MEDICINE

## 2020-07-16 PROCEDURE — 85045 AUTOMATED RETICULOCYTE COUNT: CPT | Performed by: INTERNAL MEDICINE

## 2020-07-16 PROCEDURE — 83735 ASSAY OF MAGNESIUM: CPT | Performed by: FAMILY MEDICINE

## 2020-07-16 PROCEDURE — 99284 EMERGENCY DEPT VISIT MOD MDM: CPT

## 2020-07-16 PROCEDURE — 85025 COMPLETE CBC W/AUTO DIFF WBC: CPT | Performed by: FAMILY MEDICINE

## 2020-07-16 PROCEDURE — 86901 BLOOD TYPING SEROLOGIC RH(D): CPT | Performed by: FAMILY MEDICINE

## 2020-07-16 PROCEDURE — 87635 SARS-COV-2 COVID-19 AMP PRB: CPT | Performed by: FAMILY MEDICINE

## 2020-07-16 PROCEDURE — 85730 THROMBOPLASTIN TIME PARTIAL: CPT | Performed by: FAMILY MEDICINE

## 2020-07-16 PROCEDURE — 25010000002 VANCOMYCIN 10 G RECONSTITUTED SOLUTION: Performed by: FAMILY MEDICINE

## 2020-07-16 PROCEDURE — 36415 COLL VENOUS BLD VENIPUNCTURE: CPT | Performed by: FAMILY MEDICINE

## 2020-07-16 PROCEDURE — 11045 DBRDMT SUBQ TISS EACH ADDL: CPT | Performed by: NURSE PRACTITIONER

## 2020-07-16 PROCEDURE — 71045 X-RAY EXAM CHEST 1 VIEW: CPT

## 2020-07-16 PROCEDURE — 86900 BLOOD TYPING SEROLOGIC ABO: CPT | Performed by: FAMILY MEDICINE

## 2020-07-16 PROCEDURE — 87040 BLOOD CULTURE FOR BACTERIA: CPT | Performed by: FAMILY MEDICINE

## 2020-07-16 PROCEDURE — 86140 C-REACTIVE PROTEIN: CPT | Performed by: FAMILY MEDICINE

## 2020-07-16 PROCEDURE — 11042 DBRDMT SUBQ TIS 1ST 20SQCM/<: CPT | Performed by: NURSE PRACTITIONER

## 2020-07-16 PROCEDURE — 84443 ASSAY THYROID STIM HORMONE: CPT | Performed by: FAMILY MEDICINE

## 2020-07-16 PROCEDURE — 85610 PROTHROMBIN TIME: CPT | Performed by: FAMILY MEDICINE

## 2020-07-16 PROCEDURE — 97597 DBRDMT OPN WND 1ST 20 CM/<: CPT | Performed by: NURSE PRACTITIONER

## 2020-07-16 PROCEDURE — 84439 ASSAY OF FREE THYROXINE: CPT | Performed by: FAMILY MEDICINE

## 2020-07-16 PROCEDURE — P9612 CATHETERIZE FOR URINE SPEC: HCPCS

## 2020-07-16 RX ORDER — SODIUM CHLORIDE 9 MG/ML
125 INJECTION, SOLUTION INTRAVENOUS CONTINUOUS
Status: DISCONTINUED | OUTPATIENT
Start: 2020-07-16 | End: 2020-07-17

## 2020-07-16 RX ADMIN — SODIUM CHLORIDE 125 ML/HR: 9 INJECTION, SOLUTION INTRAVENOUS at 18:30

## 2020-07-16 RX ADMIN — VANCOMYCIN HYDROCHLORIDE 1500 MG: 10 INJECTION, POWDER, LYOPHILIZED, FOR SOLUTION INTRAVENOUS at 21:52

## 2020-07-17 PROBLEM — M86.49: Status: ACTIVE | Noted: 2019-08-12

## 2020-07-17 PROBLEM — J00 NASOPHARYNGITIS: Status: ACTIVE | Noted: 2019-04-16

## 2020-07-17 PROBLEM — M54.9 CHRONIC BACK PAIN: Status: ACTIVE | Noted: 2020-07-17

## 2020-07-17 PROBLEM — E44.0 PROTEIN-CALORIE MALNUTRITION, MODERATE (HCC): Status: ACTIVE | Noted: 2019-08-08

## 2020-07-17 PROBLEM — F32.A DEPRESSIVE DISORDER: Status: ACTIVE | Noted: 2020-07-17

## 2020-07-17 PROBLEM — K21.9 GERD (GASTROESOPHAGEAL REFLUX DISEASE): Status: ACTIVE | Noted: 2019-07-04

## 2020-07-17 PROBLEM — L89.609 PRESSURE INJURY OF SKIN OF HEEL: Status: ACTIVE | Noted: 2019-08-12

## 2020-07-17 PROBLEM — G82.20 PARAPLEGIA (HCC): Status: ACTIVE | Noted: 2017-07-27

## 2020-07-17 PROBLEM — M86.10 ACUTE OSTEOMYELITIS: Status: ACTIVE | Noted: 2019-07-30

## 2020-07-17 PROBLEM — G89.29 CHRONIC BACK PAIN: Status: ACTIVE | Noted: 2020-07-17

## 2020-07-17 PROBLEM — D64.9 ANEMIA: Status: ACTIVE | Noted: 2020-07-17

## 2020-07-17 PROBLEM — I10 ESSENTIAL HYPERTENSION: Status: ACTIVE | Noted: 2020-07-17

## 2020-07-17 PROBLEM — F41.9 ANXIETY: Status: ACTIVE | Noted: 2020-07-17

## 2020-07-17 PROBLEM — Z98.890 HISTORY OF ILEAL CONDUIT: Status: ACTIVE | Noted: 2019-07-04

## 2020-07-17 LAB
ALBUMIN SERPL-MCNC: 2 G/DL (ref 3.5–5.2)
ALBUMIN/GLOB SERPL: 0.6 G/DL
ALP SERPL-CCNC: 149 U/L (ref 39–117)
ALT SERPL W P-5'-P-CCNC: <5 U/L (ref 1–33)
ANION GAP SERPL CALCULATED.3IONS-SCNC: 10 MMOL/L (ref 5–15)
AST SERPL-CCNC: 6 U/L (ref 1–32)
BACTERIA UR QL AUTO: ABNORMAL /HPF
BASOPHILS # BLD AUTO: 0.06 10*3/MM3 (ref 0–0.2)
BASOPHILS NFR BLD AUTO: 0.8 % (ref 0–1.5)
BILIRUB SERPL-MCNC: 0.4 MG/DL (ref 0–1.2)
BILIRUB UR QL STRIP: NEGATIVE
BUN SERPL-MCNC: 27 MG/DL (ref 6–20)
BUN/CREAT SERPL: 23.9 (ref 7–25)
CALCIUM SPEC-SCNC: 8 MG/DL (ref 8.6–10.5)
CHLORIDE SERPL-SCNC: 105 MMOL/L (ref 98–107)
CLARITY UR: ABNORMAL
CO2 SERPL-SCNC: 21 MMOL/L (ref 22–29)
COLOR UR: YELLOW
CREAT SERPL-MCNC: 1.13 MG/DL (ref 0.57–1)
CRP SERPL-MCNC: 14.79 MG/DL (ref 0–0.5)
DEPRECATED RDW RBC AUTO: 54.8 FL (ref 37–54)
EOSINOPHIL # BLD AUTO: 0.14 10*3/MM3 (ref 0–0.4)
EOSINOPHIL NFR BLD AUTO: 1.9 % (ref 0.3–6.2)
ERYTHROCYTE [DISTWIDTH] IN BLOOD BY AUTOMATED COUNT: 18.9 % (ref 12.3–15.4)
ERYTHROCYTE [SEDIMENTATION RATE] IN BLOOD: 44 MM/HR (ref 0–20)
FERRITIN SERPL-MCNC: 1205 NG/ML (ref 13–150)
FOLATE SERPL-MCNC: 6.95 NG/ML (ref 4.78–24.2)
GFR SERPL CREATININE-BSD FRML MDRD: 49 ML/MIN/1.73
GLOBULIN UR ELPH-MCNC: 3.6 GM/DL
GLUCOSE SERPL-MCNC: 137 MG/DL (ref 65–99)
GLUCOSE UR STRIP-MCNC: NEGATIVE MG/DL
HCT VFR BLD AUTO: 21.8 % (ref 34–46.6)
HCT VFR BLD AUTO: 24.1 % (ref 34–46.6)
HGB BLD-MCNC: 6.3 G/DL (ref 12–15.9)
HGB BLD-MCNC: 7.3 G/DL (ref 12–15.9)
HGB UR QL STRIP.AUTO: ABNORMAL
HYALINE CASTS UR QL AUTO: ABNORMAL /LPF
IMM GRANULOCYTES # BLD AUTO: 0.07 10*3/MM3 (ref 0–0.05)
IMM GRANULOCYTES NFR BLD AUTO: 1 % (ref 0–0.5)
IRON 24H UR-MRATE: 14 MCG/DL (ref 37–145)
IRON SATN MFR SERPL: 15 % (ref 20–50)
KETONES UR QL STRIP: NEGATIVE
LEUKOCYTE ESTERASE UR QL STRIP.AUTO: ABNORMAL
LYMPHOCYTES # BLD AUTO: 1.75 10*3/MM3 (ref 0.7–3.1)
LYMPHOCYTES NFR BLD AUTO: 23.8 % (ref 19.6–45.3)
MCH RBC QN AUTO: 22.9 PG (ref 26.6–33)
MCHC RBC AUTO-ENTMCNC: 28.9 G/DL (ref 31.5–35.7)
MCV RBC AUTO: 79.3 FL (ref 79–97)
MONOCYTES # BLD AUTO: 0.68 10*3/MM3 (ref 0.1–0.9)
MONOCYTES NFR BLD AUTO: 9.3 % (ref 5–12)
NEUTROPHILS NFR BLD AUTO: 4.64 10*3/MM3 (ref 1.7–7)
NEUTROPHILS NFR BLD AUTO: 63.2 % (ref 42.7–76)
NITRITE UR QL STRIP: NEGATIVE
NRBC BLD AUTO-RTO: 0 /100 WBC (ref 0–0.2)
PH UR STRIP.AUTO: 7 [PH] (ref 5–8)
PLATELET # BLD AUTO: 401 10*3/MM3 (ref 140–450)
PMV BLD AUTO: 8.7 FL (ref 6–12)
POTASSIUM SERPL-SCNC: 4.8 MMOL/L (ref 3.5–5.2)
PROT SERPL-MCNC: 5.6 G/DL (ref 6–8.5)
PROT UR QL STRIP: ABNORMAL
RBC # BLD AUTO: 2.75 10*6/MM3 (ref 3.77–5.28)
RBC # UR: ABNORMAL /HPF
REF LAB TEST METHOD: ABNORMAL
RETICS # AUTO: 0.05 10*6/MM3 (ref 0.02–0.13)
RETICS/RBC NFR AUTO: 1.54 % (ref 0.7–1.9)
SODIUM SERPL-SCNC: 136 MMOL/L (ref 136–145)
SP GR UR STRIP: 1.01 (ref 1–1.03)
SQUAMOUS #/AREA URNS HPF: ABNORMAL /HPF
TIBC SERPL-MCNC: 91 MCG/DL (ref 298–536)
TRANSFERRIN SERPL-MCNC: 61 MG/DL (ref 200–360)
UROBILINOGEN UR QL STRIP: ABNORMAL
VIT B12 BLD-MCNC: 530 PG/ML (ref 211–946)
WBC # BLD AUTO: 7.34 10*3/MM3 (ref 3.4–10.8)
WBC UR QL AUTO: ABNORMAL /HPF

## 2020-07-17 PROCEDURE — 82746 ASSAY OF FOLIC ACID SERUM: CPT | Performed by: INTERNAL MEDICINE

## 2020-07-17 PROCEDURE — 86900 BLOOD TYPING SEROLOGIC ABO: CPT

## 2020-07-17 PROCEDURE — 83540 ASSAY OF IRON: CPT | Performed by: INTERNAL MEDICINE

## 2020-07-17 PROCEDURE — 85018 HEMOGLOBIN: CPT | Performed by: INTERNAL MEDICINE

## 2020-07-17 PROCEDURE — 85014 HEMATOCRIT: CPT | Performed by: INTERNAL MEDICINE

## 2020-07-17 PROCEDURE — 99222 1ST HOSP IP/OBS MODERATE 55: CPT | Performed by: NURSE PRACTITIONER

## 2020-07-17 PROCEDURE — 86901 BLOOD TYPING SEROLOGIC RH(D): CPT

## 2020-07-17 PROCEDURE — 25010000002 ENOXAPARIN PER 10 MG: Performed by: INTERNAL MEDICINE

## 2020-07-17 PROCEDURE — 25010000002 IRON SUCROSE PER 1 MG: Performed by: INTERNAL MEDICINE

## 2020-07-17 PROCEDURE — 81001 URINALYSIS AUTO W/SCOPE: CPT | Performed by: INTERNAL MEDICINE

## 2020-07-17 PROCEDURE — 82728 ASSAY OF FERRITIN: CPT | Performed by: INTERNAL MEDICINE

## 2020-07-17 PROCEDURE — 87077 CULTURE AEROBIC IDENTIFY: CPT | Performed by: INTERNAL MEDICINE

## 2020-07-17 PROCEDURE — 36430 TRANSFUSION BLD/BLD COMPNT: CPT

## 2020-07-17 PROCEDURE — 86923 COMPATIBILITY TEST ELECTRIC: CPT

## 2020-07-17 PROCEDURE — 85651 RBC SED RATE NONAUTOMATED: CPT | Performed by: INTERNAL MEDICINE

## 2020-07-17 PROCEDURE — 82607 VITAMIN B-12: CPT | Performed by: INTERNAL MEDICINE

## 2020-07-17 PROCEDURE — 25010000002 VANCOMYCIN 10 G RECONSTITUTED SOLUTION: Performed by: INTERNAL MEDICINE

## 2020-07-17 PROCEDURE — 85025 COMPLETE CBC W/AUTO DIFF WBC: CPT | Performed by: INTERNAL MEDICINE

## 2020-07-17 PROCEDURE — 25010000002 PIPERACILLIN SOD-TAZOBACTAM PER 1 G: Performed by: INTERNAL MEDICINE

## 2020-07-17 PROCEDURE — 84466 ASSAY OF TRANSFERRIN: CPT | Performed by: INTERNAL MEDICINE

## 2020-07-17 PROCEDURE — 87186 SC STD MICRODIL/AGAR DIL: CPT | Performed by: INTERNAL MEDICINE

## 2020-07-17 PROCEDURE — P9016 RBC LEUKOCYTES REDUCED: HCPCS

## 2020-07-17 PROCEDURE — 86140 C-REACTIVE PROTEIN: CPT | Performed by: INTERNAL MEDICINE

## 2020-07-17 PROCEDURE — 80053 COMPREHEN METABOLIC PANEL: CPT | Performed by: INTERNAL MEDICINE

## 2020-07-17 PROCEDURE — 87086 URINE CULTURE/COLONY COUNT: CPT | Performed by: INTERNAL MEDICINE

## 2020-07-17 RX ORDER — ACETAMINOPHEN 325 MG/1
650 TABLET ORAL EVERY 4 HOURS PRN
Status: DISCONTINUED | OUTPATIENT
Start: 2020-07-17 | End: 2020-08-18 | Stop reason: HOSPADM

## 2020-07-17 RX ORDER — FERROUS SULFATE 325(65) MG
1 TABLET ORAL
COMMUNITY
End: 2020-08-18 | Stop reason: HOSPADM

## 2020-07-17 RX ORDER — AMLODIPINE BESYLATE AND BENAZEPRIL HYDROCHLORIDE 10; 20 MG/1; MG/1
CAPSULE ORAL
Status: ON HOLD | COMMUNITY
End: 2020-07-17

## 2020-07-17 RX ORDER — HYDROCODONE BITARTRATE AND ACETAMINOPHEN 10; 325 MG/1; MG/1
TABLET ORAL
COMMUNITY
End: 2020-08-18 | Stop reason: HOSPADM

## 2020-07-17 RX ORDER — HYDROCODONE BITARTRATE AND ACETAMINOPHEN 7.5; 325 MG/1; MG/1
1 TABLET ORAL EVERY 4 HOURS PRN
Status: DISPENSED | OUTPATIENT
Start: 2020-07-17 | End: 2020-07-27

## 2020-07-17 RX ORDER — SODIUM CHLORIDE 9 MG/ML
75 INJECTION, SOLUTION INTRAVENOUS CONTINUOUS
Status: DISCONTINUED | OUTPATIENT
Start: 2020-07-17 | End: 2020-07-19

## 2020-07-17 RX ORDER — HYDROMORPHONE HYDROCHLORIDE 1 MG/ML
0.5 INJECTION, SOLUTION INTRAMUSCULAR; INTRAVENOUS; SUBCUTANEOUS
Status: DISCONTINUED | OUTPATIENT
Start: 2020-07-17 | End: 2020-07-23 | Stop reason: RX

## 2020-07-17 RX ORDER — ONDANSETRON 4 MG/1
4 TABLET, FILM COATED ORAL EVERY 6 HOURS PRN
Status: DISCONTINUED | OUTPATIENT
Start: 2020-07-17 | End: 2020-08-13

## 2020-07-17 RX ORDER — CARISOPRODOL 350 MG/1
TABLET ORAL
COMMUNITY
End: 2020-07-17

## 2020-07-17 RX ORDER — ONDANSETRON 2 MG/ML
4 INJECTION INTRAMUSCULAR; INTRAVENOUS EVERY 6 HOURS PRN
Status: DISCONTINUED | OUTPATIENT
Start: 2020-07-17 | End: 2020-08-13

## 2020-07-17 RX ORDER — ALBUTEROL SULFATE 90 UG/1
AEROSOL, METERED RESPIRATORY (INHALATION)
COMMUNITY

## 2020-07-17 RX ORDER — SODIUM CHLORIDE 0.9 % (FLUSH) 0.9 %
10 SYRINGE (ML) INJECTION EVERY 12 HOURS SCHEDULED
Status: DISCONTINUED | OUTPATIENT
Start: 2020-07-17 | End: 2020-08-18 | Stop reason: HOSPADM

## 2020-07-17 RX ORDER — NICOTINE POLACRILEX 4 MG/1
20 GUM, CHEWING ORAL DAILY
Status: DISCONTINUED | OUTPATIENT
Start: 2020-07-17 | End: 2020-08-17

## 2020-07-17 RX ORDER — ALPRAZOLAM 0.5 MG/1
TABLET ORAL
COMMUNITY
End: 2020-08-18 | Stop reason: HOSPADM

## 2020-07-17 RX ORDER — ERGOCALCIFEROL 1.25 MG/1
50000 CAPSULE ORAL
COMMUNITY
Start: 2019-09-08 | End: 2020-08-18 | Stop reason: HOSPADM

## 2020-07-17 RX ORDER — ACETAMINOPHEN 650 MG/1
650 SUPPOSITORY RECTAL EVERY 4 HOURS PRN
Status: DISCONTINUED | OUTPATIENT
Start: 2020-07-17 | End: 2020-08-18 | Stop reason: HOSPADM

## 2020-07-17 RX ORDER — NALOXONE HCL 0.4 MG/ML
0.4 VIAL (ML) INJECTION
Status: DISCONTINUED | OUTPATIENT
Start: 2020-07-17 | End: 2020-08-18 | Stop reason: HOSPADM

## 2020-07-17 RX ORDER — BACLOFEN 10 MG/1
10 TABLET ORAL EVERY 8 HOURS SCHEDULED
Status: DISCONTINUED | OUTPATIENT
Start: 2020-07-17 | End: 2020-08-18 | Stop reason: HOSPADM

## 2020-07-17 RX ORDER — SODIUM HYPOCHLORITE 1.25 MG/ML
SOLUTION TOPICAL DAILY
Status: DISPENSED | OUTPATIENT
Start: 2020-07-17 | End: 2020-07-31

## 2020-07-17 RX ORDER — SODIUM CHLORIDE 0.9 % (FLUSH) 0.9 %
10 SYRINGE (ML) INJECTION AS NEEDED
Status: DISCONTINUED | OUTPATIENT
Start: 2020-07-17 | End: 2020-08-18 | Stop reason: HOSPADM

## 2020-07-17 RX ORDER — BACLOFEN 10 MG/1
TABLET ORAL
COMMUNITY
Start: 2018-12-03

## 2020-07-17 RX ORDER — BUSPIRONE HYDROCHLORIDE 10 MG/1
10 TABLET ORAL
Status: ON HOLD | COMMUNITY
Start: 2019-09-06 | End: 2020-07-17

## 2020-07-17 RX ORDER — FOLIC ACID 1 MG/1
1 TABLET ORAL
Status: ON HOLD | COMMUNITY
End: 2020-07-17

## 2020-07-17 RX ORDER — HYDROCHLOROTHIAZIDE 12.5 MG/1
CAPSULE, GELATIN COATED ORAL
Status: ON HOLD | COMMUNITY
End: 2020-07-17

## 2020-07-17 RX ORDER — ACETAMINOPHEN 160 MG/5ML
650 SOLUTION ORAL EVERY 4 HOURS PRN
Status: DISCONTINUED | OUTPATIENT
Start: 2020-07-17 | End: 2020-08-18 | Stop reason: HOSPADM

## 2020-07-17 RX ADMIN — ENOXAPARIN SODIUM 40 MG: 40 INJECTION SUBCUTANEOUS at 09:49

## 2020-07-17 RX ADMIN — SODIUM CHLORIDE, PRESERVATIVE FREE 10 ML: 5 INJECTION INTRAVENOUS at 22:01

## 2020-07-17 RX ADMIN — SODIUM CHLORIDE 50 ML/HR: 9 INJECTION, SOLUTION INTRAVENOUS at 03:17

## 2020-07-17 RX ADMIN — TAZOBACTAM SODIUM AND PIPERACILLIN SODIUM 4.5 G: 500; 4 INJECTION, SOLUTION INTRAVENOUS at 23:28

## 2020-07-17 RX ADMIN — IRON SUCROSE 200 MG: 20 INJECTION, SOLUTION INTRAVENOUS at 18:40

## 2020-07-17 RX ADMIN — BACLOFEN 10 MG: 10 TABLET ORAL at 22:01

## 2020-07-17 RX ADMIN — VANCOMYCIN HYDROCHLORIDE 1500 MG: 10 INJECTION, POWDER, LYOPHILIZED, FOR SOLUTION INTRAVENOUS at 22:46

## 2020-07-17 RX ADMIN — TAZOBACTAM SODIUM AND PIPERACILLIN SODIUM 4.5 G: 500; 4 INJECTION, SOLUTION INTRAVENOUS at 03:17

## 2020-07-17 RX ADMIN — DAKIN'S SOLUTION 0.125% (QUARTER STRENGTH): 0.12 SOLUTION at 12:26

## 2020-07-17 RX ADMIN — OMEPRAZOLE 20 MG: 20 TABLET, DELAYED RELEASE ORAL at 12:26

## 2020-07-17 RX ADMIN — TAZOBACTAM SODIUM AND PIPERACILLIN SODIUM 4.5 G: 500; 4 INJECTION, SOLUTION INTRAVENOUS at 09:49

## 2020-07-17 RX ADMIN — COLLAGENASE SANTYL: 250 OINTMENT TOPICAL at 12:26

## 2020-07-18 LAB
ANION GAP SERPL CALCULATED.3IONS-SCNC: 8 MMOL/L (ref 5–15)
BASOPHILS # BLD AUTO: 0.05 10*3/MM3 (ref 0–0.2)
BASOPHILS NFR BLD AUTO: 0.7 % (ref 0–1.5)
BH BB BLOOD EXPIRATION DATE: NORMAL
BH BB BLOOD TYPE BARCODE: 6200
BH BB DISPENSE STATUS: NORMAL
BH BB PRODUCT CODE: NORMAL
BH BB UNIT NUMBER: NORMAL
BUN SERPL-MCNC: 25 MG/DL (ref 6–20)
BUN/CREAT SERPL: 20.3 (ref 7–25)
CALCIUM SPEC-SCNC: 7.5 MG/DL (ref 8.6–10.5)
CHLORIDE SERPL-SCNC: 109 MMOL/L (ref 98–107)
CO2 SERPL-SCNC: 22 MMOL/L (ref 22–29)
CREAT SERPL-MCNC: 1.23 MG/DL (ref 0.57–1)
CROSSMATCH INTERPRETATION: NORMAL
DEPRECATED RDW RBC AUTO: 53 FL (ref 37–54)
EOSINOPHIL # BLD AUTO: 0.19 10*3/MM3 (ref 0–0.4)
EOSINOPHIL NFR BLD AUTO: 2.8 % (ref 0.3–6.2)
ERYTHROCYTE [DISTWIDTH] IN BLOOD BY AUTOMATED COUNT: 18.3 % (ref 12.3–15.4)
GFR SERPL CREATININE-BSD FRML MDRD: 45 ML/MIN/1.73
GLUCOSE SERPL-MCNC: 104 MG/DL (ref 65–99)
HCT VFR BLD AUTO: 21.6 % (ref 34–46.6)
HGB BLD-MCNC: 6.5 G/DL (ref 12–15.9)
IMM GRANULOCYTES # BLD AUTO: 0.07 10*3/MM3 (ref 0–0.05)
IMM GRANULOCYTES NFR BLD AUTO: 1 % (ref 0–0.5)
LYMPHOCYTES # BLD AUTO: 2.14 10*3/MM3 (ref 0.7–3.1)
LYMPHOCYTES NFR BLD AUTO: 31.4 % (ref 19.6–45.3)
MAGNESIUM SERPL-MCNC: 1.8 MG/DL (ref 1.6–2.6)
MCH RBC QN AUTO: 24.1 PG (ref 26.6–33)
MCHC RBC AUTO-ENTMCNC: 30.1 G/DL (ref 31.5–35.7)
MCV RBC AUTO: 80 FL (ref 79–97)
MONOCYTES # BLD AUTO: 0.59 10*3/MM3 (ref 0.1–0.9)
MONOCYTES NFR BLD AUTO: 8.7 % (ref 5–12)
NEUTROPHILS NFR BLD AUTO: 3.78 10*3/MM3 (ref 1.7–7)
NEUTROPHILS NFR BLD AUTO: 55.4 % (ref 42.7–76)
NRBC BLD AUTO-RTO: 0 /100 WBC (ref 0–0.2)
PHOSPHATE SERPL-MCNC: 4.2 MG/DL (ref 2.5–4.5)
PLATELET # BLD AUTO: 354 10*3/MM3 (ref 140–450)
PMV BLD AUTO: 9 FL (ref 6–12)
POTASSIUM SERPL-SCNC: 4.1 MMOL/L (ref 3.5–5.2)
RBC # BLD AUTO: 2.7 10*6/MM3 (ref 3.77–5.28)
SODIUM SERPL-SCNC: 139 MMOL/L (ref 136–145)
UNIT  ABO: NORMAL
UNIT  RH: NORMAL
VANCOMYCIN TROUGH SERPL-MCNC: 24.8 MCG/ML (ref 5–20)
WBC # BLD AUTO: 6.82 10*3/MM3 (ref 3.4–10.8)

## 2020-07-18 PROCEDURE — 84100 ASSAY OF PHOSPHORUS: CPT | Performed by: INTERNAL MEDICINE

## 2020-07-18 PROCEDURE — 80202 ASSAY OF VANCOMYCIN: CPT | Performed by: INTERNAL MEDICINE

## 2020-07-18 PROCEDURE — C1751 CATH, INF, PER/CENT/MIDLINE: HCPCS

## 2020-07-18 PROCEDURE — 36430 TRANSFUSION BLD/BLD COMPNT: CPT

## 2020-07-18 PROCEDURE — 85025 COMPLETE CBC W/AUTO DIFF WBC: CPT | Performed by: INTERNAL MEDICINE

## 2020-07-18 PROCEDURE — 86900 BLOOD TYPING SEROLOGIC ABO: CPT

## 2020-07-18 PROCEDURE — 86923 COMPATIBILITY TEST ELECTRIC: CPT

## 2020-07-18 PROCEDURE — 83735 ASSAY OF MAGNESIUM: CPT | Performed by: INTERNAL MEDICINE

## 2020-07-18 PROCEDURE — 25010000002 PIPERACILLIN SOD-TAZOBACTAM PER 1 G: Performed by: INTERNAL MEDICINE

## 2020-07-18 PROCEDURE — 25010000002 IRON SUCROSE PER 1 MG: Performed by: INTERNAL MEDICINE

## 2020-07-18 PROCEDURE — 02HV33Z INSERTION OF INFUSION DEVICE INTO SUPERIOR VENA CAVA, PERCUTANEOUS APPROACH: ICD-10-PCS | Performed by: INTERNAL MEDICINE

## 2020-07-18 PROCEDURE — P9016 RBC LEUKOCYTES REDUCED: HCPCS

## 2020-07-18 PROCEDURE — 25010000002 ENOXAPARIN PER 10 MG: Performed by: INTERNAL MEDICINE

## 2020-07-18 PROCEDURE — 80048 BASIC METABOLIC PNL TOTAL CA: CPT | Performed by: INTERNAL MEDICINE

## 2020-07-18 RX ORDER — SACCHAROMYCES BOULARDII 250 MG
250 CAPSULE ORAL 2 TIMES DAILY
Status: DISCONTINUED | OUTPATIENT
Start: 2020-07-18 | End: 2020-07-19

## 2020-07-18 RX ORDER — LIDOCAINE HYDROCHLORIDE 10 MG/ML
1 INJECTION, SOLUTION EPIDURAL; INFILTRATION; INTRACAUDAL; PERINEURAL ONCE
Status: COMPLETED | OUTPATIENT
Start: 2020-07-18 | End: 2020-07-18

## 2020-07-18 RX ADMIN — LIDOCAINE HYDROCHLORIDE ANHYDROUS 1 ML: 10 INJECTION, SOLUTION INFILTRATION at 13:16

## 2020-07-18 RX ADMIN — SODIUM CHLORIDE, PRESERVATIVE FREE 10 ML: 5 INJECTION INTRAVENOUS at 21:34

## 2020-07-18 RX ADMIN — Medication 250 MG: at 21:34

## 2020-07-18 RX ADMIN — IRON SUCROSE 200 MG: 20 INJECTION, SOLUTION INTRAVENOUS at 13:26

## 2020-07-18 RX ADMIN — TAZOBACTAM SODIUM AND PIPERACILLIN SODIUM 4.5 G: 500; 4 INJECTION, SOLUTION INTRAVENOUS at 17:02

## 2020-07-18 RX ADMIN — ENOXAPARIN SODIUM 40 MG: 40 INJECTION SUBCUTANEOUS at 09:30

## 2020-07-18 RX ADMIN — DAKIN'S SOLUTION 0.125% (QUARTER STRENGTH): 0.12 SOLUTION at 09:31

## 2020-07-18 RX ADMIN — SODIUM CHLORIDE 75 ML/HR: 9 INJECTION, SOLUTION INTRAVENOUS at 17:03

## 2020-07-18 RX ADMIN — TAZOBACTAM SODIUM AND PIPERACILLIN SODIUM 4.5 G: 500; 4 INJECTION, SOLUTION INTRAVENOUS at 23:31

## 2020-07-18 RX ADMIN — COLLAGENASE SANTYL: 250 OINTMENT TOPICAL at 09:31

## 2020-07-18 RX ADMIN — BACLOFEN 10 MG: 10 TABLET ORAL at 21:34

## 2020-07-18 RX ADMIN — SODIUM CHLORIDE 75 ML/HR: 9 INJECTION, SOLUTION INTRAVENOUS at 04:17

## 2020-07-18 RX ADMIN — TAZOBACTAM SODIUM AND PIPERACILLIN SODIUM 4.5 G: 500; 4 INJECTION, SOLUTION INTRAVENOUS at 09:30

## 2020-07-18 RX ADMIN — OMEPRAZOLE 20 MG: 20 TABLET, DELAYED RELEASE ORAL at 09:30

## 2020-07-18 RX ADMIN — BACLOFEN 10 MG: 10 TABLET ORAL at 13:27

## 2020-07-19 LAB
ALBUMIN SERPL-MCNC: 1.6 G/DL (ref 3.5–5.2)
ALBUMIN/GLOB SERPL: 0.5 G/DL
ALP SERPL-CCNC: 116 U/L (ref 39–117)
ALT SERPL W P-5'-P-CCNC: <5 U/L (ref 1–33)
ANION GAP SERPL CALCULATED.3IONS-SCNC: 9 MMOL/L (ref 5–15)
AST SERPL-CCNC: 6 U/L (ref 1–32)
BACTERIA SPEC AEROBE CULT: ABNORMAL
BASOPHILS # BLD AUTO: 0.06 10*3/MM3 (ref 0–0.2)
BASOPHILS NFR BLD AUTO: 0.8 % (ref 0–1.5)
BH BB BLOOD EXPIRATION DATE: NORMAL
BH BB BLOOD TYPE BARCODE: 6200
BH BB DISPENSE STATUS: NORMAL
BH BB PRODUCT CODE: NORMAL
BH BB UNIT NUMBER: NORMAL
BILIRUB SERPL-MCNC: 0.3 MG/DL (ref 0–1.2)
BUN SERPL-MCNC: 21 MG/DL (ref 6–20)
BUN/CREAT SERPL: 18.4 (ref 7–25)
CALCIUM SPEC-SCNC: 7.5 MG/DL (ref 8.6–10.5)
CHLORIDE SERPL-SCNC: 113 MMOL/L (ref 98–107)
CO2 SERPL-SCNC: 20 MMOL/L (ref 22–29)
CREAT SERPL-MCNC: 1.14 MG/DL (ref 0.57–1)
CROSSMATCH INTERPRETATION: NORMAL
DEPRECATED RDW RBC AUTO: 53.8 FL (ref 37–54)
EOSINOPHIL # BLD AUTO: 0.17 10*3/MM3 (ref 0–0.4)
EOSINOPHIL NFR BLD AUTO: 2.3 % (ref 0.3–6.2)
ERYTHROCYTE [DISTWIDTH] IN BLOOD BY AUTOMATED COUNT: 18.5 % (ref 12.3–15.4)
GFR SERPL CREATININE-BSD FRML MDRD: 49 ML/MIN/1.73
GLOBULIN UR ELPH-MCNC: 3.4 GM/DL
GLUCOSE SERPL-MCNC: 89 MG/DL (ref 65–99)
HCT VFR BLD AUTO: 27 % (ref 34–46.6)
HGB BLD-MCNC: 8.3 G/DL (ref 12–15.9)
IMM GRANULOCYTES # BLD AUTO: 0.19 10*3/MM3 (ref 0–0.05)
IMM GRANULOCYTES NFR BLD AUTO: 2.6 % (ref 0–0.5)
LYMPHOCYTES # BLD AUTO: 2.19 10*3/MM3 (ref 0.7–3.1)
LYMPHOCYTES NFR BLD AUTO: 29.7 % (ref 19.6–45.3)
MAGNESIUM SERPL-MCNC: 1.7 MG/DL (ref 1.6–2.6)
MCH RBC QN AUTO: 24.8 PG (ref 26.6–33)
MCHC RBC AUTO-ENTMCNC: 30.7 G/DL (ref 31.5–35.7)
MCV RBC AUTO: 80.6 FL (ref 79–97)
MONOCYTES # BLD AUTO: 0.58 10*3/MM3 (ref 0.1–0.9)
MONOCYTES NFR BLD AUTO: 7.9 % (ref 5–12)
NEUTROPHILS NFR BLD AUTO: 4.18 10*3/MM3 (ref 1.7–7)
NEUTROPHILS NFR BLD AUTO: 56.7 % (ref 42.7–76)
NRBC BLD AUTO-RTO: 0 /100 WBC (ref 0–0.2)
PLATELET # BLD AUTO: 338 10*3/MM3 (ref 140–450)
PMV BLD AUTO: 8.8 FL (ref 6–12)
POTASSIUM SERPL-SCNC: 4.1 MMOL/L (ref 3.5–5.2)
PROT SERPL-MCNC: 5 G/DL (ref 6–8.5)
RBC # BLD AUTO: 3.35 10*6/MM3 (ref 3.77–5.28)
SODIUM SERPL-SCNC: 142 MMOL/L (ref 136–145)
UNIT  ABO: NORMAL
UNIT  RH: NORMAL
WBC # BLD AUTO: 7.37 10*3/MM3 (ref 3.4–10.8)

## 2020-07-19 PROCEDURE — 25010000002 PIPERACILLIN SOD-TAZOBACTAM PER 1 G: Performed by: INTERNAL MEDICINE

## 2020-07-19 PROCEDURE — 85025 COMPLETE CBC W/AUTO DIFF WBC: CPT | Performed by: INTERNAL MEDICINE

## 2020-07-19 PROCEDURE — 25010000002 VANCOMYCIN 10 G RECONSTITUTED SOLUTION: Performed by: INTERNAL MEDICINE

## 2020-07-19 PROCEDURE — 83735 ASSAY OF MAGNESIUM: CPT | Performed by: INTERNAL MEDICINE

## 2020-07-19 PROCEDURE — 80053 COMPREHEN METABOLIC PANEL: CPT | Performed by: INTERNAL MEDICINE

## 2020-07-19 PROCEDURE — 25010000002 IRON SUCROSE PER 1 MG: Performed by: INTERNAL MEDICINE

## 2020-07-19 RX ADMIN — SODIUM CHLORIDE, PRESERVATIVE FREE 10 ML: 5 INJECTION INTRAVENOUS at 21:42

## 2020-07-19 RX ADMIN — TAZOBACTAM SODIUM AND PIPERACILLIN SODIUM 4.5 G: 500; 4 INJECTION, SOLUTION INTRAVENOUS at 23:28

## 2020-07-19 RX ADMIN — IRON SUCROSE 200 MG: 20 INJECTION, SOLUTION INTRAVENOUS at 14:12

## 2020-07-19 RX ADMIN — OMEPRAZOLE 20 MG: 20 TABLET, DELAYED RELEASE ORAL at 08:27

## 2020-07-19 RX ADMIN — BACLOFEN 10 MG: 10 TABLET ORAL at 14:14

## 2020-07-19 RX ADMIN — DAKIN'S SOLUTION 0.125% (QUARTER STRENGTH): 0.12 SOLUTION at 08:28

## 2020-07-19 RX ADMIN — COLLAGENASE SANTYL: 250 OINTMENT TOPICAL at 08:28

## 2020-07-19 RX ADMIN — Medication 250 MG: at 08:28

## 2020-07-19 RX ADMIN — BACLOFEN 10 MG: 10 TABLET ORAL at 21:42

## 2020-07-19 RX ADMIN — TAZOBACTAM SODIUM AND PIPERACILLIN SODIUM 4.5 G: 500; 4 INJECTION, SOLUTION INTRAVENOUS at 16:14

## 2020-07-19 RX ADMIN — TAZOBACTAM SODIUM AND PIPERACILLIN SODIUM 4.5 G: 500; 4 INJECTION, SOLUTION INTRAVENOUS at 08:27

## 2020-07-19 RX ADMIN — VANCOMYCIN HYDROCHLORIDE 750 MG: 10 INJECTION, POWDER, LYOPHILIZED, FOR SOLUTION INTRAVENOUS at 05:56

## 2020-07-20 LAB
ABO GROUP BLD: NORMAL
ANION GAP SERPL CALCULATED.3IONS-SCNC: 9 MMOL/L (ref 5–15)
BLD GP AB SCN SERPL QL: NEGATIVE
BUN SERPL-MCNC: 21 MG/DL (ref 6–20)
BUN/CREAT SERPL: 16.4 (ref 7–25)
CALCIUM SPEC-SCNC: 7.7 MG/DL (ref 8.6–10.5)
CHLORIDE SERPL-SCNC: 111 MMOL/L (ref 98–107)
CO2 SERPL-SCNC: 21 MMOL/L (ref 22–29)
CREAT SERPL-MCNC: 1.28 MG/DL (ref 0.57–1)
DEPRECATED RDW RBC AUTO: 56.3 FL (ref 37–54)
ERYTHROCYTE [DISTWIDTH] IN BLOOD BY AUTOMATED COUNT: 19.2 % (ref 12.3–15.4)
GFR SERPL CREATININE-BSD FRML MDRD: 43 ML/MIN/1.73
GLUCOSE SERPL-MCNC: 89 MG/DL (ref 65–99)
HCT VFR BLD AUTO: 28.7 % (ref 34–46.6)
HGB BLD-MCNC: 8.7 G/DL (ref 12–15.9)
MCH RBC QN AUTO: 24.8 PG (ref 26.6–33)
MCHC RBC AUTO-ENTMCNC: 30.3 G/DL (ref 31.5–35.7)
MCV RBC AUTO: 81.8 FL (ref 79–97)
PLATELET # BLD AUTO: 398 10*3/MM3 (ref 140–450)
PMV BLD AUTO: 8.6 FL (ref 6–12)
POTASSIUM SERPL-SCNC: 4 MMOL/L (ref 3.5–5.2)
RBC # BLD AUTO: 3.51 10*6/MM3 (ref 3.77–5.28)
RH BLD: POSITIVE
SODIUM SERPL-SCNC: 141 MMOL/L (ref 136–145)
T&S EXPIRATION DATE: NORMAL
WBC # BLD AUTO: 8.19 10*3/MM3 (ref 3.4–10.8)

## 2020-07-20 PROCEDURE — 86901 BLOOD TYPING SEROLOGIC RH(D): CPT | Performed by: SPECIALIST

## 2020-07-20 PROCEDURE — 25010000002 ENOXAPARIN PER 10 MG: Performed by: INTERNAL MEDICINE

## 2020-07-20 PROCEDURE — 25010000002 PIPERACILLIN SOD-TAZOBACTAM PER 1 G: Performed by: INTERNAL MEDICINE

## 2020-07-20 PROCEDURE — 86923 COMPATIBILITY TEST ELECTRIC: CPT

## 2020-07-20 PROCEDURE — 86850 RBC ANTIBODY SCREEN: CPT | Performed by: SPECIALIST

## 2020-07-20 PROCEDURE — 80048 BASIC METABOLIC PNL TOTAL CA: CPT | Performed by: INTERNAL MEDICINE

## 2020-07-20 PROCEDURE — 86900 BLOOD TYPING SEROLOGIC ABO: CPT | Performed by: SPECIALIST

## 2020-07-20 PROCEDURE — 25010000002 VANCOMYCIN 10 G RECONSTITUTED SOLUTION: Performed by: INTERNAL MEDICINE

## 2020-07-20 PROCEDURE — 85027 COMPLETE CBC AUTOMATED: CPT | Performed by: INTERNAL MEDICINE

## 2020-07-20 RX ADMIN — BACLOFEN 10 MG: 10 TABLET ORAL at 14:53

## 2020-07-20 RX ADMIN — BACLOFEN 10 MG: 10 TABLET ORAL at 21:01

## 2020-07-20 RX ADMIN — TAZOBACTAM SODIUM AND PIPERACILLIN SODIUM 4.5 G: 500; 4 INJECTION, SOLUTION INTRAVENOUS at 16:25

## 2020-07-20 RX ADMIN — DAKIN'S SOLUTION 0.125% (QUARTER STRENGTH): 0.12 SOLUTION at 08:34

## 2020-07-20 RX ADMIN — SODIUM CHLORIDE, PRESERVATIVE FREE 10 ML: 5 INJECTION INTRAVENOUS at 08:29

## 2020-07-20 RX ADMIN — OMEPRAZOLE 20 MG: 20 TABLET, DELAYED RELEASE ORAL at 08:29

## 2020-07-20 RX ADMIN — ENOXAPARIN SODIUM 40 MG: 40 INJECTION SUBCUTANEOUS at 14:53

## 2020-07-20 RX ADMIN — TAZOBACTAM SODIUM AND PIPERACILLIN SODIUM 4.5 G: 500; 4 INJECTION, SOLUTION INTRAVENOUS at 08:29

## 2020-07-20 RX ADMIN — SODIUM CHLORIDE, PRESERVATIVE FREE 10 ML: 5 INJECTION INTRAVENOUS at 21:01

## 2020-07-20 RX ADMIN — COLLAGENASE SANTYL: 250 OINTMENT TOPICAL at 08:38

## 2020-07-20 RX ADMIN — VANCOMYCIN HYDROCHLORIDE 750 MG: 10 INJECTION, POWDER, LYOPHILIZED, FOR SOLUTION INTRAVENOUS at 06:45

## 2020-07-21 ENCOUNTER — ANESTHESIA (OUTPATIENT)
Dept: PERIOP | Facility: HOSPITAL | Age: 60
End: 2020-07-21

## 2020-07-21 ENCOUNTER — ANESTHESIA EVENT (OUTPATIENT)
Dept: PERIOP | Facility: HOSPITAL | Age: 60
End: 2020-07-21

## 2020-07-21 LAB
ANION GAP SERPL CALCULATED.3IONS-SCNC: 10 MMOL/L (ref 5–15)
BACTERIA SPEC AEROBE CULT: NORMAL
BACTERIA SPEC AEROBE CULT: NORMAL
BUN SERPL-MCNC: 21 MG/DL (ref 6–20)
BUN/CREAT SERPL: 16.9 (ref 7–25)
CALCIUM SPEC-SCNC: 7.5 MG/DL (ref 8.6–10.5)
CHLORIDE SERPL-SCNC: 112 MMOL/L (ref 98–107)
CO2 SERPL-SCNC: 19 MMOL/L (ref 22–29)
CREAT SERPL-MCNC: 1.24 MG/DL (ref 0.57–1)
DEPRECATED RDW RBC AUTO: 59.7 FL (ref 37–54)
ERYTHROCYTE [DISTWIDTH] IN BLOOD BY AUTOMATED COUNT: 19.7 % (ref 12.3–15.4)
GFR SERPL CREATININE-BSD FRML MDRD: 44 ML/MIN/1.73
GLUCOSE SERPL-MCNC: 92 MG/DL (ref 65–99)
HCT VFR BLD AUTO: 27.6 % (ref 34–46.6)
HCT VFR BLD AUTO: 28.1 % (ref 34–46.6)
HGB BLD-MCNC: 8.1 G/DL (ref 12–15.9)
HGB BLD-MCNC: 8.4 G/DL (ref 12–15.9)
MCH RBC QN AUTO: 24.8 PG (ref 26.6–33)
MCHC RBC AUTO-ENTMCNC: 29.3 G/DL (ref 31.5–35.7)
MCV RBC AUTO: 84.7 FL (ref 79–97)
PLATELET # BLD AUTO: 361 10*3/MM3 (ref 140–450)
PMV BLD AUTO: 8.9 FL (ref 6–12)
POTASSIUM SERPL-SCNC: 4.3 MMOL/L (ref 3.5–5.2)
RBC # BLD AUTO: 3.26 10*6/MM3 (ref 3.77–5.28)
SODIUM SERPL-SCNC: 141 MMOL/L (ref 136–145)
VANCOMYCIN TROUGH SERPL-MCNC: 15.8 MCG/ML (ref 5–20)
WBC # BLD AUTO: 7.98 10*3/MM3 (ref 3.4–10.8)

## 2020-07-21 PROCEDURE — 25010000002 DEXAMETHASONE PER 1 MG: Performed by: ANESTHESIOLOGY

## 2020-07-21 PROCEDURE — 25010000002 MIDAZOLAM PER 1 MG: Performed by: ANESTHESIOLOGY

## 2020-07-21 PROCEDURE — 0HDNXZZ EXTRACTION OF LEFT FOOT SKIN, EXTERNAL APPROACH: ICD-10-PCS | Performed by: SPECIALIST

## 2020-07-21 PROCEDURE — 25010000002 DEXAMETHASONE PER 1 MG: Performed by: NURSE ANESTHETIST, CERTIFIED REGISTERED

## 2020-07-21 PROCEDURE — 25010000002 VANCOMYCIN 10 G RECONSTITUTED SOLUTION: Performed by: INTERNAL MEDICINE

## 2020-07-21 PROCEDURE — 25010000002 PIPERACILLIN SOD-TAZOBACTAM PER 1 G: Performed by: INTERNAL MEDICINE

## 2020-07-21 PROCEDURE — 0HDLXZZ EXTRACTION OF LEFT LOWER LEG SKIN, EXTERNAL APPROACH: ICD-10-PCS | Performed by: SPECIALIST

## 2020-07-21 PROCEDURE — 85014 HEMATOCRIT: CPT | Performed by: INTERNAL MEDICINE

## 2020-07-21 PROCEDURE — 25010000002 FENTANYL CITRATE (PF) 100 MCG/2ML SOLUTION: Performed by: NURSE ANESTHETIST, CERTIFIED REGISTERED

## 2020-07-21 PROCEDURE — 87205 SMEAR GRAM STAIN: CPT | Performed by: INTERNAL MEDICINE

## 2020-07-21 PROCEDURE — 94799 UNLISTED PULMONARY SVC/PX: CPT

## 2020-07-21 PROCEDURE — 87070 CULTURE OTHR SPECIMN AEROBIC: CPT | Performed by: INTERNAL MEDICINE

## 2020-07-21 PROCEDURE — 0HDMXZZ EXTRACTION OF RIGHT FOOT SKIN, EXTERNAL APPROACH: ICD-10-PCS | Performed by: SPECIALIST

## 2020-07-21 PROCEDURE — 85018 HEMOGLOBIN: CPT | Performed by: INTERNAL MEDICINE

## 2020-07-21 PROCEDURE — 25010000002 ONDANSETRON PER 1 MG: Performed by: NURSE ANESTHETIST, CERTIFIED REGISTERED

## 2020-07-21 PROCEDURE — 87176 TISSUE HOMOGENIZATION CULTR: CPT | Performed by: INTERNAL MEDICINE

## 2020-07-21 PROCEDURE — 87147 CULTURE TYPE IMMUNOLOGIC: CPT | Performed by: INTERNAL MEDICINE

## 2020-07-21 PROCEDURE — 80048 BASIC METABOLIC PNL TOTAL CA: CPT | Performed by: INTERNAL MEDICINE

## 2020-07-21 PROCEDURE — 25010000002 PROPOFOL 10 MG/ML EMULSION: Performed by: NURSE ANESTHETIST, CERTIFIED REGISTERED

## 2020-07-21 PROCEDURE — 25010000002 PIPERACILLIN SOD-TAZOBACTAM PER 1 G: Performed by: SPECIALIST

## 2020-07-21 PROCEDURE — 87186 SC STD MICRODIL/AGAR DIL: CPT | Performed by: INTERNAL MEDICINE

## 2020-07-21 PROCEDURE — 85027 COMPLETE CBC AUTOMATED: CPT | Performed by: INTERNAL MEDICINE

## 2020-07-21 PROCEDURE — 80202 ASSAY OF VANCOMYCIN: CPT | Performed by: INTERNAL MEDICINE

## 2020-07-21 PROCEDURE — 0HDKXZZ EXTRACTION OF RIGHT LOWER LEG SKIN, EXTERNAL APPROACH: ICD-10-PCS | Performed by: SPECIALIST

## 2020-07-21 DEVICE — HEMOST ABS SURGICEL SNOW 1X2IN: Type: IMPLANTABLE DEVICE | Status: FUNCTIONAL

## 2020-07-21 RX ORDER — OXYCODONE AND ACETAMINOPHEN 10; 325 MG/1; MG/1
1 TABLET ORAL ONCE AS NEEDED
Status: DISCONTINUED | OUTPATIENT
Start: 2020-07-21 | End: 2020-07-21 | Stop reason: HOSPADM

## 2020-07-21 RX ORDER — ALPRAZOLAM 0.25 MG/1
0.25 TABLET ORAL NIGHTLY PRN
Status: DISCONTINUED | OUTPATIENT
Start: 2020-07-21 | End: 2020-08-18 | Stop reason: HOSPADM

## 2020-07-21 RX ORDER — ALBUTEROL SULFATE 2.5 MG/3ML
2.5 SOLUTION RESPIRATORY (INHALATION)
Status: DISCONTINUED | OUTPATIENT
Start: 2020-07-21 | End: 2020-07-28

## 2020-07-21 RX ORDER — LIDOCAINE HYDROCHLORIDE 10 MG/ML
0.5 INJECTION, SOLUTION EPIDURAL; INFILTRATION; INTRACAUDAL; PERINEURAL ONCE AS NEEDED
Status: DISCONTINUED | OUTPATIENT
Start: 2020-07-21 | End: 2020-07-21 | Stop reason: HOSPADM

## 2020-07-21 RX ORDER — ONDANSETRON 2 MG/ML
INJECTION INTRAMUSCULAR; INTRAVENOUS AS NEEDED
Status: DISCONTINUED | OUTPATIENT
Start: 2020-07-21 | End: 2020-07-21 | Stop reason: SURG

## 2020-07-21 RX ORDER — ONDANSETRON 2 MG/ML
4 INJECTION INTRAMUSCULAR; INTRAVENOUS ONCE AS NEEDED
Status: DISCONTINUED | OUTPATIENT
Start: 2020-07-21 | End: 2020-07-21 | Stop reason: HOSPADM

## 2020-07-21 RX ORDER — PROPOFOL 10 MG/ML
VIAL (ML) INTRAVENOUS AS NEEDED
Status: DISCONTINUED | OUTPATIENT
Start: 2020-07-21 | End: 2020-07-21 | Stop reason: SURG

## 2020-07-21 RX ORDER — OXYCODONE AND ACETAMINOPHEN 7.5; 325 MG/1; MG/1
2 TABLET ORAL EVERY 4 HOURS PRN
Status: DISCONTINUED | OUTPATIENT
Start: 2020-07-21 | End: 2020-07-21 | Stop reason: HOSPADM

## 2020-07-21 RX ORDER — FENTANYL CITRATE 50 UG/ML
INJECTION, SOLUTION INTRAMUSCULAR; INTRAVENOUS AS NEEDED
Status: DISCONTINUED | OUTPATIENT
Start: 2020-07-21 | End: 2020-07-21 | Stop reason: SURG

## 2020-07-21 RX ORDER — DEXAMETHASONE SODIUM PHOSPHATE 4 MG/ML
4 INJECTION, SOLUTION INTRA-ARTICULAR; INTRALESIONAL; INTRAMUSCULAR; INTRAVENOUS; SOFT TISSUE ONCE AS NEEDED
Status: COMPLETED | OUTPATIENT
Start: 2020-07-21 | End: 2020-07-21

## 2020-07-21 RX ORDER — FLUMAZENIL 0.1 MG/ML
0.2 INJECTION INTRAVENOUS AS NEEDED
Status: DISCONTINUED | OUTPATIENT
Start: 2020-07-21 | End: 2020-07-21 | Stop reason: HOSPADM

## 2020-07-21 RX ORDER — MAGNESIUM HYDROXIDE 1200 MG/15ML
LIQUID ORAL AS NEEDED
Status: DISCONTINUED | OUTPATIENT
Start: 2020-07-21 | End: 2020-07-21 | Stop reason: HOSPADM

## 2020-07-21 RX ORDER — NALOXONE HCL 0.4 MG/ML
0.4 VIAL (ML) INJECTION AS NEEDED
Status: DISCONTINUED | OUTPATIENT
Start: 2020-07-21 | End: 2020-07-21 | Stop reason: HOSPADM

## 2020-07-21 RX ORDER — FERROUS SULFATE 325(65) MG
325 TABLET ORAL
Status: DISCONTINUED | OUTPATIENT
Start: 2020-07-21 | End: 2020-08-07

## 2020-07-21 RX ORDER — HYDROCODONE BITARTRATE AND ACETAMINOPHEN 10; 325 MG/1; MG/1
1 TABLET ORAL EVERY 4 HOURS PRN
Status: DISCONTINUED | OUTPATIENT
Start: 2020-07-21 | End: 2020-08-18 | Stop reason: HOSPADM

## 2020-07-21 RX ORDER — ACETAMINOPHEN 500 MG
1000 TABLET ORAL ONCE
Status: DISCONTINUED | OUTPATIENT
Start: 2020-07-21 | End: 2020-07-21 | Stop reason: HOSPADM

## 2020-07-21 RX ORDER — FENTANYL CITRATE 50 UG/ML
25 INJECTION, SOLUTION INTRAMUSCULAR; INTRAVENOUS
Status: DISCONTINUED | OUTPATIENT
Start: 2020-07-21 | End: 2020-07-21 | Stop reason: HOSPADM

## 2020-07-21 RX ORDER — SODIUM CHLORIDE 0.9 % (FLUSH) 0.9 %
3 SYRINGE (ML) INJECTION EVERY 12 HOURS SCHEDULED
Status: DISCONTINUED | OUTPATIENT
Start: 2020-07-21 | End: 2020-07-21 | Stop reason: HOSPADM

## 2020-07-21 RX ORDER — LABETALOL HYDROCHLORIDE 5 MG/ML
5 INJECTION, SOLUTION INTRAVENOUS
Status: DISCONTINUED | OUTPATIENT
Start: 2020-07-21 | End: 2020-07-21 | Stop reason: HOSPADM

## 2020-07-21 RX ORDER — SODIUM CHLORIDE, SODIUM LACTATE, POTASSIUM CHLORIDE, CALCIUM CHLORIDE 600; 310; 30; 20 MG/100ML; MG/100ML; MG/100ML; MG/100ML
100 INJECTION, SOLUTION INTRAVENOUS CONTINUOUS
Status: DISCONTINUED | OUTPATIENT
Start: 2020-07-21 | End: 2020-07-21

## 2020-07-21 RX ORDER — DEXAMETHASONE SODIUM PHOSPHATE 4 MG/ML
INJECTION, SOLUTION INTRA-ARTICULAR; INTRALESIONAL; INTRAMUSCULAR; INTRAVENOUS; SOFT TISSUE AS NEEDED
Status: DISCONTINUED | OUTPATIENT
Start: 2020-07-21 | End: 2020-07-21 | Stop reason: SURG

## 2020-07-21 RX ORDER — MIDAZOLAM HYDROCHLORIDE 1 MG/ML
2 INJECTION INTRAMUSCULAR; INTRAVENOUS
Status: DISCONTINUED | OUTPATIENT
Start: 2020-07-21 | End: 2020-07-21 | Stop reason: HOSPADM

## 2020-07-21 RX ORDER — MELATONIN
1000 DAILY
Status: DISCONTINUED | OUTPATIENT
Start: 2020-07-21 | End: 2020-08-18 | Stop reason: HOSPADM

## 2020-07-21 RX ORDER — SODIUM CHLORIDE 0.9 % (FLUSH) 0.9 %
3-10 SYRINGE (ML) INJECTION AS NEEDED
Status: DISCONTINUED | OUTPATIENT
Start: 2020-07-21 | End: 2020-07-21 | Stop reason: HOSPADM

## 2020-07-21 RX ADMIN — PROPOFOL 20 MG: 10 INJECTION, EMULSION INTRAVENOUS at 09:03

## 2020-07-21 RX ADMIN — BACLOFEN 10 MG: 10 TABLET ORAL at 13:07

## 2020-07-21 RX ADMIN — TAZOBACTAM SODIUM AND PIPERACILLIN SODIUM 4.5 G: 500; 4 INJECTION, SOLUTION INTRAVENOUS at 00:23

## 2020-07-21 RX ADMIN — PROPOFOL 20 MG: 10 INJECTION, EMULSION INTRAVENOUS at 09:06

## 2020-07-21 RX ADMIN — FENTANYL CITRATE 50 MCG: 50 INJECTION, SOLUTION INTRAMUSCULAR; INTRAVENOUS at 08:29

## 2020-07-21 RX ADMIN — TAZOBACTAM SODIUM AND PIPERACILLIN SODIUM 4.5 G: 500; 4 INJECTION, SOLUTION INTRAVENOUS at 08:34

## 2020-07-21 RX ADMIN — MIDAZOLAM 2 MG: 1 INJECTION INTRAMUSCULAR; INTRAVENOUS at 08:08

## 2020-07-21 RX ADMIN — PROPOFOL 20 MG: 10 INJECTION, EMULSION INTRAVENOUS at 08:54

## 2020-07-21 RX ADMIN — LIDOCAINE HYDROCHLORIDE 50 MG: 20 INJECTION, SOLUTION INTRAVENOUS at 08:29

## 2020-07-21 RX ADMIN — ALBUTEROL SULFATE 2.5 MG: 2.5 SOLUTION RESPIRATORY (INHALATION) at 14:41

## 2020-07-21 RX ADMIN — VANCOMYCIN HYDROCHLORIDE 750 MG: 10 INJECTION, POWDER, LYOPHILIZED, FOR SOLUTION INTRAVENOUS at 05:59

## 2020-07-21 RX ADMIN — PROPOFOL 20 MG: 10 INJECTION, EMULSION INTRAVENOUS at 08:36

## 2020-07-21 RX ADMIN — SODIUM CHLORIDE, POTASSIUM CHLORIDE, SODIUM LACTATE AND CALCIUM CHLORIDE 100 ML/HR: 600; 310; 30; 20 INJECTION, SOLUTION INTRAVENOUS at 08:08

## 2020-07-21 RX ADMIN — PROPOFOL 20 MG: 10 INJECTION, EMULSION INTRAVENOUS at 08:29

## 2020-07-21 RX ADMIN — BACLOFEN 10 MG: 10 TABLET ORAL at 21:26

## 2020-07-21 RX ADMIN — ONDANSETRON HYDROCHLORIDE 4 MG: 2 SOLUTION INTRAMUSCULAR; INTRAVENOUS at 08:43

## 2020-07-21 RX ADMIN — TAZOBACTAM SODIUM AND PIPERACILLIN SODIUM 4.5 G: 500; 4 INJECTION, SOLUTION INTRAVENOUS at 23:14

## 2020-07-21 RX ADMIN — ALBUTEROL SULFATE 2.5 MG: 2.5 SOLUTION RESPIRATORY (INHALATION) at 23:04

## 2020-07-21 RX ADMIN — ALBUTEROL SULFATE 2.5 MG: 2.5 SOLUTION RESPIRATORY (INHALATION) at 11:33

## 2020-07-21 RX ADMIN — FERROUS SULFATE TAB 325 MG (65 MG ELEMENTAL FE) 325 MG: 325 (65 FE) TAB at 17:47

## 2020-07-21 RX ADMIN — SODIUM CHLORIDE, PRESERVATIVE FREE 10 ML: 5 INJECTION INTRAVENOUS at 21:27

## 2020-07-21 RX ADMIN — PROPOFOL 20 MG: 10 INJECTION, EMULSION INTRAVENOUS at 08:44

## 2020-07-21 RX ADMIN — CHOLECALCIFEROL (VITAMIN D3) 25 MCG (1,000 UNIT) TABLET 1000 UNITS: TABLET at 13:07

## 2020-07-21 RX ADMIN — TAZOBACTAM SODIUM AND PIPERACILLIN SODIUM 4.5 G: 500; 4 INJECTION, SOLUTION INTRAVENOUS at 17:46

## 2020-07-21 RX ADMIN — ALBUTEROL SULFATE 2.5 MG: 2.5 SOLUTION RESPIRATORY (INHALATION) at 23:03

## 2020-07-21 RX ADMIN — DEXAMETHASONE SODIUM PHOSPHATE 4 MG: 4 INJECTION, SOLUTION INTRAMUSCULAR; INTRAVENOUS at 08:43

## 2020-07-21 RX ADMIN — PROPOFOL 20 MG: 10 INJECTION, EMULSION INTRAVENOUS at 08:41

## 2020-07-21 RX ADMIN — PROPOFOL 40 MG: 10 INJECTION, EMULSION INTRAVENOUS at 08:49

## 2020-07-21 RX ADMIN — DEXAMETHASONE SODIUM PHOSPHATE 4 MG: 4 INJECTION, SOLUTION INTRAMUSCULAR; INTRAVENOUS at 08:08

## 2020-07-21 RX ADMIN — PROPOFOL 20 MG: 10 INJECTION, EMULSION INTRAVENOUS at 08:58

## 2020-07-21 RX ADMIN — FENTANYL CITRATE 50 MCG: 50 INJECTION, SOLUTION INTRAMUSCULAR; INTRAVENOUS at 08:24

## 2020-07-21 NOTE — ANESTHESIA POSTPROCEDURE EVALUATION
Patient: Jessica Alvarenga    Procedure Summary     Date:  07/21/20 Room / Location:  [facility] OR  /  [facility] OR    Anesthesia Start:  0819 Anesthesia Stop:  0937    Procedure:  DEBRIDEMENT DECUBITUS ULCER (Bilateral Leg Lower) Diagnosis:  (DECUBITUS ULCER)    Surgeon:  Truong Neal MD Provider:  Riaz Cook CRNA    Anesthesia Type:  MAC ASA Status:  3          Anesthesia Type: MAC    Vitals  No vitals data found for the desired time range.          Post Anesthesia Care and Evaluation    Patient location during evaluation: PHASE II  Patient participation: complete - patient participated  Level of consciousness: awake  Pain score: 0  Pain management: adequate  Airway patency: patent  Anesthetic complications: No anesthetic complications  PONV Status: none  Cardiovascular status: acceptable  Respiratory status: acceptable  Hydration status: acceptable

## 2020-07-21 NOTE — ANESTHESIA PREPROCEDURE EVALUATION
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 Has chronic osteomyelitis in pelvis and multiple decubiti. Have discussed sedation)  intravenous induction     Anesthetic plan, all risks, benefits, and alternatives have been provided, discussed and informed consent has been obtained with: patient.

## 2020-07-22 PROCEDURE — 25010000002 ENOXAPARIN PER 10 MG: Performed by: SPECIALIST

## 2020-07-22 PROCEDURE — 25010000002 PIPERACILLIN SOD-TAZOBACTAM PER 1 G: Performed by: SPECIALIST

## 2020-07-22 PROCEDURE — 94799 UNLISTED PULMONARY SVC/PX: CPT

## 2020-07-22 PROCEDURE — 25010000002 VANCOMYCIN 10 G RECONSTITUTED SOLUTION: Performed by: SPECIALIST

## 2020-07-22 RX ADMIN — BACLOFEN 10 MG: 10 TABLET ORAL at 21:11

## 2020-07-22 RX ADMIN — CHOLECALCIFEROL (VITAMIN D3) 25 MCG (1,000 UNIT) TABLET 1000 UNITS: TABLET at 08:00

## 2020-07-22 RX ADMIN — VANCOMYCIN HYDROCHLORIDE 750 MG: 10 INJECTION, POWDER, LYOPHILIZED, FOR SOLUTION INTRAVENOUS at 06:47

## 2020-07-22 RX ADMIN — TAZOBACTAM SODIUM AND PIPERACILLIN SODIUM 4.5 G: 500; 4 INJECTION, SOLUTION INTRAVENOUS at 23:27

## 2020-07-22 RX ADMIN — ENOXAPARIN SODIUM 40 MG: 40 INJECTION SUBCUTANEOUS at 13:20

## 2020-07-22 RX ADMIN — TAZOBACTAM SODIUM AND PIPERACILLIN SODIUM 4.5 G: 500; 4 INJECTION, SOLUTION INTRAVENOUS at 16:49

## 2020-07-22 RX ADMIN — ALBUTEROL SULFATE 2.5 MG: 2.5 SOLUTION RESPIRATORY (INHALATION) at 07:06

## 2020-07-22 RX ADMIN — BACLOFEN 10 MG: 10 TABLET ORAL at 13:20

## 2020-07-22 RX ADMIN — ALBUTEROL SULFATE 2.5 MG: 2.5 SOLUTION RESPIRATORY (INHALATION) at 19:24

## 2020-07-22 RX ADMIN — SODIUM CHLORIDE, PRESERVATIVE FREE 10 ML: 5 INJECTION INTRAVENOUS at 21:11

## 2020-07-22 RX ADMIN — TAZOBACTAM SODIUM AND PIPERACILLIN SODIUM 4.5 G: 500; 4 INJECTION, SOLUTION INTRAVENOUS at 08:00

## 2020-07-22 RX ADMIN — DAKIN'S SOLUTION 0.125% (QUARTER STRENGTH): 0.12 SOLUTION at 13:21

## 2020-07-22 RX ADMIN — FERROUS SULFATE TAB 325 MG (65 MG ELEMENTAL FE) 325 MG: 325 (65 FE) TAB at 16:49

## 2020-07-22 RX ADMIN — OMEPRAZOLE 20 MG: 20 TABLET, DELAYED RELEASE ORAL at 08:00

## 2020-07-22 RX ADMIN — COLLAGENASE SANTYL: 250 OINTMENT TOPICAL at 13:21

## 2020-07-23 PROCEDURE — 25010000002 ENOXAPARIN PER 10 MG: Performed by: SPECIALIST

## 2020-07-23 PROCEDURE — 94799 UNLISTED PULMONARY SVC/PX: CPT

## 2020-07-23 PROCEDURE — 25010000002 VANCOMYCIN 10 G RECONSTITUTED SOLUTION: Performed by: SPECIALIST

## 2020-07-23 PROCEDURE — 25010000002 PIPERACILLIN SOD-TAZOBACTAM PER 1 G: Performed by: SPECIALIST

## 2020-07-23 RX ADMIN — OMEPRAZOLE 20 MG: 20 TABLET, DELAYED RELEASE ORAL at 08:41

## 2020-07-23 RX ADMIN — SODIUM CHLORIDE, PRESERVATIVE FREE 10 ML: 5 INJECTION INTRAVENOUS at 22:29

## 2020-07-23 RX ADMIN — VANCOMYCIN HYDROCHLORIDE 750 MG: 10 INJECTION, POWDER, LYOPHILIZED, FOR SOLUTION INTRAVENOUS at 05:54

## 2020-07-23 RX ADMIN — SODIUM CHLORIDE, PRESERVATIVE FREE 10 ML: 5 INJECTION INTRAVENOUS at 08:41

## 2020-07-23 RX ADMIN — BACLOFEN 10 MG: 10 TABLET ORAL at 22:29

## 2020-07-23 RX ADMIN — ENOXAPARIN SODIUM 40 MG: 40 INJECTION SUBCUTANEOUS at 12:15

## 2020-07-23 RX ADMIN — ALBUTEROL SULFATE 2.5 MG: 2.5 SOLUTION RESPIRATORY (INHALATION) at 23:15

## 2020-07-23 RX ADMIN — ALBUTEROL SULFATE 2.5 MG: 2.5 SOLUTION RESPIRATORY (INHALATION) at 19:43

## 2020-07-23 RX ADMIN — DAKIN'S SOLUTION 0.125% (QUARTER STRENGTH): 0.12 SOLUTION at 08:41

## 2020-07-23 RX ADMIN — CHOLECALCIFEROL (VITAMIN D3) 25 MCG (1,000 UNIT) TABLET 1000 UNITS: TABLET at 08:41

## 2020-07-23 RX ADMIN — BACLOFEN 10 MG: 10 TABLET ORAL at 15:10

## 2020-07-23 RX ADMIN — TAZOBACTAM SODIUM AND PIPERACILLIN SODIUM 4.5 G: 500; 4 INJECTION, SOLUTION INTRAVENOUS at 15:10

## 2020-07-23 RX ADMIN — COLLAGENASE SANTYL: 250 OINTMENT TOPICAL at 08:41

## 2020-07-23 RX ADMIN — TAZOBACTAM SODIUM AND PIPERACILLIN SODIUM 4.5 G: 500; 4 INJECTION, SOLUTION INTRAVENOUS at 08:41

## 2020-07-24 LAB
ANION GAP SERPL CALCULATED.3IONS-SCNC: 12 MMOL/L (ref 5–15)
BH BB BLOOD EXPIRATION DATE: NORMAL
BH BB BLOOD EXPIRATION DATE: NORMAL
BH BB BLOOD TYPE BARCODE: 6200
BH BB BLOOD TYPE BARCODE: 6200
BH BB DISPENSE STATUS: NORMAL
BH BB DISPENSE STATUS: NORMAL
BH BB PRODUCT CODE: NORMAL
BH BB PRODUCT CODE: NORMAL
BH BB UNIT NUMBER: NORMAL
BH BB UNIT NUMBER: NORMAL
BUN SERPL-MCNC: 27 MG/DL (ref 6–20)
BUN/CREAT SERPL: 21.4 (ref 7–25)
CALCIUM SPEC-SCNC: 7.8 MG/DL (ref 8.6–10.5)
CHLORIDE SERPL-SCNC: 110 MMOL/L (ref 98–107)
CO2 SERPL-SCNC: 22 MMOL/L (ref 22–29)
CREAT SERPL-MCNC: 1.26 MG/DL (ref 0.57–1)
CROSSMATCH INTERPRETATION: NORMAL
CROSSMATCH INTERPRETATION: NORMAL
GFR SERPL CREATININE-BSD FRML MDRD: 44 ML/MIN/1.73
GLUCOSE SERPL-MCNC: 136 MG/DL (ref 65–99)
POTASSIUM SERPL-SCNC: 4.3 MMOL/L (ref 3.5–5.2)
SODIUM SERPL-SCNC: 144 MMOL/L (ref 136–145)
UNIT  ABO: NORMAL
UNIT  ABO: NORMAL
UNIT  RH: NORMAL
UNIT  RH: NORMAL

## 2020-07-24 PROCEDURE — 94799 UNLISTED PULMONARY SVC/PX: CPT

## 2020-07-24 PROCEDURE — 25010000002 VANCOMYCIN 10 G RECONSTITUTED SOLUTION: Performed by: SPECIALIST

## 2020-07-24 PROCEDURE — 94760 N-INVAS EAR/PLS OXIMETRY 1: CPT

## 2020-07-24 PROCEDURE — 25010000002 ENOXAPARIN PER 10 MG: Performed by: SPECIALIST

## 2020-07-24 PROCEDURE — 25010000002 CEFTAZIDIME PER 500 MG: Performed by: INTERNAL MEDICINE

## 2020-07-24 PROCEDURE — 80048 BASIC METABOLIC PNL TOTAL CA: CPT | Performed by: INTERNAL MEDICINE

## 2020-07-24 PROCEDURE — 25010000002 PIPERACILLIN SOD-TAZOBACTAM PER 1 G: Performed by: SPECIALIST

## 2020-07-24 RX ORDER — FUROSEMIDE 20 MG/1
10 TABLET ORAL DAILY
Status: DISCONTINUED | OUTPATIENT
Start: 2020-07-24 | End: 2020-08-07

## 2020-07-24 RX ADMIN — BACLOFEN 10 MG: 10 TABLET ORAL at 17:43

## 2020-07-24 RX ADMIN — BACLOFEN 10 MG: 10 TABLET ORAL at 12:33

## 2020-07-24 RX ADMIN — CHOLECALCIFEROL (VITAMIN D3) 25 MCG (1,000 UNIT) TABLET 1000 UNITS: TABLET at 08:26

## 2020-07-24 RX ADMIN — ALBUTEROL SULFATE 2.5 MG: 2.5 SOLUTION RESPIRATORY (INHALATION) at 07:05

## 2020-07-24 RX ADMIN — ENOXAPARIN SODIUM 40 MG: 40 INJECTION SUBCUTANEOUS at 12:33

## 2020-07-24 RX ADMIN — ALBUTEROL SULFATE 2.5 MG: 2.5 SOLUTION RESPIRATORY (INHALATION) at 10:56

## 2020-07-24 RX ADMIN — FUROSEMIDE 10 MG: 20 TABLET ORAL at 12:33

## 2020-07-24 RX ADMIN — BACLOFEN 10 MG: 10 TABLET ORAL at 20:36

## 2020-07-24 RX ADMIN — DAKIN'S SOLUTION 0.125% (QUARTER STRENGTH): 0.12 SOLUTION at 08:26

## 2020-07-24 RX ADMIN — TAZOBACTAM SODIUM AND PIPERACILLIN SODIUM 4.5 G: 500; 4 INJECTION, SOLUTION INTRAVENOUS at 00:34

## 2020-07-24 RX ADMIN — SODIUM CHLORIDE, PRESERVATIVE FREE 10 ML: 5 INJECTION INTRAVENOUS at 20:36

## 2020-07-24 RX ADMIN — CEFTAZIDIME 2 G: 1 INJECTION, POWDER, FOR SOLUTION INTRAMUSCULAR; INTRAVENOUS at 17:43

## 2020-07-24 RX ADMIN — SODIUM CHLORIDE, PRESERVATIVE FREE 10 ML: 5 INJECTION INTRAVENOUS at 08:26

## 2020-07-24 RX ADMIN — OMEPRAZOLE 20 MG: 20 TABLET, DELAYED RELEASE ORAL at 08:26

## 2020-07-24 RX ADMIN — COLLAGENASE SANTYL: 250 OINTMENT TOPICAL at 08:26

## 2020-07-24 RX ADMIN — VANCOMYCIN HYDROCHLORIDE 750 MG: 10 INJECTION, POWDER, LYOPHILIZED, FOR SOLUTION INTRAVENOUS at 06:58

## 2020-07-24 RX ADMIN — CEFTAZIDIME 2 G: 1 INJECTION, POWDER, FOR SOLUTION INTRAMUSCULAR; INTRAVENOUS at 09:23

## 2020-07-25 PROCEDURE — 25010000002 ENOXAPARIN PER 10 MG: Performed by: SPECIALIST

## 2020-07-25 PROCEDURE — 25010000002 VANCOMYCIN 10 G RECONSTITUTED SOLUTION: Performed by: INTERNAL MEDICINE

## 2020-07-25 PROCEDURE — 25010000002 CEFTAZIDIME PER 500 MG: Performed by: INTERNAL MEDICINE

## 2020-07-25 RX ADMIN — BACLOFEN 10 MG: 10 TABLET ORAL at 17:14

## 2020-07-25 RX ADMIN — CEFTAZIDIME 2 G: 1 INJECTION, POWDER, FOR SOLUTION INTRAMUSCULAR; INTRAVENOUS at 09:02

## 2020-07-25 RX ADMIN — CEFTAZIDIME 2 G: 1 INJECTION, POWDER, FOR SOLUTION INTRAMUSCULAR; INTRAVENOUS at 00:55

## 2020-07-25 RX ADMIN — CHOLECALCIFEROL (VITAMIN D3) 25 MCG (1,000 UNIT) TABLET 1000 UNITS: TABLET at 09:02

## 2020-07-25 RX ADMIN — ENOXAPARIN SODIUM 40 MG: 40 INJECTION SUBCUTANEOUS at 12:23

## 2020-07-25 RX ADMIN — CEFTAZIDIME 2 G: 1 INJECTION, POWDER, FOR SOLUTION INTRAMUSCULAR; INTRAVENOUS at 20:18

## 2020-07-25 RX ADMIN — BACLOFEN 10 MG: 10 TABLET ORAL at 12:23

## 2020-07-25 RX ADMIN — OMEPRAZOLE 20 MG: 20 TABLET, DELAYED RELEASE ORAL at 09:02

## 2020-07-25 RX ADMIN — FERROUS SULFATE TAB 325 MG (65 MG ELEMENTAL FE) 325 MG: 325 (65 FE) TAB at 17:14

## 2020-07-25 RX ADMIN — FUROSEMIDE 10 MG: 20 TABLET ORAL at 09:02

## 2020-07-25 RX ADMIN — VANCOMYCIN HYDROCHLORIDE 750 MG: 10 INJECTION, POWDER, LYOPHILIZED, FOR SOLUTION INTRAVENOUS at 06:15

## 2020-07-26 LAB
ANION GAP SERPL CALCULATED.3IONS-SCNC: 9 MMOL/L (ref 5–15)
BUN SERPL-MCNC: 22 MG/DL (ref 6–20)
BUN/CREAT SERPL: 20.2 (ref 7–25)
CALCIUM SPEC-SCNC: 8 MG/DL (ref 8.6–10.5)
CHLORIDE SERPL-SCNC: 108 MMOL/L (ref 98–107)
CO2 SERPL-SCNC: 22 MMOL/L (ref 22–29)
CREAT SERPL-MCNC: 1.09 MG/DL (ref 0.57–1)
DEPRECATED RDW RBC AUTO: 69.1 FL (ref 37–54)
ERYTHROCYTE [DISTWIDTH] IN BLOOD BY AUTOMATED COUNT: 22.5 % (ref 12.3–15.4)
GFR SERPL CREATININE-BSD FRML MDRD: 52 ML/MIN/1.73
GLUCOSE SERPL-MCNC: 116 MG/DL (ref 65–99)
HCT VFR BLD AUTO: 28.7 % (ref 34–46.6)
HGB BLD-MCNC: 8.4 G/DL (ref 12–15.9)
MCH RBC QN AUTO: 24.9 PG (ref 26.6–33)
MCHC RBC AUTO-ENTMCNC: 29.3 G/DL (ref 31.5–35.7)
MCV RBC AUTO: 84.9 FL (ref 79–97)
PLATELET # BLD AUTO: 300 10*3/MM3 (ref 140–450)
PMV BLD AUTO: 8.7 FL (ref 6–12)
POTASSIUM SERPL-SCNC: 4.3 MMOL/L (ref 3.5–5.2)
RBC # BLD AUTO: 3.38 10*6/MM3 (ref 3.77–5.28)
SODIUM SERPL-SCNC: 139 MMOL/L (ref 136–145)
VANCOMYCIN TROUGH SERPL-MCNC: 14.3 MCG/ML (ref 5–20)
WBC # BLD AUTO: 7.43 10*3/MM3 (ref 3.4–10.8)

## 2020-07-26 PROCEDURE — 25010000002 CEFTAZIDIME PER 500 MG: Performed by: INTERNAL MEDICINE

## 2020-07-26 PROCEDURE — 80048 BASIC METABOLIC PNL TOTAL CA: CPT | Performed by: INTERNAL MEDICINE

## 2020-07-26 PROCEDURE — 25010000002 VANCOMYCIN 10 G RECONSTITUTED SOLUTION: Performed by: INTERNAL MEDICINE

## 2020-07-26 PROCEDURE — 25010000002 ENOXAPARIN PER 10 MG: Performed by: SPECIALIST

## 2020-07-26 PROCEDURE — 80202 ASSAY OF VANCOMYCIN: CPT | Performed by: INTERNAL MEDICINE

## 2020-07-26 PROCEDURE — 85027 COMPLETE CBC AUTOMATED: CPT | Performed by: INTERNAL MEDICINE

## 2020-07-26 RX ADMIN — COLLAGENASE SANTYL: 250 OINTMENT TOPICAL at 08:48

## 2020-07-26 RX ADMIN — DAKIN'S SOLUTION 0.125% (QUARTER STRENGTH): 0.12 SOLUTION at 00:48

## 2020-07-26 RX ADMIN — ENOXAPARIN SODIUM 40 MG: 40 INJECTION SUBCUTANEOUS at 14:31

## 2020-07-26 RX ADMIN — OMEPRAZOLE 20 MG: 20 TABLET, DELAYED RELEASE ORAL at 08:46

## 2020-07-26 RX ADMIN — COLLAGENASE SANTYL: 250 OINTMENT TOPICAL at 00:48

## 2020-07-26 RX ADMIN — SODIUM CHLORIDE, PRESERVATIVE FREE 10 ML: 5 INJECTION INTRAVENOUS at 00:49

## 2020-07-26 RX ADMIN — BACLOFEN 10 MG: 10 TABLET ORAL at 17:02

## 2020-07-26 RX ADMIN — DAKIN'S SOLUTION 0.125% (QUARTER STRENGTH): 0.12 SOLUTION at 08:48

## 2020-07-26 RX ADMIN — CEFTAZIDIME 2 G: 1 INJECTION, POWDER, FOR SOLUTION INTRAMUSCULAR; INTRAVENOUS at 04:25

## 2020-07-26 RX ADMIN — FERROUS SULFATE TAB 325 MG (65 MG ELEMENTAL FE) 325 MG: 325 (65 FE) TAB at 17:02

## 2020-07-26 RX ADMIN — BACLOFEN 10 MG: 10 TABLET ORAL at 11:14

## 2020-07-26 RX ADMIN — CEFTAZIDIME 2 G: 1 INJECTION, POWDER, FOR SOLUTION INTRAMUSCULAR; INTRAVENOUS at 11:14

## 2020-07-26 RX ADMIN — BACLOFEN 10 MG: 10 TABLET ORAL at 22:54

## 2020-07-26 RX ADMIN — SODIUM CHLORIDE, PRESERVATIVE FREE 10 ML: 5 INJECTION INTRAVENOUS at 20:23

## 2020-07-26 RX ADMIN — FUROSEMIDE 10 MG: 20 TABLET ORAL at 08:47

## 2020-07-26 RX ADMIN — CEFTAZIDIME 2 G: 1 INJECTION, POWDER, FOR SOLUTION INTRAMUSCULAR; INTRAVENOUS at 20:23

## 2020-07-26 RX ADMIN — VANCOMYCIN HYDROCHLORIDE 750 MG: 10 INJECTION, POWDER, LYOPHILIZED, FOR SOLUTION INTRAVENOUS at 06:09

## 2020-07-26 RX ADMIN — CHOLECALCIFEROL (VITAMIN D3) 25 MCG (1,000 UNIT) TABLET 1000 UNITS: TABLET at 08:47

## 2020-07-27 PROCEDURE — 25010000002 VANCOMYCIN 10 G RECONSTITUTED SOLUTION: Performed by: INTERNAL MEDICINE

## 2020-07-27 PROCEDURE — 25010000002 ENOXAPARIN PER 10 MG: Performed by: SPECIALIST

## 2020-07-27 PROCEDURE — 25010000002 CEFTAZIDIME PER 500 MG: Performed by: INTERNAL MEDICINE

## 2020-07-27 PROCEDURE — 63710000001 DIPHENHYDRAMINE PER 50 MG: Performed by: INTERNAL MEDICINE

## 2020-07-27 PROCEDURE — 94799 UNLISTED PULMONARY SVC/PX: CPT

## 2020-07-27 RX ORDER — DIPHENHYDRAMINE HCL 25 MG
25 CAPSULE ORAL EVERY 6 HOURS PRN
Status: DISCONTINUED | OUTPATIENT
Start: 2020-07-27 | End: 2020-08-18 | Stop reason: HOSPADM

## 2020-07-27 RX ADMIN — BACLOFEN 10 MG: 10 TABLET ORAL at 12:35

## 2020-07-27 RX ADMIN — ENOXAPARIN SODIUM 40 MG: 40 INJECTION SUBCUTANEOUS at 13:44

## 2020-07-27 RX ADMIN — COLLAGENASE SANTYL: 250 OINTMENT TOPICAL at 09:49

## 2020-07-27 RX ADMIN — DIPHENHYDRAMINE HYDROCHLORIDE 25 MG: 25 CAPSULE ORAL at 16:44

## 2020-07-27 RX ADMIN — CEFTAZIDIME 2 G: 1 INJECTION, POWDER, FOR SOLUTION INTRAMUSCULAR; INTRAVENOUS at 04:39

## 2020-07-27 RX ADMIN — OMEPRAZOLE 20 MG: 20 TABLET, DELAYED RELEASE ORAL at 08:36

## 2020-07-27 RX ADMIN — SODIUM CHLORIDE, PRESERVATIVE FREE 10 ML: 5 INJECTION INTRAVENOUS at 08:39

## 2020-07-27 RX ADMIN — BACLOFEN 10 MG: 10 TABLET ORAL at 22:35

## 2020-07-27 RX ADMIN — CHOLECALCIFEROL (VITAMIN D3) 25 MCG (1,000 UNIT) TABLET 1000 UNITS: TABLET at 08:36

## 2020-07-27 RX ADMIN — BACLOFEN 10 MG: 10 TABLET ORAL at 16:44

## 2020-07-27 RX ADMIN — CEFTAZIDIME 2 G: 1 INJECTION, POWDER, FOR SOLUTION INTRAMUSCULAR; INTRAVENOUS at 19:29

## 2020-07-27 RX ADMIN — SODIUM CHLORIDE, PRESERVATIVE FREE 10 ML: 5 INJECTION INTRAVENOUS at 22:35

## 2020-07-27 RX ADMIN — VANCOMYCIN HYDROCHLORIDE 750 MG: 10 INJECTION, POWDER, LYOPHILIZED, FOR SOLUTION INTRAVENOUS at 05:58

## 2020-07-27 RX ADMIN — FERROUS SULFATE TAB 325 MG (65 MG ELEMENTAL FE) 325 MG: 325 (65 FE) TAB at 16:44

## 2020-07-27 RX ADMIN — CEFTAZIDIME 2 G: 1 INJECTION, POWDER, FOR SOLUTION INTRAMUSCULAR; INTRAVENOUS at 12:35

## 2020-07-27 RX ADMIN — FUROSEMIDE 10 MG: 20 TABLET ORAL at 08:36

## 2020-07-27 RX ADMIN — DAKIN'S SOLUTION 0.125% (QUARTER STRENGTH): 0.12 SOLUTION at 08:38

## 2020-07-28 PROCEDURE — 25010000002 VANCOMYCIN 10 G RECONSTITUTED SOLUTION: Performed by: INTERNAL MEDICINE

## 2020-07-28 PROCEDURE — 25010000002 ENOXAPARIN PER 10 MG: Performed by: SPECIALIST

## 2020-07-28 PROCEDURE — 63710000001 DIPHENHYDRAMINE PER 50 MG: Performed by: INTERNAL MEDICINE

## 2020-07-28 PROCEDURE — 25010000002 CEFTAZIDIME PER 500 MG: Performed by: INTERNAL MEDICINE

## 2020-07-28 PROCEDURE — 94799 UNLISTED PULMONARY SVC/PX: CPT

## 2020-07-28 RX ORDER — ALBUTEROL SULFATE 2.5 MG/3ML
2.5 SOLUTION RESPIRATORY (INHALATION) 4 TIMES DAILY PRN
Status: DISCONTINUED | OUTPATIENT
Start: 2020-07-28 | End: 2020-08-18 | Stop reason: HOSPADM

## 2020-07-28 RX ADMIN — OMEPRAZOLE 20 MG: 20 TABLET, DELAYED RELEASE ORAL at 08:35

## 2020-07-28 RX ADMIN — DIPHENHYDRAMINE HYDROCHLORIDE 25 MG: 25 CAPSULE ORAL at 14:14

## 2020-07-28 RX ADMIN — CEFTAZIDIME 2 G: 1 INJECTION, POWDER, FOR SOLUTION INTRAMUSCULAR; INTRAVENOUS at 04:19

## 2020-07-28 RX ADMIN — BACLOFEN 10 MG: 10 TABLET ORAL at 17:22

## 2020-07-28 RX ADMIN — CEFTAZIDIME 2 G: 1 INJECTION, POWDER, FOR SOLUTION INTRAMUSCULAR; INTRAVENOUS at 11:59

## 2020-07-28 RX ADMIN — ENOXAPARIN SODIUM 40 MG: 40 INJECTION SUBCUTANEOUS at 12:00

## 2020-07-28 RX ADMIN — FUROSEMIDE 10 MG: 20 TABLET ORAL at 08:35

## 2020-07-28 RX ADMIN — FERROUS SULFATE TAB 325 MG (65 MG ELEMENTAL FE) 325 MG: 325 (65 FE) TAB at 17:21

## 2020-07-28 RX ADMIN — SODIUM CHLORIDE, PRESERVATIVE FREE 10 ML: 5 INJECTION INTRAVENOUS at 08:35

## 2020-07-28 RX ADMIN — COLLAGENASE SANTYL: 250 OINTMENT TOPICAL at 08:35

## 2020-07-28 RX ADMIN — BACLOFEN 10 MG: 10 TABLET ORAL at 11:59

## 2020-07-28 RX ADMIN — VANCOMYCIN HYDROCHLORIDE 750 MG: 10 INJECTION, POWDER, LYOPHILIZED, FOR SOLUTION INTRAVENOUS at 06:08

## 2020-07-28 RX ADMIN — CHOLECALCIFEROL (VITAMIN D3) 25 MCG (1,000 UNIT) TABLET 1000 UNITS: TABLET at 08:35

## 2020-07-28 RX ADMIN — SODIUM CHLORIDE, PRESERVATIVE FREE 10 ML: 5 INJECTION INTRAVENOUS at 22:30

## 2020-07-28 RX ADMIN — CEFTAZIDIME 2 G: 1 INJECTION, POWDER, FOR SOLUTION INTRAMUSCULAR; INTRAVENOUS at 19:44

## 2020-07-28 RX ADMIN — BACLOFEN 10 MG: 10 TABLET ORAL at 22:29

## 2020-07-28 RX ADMIN — DAKIN'S SOLUTION 0.125% (QUARTER STRENGTH): 0.12 SOLUTION at 08:35

## 2020-07-29 LAB
ANION GAP SERPL CALCULATED.3IONS-SCNC: 11 MMOL/L (ref 5–15)
BUN SERPL-MCNC: 24 MG/DL (ref 6–20)
BUN/CREAT SERPL: 20.9 (ref 7–25)
CALCIUM SPEC-SCNC: 8.2 MG/DL (ref 8.6–10.5)
CHLORIDE SERPL-SCNC: 107 MMOL/L (ref 98–107)
CO2 SERPL-SCNC: 25 MMOL/L (ref 22–29)
CREAT SERPL-MCNC: 1.15 MG/DL (ref 0.57–1)
DEPRECATED RDW RBC AUTO: 68.9 FL (ref 37–54)
ERYTHROCYTE [DISTWIDTH] IN BLOOD BY AUTOMATED COUNT: 22.5 % (ref 12.3–15.4)
GFR SERPL CREATININE-BSD FRML MDRD: 48 ML/MIN/1.73
GLUCOSE SERPL-MCNC: 97 MG/DL (ref 65–99)
HCT VFR BLD AUTO: 28.5 % (ref 34–46.6)
HGB BLD-MCNC: 8.4 G/DL (ref 12–15.9)
MCH RBC QN AUTO: 24.9 PG (ref 26.6–33)
MCHC RBC AUTO-ENTMCNC: 29.5 G/DL (ref 31.5–35.7)
MCV RBC AUTO: 84.6 FL (ref 79–97)
PLATELET # BLD AUTO: 255 10*3/MM3 (ref 140–450)
PMV BLD AUTO: 9.3 FL (ref 6–12)
POTASSIUM SERPL-SCNC: 3.9 MMOL/L (ref 3.5–5.2)
RBC # BLD AUTO: 3.37 10*6/MM3 (ref 3.77–5.28)
SODIUM SERPL-SCNC: 143 MMOL/L (ref 136–145)
WBC # BLD AUTO: 5.99 10*3/MM3 (ref 3.4–10.8)

## 2020-07-29 PROCEDURE — 25010000002 ENOXAPARIN PER 10 MG: Performed by: SPECIALIST

## 2020-07-29 PROCEDURE — 85027 COMPLETE CBC AUTOMATED: CPT | Performed by: INTERNAL MEDICINE

## 2020-07-29 PROCEDURE — 80048 BASIC METABOLIC PNL TOTAL CA: CPT | Performed by: INTERNAL MEDICINE

## 2020-07-29 PROCEDURE — 25010000002 VANCOMYCIN 10 G RECONSTITUTED SOLUTION: Performed by: INTERNAL MEDICINE

## 2020-07-29 PROCEDURE — 25010000002 CEFTAZIDIME PER 500 MG: Performed by: INTERNAL MEDICINE

## 2020-07-29 RX ADMIN — FUROSEMIDE 10 MG: 20 TABLET ORAL at 08:29

## 2020-07-29 RX ADMIN — COLLAGENASE SANTYL: 250 OINTMENT TOPICAL at 12:18

## 2020-07-29 RX ADMIN — BACLOFEN 10 MG: 10 TABLET ORAL at 21:54

## 2020-07-29 RX ADMIN — CHOLECALCIFEROL (VITAMIN D3) 25 MCG (1,000 UNIT) TABLET 1000 UNITS: TABLET at 08:29

## 2020-07-29 RX ADMIN — ENOXAPARIN SODIUM 40 MG: 40 INJECTION SUBCUTANEOUS at 14:41

## 2020-07-29 RX ADMIN — VANCOMYCIN HYDROCHLORIDE 750 MG: 10 INJECTION, POWDER, LYOPHILIZED, FOR SOLUTION INTRAVENOUS at 05:59

## 2020-07-29 RX ADMIN — OMEPRAZOLE 20 MG: 20 TABLET, DELAYED RELEASE ORAL at 08:28

## 2020-07-29 RX ADMIN — SODIUM CHLORIDE, PRESERVATIVE FREE 10 ML: 5 INJECTION INTRAVENOUS at 12:20

## 2020-07-29 RX ADMIN — CEFTAZIDIME 2 G: 1 INJECTION, POWDER, FOR SOLUTION INTRAMUSCULAR; INTRAVENOUS at 04:43

## 2020-07-29 RX ADMIN — FERROUS SULFATE TAB 325 MG (65 MG ELEMENTAL FE) 325 MG: 325 (65 FE) TAB at 18:24

## 2020-07-29 RX ADMIN — DAKIN'S SOLUTION 0.125% (QUARTER STRENGTH): 0.12 SOLUTION at 12:18

## 2020-07-29 RX ADMIN — CEFTAZIDIME 2 G: 1 INJECTION, POWDER, FOR SOLUTION INTRAMUSCULAR; INTRAVENOUS at 21:54

## 2020-07-29 RX ADMIN — BACLOFEN 10 MG: 10 TABLET ORAL at 12:19

## 2020-07-29 RX ADMIN — BACLOFEN 10 MG: 10 TABLET ORAL at 18:24

## 2020-07-29 RX ADMIN — CEFTAZIDIME 2 G: 1 INJECTION, POWDER, FOR SOLUTION INTRAMUSCULAR; INTRAVENOUS at 12:17

## 2020-07-30 PROCEDURE — 25010000002 ENOXAPARIN PER 10 MG: Performed by: SPECIALIST

## 2020-07-30 PROCEDURE — 25010000002 VANCOMYCIN 10 G RECONSTITUTED SOLUTION: Performed by: INTERNAL MEDICINE

## 2020-07-30 PROCEDURE — 25010000002 CEFTAZIDIME PER 500 MG: Performed by: INTERNAL MEDICINE

## 2020-07-30 RX ADMIN — BACLOFEN 10 MG: 10 TABLET ORAL at 12:57

## 2020-07-30 RX ADMIN — FERROUS SULFATE TAB 325 MG (65 MG ELEMENTAL FE) 325 MG: 325 (65 FE) TAB at 17:40

## 2020-07-30 RX ADMIN — COLLAGENASE SANTYL: 250 OINTMENT TOPICAL at 08:13

## 2020-07-30 RX ADMIN — SODIUM CHLORIDE, PRESERVATIVE FREE 10 ML: 5 INJECTION INTRAVENOUS at 08:13

## 2020-07-30 RX ADMIN — BACLOFEN 10 MG: 10 TABLET ORAL at 17:40

## 2020-07-30 RX ADMIN — FUROSEMIDE 10 MG: 20 TABLET ORAL at 08:12

## 2020-07-30 RX ADMIN — OMEPRAZOLE 20 MG: 20 TABLET, DELAYED RELEASE ORAL at 08:11

## 2020-07-30 RX ADMIN — ENOXAPARIN SODIUM 40 MG: 40 INJECTION SUBCUTANEOUS at 12:57

## 2020-07-30 RX ADMIN — BACLOFEN 10 MG: 10 TABLET ORAL at 21:02

## 2020-07-30 RX ADMIN — CEFTAZIDIME 2 G: 1 INJECTION, POWDER, FOR SOLUTION INTRAMUSCULAR; INTRAVENOUS at 05:15

## 2020-07-30 RX ADMIN — CEFTAZIDIME 2 G: 1 INJECTION, POWDER, FOR SOLUTION INTRAMUSCULAR; INTRAVENOUS at 21:00

## 2020-07-30 RX ADMIN — CEFTAZIDIME 2 G: 1 INJECTION, POWDER, FOR SOLUTION INTRAMUSCULAR; INTRAVENOUS at 12:57

## 2020-07-30 RX ADMIN — SODIUM CHLORIDE, PRESERVATIVE FREE 10 ML: 5 INJECTION INTRAVENOUS at 21:56

## 2020-07-30 RX ADMIN — VANCOMYCIN HYDROCHLORIDE 750 MG: 10 INJECTION, POWDER, LYOPHILIZED, FOR SOLUTION INTRAVENOUS at 06:09

## 2020-07-30 RX ADMIN — CHOLECALCIFEROL (VITAMIN D3) 25 MCG (1,000 UNIT) TABLET 1000 UNITS: TABLET at 08:12

## 2020-07-30 RX ADMIN — DAKIN'S SOLUTION 0.125% (QUARTER STRENGTH): 0.12 SOLUTION at 08:13

## 2020-07-31 LAB
CREAT SERPL-MCNC: 0.74 MG/DL (ref 0.57–1)
GFR SERPL CREATININE-BSD FRML MDRD: 81 ML/MIN/1.73
VANCOMYCIN TROUGH SERPL-MCNC: 13.4 MCG/ML (ref 5–20)

## 2020-07-31 PROCEDURE — 25010000002 ENOXAPARIN PER 10 MG: Performed by: SPECIALIST

## 2020-07-31 PROCEDURE — 82565 ASSAY OF CREATININE: CPT | Performed by: INTERNAL MEDICINE

## 2020-07-31 PROCEDURE — 80202 ASSAY OF VANCOMYCIN: CPT | Performed by: INTERNAL MEDICINE

## 2020-07-31 PROCEDURE — 25010000002 CEFTAZIDIME PER 500 MG: Performed by: INTERNAL MEDICINE

## 2020-07-31 PROCEDURE — 25010000002 VANCOMYCIN 10 G RECONSTITUTED SOLUTION: Performed by: INTERNAL MEDICINE

## 2020-07-31 RX ORDER — VANCOMYCIN HYDROCHLORIDE 1 G/200ML
1000 INJECTION, SOLUTION INTRAVENOUS EVERY 24 HOURS
Status: DISCONTINUED | OUTPATIENT
Start: 2020-08-01 | End: 2020-08-05

## 2020-07-31 RX ADMIN — BACLOFEN 10 MG: 10 TABLET ORAL at 18:15

## 2020-07-31 RX ADMIN — VANCOMYCIN HYDROCHLORIDE 750 MG: 10 INJECTION, POWDER, LYOPHILIZED, FOR SOLUTION INTRAVENOUS at 06:03

## 2020-07-31 RX ADMIN — CEFTAZIDIME 2 G: 1 INJECTION, POWDER, FOR SOLUTION INTRAMUSCULAR; INTRAVENOUS at 05:00

## 2020-07-31 RX ADMIN — ENOXAPARIN SODIUM 40 MG: 40 INJECTION SUBCUTANEOUS at 13:40

## 2020-07-31 RX ADMIN — CHOLECALCIFEROL (VITAMIN D3) 25 MCG (1,000 UNIT) TABLET 1000 UNITS: TABLET at 10:20

## 2020-07-31 RX ADMIN — BACLOFEN 10 MG: 10 TABLET ORAL at 21:50

## 2020-07-31 RX ADMIN — BACLOFEN 10 MG: 10 TABLET ORAL at 12:09

## 2020-07-31 RX ADMIN — CEFTAZIDIME 2 G: 1 INJECTION, POWDER, FOR SOLUTION INTRAMUSCULAR; INTRAVENOUS at 12:09

## 2020-07-31 RX ADMIN — FUROSEMIDE 10 MG: 20 TABLET ORAL at 10:20

## 2020-07-31 RX ADMIN — CEFTAZIDIME 2 G: 1 INJECTION, POWDER, FOR SOLUTION INTRAMUSCULAR; INTRAVENOUS at 21:50

## 2020-07-31 RX ADMIN — OMEPRAZOLE 20 MG: 20 TABLET, DELAYED RELEASE ORAL at 10:20

## 2020-07-31 RX ADMIN — FERROUS SULFATE TAB 325 MG (65 MG ELEMENTAL FE) 325 MG: 325 (65 FE) TAB at 18:15

## 2020-08-01 PROCEDURE — 25010000002 VANCOMYCIN PER 500 MG: Performed by: INTERNAL MEDICINE

## 2020-08-01 PROCEDURE — 63710000001 DIPHENHYDRAMINE PER 50 MG: Performed by: INTERNAL MEDICINE

## 2020-08-01 PROCEDURE — 25010000002 ENOXAPARIN PER 10 MG: Performed by: SPECIALIST

## 2020-08-01 PROCEDURE — 25010000002 CEFTAZIDIME PER 500 MG: Performed by: INTERNAL MEDICINE

## 2020-08-01 RX ADMIN — FUROSEMIDE 10 MG: 20 TABLET ORAL at 10:28

## 2020-08-01 RX ADMIN — CEFTAZIDIME 2 G: 1 INJECTION, POWDER, FOR SOLUTION INTRAMUSCULAR; INTRAVENOUS at 04:59

## 2020-08-01 RX ADMIN — BACLOFEN 10 MG: 10 TABLET ORAL at 21:04

## 2020-08-01 RX ADMIN — BACLOFEN 10 MG: 10 TABLET ORAL at 12:13

## 2020-08-01 RX ADMIN — VANCOMYCIN HYDROCHLORIDE 1000 MG: 1 INJECTION, SOLUTION INTRAVENOUS at 05:54

## 2020-08-01 RX ADMIN — SODIUM CHLORIDE, PRESERVATIVE FREE 10 ML: 5 INJECTION INTRAVENOUS at 10:28

## 2020-08-01 RX ADMIN — SODIUM CHLORIDE, PRESERVATIVE FREE 10 ML: 5 INJECTION INTRAVENOUS at 05:00

## 2020-08-01 RX ADMIN — BACLOFEN 10 MG: 10 TABLET ORAL at 17:39

## 2020-08-01 RX ADMIN — ENOXAPARIN SODIUM 40 MG: 40 INJECTION SUBCUTANEOUS at 15:05

## 2020-08-01 RX ADMIN — CEFTAZIDIME 2 G: 1 INJECTION, POWDER, FOR SOLUTION INTRAMUSCULAR; INTRAVENOUS at 21:04

## 2020-08-01 RX ADMIN — OMEPRAZOLE 20 MG: 20 TABLET, DELAYED RELEASE ORAL at 10:28

## 2020-08-01 RX ADMIN — FERROUS SULFATE TAB 325 MG (65 MG ELEMENTAL FE) 325 MG: 325 (65 FE) TAB at 17:39

## 2020-08-01 RX ADMIN — DIPHENHYDRAMINE HYDROCHLORIDE 25 MG: 25 CAPSULE ORAL at 15:07

## 2020-08-01 RX ADMIN — SODIUM CHLORIDE, PRESERVATIVE FREE 10 ML: 5 INJECTION INTRAVENOUS at 21:04

## 2020-08-01 RX ADMIN — CEFTAZIDIME 2 G: 1 INJECTION, POWDER, FOR SOLUTION INTRAMUSCULAR; INTRAVENOUS at 12:13

## 2020-08-01 RX ADMIN — CHOLECALCIFEROL (VITAMIN D3) 25 MCG (1,000 UNIT) TABLET 1000 UNITS: TABLET at 10:28

## 2020-08-02 LAB
ANION GAP SERPL CALCULATED.3IONS-SCNC: 8 MMOL/L (ref 5–15)
BUN SERPL-MCNC: 19 MG/DL (ref 6–20)
BUN/CREAT SERPL: 26 (ref 7–25)
CALCIUM SPEC-SCNC: 8.4 MG/DL (ref 8.6–10.5)
CHLORIDE SERPL-SCNC: 104 MMOL/L (ref 98–107)
CO2 SERPL-SCNC: 26 MMOL/L (ref 22–29)
CREAT SERPL-MCNC: 0.73 MG/DL (ref 0.57–1)
GFR SERPL CREATININE-BSD FRML MDRD: 82 ML/MIN/1.73
GLUCOSE SERPL-MCNC: 100 MG/DL (ref 65–99)
MAGNESIUM SERPL-MCNC: 2 MG/DL (ref 1.6–2.6)
PHOSPHATE SERPL-MCNC: 3.6 MG/DL (ref 2.5–4.5)
POTASSIUM SERPL-SCNC: 4.2 MMOL/L (ref 3.5–5.2)
SODIUM SERPL-SCNC: 138 MMOL/L (ref 136–145)

## 2020-08-02 PROCEDURE — 25010000002 ENOXAPARIN PER 10 MG: Performed by: SPECIALIST

## 2020-08-02 PROCEDURE — 84100 ASSAY OF PHOSPHORUS: CPT | Performed by: INTERNAL MEDICINE

## 2020-08-02 PROCEDURE — 63710000001 DIPHENHYDRAMINE PER 50 MG: Performed by: INTERNAL MEDICINE

## 2020-08-02 PROCEDURE — 83735 ASSAY OF MAGNESIUM: CPT | Performed by: INTERNAL MEDICINE

## 2020-08-02 PROCEDURE — 25010000002 VANCOMYCIN PER 500 MG: Performed by: INTERNAL MEDICINE

## 2020-08-02 PROCEDURE — 25010000002 CEFTAZIDIME PER 500 MG: Performed by: INTERNAL MEDICINE

## 2020-08-02 PROCEDURE — 80048 BASIC METABOLIC PNL TOTAL CA: CPT | Performed by: INTERNAL MEDICINE

## 2020-08-02 RX ORDER — DIAPER,BRIEF,INFANT-TODD,DISP
EACH MISCELLANEOUS EVERY 12 HOURS SCHEDULED
Status: DISCONTINUED | OUTPATIENT
Start: 2020-08-02 | End: 2020-08-18 | Stop reason: HOSPADM

## 2020-08-02 RX ADMIN — SODIUM CHLORIDE, PRESERVATIVE FREE 10 ML: 5 INJECTION INTRAVENOUS at 20:52

## 2020-08-02 RX ADMIN — CEFTAZIDIME 2 G: 1 INJECTION, POWDER, FOR SOLUTION INTRAMUSCULAR; INTRAVENOUS at 20:16

## 2020-08-02 RX ADMIN — OMEPRAZOLE 20 MG: 20 TABLET, DELAYED RELEASE ORAL at 08:30

## 2020-08-02 RX ADMIN — HYDROCORTISONE 1 APPLICATION: 1 CREAM TOPICAL at 20:52

## 2020-08-02 RX ADMIN — ENOXAPARIN SODIUM 40 MG: 40 INJECTION SUBCUTANEOUS at 14:02

## 2020-08-02 RX ADMIN — CEFTAZIDIME 2 G: 1 INJECTION, POWDER, FOR SOLUTION INTRAMUSCULAR; INTRAVENOUS at 04:51

## 2020-08-02 RX ADMIN — FUROSEMIDE 10 MG: 20 TABLET ORAL at 08:30

## 2020-08-02 RX ADMIN — CHOLECALCIFEROL (VITAMIN D3) 25 MCG (1,000 UNIT) TABLET 1000 UNITS: TABLET at 08:30

## 2020-08-02 RX ADMIN — DIPHENHYDRAMINE HYDROCHLORIDE 25 MG: 25 CAPSULE ORAL at 14:08

## 2020-08-02 RX ADMIN — BACLOFEN 10 MG: 10 TABLET ORAL at 18:18

## 2020-08-02 RX ADMIN — SODIUM CHLORIDE, PRESERVATIVE FREE 10 ML: 5 INJECTION INTRAVENOUS at 08:30

## 2020-08-02 RX ADMIN — VANCOMYCIN HYDROCHLORIDE 1000 MG: 1 INJECTION, SOLUTION INTRAVENOUS at 06:17

## 2020-08-02 RX ADMIN — FERROUS SULFATE TAB 325 MG (65 MG ELEMENTAL FE) 325 MG: 325 (65 FE) TAB at 18:18

## 2020-08-02 RX ADMIN — BACLOFEN 10 MG: 10 TABLET ORAL at 21:29

## 2020-08-02 RX ADMIN — BACLOFEN 10 MG: 10 TABLET ORAL at 14:02

## 2020-08-02 RX ADMIN — CEFTAZIDIME 2 G: 1 INJECTION, POWDER, FOR SOLUTION INTRAMUSCULAR; INTRAVENOUS at 14:02

## 2020-08-02 RX ADMIN — HYDROCORTISONE: 1 CREAM TOPICAL at 15:41

## 2020-08-03 PROCEDURE — 63710000001 DIPHENHYDRAMINE PER 50 MG: Performed by: INTERNAL MEDICINE

## 2020-08-03 PROCEDURE — 25010000002 VANCOMYCIN PER 500 MG: Performed by: INTERNAL MEDICINE

## 2020-08-03 PROCEDURE — 25010000002 ENOXAPARIN PER 10 MG: Performed by: SPECIALIST

## 2020-08-03 PROCEDURE — 99232 SBSQ HOSP IP/OBS MODERATE 35: CPT | Performed by: NURSE PRACTITIONER

## 2020-08-03 PROCEDURE — 25010000002 CEFTAZIDIME PER 500 MG: Performed by: INTERNAL MEDICINE

## 2020-08-03 RX ORDER — SODIUM HYPOCHLORITE 1.25 MG/ML
SOLUTION TOPICAL DAILY
Status: COMPLETED | OUTPATIENT
Start: 2020-08-03 | End: 2020-08-16

## 2020-08-03 RX ADMIN — COLLAGENASE SANTYL: 250 OINTMENT TOPICAL at 16:00

## 2020-08-03 RX ADMIN — DAKIN'S SOLUTION 0.125% (QUARTER STRENGTH): 0.12 SOLUTION at 16:00

## 2020-08-03 RX ADMIN — FERROUS SULFATE TAB 325 MG (65 MG ELEMENTAL FE) 325 MG: 325 (65 FE) TAB at 16:58

## 2020-08-03 RX ADMIN — ENOXAPARIN SODIUM 40 MG: 40 INJECTION SUBCUTANEOUS at 13:49

## 2020-08-03 RX ADMIN — CEFTAZIDIME 2 G: 1 INJECTION, POWDER, FOR SOLUTION INTRAMUSCULAR; INTRAVENOUS at 03:58

## 2020-08-03 RX ADMIN — BACLOFEN 10 MG: 10 TABLET ORAL at 13:29

## 2020-08-03 RX ADMIN — DIPHENHYDRAMINE HYDROCHLORIDE 25 MG: 25 CAPSULE ORAL at 13:51

## 2020-08-03 RX ADMIN — OMEPRAZOLE 20 MG: 20 TABLET, DELAYED RELEASE ORAL at 09:10

## 2020-08-03 RX ADMIN — BACLOFEN 10 MG: 10 TABLET ORAL at 22:50

## 2020-08-03 RX ADMIN — SODIUM CHLORIDE, PRESERVATIVE FREE 10 ML: 5 INJECTION INTRAVENOUS at 20:00

## 2020-08-03 RX ADMIN — CEFTAZIDIME 2 G: 1 INJECTION, POWDER, FOR SOLUTION INTRAMUSCULAR; INTRAVENOUS at 13:29

## 2020-08-03 RX ADMIN — BACLOFEN 10 MG: 10 TABLET ORAL at 16:58

## 2020-08-03 RX ADMIN — VANCOMYCIN HYDROCHLORIDE 1000 MG: 1 INJECTION, SOLUTION INTRAVENOUS at 05:28

## 2020-08-03 RX ADMIN — HYDROCORTISONE 1 APPLICATION: 1 CREAM TOPICAL at 22:50

## 2020-08-03 RX ADMIN — HYDROCORTISONE: 1 CREAM TOPICAL at 09:12

## 2020-08-03 RX ADMIN — CHOLECALCIFEROL (VITAMIN D3) 25 MCG (1,000 UNIT) TABLET 1000 UNITS: TABLET at 09:11

## 2020-08-03 RX ADMIN — FUROSEMIDE 10 MG: 20 TABLET ORAL at 09:10

## 2020-08-03 RX ADMIN — SODIUM CHLORIDE, PRESERVATIVE FREE 10 ML: 5 INJECTION INTRAVENOUS at 09:11

## 2020-08-03 RX ADMIN — CEFTAZIDIME 2 G: 1 INJECTION, POWDER, FOR SOLUTION INTRAMUSCULAR; INTRAVENOUS at 19:58

## 2020-08-04 PROCEDURE — 25010000002 ENOXAPARIN PER 10 MG: Performed by: SPECIALIST

## 2020-08-04 PROCEDURE — 25010000002 VANCOMYCIN PER 500 MG: Performed by: INTERNAL MEDICINE

## 2020-08-04 PROCEDURE — 25010000002 CEFTAZIDIME PER 500 MG: Performed by: INTERNAL MEDICINE

## 2020-08-04 PROCEDURE — 63710000001 DIPHENHYDRAMINE PER 50 MG: Performed by: INTERNAL MEDICINE

## 2020-08-04 RX ADMIN — ENOXAPARIN SODIUM 40 MG: 40 INJECTION SUBCUTANEOUS at 13:51

## 2020-08-04 RX ADMIN — BACLOFEN 10 MG: 10 TABLET ORAL at 16:44

## 2020-08-04 RX ADMIN — CHOLECALCIFEROL (VITAMIN D3) 25 MCG (1,000 UNIT) TABLET 1000 UNITS: TABLET at 08:29

## 2020-08-04 RX ADMIN — HYDROCORTISONE: 1 CREAM TOPICAL at 21:17

## 2020-08-04 RX ADMIN — DIPHENHYDRAMINE HYDROCHLORIDE 25 MG: 25 CAPSULE ORAL at 13:51

## 2020-08-04 RX ADMIN — BACLOFEN 10 MG: 10 TABLET ORAL at 22:50

## 2020-08-04 RX ADMIN — DAKIN'S SOLUTION 0.125% (QUARTER STRENGTH): 0.12 SOLUTION at 08:30

## 2020-08-04 RX ADMIN — FERROUS SULFATE TAB 325 MG (65 MG ELEMENTAL FE) 325 MG: 325 (65 FE) TAB at 16:44

## 2020-08-04 RX ADMIN — SODIUM CHLORIDE, PRESERVATIVE FREE 10 ML: 5 INJECTION INTRAVENOUS at 08:29

## 2020-08-04 RX ADMIN — CEFTAZIDIME 2 G: 1 INJECTION, POWDER, FOR SOLUTION INTRAMUSCULAR; INTRAVENOUS at 03:59

## 2020-08-04 RX ADMIN — OMEPRAZOLE 20 MG: 20 TABLET, DELAYED RELEASE ORAL at 08:29

## 2020-08-04 RX ADMIN — CEFTAZIDIME 2 G: 1 INJECTION, POWDER, FOR SOLUTION INTRAMUSCULAR; INTRAVENOUS at 13:45

## 2020-08-04 RX ADMIN — SODIUM CHLORIDE, PRESERVATIVE FREE 10 ML: 5 INJECTION INTRAVENOUS at 22:45

## 2020-08-04 RX ADMIN — CEFTAZIDIME 2 G: 1 INJECTION, POWDER, FOR SOLUTION INTRAMUSCULAR; INTRAVENOUS at 21:14

## 2020-08-04 RX ADMIN — BACLOFEN 10 MG: 10 TABLET ORAL at 13:46

## 2020-08-04 RX ADMIN — FUROSEMIDE 10 MG: 20 TABLET ORAL at 08:31

## 2020-08-04 RX ADMIN — VANCOMYCIN HYDROCHLORIDE 1000 MG: 1 INJECTION, SOLUTION INTRAVENOUS at 06:05

## 2020-08-05 LAB
ALBUMIN SERPL-MCNC: 2.5 G/DL (ref 3.5–5.2)
ALBUMIN/GLOB SERPL: 0.9 G/DL
ALP SERPL-CCNC: 111 U/L (ref 39–117)
ALT SERPL W P-5'-P-CCNC: 7 U/L (ref 1–33)
ANION GAP SERPL CALCULATED.3IONS-SCNC: 10 MMOL/L (ref 5–15)
AST SERPL-CCNC: 11 U/L (ref 1–32)
BASOPHILS # BLD AUTO: 0.08 10*3/MM3 (ref 0–0.2)
BASOPHILS NFR BLD AUTO: 1.5 % (ref 0–1.5)
BILIRUB SERPL-MCNC: 0.2 MG/DL (ref 0–1.2)
BUN SERPL-MCNC: 27 MG/DL (ref 6–20)
BUN/CREAT SERPL: 21.8 (ref 7–25)
CALCIUM SPEC-SCNC: 8.4 MG/DL (ref 8.6–10.5)
CHLORIDE SERPL-SCNC: 107 MMOL/L (ref 98–107)
CO2 SERPL-SCNC: 23 MMOL/L (ref 22–29)
CREAT SERPL-MCNC: 1.24 MG/DL (ref 0.57–1)
CRP SERPL-MCNC: 5.66 MG/DL (ref 0–0.5)
DEPRECATED RDW RBC AUTO: 69.2 FL (ref 37–54)
EOSINOPHIL # BLD AUTO: 0.42 10*3/MM3 (ref 0–0.4)
EOSINOPHIL NFR BLD AUTO: 7.6 % (ref 0.3–6.2)
ERYTHROCYTE [DISTWIDTH] IN BLOOD BY AUTOMATED COUNT: 21.7 % (ref 12.3–15.4)
GFR SERPL CREATININE-BSD FRML MDRD: 44 ML/MIN/1.73
GLOBULIN UR ELPH-MCNC: 2.9 GM/DL
GLUCOSE SERPL-MCNC: 99 MG/DL (ref 65–99)
HCT VFR BLD AUTO: 29.3 % (ref 34–46.6)
HGB BLD-MCNC: 8.8 G/DL (ref 12–15.9)
IMM GRANULOCYTES # BLD AUTO: 0.02 10*3/MM3 (ref 0–0.05)
IMM GRANULOCYTES NFR BLD AUTO: 0.4 % (ref 0–0.5)
LYMPHOCYTES # BLD AUTO: 1.99 10*3/MM3 (ref 0.7–3.1)
LYMPHOCYTES NFR BLD AUTO: 36.1 % (ref 19.6–45.3)
MCH RBC QN AUTO: 26 PG (ref 26.6–33)
MCHC RBC AUTO-ENTMCNC: 30 G/DL (ref 31.5–35.7)
MCV RBC AUTO: 86.7 FL (ref 79–97)
MONOCYTES # BLD AUTO: 0.52 10*3/MM3 (ref 0.1–0.9)
MONOCYTES NFR BLD AUTO: 9.4 % (ref 5–12)
NEUTROPHILS NFR BLD AUTO: 2.48 10*3/MM3 (ref 1.7–7)
NEUTROPHILS NFR BLD AUTO: 45 % (ref 42.7–76)
NRBC BLD AUTO-RTO: 0 /100 WBC (ref 0–0.2)
PLATELET # BLD AUTO: 212 10*3/MM3 (ref 140–450)
PMV BLD AUTO: 9.9 FL (ref 6–12)
POTASSIUM SERPL-SCNC: 4.3 MMOL/L (ref 3.5–5.2)
PROT SERPL-MCNC: 5.4 G/DL (ref 6–8.5)
RBC # BLD AUTO: 3.38 10*6/MM3 (ref 3.77–5.28)
SODIUM SERPL-SCNC: 140 MMOL/L (ref 136–145)
VANCOMYCIN TROUGH SERPL-MCNC: 21.6 MCG/ML (ref 5–20)
WBC # BLD AUTO: 5.51 10*3/MM3 (ref 3.4–10.8)

## 2020-08-05 PROCEDURE — 80202 ASSAY OF VANCOMYCIN: CPT | Performed by: INTERNAL MEDICINE

## 2020-08-05 PROCEDURE — 25010000002 CEFTAZIDIME PER 500 MG: Performed by: INTERNAL MEDICINE

## 2020-08-05 PROCEDURE — 86140 C-REACTIVE PROTEIN: CPT | Performed by: INTERNAL MEDICINE

## 2020-08-05 PROCEDURE — 25010000002 ENOXAPARIN PER 10 MG: Performed by: SPECIALIST

## 2020-08-05 PROCEDURE — 25010000002 VANCOMYCIN PER 500 MG: Performed by: INTERNAL MEDICINE

## 2020-08-05 PROCEDURE — 80053 COMPREHEN METABOLIC PANEL: CPT | Performed by: INTERNAL MEDICINE

## 2020-08-05 PROCEDURE — 85025 COMPLETE CBC W/AUTO DIFF WBC: CPT | Performed by: INTERNAL MEDICINE

## 2020-08-05 RX ADMIN — CHOLECALCIFEROL (VITAMIN D3) 25 MCG (1,000 UNIT) TABLET 1000 UNITS: TABLET at 09:10

## 2020-08-05 RX ADMIN — FUROSEMIDE 10 MG: 20 TABLET ORAL at 09:09

## 2020-08-05 RX ADMIN — CEFTAZIDIME 2 G: 1 INJECTION, POWDER, FOR SOLUTION INTRAMUSCULAR; INTRAVENOUS at 20:21

## 2020-08-05 RX ADMIN — COLLAGENASE SANTYL: 250 OINTMENT TOPICAL at 09:10

## 2020-08-05 RX ADMIN — CEFTAZIDIME 2 G: 1 INJECTION, POWDER, FOR SOLUTION INTRAMUSCULAR; INTRAVENOUS at 04:55

## 2020-08-05 RX ADMIN — HYDROCORTISONE: 1 CREAM TOPICAL at 09:11

## 2020-08-05 RX ADMIN — BACLOFEN 10 MG: 10 TABLET ORAL at 17:59

## 2020-08-05 RX ADMIN — DAKIN'S SOLUTION 0.125% (QUARTER STRENGTH): 0.12 SOLUTION at 09:20

## 2020-08-05 RX ADMIN — BACLOFEN 10 MG: 10 TABLET ORAL at 12:59

## 2020-08-05 RX ADMIN — OMEPRAZOLE 20 MG: 20 TABLET, DELAYED RELEASE ORAL at 09:10

## 2020-08-05 RX ADMIN — FERROUS SULFATE TAB 325 MG (65 MG ELEMENTAL FE) 325 MG: 325 (65 FE) TAB at 17:59

## 2020-08-05 RX ADMIN — ENOXAPARIN SODIUM 40 MG: 40 INJECTION SUBCUTANEOUS at 12:59

## 2020-08-05 RX ADMIN — VANCOMYCIN HYDROCHLORIDE 1000 MG: 1 INJECTION, SOLUTION INTRAVENOUS at 05:34

## 2020-08-05 RX ADMIN — BACLOFEN 10 MG: 10 TABLET ORAL at 23:36

## 2020-08-05 RX ADMIN — SODIUM CHLORIDE, PRESERVATIVE FREE 10 ML: 5 INJECTION INTRAVENOUS at 09:20

## 2020-08-05 RX ADMIN — CEFTAZIDIME 2 G: 1 INJECTION, POWDER, FOR SOLUTION INTRAMUSCULAR; INTRAVENOUS at 12:59

## 2020-08-06 PROCEDURE — 25010000002 ENOXAPARIN PER 10 MG: Performed by: SPECIALIST

## 2020-08-06 PROCEDURE — 25010000002 CEFTAZIDIME PER 500 MG: Performed by: INTERNAL MEDICINE

## 2020-08-06 PROCEDURE — 25010000002 ONDANSETRON PER 1 MG: Performed by: SPECIALIST

## 2020-08-06 PROCEDURE — 25010000002 VANCOMYCIN 10 G RECONSTITUTED SOLUTION: Performed by: INTERNAL MEDICINE

## 2020-08-06 RX ADMIN — SODIUM CHLORIDE, PRESERVATIVE FREE 10 ML: 5 INJECTION INTRAVENOUS at 20:06

## 2020-08-06 RX ADMIN — VANCOMYCIN HYDROCHLORIDE 500 MG: 10 INJECTION, POWDER, LYOPHILIZED, FOR SOLUTION INTRAVENOUS at 13:03

## 2020-08-06 RX ADMIN — CHOLECALCIFEROL (VITAMIN D3) 25 MCG (1,000 UNIT) TABLET 1000 UNITS: TABLET at 08:48

## 2020-08-06 RX ADMIN — BACLOFEN 10 MG: 10 TABLET ORAL at 16:17

## 2020-08-06 RX ADMIN — BACLOFEN 10 MG: 10 TABLET ORAL at 23:28

## 2020-08-06 RX ADMIN — COLLAGENASE SANTYL: 250 OINTMENT TOPICAL at 08:50

## 2020-08-06 RX ADMIN — CEFTAZIDIME 2 G: 1 INJECTION, POWDER, FOR SOLUTION INTRAMUSCULAR; INTRAVENOUS at 05:37

## 2020-08-06 RX ADMIN — OMEPRAZOLE 20 MG: 20 TABLET, DELAYED RELEASE ORAL at 08:48

## 2020-08-06 RX ADMIN — HYDROCORTISONE: 1 CREAM TOPICAL at 20:06

## 2020-08-06 RX ADMIN — FERROUS SULFATE TAB 325 MG (65 MG ELEMENTAL FE) 325 MG: 325 (65 FE) TAB at 16:53

## 2020-08-06 RX ADMIN — CEFTAZIDIME 2 G: 1 INJECTION, POWDER, FOR SOLUTION INTRAMUSCULAR; INTRAVENOUS at 14:22

## 2020-08-06 RX ADMIN — ONDANSETRON HYDROCHLORIDE 4 MG: 2 SOLUTION INTRAMUSCULAR; INTRAVENOUS at 16:44

## 2020-08-06 RX ADMIN — SODIUM CHLORIDE, PRESERVATIVE FREE 10 ML: 5 INJECTION INTRAVENOUS at 06:22

## 2020-08-06 RX ADMIN — FUROSEMIDE 10 MG: 20 TABLET ORAL at 08:48

## 2020-08-06 RX ADMIN — CEFTAZIDIME 2 G: 1 INJECTION, POWDER, FOR SOLUTION INTRAMUSCULAR; INTRAVENOUS at 20:05

## 2020-08-06 RX ADMIN — DAKIN'S SOLUTION 0.125% (QUARTER STRENGTH): 0.12 SOLUTION at 08:50

## 2020-08-06 RX ADMIN — ENOXAPARIN SODIUM 40 MG: 40 INJECTION SUBCUTANEOUS at 15:26

## 2020-08-06 RX ADMIN — ONDANSETRON HYDROCHLORIDE 4 MG: 2 SOLUTION INTRAMUSCULAR; INTRAVENOUS at 22:40

## 2020-08-06 RX ADMIN — BACLOFEN 10 MG: 10 TABLET ORAL at 13:04

## 2020-08-07 LAB
BACTERIA SPEC AEROBE CULT: ABNORMAL
BACTERIA SPEC AEROBE CULT: ABNORMAL
CREAT SERPL-MCNC: 1.48 MG/DL (ref 0.57–1)
GFR SERPL CREATININE-BSD FRML MDRD: 36 ML/MIN/1.73
GLUCOSE BLDC GLUCOMTR-MCNC: 106 MG/DL (ref 70–130)
GRAM STN SPEC: ABNORMAL
GRAM STN SPEC: ABNORMAL
VANCOMYCIN TROUGH SERPL-MCNC: 19.6 MCG/ML (ref 5–20)

## 2020-08-07 PROCEDURE — 25010000002 PROCHLORPERAZINE 10 MG/2ML SOLUTION: Performed by: FAMILY MEDICINE

## 2020-08-07 PROCEDURE — 97161 PT EVAL LOW COMPLEX 20 MIN: CPT | Performed by: PHYSICAL THERAPIST

## 2020-08-07 PROCEDURE — 82565 ASSAY OF CREATININE: CPT | Performed by: INTERNAL MEDICINE

## 2020-08-07 PROCEDURE — 82962 GLUCOSE BLOOD TEST: CPT

## 2020-08-07 PROCEDURE — 25010000002 CEFTAZIDIME PER 500 MG: Performed by: INTERNAL MEDICINE

## 2020-08-07 PROCEDURE — 80202 ASSAY OF VANCOMYCIN: CPT | Performed by: INTERNAL MEDICINE

## 2020-08-07 PROCEDURE — 25010000002 ONDANSETRON PER 1 MG: Performed by: SPECIALIST

## 2020-08-07 RX ORDER — PROCHLORPERAZINE EDISYLATE 5 MG/ML
5 INJECTION INTRAMUSCULAR; INTRAVENOUS EVERY 6 HOURS PRN
Status: DISPENSED | OUTPATIENT
Start: 2020-08-07 | End: 2020-08-08

## 2020-08-07 RX ORDER — SODIUM CHLORIDE 9 MG/ML
50 INJECTION, SOLUTION INTRAVENOUS CONTINUOUS
Status: DISCONTINUED | OUTPATIENT
Start: 2020-08-07 | End: 2020-08-11

## 2020-08-07 RX ADMIN — OMEPRAZOLE 20 MG: 20 TABLET, DELAYED RELEASE ORAL at 09:00

## 2020-08-07 RX ADMIN — DAKIN'S SOLUTION 0.125% (QUARTER STRENGTH): 0.12 SOLUTION at 09:01

## 2020-08-07 RX ADMIN — CEFTAZIDIME 2 G: 1 INJECTION, POWDER, FOR SOLUTION INTRAMUSCULAR; INTRAVENOUS at 04:42

## 2020-08-07 RX ADMIN — ONDANSETRON HYDROCHLORIDE 4 MG: 2 SOLUTION INTRAMUSCULAR; INTRAVENOUS at 15:57

## 2020-08-07 RX ADMIN — PROCHLORPERAZINE EDISYLATE 5 MG: 5 INJECTION INTRAMUSCULAR; INTRAVENOUS at 09:04

## 2020-08-07 RX ADMIN — SODIUM CHLORIDE 50 ML/HR: 9 INJECTION, SOLUTION INTRAVENOUS at 14:11

## 2020-08-07 RX ADMIN — SODIUM CHLORIDE, PRESERVATIVE FREE 10 ML: 5 INJECTION INTRAVENOUS at 20:21

## 2020-08-07 RX ADMIN — CEFTAZIDIME 2 G: 1 INJECTION, POWDER, FOR SOLUTION INTRAMUSCULAR; INTRAVENOUS at 20:21

## 2020-08-07 RX ADMIN — ONDANSETRON HYDROCHLORIDE 4 MG: 2 SOLUTION INTRAMUSCULAR; INTRAVENOUS at 04:49

## 2020-08-07 RX ADMIN — CEFTAZIDIME 2 G: 1 INJECTION, POWDER, FOR SOLUTION INTRAMUSCULAR; INTRAVENOUS at 12:51

## 2020-08-07 RX ADMIN — HYDROCORTISONE: 1 CREAM TOPICAL at 20:22

## 2020-08-07 RX ADMIN — BACLOFEN 10 MG: 10 TABLET ORAL at 21:33

## 2020-08-07 RX ADMIN — BACLOFEN 10 MG: 10 TABLET ORAL at 12:51

## 2020-08-07 RX ADMIN — PROCHLORPERAZINE EDISYLATE 5 MG: 5 INJECTION INTRAMUSCULAR; INTRAVENOUS at 21:12

## 2020-08-07 RX ADMIN — FUROSEMIDE 10 MG: 20 TABLET ORAL at 09:00

## 2020-08-07 RX ADMIN — CHOLECALCIFEROL (VITAMIN D3) 25 MCG (1,000 UNIT) TABLET 1000 UNITS: TABLET at 09:00

## 2020-08-07 RX ADMIN — COLLAGENASE SANTYL: 250 OINTMENT TOPICAL at 09:01

## 2020-08-08 LAB
ANION GAP SERPL CALCULATED.3IONS-SCNC: 10 MMOL/L (ref 5–15)
BUN SERPL-MCNC: 28 MG/DL (ref 6–20)
BUN/CREAT SERPL: 19.9 (ref 7–25)
CALCIUM SPEC-SCNC: 8.6 MG/DL (ref 8.6–10.5)
CHLORIDE SERPL-SCNC: 109 MMOL/L (ref 98–107)
CO2 SERPL-SCNC: 25 MMOL/L (ref 22–29)
CREAT SERPL-MCNC: 1.41 MG/DL (ref 0.57–1)
DEPRECATED RDW RBC AUTO: 66.6 FL (ref 37–54)
ERYTHROCYTE [DISTWIDTH] IN BLOOD BY AUTOMATED COUNT: 21.2 % (ref 12.3–15.4)
GFR SERPL CREATININE-BSD FRML MDRD: 38 ML/MIN/1.73
GLUCOSE SERPL-MCNC: 103 MG/DL (ref 65–99)
HCT VFR BLD AUTO: 30.2 % (ref 34–46.6)
HGB BLD-MCNC: 9.1 G/DL (ref 12–15.9)
MCH RBC QN AUTO: 25.9 PG (ref 26.6–33)
MCHC RBC AUTO-ENTMCNC: 30.1 G/DL (ref 31.5–35.7)
MCV RBC AUTO: 86 FL (ref 79–97)
PLATELET # BLD AUTO: 266 10*3/MM3 (ref 140–450)
PMV BLD AUTO: 9.8 FL (ref 6–12)
POTASSIUM SERPL-SCNC: 4.2 MMOL/L (ref 3.5–5.2)
RBC # BLD AUTO: 3.51 10*6/MM3 (ref 3.77–5.28)
SODIUM SERPL-SCNC: 144 MMOL/L (ref 136–145)
WBC # BLD AUTO: 4.67 10*3/MM3 (ref 3.4–10.8)

## 2020-08-08 PROCEDURE — 25010000002 ENOXAPARIN PER 10 MG: Performed by: INTERNAL MEDICINE

## 2020-08-08 PROCEDURE — 25010000002 CEFTAZIDIME PER 500 MG: Performed by: INTERNAL MEDICINE

## 2020-08-08 PROCEDURE — 25010000002 ONDANSETRON PER 1 MG: Performed by: SPECIALIST

## 2020-08-08 PROCEDURE — 85027 COMPLETE CBC AUTOMATED: CPT | Performed by: INTERNAL MEDICINE

## 2020-08-08 PROCEDURE — 80048 BASIC METABOLIC PNL TOTAL CA: CPT | Performed by: INTERNAL MEDICINE

## 2020-08-08 RX ORDER — PROCHLORPERAZINE EDISYLATE 5 MG/ML
10 INJECTION INTRAMUSCULAR; INTRAVENOUS ONCE
Status: DISCONTINUED | OUTPATIENT
Start: 2020-08-08 | End: 2020-08-18 | Stop reason: HOSPADM

## 2020-08-08 RX ADMIN — ONDANSETRON HYDROCHLORIDE 4 MG: 2 SOLUTION INTRAMUSCULAR; INTRAVENOUS at 09:10

## 2020-08-08 RX ADMIN — BACLOFEN 10 MG: 10 TABLET ORAL at 17:45

## 2020-08-08 RX ADMIN — DAKIN'S SOLUTION 0.125% (QUARTER STRENGTH): 0.12 SOLUTION at 09:04

## 2020-08-08 RX ADMIN — BACLOFEN 10 MG: 10 TABLET ORAL at 22:15

## 2020-08-08 RX ADMIN — ONDANSETRON HYDROCHLORIDE 4 MG: 2 SOLUTION INTRAMUSCULAR; INTRAVENOUS at 00:51

## 2020-08-08 RX ADMIN — ENOXAPARIN SODIUM 30 MG: 30 INJECTION SUBCUTANEOUS at 14:07

## 2020-08-08 RX ADMIN — ONDANSETRON HYDROCHLORIDE 4 MG: 2 SOLUTION INTRAMUSCULAR; INTRAVENOUS at 22:15

## 2020-08-08 RX ADMIN — CEFTAZIDIME 2 G: 1 INJECTION, POWDER, FOR SOLUTION INTRAMUSCULAR; INTRAVENOUS at 19:40

## 2020-08-08 RX ADMIN — SODIUM CHLORIDE, PRESERVATIVE FREE 10 ML: 5 INJECTION INTRAVENOUS at 20:13

## 2020-08-08 RX ADMIN — ONDANSETRON HYDROCHLORIDE 4 MG: 2 SOLUTION INTRAMUSCULAR; INTRAVENOUS at 16:17

## 2020-08-08 RX ADMIN — CEFTAZIDIME 2 G: 1 INJECTION, POWDER, FOR SOLUTION INTRAMUSCULAR; INTRAVENOUS at 05:29

## 2020-08-08 RX ADMIN — CEFTAZIDIME 2 G: 1 INJECTION, POWDER, FOR SOLUTION INTRAMUSCULAR; INTRAVENOUS at 12:05

## 2020-08-08 RX ADMIN — COLLAGENASE SANTYL: 250 OINTMENT TOPICAL at 09:06

## 2020-08-08 RX ADMIN — SODIUM CHLORIDE 50 ML/HR: 9 INJECTION, SOLUTION INTRAVENOUS at 10:24

## 2020-08-08 RX ADMIN — HYDROCORTISONE: 1 CREAM TOPICAL at 20:13

## 2020-08-09 PROCEDURE — 25010000002 CEFTAZIDIME PER 500 MG: Performed by: INTERNAL MEDICINE

## 2020-08-09 PROCEDURE — 25010000002 ONDANSETRON PER 1 MG: Performed by: SPECIALIST

## 2020-08-09 PROCEDURE — 25010000002 ENOXAPARIN PER 10 MG: Performed by: INTERNAL MEDICINE

## 2020-08-09 PROCEDURE — 97530 THERAPEUTIC ACTIVITIES: CPT

## 2020-08-09 PROCEDURE — 97110 THERAPEUTIC EXERCISES: CPT

## 2020-08-09 RX ADMIN — CEFTAZIDIME 2 G: 1 INJECTION, POWDER, FOR SOLUTION INTRAMUSCULAR; INTRAVENOUS at 12:29

## 2020-08-09 RX ADMIN — ONDANSETRON HYDROCHLORIDE 4 MG: 2 SOLUTION INTRAMUSCULAR; INTRAVENOUS at 16:04

## 2020-08-09 RX ADMIN — DAKIN'S SOLUTION 0.125% (QUARTER STRENGTH): 0.12 SOLUTION at 09:05

## 2020-08-09 RX ADMIN — BACLOFEN 10 MG: 10 TABLET ORAL at 12:29

## 2020-08-09 RX ADMIN — ENOXAPARIN SODIUM 30 MG: 30 INJECTION SUBCUTANEOUS at 12:29

## 2020-08-09 RX ADMIN — BACLOFEN 10 MG: 10 TABLET ORAL at 16:07

## 2020-08-09 RX ADMIN — CEFTAZIDIME 2 G: 1 INJECTION, POWDER, FOR SOLUTION INTRAMUSCULAR; INTRAVENOUS at 21:19

## 2020-08-09 RX ADMIN — SODIUM CHLORIDE, PRESERVATIVE FREE 10 ML: 5 INJECTION INTRAVENOUS at 21:20

## 2020-08-09 RX ADMIN — ONDANSETRON HYDROCHLORIDE 4 MG: 2 SOLUTION INTRAMUSCULAR; INTRAVENOUS at 10:10

## 2020-08-09 RX ADMIN — OMEPRAZOLE 20 MG: 20 TABLET, DELAYED RELEASE ORAL at 09:01

## 2020-08-09 RX ADMIN — ONDANSETRON HYDROCHLORIDE 4 MG: 2 SOLUTION INTRAMUSCULAR; INTRAVENOUS at 04:02

## 2020-08-09 RX ADMIN — COLLAGENASE SANTYL: 250 OINTMENT TOPICAL at 09:04

## 2020-08-09 RX ADMIN — CEFTAZIDIME 2 G: 1 INJECTION, POWDER, FOR SOLUTION INTRAMUSCULAR; INTRAVENOUS at 03:53

## 2020-08-09 RX ADMIN — BACLOFEN 10 MG: 10 TABLET ORAL at 21:19

## 2020-08-09 RX ADMIN — SODIUM CHLORIDE 50 ML/HR: 9 INJECTION, SOLUTION INTRAVENOUS at 09:04

## 2020-08-09 RX ADMIN — ONDANSETRON HYDROCHLORIDE 4 MG: 2 SOLUTION INTRAMUSCULAR; INTRAVENOUS at 22:18

## 2020-08-10 LAB
ANION GAP SERPL CALCULATED.3IONS-SCNC: 10 MMOL/L (ref 5–15)
BUN SERPL-MCNC: 25 MG/DL (ref 6–20)
BUN/CREAT SERPL: 18.8 (ref 7–25)
CALCIUM SPEC-SCNC: 8.6 MG/DL (ref 8.6–10.5)
CHLORIDE SERPL-SCNC: 110 MMOL/L (ref 98–107)
CO2 SERPL-SCNC: 25 MMOL/L (ref 22–29)
CREAT SERPL-MCNC: 1.33 MG/DL (ref 0.57–1)
GFR SERPL CREATININE-BSD FRML MDRD: 41 ML/MIN/1.73
GLUCOSE BLDC GLUCOMTR-MCNC: 115 MG/DL (ref 70–130)
GLUCOSE SERPL-MCNC: 119 MG/DL (ref 65–99)
POTASSIUM SERPL-SCNC: 3.8 MMOL/L (ref 3.5–5.2)
SODIUM SERPL-SCNC: 145 MMOL/L (ref 136–145)

## 2020-08-10 PROCEDURE — 25010000002 ONDANSETRON PER 1 MG: Performed by: SPECIALIST

## 2020-08-10 PROCEDURE — 82962 GLUCOSE BLOOD TEST: CPT

## 2020-08-10 PROCEDURE — 25010000002 CEFTAZIDIME PER 500 MG: Performed by: INTERNAL MEDICINE

## 2020-08-10 PROCEDURE — 25010000002 ENOXAPARIN PER 10 MG: Performed by: INTERNAL MEDICINE

## 2020-08-10 PROCEDURE — 80048 BASIC METABOLIC PNL TOTAL CA: CPT | Performed by: INTERNAL MEDICINE

## 2020-08-10 RX ADMIN — ONDANSETRON HYDROCHLORIDE 4 MG: 2 SOLUTION INTRAMUSCULAR; INTRAVENOUS at 20:02

## 2020-08-10 RX ADMIN — ONDANSETRON HYDROCHLORIDE 4 MG: 2 SOLUTION INTRAMUSCULAR; INTRAVENOUS at 04:09

## 2020-08-10 RX ADMIN — DAKIN'S SOLUTION 0.125% (QUARTER STRENGTH): 0.12 SOLUTION at 10:55

## 2020-08-10 RX ADMIN — BACLOFEN 10 MG: 10 TABLET ORAL at 11:57

## 2020-08-10 RX ADMIN — BACLOFEN 10 MG: 10 TABLET ORAL at 22:29

## 2020-08-10 RX ADMIN — CEFTAZIDIME 2 G: 1 INJECTION, POWDER, FOR SOLUTION INTRAMUSCULAR; INTRAVENOUS at 20:02

## 2020-08-10 RX ADMIN — SODIUM CHLORIDE, PRESERVATIVE FREE 10 ML: 5 INJECTION INTRAVENOUS at 20:02

## 2020-08-10 RX ADMIN — ONDANSETRON HYDROCHLORIDE 4 MG: 2 SOLUTION INTRAMUSCULAR; INTRAVENOUS at 11:25

## 2020-08-10 RX ADMIN — POLYETHYLENE GLYCOL 3350 17 G: 17 POWDER, FOR SOLUTION ORAL at 16:51

## 2020-08-10 RX ADMIN — OMEPRAZOLE 20 MG: 20 TABLET, DELAYED RELEASE ORAL at 09:08

## 2020-08-10 RX ADMIN — ENOXAPARIN SODIUM 30 MG: 30 INJECTION SUBCUTANEOUS at 11:57

## 2020-08-10 RX ADMIN — SODIUM CHLORIDE 50 ML/HR: 9 INJECTION, SOLUTION INTRAVENOUS at 06:57

## 2020-08-10 RX ADMIN — BACLOFEN 10 MG: 10 TABLET ORAL at 16:51

## 2020-08-10 RX ADMIN — CEFTAZIDIME 2 G: 1 INJECTION, POWDER, FOR SOLUTION INTRAMUSCULAR; INTRAVENOUS at 11:57

## 2020-08-10 RX ADMIN — COLLAGENASE SANTYL: 250 OINTMENT TOPICAL at 10:55

## 2020-08-10 RX ADMIN — CHOLECALCIFEROL (VITAMIN D3) 25 MCG (1,000 UNIT) TABLET 1000 UNITS: TABLET at 09:08

## 2020-08-10 RX ADMIN — SODIUM CHLORIDE, PRESERVATIVE FREE 10 ML: 5 INJECTION INTRAVENOUS at 09:09

## 2020-08-10 RX ADMIN — CEFTAZIDIME 2 G: 1 INJECTION, POWDER, FOR SOLUTION INTRAMUSCULAR; INTRAVENOUS at 04:09

## 2020-08-11 ENCOUNTER — APPOINTMENT (OUTPATIENT)
Dept: GENERAL RADIOLOGY | Facility: HOSPITAL | Age: 60
End: 2020-08-11

## 2020-08-11 LAB
ANION GAP SERPL CALCULATED.3IONS-SCNC: 11 MMOL/L (ref 5–15)
BUN SERPL-MCNC: 21 MG/DL (ref 6–20)
BUN/CREAT SERPL: 18.1 (ref 7–25)
CALCIUM SPEC-SCNC: 8.5 MG/DL (ref 8.6–10.5)
CHLORIDE SERPL-SCNC: 111 MMOL/L (ref 98–107)
CO2 SERPL-SCNC: 23 MMOL/L (ref 22–29)
CREAT SERPL-MCNC: 1.16 MG/DL (ref 0.57–1)
GFR SERPL CREATININE-BSD FRML MDRD: 48 ML/MIN/1.73
GLUCOSE SERPL-MCNC: 86 MG/DL (ref 65–99)
POTASSIUM SERPL-SCNC: 4.4 MMOL/L (ref 3.5–5.2)
SODIUM SERPL-SCNC: 145 MMOL/L (ref 136–145)

## 2020-08-11 PROCEDURE — 63710000001 ONDANSETRON PER 8 MG: Performed by: SPECIALIST

## 2020-08-11 PROCEDURE — 25010000002 DAPTOMYCIN PER 1 MG: Performed by: INTERNAL MEDICINE

## 2020-08-11 PROCEDURE — 80048 BASIC METABOLIC PNL TOTAL CA: CPT | Performed by: INTERNAL MEDICINE

## 2020-08-11 PROCEDURE — 97166 OT EVAL MOD COMPLEX 45 MIN: CPT | Performed by: OCCUPATIONAL THERAPIST

## 2020-08-11 PROCEDURE — 97530 THERAPEUTIC ACTIVITIES: CPT

## 2020-08-11 PROCEDURE — 25010000002 ENOXAPARIN PER 10 MG: Performed by: INTERNAL MEDICINE

## 2020-08-11 PROCEDURE — 25010000002 ONDANSETRON PER 1 MG: Performed by: SPECIALIST

## 2020-08-11 PROCEDURE — 74018 RADEX ABDOMEN 1 VIEW: CPT

## 2020-08-11 PROCEDURE — 97530 THERAPEUTIC ACTIVITIES: CPT | Performed by: OCCUPATIONAL THERAPIST

## 2020-08-11 PROCEDURE — 97535 SELF CARE MNGMENT TRAINING: CPT | Performed by: OCCUPATIONAL THERAPIST

## 2020-08-11 PROCEDURE — 25010000002 CEFTAZIDIME PER 500 MG: Performed by: INTERNAL MEDICINE

## 2020-08-11 RX ADMIN — CEFTAZIDIME 2 G: 1 INJECTION, POWDER, FOR SOLUTION INTRAMUSCULAR; INTRAVENOUS at 04:28

## 2020-08-11 RX ADMIN — SODIUM CHLORIDE, PRESERVATIVE FREE 10 ML: 5 INJECTION INTRAVENOUS at 07:33

## 2020-08-11 RX ADMIN — BACLOFEN 10 MG: 10 TABLET ORAL at 11:59

## 2020-08-11 RX ADMIN — SODIUM CHLORIDE 50 ML/HR: 9 INJECTION, SOLUTION INTRAVENOUS at 03:27

## 2020-08-11 RX ADMIN — DAPTOMYCIN 450 MG: 500 INJECTION, POWDER, LYOPHILIZED, FOR SOLUTION INTRAVENOUS at 19:42

## 2020-08-11 RX ADMIN — OMEPRAZOLE 20 MG: 20 TABLET, DELAYED RELEASE ORAL at 07:34

## 2020-08-11 RX ADMIN — BACLOFEN 10 MG: 10 TABLET ORAL at 21:45

## 2020-08-11 RX ADMIN — ENOXAPARIN SODIUM 30 MG: 30 INJECTION SUBCUTANEOUS at 14:10

## 2020-08-11 RX ADMIN — DAKIN'S SOLUTION 0.125% (QUARTER STRENGTH): 0.12 SOLUTION at 09:52

## 2020-08-11 RX ADMIN — COLLAGENASE SANTYL: 250 OINTMENT TOPICAL at 07:34

## 2020-08-11 RX ADMIN — ONDANSETRON 4 MG: 4 TABLET, FILM COATED ORAL at 09:51

## 2020-08-11 RX ADMIN — BACLOFEN 10 MG: 10 TABLET ORAL at 16:50

## 2020-08-11 RX ADMIN — CEFTAZIDIME 2 G: 1 INJECTION, POWDER, FOR SOLUTION INTRAMUSCULAR; INTRAVENOUS at 20:38

## 2020-08-11 RX ADMIN — CEFTAZIDIME 2 G: 1 INJECTION, POWDER, FOR SOLUTION INTRAMUSCULAR; INTRAVENOUS at 11:59

## 2020-08-11 RX ADMIN — ONDANSETRON HYDROCHLORIDE 4 MG: 2 SOLUTION INTRAMUSCULAR; INTRAVENOUS at 03:28

## 2020-08-12 LAB
ALBUMIN SERPL-MCNC: 2.4 G/DL (ref 3.5–5.2)
ALBUMIN/GLOB SERPL: 0.8 G/DL
ALP SERPL-CCNC: 132 U/L (ref 39–117)
ALT SERPL W P-5'-P-CCNC: <5 U/L (ref 1–33)
ANION GAP SERPL CALCULATED.3IONS-SCNC: 13 MMOL/L (ref 5–15)
AST SERPL-CCNC: 8 U/L (ref 1–32)
BASOPHILS # BLD AUTO: 0.11 10*3/MM3 (ref 0–0.2)
BASOPHILS NFR BLD AUTO: 2.3 % (ref 0–1.5)
BILIRUB SERPL-MCNC: 0.2 MG/DL (ref 0–1.2)
BUN SERPL-MCNC: 20 MG/DL (ref 6–20)
BUN/CREAT SERPL: 18.5 (ref 7–25)
CALCIUM SPEC-SCNC: 8.4 MG/DL (ref 8.6–10.5)
CHLORIDE SERPL-SCNC: 112 MMOL/L (ref 98–107)
CK SERPL-CCNC: 7 U/L (ref 20–180)
CO2 SERPL-SCNC: 22 MMOL/L (ref 22–29)
CREAT SERPL-MCNC: 1.08 MG/DL (ref 0.57–1)
CRP SERPL-MCNC: 1.49 MG/DL (ref 0–0.5)
DEPRECATED RDW RBC AUTO: 63.9 FL (ref 37–54)
EOSINOPHIL # BLD AUTO: 0.55 10*3/MM3 (ref 0–0.4)
EOSINOPHIL NFR BLD AUTO: 11.5 % (ref 0.3–6.2)
ERYTHROCYTE [DISTWIDTH] IN BLOOD BY AUTOMATED COUNT: 20.4 % (ref 12.3–15.4)
GFR SERPL CREATININE-BSD FRML MDRD: 52 ML/MIN/1.73
GLOBULIN UR ELPH-MCNC: 3.2 GM/DL
GLUCOSE SERPL-MCNC: 84 MG/DL (ref 65–99)
HCT VFR BLD AUTO: 31.4 % (ref 34–46.6)
HGB BLD-MCNC: 9.4 G/DL (ref 12–15.9)
IMM GRANULOCYTES # BLD AUTO: 0.01 10*3/MM3 (ref 0–0.05)
IMM GRANULOCYTES NFR BLD AUTO: 0.2 % (ref 0–0.5)
LIPASE SERPL-CCNC: 8 U/L (ref 13–60)
LYMPHOCYTES # BLD AUTO: 1.62 10*3/MM3 (ref 0.7–3.1)
LYMPHOCYTES NFR BLD AUTO: 33.8 % (ref 19.6–45.3)
MCH RBC QN AUTO: 25.7 PG (ref 26.6–33)
MCHC RBC AUTO-ENTMCNC: 29.9 G/DL (ref 31.5–35.7)
MCV RBC AUTO: 85.8 FL (ref 79–97)
MONOCYTES # BLD AUTO: 0.42 10*3/MM3 (ref 0.1–0.9)
MONOCYTES NFR BLD AUTO: 8.8 % (ref 5–12)
NEUTROPHILS NFR BLD AUTO: 2.08 10*3/MM3 (ref 1.7–7)
NEUTROPHILS NFR BLD AUTO: 43.4 % (ref 42.7–76)
NRBC BLD AUTO-RTO: 0 /100 WBC (ref 0–0.2)
PLATELET # BLD AUTO: 275 10*3/MM3 (ref 140–450)
PMV BLD AUTO: 9.8 FL (ref 6–12)
POTASSIUM SERPL-SCNC: 3.3 MMOL/L (ref 3.5–5.2)
PROT SERPL-MCNC: 5.6 G/DL (ref 6–8.5)
RBC # BLD AUTO: 3.66 10*6/MM3 (ref 3.77–5.28)
SODIUM SERPL-SCNC: 147 MMOL/L (ref 136–145)
T4 FREE SERPL-MCNC: 1.19 NG/DL (ref 0.93–1.7)
TSH SERPL DL<=0.05 MIU/L-ACNC: 2.46 UIU/ML (ref 0.27–4.2)
WBC # BLD AUTO: 4.79 10*3/MM3 (ref 3.4–10.8)

## 2020-08-12 PROCEDURE — 97530 THERAPEUTIC ACTIVITIES: CPT

## 2020-08-12 PROCEDURE — 80053 COMPREHEN METABOLIC PANEL: CPT | Performed by: INTERNAL MEDICINE

## 2020-08-12 PROCEDURE — 86140 C-REACTIVE PROTEIN: CPT | Performed by: INTERNAL MEDICINE

## 2020-08-12 PROCEDURE — 85025 COMPLETE CBC W/AUTO DIFF WBC: CPT | Performed by: INTERNAL MEDICINE

## 2020-08-12 PROCEDURE — 84443 ASSAY THYROID STIM HORMONE: CPT | Performed by: INTERNAL MEDICINE

## 2020-08-12 PROCEDURE — 25010000002 ENOXAPARIN PER 10 MG: Performed by: INTERNAL MEDICINE

## 2020-08-12 PROCEDURE — 83690 ASSAY OF LIPASE: CPT | Performed by: INTERNAL MEDICINE

## 2020-08-12 PROCEDURE — 25010000002 CEFTAZIDIME PER 500 MG: Performed by: INTERNAL MEDICINE

## 2020-08-12 PROCEDURE — 82550 ASSAY OF CK (CPK): CPT | Performed by: INTERNAL MEDICINE

## 2020-08-12 PROCEDURE — 84439 ASSAY OF FREE THYROXINE: CPT | Performed by: INTERNAL MEDICINE

## 2020-08-12 RX ORDER — POTASSIUM CHLORIDE 750 MG/1
40 CAPSULE, EXTENDED RELEASE ORAL ONCE
Status: COMPLETED | OUTPATIENT
Start: 2020-08-12 | End: 2020-08-12

## 2020-08-12 RX ADMIN — SODIUM CHLORIDE, PRESERVATIVE FREE 10 ML: 5 INJECTION INTRAVENOUS at 08:43

## 2020-08-12 RX ADMIN — ENOXAPARIN SODIUM 30 MG: 30 INJECTION SUBCUTANEOUS at 12:26

## 2020-08-12 RX ADMIN — BACLOFEN 10 MG: 10 TABLET ORAL at 21:34

## 2020-08-12 RX ADMIN — OMEPRAZOLE 20 MG: 20 TABLET, DELAYED RELEASE ORAL at 08:41

## 2020-08-12 RX ADMIN — BACLOFEN 10 MG: 10 TABLET ORAL at 17:31

## 2020-08-12 RX ADMIN — COLLAGENASE SANTYL: 250 OINTMENT TOPICAL at 08:42

## 2020-08-12 RX ADMIN — POTASSIUM CHLORIDE 40 MEQ: 750 CAPSULE, EXTENDED RELEASE ORAL at 17:31

## 2020-08-12 RX ADMIN — CEFTAZIDIME 2 G: 1 INJECTION, POWDER, FOR SOLUTION INTRAMUSCULAR; INTRAVENOUS at 04:17

## 2020-08-12 RX ADMIN — SODIUM CHLORIDE, PRESERVATIVE FREE 10 ML: 5 INJECTION INTRAVENOUS at 21:35

## 2020-08-12 RX ADMIN — BACLOFEN 10 MG: 10 TABLET ORAL at 12:26

## 2020-08-12 RX ADMIN — DAKIN'S SOLUTION 0.125% (QUARTER STRENGTH): 0.12 SOLUTION at 08:42

## 2020-08-13 ENCOUNTER — APPOINTMENT (OUTPATIENT)
Dept: NEUROLOGY | Facility: HOSPITAL | Age: 60
End: 2020-08-13

## 2020-08-13 ENCOUNTER — APPOINTMENT (OUTPATIENT)
Dept: MRI IMAGING | Facility: HOSPITAL | Age: 60
End: 2020-08-13

## 2020-08-13 LAB
ANION GAP SERPL CALCULATED.3IONS-SCNC: 13 MMOL/L (ref 5–15)
BUN SERPL-MCNC: 19 MG/DL (ref 6–20)
BUN/CREAT SERPL: 16.8 (ref 7–25)
CALCIUM SPEC-SCNC: 9.3 MG/DL (ref 8.6–10.5)
CHLORIDE SERPL-SCNC: 113 MMOL/L (ref 98–107)
CO2 SERPL-SCNC: 22 MMOL/L (ref 22–29)
CREAT SERPL-MCNC: 1.13 MG/DL (ref 0.57–1)
GFR SERPL CREATININE-BSD FRML MDRD: 49 ML/MIN/1.73
GLUCOSE SERPL-MCNC: 89 MG/DL (ref 65–99)
MAGNESIUM SERPL-MCNC: 1.9 MG/DL (ref 1.6–2.6)
POTASSIUM SERPL-SCNC: 3.9 MMOL/L (ref 3.5–5.2)
SODIUM SERPL-SCNC: 148 MMOL/L (ref 136–145)

## 2020-08-13 PROCEDURE — 25010000002 ENOXAPARIN PER 10 MG: Performed by: INTERNAL MEDICINE

## 2020-08-13 PROCEDURE — 80048 BASIC METABOLIC PNL TOTAL CA: CPT | Performed by: INTERNAL MEDICINE

## 2020-08-13 PROCEDURE — 99223 1ST HOSP IP/OBS HIGH 75: CPT | Performed by: PSYCHIATRY & NEUROLOGY

## 2020-08-13 PROCEDURE — 70551 MRI BRAIN STEM W/O DYE: CPT

## 2020-08-13 PROCEDURE — 95816 EEG AWAKE AND DROWSY: CPT

## 2020-08-13 PROCEDURE — 83735 ASSAY OF MAGNESIUM: CPT | Performed by: INTERNAL MEDICINE

## 2020-08-13 RX ORDER — PROMETHAZINE HYDROCHLORIDE 25 MG/ML
12.5 INJECTION, SOLUTION INTRAMUSCULAR; INTRAVENOUS EVERY 6 HOURS PRN
Status: DISCONTINUED | OUTPATIENT
Start: 2020-08-13 | End: 2020-08-18 | Stop reason: HOSPADM

## 2020-08-13 RX ORDER — DEXTROSE AND SODIUM CHLORIDE 5; .45 G/100ML; G/100ML
75 INJECTION, SOLUTION INTRAVENOUS CONTINUOUS
Status: DISPENSED | OUTPATIENT
Start: 2020-08-13 | End: 2020-08-14

## 2020-08-13 RX ORDER — SODIUM CHLORIDE 450 MG/100ML
75 INJECTION, SOLUTION INTRAVENOUS CONTINUOUS
Status: DISCONTINUED | OUTPATIENT
Start: 2020-08-13 | End: 2020-08-13

## 2020-08-13 RX ADMIN — BACLOFEN 10 MG: 10 TABLET ORAL at 17:54

## 2020-08-13 RX ADMIN — ENOXAPARIN SODIUM 30 MG: 30 INJECTION SUBCUTANEOUS at 13:18

## 2020-08-13 RX ADMIN — ALPRAZOLAM 0.25 MG: 0.25 TABLET ORAL at 05:49

## 2020-08-13 RX ADMIN — BACLOFEN 10 MG: 10 TABLET ORAL at 21:43

## 2020-08-13 RX ADMIN — DAKIN'S SOLUTION 0.125% (QUARTER STRENGTH): 0.12 SOLUTION at 09:06

## 2020-08-13 RX ADMIN — SODIUM CHLORIDE 75 ML/HR: 4.5 INJECTION, SOLUTION INTRAVENOUS at 06:59

## 2020-08-13 RX ADMIN — COLLAGENASE SANTYL: 250 OINTMENT TOPICAL at 09:06

## 2020-08-13 RX ADMIN — DEXTROSE AND SODIUM CHLORIDE 75 ML/HR: 5; 450 INJECTION, SOLUTION INTRAVENOUS at 13:18

## 2020-08-14 LAB
ANION GAP SERPL CALCULATED.3IONS-SCNC: 12 MMOL/L (ref 5–15)
BUN SERPL-MCNC: 18 MG/DL (ref 6–20)
BUN/CREAT SERPL: 18.2 (ref 7–25)
CALCIUM SPEC-SCNC: 8.7 MG/DL (ref 8.6–10.5)
CHLORIDE SERPL-SCNC: 111 MMOL/L (ref 98–107)
CO2 SERPL-SCNC: 23 MMOL/L (ref 22–29)
CREAT SERPL-MCNC: 0.99 MG/DL (ref 0.57–1)
GFR SERPL CREATININE-BSD FRML MDRD: 58 ML/MIN/1.73
GLUCOSE SERPL-MCNC: 89 MG/DL (ref 65–99)
POTASSIUM SERPL-SCNC: 3.3 MMOL/L (ref 3.5–5.2)
SODIUM SERPL-SCNC: 146 MMOL/L (ref 136–145)

## 2020-08-14 PROCEDURE — 99232 SBSQ HOSP IP/OBS MODERATE 35: CPT | Performed by: PSYCHIATRY & NEUROLOGY

## 2020-08-14 PROCEDURE — 80048 BASIC METABOLIC PNL TOTAL CA: CPT | Performed by: INTERNAL MEDICINE

## 2020-08-14 PROCEDURE — 97110 THERAPEUTIC EXERCISES: CPT

## 2020-08-14 PROCEDURE — 25010000002 ENOXAPARIN PER 10 MG: Performed by: INTERNAL MEDICINE

## 2020-08-14 PROCEDURE — 97530 THERAPEUTIC ACTIVITIES: CPT

## 2020-08-14 RX ORDER — POTASSIUM CHLORIDE 750 MG/1
40 CAPSULE, EXTENDED RELEASE ORAL ONCE
Status: COMPLETED | OUTPATIENT
Start: 2020-08-14 | End: 2020-08-14

## 2020-08-14 RX ADMIN — POTASSIUM CHLORIDE 40 MEQ: 750 CAPSULE, EXTENDED RELEASE ORAL at 11:31

## 2020-08-14 RX ADMIN — DAKIN'S SOLUTION 0.125% (QUARTER STRENGTH): 0.12 SOLUTION at 08:09

## 2020-08-14 RX ADMIN — ENOXAPARIN SODIUM 30 MG: 30 INJECTION SUBCUTANEOUS at 16:48

## 2020-08-14 RX ADMIN — DEXTROSE AND SODIUM CHLORIDE 75 ML/HR: 5; 450 INJECTION, SOLUTION INTRAVENOUS at 19:52

## 2020-08-14 RX ADMIN — SODIUM CHLORIDE, PRESERVATIVE FREE 10 ML: 5 INJECTION INTRAVENOUS at 21:53

## 2020-08-14 RX ADMIN — DEXTROSE AND SODIUM CHLORIDE 75 ML/HR: 5; 450 INJECTION, SOLUTION INTRAVENOUS at 06:11

## 2020-08-14 RX ADMIN — COLLAGENASE SANTYL: 250 OINTMENT TOPICAL at 08:09

## 2020-08-14 RX ADMIN — BACLOFEN 10 MG: 10 TABLET ORAL at 16:48

## 2020-08-14 RX ADMIN — BACLOFEN 10 MG: 10 TABLET ORAL at 21:54

## 2020-08-14 RX ADMIN — BACLOFEN 10 MG: 10 TABLET ORAL at 11:31

## 2020-08-14 RX ADMIN — OMEPRAZOLE 20 MG: 20 TABLET, DELAYED RELEASE ORAL at 08:08

## 2020-08-15 LAB
ANION GAP SERPL CALCULATED.3IONS-SCNC: 8 MMOL/L (ref 5–15)
BUN SERPL-MCNC: 15 MG/DL (ref 6–20)
BUN/CREAT SERPL: 15.5 (ref 7–25)
CALCIUM SPEC-SCNC: 8.6 MG/DL (ref 8.6–10.5)
CHLORIDE SERPL-SCNC: 111 MMOL/L (ref 98–107)
CO2 SERPL-SCNC: 23 MMOL/L (ref 22–29)
CREAT SERPL-MCNC: 0.97 MG/DL (ref 0.57–1)
GFR SERPL CREATININE-BSD FRML MDRD: 59 ML/MIN/1.73
GLUCOSE SERPL-MCNC: 98 MG/DL (ref 65–99)
MAGNESIUM SERPL-MCNC: 1.7 MG/DL (ref 1.6–2.6)
POTASSIUM SERPL-SCNC: 3.9 MMOL/L (ref 3.5–5.2)
SODIUM SERPL-SCNC: 142 MMOL/L (ref 136–145)

## 2020-08-15 PROCEDURE — 80048 BASIC METABOLIC PNL TOTAL CA: CPT | Performed by: INTERNAL MEDICINE

## 2020-08-15 PROCEDURE — 87635 SARS-COV-2 COVID-19 AMP PRB: CPT | Performed by: INTERNAL MEDICINE

## 2020-08-15 PROCEDURE — 97530 THERAPEUTIC ACTIVITIES: CPT

## 2020-08-15 PROCEDURE — 95816 EEG AWAKE AND DROWSY: CPT | Performed by: PSYCHIATRY & NEUROLOGY

## 2020-08-15 PROCEDURE — 83735 ASSAY OF MAGNESIUM: CPT | Performed by: INTERNAL MEDICINE

## 2020-08-15 PROCEDURE — 25010000002 PROMETHAZINE PER 50 MG: Performed by: INTERNAL MEDICINE

## 2020-08-15 PROCEDURE — 25010000002 ENOXAPARIN PER 10 MG: Performed by: INTERNAL MEDICINE

## 2020-08-15 RX ADMIN — ALPRAZOLAM 0.25 MG: 0.25 TABLET ORAL at 21:16

## 2020-08-15 RX ADMIN — CHOLECALCIFEROL (VITAMIN D3) 25 MCG (1,000 UNIT) TABLET 1000 UNITS: TABLET at 08:23

## 2020-08-15 RX ADMIN — SODIUM CHLORIDE, PRESERVATIVE FREE 10 ML: 5 INJECTION INTRAVENOUS at 21:16

## 2020-08-15 RX ADMIN — BACLOFEN 10 MG: 10 TABLET ORAL at 17:03

## 2020-08-15 RX ADMIN — HYDROCORTISONE: 1 CREAM TOPICAL at 21:18

## 2020-08-15 RX ADMIN — ENOXAPARIN SODIUM 30 MG: 30 INJECTION SUBCUTANEOUS at 12:51

## 2020-08-15 RX ADMIN — COLLAGENASE SANTYL: 250 OINTMENT TOPICAL at 08:24

## 2020-08-15 RX ADMIN — BACLOFEN 10 MG: 10 TABLET ORAL at 12:51

## 2020-08-15 RX ADMIN — OMEPRAZOLE 20 MG: 20 TABLET, DELAYED RELEASE ORAL at 08:23

## 2020-08-15 RX ADMIN — PROMETHAZINE HYDROCHLORIDE 12.5 MG: 25 INJECTION INTRAMUSCULAR; INTRAVENOUS at 05:07

## 2020-08-15 RX ADMIN — DAKIN'S SOLUTION 0.125% (QUARTER STRENGTH): 0.12 SOLUTION at 08:24

## 2020-08-15 RX ADMIN — HYDROCORTISONE: 1 CREAM TOPICAL at 08:25

## 2020-08-15 RX ADMIN — BACLOFEN 10 MG: 10 TABLET ORAL at 21:18

## 2020-08-16 LAB — SARS-COV-2 N GENE RESP QL NAA+PROBE: NOT DETECTED

## 2020-08-16 PROCEDURE — 25010000002 PROMETHAZINE PER 50 MG: Performed by: INTERNAL MEDICINE

## 2020-08-16 PROCEDURE — 25010000002 ENOXAPARIN PER 10 MG: Performed by: INTERNAL MEDICINE

## 2020-08-16 RX ADMIN — HYDROCORTISONE: 1 CREAM TOPICAL at 08:56

## 2020-08-16 RX ADMIN — ENOXAPARIN SODIUM 30 MG: 30 INJECTION SUBCUTANEOUS at 12:25

## 2020-08-16 RX ADMIN — BACLOFEN 10 MG: 10 TABLET ORAL at 12:25

## 2020-08-16 RX ADMIN — PROMETHAZINE HYDROCHLORIDE 12.5 MG: 25 INJECTION INTRAMUSCULAR; INTRAVENOUS at 05:05

## 2020-08-16 RX ADMIN — DAKIN'S SOLUTION 0.125% (QUARTER STRENGTH): 0.12 SOLUTION at 08:56

## 2020-08-16 RX ADMIN — SODIUM CHLORIDE, PRESERVATIVE FREE 10 ML: 5 INJECTION INTRAVENOUS at 20:31

## 2020-08-16 RX ADMIN — SODIUM CHLORIDE, PRESERVATIVE FREE 10 ML: 5 INJECTION INTRAVENOUS at 08:57

## 2020-08-16 RX ADMIN — BACLOFEN 10 MG: 10 TABLET ORAL at 17:34

## 2020-08-16 RX ADMIN — COLLAGENASE SANTYL: 250 OINTMENT TOPICAL at 08:56

## 2020-08-16 RX ADMIN — CHOLECALCIFEROL (VITAMIN D3) 25 MCG (1,000 UNIT) TABLET 1000 UNITS: TABLET at 08:56

## 2020-08-16 RX ADMIN — BACLOFEN 10 MG: 10 TABLET ORAL at 22:58

## 2020-08-17 PROCEDURE — 97110 THERAPEUTIC EXERCISES: CPT

## 2020-08-17 PROCEDURE — 97530 THERAPEUTIC ACTIVITIES: CPT

## 2020-08-17 PROCEDURE — 25010000002 ENOXAPARIN PER 10 MG: Performed by: INTERNAL MEDICINE

## 2020-08-17 PROCEDURE — 25010000002 ONDANSETRON PER 1 MG: Performed by: INTERNAL MEDICINE

## 2020-08-17 RX ORDER — NYSTATIN 100000 [USP'U]/G
POWDER TOPICAL EVERY 12 HOURS SCHEDULED
Status: DISCONTINUED | OUTPATIENT
Start: 2020-08-17 | End: 2020-08-18 | Stop reason: HOSPADM

## 2020-08-17 RX ORDER — ONDANSETRON 2 MG/ML
4 INJECTION INTRAMUSCULAR; INTRAVENOUS EVERY 6 HOURS PRN
Status: DISCONTINUED | OUTPATIENT
Start: 2020-08-17 | End: 2020-08-18 | Stop reason: HOSPADM

## 2020-08-17 RX ORDER — OMEPRAZOLE 20 MG/1
20 TABLET, DELAYED RELEASE ORAL
Status: DISCONTINUED | OUTPATIENT
Start: 2020-08-17 | End: 2020-08-18 | Stop reason: HOSPADM

## 2020-08-17 RX ADMIN — ONDANSETRON HYDROCHLORIDE 4 MG: 2 SOLUTION INTRAMUSCULAR; INTRAVENOUS at 16:45

## 2020-08-17 RX ADMIN — CHOLECALCIFEROL (VITAMIN D3) 25 MCG (1,000 UNIT) TABLET 1000 UNITS: TABLET at 10:05

## 2020-08-17 RX ADMIN — ALUMINUM ZIRCONIUM TRICHLOROHYDREX GLY 20 MG: 0.19 STICK TOPICAL at 12:26

## 2020-08-17 RX ADMIN — NYSTATIN: 100000 POWDER TOPICAL at 21:34

## 2020-08-17 RX ADMIN — SODIUM CHLORIDE, PRESERVATIVE FREE 10 ML: 5 INJECTION INTRAVENOUS at 10:06

## 2020-08-17 RX ADMIN — SODIUM CHLORIDE, PRESERVATIVE FREE 10 ML: 5 INJECTION INTRAVENOUS at 21:34

## 2020-08-17 RX ADMIN — ONDANSETRON HYDROCHLORIDE 4 MG: 2 SOLUTION INTRAMUSCULAR; INTRAVENOUS at 22:48

## 2020-08-17 RX ADMIN — COLLAGENASE SANTYL: 250 OINTMENT TOPICAL at 10:06

## 2020-08-17 RX ADMIN — BACLOFEN 10 MG: 10 TABLET ORAL at 21:33

## 2020-08-17 RX ADMIN — BACLOFEN 10 MG: 10 TABLET ORAL at 17:33

## 2020-08-17 RX ADMIN — ENOXAPARIN SODIUM 30 MG: 30 INJECTION SUBCUTANEOUS at 17:35

## 2020-08-18 VITALS
RESPIRATION RATE: 16 BRPM | WEIGHT: 156.56 LBS | DIASTOLIC BLOOD PRESSURE: 56 MMHG | BODY MASS INDEX: 23.73 KG/M2 | HEIGHT: 68 IN | OXYGEN SATURATION: 96 % | TEMPERATURE: 98.5 F | HEART RATE: 83 BPM | SYSTOLIC BLOOD PRESSURE: 139 MMHG

## 2020-08-18 PROCEDURE — 97110 THERAPEUTIC EXERCISES: CPT

## 2020-08-18 PROCEDURE — 25010000002 ONDANSETRON PER 1 MG: Performed by: INTERNAL MEDICINE

## 2020-08-18 RX ORDER — ALPRAZOLAM 0.25 MG/1
0.25 TABLET ORAL NIGHTLY PRN
Qty: 5 TABLET | Refills: 0 | Status: SHIPPED | OUTPATIENT
Start: 2020-08-18

## 2020-08-18 RX ORDER — POLYETHYLENE GLYCOL 3350 17 G/17G
17 POWDER, FOR SOLUTION ORAL DAILY PRN
Start: 2020-08-18

## 2020-08-18 RX ORDER — MELATONIN
1000 DAILY
Start: 2020-08-19

## 2020-08-18 RX ORDER — HYDROCODONE BITARTRATE AND ACETAMINOPHEN 10; 325 MG/1; MG/1
1 TABLET ORAL EVERY 4 HOURS PRN
Qty: 6 TABLET | Refills: 0 | Status: SHIPPED | OUTPATIENT
Start: 2020-08-18

## 2020-08-18 RX ORDER — NYSTATIN 100000 [USP'U]/G
POWDER TOPICAL EVERY 12 HOURS SCHEDULED
Start: 2020-08-18

## 2020-08-18 RX ORDER — SODIUM HYPOCHLORITE 1.25 MG/ML
SOLUTION TOPICAL DAILY
Status: DISCONTINUED | OUTPATIENT
Start: 2020-08-18 | End: 2020-08-18 | Stop reason: HOSPADM

## 2020-08-18 RX ORDER — OMEPRAZOLE 20 MG/1
20 TABLET, DELAYED RELEASE ORAL
Start: 2020-08-18

## 2020-08-18 RX ORDER — DIPHENHYDRAMINE HCL 25 MG
25 CAPSULE ORAL EVERY 6 HOURS PRN
Start: 2020-08-18

## 2020-08-18 RX ORDER — ONDANSETRON 4 MG/1
4 TABLET, ORALLY DISINTEGRATING ORAL EVERY 8 HOURS PRN
Start: 2020-08-18

## 2020-08-18 RX ORDER — ACETAMINOPHEN 325 MG/1
650 TABLET ORAL EVERY 4 HOURS PRN
Start: 2020-08-18

## 2020-08-18 RX ORDER — SODIUM HYPOCHLORITE 1.25 MG/ML
1000 SOLUTION TOPICAL DAILY
Start: 2020-08-18

## 2020-08-18 RX ADMIN — COLLAGENASE SANTYL: 250 OINTMENT TOPICAL at 09:35

## 2020-08-18 RX ADMIN — NYSTATIN: 100000 POWDER TOPICAL at 09:31

## 2020-08-18 RX ADMIN — ONDANSETRON HYDROCHLORIDE 4 MG: 2 SOLUTION INTRAMUSCULAR; INTRAVENOUS at 06:54

## 2020-08-18 RX ADMIN — CHOLECALCIFEROL (VITAMIN D3) 25 MCG (1,000 UNIT) TABLET 1000 UNITS: TABLET at 09:31

## 2020-08-18 RX ADMIN — DAKIN'S SOLUTION 0.125% (QUARTER STRENGTH): 0.12 SOLUTION at 15:33

## 2020-08-18 RX ADMIN — BACLOFEN 10 MG: 10 TABLET ORAL at 14:42

## 2020-09-18 ENCOUNTER — APPOINTMENT (OUTPATIENT)
Dept: CT IMAGING | Facility: HOSPITAL | Age: 60
End: 2020-09-18

## 2020-09-18 ENCOUNTER — APPOINTMENT (OUTPATIENT)
Dept: GENERAL RADIOLOGY | Facility: HOSPITAL | Age: 60
End: 2020-09-18

## 2020-09-18 ENCOUNTER — HOSPITAL ENCOUNTER (EMERGENCY)
Facility: HOSPITAL | Age: 60
Discharge: ANOTHER HOSPITAL | End: 2020-09-18
Attending: EMERGENCY MEDICINE | Admitting: EMERGENCY MEDICINE

## 2020-09-18 VITALS
TEMPERATURE: 98.6 F | SYSTOLIC BLOOD PRESSURE: 105 MMHG | BODY MASS INDEX: 20.61 KG/M2 | WEIGHT: 136 LBS | HEART RATE: 89 BPM | HEIGHT: 68 IN | RESPIRATION RATE: 16 BRPM | DIASTOLIC BLOOD PRESSURE: 79 MMHG | OXYGEN SATURATION: 99 %

## 2020-09-18 DIAGNOSIS — A49.9 BACTERIAL URINARY INFECTION: ICD-10-CM

## 2020-09-18 DIAGNOSIS — N39.0 BACTERIAL URINARY INFECTION: ICD-10-CM

## 2020-09-18 DIAGNOSIS — M46.28 SACRAL OSTEOMYELITIS (HCC): ICD-10-CM

## 2020-09-18 DIAGNOSIS — L89.154 PRESSURE INJURY OF SACRAL REGION, STAGE 4 (HCC): Primary | ICD-10-CM

## 2020-09-18 LAB
ALBUMIN SERPL-MCNC: 2.3 G/DL (ref 3.5–5.2)
ALBUMIN/GLOB SERPL: 0.6 G/DL
ALP SERPL-CCNC: 156 U/L (ref 39–117)
ALT SERPL W P-5'-P-CCNC: 8 U/L (ref 1–33)
ANION GAP SERPL CALCULATED.3IONS-SCNC: 12 MMOL/L (ref 5–15)
APTT PPP: 33.2 SECONDS (ref 24.1–35)
AST SERPL-CCNC: 13 U/L (ref 1–32)
BACTERIA UR QL AUTO: ABNORMAL /HPF
BASOPHILS # BLD AUTO: 0.06 10*3/MM3 (ref 0–0.2)
BASOPHILS NFR BLD AUTO: 0.9 % (ref 0–1.5)
BILIRUB SERPL-MCNC: 0.2 MG/DL (ref 0–1.2)
BILIRUB UR QL STRIP: NEGATIVE
BUN SERPL-MCNC: 22 MG/DL (ref 6–20)
BUN/CREAT SERPL: 32.4 (ref 7–25)
CALCIUM SPEC-SCNC: 8.7 MG/DL (ref 8.6–10.5)
CHLORIDE SERPL-SCNC: 104 MMOL/L (ref 98–107)
CLARITY UR: ABNORMAL
CO2 SERPL-SCNC: 25 MMOL/L (ref 22–29)
COLOR UR: YELLOW
CREAT SERPL-MCNC: 0.68 MG/DL (ref 0.57–1)
D-LACTATE SERPL-SCNC: 0.7 MMOL/L (ref 0.5–2)
DEPRECATED RDW RBC AUTO: 46.1 FL (ref 37–54)
EOSINOPHIL # BLD AUTO: 0.26 10*3/MM3 (ref 0–0.4)
EOSINOPHIL NFR BLD AUTO: 3.8 % (ref 0.3–6.2)
ERYTHROCYTE [DISTWIDTH] IN BLOOD BY AUTOMATED COUNT: 14.5 % (ref 12.3–15.4)
ERYTHROCYTE [SEDIMENTATION RATE] IN BLOOD: 49 MM/HR (ref 0–20)
GFR SERPL CREATININE-BSD FRML MDRD: 89 ML/MIN/1.73
GLOBULIN UR ELPH-MCNC: 3.8 GM/DL
GLUCOSE SERPL-MCNC: 97 MG/DL (ref 65–99)
GLUCOSE UR STRIP-MCNC: NEGATIVE MG/DL
HCT VFR BLD AUTO: 32.5 % (ref 34–46.6)
HGB BLD-MCNC: 9.9 G/DL (ref 12–15.9)
HGB UR QL STRIP.AUTO: ABNORMAL
HOLD SPECIMEN: NORMAL
HYALINE CASTS UR QL AUTO: ABNORMAL /LPF
IMM GRANULOCYTES # BLD AUTO: 0.02 10*3/MM3 (ref 0–0.05)
IMM GRANULOCYTES NFR BLD AUTO: 0.3 % (ref 0–0.5)
INR PPP: 1.23 (ref 0.91–1.09)
KETONES UR QL STRIP: NEGATIVE
LEUKOCYTE ESTERASE UR QL STRIP.AUTO: ABNORMAL
LYMPHOCYTES # BLD AUTO: 1.7 10*3/MM3 (ref 0.7–3.1)
LYMPHOCYTES NFR BLD AUTO: 24.9 % (ref 19.6–45.3)
MCH RBC QN AUTO: 26.5 PG (ref 26.6–33)
MCHC RBC AUTO-ENTMCNC: 30.5 G/DL (ref 31.5–35.7)
MCV RBC AUTO: 86.9 FL (ref 79–97)
MONOCYTES # BLD AUTO: 0.48 10*3/MM3 (ref 0.1–0.9)
MONOCYTES NFR BLD AUTO: 7 % (ref 5–12)
NEUTROPHILS NFR BLD AUTO: 4.31 10*3/MM3 (ref 1.7–7)
NEUTROPHILS NFR BLD AUTO: 63.1 % (ref 42.7–76)
NITRITE UR QL STRIP: POSITIVE
NRBC BLD AUTO-RTO: 0 /100 WBC (ref 0–0.2)
PH UR STRIP.AUTO: 6 [PH] (ref 5–8)
PLATELET # BLD AUTO: 397 10*3/MM3 (ref 140–450)
PMV BLD AUTO: 9.3 FL (ref 6–12)
POTASSIUM SERPL-SCNC: 4.1 MMOL/L (ref 3.5–5.2)
PROCALCITONIN SERPL-MCNC: 0.18 NG/ML (ref 0–0.25)
PROT SERPL-MCNC: 6.1 G/DL (ref 6–8.5)
PROT UR QL STRIP: ABNORMAL
PROTHROMBIN TIME: 15.1 SECONDS (ref 11.9–14.6)
RBC # BLD AUTO: 3.74 10*6/MM3 (ref 3.77–5.28)
RBC # UR: ABNORMAL /HPF
REF LAB TEST METHOD: ABNORMAL
SODIUM SERPL-SCNC: 141 MMOL/L (ref 136–145)
SP GR UR STRIP: 1.01 (ref 1–1.03)
SQUAMOUS #/AREA URNS HPF: ABNORMAL /HPF
UROBILINOGEN UR QL STRIP: ABNORMAL
WBC # BLD AUTO: 6.83 10*3/MM3 (ref 3.4–10.8)
WBC UR QL AUTO: ABNORMAL /HPF
WHOLE BLOOD HOLD SPECIMEN: NORMAL
WHOLE BLOOD HOLD SPECIMEN: NORMAL

## 2020-09-18 PROCEDURE — 83605 ASSAY OF LACTIC ACID: CPT | Performed by: EMERGENCY MEDICINE

## 2020-09-18 PROCEDURE — 72192 CT PELVIS W/O DYE: CPT

## 2020-09-18 PROCEDURE — 99285 EMERGENCY DEPT VISIT HI MDM: CPT

## 2020-09-18 PROCEDURE — 80053 COMPREHEN METABOLIC PANEL: CPT | Performed by: EMERGENCY MEDICINE

## 2020-09-18 PROCEDURE — 96375 TX/PRO/DX INJ NEW DRUG ADDON: CPT

## 2020-09-18 PROCEDURE — 25010000002 ONDANSETRON PER 1 MG: Performed by: EMERGENCY MEDICINE

## 2020-09-18 PROCEDURE — 25010000002 PIPERACILLIN SOD-TAZOBACTAM PER 1 G: Performed by: EMERGENCY MEDICINE

## 2020-09-18 PROCEDURE — 87070 CULTURE OTHR SPECIMN AEROBIC: CPT | Performed by: EMERGENCY MEDICINE

## 2020-09-18 PROCEDURE — P9612 CATHETERIZE FOR URINE SPEC: HCPCS

## 2020-09-18 PROCEDURE — 87086 URINE CULTURE/COLONY COUNT: CPT | Performed by: EMERGENCY MEDICINE

## 2020-09-18 PROCEDURE — 87147 CULTURE TYPE IMMUNOLOGIC: CPT | Performed by: EMERGENCY MEDICINE

## 2020-09-18 PROCEDURE — 96365 THER/PROPH/DIAG IV INF INIT: CPT

## 2020-09-18 PROCEDURE — 71045 X-RAY EXAM CHEST 1 VIEW: CPT

## 2020-09-18 PROCEDURE — 96367 TX/PROPH/DG ADDL SEQ IV INF: CPT

## 2020-09-18 PROCEDURE — 25010000002 VANCOMYCIN PER 500 MG: Performed by: EMERGENCY MEDICINE

## 2020-09-18 PROCEDURE — 87040 BLOOD CULTURE FOR BACTERIA: CPT | Performed by: EMERGENCY MEDICINE

## 2020-09-18 PROCEDURE — 85730 THROMBOPLASTIN TIME PARTIAL: CPT | Performed by: EMERGENCY MEDICINE

## 2020-09-18 PROCEDURE — 81001 URINALYSIS AUTO W/SCOPE: CPT | Performed by: EMERGENCY MEDICINE

## 2020-09-18 PROCEDURE — 87205 SMEAR GRAM STAIN: CPT | Performed by: EMERGENCY MEDICINE

## 2020-09-18 PROCEDURE — 84145 PROCALCITONIN (PCT): CPT | Performed by: EMERGENCY MEDICINE

## 2020-09-18 PROCEDURE — 85651 RBC SED RATE NONAUTOMATED: CPT | Performed by: EMERGENCY MEDICINE

## 2020-09-18 PROCEDURE — 85025 COMPLETE CBC W/AUTO DIFF WBC: CPT | Performed by: EMERGENCY MEDICINE

## 2020-09-18 PROCEDURE — 85610 PROTHROMBIN TIME: CPT | Performed by: EMERGENCY MEDICINE

## 2020-09-18 PROCEDURE — 87186 SC STD MICRODIL/AGAR DIL: CPT | Performed by: EMERGENCY MEDICINE

## 2020-09-18 RX ORDER — VANCOMYCIN HYDROCHLORIDE 1 G/200ML
1000 INJECTION, SOLUTION INTRAVENOUS ONCE
Status: COMPLETED | OUTPATIENT
Start: 2020-09-18 | End: 2020-09-18

## 2020-09-18 RX ORDER — SODIUM CHLORIDE 0.9 % (FLUSH) 0.9 %
10 SYRINGE (ML) INJECTION AS NEEDED
Status: DISCONTINUED | OUTPATIENT
Start: 2020-09-18 | End: 2020-09-18 | Stop reason: HOSPADM

## 2020-09-18 RX ORDER — PANTOPRAZOLE SODIUM 40 MG/1
40 TABLET, DELAYED RELEASE ORAL ONCE
Status: COMPLETED | OUTPATIENT
Start: 2020-09-18 | End: 2020-09-18

## 2020-09-18 RX ORDER — ONDANSETRON 2 MG/ML
4 INJECTION INTRAMUSCULAR; INTRAVENOUS ONCE
Status: COMPLETED | OUTPATIENT
Start: 2020-09-18 | End: 2020-09-18

## 2020-09-18 RX ADMIN — VANCOMYCIN HYDROCHLORIDE 1000 MG: 1 INJECTION, SOLUTION INTRAVENOUS at 17:11

## 2020-09-18 RX ADMIN — PIPERACILLIN AND TAZOBACTAM 3.38 G: 3; .375 INJECTION, POWDER, LYOPHILIZED, FOR SOLUTION INTRAVENOUS; PARENTERAL at 18:26

## 2020-09-18 RX ADMIN — PANTOPRAZOLE SODIUM 40 MG: 40 TABLET, DELAYED RELEASE ORAL at 16:22

## 2020-09-18 RX ADMIN — ONDANSETRON HYDROCHLORIDE 4 MG: 2 SOLUTION INTRAMUSCULAR; INTRAVENOUS at 18:31

## 2020-09-18 RX ADMIN — SODIUM CHLORIDE 1000 ML: 9 INJECTION, SOLUTION INTRAVENOUS at 13:22

## 2020-09-18 NOTE — ED NOTES
Pts wound were examined by Dr. Cruz. He states she will need to be seen by wound care for further eval her chronic sacral wound.      Corrina Meredith, RN  09/18/20 [id number]

## 2020-09-19 LAB — BACTERIA SPEC AEROBE CULT: NORMAL

## 2020-09-20 LAB
BACTERIA SPEC AEROBE CULT: ABNORMAL
GRAM STN SPEC: ABNORMAL
STREP GROUPING: ABNORMAL

## 2020-09-23 LAB
BACTERIA SPEC AEROBE CULT: NORMAL
BACTERIA SPEC AEROBE CULT: NORMAL

## 2020-11-17 ENCOUNTER — HOSPITAL ENCOUNTER (OUTPATIENT)
Dept: WOUND CARE | Age: 60
Discharge: HOME | End: 2020-11-17
Payer: MEDICARE

## 2020-11-24 ENCOUNTER — HOSPITAL ENCOUNTER (OUTPATIENT)
Dept: WOUND CARE | Age: 60
Discharge: HOME | End: 2020-11-24
Payer: MEDICARE

## 2020-11-24 VITALS
HEIGHT: 68 IN | HEART RATE: 86 BPM | TEMPERATURE: 96.9 F | RESPIRATION RATE: 20 BRPM | SYSTOLIC BLOOD PRESSURE: 127 MMHG | DIASTOLIC BLOOD PRESSURE: 64 MMHG | WEIGHT: 160 LBS | BODY MASS INDEX: 24.25 KG/M2

## 2020-11-24 PROBLEM — L89.624 DECUBITUS ULCER OF LEFT HEEL, STAGE 4 (HCC): Status: ACTIVE | Noted: 2020-11-24

## 2020-11-24 PROBLEM — G82.20 PARAPLEGIA AT T9 LEVEL (HCC): Status: ACTIVE | Noted: 2019-08-12

## 2020-11-24 PROBLEM — L89.894: Status: ACTIVE | Noted: 2020-11-24

## 2020-11-24 PROBLEM — I87.2 VENOUS STASIS ULCER OF RIGHT CALF WITH FAT LAYER EXPOSED WITHOUT VARICOSE VEINS (HCC): Status: ACTIVE | Noted: 2020-11-24

## 2020-11-24 PROBLEM — L89.894 DECUBITUS ULCER OF RIGHT FOOT, STAGE 4 (HCC): Status: ACTIVE | Noted: 2020-11-24

## 2020-11-24 PROBLEM — L97.212 VENOUS STASIS ULCER OF RIGHT CALF WITH FAT LAYER EXPOSED WITHOUT VARICOSE VEINS (HCC): Status: ACTIVE | Noted: 2020-11-24

## 2020-11-24 PROCEDURE — 99214 OFFICE O/P EST MOD 30 MIN: CPT

## 2020-11-24 PROCEDURE — 97598 DBRDMT OPN WND ADDL 20CM/<: CPT

## 2020-11-24 PROCEDURE — 99215 OFFICE O/P EST HI 40 MIN: CPT | Performed by: NURSE PRACTITIONER

## 2020-11-24 PROCEDURE — 97597 DBRDMT OPN WND 1ST 20 CM/<: CPT

## 2020-11-24 PROCEDURE — 97598 DBRDMT OPN WND ADDL 20CM/<: CPT | Performed by: NURSE PRACTITIONER

## 2020-11-24 PROCEDURE — 97597 DBRDMT OPN WND 1ST 20 CM/<: CPT | Performed by: NURSE PRACTITIONER

## 2020-11-24 RX ORDER — LIDOCAINE HYDROCHLORIDE 20 MG/ML
JELLY TOPICAL PRN
Status: DISCONTINUED | OUTPATIENT
Start: 2020-11-24 | End: 2020-11-26 | Stop reason: HOSPADM

## 2020-11-24 ASSESSMENT — VISUAL ACUITY: OU: 1

## 2020-11-24 NOTE — PLAN OF CARE
Problem: Wound:  Goal: Will show signs of wound healing; wound closure and no evidence of infection  Description: Will show signs of wound healing; wound closure and no evidence of infection  Outcome: Ongoing     Problem: Pressure Ulcer:  Goal: Signs of wound healing will improve  Description: Signs of wound healing will improve  Outcome: Ongoing  Goal: Absence of new pressure ulcer  Description: Absence of new pressure ulcer  Outcome: Ongoing  Goal: Will show no infection signs and symptoms  Description: Will show no infection signs and symptoms  Outcome: Ongoing     Problem: Weight control:  Goal: Ability to maintain an optimal weight for height and age will be supported  Description: Ability to maintain an optimal weight for height and age will be supported  Outcome: Ongoing     Problem: Falls - Risk of:  Goal: Will remain free from falls  Description: Will remain free from falls  Outcome: Ongoing

## 2020-11-24 NOTE — PROGRESS NOTES
month(s) ago. She believes this is not healing. She has been applying Santyl. She has not had  fever orchills. She has a history of paraplegic with bilateral leg edema. Aashish Schwartz is a 64 y.o. female with the following history reviewed and recorded in [facility]:    Current Outpatient Medications   Medication Sig Dispense Refill    ALPRAZolam (XANAX) 1 MG tablet Take 1 mg by mouth 3 times daily as needed for Anxiety.  HYDROcodone-acetaminophen (NORCO)  MG per tablet Take 1 tablet by mouth every 6 hours as needed for Pain.       Multiple Vitamins-Minerals (THERAPEUTIC MULTIVITAMIN-MINERALS) tablet Take 1 tablet by mouth daily      omeprazole (PRILOSEC) 20 MG delayed release capsule Take 20 mg by mouth daily      zinc 50 MG CAPS Take 1 capsule by mouth daily      baclofen (LIORESAL) 10 MG tablet Take 10 mg by mouth 3 times daily       collagenase (SANTYL) 250 UNIT/GM ointment Apply 1 applicator topically daily      sodium hypochlorite (DAKINS) 0.125 % SOLN Irrigate with as directed 2 times daily       Current Facility-Administered Medications   Medication Dose Route Frequency Provider Last Rate Last Dose    lidocaine (XYLOCAINE) 2 % jelly   Topical PRN Aga Syed, APRN - CNP         Allergies: Morphine and related  Past Medical History:   Diagnosis Date    Blood circulation, collateral     Chronic kidney disease     bladder removed-no kidney problems    GERD (gastroesophageal reflux disease)     History of blood transfusion     History of urostomy     Neuromuscular disorder (HCC)     parapalegic t10-l1    Osteomyelitis (HCC)     Pressure ulcer     to left ischium and bilat heels       Past Surgical History:   Procedure Laterality Date    BACK SURGERY  1979    Removed part of outer spine after tumor T 10-L-1    BLADDER SURGERY  2019    bladder diversion surgery     SECTION      ENDOSCOPY, COLON, DIAGNOSTIC      IVC FILTER INSERTION      Had a blood clot and filter placement    LEG SURGERY Right     osteomyelitis surgery and MRSA Dr. Yolis Lomeli removed part of bone    OTHER SURGICAL HISTORY Left     Skin Flap to L. Buttock years ago more than 20 years ago    SMALL INTESTINE SURGERY N/A 2019    DIVERTING LOOP COLOSTOMY performed by Dusty Saint, [facility] at [facility] History   Problem Relation Age of Onset    Other Mother          of sepsis    Cancer Father         Lung Cancer    Diabetes Paternal Grandmother     Heart Attack Paternal Grandfather      Social History     Tobacco Use    Smoking status: Never Smoker    Smokeless tobacco: Never Used   Substance Use Topics    Alcohol use: Not Currently         Review of Systems    Review of Systems   Skin: Positive for wound. All other systems reviewed and are negative. All other review of systems are negative. Physical Exam    /64   Pulse 86   Temp 96.9 °F (36.1 °C) (Temporal)   Resp 20   Ht 5' 8\" (1.727 m)   Wt 160 lb (72.6 kg)   BMI 24.33 kg/m²     Physical Exam  Vitals signs reviewed. Constitutional:       Appearance: Normal appearance. She is normal weight. HENT:      Head: Normocephalic and atraumatic. Right Ear: External ear normal.      Left Ear: External ear normal.   Eyes:      General: Lids are normal. Lids are everted, no foreign bodies appreciated. Vision grossly intact. Gaze aligned appropriately. Neck:      Musculoskeletal: Normal range of motion. Cardiovascular:      Rate and Rhythm: Normal rate and regular rhythm. Pulses: Normal pulses. Heart sounds: Normal heart sounds. Pulmonary:      Effort: Pulmonary effort is normal.      Breath sounds: Normal breath sounds. Abdominal:      General: Bowel sounds are normal.   Musculoskeletal:        Feet:       Comments: paraplegic   Feet:      Right foot:      Skin integrity: Ulcer present. Left foot:      Skin integrity: Ulcer present. Skin:     General: Skin is dry. 1142   Drainage Amount Moderate 11/24/20 1142   Drainage Description Serosanguinous 11/24/20 1142   Odor Malodorous/putrid 11/24/20 1142   Nellie-wound Assessment Maceration; Intact 11/24/20 1142   Margins Defined edges 11/24/20 1142   Wound Thickness Description not for Pressure Injury Full thickness 11/24/20 1142   Number of days: 0       Wound 11/24/20 Tibial Distal;Right wound 4- right leg outer venous (Active)   Wound Image   11/24/20 1142   Wound Etiology Venous 11/24/20 1142   Dressing Status Old drainage noted 11/24/20 1142   Wound Cleansed Soap and water 11/24/20 1142   Dressing/Treatment Moist to moist 11/24/20 1142   Wound Length (cm) 1.8 cm 11/24/20 1142   Wound Width (cm) 1.6 cm 11/24/20 1142   Wound Depth (cm) 0.3 cm 11/24/20 1142   Wound Surface Area (cm^2) 2.88 cm^2 11/24/20 1142   Wound Volume (cm^3) 0.86 cm^3 11/24/20 1142   Post-Procedure Length (cm) 1.8 cm 11/24/20 1142   Post-Procedure Width (cm) 1.6 cm 11/24/20 1142   Post-Procedure Depth (cm) 0.3 cm 11/24/20 1142   Post-Procedure Surface Area (cm^2) 2.88 cm^2 11/24/20 1142   Post-Procedure Volume (cm^3) 0.86 cm^3 11/24/20 1142   Wound Assessment [facility] 11/24/20 1142   Drainage Amount Moderate 11/24/20 1142   Drainage Description Serosanguinous 11/24/20 1142   Odor None 11/24/20 1142   Nellie-wound Assessment Excoriated; Intact 11/24/20 1142   Margins Attached edges; Defined edges 11/24/20 1142   Wound Thickness Description not for Pressure Injury Full thickness 11/24/20 1142   Number of days: 0            Debridement: Selective Debridement/Non-Excisional Debridement    Using curette, scissors and forceps the wound(s)/ulcer(s) was/were sharply debrided down through and including the removal of epidermis and dermis.         Devitalized Tissue Debrided:  fibrin, biofilm, slough, necrotic/eschar and exudate    Pre Debridement Measurements:  Are located in the Wound/Ulcer Documentation Flow Sheet    Wound/Ulcer #: 1, 2, 3 and 4    Post Debridement Measurements:  Wound/Ulcer Descriptions are Pre Debridement except measurements:          Percent of Wound(s)/Ulcer(s) Debrided: 100%    Total Surface Area Debrided:  89.07 sq cm     Diabetic/Pressure/Non Pressure Ulcers only:  Ulcer: Pressure ulcer, Stage 4     Estimated Blood Loss:  Minimal    Hemostasis Achieved:  by pressure    Procedural Pain:  0  / 10     Post Procedural Pain:  0 / 10     Response to treatment:  Well tolerated by patient. Assessment    1. Severe protein-calorie malnutrition ([address]    2. Decubitus ulcer of left heel, stage 4 (HCC)    3. Decubitus ulcer of right foot, stage 4 ([address]    4. Decubitus ulcer of left leg, stage 4 (HCC)    5. Venous stasis ulcer of right calf with fat layer exposed without varicose veins ([facility])    6. Paraplegia at T9 level ([facility]          Plan    1. TRINIDAD  2. Xray    Planfor wound - Dress per physician order  Treatment:     Compression : No   Offloading : Yes    Dressing Orders:  Right and left foot wound: Soap and water wash; Santyl to wound bed nickel thick, saline moistened gauze loosely over the wound bed, secure with dry gauze, and rolled gauze twice daily. Right and left ankle wound: Soap and water wash; Santyl to wound bed nickel thick, saline moistened gauze loosely over the wound bed, secure with dry gauze, and rolled gauze twice daily. Discussed importance of nutrition, offloading, wound care, and plan of care. Patient understanding and questions answered. I spent a total of 60 minutes face to face with the patient. Over 75% of that time was spent on counseling and care coordination. Patient was told that if symptoms worsen or new symptoms develop they are to go to the emergency department immediately. Patient was educated on diagnosis and treatment plan. All of patient's questions were answered, and the patient understands the discharge plan. Discussed appropriate home care of this wound. Wound redressed.   Patient instructions

## 2020-12-07 ENCOUNTER — HOSPITAL ENCOUNTER (OUTPATIENT)
Dept: WOUND CARE | Age: 60
Discharge: HOME | End: 2020-12-07
Payer: MEDICARE

## 2020-12-07 ENCOUNTER — HOSPITAL ENCOUNTER (OUTPATIENT)
Dept: NON INVASIVE DIAGNOSTICS | Age: 60
Discharge: HOME | End: 2020-12-07
Payer: MEDICARE

## 2020-12-07 ENCOUNTER — HOSPITAL ENCOUNTER (OUTPATIENT)
Dept: GENERAL RADIOLOGY | Age: 60
Discharge: HOME | End: 2020-12-07
Payer: MEDICARE

## 2020-12-07 VITALS
TEMPERATURE: 97.6 F | DIASTOLIC BLOOD PRESSURE: 67 MMHG | BODY MASS INDEX: 24.25 KG/M2 | SYSTOLIC BLOOD PRESSURE: 137 MMHG | WEIGHT: 160 LBS | HEART RATE: 78 BPM | RESPIRATION RATE: 20 BRPM | HEIGHT: 68 IN

## 2020-12-07 PROBLEM — L89.314 DECUBITUS ULCER OF ISCHIUM, RIGHT, STAGE IV (HCC): Chronic | Status: ACTIVE | Noted: 2019-08-12

## 2020-12-07 PROBLEM — L89.894: Chronic | Status: ACTIVE | Noted: 2020-11-24

## 2020-12-07 PROBLEM — L89.624 DECUBITUS ULCER OF LEFT HEEL, STAGE 4 (HCC): Chronic | Status: ACTIVE | Noted: 2020-11-24

## 2020-12-07 PROBLEM — E43 SEVERE PROTEIN-CALORIE MALNUTRITION (HCC): Chronic | Status: ACTIVE | Noted: 2019-08-20

## 2020-12-07 PROBLEM — L89.894 DECUBITUS ULCER OF RIGHT FOOT, STAGE 4 (HCC): Chronic | Status: ACTIVE | Noted: 2020-11-24

## 2020-12-07 PROBLEM — L89.314 PRESSURE INJURY OF RIGHT ISCHIUM, STAGE 4 (HCC): Status: ACTIVE | Noted: 2019-08-12

## 2020-12-07 PROCEDURE — 11043 DBRDMT MUSC&/FSCA 1ST 20/<: CPT

## 2020-12-07 PROCEDURE — 11043 DBRDMT MUSC&/FSCA 1ST 20/<: CPT | Performed by: SURGERY

## 2020-12-07 PROCEDURE — 73590 X-RAY EXAM OF LOWER LEG: CPT

## 2020-12-07 PROCEDURE — 73620 X-RAY EXAM OF FOOT: CPT

## 2020-12-07 PROCEDURE — 11042 DBRDMT SUBQ TIS 1ST 20SQCM/<: CPT | Performed by: SURGERY

## 2020-12-07 PROCEDURE — 11042 DBRDMT SUBQ TIS 1ST 20SQCM/<: CPT

## 2020-12-07 PROCEDURE — 93923 UPR/LXTR ART STDY 3+ LVLS: CPT

## 2020-12-07 PROCEDURE — 11046 DBRDMT MUSC&/FSCA EA ADDL: CPT

## 2020-12-07 PROCEDURE — 11046 DBRDMT MUSC&/FSCA EA ADDL: CPT | Performed by: SURGERY

## 2020-12-07 RX ORDER — SULFAMETHOXAZOLE AND TRIMETHOPRIM 800; 160 MG/1; MG/1
1 TABLET ORAL 2 TIMES DAILY
COMMUNITY
End: 2021-03-29 | Stop reason: ALTCHOICE

## 2020-12-07 ASSESSMENT — PAIN SCALES - GENERAL: PAINLEVEL_OUTOF10: 0

## 2020-12-07 NOTE — PROGRESS NOTES
[address]   Progress Note and Procedure Note      [address] RECORD NUMBER:  [id number]  AGE: 64 y.o. GENDER: female  : 1960  EPISODE DATE:  2020    Subjective:     Chief Complaint   Patient presents with    Wound Check     follow up         HISTORY of PRESENT ILLNESS HPI     Jasmin Marie is a 64 y.o. female who presents today for wound/ulcer evaluation. Wound Context: Pt with R&L Heel/foot and R&L ischial wounds here for eval/treat  Wound/Ulcer Pain Timing/Severity: none  Quality of pain: N/A  Severity:  0 / 10   Modifying Factors: None  Associated Signs/Symptoms: none    Ulcer Identification:  Ulcer Type: pressure  Contributing Factors: chronic pressure, decreased mobility, shear force and obesity    Wound: pressure        PAST MEDICAL HISTORY        Diagnosis Date    Blood circulation, collateral     Chronic kidney disease     bladder removed-no kidney problems    GERD (gastroesophageal reflux disease)     History of blood transfusion     History of urostomy     Neuromuscular disorder (HCC)     parapalegic t10-l1    Osteomyelitis (HCC)     Pressure ulcer     to left ischium and bilat heels       PAST SURGICAL HISTORY    Past Surgical History:   Procedure Laterality Date    BACK SURGERY  1979    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 Shree Harper removed part of bone    OTHER SURGICAL HISTORY Left     Skin Flap to L.  Buttock years ago more than 20 years ago    SMALL INTESTINE SURGERY N/A 2019    DIVERTING LOOP COLOSTOMY performed by Jonatan Layne MD at [address] HISTORY    Family History   Problem Relation Age of Onset    Other Mother          of sepsis  Cancer Father         Lung Cancer    Diabetes Paternal Grandmother     Heart Attack Paternal Grandfather        SOCIAL HISTORY    Social History     Tobacco Use    Smoking status: Never Smoker    Smokeless tobacco: Never Used   Substance Use Topics    Alcohol use: Not Currently    Drug use: Never       ALLERGIES    Allergies   Allergen Reactions    Morphine And Related Nausea Only     Caused severe altered mental status       MEDICATIONS    Current Outpatient Medications on File Prior to Encounter   Medication Sig Dispense Refill    sulfamethoxazole-trimethoprim (BACTRIM DS;SEPTRA DS) 800-160 MG per tablet Take 1 tablet by mouth 2 times daily      collagenase (SANTYL) 250 UNIT/GM ointment Apply 1 applicator topically daily      ALPRAZolam (XANAX) 1 MG tablet Take 1 mg by mouth 3 times daily as needed for Anxiety.  HYDROcodone-acetaminophen (NORCO)  MG per tablet Take 1 tablet by mouth every 6 hours as needed for Pain.  sodium hypochlorite (DAKINS) 0.125 % SOLN Irrigate with as directed 2 times daily      Multiple Vitamins-Minerals (THERAPEUTIC MULTIVITAMIN-MINERALS) tablet Take 1 tablet by mouth daily      omeprazole (PRILOSEC) 20 MG delayed release capsule Take 20 mg by mouth daily      zinc 50 MG CAPS Take 1 capsule by mouth daily      baclofen (LIORESAL) 10 MG tablet Take 10 mg by mouth 3 times daily        No current facility-administered medications on file prior to encounter. REVIEW OF SYSTEMS    A comprehensive review of systems was negative.     Objective:      /67   Pulse 78   Temp 97.6 °F (36.4 °C) (Temporal)   Resp 20   Ht 5' 8\" (1.727 m)   Wt 160 lb (72.6 kg)   BMI 24.33 kg/m²     Wt Readings from Last 3 Encounters:   12/07/20 160 lb (72.6 kg)   11/24/20 160 lb (72.6 kg)   09/06/19 183 lb 8 oz (83.2 kg)       PHYSICAL EXAM    General Appearance: alert and oriented to person, place and time, well developed and well- nourished, in no acute distress  Skin: warm and dry, no rash or erythema  Head: normocephalic and atraumatic  Eyes: pupils equal, round, and reactive to light, extraocular eye movements intact, conjunctivae normal  ENT: tympanic membrane, external ear and ear canal normal bilaterally, nose without deformity, nasal mucosa and turbinates normal without polyps, lips teeth and gums normal  Neck: supple and non-tender without mass, no thyromegaly or thyroid nodules, no cervical lymphadenopathy  Pulmonary/Chest: clear to auscultation bilaterally- no wheezes, rales or rhonchi, normal air movement, no respiratory distress  Cardiovascular: normal rate, regular rhythm, normal S1 and S2, no murmurs, rubs, clicks, or gallops, distal pulses intact, no carotid bruits  Abdomen: soft, non-tender, non-distended, normal bowel sounds, no masses or organomegaly  Extremities: no cyanosis, clubbing or edema  Musculoskeletal: normal range of motion, no joint swelling, deformity or tenderness  Neurologic: reflexes normal and symmetric, no cranial nerve deficit, gait, coordination and speech normal, skin sensation normal      Assessment:      Problem List Items Addressed This Visit     Decubitus ulcer of ischial area, left, stage IV (HCC) (Chronic)    Decubitus ulcer of ischium, right, stage IV (HCC) (Chronic)    Chronic osteomyelitis with draining sinus of multiple sites (HCC) - Primary (Chronic)    Pressure injury of right heel, stage 3 (HCC) (Chronic)    Severe protein-calorie malnutrition (HCC) (Chronic)    * (Principal) Decubitus ulcer of left heel, stage 4 (HCC) (Chronic)    Decubitus ulcer of right foot, stage 4 (HCC) (Chronic)    Decubitus ulcer of left leg, stage 4 (HCC) (Chronic)    Venous stasis ulcer of right calf with fat layer exposed without varicose veins (HCC)           Procedure Note  Indications:  Based on my examination of this patient's wound(s)/ulcer(s) today, debridement is required to promote healing and evaluate the wound base.     Performed by: Phyllis Andrade Jake Tolbert MD    Consent obtained:  Yes    Time out taken:  Yes    Pain Control:         Debridement:Excisional Debridement    Using curette and forceps the wound(s)/ulcer(s) was/were sharply debrided down through and including the removal of epidermis, dermis, subcutaneous tissue and muscle/fascia. Devitalized Tissue Debrided:  fibrin, biofilm, slough, necrotic/eschar, exudate and callus      Pre Debridement Measurements:  Are located in the Wound/Ulcer Documentation Flow Sheet    Wound/Ulcer #: 1 and 3    Percent of Wound(s)/Ulcer(s) Debrided: 100%    Total Surface Area Debrided:  60 sq cm       Diabetic/Pressure/Non Pressure Ulcers only:  Ulcer: Pressure ulcer, Stage 4      Debridement:Excisional Debridement    Using curette and forceps the wound(s)/ulcer(s) was/were sharply debrided down through and including the removal of epidermis, dermis and subcutaneous tissue. Devitalized Tissue Debrided:  fibrin, biofilm, slough, necrotic/eschar and exudate    Pre Debridement Measurements:  Are located in the Wound/Ulcer Documentation Flow Sheet    Wound/Ulcer #: 2 and 4    Percent of Wound(s)/Ulcer(s) Debrided: 100%    Total Surface Area Debrided:  13 sq cm       Diabetic/Pressure/Non Pressure Ulcers only:  Ulcer: Pressure ulcer, Stage 4                Post Debridement Measurements:    Wound/Ulcer Descriptions are Pre Debridement --EXCEPT MEASUREMENTS    Wound 11/24/20 Foot Posterior; Left wound 1-left posterior foot stage 4 (Active)   Wound Image   12/07/20 1653   Wound Etiology Pressure Stage  4 12/07/20 1653   Dressing Status Old drainage noted 12/07/20 1653   Wound Cleansed Irrigated with saline 12/07/20 1653   Dressing/Treatment Moist to moist 11/24/20 1142   Offloading for Diabetic Foot Ulcers Offloading boot 11/24/20 1142   Wound Length (cm) 12.8 cm 12/07/20 1653   Wound Width (cm) 3.5 cm 12/07/20 1653   Wound Depth (cm) 1.3 cm 12/07/20 1653   Wound Surface Area (cm^2) 44.8 cm^2 12/07/20 1653   Change Defined edges 12/07/20 1653   Wound Thickness Description not for Pressure Injury Full thickness 11/24/20 1142   Number of days: 13       Wound 11/24/20 Foot Right;Plantar wound 3- right plantar foot stage 4 (Active)   Wound Image   12/07/20 1653   Wound Etiology Pressure Stage  4 12/07/20 1653   Dressing Status Old drainage noted 12/07/20 1653   Wound Cleansed Irrigated with saline 12/07/20 1653   Dressing/Treatment Moist to moist 11/24/20 1142   Offloading for Diabetic Foot Ulcers Felt or foam 11/24/20 1142   Wound Length (cm) 4.9 cm 12/07/20 1653   Wound Width (cm) 3.3 cm 12/07/20 1653   Wound Depth (cm) 1.4 cm 12/07/20 1653   Wound Surface Area (cm^2) 16.17 cm^2 12/07/20 1653   Change in Wound Size % (l*w) -20.31 12/07/20 1653   Wound Volume (cm^3) 22.64 cm^3 12/07/20 1653   Wound Healing % -20 12/07/20 1653   Post-Procedure Length (cm) 4.9 cm 12/07/20 1731   Post-Procedure Width (cm) 3.3 cm 12/07/20 1731   Post-Procedure Depth (cm) 1.4 cm 12/07/20 1731   Post-Procedure Surface Area (cm^2) 16.17 cm^2 12/07/20 1731   Post-Procedure Volume (cm^3) 22.64 cm^3 12/07/20 1731   Wound Assessment Slough;Eschar moist 12/07/20 1653   Drainage Amount Moderate 12/07/20 1653   Drainage Description Serosanguinous 12/07/20 1653   Odor Malodorous/putrid 12/07/20 1653   Nellie-wound Assessment Maceration; Intact 12/07/20 1653   Margins Defined edges 12/07/20 1653   Wound Thickness Description not for Pressure Injury Full thickness 12/07/20 1653   Number of days: 13       Wound 11/24/20 Tibial Distal;Right wound 4- right leg outer venous (Active)   Wound Image   12/07/20 1653   Wound Etiology Venous 12/07/20 1653   Dressing Status Old drainage noted 12/07/20 1653   Wound Cleansed Soap and water 11/24/20 1142   Dressing/Treatment Moist to moist 11/24/20 1142   Wound Length (cm) 1.5 cm 12/07/20 1653   Wound Width (cm) 2.2 cm 12/07/20 1653   Wound Depth (cm) 0.3 cm 12/07/20 1653   Wound Surface Area (cm^2) 3.3 cm^2 12/07/20 1653 Change in Wound Size % (l*w) -14.58 12/07/20 1653   Wound Volume (cm^3) 0.99 cm^3 12/07/20 1653   Wound Healing % -15 12/07/20 1653   Post-Procedure Length (cm) 1.5 cm 12/07/20 1731   Post-Procedure Width (cm) 2.2 cm 12/07/20 1731   Post-Procedure Depth (cm) 0.3 cm 12/07/20 1731   Post-Procedure Surface Area (cm^2) 3.3 cm^2 12/07/20 1731   Post-Procedure Volume (cm^3) 0.99 cm^3 12/07/20 1731   Wound Assessment [facility] 12/07/20 1653   Drainage Amount Moderate 12/07/20 1653   Drainage Description Serosanguinous 12/07/20 1653   Odor None 12/07/20 1653   Nellie-wound Assessment Excoriated; Intact 12/07/20 1653   Margins Attached edges; Defined edges 12/07/20 1653   Wound Thickness Description not for Pressure Injury Full thickness 12/07/20 1653   Number of days: 13       Wound 12/07/20 Left Wound 5. Left Ischium (Active)   Wound Image   12/07/20 1653   Wound Etiology Pressure Stage  4 12/07/20 1653   Dressing Status Old drainage noted 12/07/20 1653   Wound Cleansed Irrigated with saline 12/07/20 1653   Wound Length (cm) 9 cm 12/07/20 1653   Wound Width (cm) 11 cm 12/07/20 1653   Wound Depth (cm) 12.9 cm 12/07/20 1653   Wound Surface Area (cm^2) 99 cm^2 12/07/20 1653   Wound Volume (cm^3) 1277.1 cm^3 12/07/20 1653   Wound Assessment Pale granulation tissue 12/07/20 1653   Drainage Amount Moderate 12/07/20 1653   Drainage Description Serosanguinous 12/07/20 1653   Odor None 12/07/20 1653   Nellie-wound Assessment Intact 12/07/20 1653   Number of days: 0       Wound 12/07/20 Ischium Right Wound 6.    Right Ischium (Active)   Wound Image   12/07/20 1653   Wound Etiology Pressure Stage  4 12/07/20 1653   Dressing Status Old drainage noted 12/07/20 1653   Wound Length (cm) 13.5 cm 12/07/20 1653   Wound Width (cm) 5.5 cm 12/07/20 1653   Wound Depth (cm) 4 cm 12/07/20 1653   Wound Surface Area (cm^2) 74.25 cm^2 12/07/20 1653   Wound Volume (cm^3) 297 cm^3 12/07/20 1653   Wound Assessment Pale granulation tissue 12/07/20 1653 Drainage Amount Moderate 12/07/20 1653   Drainage Description Serosanguinous 12/07/20 1653   Odor None 12/07/20 1653   Nellie-wound Assessment Intact 12/07/20 1653   Number of days: 0             Estimated Blood Loss:  Minimal    Hemostasis Achieved:  by pressure    Procedural Pain:  0  / 10     Post Procedural Pain:  0 / 10     Response to treatment:  Well tolerated by patient. Plan:     Problem List Items Addressed This Visit     Decubitus ulcer of ischial area, left, stage IV (HCC) (Chronic)    Decubitus ulcer of ischium, right, stage IV (HCC) (Chronic)    Chronic osteomyelitis with draining sinus of multiple sites ([facility] [address] - Primary (Chronic)    Pressure injury of right heel, stage 3 (HCC) (Chronic)    Severe protein-calorie malnutrition (HCC) (Chronic)    * (Principal) Decubitus ulcer of left heel, stage 4 (HCC) (Chronic)    Decubitus ulcer of right foot, stage 4 (HCC) (Chronic)    Decubitus ulcer of left leg, stage 4 (HCC) (Chronic)    Venous stasis ulcer of right calf with fat layer exposed without varicose veins ([address]          Pt discussion about her expectations of care and what I rec for her to succeed. Main issues are pressure offload and nutrition protein intake. Heel lift boots bilaterally and pressure bed. Will rec santyl to all LE wounds as well Dakin's dressings to ischial and LE wounds as well. Long road ahead for her to heal but she appears willing to give her best effort. We will see. She is reluctant to transition to MARYSOL bed because she has a waterbed. We will slowly confront that issue in future visits    Treatment Note please see attached Discharge Instructions    In my professional opinion this patient would benefit from HBO Therapy: No    Written patient dismissal instructions given to patient and signed by patient or POA.          Discharge Instructions         [address] [facility]   Physician Orders and Discharge Instructions  [address]   Telephone: [phone]    NAME:  David Yan OF BIRTH:  1960  MEDICAL RECORD NUMBER:  [id number]  DATE:  12/7/2020    Discharge condition: Stable    Discharge to: Home    Left via:Private automobile    Accompanied by: Self    ECF/HHA: [facility]- patient needs offloading heel lift boots if possible    Dressing Orders:  Right and left foot and leg wounds Soap and water wash; Santyl to wound bed nickel thick, saline or dakins moistened gauze loosely over  the wound bed, secure with dry gauze, and rolled gauze twice daily. Right and left ankle wound: Soap and water wash; Santyl to wound bed nickel thick, saline moistened gauze loosely  over the wound bed, secure with dry gauze, and rolled gauze twice daily. Elevate feet when possible to reduce or prevent swelling    Dressing Orders: Right and Left Ishium wounds  Dakins moist roll gauze tucked loosely into wound, cover with dry gauze, ABD pad and tape  Change daily    Treatment Orders:  Protein rich diet (unless restricted by your physician); Multivitamin daily; Elevate legs above the level of your heart when  sitting 3-4 times daily for at least one hour each time, avoid standing for long periods of time. Avoid Pressure to wound site. Off-load heels by applying heel-lift boots or \"float\" heels by placing pillows under calves so that heels do not touch  mattress. Continue Protein rich diet (unless restricted by your physician)  Take Multivitamin daily  Please use Roho cushion in chair  Please use low air loss bed    [address] [place] [address],[address] follow up visit __________2 weeks_______________  (Please note your next appointment above and if you are unable to keep, kindly give a 24 hour notice.  Thank you.)    If you experience any of the following, please call the [facility] [address] during business hours:    * Increase in Pain  * Temperature over 101  * Increase in drainage from your wound  * Drainage with a foul odor  * Bleeding  * Increase in swelling  * Need for compression bandage changes due to slippage, breakthrough drainage. If you need medical attention outside of the business hours of the [address] [place] [address] Road please contact your PCP or go to the nearest emergency room.         Electronically signed by Tracee Gipson MD on 12/7/2020 at 5:37 PM

## 2020-12-28 ENCOUNTER — HOSPITAL ENCOUNTER (OUTPATIENT)
Dept: WOUND CARE | Age: 60
Discharge: HOME | End: 2020-12-28
Payer: MEDICARE

## 2020-12-28 VITALS
WEIGHT: 160 LBS | TEMPERATURE: 98.1 F | BODY MASS INDEX: 24.25 KG/M2 | SYSTOLIC BLOOD PRESSURE: 137 MMHG | HEART RATE: 76 BPM | HEIGHT: 68 IN | DIASTOLIC BLOOD PRESSURE: 59 MMHG | RESPIRATION RATE: 18 BRPM

## 2020-12-28 PROCEDURE — 97598 DBRDMT OPN WND ADDL 20CM/<: CPT

## 2020-12-28 PROCEDURE — 97598 DBRDMT OPN WND ADDL 20CM/<: CPT | Performed by: NURSE PRACTITIONER

## 2020-12-28 PROCEDURE — 97597 DBRDMT OPN WND 1ST 20 CM/<: CPT | Performed by: NURSE PRACTITIONER

## 2020-12-28 PROCEDURE — 97597 DBRDMT OPN WND 1ST 20 CM/<: CPT

## 2020-12-28 RX ORDER — SODIUM HYPOCHLORITE 1.25 MG/ML
SOLUTION TOPICAL
Qty: 2000 ML | Refills: 3 | Status: SHIPPED
Start: 2020-12-28 | End: 2021-03-08 | Stop reason: SDUPTHER

## 2020-12-28 ASSESSMENT — PAIN SCALES - GENERAL: PAINLEVEL_OUTOF10: 0

## 2020-12-29 NOTE — PROGRESS NOTES
Diabetes Paternal Grandmother     Heart Attack Paternal Grandfather        SOCIAL HISTORY    Social History     Tobacco Use    Smoking status: Never Smoker    Smokeless tobacco: Never Used   Substance Use Topics    Alcohol use: Not Currently    Drug use: Never       ALLERGIES    Allergies   Allergen Reactions    Morphine And Related Nausea Only     Caused severe altered mental status       MEDICATIONS    Current Outpatient Medications on File Prior to Encounter   Medication Sig Dispense Refill    sulfamethoxazole-trimethoprim (BACTRIM DS;SEPTRA DS) 800-160 MG per tablet Take 1 tablet by mouth 2 times daily      collagenase (SANTYL) 250 UNIT/GM ointment Apply 1 applicator topically daily      ALPRAZolam (XANAX) 1 MG tablet Take 1 mg by mouth 3 times daily as needed for Anxiety.  HYDROcodone-acetaminophen (NORCO)  MG per tablet Take 1 tablet by mouth every 6 hours as needed for Pain.  sodium hypochlorite (DAKINS) 0.125 % SOLN Irrigate with as directed 2 times daily      Multiple Vitamins-Minerals (THERAPEUTIC MULTIVITAMIN-MINERALS) tablet Take 1 tablet by mouth daily      omeprazole (PRILOSEC) 20 MG delayed release capsule Take 20 mg by mouth daily      zinc 50 MG CAPS Take 1 capsule by mouth daily      baclofen (LIORESAL) 10 MG tablet Take 10 mg by mouth 3 times daily        No current facility-administered medications on file prior to encounter. REVIEW OF SYSTEMS    A comprehensive review of systems was negative.     Objective:      BP (!) 137/59   Pulse 76   Temp 98.1 °F (36.7 °C) (Temporal)   Resp 18   Ht 5' 8\" (1.727 m)   Wt 160 lb (72.6 kg)   BMI 24.33 kg/m²     Wt Readings from Last 3 Encounters:   12/28/20 160 lb (72.6 kg)   12/07/20 160 lb (72.6 kg)   11/24/20 160 lb (72.6 kg)       PHYSICAL EXAM    General Appearance: alert and oriented to person, place and time, well developed and well- nourished, in no acute distress  Skin: warm and dry, no rash or erythema  Head: normocephalic and atraumatic  Eyes: pupils equal, round, and reactive to light, extraocular eye movements intact, conjunctivae normal  ENT: tympanic membrane, external ear and ear canal normal bilaterally, nose without deformity, nasal mucosa and turbinates normal without polyps  Neck: supple and non-tender without mass, no thyromegaly or thyroid nodules, no cervical lymphadenopathy  Pulmonary/Chest: clear to auscultation bilaterally- no wheezes, rales or rhonchi, normal air movement, no respiratory distress  Extremities: no cyanosis, clubbing or edema  Musculoskeletal: normal range of motion, no joint swelling, deformity or tenderness  Neurologic: reflexes normal and symmetric, no cranial nerve deficit, gait, coordination and speech normal      Assessment:      Patient Active Problem List   Diagnosis Code    Paraplegia at T9 level ([facility]) G82.20    Pressure injury of right ischium, stage 4 ([facility]) L89.314    Decubitus ulcer of ischial area, left, stage IV ([facility]) L89.324    Decubitus ulcer of ischium, right, stage IV ([facility]) L89.314    [facility]omyelitis with draining sinus of multiple sites ([facility] M86.49    Pressure injury of right heel, stage 3 ([facility]) L89.613    Gastrointestinal hemorrhage K92.2    Pressure injury of skin of left heel L89.629    Acute encephalopathy G93.40    Anemia due to blood loss, acute D62    Hypocalcemia E83.51    Severe protein-calorie malnutrition ([facility]) E43    GI bleed K92.2    Anemia D64.9    Decubitus ulcer of left heel, stage 4 ([facility]) T53.373    Decubitus ulcer of right foot, stage 4 ([facility]) L89.894    Decubitus ulcer of left leg, stage 4 ([facility]) V98.322    Venous stasis ulcer of right calf with fat layer exposed without varicose veins ([facility]) I87.2, U68.758        Procedure Note  Indications:  Based on my examination of this patient's wound(s)/ulcer(s) today, debridement is required to promote healing and evaluate the wound base.     Performed by: NELLIE Abad 12/28/20 1315   Drainage Amount Moderate 12/28/20 1315   Drainage Description Serosanguinous 12/28/20 1315   Odor Malodorous/putrid 12/28/20 1315   Nellie-wound Assessment Intact 12/28/20 1315   Margins Defined edges 12/28/20 1315   Wound Thickness Description not for Pressure Injury Full thickness 11/24/20 1142   Number of days: 34       Wound 11/24/20 Tibial Distal;Left;Posterior wound 2- left outer ankle stage 4 (Active)   Wound Image   12/28/20 1315   Wound Etiology Pressure Stage  4 12/28/20 1315   Dressing Status Old drainage noted 12/28/20 1315   Wound Cleansed Soap and water 12/28/20 1406   Dressing/Treatment Moist to dry;ABD 12/28/20 1406   Offloading for Diabetic Foot Ulcers Other (comment) 12/28/20 1406   Wound Length (cm) 4.2 cm 12/28/20 1315   Wound Width (cm) 1.5 cm 12/28/20 1315   Wound Depth (cm) 0.4 cm 12/28/20 1315   Wound Surface Area (cm^2) 6.3 cm^2 12/28/20 1315   Change in Wound Size % (l*w) 6.67 12/28/20 1315   Wound Volume (cm^3) 2.52 cm^3 12/28/20 1315   Wound Healing % -271 12/28/20 1315   Post-Procedure Length (cm) 4.2 cm 12/28/20 1406   Post-Procedure Width (cm) 1.5 cm 12/28/20 1406   Post-Procedure Depth (cm) 0.4 cm 12/28/20 1406   Post-Procedure Surface Area (cm^2) 6.3 cm^2 12/28/20 1406   Post-Procedure Volume (cm^3) 2.52 cm^3 12/28/20 1406   Wound Assessment Slough;Eschar moist 12/28/20 1315   Drainage Amount Moderate 12/28/20 1315   Drainage Description Serosanguinous 12/28/20 1315   Odor None 12/28/20 1315   Nellie-wound Assessment Intact 12/28/20 1315   Margins Defined edges 12/28/20 1315   Wound Thickness Description not for Pressure Injury Full thickness 11/24/20 1142   Number of days: 34       Wound 11/24/20 Foot Right;Plantar wound 3- right plantar foot stage 4 (Active)   Wound Image   12/28/20 1315   Wound Etiology Pressure Stage  4 12/28/20 1315   Dressing Status Old drainage noted 12/28/20 1315   Wound Cleansed Soap and water 12/28/20 1406   Dressing/Treatment Moist to dry;ABD 12/28/20 1406   Offloading for Diabetic Foot Ulcers Other (comment) 12/28/20 1406   Wound Length (cm) 4.5 cm 12/28/20 1315   Wound Width (cm) 3.2 cm 12/28/20 1315   Wound Depth (cm) 1 cm 12/28/20 1315   Wound Surface Area (cm^2) 14.4 cm^2 12/28/20 1315   Change in Wound Size % (l*w) -7.14 12/28/20 1315   Wound Volume (cm^3) 14.4 cm^3 12/28/20 1315   Wound Healing % 23 12/28/20 1315   Post-Procedure Length (cm) 4.5 cm 12/28/20 1406   Post-Procedure Width (cm) 3.2 cm 12/28/20 1406   Post-Procedure Depth (cm) 1 cm 12/28/20 1406   Post-Procedure Surface Area (cm^2) 14.4 cm^2 12/28/20 1406   Post-Procedure Volume (cm^3) 14.4 cm^3 12/28/20 1406   Distance Tunneling (cm) 0 cm 12/28/20 1315   Tunneling Position ___ O'Clock 0 12/28/20 1315   Undermining Starts ___ O'Clock 0 12/28/20 1315   Undermining Ends___ O'Clock 0 12/28/20 1315   Undermining Maxium Distance (cm) 0 12/28/20 1315   Wound Assessment Slough;Eschar moist 12/28/20 1315   Drainage Amount Moderate 12/28/20 1315   Drainage Description Serosanguinous 12/28/20 1315   Odor Malodorous/putrid 12/28/20 1315   Nellie-wound Assessment Maceration; Intact 12/28/20 1315   Margins Defined edges 12/28/20 1315   Wound Thickness Description not for Pressure Injury Full thickness 12/28/20 1315   Number of days: 34       Wound 11/24/20 Tibial Distal;Right wound 4- right leg outer venous (Active)   Wound Image   12/28/20 1315   Wound Etiology Venous 12/28/20 1315   Dressing Status Old drainage noted 12/28/20 1315   Wound Cleansed Soap and water 12/28/20 1406   Dressing/Treatment Moist to dry;ABD 12/28/20 1406   Offloading for Diabetic Foot Ulcers Other (comment) 12/28/20 1406   Wound Length (cm) 2 cm 12/28/20 1315   Wound Width (cm) 3 cm 12/28/20 1315   Wound Depth (cm) 0.3 cm 12/28/20 1315   Wound Surface Area (cm^2) 6 cm^2 12/28/20 1315   Change in Wound Size % (l*w) -108.33 12/28/20 1315   Wound Volume (cm^3) 1.8 cm^3 12/28/20 1315   Wound Healing % -109 12/28/20 1315 Post-Procedure Length (cm) 2 cm 12/28/20 1406   Post-Procedure Width (cm) 3 cm 12/28/20 1406   Post-Procedure Depth (cm) 0.3 cm 12/28/20 1406   Post-Procedure Surface Area (cm^2) 6 cm^2 12/28/20 1406   Post-Procedure Volume (cm^3) 1.8 cm^3 12/28/20 1406   Distance Tunneling (cm) 0 cm 12/28/20 1315   Tunneling Position ___ O'Clock 0 12/28/20 1315   Undermining Starts ___ O'Clock 0 12/28/20 1315   Undermining Ends___ O'Clock 0 12/28/20 1315   Undermining Maxium Distance (cm) 0 12/28/20 1315   Wound Assessment Slough 12/28/20 1315   Drainage Amount Moderate 12/28/20 1315   Drainage Description Serosanguinous 12/28/20 1315   Odor None 12/28/20 1315   Nellie-wound Assessment Excoriated; Intact 12/28/20 1315   Margins Attached edges; Defined edges 12/28/20 1315   Wound Thickness Description not for Pressure Injury Full thickness 12/28/20 1315   Number of days: 34       Wound 12/07/20 Left Wound 5.   Left Ischium (Active)   Wound Image   12/28/20 1315   Wound Etiology Pressure Stage  4 12/28/20 1315   Dressing Status Old drainage noted 12/28/20 1315   Wound Cleansed Irrigated with saline 12/28/20 1315   Dressing/Treatment Moist to dry;ABD 12/28/20 1406   Offloading for Diabetic Foot Ulcers Other (comment) 12/28/20 1406   Wound Length (cm) 9 cm 12/28/20 1315   Wound Width (cm) 11 cm 12/28/20 1315   Wound Depth (cm) 2.8 cm 12/28/20 1315   Wound Surface Area (cm^2) 99 cm^2 12/28/20 1315   Change in Wound Size % (l*w) 0 12/28/20 1315   Wound Volume (cm^3) 277.2 cm^3 12/28/20 1315   Wound Healing % 78 12/28/20 1315   Post-Procedure Length (cm) 9 cm 12/28/20 1406   Post-Procedure Width (cm) 11 cm 12/28/20 1406   Post-Procedure Depth (cm) 2.8 cm 12/28/20 1406   Post-Procedure Surface Area (cm^2) 99 cm^2 12/28/20 1406   Post-Procedure Volume (cm^3) 277.2 cm^3 12/28/20 1406   Distance Tunneling (cm) 0 cm 12/28/20 1315   Tunneling Position ___ O'Clock 0 12/28/20 1315   Undermining Starts ___ O'Clock 0 12/28/20 1315   Undermining Ends___ O'Clock 0 12/28/20 1315   Undermining Maxium Distance (cm) 0 12/28/20 1315   Wound Assessment Pale granulation tissue 12/28/20 1315   Drainage Amount Moderate 12/28/20 1315   Drainage Description Serosanguinous 12/28/20 1315   Odor None 12/28/20 1315   Nellie-wound Assessment Intact 12/28/20 1315   Margins Attached edges 12/28/20 1315   Number of days: 21       Wound 12/07/20 Ischium Right Wound 6.    Right Ischium (Active)   Wound Image   12/28/20 1315   Wound Etiology Pressure Stage  4 12/28/20 1315   Dressing Status Old drainage noted 12/28/20 1315   Wound Cleansed Soap and water 12/28/20 1406   Dressing/Treatment Moist to dry;ABD 12/28/20 1406   Offloading for Diabetic Foot Ulcers Other (comment) 12/28/20 1406   Wound Length (cm) 13 cm 12/28/20 1315   Wound Width (cm) 5.5 cm 12/28/20 1315   Wound Depth (cm) 3.8 cm 12/28/20 1315   Wound Surface Area (cm^2) 71.5 cm^2 12/28/20 1315   Change in Wound Size % (l*w) 3.7 12/28/20 1315   Wound Volume (cm^3) 271.7 cm^3 12/28/20 1315   Wound Healing % 9 12/28/20 1315   Post-Procedure Length (cm) 13 cm 12/28/20 1406   Post-Procedure Width (cm) 5.5 cm 12/28/20 1406   Post-Procedure Depth (cm) 3.8 cm 12/28/20 1406   Post-Procedure Surface Area (cm^2) 71.5 cm^2 12/28/20 1406   Post-Procedure Volume (cm^3) 271.7 cm^3 12/28/20 1406   Distance Tunneling (cm) 0 cm 12/28/20 1315   Tunneling Position ___ O'Clock 0 12/28/20 1315   Undermining Starts ___ O'Clock 0 12/28/20 1315   Undermining Ends___ O'Clock 0 12/28/20 1315   Undermining Maxium Distance (cm) 0 12/28/20 1315   Wound Assessment Pale granulation tissue 12/28/20 1315   Drainage Amount Moderate 12/28/20 1315   Drainage Description Serosanguinous 12/28/20 1315   Odor None 12/28/20 1315   Nellie-wound Assessment Intact 12/28/20 1315   Margins Attached edges 12/28/20 1315   Number of days: 21            Diabetic/Pressure/Non Pressure Ulcers only:  Ulcer: Pressure ulcer, Stage 4      Estimated Blood Loss: Minimal    Hemostasis Achieved:  by pressure    Procedural Pain:  0  / 10     Post Procedural Pain:  0 / 10     Response to treatment:  Well tolerated by patient. Plan:     Problem List Items Addressed This Visit     Decubitus ulcer of ischial area, left, stage IV ([facility]) (Chronic)    Decubitus ulcer of ischium, right, stage IV ([facility]CC) (Chronic)    Chronic osteomyelitis with draining sinus of multiple sites (HCC) (Chronic)    * (Principal) Pressure injury of right heel, stage 3 (HCC) (Chronic)    Severe protein-calorie malnutrition (HCC) (Chronic)    Decubitus ulcer of left heel, stage 4 (HCC) - Primary (Chronic)    Decubitus ulcer of right foot, stage 4 (HCC) (Chronic)    Decubitus ulcer of left leg, stage 4 (HCC) (Chronic)    Paraplegia at T9 level (HCC)    Pressure injury of right ischium, stage 4 (HCC)    Venous stasis ulcer of right calf with fat layer exposed without varicose veins ([address]          Treatment Note please see attached Discharge Instructions    In my professional opinion this patient would benefit from HBO Therapy: No    Written patient dismissal instructions given to patient and signed by patient or POA. Ms. Soumya Adams has poor prognosis due to extensive damage to her feet. I am concerned about sinus cavities developing. There is a significant amount of necrosis and slough today. She has chronic osteomyelitis in her ischium wounds which has been noted non-surgical in the past.  We will continue to use santyl and moistened gauze. The patient has home health but she is trying to care for her wounds independently which is why I am concerned about the cavities developing.      Electronically signed by NELLIE High CNP on 12/28/2020 at 6:06 PM

## 2021-01-18 ENCOUNTER — HOSPITAL ENCOUNTER (OUTPATIENT)
Dept: WOUND CARE | Age: 61
Discharge: HOME | End: 2021-01-18
Payer: MEDICARE

## 2021-01-18 VITALS
RESPIRATION RATE: 18 BRPM | HEART RATE: 83 BPM | SYSTOLIC BLOOD PRESSURE: 141 MMHG | HEIGHT: 68 IN | BODY MASS INDEX: 28.79 KG/M2 | DIASTOLIC BLOOD PRESSURE: 50 MMHG | WEIGHT: 190 LBS | TEMPERATURE: 97 F

## 2021-01-18 DIAGNOSIS — L89.613 PRESSURE INJURY OF RIGHT HEEL, STAGE 3 (HCC): Chronic | ICD-10-CM

## 2021-01-18 DIAGNOSIS — E43 SEVERE PROTEIN-CALORIE MALNUTRITION (HCC): Chronic | ICD-10-CM

## 2021-01-18 DIAGNOSIS — L89.624 DECUBITUS ULCER OF LEFT HEEL, STAGE 4 (HCC): ICD-10-CM

## 2021-01-18 DIAGNOSIS — I87.2 VENOUS STASIS ULCER OF RIGHT CALF WITH FAT LAYER EXPOSED WITHOUT VARICOSE VEINS (HCC): ICD-10-CM

## 2021-01-18 DIAGNOSIS — L89.894 DECUBITUS ULCER OF LEFT LEG, STAGE 4 (HCC): ICD-10-CM

## 2021-01-18 DIAGNOSIS — L89.314 DECUBITUS ULCER OF ISCHIUM, RIGHT, STAGE IV (HCC): ICD-10-CM

## 2021-01-18 DIAGNOSIS — L97.212 VENOUS STASIS ULCER OF RIGHT CALF WITH FAT LAYER EXPOSED WITHOUT VARICOSE VEINS (HCC): ICD-10-CM

## 2021-01-18 DIAGNOSIS — L89.324 DECUBITUS ULCER OF ISCHIAL AREA, LEFT, STAGE IV (HCC): Primary | Chronic | ICD-10-CM

## 2021-01-18 DIAGNOSIS — L89.894 DECUBITUS ULCER OF RIGHT FOOT, STAGE 4 (HCC): ICD-10-CM

## 2021-01-18 PROCEDURE — 99212 OFFICE O/P EST SF 10 MIN: CPT | Performed by: SURGERY

## 2021-01-18 PROCEDURE — 99213 OFFICE O/P EST LOW 20 MIN: CPT

## 2021-01-18 ASSESSMENT — PAIN SCALES - GENERAL: PAINLEVEL_OUTOF10: 0

## 2021-01-18 NOTE — PROGRESS NOTES
[address]   Progress Note and Procedure Note      [address] RECORD NUMBER:  [id number]  AGE: 64 y.o. GENDER: female  : 1960  EPISODE DATE:  2021    Subjective:     Chief Complaint   Patient presents with    Wound Check     Buttocks and bilateral feet wounds; Patient denies increased pain at wound site         HISTORY of PRESENT ILLNESS HPI     Baron Woodward is a 64 y.o. female who presents today for wound/ulcer evaluation. History of Wound Context: Pt with multiple wounds here for eval/treat  Wound/Ulcer Pain Timing/Severity: none  Quality of pain: N/A  Severity:  0 / 10   Modifying Factors: None  Associated Signs/Symptoms: none    Ulcer Identification:  Ulcer Type: pressure  Contributing Factors: diabetes, chronic pressure, decreased mobility and shear force    Wound: pressure        PAST MEDICAL HISTORY        Diagnosis Date    Blood circulation, collateral     Chronic kidney disease     bladder removed-no kidney problems    GERD (gastroesophageal reflux disease)     History of blood transfusion     History of urostomy     Neuromuscular disorder (HCC)     parapalegic t10-l1    Osteomyelitis (HCC)     Pressure ulcer     to left ischium and bilat heels       PAST SURGICAL HISTORY    Past Surgical History:   Procedure Laterality Date    BACK SURGERY  1979    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  Buttock years ago more than 20 years ago    SMALL INTESTINE SURGERY N/A 2019    DIVERTING LOOP COLOSTOMY performed by Claire Preciado MD at [facility] [address]    Family History   Problem 12/07/20 160 lb (72.6 kg)       PHYSICAL EXAM    General Appearance: alert and oriented to person, place and time, well developed and well- nourished, in no acute distress  Skin: warm and dry, no rash or erythema  Head: normocephalic and atraumatic  Eyes: pupils equal, round, and reactive to light, extraocular eye movements intact, conjunctivae normal  ENT: tympanic membrane, external ear and ear canal normal bilaterally, nose without deformity, nasal mucosa and turbinates normal without polyps, lips teeth and gums normal  Neck: supple and non-tender without mass, no thyromegaly or thyroid nodules, no cervical lymphadenopathy  Pulmonary/Chest: clear to auscultation bilaterally- no wheezes, rales or rhonchi, normal air movement, no respiratory distress  Cardiovascular: normal rate, regular rhythm, normal S1 and S2, no murmurs, rubs, clicks, or gallops, distal pulses intact, no carotid bruits  Abdomen: soft, non-tender, non-distended, normal bowel sounds, no masses or organomegaly  Extremities: no cyanosis, clubbing or edema  Musculoskeletal: normal range of motion, no joint swelling, deformity or tenderness      Assessment:      Patient Active Problem List   Diagnosis Code    Paraplegia at T9 level ([facility]) G82.20    Pressure injury of right ischium, stage 4 ([facility]) L89.314    Decubitus ulcer of ischial area, left, stage IV ([facility]) L89.324    Decubitus ulcer of ischium, right, stage IV ([facility]) L89.314    Chronic osteomyelitis with draining sinus of multiple sites ([facility] M86.49    Pressure injury of right heel, stage 3 ([facility]) L89.613    Gastrointestinal hemorrhage K92.2    Pressure injury of skin of left heel L89.629    Acute encephalopathy G93.40    Anemia due to blood loss, acute D62    Hypocalcemia E83.51    Severe protein-calorie malnutrition ([facility]) E43    GI bleed K92.2    Anemia D64.9    Decubitus ulcer of left heel, stage 4 ([facility]) T31.928    Decubitus ulcer of right foot, stage 4 ([facility]) O80.502    Decubitus ulcer of left leg, stage 4 ([facility]) L89.894    Venous stasis ulcer of right calf with fat layer exposed without varicose veins ([facility]) I87.2, L97.212             Wound 11/24/20 Foot Posterior; Left wound 1-left posterior foot stage 4 (Active)   Wound Image   01/18/21 1426   Wound Etiology Pressure Stage  4 01/18/21 1426   Dressing Status Old drainage noted 01/18/21 1426   Wound Cleansed Soap and water 01/18/21 1426   Dressing/Treatment Moist to dry;ABD 01/18/21 1620   Offloading for Diabetic Foot Ulcers Other (comment) 01/18/21 1426   Wound Length (cm) 12 cm 01/18/21 1426   Wound Width (cm) 4 cm 01/18/21 1426   Wound Depth (cm) 1.4 cm 01/18/21 1426   Wound Surface Area (cm^2) 48 cm^2 01/18/21 1426   Change in Wound Size % (l*w) 27.27 01/18/21 1426   Wound Volume (cm^3) 67.2 cm^3 01/18/21 1426   Wound Healing % -2 01/18/21 1426   Post-Procedure Length (cm) 12 cm 12/28/20 1406   Post-Procedure Width (cm) 4.5 cm 12/28/20 1406   Post-Procedure Depth (cm) 1.9 cm 12/28/20 1406   Post-Procedure Surface Area (cm^2) 54 cm^2 12/28/20 1406   Post-Procedure Volume (cm^3) 102.6 cm^3 12/28/20 1406   Distance Tunneling (cm) 0 cm 12/28/20 1315   Tunneling Position ___ O'Clock 0 12/28/20 1315   Undermining Starts ___ O'Clock 0 12/28/20 1315   Undermining Ends___ O'Clock 0 12/28/20 1315   Undermining Maxium Distance (cm) 0 12/28/20 1315   Wound Assessment Slough;Pale granulation tissue 01/18/21 1426   Drainage Amount Moderate 01/18/21 1426   Drainage Description Serosanguinous 01/18/21 1426   Odor None 01/18/21 1426   Nellie-wound Assessment Intact 01/18/21 1426   Margins Attached edges 01/18/21 1426   Wound Thickness Description not for Pressure Injury Full thickness 11/24/20 1142   Number of days: 55       Wound 11/24/20 Tibial Distal;Left;Posterior wound 2- left outer ankle stage 4 (Active)   Wound Image   01/18/21 1426   Wound Etiology Pressure Stage  4 01/18/21 1426   Dressing Status Old drainage noted 01/18/21 1426   Wound Cleansed Soap and water 01/18/21 1426   Dressing/Treatment Moist to dry;ABD 01/18/21 1620   Offloading for Diabetic Foot Ulcers Other (comment) 01/18/21 1426   Wound Length (cm) 3.5 cm 01/18/21 1426   Wound Width (cm) 1 cm 01/18/21 1426   Wound Depth (cm) 0.2 cm 01/18/21 1426   Wound Surface Area (cm^2) 3.5 cm^2 01/18/21 1426   Change in Wound Size % (l*w) 48.15 01/18/21 1426   Wound Volume (cm^3) 0.7 cm^3 01/18/21 1426   Wound Healing % -3 01/18/21 1426   Post-Procedure Length (cm) 4.2 cm 12/28/20 1406   Post-Procedure Width (cm) 1.5 cm 12/28/20 1406   Post-Procedure Depth (cm) 0.4 cm 12/28/20 1406   Post-Procedure Surface Area (cm^2) 6.3 cm^2 12/28/20 1406   Post-Procedure Volume (cm^3) 2.52 cm^3 12/28/20 1406   Wound Assessment Pale granulation tissue;Slough 01/18/21 1426   Drainage Amount Moderate 01/18/21 1426   Drainage Description Serosanguinous 01/18/21 1426   Odor None 01/18/21 1426   Nellie-wound Assessment Intact 01/18/21 1426   Margins Attached edges 01/18/21 1426   Wound Thickness Description not for Pressure Injury Full thickness 11/24/20 1142   Number of days: 55       Wound 11/24/20 Foot Right;Plantar wound 3- right plantar foot stage 4 (Active)   Wound Image   01/18/21 1426   Wound Etiology Pressure Stage  4 01/18/21 1426   Dressing Status Old drainage noted 01/18/21 1426   Wound Cleansed Soap and water 01/18/21 1426   Dressing/Treatment Moist to dry;Gauze dressing/dressing sponge 01/18/21 1620   Offloading for Diabetic Foot Ulcers Other (comment) 01/18/21 1426   Wound Length (cm) 5 cm 01/18/21 1426   Wound Width (cm) 2.7 cm 01/18/21 1426   Wound Depth (cm) 1 cm 01/18/21 1426   Wound Surface Area (cm^2) 13.5 cm^2 01/18/21 1426   Change in Wound Size % (l*w) -0.45 01/18/21 1426   Wound Volume (cm^3) 13.5 cm^3 01/18/21 1426   Wound Healing % 28 01/18/21 1426   Post-Procedure Length (cm) 4.5 cm 12/28/20 1406   Post-Procedure Width (cm) 3.2 cm 12/28/20 1406   Post-Procedure Depth (cm) 1 cm 12/28/20 1406 granulation tissue 01/18/21 1426   Drainage Amount Moderate 01/18/21 1426   Drainage Description Serosanguinous 01/18/21 1426   Odor None 01/18/21 1426   Nellie-wound Assessment Intact 01/18/21 1426   Margins Attached edges 01/18/21 1426   Wound Thickness Description not for Pressure Injury Full thickness 12/28/20 1315   Number of days: 55       Wound 12/07/20 Left Wound 5. Left Ischium (Active)   Wound Image     01/18/21 1426   Wound Etiology Pressure Stage  4 01/18/21 1426   Dressing Status Old drainage noted 01/18/21 1426   Wound Cleansed Irrigated with saline 01/18/21 1426   Dressing/Treatment Moist to dry;ABD 01/18/21 1620   Offloading for Diabetic Foot Ulcers Other (comment) 01/18/21 1426   Wound Length (cm) 6 cm 01/18/21 1426   Wound Width (cm) 7 cm 01/18/21 1426   Wound Depth (cm) 2.8 cm 01/18/21 1426   Wound Surface Area (cm^2) 42 cm^2 01/18/21 1426   Change in Wound Size % (l*w) 57.58 01/18/21 1426   Wound Volume (cm^3) 117.6 cm^3 01/18/21 1426   Wound Healing % 91 01/18/21 1426   Post-Procedure Length (cm) 9 cm 12/28/20 1406   Post-Procedure Width (cm) 11 cm 12/28/20 1406   Post-Procedure Depth (cm) 2.8 cm 12/28/20 1406   Post-Procedure Surface Area (cm^2) 99 cm^2 12/28/20 1406   Post-Procedure Volume (cm^3) 277.2 cm^3 12/28/20 1406   Distance Tunneling (cm) 3.5 cm 01/18/21 1426   Tunneling Position ___ O'Clock 1 01/18/21 1426   Undermining Starts ___ O'Clock 0 12/28/20 1315   Undermining Ends___ O'Clock 0 12/28/20 1315   Undermining Maxium Distance (cm) 0 12/28/20 1315   Wound Assessment Pale granulation tissue; Purple/maroon;Slough 01/18/21 1426   Drainage Amount Moderate 01/18/21 1426   Drainage Description Serosanguinous 01/18/21 1426   Odor None 01/18/21 1426   Nellie-wound Assessment Excoriated 01/18/21 1426   Margins Attached edges 01/18/21 1426   Number of days: 42       Wound 12/07/20 Ischium Right Wound 6.    Right Ischium (Active)   Wound Image    01/18/21 1426   Wound Etiology Pressure Stage  4 01/18/21 1426   Dressing Status Old drainage noted 01/18/21 1426   Wound Cleansed Soap and water 01/18/21 1426   Dressing/Treatment Moist to dry;ABD 01/18/21 1620   Offloading for Diabetic Foot Ulcers Other (comment) 01/18/21 1426   Wound Length (cm) 8.5 cm 01/18/21 1426   Wound Width (cm) 7.5 cm 01/18/21 1426   Wound Depth (cm) 3.5 cm 01/18/21 1426   Wound Surface Area (cm^2) 63.75 cm^2 01/18/21 1426   Change in Wound Size % (l*w) 14.14 01/18/21 1426   Wound Volume (cm^3) 223.12 cm^3 01/18/21 1426   Wound Healing % 25 01/18/21 1426   Post-Procedure Length (cm) 13 cm 12/28/20 1406   Post-Procedure Width (cm) 5.5 cm 12/28/20 1406   Post-Procedure Depth (cm) 3.8 cm 12/28/20 1406   Post-Procedure Surface Area (cm^2) 71.5 cm^2 12/28/20 1406   Post-Procedure Volume (cm^3) 271.7 cm^3 12/28/20 1406   Distance Tunneling (cm) 11 cm 01/18/21 1426   Tunneling Position ___ O'Clock 7 01/18/21 1426   Undermining Starts ___ O'Clock 9 01/18/21 1426   Undermining Ends___ O'Clock 12 01/18/21 1426   Undermining Maxium Distance (cm) 1.5 01/18/21 1426   Wound Assessment Pale granulation tissue 01/18/21 1426   Drainage Amount Moderate 01/18/21 1426   Drainage Description Serosanguinous 01/18/21 1426   Odor None 01/18/21 1426   Nellie-wound Assessment Intact 01/18/21 1426   Margins Attached edges 01/18/21 1426   Number of days: 42             Plan:     Problem List Items Addressed This Visit     * (Principal) Decubitus ulcer of ischial area, left, stage IV (HCC) - Primary (Chronic)    Decubitus ulcer of ischium, right, stage IV (HCC) (Chronic)    Pressure injury of right heel, stage 3 (HCC) (Chronic)    Severe protein-calorie malnutrition (HCC) (Chronic)    Decubitus ulcer of left heel, stage 4 (HCC) (Chronic)    Decubitus ulcer of right foot, stage 4 (HCC) (Chronic)    Decubitus ulcer of left leg, stage 4 (HCC) (Chronic)    Venous stasis ulcer of right calf with fat layer exposed without varicose veins (HCC)        Wounds look good with current care. Pt refuses MARYSOL and sleeps on waterbed. Treatment Note please see attached Discharge Instructions    In my professional opinion this patient would benefit from HBO Therapy: No    Written patient dismissal instructions given to patient and signed by patient or POA. Discharge Instructions         [address] [facility]   Physician Orders and Discharge Instructions  [id number]  Telephone: [phone]    NAME:  Sheri Isaacs OF BIRTH:  1960  MEDICAL RECORD NUMBER:  [id number]  DATE:  1/18/2021    Discharge condition: Stable    Discharge to: Home    Left via:Private automobile    Accompanied by: Self    ECF/[facility]: [facility]- Please increase pt visits to 3 times weekly    Dressing Orders: As follows     Right and left plantar foot- Soap and water wash; Santyl to wound bed nickel thick, saline or dakins moistened  gauze loosely over  the wound bed, secure with dry gauze, and rolled gauze once daily. Right and left ankle wound: Soap and water wash  Dakins moistened gauze loosely over the wound bed, secure with dry gauze, and rolled gauze twice daily. Elevate feet when possible to reduce or prevent swelling    Dressing Orders: Right and Left Ishium,   Dakins moist roll gauze tucked loosely into wound, cover with dry gauze, ABD pad and tape  Change twice daily    Treatment Orders: Avoid Pressure to wound site. Turn frequently (turn at least every two hours when in bed)  While in chair reposition every 30 minutes  Patient advised to sit up no more than 30minutes at a time for meals ONLY. Please do not elevate the head of the bed higher than 30 degrees.   Off-load heels by applying heel-lift boots or \"float\" heels by placing pillows under calves so that heels do not touch  High Protein as Tolerated     [facility] follow up visit ____________2 weeks_________________  (Please note your next appointment above and if you are unable to keep, kindly give a 24 hour notice. Thank you.)    If you experience any of the following, please call the [facility] during business hours:    * Increase in Pain  * Temperature over 101  * Increase in drainage from your wound  * Drainage with a foul odor  * Bleeding  * Increase in swelling  * Need for compression bandage changes due to slippage, breakthrough drainage. If you need medical attention outside of the business hours of the [facility] please contact your PCP or go to the nearest emergency room.         Electronically signed by rIma Woods MD on 1/18/2021 at 5:03 PM

## 2021-01-19 NOTE — DISCHARGE INSTR - COC
Continuity of Care Form    Patient Name: Kelly Bahena   :  1960  MRN:  [id number]    Admit date:  2021  Discharge date:  ***    Code Status Order: Prior   Advance Directives:   [facility] Documentation     Date/Time Healthcare Directive Type of Healthcare Directive Copy in [facility] [address] Agent's Name Healthcare Agent's Phone Number    [phone]  No, patient does not have an advance directive for healthcare treatment -- -- -- -- --          Admitting Physician:  No admitting provider for patient encounter. PCP: Olive Roth MD    Discharging Nurse: [facility] Unit/Room#: No information available for this encounter. Discharging Unit Phone Number: ***    Emergency Contact:   Extended Emergency Contact Information  Primary Emergency ContactSherann ARANDA  Home Phone: [phone]  Relation: Brother/Sister   needed? 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 Ramona Ardon removed part of bone    OTHER SURGICAL HISTORY Left     Skin Flap to L. Buttock years ago more than 20 years ago    SMALL INTESTINE SURGERY N/A 2019    DIVERTING LOOP COLOSTOMY performed by Dai Pisano MD at [facility]       Immunization History: There is no immunization history on file for this patient.     Active Problems:  Patient Active Problem List   Diagnosis Code    Paraplegia at T9 level ([facility] [address] G82.20    Pressure injury of right ischium, stage 4 ([facility]) L89.314    Decubitus ulcer of ischial area, left, stage IV ([facility]) L89.324    Decubitus ulcer of ischium, right, stage IV ([address] L89.314    Chronic osteomyelitis with draining sinus of multiple sites ([facility]) M86.49    Pressure injury of right heel, stage 3 ([facility]) L89.613    Gastrointestinal hemorrhage K92.2    Pressure injury of skin of left heel L89.629    Acute encephalopathy G93.40    Anemia due to blood loss, acute D62    Hypocalcemia E83.51    Severe protein-calorie malnutrition ([facility]) E43    GI bleed K92.2    Anemia D64.9    Decubitus ulcer of left heel, stage 4 ([facility]) L89.624    Decubitus ulcer of right foot, stage 4 ([facility]) L89.894    Decubitus ulcer of left leg, stage 4 ([facility]) P28.262    Venous stasis ulcer of right calf with fat layer exposed without varicose veins ([facility]) I87.2, Z82.326       Isolation/Infection:   Isolation          No Isolation        Patient Infection Status     Infection Onset Added Last Indicated Last Indicated By Review Planned Expiration Resolved Resolved By    MRSA 07/29/19 08/01/19 08/30/19 MRSA Screening Culture Only        07/29/2019 Blood culture (RAC): MRSA  07/29/2019 Urine culture:MRSA  07/30/2019 Wound Culture (Decubitis): MRSA  08/30/2019 MRSA Screening culture (nares): MRSA Positive            Nurse Assessment:  Last Vital Signs: BP (!) 141/50   Pulse 83   Temp 97 °F (36.1 °C) (Temporal)   Resp 18   Ht 5' 8\" (1.727 m)   Wt 190 lb (86.2 kg)   BMI 28.89 kg/m²     Last documented pain score (0-10 scale): Pain Level: 0  Last Weight:   Wt Readings from Last 1 Encounters:   01/18/21 190 lb (86.2 kg)     Mental Status:  {IP PT MENTAL STATUS:20030}    IV Access:  { NIKKO IV ACCESS:051174448}    Nursing Mobility/ADLs:  Walking   {[facility] DME MWGE:414143866}  Transfer  {[facility] DME VPAW:026117535}  Bathing  {[facility] DME MMIE:272738499}  Dressing  {[facility] DME EYFO:854323814}  Toileting  {[facility] DME HKQN:167818533}  Feeding  {[facility] DME QJIT:137340377}  Med Admin  {[facility] DME IZGV:521942069}  Med Delivery   {[facility] NIKKO MED Delivery:137458462}    Wound Care Documentation and Therapy:  Wound 11/24/20 Foot Posterior; Left wound 1-left posterior foot stage 4 (Active)   Wound Image   01/18/21 1426   Wound Etiology Pressure Stage  4 01/18/21 1426   Dressing Status Old drainage noted 01/18/21 1426   Wound Cleansed Soap and water 01/18/21 1426   Dressing/Treatment Moist to dry;ABD 01/18/21 1620   Offloading for Diabetic Foot Ulcers Other (comment) 01/18/21 1426   Wound Length (cm) 12 cm 01/18/21 1426   Wound Width (cm) 4 cm 01/18/21 1426   Wound Depth (cm) 1.4 cm 01/18/21 1426   Wound Surface Area (cm^2) 48 cm^2 01/18/21 1426   Change in Wound Size % (l*w) 27.27 01/18/21 1426   Wound Volume (cm^3) 67.2 cm^3 01/18/21 1426   Wound Healing % -2 01/18/21 1426   Post-Procedure Length (cm) 12 cm 12/28/20 1406   Post-Procedure Width (cm) 4.5 cm 12/28/20 1406   Post-Procedure Depth (cm) 1.9 cm 12/28/20 1406   Post-Procedure Surface Area (cm^2) 54 cm^2 12/28/20 1406   Post-Procedure Volume (cm^3) 102.6 cm^3 12/28/20 1406   Distance Tunneling (cm) 0 cm 12/28/20 1315   Tunneling Position ___ O'Clock 0 12/28/20 1315   Undermining Starts ___ O'Clock 0 12/28/20 1315   Undermining Ends___ O'Clock 0 12/28/20 1315   Undermining Maxium Distance (cm) 0 12/28/20 1315   Wound Assessment Slough;Pale granulation tissue 01/18/21 1426   Drainage Amount Moderate 01/18/21 1426   Drainage Description Serosanguinous 01/18/21 1426   Odor None 01/18/21 1426   Nellie-wound Assessment Intact 01/18/21 1426   Margins Attached edges 01/18/21 1426   Wound Thickness Description not for Pressure Injury Full thickness 11/24/20 1142   Number of days: 55       Wound 11/24/20 Tibial Distal;Left;Posterior wound 2- left outer ankle stage 4 (Active)   Wound Image   01/18/21 1426   Wound Etiology Pressure Stage  4 01/18/21 1426   Dressing Status Old drainage noted 01/18/21 1426   Wound Cleansed Soap and water 01/18/21 1426   Dressing/Treatment Moist to dry;ABD 01/18/21 1620   Offloading for Diabetic Foot Ulcers Other (comment) 01/18/21 1426   Wound Length (cm) 3.5 cm 01/18/21 1426   Wound Width (cm) 1 cm 01/18/21 1426   Wound Depth (cm) 0.2 cm 01/18/21 1426   Wound Surface Area (cm^2) 3.5 cm^2 01/18/21 1426   Change in Wound Size % (l*w) 48.15 01/18/21 1426   Wound Volume (cm^3) 0.7 cm^3 01/18/21 1426   Wound Healing % -3 01/18/21 1426   Post-Procedure Length (cm) 4.2 cm 12/28/20 1406   Post-Procedure Width (cm) 1.5 cm 12/28/20 1406   Post-Procedure Depth (cm) 0.4 cm 12/28/20 1406   Post-Procedure Surface Area (cm^2) 6.3 cm^2 12/28/20 1406   Post-Procedure Volume (cm^3) 2.52 cm^3 12/28/20 1406   Wound Assessment Pale granulation tissue;Slough 01/18/21 1426   Drainage Amount Moderate 01/18/21 1426   Drainage Description Serosanguinous 01/18/21 1426   Odor None 01/18/21 1426   Nellie-wound Assessment Intact 01/18/21 1426   Margins Attached edges 01/18/21 1426   Wound Thickness Description not for Pressure Injury Full thickness 11/24/20 1142   Number of days: 55       Wound 11/24/20 Foot Right;Plantar wound 3- right plantar foot stage 4 (Active)   Wound Image   01/18/21 1426   Wound Etiology Pressure Stage  4 01/18/21 1426   Dressing Status Old drainage noted 01/18/21 1426   Wound Cleansed Soap and water 01/18/21 1426   Dressing/Treatment Moist to dry;Gauze dressing/dressing sponge 01/18/21 1620   Offloading for Diabetic Foot Ulcers Other (comment) 01/18/21 1426   Wound Length (cm) 5 cm 01/18/21 1426   Wound Width (cm) 2.7 cm 01/18/21 1426   Wound Depth (cm) 1 cm 01/18/21 1426   Wound Surface Area (cm^2) 13.5 cm^2 01/18/21 1426   Change in Wound Size % (l*w) -0.45 01/18/21 1426   Wound Volume (cm^3) 13.5 cm^3 01/18/21 1426   Wound Healing % 28 01/18/21 1426   Post-Procedure Length (cm) 4.5 cm 12/28/20 1406   Post-Procedure Width (cm) 3.2 cm 12/28/20 1406   Post-Procedure Depth (cm) 1 cm 12/28/20 1406   Post-Procedure Surface Area (cm^2) 14.4 cm^2 12/28/20 1406   Post-Procedure Volume (cm^3) 14.4 cm^3 12/28/20 1406   Distance Tunneling (cm) 0 cm 12/28/20 1315   Tunneling Position ___ O'Clock 0 12/28/20 Margins Attached edges 01/18/21 1426   Wound Thickness Description not for Pressure Injury Full thickness 12/28/20 1315   Number of days: 55       Wound 12/07/20 Left Wound 5. Left Ischium (Active)   Wound Image     01/18/21 1426   Wound Etiology Pressure Stage  4 01/18/21 1426   Dressing Status Old drainage noted 01/18/21 1426   Wound Cleansed Irrigated with saline 01/18/21 1426   Dressing/Treatment Moist to dry;ABD 01/18/21 1620   Offloading for Diabetic Foot Ulcers Other (comment) 01/18/21 1426   Wound Length (cm) 6 cm 01/18/21 1426   Wound Width (cm) 7 cm 01/18/21 1426   Wound Depth (cm) 2.8 cm 01/18/21 1426   Wound Surface Area (cm^2) 42 cm^2 01/18/21 1426   Change in Wound Size % (l*w) 57.58 01/18/21 1426   Wound Volume (cm^3) 117.6 cm^3 01/18/21 1426   Wound Healing % 91 01/18/21 1426   Post-Procedure Length (cm) 9 cm 12/28/20 1406   Post-Procedure Width (cm) 11 cm 12/28/20 1406   Post-Procedure Depth (cm) 2.8 cm 12/28/20 1406   Post-Procedure Surface Area (cm^2) 99 cm^2 12/28/20 1406   Post-Procedure Volume (cm^3) 277.2 cm^3 12/28/20 1406   Distance Tunneling (cm) 3.5 cm 01/18/21 1426   Tunneling Position ___ O'Clock 1 01/18/21 1426   Undermining Starts ___ O'Clock 0 12/28/20 1315   Undermining Ends___ O'Clock 0 12/28/20 1315   Undermining Maxium Distance (cm) 0 12/28/20 1315   Wound Assessment Pale granulation tissue; Purple/maroon;Slough 01/18/21 1426   Drainage Amount Moderate 01/18/21 1426   Drainage Description Serosanguinous 01/18/21 1426   Odor None 01/18/21 1426   Nellie-wound Assessment Excoriated 01/18/21 1426   Margins Attached edges 01/18/21 1426   Number of days: 42       Wound 12/07/20 Ischium Right Wound 6.    Right Ischium (Active)   Wound Image    01/18/21 1426   Wound Etiology Pressure Stage  4 01/18/21 1426   Dressing Status Old drainage noted 01/18/21 1426   Wound Cleansed Soap and water 01/18/21 1426   Dressing/Treatment Moist to dry;ABD 01/18/21 1620   Offloading for Diabetic Foot Ulcers Hospital Discharge:   Respiratory Treatments: ***  Oxygen Therapy:  {Therapy; copd oxygen:04560}  Ventilator:    {MH CC Vent ICIL:514218048}    Rehab Therapies: {THERAPEUTIC INTERVENTION:2112799747}  Weight Bearing Status/Restrictions: 50Osiris CHATTERJEE Weight Bearin}  Other Medical Equipment (for information only, NOT a DME order):  {EQUIPMENT:440215961}  Other Treatments: ***    Patient's personal belongings (please select all that are sent with patient):  {P DME Belongings:628858262}    RN SIGNATURE:  {Esignature:004542144}    CASE MANAGEMENT/SOCIAL WORK SECTION    Inpatient Status Date: ***    Readmission Risk Assessment Score:  Readmission Risk              Risk of Unplanned Readmission:        0           Discharging to Facility/ Agency   · Name:   · Address:  · Phone:  · Fax:    Dialysis Facility (if applicable)   · Name:  · Address:  · Dialysis Schedule:  · Phone:  · Fax:    / signature: {Esignature:450498544}    PHYSICIAN SECTION    Prognosis: {Prognosis:1316940019}    Condition at Discharge: 50Osiris Ma Patient Condition:356524772}    Rehab Potential (if transferring to Rehab): {Prognosis:3935823742}    Recommended Labs or Other Treatments After Discharge: ***    Physician Certification: I certify the above information and transfer of Garry Jeffery  is necessary for the continuing treatment of the diagnosis listed and that she requires {Admit to Appropriate Level of Care:55572} for {GREATER/LESS:618167512} 30 days.      Update Admission H&P: [facility]}    PHYSICIAN SIGNATURE:  {Esignature:257941528}

## 2021-03-08 ENCOUNTER — HOSPITAL ENCOUNTER (OUTPATIENT)
Dept: WOUND CARE | Age: 61
Discharge: HOME | End: 2021-03-08
Payer: MEDICARE

## 2021-03-08 VITALS
RESPIRATION RATE: 18 BRPM | DIASTOLIC BLOOD PRESSURE: 69 MMHG | HEART RATE: 75 BPM | WEIGHT: 190 LBS | BODY MASS INDEX: 28.79 KG/M2 | HEIGHT: 68 IN | TEMPERATURE: 97.2 F | SYSTOLIC BLOOD PRESSURE: 157 MMHG

## 2021-03-08 DIAGNOSIS — L89.613 PRESSURE INJURY OF RIGHT HEEL, STAGE 3 (HCC): Chronic | ICD-10-CM

## 2021-03-08 DIAGNOSIS — I87.2 VENOUS STASIS ULCER OF RIGHT CALF WITH FAT LAYER EXPOSED WITHOUT VARICOSE VEINS (HCC): ICD-10-CM

## 2021-03-08 DIAGNOSIS — L89.894 DECUBITUS ULCER OF RIGHT FOOT, STAGE 4 (HCC): ICD-10-CM

## 2021-03-08 DIAGNOSIS — L89.894 DECUBITUS ULCER OF LEFT LEG, STAGE 4 (HCC): ICD-10-CM

## 2021-03-08 DIAGNOSIS — L89.314 DECUBITUS ULCER OF ISCHIUM, RIGHT, STAGE IV (HCC): ICD-10-CM

## 2021-03-08 DIAGNOSIS — L89.624 DECUBITUS ULCER OF LEFT HEEL, STAGE 4 (HCC): Primary | ICD-10-CM

## 2021-03-08 DIAGNOSIS — L89.324 DECUBITUS ULCER OF ISCHIAL AREA, LEFT, STAGE IV (HCC): Chronic | ICD-10-CM

## 2021-03-08 DIAGNOSIS — E43 SEVERE PROTEIN-CALORIE MALNUTRITION (HCC): Chronic | ICD-10-CM

## 2021-03-08 DIAGNOSIS — L97.212 VENOUS STASIS ULCER OF RIGHT CALF WITH FAT LAYER EXPOSED WITHOUT VARICOSE VEINS (HCC): ICD-10-CM

## 2021-03-08 PROCEDURE — 11046 DBRDMT MUSC&/FSCA EA ADDL: CPT

## 2021-03-08 PROCEDURE — 11045 DBRDMT SUBQ TISS EACH ADDL: CPT | Performed by: SURGERY

## 2021-03-08 PROCEDURE — 11046 DBRDMT MUSC&/FSCA EA ADDL: CPT | Performed by: SURGERY

## 2021-03-08 PROCEDURE — 11045 DBRDMT SUBQ TISS EACH ADDL: CPT

## 2021-03-08 PROCEDURE — 97597 DBRDMT OPN WND 1ST 20 CM/<: CPT

## 2021-03-08 PROCEDURE — 11043 DBRDMT MUSC&/FSCA 1ST 20/<: CPT

## 2021-03-08 PROCEDURE — 11042 DBRDMT SUBQ TIS 1ST 20SQCM/<: CPT | Performed by: SURGERY

## 2021-03-08 PROCEDURE — 11042 DBRDMT SUBQ TIS 1ST 20SQCM/<: CPT

## 2021-03-08 PROCEDURE — 11043 DBRDMT MUSC&/FSCA 1ST 20/<: CPT | Performed by: SURGERY

## 2021-03-08 PROCEDURE — 97597 DBRDMT OPN WND 1ST 20 CM/<: CPT | Performed by: SURGERY

## 2021-03-08 RX ORDER — MINOCYCLINE HYDROCHLORIDE 100 MG/1
100 CAPSULE ORAL 2 TIMES DAILY
Qty: 60 CAPSULE | Refills: 0 | Status: SHIPPED | OUTPATIENT
Start: 2021-03-08 | End: 2021-04-07

## 2021-03-08 NOTE — PROGRESS NOTES
[address]   Progress Note and Procedure Note      [address] RECORD NUMBER:  [id number]  AGE: 63 y.o. GENDER: female  : 1960  EPISODE DATE:  3/8/2021    Subjective:     Chief Complaint   Patient presents with    Wound Check     follow up         HISTORY of PRESENT ILLNESS MARINA Alejandra is a 63 y.o. female who presents today for wound/ulcer evaluation. Wound Context: Pt with paraplegia with several wounds here for eval/treat  Wound/Ulcer Pain Timing/Severity: none  Quality of pain: N/A  Severity:  0 / 10   Modifying Factors: None  Associated Signs/Symptoms: none    Ulcer Identification:  Ulcer Type: pressure  Contributing Factors: chronic pressure, decreased mobility and shear force    Wound: pressure        PAST MEDICAL HISTORY        Diagnosis Date    Blood circulation, collateral     Chronic kidney disease     bladder removed-no kidney problems    GERD (gastroesophageal reflux disease)     History of blood transfusion     History of urostomy     Neuromuscular disorder (HCC)     parapalegic t10-l1    Osteomyelitis (HCC)     Pressure ulcer     to left ischium and bilat heels       PAST SURGICAL HISTORY    Past Surgical History:   Procedure Laterality Date    BACK SURGERY  1979    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  Buttock years ago more than 20 years ago    SMALL INTESTINE SURGERY N/A 2019    DIVERTING LOOP COLOSTOMY performed by Roddy Loco MD at [address] HISTORY    Family History   Problem Relation Age of Onset    Other Mother          of sepsis    Cancer Wound Dressings    Supply: Pack Wound    Supply: Cover and Secure    Venous stasis ulcer of right calf with fat layer exposed without varicose veins (HCC)    Relevant Orders    Supply: Wound Cleanser    Supply: Nellie Wound    Supply: Wound Dressings    Supply: Pack Wound    Supply: Cover and Secure           Procedure Note  Indications:  Based on my examination of this patient's wound(s)/ulcer(s) today, debridement is required to promote healing and evaluate the wound base. Performed by: Kianna Chowdhury MD    Consent obtained:  Yes    Time out taken:  Yes    Pain Control: Anesthetic  Anesthetic: 2% Lidocaine Gel Topical       Debridement:Excisional Debridement    Using curette the wound(s)/ulcer(s) was/were sharply debrided down through and including the removal of epidermis, dermis, subcutaneous tissue and muscle/fascia. Devitalized Tissue Debrided:  fibrin, biofilm, slough, necrotic/eschar and exudate      Pre Debridement Measurements:  Are located in the Wound/Ulcer Documentation Flow Sheet    Wound/Ulcer #: 5 and 6    Percent of Wound(s)/Ulcer(s) Debrided: 100%    Total Surface Area Debrided:  95 sq cm       Diabetic/Pressure/Non Pressure Ulcers only:  Ulcer: Pressure ulcer, Stage 4     Debridement:Excisional Debridement    Using curette the wound(s)/ulcer(s) was/were sharply debrided down through and including the removal of epidermis, dermis and subcutaneous tissue.         Devitalized Tissue Debrided:  fibrin, biofilm, slough, necrotic/eschar and exudate    Pre Debridement Measurements:  Are located in the Wound/Ulcer Documentation Flow Sheet    Wound/Ulcer #: 1, 3 and 4    Percent of Wound(s)/Ulcer(s) Debrided: 100%    Total Surface Area Debrided:  71 sq cm       Diabetic/Pressure/Non Pressure Ulcers only:  Ulcer: Pressure ulcer, Stage 4      Debridement:Non-excisional Debridement    Using curette the wound(s)/ulcer(s) was/were sharply debrided down through and including the removal of epidermis and dermis.         Devitalized Tissue Debrided:  fibrin, biofilm, slough, necrotic/eschar and exudate    Pre Debridement Measurements:  Are located in the Wound/Ulcer Documentation Flow Sheet    Wound/Ulcer #: 2 and 7    Percent of Wound(s)/Ulcer(s) Debrided: 100%    Total Surface Area Debrided:  17 sq cm       Diabetic/Pressure/Non Pressure Ulcers only:  Ulcer: Pressure ulcer, Stage 4         Post Debridement Measurements:    Wound/Ulcer Descriptions are Pre Debridement --EXCEPT MEASUREMENTS    Wound 11/24/20 Foot Left;Plantar wound 1-left plantar foot stage 4 (Active)   Wound Image   03/08/21 1454   Wound Etiology Pressure Stage  4 03/08/21 1454   Dressing Status Old drainage noted 03/08/21 1454   Wound Cleansed Cleansed with saline 03/08/21 1454   Dressing/Treatment Moist to dry;ABD 01/18/21 1620   Offloading for Diabetic Foot Ulcers Other (comment) 01/18/21 1426   Wound Length (cm) 12.4 cm 03/08/21 1454   Wound Width (cm) 4 cm 03/08/21 1454   Wound Depth (cm) 0.7 cm 03/08/21 1454   Wound Surface Area (cm^2) 49.6 cm^2 03/08/21 1454   Change in Wound Size % (l*w) 24.85 03/08/21 1454   Wound Volume (cm^3) 34.72 cm^3 03/08/21 1454   Wound Healing % 47 03/08/21 1454   Post-Procedure Length (cm) 12.4 cm 03/08/21 1535   Post-Procedure Width (cm) 4 cm 03/08/21 1535   Post-Procedure Depth (cm) 0.7 cm 03/08/21 1535   Post-Procedure Surface Area (cm^2) 49.6 cm^2 03/08/21 1535   Post-Procedure Volume (cm^3) 34.72 cm^3 03/08/21 1535   Distance Tunneling (cm) 0 cm 03/08/21 1454   Tunneling Position ___ O'Clock 0 03/08/21 1454   Undermining Starts ___ O'Clock 0 03/08/21 1454   Undermining Ends___ O'Clock 0 03/08/21 1454   Undermining Maxium Distance (cm) 0 03/08/21 1454   Wound Assessment Slough;Pale granulation tissue 03/08/21 1454   Drainage Amount Moderate 03/08/21 1454   Drainage Description Serosanguinous 03/08/21 1454   Odor None 03/08/21 1454   Nellie-wound Assessment Intact 03/08/21 1454   Margins Attached edges 03/08/21 1454   Wound Thickness Description not for Pressure Injury Full thickness 11/24/20 1142   Number of days: 104       Wound 11/24/20 Tibial Distal;Left;Posterior wound 2- left outer ankle stage 4 (Active)   Wound Image   03/08/21 1454   Wound Etiology Pressure Stage  4 03/08/21 1454   Dressing Status Old drainage noted 03/08/21 1454   Wound Cleansed Cleansed with saline 03/08/21 1454   Dressing/Treatment Moist to dry;ABD 01/18/21 1620   Offloading for Diabetic Foot Ulcers Other (comment) 01/18/21 1426   Wound Length (cm) 0.6 cm 03/08/21 1454   Wound Width (cm) 0.3 cm 03/08/21 1454   Wound Depth (cm) 0.1 cm 03/08/21 1454   Wound Surface Area (cm^2) 0.18 cm^2 03/08/21 1454   Change in Wound Size % (l*w) 97.33 03/08/21 1454   Wound Volume (cm^3) 0.02 cm^3 03/08/21 1454   Wound Healing % 97 03/08/21 1454   Post-Procedure Length (cm) 0.6 cm 03/08/21 1535   Post-Procedure Width (cm) 0.3 cm 03/08/21 1535   Post-Procedure Depth (cm) 0.1 cm 03/08/21 1535   Post-Procedure Surface Area (cm^2) 0.18 cm^2 03/08/21 1535   Post-Procedure Volume (cm^3) 0.02 cm^3 03/08/21 1535   Distance Tunneling (cm) 0 cm 03/08/21 1454   Tunneling Position ___ O'Clock 0 03/08/21 1454   Undermining Starts ___ O'Clock 0 03/08/21 1454   Undermining Ends___ O'Clock 0 03/08/21 1454   Undermining Maxium Distance (cm) 0 03/08/21 1454   Wound Assessment Pale granulation tissue;Slough 03/08/21 1454   Drainage Amount Moderate 03/08/21 1454   Drainage Description Serosanguinous 03/08/21 1454   Odor None 03/08/21 1454   Nellie-wound Assessment Intact 03/08/21 1454   Margins Attached edges 03/08/21 1454   Wound Thickness Description not for Pressure Injury Full thickness 11/24/20 1142   Number of days: 104       Wound 11/24/20 Foot Right;Plantar wound 3- right plantar foot stage 4 (Active)   Wound Image   03/08/21 1454   Wound Etiology Pressure Stage  4 03/08/21 1454   Dressing Status Old drainage noted 03/08/21 1454   Wound Cleansed Soap and water 01/18/21 1482 Dressing/Treatment Moist to dry;Gauze dressing/dressing sponge 01/18/21 1620   Offloading for Diabetic Foot Ulcers Other (comment) 01/18/21 1426   Wound Length (cm) 4 cm 03/08/21 1454   Wound Width (cm) 3 cm 03/08/21 1454   Wound Depth (cm) 1 cm 03/08/21 1454   Wound Surface Area (cm^2) 12 cm^2 03/08/21 1454   Change in Wound Size % (l*w) 10.71 03/08/21 1454   Wound Volume (cm^3) 12 cm^3 03/08/21 1454   Wound Healing % 36 03/08/21 1454   Post-Procedure Length (cm) 4 cm 03/08/21 1535   Post-Procedure Width (cm) 3 cm 03/08/21 1535   Post-Procedure Depth (cm) 1 cm 03/08/21 1535   Post-Procedure Surface Area (cm^2) 12 cm^2 03/08/21 1535   Post-Procedure Volume (cm^3) 12 cm^3 03/08/21 1535   Distance Tunneling (cm) 0 cm 03/08/21 1454   Tunneling Position ___ O'Clock 0 03/08/21 1454   Undermining Starts ___ O'Clock 0 03/08/21 1454   Undermining Ends___ O'Clock 0 03/08/21 1454   Undermining Maxium Distance (cm) 0 03/08/21 1454   Wound Assessment Slough;Pale granulation tissue 03/08/21 1454   Drainage Amount Moderate 03/08/21 1454   Drainage Description Serosanguinous 03/08/21 1454   Odor None 03/08/21 1454   Nellie-wound Assessment Intact 03/08/21 1454   Margins Attached edges 03/08/21 1454   Wound Thickness Description not for Pressure Injury Full thickness 01/18/21 1426   Number of days: 104       Wound 11/24/20 Tibial Distal;Right wound 4- right leg outer venous (Active)   Wound Image   03/08/21 1454   Wound Etiology Venous 03/08/21 1454   Dressing Status Old drainage noted 03/08/21 1454   Wound Cleansed Cleansed with saline 03/08/21 1454   Dressing/Treatment Moist to dry;Gauze dressing/dressing sponge 01/18/21 1620   Offloading for Diabetic Foot Ulcers Other (comment) 01/18/21 1426   Wound Length (cm) 4 cm 03/08/21 1454   Wound Width (cm) 2.4 cm 03/08/21 1454   Wound Depth (cm) 0.6 cm 03/08/21 1454   Wound Surface Area (cm^2) 9.6 cm^2 03/08/21 1454   Change in Wound Size % (l*w) -233.33 03/08/21 1454   Wound Volume (cm^3) 5.76 cm^3 03/08/21 1454   Wound Healing % -570 03/08/21 1454   Post-Procedure Length (cm) 4 cm 03/08/21 1535   Post-Procedure Width (cm) 2.4 cm 03/08/21 1535   Post-Procedure Depth (cm) 0.6 cm 03/08/21 1535   Post-Procedure Surface Area (cm^2) 9.6 cm^2 03/08/21 1535   Post-Procedure Volume (cm^3) 5.76 cm^3 03/08/21 1535   Distance Tunneling (cm) 0 cm 03/08/21 1454   Tunneling Position ___ O'Clock 0 03/08/21 1454   Undermining Starts ___ O'Clock 0 03/08/21 1454   Undermining Ends___ O'Clock 0 03/08/21 1454   Undermining Maxium Distance (cm) 0 03/08/21 1454   Wound Assessment Slough;Pale granulation tissue 03/08/21 1454   Drainage Amount Moderate 03/08/21 1454   Drainage Description Serosanguinous 03/08/21 1454   Odor None 03/08/21 1454   Nellie-wound Assessment Intact 03/08/21 1454   Margins Attached edges 03/08/21 1454   Wound Thickness Description not for Pressure Injury Full thickness 12/28/20 1315   Number of days: 104       Wound 12/07/20 Left Wound 5.   Left Ischium (Active)   Wound Image   03/08/21 1454   Wound Etiology Pressure Stage  4 03/08/21 1454   Dressing Status Old drainage noted;New drainage noted 03/08/21 1454   Wound Cleansed Irrigated with saline 03/08/21 1454   Dressing/Treatment Moist to dry;ABD 01/18/21 1620   Offloading for Diabetic Foot Ulcers Other (comment) 01/18/21 1426   Wound Length (cm) 10 cm 03/08/21 1454   Wound Width (cm) 4.5 cm 03/08/21 1454   Wound Depth (cm) 3.2 cm 03/08/21 1454   Wound Surface Area (cm^2) 45 cm^2 03/08/21 1454   Change in Wound Size % (l*w) 54.55 03/08/21 1454   Wound Volume (cm^3) 144 cm^3 03/08/21 1454   Wound Healing % 89 03/08/21 1454   Post-Procedure Length (cm) 10 cm 03/08/21 1535   Post-Procedure Width (cm) 4.5 cm 03/08/21 1535   Post-Procedure Depth (cm) 3.2 cm 03/08/21 1535   Post-Procedure Surface Area (cm^2) 45 cm^2 03/08/21 1535   Post-Procedure Volume (cm^3) 144 cm^3 03/08/21 1535   Distance Tunneling (cm) 6 cm 03/08/21 1454   Tunneling Position ___ O'Clock 6 03/08/21 1454   Undermining Starts ___ O'Clock 0 03/08/21 1454   Undermining Ends___ O'Clock 0 03/08/21 1454   Undermining Maxium Distance (cm) 0 03/08/21 1454   Wound Assessment Pale granulation tissue; Purple/maroon;Slough 03/08/21 1454   Drainage Amount Moderate 03/08/21 1454   Drainage Description Serosanguinous 03/08/21 1454   Odor None 03/08/21 1454   Nellie-wound Assessment Excoriated 03/08/21 1454   Margins Attached edges 03/08/21 1454   Number of days: 90       Wound 12/07/20 Ischium Right Wound 6.    Right Ischium (Active)   Wound Image   03/08/21 1454   Wound Etiology Pressure Stage  4 03/08/21 1454   Dressing Status Old drainage noted 03/08/21 1454   Wound Cleansed Irrigated with saline 03/08/21 1454   Dressing/Treatment Moist to dry;ABD 01/18/21 1620   Offloading for Diabetic Foot Ulcers Other (comment) 01/18/21 1426   Wound Length (cm) 9 cm 03/08/21 1454   Wound Width (cm) 5.5 cm 03/08/21 1454   Wound Depth (cm) 1 cm 03/08/21 1454   Wound Surface Area (cm^2) 49.5 cm^2 03/08/21 1454   Change in Wound Size % (l*w) 33.33 03/08/21 1454   Wound Volume (cm^3) 49.5 cm^3 03/08/21 1454   Wound Healing % 83 03/08/21 1454   Post-Procedure Length (cm) 9 cm 03/08/21 1535   Post-Procedure Width (cm) 5.5 cm 03/08/21 1535   Post-Procedure Depth (cm) 1 cm 03/08/21 1535   Post-Procedure Surface Area (cm^2) 49.5 cm^2 03/08/21 1535   Post-Procedure Volume (cm^3) 49.5 cm^3 03/08/21 1535   Distance Tunneling (cm) 11 cm 01/18/21 1426   Tunneling Position ___ O'Clock 7 01/18/21 1426   Undermining Starts ___ O'Clock 9 03/08/21 1454   Undermining Ends___ O'Clock 2 03/08/21 1454   Undermining Maxium Distance (cm) 3.2 03/08/21 1454   Wound Assessment Pale granulation tissue 03/08/21 1454   Drainage Amount Moderate 03/08/21 1454   Drainage Description Serosanguinous 03/08/21 1454   Odor None 03/08/21 1454   Nellie-wound Assessment Intact 03/08/21 1454   Margins Unattached edges 03/08/21 1454   Number of days: 90 Wound 03/08/21 Heel Left Wound 7. Left heel (Active)   Wound Image   03/08/21 1454   Wound Etiology Pressure Stage  4 03/08/21 1454   Dressing Status Dry 03/08/21 1454   Wound Length (cm) 4.2 cm 03/08/21 1454   Wound Width (cm) 4 cm 03/08/21 1454   Wound Depth (cm) 0.1 cm 03/08/21 1454   Wound Surface Area (cm^2) 16.8 cm^2 03/08/21 1454   Wound Volume (cm^3) 1.68 cm^3 03/08/21 1454   Post-Procedure Length (cm) 4.2 cm 03/08/21 1535   Post-Procedure Width (cm) 4 cm 03/08/21 1535   Post-Procedure Depth (cm) 0.1 cm 03/08/21 1535   Post-Procedure Surface Area (cm^2) 16.8 cm^2 03/08/21 1535   Post-Procedure Volume (cm^3) 1.68 cm^3 03/08/21 1535   Wound Assessment Eschar dry 03/08/21 1454   Drainage Amount Scant 03/08/21 1454   Drainage Description Serosanguinous 03/08/21 1454   Odor Mild 03/08/21 1454   Nellie-wound Assessment Blanchable erythema 03/08/21 1454   Margins Defined edges 03/08/21 1454   Number of days: 0             Estimated Blood Loss:  Minimal    Hemostasis Achieved:  by pressure    Procedural Pain:  0  / 10     Post Procedural Pain:  0 / 10     Response to treatment:  Well tolerated by patient.          Plan:     Problem List Items Addressed This Visit     * (Principal) Decubitus ulcer of ischial area, left, stage IV (HCC) (Chronic)    Decubitus ulcer of ischium, right, stage IV (HCC) (Chronic)    Relevant Orders    Supply: Wound Cleanser    Supply: Nellie Wound    Supply: Wound Dressings    Supply: Pack Wound    Supply: Cover and Secure    Pressure injury of right heel, stage 3 (HCC) (Chronic)    Severe protein-calorie malnutrition (HCC) (Chronic)    Decubitus ulcer of left heel, stage 4 (HCC) - Primary (Chronic)    Relevant Orders    Supply: Wound Cleanser    Supply: Nellie Wound    Supply: Wound Dressings    Supply: Pack Wound    Supply: Cover and Secure    Decubitus ulcer of right foot, stage 4 (HCC) (Chronic)    Relevant Orders    Supply: Wound Cleanser    Supply: Nellie Wound    Supply: Wound Dressings Supply: Pack Wound    Supply: Cover and Secure    Decubitus ulcer of left leg, stage 4 (HCC) (Chronic)    Relevant Orders    Supply: Wound Cleanser    Supply: Nellie Wound    Supply: Wound Dressings    Supply: Pack Wound    Supply: Cover and Secure    Venous stasis ulcer of right calf with fat layer exposed without varicose veins (HCC)    Relevant Orders    Supply: Wound Cleanser    Supply: Nellie Wound    Supply: Wound Dressings    Supply: Pack Wound    Supply: Cover and Secure          Pt has not been here for 2 months for one mishap after another. A long discussion was had about offloading, nutrition, wound care and forward treatment. We have agreed that these areas are not going to resolve overnight and we will have to tackle this in team fashion. Her goals are for resolution of her wounds as fast as possible. When she really thought about her protein intake she feels she can do better. We will see her every 2 weeks. See treatment orders. Started minocycline for increased drainage RLE. She has a steep climb ahead of her. Treatment Note please see attached Discharge Instructions    In my professional opinion this patient would benefit from HBO Therapy: No    Written patient dismissal instructions given to patient and signed by patient or POA.          Discharge [address] and Hyperbaric Oxygen Therapy   Physician Orders and Discharge Instructions  [address]  Telephone: [phone]    NAME:  Landry Villeda OF BIRTH:  1960  MEDICAL RECORD NUMBER:  [id number]  DATE:  3/8/2021    Discharge condition: Stable    Discharge to: Home    Left via:Private automobile    Accompanied by: Self    HHA: [facility]- Please increase pt visits to 3 times weekly     Dressing Orders: As follows      Right and Left plantar foot- Soap and water wash; Santyl to wound bed nickel thick, saline or dakins moistened  gauze loosely over  the wound bed, secure with dry gauze, and rolled gauze once daily. Wear Heel Lift Boots except to transfer, No shoe on either foot    Right and Left Ext Wounds: Soap and water wash  Aquacel Ag (DRY) cut to fit into wound bed, secure with dry gauze, and rolled gauze daily and as needed   Elevate feet when possible to reduce or prevent swelling     Dressing Orders: Right and Left Ishium,   Dakins moist roll gauze tucked loosely into wound, cover with dry gauze, ABD pad and tape  Change twice daily     Treatment Orders: Avoid Pressure to wound site. Use ROHO cushion, MARYSOL bed and Heel lift boots, check air in ROHO cushion to make sure its inflated properly   Turn frequently (turn at least every two hours when in bed)  While in chair reposition every 30 minutes  Patient advised to sit up no more than 30minutes at a time for meals ONLY. Please do not elevate the head of the bed higher than 30 degrees. Off-load heels by applying heel-lift boots or \"float\" heels by placing pillows under calves so that heels do not touch  High Protein as Tolerated      [facility] follow up visit ___________2 weeks________________  (Please note your next appointment above and if you are unable to keep, kindly give a 24 hour notice. Thank you.)    If you experience any of the following, please call the [facility] during business hours:    * Increase in Pain  * Temperature over 101  * Increase in drainage from your wound  * Drainage with a foul odor  * Bleeding  * Increase in swelling  * Need for compression bandage changes due to slippage, breakthrough drainage. If you need medical attention outside of the business hours of the [facility] please contact your PCP or go to the nearest emergency room.         Electronically signed by Bee Richards MD on 3/8/2021 at 4:29 PM

## 2021-03-29 ENCOUNTER — HOSPITAL ENCOUNTER (OUTPATIENT)
Dept: WOUND CARE | Age: 61
Discharge: HOME | End: 2021-03-29
Payer: MEDICARE

## 2021-03-29 VITALS
SYSTOLIC BLOOD PRESSURE: 125 MMHG | DIASTOLIC BLOOD PRESSURE: 69 MMHG | RESPIRATION RATE: 18 BRPM | HEART RATE: 74 BPM | TEMPERATURE: 97.9 F | WEIGHT: 190 LBS | HEIGHT: 68 IN | BODY MASS INDEX: 28.79 KG/M2

## 2021-03-29 DIAGNOSIS — E43 SEVERE PROTEIN-CALORIE MALNUTRITION (HCC): Chronic | ICD-10-CM

## 2021-03-29 DIAGNOSIS — L89.624 DECUBITUS ULCER OF LEFT HEEL, STAGE 4 (HCC): Primary | ICD-10-CM

## 2021-03-29 DIAGNOSIS — M86.49 CHRONIC OSTEOMYELITIS WITH DRAINING SINUS OF MULTIPLE SITES (HCC): Chronic | ICD-10-CM

## 2021-03-29 DIAGNOSIS — L89.894 DECUBITUS ULCER OF LEFT LEG, STAGE 4 (HCC): ICD-10-CM

## 2021-03-29 DIAGNOSIS — L89.894 DECUBITUS ULCER OF RIGHT FOOT, STAGE 4 (HCC): ICD-10-CM

## 2021-03-29 DIAGNOSIS — L89.324 DECUBITUS ULCER OF ISCHIAL AREA, LEFT, STAGE IV (HCC): Chronic | ICD-10-CM

## 2021-03-29 DIAGNOSIS — L97.212 VENOUS STASIS ULCER OF RIGHT CALF WITH FAT LAYER EXPOSED WITHOUT VARICOSE VEINS (HCC): ICD-10-CM

## 2021-03-29 DIAGNOSIS — L89.613 PRESSURE INJURY OF RIGHT HEEL, STAGE 3 (HCC): Chronic | ICD-10-CM

## 2021-03-29 DIAGNOSIS — I87.2 VENOUS STASIS ULCER OF RIGHT CALF WITH FAT LAYER EXPOSED WITHOUT VARICOSE VEINS (HCC): ICD-10-CM

## 2021-03-29 DIAGNOSIS — L89.314 DECUBITUS ULCER OF ISCHIUM, RIGHT, STAGE IV (HCC): ICD-10-CM

## 2021-03-29 PROCEDURE — 11042 DBRDMT SUBQ TIS 1ST 20SQCM/<: CPT

## 2021-03-29 PROCEDURE — 11042 DBRDMT SUBQ TIS 1ST 20SQCM/<: CPT | Performed by: SURGERY

## 2021-03-29 ASSESSMENT — PAIN SCALES - GENERAL: PAINLEVEL_OUTOF10: 0

## 2021-03-29 NOTE — PLAN OF CARE
[employer] Application   Below Knee    NAME:  Stuart Mayorga  YOB: 1960  MEDICAL RECORD NUMBER:  [id number]  DATE:  3/29/2021    Jacob Duran boot: Applied moisturizing agent to dry skin as needed. Appied primary and secondary dressing as ordered. Applied Unna roll from toes to knee overlapping each time. Applied ace wrap or coban from toes to below the knee. Instructed patient/caregiver to keep dressing dry and intact. DO NOT REMOVE DRESSING. Instructed pt/family/caregiver to report excessive draining, loose bandage, wet dressing, severe pain or tingling in toes. Applied [employer] dressing below the knee to right lower leg. Applied [employer] dressing below the knee to left lower leg. Dressings applied with x2 nurses Trudy Roth RN and Lisa Corbin. Reviewed AVS with patient. Reviewed to call Jose Golden if dressing to BLE's gets wet, falls down, feels tight, notices any changes. Patient verbalized understanding. Unna Boot(s) were applied per  Guidelines.      Electronically signed by Rachael Adrian RN on 3/29/2021 at 4:55 PM

## 2021-03-29 NOTE — PROGRESS NOTES
[address]   Progress Note and Procedure Note      [address] RECORD NUMBER:  [id number]  AGE: 63 y.o. GENDER: female  : 1960  EPISODE DATE:  3/29/2021    Subjective:     Chief Complaint   Patient presents with    Wound Check     wound, wants to see about getting wound culture, has had UTI, having chills, to  antibiotics for UTI today         HISTORY of PRESENT ILLNESS HPI     Ralph Pearson is a 63 y.o. female who presents today for wound/ulcer evaluation. Wound Context: Pt with multiple sites of wounds  Here for eval/treat  Wound/Ulcer Pain Timing/Severity: none  Quality of pain: N/A  Severity:  0 / 10   Modifying Factors: None  Associated Signs/Symptoms: none    Ulcer Identification:  Ulcer Type: pressure  Contributing Factors: chronic pressure, decreased mobility and shear force    Wound: pressure        PAST MEDICAL HISTORY        Diagnosis Date    Blood circulation, collateral     Chronic kidney disease     bladder removed-no kidney problems    GERD (gastroesophageal reflux disease)     History of blood transfusion     History of urostomy     Neuromuscular disorder (HCC)     parapalegic t10-l1    Osteomyelitis (HCC)     Pressure ulcer     to left ischium and bilat heels       PAST SURGICAL HISTORY    Past Surgical History:   Procedure Laterality Date    BACK SURGERY  1979    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 Nataly Burns removed part of bone    OTHER SURGICAL HISTORY Left     Skin Flap to L.  Buttock years ago more than 20 years ago    SMALL INTESTINE SURGERY N/A 2019    DIVERTING LOOP COLOSTOMY performed by Zach Teague MD at [address] [place] [address] erythema  Head: normocephalic and atraumatic  Eyes: pupils equal, round, and reactive to light, extraocular eye movements intact, conjunctivae normal  ENT: tympanic membrane, external ear and ear canal normal bilaterally, nose without deformity, nasal mucosa and turbinates normal without polyps, lips teeth and gums normal  Neck: supple and non-tender without mass, no thyromegaly or thyroid nodules, no cervical lymphadenopathy  Pulmonary/Chest: clear to auscultation bilaterally- no wheezes, rales or rhonchi, normal air movement, no respiratory distress  Cardiovascular: normal rate, regular rhythm, normal S1 and S2, no murmurs, rubs, clicks, or gallops, distal pulses intact, no carotid bruits  Abdomen: soft, non-tender, non-distended, normal bowel sounds, no masses or organomegaly  Extremities: no cyanosis, clubbing or edema  Musculoskeletal: normal range of motion, no joint swelling, deformity or tenderness      Assessment:      Problem List Items Addressed This Visit     Decubitus ulcer of ischial area, left, stage IV (HCC) (Chronic)    Decubitus ulcer of ischium, right, stage IV (HCC) (Chronic)    Relevant Orders    Supply: Wound Cleanser    Chronic osteomyelitis with draining sinus of multiple sites (HCC) (Chronic)    * (Principal) Pressure injury of right heel, stage 3 (HCC) (Chronic)    Severe protein-calorie malnutrition (HCC) (Chronic)    Decubitus ulcer of left heel, stage 4 (HCC) - Primary (Chronic)    Relevant Orders    Supply: Wound Cleanser    Decubitus ulcer of right foot, stage 4 (HCC) (Chronic)    Relevant Orders    Supply: Wound Cleanser    Decubitus ulcer of left leg, stage 4 (HCC) (Chronic)    Relevant Orders    Supply: Wound Cleanser    Venous stasis ulcer of right calf with fat layer exposed without varicose veins (HCC)    Relevant Orders    Supply: Wound Cleanser           Procedure Note  Indications:  Based on my examination of this patient's wound(s)/ulcer(s) today, debridement is required to promote healing and evaluate the wound base. Performed by: Kalie Kay MD    Consent obtained:  Yes    Time out taken:  Yes    Pain Control: Anesthetic  Anesthetic: 2% Lidocaine Gel Topical       Debridement:Excisional Debridement    Using curette and knife and forceps the wound(s)/ulcer(s) was/were sharply debrided down through and including the removal of epidermis, dermis and subcutaneous tissue.         Devitalized Tissue Debrided:  fibrin, biofilm, slough, necrotic/eschar and exudate      Pre Debridement Measurements:  Are located in the [facility]  Documentation Flow Sheet    Wound/Ulcer #: 7    Percent of Wound(s)/Ulcer(s) Debrided: 100%    Total Surface Area Debrided:  12.25 sq cm       Diabetic/Pressure/Non Pressure Ulcers only:  Ulcer: Pressure ulcer, Stage 4             Post Debridement Measurements:    Wound/Ulcer Descriptions are Pre Debridement --EXCEPT MEASUREMENTS    Wound 11/24/20 Leg Right;Plantar wound 1-right plantar foot stage 4 (Active)   Wound Image   03/29/21 1444   Wound Etiology Pressure Stage  4 03/29/21 1444   Dressing Status New dressing applied 03/08/21 1640   Wound Cleansed Soap and water 03/29/21 1444   Dressing/Treatment Moist to moist;Roll gauze;Tape/Soft cloth adhesive tape 03/08/21 1640   Offloading for Diabetic Foot Ulcers Other (comment) 01/18/21 1426   Wound Length (cm) 3 cm 03/29/21 1444   Wound Width (cm) 3 cm 03/29/21 1444   Wound Depth (cm) 1 cm 03/29/21 1444   Wound Surface Area (cm^2) 9 cm^2 03/29/21 1444   Change in Wound Size % (l*w) 86.36 03/29/21 1444   Wound Volume (cm^3) 9 cm^3 03/29/21 1444   Wound Healing % 86 03/29/21 1444   Post-Procedure Length (cm) 12.4 cm 03/08/21 1535   Post-Procedure Width (cm) 4 cm 03/08/21 1535   Post-Procedure Depth (cm) 0.7 cm 03/08/21 1535   Post-Procedure Surface Area (cm^2) 49.6 cm^2 03/08/21 1535   Post-Procedure Volume (cm^3) 34.72 cm^3 03/08/21 1535   Distance Tunneling (cm) 0 cm 03/29/21 1444   Tunneling Position ___ O'Clock 0 03/29/21 1444   Undermining Starts ___ O'Clock 0 03/29/21 1444   Undermining Ends___ O'Clock 0 03/29/21 1444   Undermining Maxium Distance (cm) 0 03/29/21 1444   Wound Assessment Eschar moist;Pale granulation tissue;Pink/red 03/29/21 1444   Drainage Amount Moderate 03/29/21 1444   Drainage Description Serosanguinous 03/29/21 1444   Odor None 03/29/21 1444   Nellie-wound Assessment Intact 03/29/21 1444   Margins Attached edges 03/29/21 1444   Wound Thickness Description not for Pressure Injury Full thickness 11/24/20 1142   Number of days: 125       Wound 11/24/20 Ankle Distal;Right; Outer wound 2- right outer ankle stage 4 (Active)   Wound Image   03/29/21 1444   Wound Etiology Pressure Stage  4 03/29/21 1444   Dressing Status New dressing applied 03/08/21 1640   Wound Cleansed Soap and water 03/29/21 1444   Dressing/Treatment Alginate with Ag;Gauze dressing/dressing sponge;Roll gauze;Tape/Soft cloth adhesive tape 03/08/21 1640   Offloading for Diabetic Foot Ulcers Other (comment) 01/18/21 1426   Wound Length (cm) 3 cm 03/29/21 1444   Wound Width (cm) 2 cm 03/29/21 1444   Wound Depth (cm) 0.5 cm 03/29/21 1444   Wound Surface Area (cm^2) 6 cm^2 03/29/21 1444   Change in Wound Size % (l*w) 11.11 03/29/21 1444   Wound Volume (cm^3) 3 cm^3 03/29/21 1444   Wound Healing % -341 03/29/21 1444   Post-Procedure Length (cm) 0.6 cm 03/08/21 1535   Post-Procedure Width (cm) 0.3 cm 03/08/21 1535   Post-Procedure Depth (cm) 0.1 cm 03/08/21 1535   Post-Procedure Surface Area (cm^2) 0.18 cm^2 03/08/21 1535   Post-Procedure Volume (cm^3) 0.02 cm^3 03/08/21 1535   Distance Tunneling (cm) 0 cm 03/29/21 1444   Tunneling Position ___ O'Clock 0 03/29/21 1444   Undermining Starts ___ O'Clock 0 03/29/21 1444   Undermining Ends___ O'Clock 0 03/29/21 1444   Undermining Maxium Distance (cm) 0 03/29/21 1444   Wound Assessment Pink/red;Slough 03/29/21 1444   Drainage Amount Moderate 03/29/21 1444   Drainage Description Serosanguinous 03/29/21 1444   Odor None 03/29/21 1444   Nellie-wound Assessment Dry/flaky; Intact 03/29/21 1444   Margins Attached edges 03/29/21 1444   Wound Thickness Description not for Pressure Injury Full thickness 11/24/20 1142   Number of days: 125       Wound 11/24/20 Foot Plantar;Left wound 3- left plantar foot stage 4 (Active)   Wound Image   03/29/21 1444   Wound Etiology Pressure Stage  4 03/29/21 1444   Dressing Status New dressing applied 03/08/21 1640   Wound Cleansed Soap and water 03/29/21 1444   Dressing/Treatment Moist to moist;Roll gauze;Tape/Soft cloth adhesive tape 03/08/21 1640   Offloading for Diabetic Foot Ulcers Other (comment) 01/18/21 1426   Wound Length (cm) 10 cm 03/29/21 1444   Wound Width (cm) 4 cm 03/29/21 1444   Wound Depth (cm) 2 cm 03/29/21 1444   Wound Surface Area (cm^2) 40 cm^2 03/29/21 1444   Change in Wound Size % (l*w) -197.62 03/29/21 1444   Wound Volume (cm^3) 80 cm^3 03/29/21 1444   Wound Healing % -325 03/29/21 1444   Post-Procedure Length (cm) 4 cm 03/08/21 1535   Post-Procedure Width (cm) 3 cm 03/08/21 1535   Post-Procedure Depth (cm) 1 cm 03/08/21 1535   Post-Procedure Surface Area (cm^2) 12 cm^2 03/08/21 1535   Post-Procedure Volume (cm^3) 12 cm^3 03/08/21 1535   Distance Tunneling (cm) 0 cm 03/29/21 1444   Tunneling Position ___ O'Clock 0 03/29/21 1444   Undermining Starts ___ O'Clock 0 03/29/21 1444   Undermining Ends___ O'Clock 0 03/29/21 1444   Undermining Maxium Distance (cm) 0 03/29/21 1444   Wound Assessment Pale granulation tissue;Slough 03/29/21 1444   Drainage Amount Moderate 03/29/21 1444   Drainage Description Serosanguinous 03/29/21 1444   Odor None 03/29/21 1444   Nellie-wound Assessment Intact 03/29/21 1444   Margins Attached edges 03/29/21 1444   Wound Thickness Description not for Pressure Injury Full thickness 03/29/21 1444   Number of days: 125       Wound 11/24/20 Tibial Distal;Left;Outer wound 4- left leg outer Pressure (Active)   Wound Image   03/29/21 1444 Wound Etiology Venous 03/29/21 1444   Dressing Status New dressing applied 03/08/21 1640   Wound Cleansed Soap and water 03/29/21 1444   Dressing/Treatment Alginate with Ag;Gauze dressing/dressing sponge;Roll gauze;Tape/Soft cloth adhesive tape 03/08/21 1640   Offloading for Diabetic Foot Ulcers Other (comment) 01/18/21 1426   Wound Length (cm) 1.7 cm 03/29/21 1444   Wound Width (cm) 0.7 cm 03/29/21 1444   Wound Depth (cm) 0.1 cm 03/29/21 1444   Wound Surface Area (cm^2) 1.19 cm^2 03/29/21 1444   Change in Wound Size % (l*w) 58.68 03/29/21 1444   Wound Volume (cm^3) 0.12 cm^3 03/29/21 1444   Wound Healing % 86 03/29/21 1444   Post-Procedure Length (cm) 4 cm 03/08/21 1535   Post-Procedure Width (cm) 2.4 cm 03/08/21 1535   Post-Procedure Depth (cm) 0.6 cm 03/08/21 1535   Post-Procedure Surface Area (cm^2) 9.6 cm^2 03/08/21 1535   Post-Procedure Volume (cm^3) 5.76 cm^3 03/08/21 1535   Distance Tunneling (cm) 0 cm 03/29/21 1444   Tunneling Position ___ O'Clock 0 03/29/21 1444   Undermining Starts ___ O'Clock 0 03/29/21 1444   Undermining Ends___ O'Clock 0 03/29/21 1444   Undermining Maxium Distance (cm) 0 03/29/21 1444   Wound Assessment Pale granulation tissue;Pink/red;Slough 03/29/21 1444   Drainage Amount Moderate 03/29/21 1444   Drainage Description Serosanguinous 03/29/21 1444   Odor None 03/29/21 1444   Nellie-wound Assessment Intact 03/29/21 1444   Margins Attached edges 03/29/21 1444   Wound Thickness Description not for Pressure Injury Full thickness 12/28/20 1315   Number of days: 125       Wound 12/07/20 Left Wound 5. Left Ischium (Active)   Wound Image   03/29/21 1444   Wound Etiology Pressure Stage  4 03/29/21 1444   Dressing Status Old drainage noted 03/29/21 1444   Wound Cleansed Soap and water 03/29/21 1444   Dressing/Treatment ABD; Moist to moist;Tape/Soft cloth adhesive tape 03/08/21 1640   Offloading for Diabetic Foot Ulcers Other (comment) 01/18/21 1426   Wound Length (cm) 9.5 cm 03/29/21 1444 Wound Width (cm) 5 cm 03/29/21 1444   Wound Depth (cm) 5 cm 03/29/21 1444   Wound Surface Area (cm^2) 47.5 cm^2 03/29/21 1444   Change in Wound Size % (l*w) 52.02 03/29/21 1444   Wound Volume (cm^3) 237.5 cm^3 03/29/21 1444   Wound Healing % 81 03/29/21 1444   Post-Procedure Length (cm) 10 cm 03/08/21 1535   Post-Procedure Width (cm) 4.5 cm 03/08/21 1535   Post-Procedure Depth (cm) 3.2 cm 03/08/21 1535   Post-Procedure Surface Area (cm^2) 45 cm^2 03/08/21 1535   Post-Procedure Volume (cm^3) 144 cm^3 03/08/21 1535   Distance Tunneling (cm) 0 cm 03/29/21 1444   Tunneling Position ___ O'Clock 0 03/29/21 1444   Undermining Starts ___ O'Clock 8 03/29/21 1444   Undermining Ends___ O'Clock 11 03/29/21 1444   Undermining Maxium Distance (cm) 12 03/29/21 1444   Wound Assessment Pale granulation tissue;Pink/red;Slough 03/29/21 1444   Drainage Amount Moderate 03/29/21 1444   Drainage Description Serosanguinous 03/29/21 1444   Odor None 03/29/21 1444   Nellie-wound Assessment Excoriated 03/29/21 1444   Margins Attached edges 03/29/21 1444   Number of days: 111       Wound 12/07/20 Ischium Right Wound 6. Right Ischium (Active)   Wound Image   03/29/21 1444   Wound Etiology Pressure Stage  4 03/29/21 1444   Dressing Status Old drainage noted 03/29/21 1444   Wound Cleansed Soap and water 03/29/21 1444   Dressing/Treatment ABD; Moist to moist;Tape/Soft cloth adhesive tape 03/08/21 1640   Offloading for Diabetic Foot Ulcers Other (comment) 01/18/21 1426   Wound Length (cm) 5.5 cm 03/29/21 1444   Wound Width (cm) 9 cm 03/29/21 1444   Wound Depth (cm) 3 cm 03/29/21 1444   Wound Surface Area (cm^2) 49.5 cm^2 03/29/21 1444   Change in Wound Size % (l*w) 33.33 03/29/21 1444   Wound Volume (cm^3) 148.5 cm^3 03/29/21 1444   Wound Healing % 50 03/29/21 1444   Post-Procedure Length (cm) 9 cm 03/08/21 1535   Post-Procedure Width (cm) 5.5 cm 03/08/21 1535   Post-Procedure Depth (cm) 1 cm 03/08/21 1535   Post-Procedure Surface Area (cm^2) 49.5 cm^2 03/08/21 1535   Post-Procedure Volume (cm^3) 49.5 cm^3 03/08/21 1535   Distance Tunneling (cm) 3 cm 03/29/21 1444   Tunneling Position ___ O'Clock 6 03/29/21 1444   Undermining Starts ___ O'Clock 0 03/29/21 1444   Undermining Ends___ O'Clock 0 03/29/21 1444   Undermining Maxium Distance (cm) 0 03/29/21 1444   Wound Assessment Pink/red;Slough 03/29/21 1444   Drainage Amount Moderate 03/29/21 1444   Drainage Description Serosanguinous 03/29/21 1444   Odor None 03/29/21 1444   Nellie-wound Assessment Intact 03/29/21 1444   Margins Attached edges; Unattached edges 03/29/21 1444   Number of days: 111       Wound 03/08/21 Heel Right Wound 7.  right heel (Active)   Wound Image   03/29/21 1444   Wound Etiology Pressure Stage  4 03/29/21 1444   Dressing Status New dressing applied 03/08/21 1640   Wound Cleansed Soap and water 03/29/21 1444   Dressing/Treatment Moist to moist;Roll gauze;Tape/Soft cloth adhesive tape 03/08/21 1640   Wound Length (cm) 3.5 cm 03/29/21 1444   Wound Width (cm) 3.5 cm 03/29/21 1444   Wound Depth (cm) 1.2 cm 03/29/21 1444   Wound Surface Area (cm^2) 12.25 cm^2 03/29/21 1444   Change in Wound Size % (l*w) 27.08 03/29/21 1444   Wound Volume (cm^3) 14.7 cm^3 03/29/21 1444   Wound Healing % -775 03/29/21 1444   Post-Procedure Length (cm) 3.5 cm 03/29/21 1635   Post-Procedure Width (cm) 3.5 cm 03/29/21 1635   Post-Procedure Depth (cm) 1.2 cm 03/29/21 1635   Post-Procedure Surface Area (cm^2) 12.25 cm^2 03/29/21 1635   Post-Procedure Volume (cm^3) 14.7 cm^3 03/29/21 1635   Distance Tunneling (cm) 0 cm 03/29/21 1444   Tunneling Position ___ O'Clock 0 03/29/21 1444   Undermining Starts ___ O'Clock 0 03/29/21 1444   Undermining Ends___ O'Clock 0 03/29/21 1444   Undermining Maxium Distance (cm) 0 03/29/21 1444   Wound Assessment Eschar dry 03/29/21 1444   Drainage Amount Scant 03/29/21 1444   Drainage Description Serosanguinous 03/29/21 1444   Odor None 03/29/21 1444   Nellie-wound Assessment Dry/flaky 03/29/21 1444   Margins Defined edges 03/29/21 1444   Number of days: 21             Estimated Blood Loss:  Minimal    Hemostasis Achieved:  by pressure    Procedural Pain:  0  / 10     Post Procedural Pain:  0 / 10     Response to treatment:  Well tolerated by patient. Plan:     Problem List Items Addressed This Visit     Decubitus ulcer of ischial area, left, stage IV (HCC) (Chronic)    Decubitus ulcer of ischium, right, stage IV (HCC) (Chronic)    Relevant Orders    Supply: Wound Cleanser    Chronic osteomyelitis with draining sinus of multiple sites (HCC) (Chronic)    * (Principal) Pressure injury of right heel, stage 3 (HCC) (Chronic)    Severe protein-calorie malnutrition (HCC) (Chronic)    Decubitus ulcer of left heel, stage 4 (HCC) - Primary (Chronic)    Relevant Orders    Supply: Wound Cleanser    Decubitus ulcer of right foot, stage 4 (HCC) (Chronic)    Relevant Orders    Supply: Wound Cleanser    Decubitus ulcer of left leg, stage 4 (HCC) (Chronic)    Relevant Orders    Supply: Wound Cleanser    Venous stasis ulcer of right calf with fat layer exposed without varicose veins (HCC)    Relevant Orders    Supply: Wound Cleanser          A long discussion was had with pt and we addressed her situation. She only has her adult son at home for help(). HH is coming out and says they will only see her for another month unless she gets a care giver for dressings. We will place coflex BLE with aquacel on LE wounds and Dakin's to her ischial wounds. Her L ischial wound is gigantic and even with maximum nutrition and maximum offloading, I do not feel these will heal. Conservative management at this point will be difficult at best.  RTO 2 weeks    Treatment Note please see attached Discharge Instructions    In my professional opinion this patient would benefit from HBO Therapy: No    Written patient dismissal instructions given to patient and signed by patient or POA.          Discharge [address] and Hyperbaric Oxygen Therapy   Physician Orders and Discharge Instructions  [id number]  Telephone: [phone]    NAME:  Shruthi Harper OF BIRTH:  1960  MEDICAL RECORD NUMBER:  [id number]  DATE:  3/29/2021    Discharge condition: Stable    Discharge to: Home    Left via:Private automobile    Accompanied by: Self    HHA: [facility]- To change Coflex 3 times weekly and PRN     Dressing Orders: As follows     Dressing Orders: Right and Left Lower Ext Ulcer  Aquacel Ag to open areas, Cover with Absorbant layer ( drawtex, Optilock)  Copa to pad as needed, Pink Coflex Unnaboot, Keep clean and Dry  Elevate feet to level or above heart 3-4 times daily and as needed to for 30 minutes to reduce swelling  Remove wraps and call office if- Wraps get wet, Cause increased pain, Slide down or you are unable to make your next scheduled appointment   Multi Vitamin, High Protein diet as tolerated    Dressing Orders: Right and Left Ishium,   Dakins moist roll gauze tucked loosely into wound, cover with dry gauze, ABD pad and tape  Change twice daily     Treatment Orders: Avoid Pressure to wound site. Use ROHO cushion, MARYSOL bed and Heel lift boots, check air in ROHO cushion to make sure its inflated properly   Turn frequently (turn at least every two hours when in bed)  While in chair reposition every 30 minutes  Patient advised to sit up no more than 30minutes at a time for meals ONLY. Please do not elevate the head of the bed higher than 30 degrees. Off-load heels by applying heel-lift boots or \"float\" heels by placing pillows under calves so that heels do not touch  High Protein as Tolerated     [facility] follow up visit _____________2 weeks________________  (Please note your next appointment above and if you are unable to keep, kindly give a 24 hour notice.  Thank you.)    If you experience any of the following, please call the [facility] during business hours:    * Increase in Pain  * Temperature over 101  * Increase in drainage from your wound  * Drainage with a foul odor  * Bleeding  * Increase in swelling  * Need for compression bandage changes due to slippage, breakthrough drainage. If you need medical attention outside of the business hours of the [facility] please contact your PCP or go to the nearest emergency room.         Electronically signed by Bee Richards MD on 3/29/2021 at 4:37 PM

## 2021-03-29 NOTE — PROGRESS NOTES
Received a call from Steve Malik at [facility], She states that Ms Javan Councilman is very much noncompliant with her dressing changes, and that they were considering discharging her, Dr Laz Menard notified

## 2021-04-12 ENCOUNTER — HOSPITAL ENCOUNTER (OUTPATIENT)
Dept: WOUND CARE | Age: 61
Discharge: HOME | End: 2021-04-12
Payer: MEDICARE

## 2021-04-12 VITALS
HEART RATE: 88 BPM | WEIGHT: 190 LBS | DIASTOLIC BLOOD PRESSURE: 72 MMHG | TEMPERATURE: 97.1 F | HEIGHT: 68 IN | RESPIRATION RATE: 18 BRPM | SYSTOLIC BLOOD PRESSURE: 150 MMHG | BODY MASS INDEX: 28.79 KG/M2

## 2021-04-12 DIAGNOSIS — E43 SEVERE PROTEIN-CALORIE MALNUTRITION (HCC): Chronic | ICD-10-CM

## 2021-04-12 DIAGNOSIS — L97.212 VENOUS STASIS ULCER OF RIGHT CALF WITH FAT LAYER EXPOSED WITHOUT VARICOSE VEINS (HCC): ICD-10-CM

## 2021-04-12 DIAGNOSIS — M86.49 CHRONIC OSTEOMYELITIS WITH DRAINING SINUS OF MULTIPLE SITES (HCC): Primary | Chronic | ICD-10-CM

## 2021-04-12 DIAGNOSIS — L89.894 DECUBITUS ULCER OF LEFT LEG, STAGE 4 (HCC): ICD-10-CM

## 2021-04-12 DIAGNOSIS — L89.324 DECUBITUS ULCER OF ISCHIAL AREA, LEFT, STAGE IV (HCC): Chronic | ICD-10-CM

## 2021-04-12 DIAGNOSIS — L89.613 PRESSURE INJURY OF RIGHT HEEL, STAGE 3 (HCC): Chronic | ICD-10-CM

## 2021-04-12 DIAGNOSIS — L89.624 DECUBITUS ULCER OF LEFT HEEL, STAGE 4 (HCC): ICD-10-CM

## 2021-04-12 DIAGNOSIS — L89.894 DECUBITUS ULCER OF RIGHT FOOT, STAGE 4 (HCC): ICD-10-CM

## 2021-04-12 DIAGNOSIS — L89.314 DECUBITUS ULCER OF ISCHIUM, RIGHT, STAGE IV (HCC): ICD-10-CM

## 2021-04-12 DIAGNOSIS — I87.2 VENOUS STASIS ULCER OF RIGHT CALF WITH FAT LAYER EXPOSED WITHOUT VARICOSE VEINS (HCC): ICD-10-CM

## 2021-04-12 PROCEDURE — 11042 DBRDMT SUBQ TIS 1ST 20SQCM/<: CPT

## 2021-04-12 PROCEDURE — 11042 DBRDMT SUBQ TIS 1ST 20SQCM/<: CPT | Performed by: SURGERY

## 2021-04-12 ASSESSMENT — PAIN SCALES - GENERAL: PAINLEVEL_OUTOF10: 0

## 2021-04-12 NOTE — PROGRESS NOTES
[address]   Progress Note and Procedure Note      [address] RECORD NUMBER:  [id number]  AGE: 63 y.o. GENDER: female  : 1960  EPISODE DATE:  2021    Subjective:     Chief Complaint   Patient presents with    Wound Check     follow up         HISTORY of PRESENT ILLNESS HPI     Asia Cruz is a 63 y.o. female who presents today for wound/ulcer evaluation. Wound Context: Pt with multiple pressure wounds here for eval/treat  Wound/Ulcer Pain Timing/Severity: none  Quality of pain: N/A  Severity:  0 / 10   Modifying Factors: None  Associated Signs/Symptoms: none    Ulcer Identification:  Ulcer Type: pressure  Contributing Factors: chronic pressure, decreased mobility and obesity    Wound: pressure/chronic osteo        PAST MEDICAL HISTORY        Diagnosis Date    Blood circulation, collateral     Chronic kidney disease     bladder removed-no kidney problems    GERD (gastroesophageal reflux disease)     History of blood transfusion     History of urostomy     Neuromuscular disorder (HCC)     parapalegic t10-l1    Osteomyelitis (HCC)     Pressure ulcer     to left ischium and bilat heels       PAST SURGICAL HISTORY    Past Surgical History:   Procedure Laterality Date    BACK SURGERY  1979    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  Buttock years ago more than 20 years ago    SMALL INTESTINE SURGERY N/A 2019    DIVERTING LOOP COLOSTOMY performed by Joseph Francois MD at [address] HISTORY    Family History   Problem Relation Age of Onset    Other Mother          of sepsis    Cancer Father         Lung Cancer    Diabetes Paternal Grandmother     Heart Attack Paternal Grandfather        SOCIAL HISTORY    Social History     Tobacco Use    Smoking status: Never Smoker    Smokeless tobacco: Never Used   Substance Use Topics    Alcohol use: Not Currently    Drug use: Never       ALLERGIES    Allergies   Allergen Reactions    Morphine And Related Nausea Only     Caused severe altered mental status       MEDICATIONS    Current Outpatient Medications on File Prior to Encounter   Medication Sig Dispense Refill    collagenase (SANTYL) 250 UNIT/GM ointment Apply 1 applicator topically daily      ALPRAZolam (XANAX) 1 MG tablet Take 1 mg by mouth 3 times daily as needed for Anxiety.  HYDROcodone-acetaminophen (NORCO)  MG per tablet Take 1 tablet by mouth every 6 hours as needed for Pain.  sodium hypochlorite (DAKINS) 0.125 % SOLN Irrigate with as directed 2 times daily      omeprazole (PRILOSEC) 20 MG delayed release capsule Take 20 mg by mouth daily      zinc 50 MG CAPS Take 1 capsule by mouth daily      baclofen (LIORESAL) 10 MG tablet Take 10 mg by mouth 3 times daily        No current facility-administered medications on file prior to encounter. REVIEW OF SYSTEMS    A comprehensive review of systems was negative.     Objective:      BP (!) 150/72   Pulse 88   Temp 97.1 °F (36.2 °C) (Temporal)   Resp 18   Ht 5' 8\" (1.727 m)   Wt 190 lb (86.2 kg)   BMI 28.89 kg/m²     Wt Readings from Last 3 Encounters:   04/12/21 190 lb (86.2 kg)   03/29/21 190 lb (86.2 kg)   03/08/21 190 lb (86.2 kg)       PHYSICAL EXAM    General Appearance: alert and oriented to person, place and time, well developed and well- nourished, in no acute distress  Skin: warm and dry, no rash or erythema  Head: normocephalic and atraumatic  Eyes: pupils equal, round, and reactive to light, extraocular eye movements intact, conjunctivae normal  ENT: tympanic membrane, external ear and ear canal normal [facility])    Relevant Orders    Supply: Wound Cleanser    Supply: Nellie Wound    Supply: Wound Dressings    Supply: Pack Wound    Supply: Cover and Secure           Procedure Note  Indications:  Based on my examination of this patient's wound(s)/ulcer(s) today, debridement is required to promote healing and evaluate the wound base. Performed by: Sahara Perez MD    Consent obtained:  Yes    Time out taken:  Yes    Pain Control: Anesthetic  Anesthetic: 2% Lidocaine Gel Topical       Debridement:Excisional Debridement    Using curette the wound(s)/ulcer(s) was/were sharply debrided down through and including the removal of epidermis, dermis and subcutaneous tissue.         Devitalized Tissue Debrided:  fibrin, biofilm, slough, necrotic/eschar and exudate      Pre Debridement Measurements:  Are located in the Wound/Ulcer Documentation Flow Sheet    Wound/Ulcer #: 1, 2 and 7    Percent of Wound(s)/Ulcer(s) Debrided: 100%    Total Surface Area Debrided:  13 sq cm       Diabetic/Pressure/Non Pressure Ulcers only:  Ulcer: Pressure ulcer, Stage 4           Post Debridement Measurements:    Wound/Ulcer Descriptions are Pre Debridement --EXCEPT MEASUREMENTS    Wound 11/24/20 Leg Right;Plantar wound 1-right plantar foot stage 4 (Active)   Wound Image   04/12/21 1543   Wound Etiology Pressure Stage  4 04/12/21 1543   Dressing Status New drainage noted 04/12/21 1543   Wound Cleansed Soap and water 04/12/21 1543   Dressing/Treatment Alginate with Ag 03/29/21 1650   Offloading for Diabetic Foot Ulcers Other (comment) 01/18/21 1426   Wound Length (cm) 2.2 cm 04/12/21 1543   Wound Width (cm) 2.5 cm 04/12/21 1543   Wound Depth (cm) 1 cm 04/12/21 1543   Wound Surface Area (cm^2) 5.5 cm^2 04/12/21 1543   Change in Wound Size % (l*w) 91.67 04/12/21 1543   Wound Volume (cm^3) 5.5 cm^3 04/12/21 1543   Wound Healing % 92 04/12/21 1543   Post-Procedure Length (cm) 2.2 cm 04/12/21 1554   Post-Procedure Width (cm) 2.5 cm 04/12/21 1554 Post-Procedure Depth (cm) 1 cm 04/12/21 1554   Post-Procedure Surface Area (cm^2) 5.5 cm^2 04/12/21 1554   Post-Procedure Volume (cm^3) 5.5 cm^3 04/12/21 1554   Distance Tunneling (cm) 0 cm 04/12/21 1543   Tunneling Position ___ O'Clock 0 04/12/21 1543   Undermining Starts ___ O'Clock 0 04/12/21 1543   Undermining Ends___ O'Clock 0 04/12/21 1543   Undermining Maxium Distance (cm) 0 04/12/21 1543   Wound Assessment Eschar moist;Pale granulation tissue;Pink/red 04/12/21 1543   Drainage Amount Moderate 04/12/21 1543   Drainage Description Serosanguinous 04/12/21 1543   Odor None 04/12/21 1543   Nellie-wound Assessment Intact 04/12/21 1543   Margins Attached edges 04/12/21 1543   Wound Thickness Description not for Pressure Injury Full thickness 11/24/20 1142   Number of days: 139       Wound 11/24/20 Ankle Distal;Right; Outer wound 2- right outer ankle stage 4 (Active)   Wound Image   04/12/21 1543   Wound Etiology Pressure Stage  4 04/12/21 1543   Dressing Status New drainage noted 04/12/21 1543   Wound Cleansed Soap and water 04/12/21 1543   Dressing/Treatment Alginate with Ag 03/29/21 1650   Offloading for Diabetic Foot Ulcers Other (comment) 01/18/21 1426   Wound Length (cm) 1.5 cm 04/12/21 1543   Wound Width (cm) 0.6 cm 04/12/21 1543   Wound Depth (cm) 0.2 cm 04/12/21 1543   Wound Surface Area (cm^2) 0.9 cm^2 04/12/21 1543   Change in Wound Size % (l*w) 86.67 04/12/21 1543   Wound Volume (cm^3) 0.18 cm^3 04/12/21 1543   Wound Healing % 74 04/12/21 1543   Post-Procedure Length (cm) 1.5 cm 04/12/21 1554   Post-Procedure Width (cm) 0.6 cm 04/12/21 1554   Post-Procedure Depth (cm) 0.2 cm 04/12/21 1554   Post-Procedure Surface Area (cm^2) 0.9 cm^2 04/12/21 1554   Post-Procedure Volume (cm^3) 0.18 cm^3 04/12/21 1554   Distance Tunneling (cm) 0 cm 04/12/21 1543   Tunneling Position ___ O'Clock 0 04/12/21 1543   Undermining Starts ___ O'Clock 0 04/12/21 1543   Undermining Ends___ O'Clock 0 04/12/21 1543   Undermining Maxium Distance (cm) 0 04/12/21 1543   Wound Assessment Pink/red;Slough 04/12/21 1543   Drainage Amount Moderate 04/12/21 1543   Drainage Description Serosanguinous 04/12/21 1543   Odor None 04/12/21 1543   Nellie-wound Assessment Dry/flaky; Intact 04/12/21 1543   Margins Attached edges 04/12/21 1543   Wound Thickness Description not for Pressure Injury Full thickness 11/24/20 1142   Number of days: 139       Wound 11/24/20 Foot Plantar;Left wound 3- left plantar foot stage 4 (Active)   Wound Image   04/12/21 1543   Wound Etiology Pressure Stage  4 04/12/21 1543   Dressing Status New drainage noted 04/12/21 1543   Wound Cleansed Soap and water 04/12/21 1543   Dressing/Treatment Alginate with Ag 03/29/21 1650   Offloading for Diabetic Foot Ulcers Other (comment) 01/18/21 1426   Wound Length (cm) 12.5 cm 04/12/21 1543   Wound Width (cm) 4 cm 04/12/21 1543   Wound Depth (cm) 1.7 cm 04/12/21 1543   Wound Surface Area (cm^2) 50 cm^2 04/12/21 1543   Change in Wound Size % (l*w) -272.02 04/12/21 1543   Wound Volume (cm^3) 85 cm^3 04/12/21 1543   Wound Healing % -352 04/12/21 1543   Post-Procedure Length (cm) 4 cm 03/08/21 1535   Post-Procedure Width (cm) 3 cm 03/08/21 1535   Post-Procedure Depth (cm) 1 cm 03/08/21 1535   Post-Procedure Surface Area (cm^2) 12 cm^2 03/08/21 1535   Post-Procedure Volume (cm^3) 12 cm^3 03/08/21 1535   Distance Tunneling (cm) 0 cm 04/12/21 1543   Tunneling Position ___ O'Clock 0 04/12/21 1543   Undermining Starts ___ O'Clock 0 04/12/21 1543   Undermining Ends___ O'Clock 0 04/12/21 1543   Undermining Maxium Distance (cm) 0 04/12/21 1543   Wound Assessment Pale granulation tissue;Slough 04/12/21 1543   Drainage Amount Moderate 04/12/21 1543   Drainage Description Serosanguinous 04/12/21 1543   Odor None 04/12/21 1543   Nellie-wound Assessment Intact 04/12/21 1543   Margins Attached edges 04/12/21 1543   Wound Thickness Description not for Pressure Injury Full thickness 04/12/21 1543   Number of days: (cm) 8.5 cm 04/12/21 1543   Wound Depth (cm) 5 cm 03/29/21 1444   Wound Surface Area (cm^2) 85 cm^2 04/12/21 1543   Change in Wound Size % (l*w) 14.14 04/12/21 1543   Wound Volume (cm^3) 237.5 cm^3 03/29/21 1444   Wound Healing % 81 03/29/21 1444   Post-Procedure Length (cm) 10 cm 03/08/21 1535   Post-Procedure Width (cm) 4.5 cm 03/08/21 1535   Post-Procedure Depth (cm) 3.2 cm 03/08/21 1535   Post-Procedure Surface Area (cm^2) 45 cm^2 03/08/21 1535   Post-Procedure Volume (cm^3) 144 cm^3 03/08/21 1535   Distance Tunneling (cm) 0 cm 03/29/21 1444   Tunneling Position ___ O'Clock 0 03/29/21 1444   Undermining Starts ___ O'Clock 8 04/12/21 1543   Undermining Ends___ O'Clock 11 04/12/21 1543   Undermining Maxium Distance (cm) 11.7 04/12/21 1543   Wound Assessment Pale granulation tissue;Pink/red;Slough 04/12/21 1543   Drainage Amount Moderate 04/12/21 1543   Drainage Description Serosanguinous 04/12/21 1543   Odor None 04/12/21 1543   Nellie-wound Assessment Excoriated 04/12/21 1543   Margins Attached edges 04/12/21 1543   Number of days: 125       Wound 12/07/20 Ischium Right Wound 6. Right Ischium (Active)   Wound Image   04/12/21 1543   Wound Etiology Pressure Stage  4 04/12/21 1543   Dressing Status New drainage noted 04/12/21 1543   Wound Cleansed Soap and water 04/12/21 1543   Dressing/Treatment ABD; Moist to moist;Tape/Soft cloth adhesive tape 03/29/21 1650   Offloading for Diabetic Foot Ulcers Other (comment) 01/18/21 1426   Wound Length (cm) 5 cm 04/12/21 1543   Wound Width (cm) 9.5 cm 04/12/21 1543   Wound Depth (cm) 2.8 cm 04/12/21 1543   Wound Surface Area (cm^2) 47.5 cm^2 04/12/21 1543   Change in Wound Size % (l*w) 36.03 04/12/21 1543   Wound Volume (cm^3) 133 cm^3 04/12/21 1543   Wound Healing % 55 04/12/21 1543   Post-Procedure Length (cm) 9 cm 03/08/21 1535   Post-Procedure Width (cm) 5.5 cm 03/08/21 1535   Post-Procedure Depth (cm) 1 cm 03/08/21 1535   Post-Procedure Surface Area (cm^2) 49.5 cm^2 03/08/21 1535   Post-Procedure Volume (cm^3) 49.5 cm^3 03/08/21 1535   Distance Tunneling (cm) 3 cm 04/12/21 1543   Tunneling Position ___ O'Clock 6 04/12/21 1543   Undermining Starts ___ O'Clock 0 04/12/21 1543   Undermining Ends___ O'Clock 0 04/12/21 1543   Undermining Maxium Distance (cm) 0 04/12/21 1543   Wound Assessment Pink/red;Slough 04/12/21 1543   Drainage Amount Moderate 04/12/21 1543   Drainage Description Serosanguinous 04/12/21 1543   Odor None 04/12/21 1543   Nellie-wound Assessment Intact 04/12/21 1543   Margins Attached edges; Unattached edges 04/12/21 1543   Number of days: 125       Wound 03/08/21 Heel Right Wound 7.  right heel (Active)   Wound Image   04/12/21 1543   Wound Etiology Pressure Stage  4 04/12/21 1543   Dressing Status New drainage noted 04/12/21 1543   Wound Cleansed Soap and water 04/12/21 1543   Dressing/Treatment Alginate with Ag 03/29/21 1650   Wound Length (cm) 2 cm 04/12/21 1543   Wound Width (cm) 3 cm 04/12/21 1543   Wound Depth (cm) 1 cm 04/12/21 1543   Wound Surface Area (cm^2) 6 cm^2 04/12/21 1543   Change in Wound Size % (l*w) 64.29 04/12/21 1543   Wound Volume (cm^3) 6 cm^3 04/12/21 1543   Wound Healing % -257 04/12/21 1543   Post-Procedure Length (cm) 2 cm 04/12/21 1554   Post-Procedure Width (cm) 3 cm 04/12/21 1554   Post-Procedure Depth (cm) 1 cm 04/12/21 1554   Post-Procedure Surface Area (cm^2) 6 cm^2 04/12/21 1554   Post-Procedure Volume (cm^3) 6 cm^3 04/12/21 1554   Distance Tunneling (cm) 0 cm 04/12/21 1543   Tunneling Position ___ O'Clock 0 04/12/21 1543   Undermining Starts ___ O'Clock 0 04/12/21 1543   Undermining Ends___ O'Clock 0 04/12/21 1543   Undermining Maxium Distance (cm) 0 04/12/21 1543   Wound Assessment Slough 04/12/21 1543   Drainage Amount Moderate 04/12/21 1543   Drainage Description Serosanguinous;Brown 04/12/21 1543   Odor None 04/12/21 1543   Nellie-wound Assessment Dry/flaky 04/12/21 1543   Margins Defined edges 04/12/21 1543   Number of days: 34 Estimated Blood Loss:  Minimal    Hemostasis Achieved:  by pressure    Procedural Pain:  0  / 10     Post Procedural Pain:  0 / 10     Response to treatment:  Well tolerated by patient. Plan:     Problem List Items Addressed This Visit     * (Principal) Decubitus ulcer of ischial area, left, stage IV (HCC) (Chronic)    Decubitus ulcer of ischium, right, stage IV (HCC) (Chronic)    Relevant Orders    Supply: Wound Cleanser    Supply: Nellie Wound    Supply: Wound Dressings    Supply: Pack Wound    Supply: Cover and Secure    Chronic osteomyelitis with draining sinus of multiple sites (HCC) - Primary (Chronic)    Pressure injury of right heel, stage 3 (HCC) (Chronic)    Severe protein-calorie malnutrition (HCC) (Chronic)    Decubitus ulcer of left heel, stage 4 (HCC) (Chronic)    Relevant Orders    Supply: Wound Cleanser    Supply: Nellie Wound    Supply: Wound Dressings    Supply: Pack Wound    Supply: Cover and Secure    Decubitus ulcer of right foot, stage 4 (HCC) (Chronic)    Relevant Orders    Supply: Wound Cleanser    Supply: Nellie Wound    Supply: Wound Dressings    Supply: Pack Wound    Supply: Cover and Secure    Decubitus ulcer of left leg, stage 4 (HCC) (Chronic)    Relevant Orders    Supply: Wound Cleanser    Supply: Nellie Wound    Supply: Wound Dressings    Supply: Pack Wound    Supply: Cover and Secure    Venous stasis ulcer of right calf with fat layer exposed without varicose veins (HCC)    Relevant Orders    Supply: Wound Cleanser    Supply: Nellie Wound    Supply: Wound Dressings    Supply: Pack Wound    Supply: Cover and Secure          Pt with several wounds most looking better some worse. Cont current regimen    Treatment Note please see attached Discharge Instructions    In my professional opinion this patient would benefit from HBO Therapy: No    Written patient dismissal instructions given to patient and signed by patient or POA.          Discharge [address] [facility]   Physician Orders and Discharge Instructions  [address]  Telephone: [phone]    NAME:  Michael Bell OF BIRTH:  1960  MEDICAL RECORD NUMBER:  [id number]  DATE:  4/12/2021    Discharge condition: Stable    Discharge to: Home    Left via:Private automobile    Accompanied by: Self    HHA: [facility]- To change Coflex 3 times weekly and PRN    Dressing Orders: Right and Left Lower Ext Ulcer  Aquacel Ag to open areas, Cover with Absorbant layer ( drawtex, Optilock)  Copa to pad as needed, Pink Coflex Unnaboot, Keep clean and Dry  Elevate feet to level or above heart 3-4 times daily and as needed to for 30 minutes to reduce swelling  Remove wraps and call office if- Wraps get wet, Cause increased pain, Slide down or you are unable to make  your next scheduled appointment  Multi Vitamin, High Protein diet as tolerated    Dressing Orders: Right and Left Ishium,  Dakins moist roll gauze tucked loosely into wound, cover with dry gauze, ABD pad and tape  Change twice daily    Treatment Orders: Avoid Pressure to wound site. Use ROHO cushion, MARYSOL bed and Heel lift boots, check air in ROHO cushion to make sure its inflated properly  Turn frequently (turn at least every two hours when in bed)  While in chair reposition every 30 minutes  Patient advised to sit up no more than 30minutes at a time for meals ONLY. Please do not elevate the head of the bed higher than 30 degrees. Off-load heels by applying heel-lift boots or \"float\" heels by placing pillows under calves so that heels do not touch  High Protein as Tolerated    [facility] follow up visit _____________2 weeks________________  (Please note your next appointment above and if you are unable to keep, kindly give a 24 hour notice.  Thank you.)    If you experience any of the following, please call the [facility] [address] during business hours:    * Increase in Pain  * Temperature over 101  * Increase in drainage from your wound  * Drainage with a foul odor  * Bleeding  * Increase in swelling  * Need for compression bandage changes due to slippage, breakthrough drainage. If you need medical attention outside of the business hours of the [address] [place] [address] Road please contact your PCP or go to the nearest emergency room.         Electronically signed by Jarod Medina MD on 4/12/2021 at 4:02 PM

## 2021-05-03 ENCOUNTER — HOSPITAL ENCOUNTER (OUTPATIENT)
Dept: WOUND CARE | Age: 61
Discharge: HOME | End: 2021-05-03
Payer: MEDICARE

## 2021-05-03 VITALS
RESPIRATION RATE: 18 BRPM | SYSTOLIC BLOOD PRESSURE: 158 MMHG | DIASTOLIC BLOOD PRESSURE: 66 MMHG | HEART RATE: 91 BPM | TEMPERATURE: 96.8 F

## 2021-05-03 DIAGNOSIS — E43 SEVERE PROTEIN-CALORIE MALNUTRITION (HCC): Chronic | ICD-10-CM

## 2021-05-03 DIAGNOSIS — L97.212 VENOUS STASIS ULCER OF RIGHT CALF WITH FAT LAYER EXPOSED WITHOUT VARICOSE VEINS (HCC): ICD-10-CM

## 2021-05-03 DIAGNOSIS — L89.894 DECUBITUS ULCER OF LEFT LEG, STAGE 4 (HCC): ICD-10-CM

## 2021-05-03 DIAGNOSIS — L89.613 PRESSURE INJURY OF RIGHT HEEL, STAGE 3 (HCC): Chronic | ICD-10-CM

## 2021-05-03 DIAGNOSIS — L89.894 DECUBITUS ULCER OF RIGHT FOOT, STAGE 4 (HCC): ICD-10-CM

## 2021-05-03 DIAGNOSIS — L89.324 DECUBITUS ULCER OF ISCHIAL AREA, LEFT, STAGE IV (HCC): Chronic | ICD-10-CM

## 2021-05-03 DIAGNOSIS — I87.2 VENOUS STASIS ULCER OF RIGHT CALF WITH FAT LAYER EXPOSED WITHOUT VARICOSE VEINS (HCC): ICD-10-CM

## 2021-05-03 DIAGNOSIS — L89.624 DECUBITUS ULCER OF LEFT HEEL, STAGE 4 (HCC): Primary | ICD-10-CM

## 2021-05-03 DIAGNOSIS — L89.314 DECUBITUS ULCER OF ISCHIUM, RIGHT, STAGE IV (HCC): ICD-10-CM

## 2021-05-03 PROCEDURE — 11046 DBRDMT MUSC&/FSCA EA ADDL: CPT | Performed by: SURGERY

## 2021-05-03 PROCEDURE — 11043 DBRDMT MUSC&/FSCA 1ST 20/<: CPT | Performed by: SURGERY

## 2021-05-03 PROCEDURE — 11045 DBRDMT SUBQ TISS EACH ADDL: CPT

## 2021-05-03 PROCEDURE — 97597 DBRDMT OPN WND 1ST 20 CM/<: CPT

## 2021-05-03 PROCEDURE — 11042 DBRDMT SUBQ TIS 1ST 20SQCM/<: CPT

## 2021-05-03 PROCEDURE — 11045 DBRDMT SUBQ TISS EACH ADDL: CPT | Performed by: SURGERY

## 2021-05-03 PROCEDURE — 11043 DBRDMT MUSC&/FSCA 1ST 20/<: CPT

## 2021-05-03 PROCEDURE — 11042 DBRDMT SUBQ TIS 1ST 20SQCM/<: CPT | Performed by: SURGERY

## 2021-05-03 PROCEDURE — 11046 DBRDMT MUSC&/FSCA EA ADDL: CPT

## 2021-05-03 PROCEDURE — 97597 DBRDMT OPN WND 1ST 20 CM/<: CPT | Performed by: SURGERY

## 2021-05-03 NOTE — PLAN OF CARE
[employer] Application   Below Knee    NAME:  Tess Johnson  YOB: 1960  MEDICAL RECORD NUMBER:  [id number]  DATE:  5/3/2021    [facility] boot: Appied primary and secondary dressing as ordered. Applied Unna roll from toes to knee overlapping each time. Applied ace wrap or coban from toes to below the knee. Instructed patient/caregiver to keep dressing dry and intact. DO NOT REMOVE DRESSING. Instructed pt/family/caregiver to report excessive draining, loose bandage, wet dressing, severe pain or tingling in toes. Applied [employer] dressing below the knee to right lower leg. Applied [employer] dressing below the knee to left lower leg. Unna Boot(s) were applied per  Guidelines.      Electronically signed by Rebecca Muñoz RN on 5/3/2021 at 3:18 PM

## 2021-05-03 NOTE — PLAN OF CARE
Problem: Wound:  Goal: Will show signs of wound healing; wound closure and no evidence of infection  Description: Will show signs of wound healing; wound closure and no evidence of infection  5/3/2021 1413 by Tom Keller RN  Outcome: Ongoing  5/3/2021 1404 by Adriel Melendez RN  Outcome: Ongoing     Problem: Pressure Ulcer:  Goal: Signs of wound healing will improve  Description: Signs of wound healing will improve  5/3/2021 1413 by Tom Keller RN  Outcome: Ongoing  5/3/2021 1404 by Adriel Melendez RN  Outcome: Ongoing     Problem: Pressure Ulcer:  Goal: Absence of new pressure ulcer  Description: Absence of new pressure ulcer  5/3/2021 1413 by Tom Keller RN  Outcome: Ongoing  5/3/2021 1404 by Adriel Melendez RN  Outcome: Ongoing     Problem: Pressure Ulcer:  Goal: Will show no infection signs and symptoms  Description: Will show no infection signs and symptoms  5/3/2021 1413 by Tom Keller RN  Outcome: Ongoing  5/3/2021 1404 by Adriel Melendez RN  Outcome: Ongoing     Problem: Falls - Risk of:  Goal: Will remain free from falls  Description: Will remain free from falls  5/3/2021 1413 by Tom Keller RN  Outcome: Ongoing  5/3/2021 1404 by Adriel Melendez RN  Outcome: Ongoing

## 2021-05-03 NOTE — PROGRESS NOTES
[address]   Progress Note and Procedure Note      [address] RECORD NUMBER:  [id number]  AGE: 63 y.o. GENDER: female  : 1960  EPISODE DATE:  5/3/2021    Subjective:     Chief Complaint   Patient presents with    Wound Check     wound         HISTORY of PRESENT ILLNESS HPI     Laura Terry is a 63 y.o. female who presents today for wound/ulcer evaluation. Wound Context: Pt with multiple pressure wounds here for eval/treat  Wound/Ulcer Pain Timing/Severity: none  Quality of pain: N/A  Severity:  0 / 10   Modifying Factors: None  Associated Signs/Symptoms: none    Ulcer Identification:  Ulcer Type: pressure  Contributing Factors: chronic pressure, decreased mobility, shear force and obesity    Wound: pressure        PAST MEDICAL HISTORY        Diagnosis Date    Blood circulation, collateral     Chronic kidney disease     bladder removed-no kidney problems    GERD (gastroesophageal reflux disease)     History of blood transfusion     History of urostomy     Neuromuscular disorder (HCC)     parapalegic t10-l1    Osteomyelitis (HCC)     Pressure ulcer     to left ischium and bilat heels       PAST SURGICAL HISTORY    Past Surgical History:   Procedure Laterality Date    BACK SURGERY  1979    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  Buttock years ago more than 20 years ago    SMALL INTESTINE SURGERY N/A 2019    DIVERTING LOOP COLOSTOMY performed by Carlton Isaacs MD at [address] HISTORY    Family History   Problem Relation Age of Onset    Other Mother          of sepsis    Cancer Father         Lung Cancer    Diabetes Paternal Grandmother     Heart Attack Paternal Grandfather        SOCIAL HISTORY    Social History     Tobacco Use    Smoking status: Never Smoker    Smokeless tobacco: Never Used   Substance Use Topics    Alcohol use: Not Currently    Drug use: Never       ALLERGIES    Allergies   Allergen Reactions    Morphine And Related Nausea Only     Caused severe altered mental status       MEDICATIONS    Current Outpatient Medications on File Prior to Encounter   Medication Sig Dispense Refill    collagenase (SANTYL) 250 UNIT/GM ointment Apply 1 applicator topically daily      ALPRAZolam (XANAX) 1 MG tablet Take 1 mg by mouth 3 times daily as needed for Anxiety.  HYDROcodone-acetaminophen (NORCO)  MG per tablet Take 1 tablet by mouth every 6 hours as needed for Pain.  sodium hypochlorite (DAKINS) 0.125 % SOLN Irrigate with as directed 2 times daily      omeprazole (PRILOSEC) 20 MG delayed release capsule Take 20 mg by mouth daily      zinc 50 MG CAPS Take 1 capsule by mouth daily      baclofen (LIORESAL) 10 MG tablet Take 10 mg by mouth 3 times daily        No current facility-administered medications on file prior to encounter. REVIEW OF SYSTEMS    A comprehensive review of systems was negative.     Objective:      BP (!) 158/66   Pulse 91   Temp 96.8 °F (36 °C) (Temporal)   Resp 18     Wt Readings from Last 3 Encounters:   04/12/21 190 lb (86.2 kg)   03/29/21 190 lb (86.2 kg)   03/08/21 190 lb (86.2 kg)       PHYSICAL EXAM    General Appearance: alert and oriented to person, place and time, well developed and well- nourished, in no acute distress  Skin: warm and dry, no rash or erythema  Head: normocephalic and atraumatic  Eyes: pupils equal, round, and reactive to light, extraocular eye movements intact, conjunctivae normal  ENT: tympanic membrane, external ear and ear canal normal bilaterally, nose without deformity, nasal mucosa and turbinates normal without polyps, lips teeth and gums normal  Neck: supple and non-tender without mass, no thyromegaly or thyroid nodules, no cervical lymphadenopathy  Pulmonary/Chest: clear to auscultation bilaterally- no wheezes, rales or rhonchi, normal air movement, no respiratory distress  Cardiovascular: normal rate, regular rhythm, normal S1 and S2, no murmurs, rubs, clicks, or gallops, distal pulses intact, no carotid bruits  Abdomen: soft, non-tender, non-distended, normal bowel sounds, no masses or organomegaly  Extremities: no cyanosis, clubbing or edema  Musculoskeletal: normal range of motion, no joint swelling, deformity or tenderness      Assessment:      Problem List Items Addressed This Visit     Decubitus ulcer of ischial area, left, stage IV (HCC) (Chronic)    * (Principal) Decubitus ulcer of ischium, right, stage IV (HCC) (Chronic)    Relevant Orders    Supply: Wound Cleanser    Pressure injury of right heel, stage 3 (HCC) (Chronic)    Severe protein-calorie malnutrition (HCC) (Chronic)    Decubitus ulcer of left heel, stage 4 (HCC) - Primary (Chronic)    Relevant Orders    Supply: Wound Cleanser    Decubitus ulcer of right foot, stage 4 (HCC) (Chronic)    Relevant Orders    Supply: Wound Cleanser    Decubitus ulcer of left leg, stage 4 (HCC) (Chronic)    Relevant Orders    Supply: Wound Cleanser    Venous stasis ulcer of right calf with fat layer exposed without varicose veins (HCC)    Relevant Orders    Supply: Wound Cleanser           Procedure Note  Indications:  Based on my examination of this patient's wound(s)/ulcer(s) today, debridement is required to promote healing and evaluate the wound base.     Performed by: Tyrese Guzman MD    Consent obtained:  Yes    Time out taken:  Yes    Pain Control: Anesthetic  Anesthetic: 2% Lidocaine Gel Topical       Debridement:Excisional Debridement    Using curette the wound(s)/ulcer(s) was/were sharply debrided down through and including the removal of Ag;Foam 04/12/21 1625   Offloading for Diabetic Foot Ulcers Other (comment) 01/18/21 1426   Wound Length (cm) 1 cm 05/03/21 1338   Wound Width (cm) 0.7 cm 05/03/21 1338   Wound Depth (cm) 0.1 cm 05/03/21 1338   Wound Surface Area (cm^2) 0.7 cm^2 05/03/21 1338   Change in Wound Size % (l*w) 98.94 05/03/21 1338   Wound Volume (cm^3) 0.07 cm^3 05/03/21 1338   Wound Healing % 100 05/03/21 1338   Post-Procedure Length (cm) 1 cm 05/03/21 1412   Post-Procedure Width (cm) 0.7 cm 05/03/21 1412   Post-Procedure Depth (cm) 0.1 cm 05/03/21 1412   Post-Procedure Surface Area (cm^2) 0.7 cm^2 05/03/21 1412   Post-Procedure Volume (cm^3) 0.07 cm^3 05/03/21 1412   Distance Tunneling (cm) 0 cm 05/03/21 1338   Tunneling Position ___ O'Clock 0 05/03/21 1338   Undermining Starts ___ O'Clock 0 05/03/21 1338   Undermining Ends___ O'Clock 0 05/03/21 1338   Undermining Maxium Distance (cm) 0 05/03/21 1338   Wound Assessment Pink/red;Slough 05/03/21 1338   Drainage Amount Moderate 05/03/21 1338   Drainage Description Serosanguinous 05/03/21 1338   Odor None 05/03/21 1338   Nellie-wound Assessment Intact 05/03/21 1338   Margins Attached edges 05/03/21 1338   Wound Thickness Description not for Pressure Injury Full thickness 11/24/20 1142   Number of days: 159       Wound 11/24/20 Ankle Distal;Right; Outer wound 2- right outer ankle stage 4 (Active)   Wound Image   05/03/21 1338   Wound Etiology Pressure Stage  4 05/03/21 1338   Dressing Status New dressing applied 04/12/21 1625   Wound Cleansed Soap and water 04/12/21 1543   Dressing/Treatment Alginate with Ag; Foam 04/12/21 1625   Offloading for Diabetic Foot Ulcers Other (comment) 01/18/21 1426   Wound Length (cm) 0 cm 05/03/21 1338   Wound Width (cm) 0 cm 05/03/21 1338   Wound Depth (cm) 0 cm 05/03/21 1338   Wound Surface Area (cm^2) 0 cm^2 05/03/21 1338   Change in Wound Size % (l*w) 100 05/03/21 1338   Wound Volume (cm^3) 0 cm^3 05/03/21 1338   Wound Healing % 100 05/03/21 1338 Post-Procedure Length (cm) 1.5 cm 04/12/21 1554   Post-Procedure Width (cm) 0.6 cm 04/12/21 1554   Post-Procedure Depth (cm) 0.2 cm 04/12/21 1554   Post-Procedure Surface Area (cm^2) 0.9 cm^2 04/12/21 1554   Post-Procedure Volume (cm^3) 0.18 cm^3 04/12/21 1554   Distance Tunneling (cm) 0 cm 05/03/21 1338   Tunneling Position ___ O'Clock 0 05/03/21 1338   Undermining Starts ___ O'Clock 0 05/03/21 1338   Undermining Ends___ O'Clock 0 05/03/21 1338   Undermining Maxium Distance (cm) 0 05/03/21 1338   Wound Assessment Epithelialization 05/03/21 1338   Drainage Amount None 05/03/21 1338   Drainage Description Serosanguinous 04/12/21 1543   Odor None 05/03/21 1338   Nellie-wound Assessment Dry/flaky; Intact 05/03/21 1338   Margins Attached edges 05/03/21 1338   Wound Thickness Description not for Pressure Injury Full thickness 11/24/20 1142   Number of days: 159       Wound 12/07/20 Left Wound 5.   Left Ischium (Active)   Wound Image   05/03/21 1338   Wound Etiology Pressure Stage  4 05/03/21 1338   Dressing Status Old drainage noted 05/03/21 1338   Wound Cleansed Soap and water 05/03/21 1338   Dressing/Treatment ABD;Gauze dressing/dressing sponge;Moist to moist;Tape/Soft cloth adhesive tape 04/12/21 1625   Offloading for Diabetic Foot Ulcers Other (comment) 01/18/21 1426   Wound Length (cm) 5.5 cm 05/03/21 1338   Wound Width (cm) 9.7 cm 05/03/21 1338   Wound Depth (cm) 5.1 cm 05/03/21 1338   Wound Surface Area (cm^2) 53.35 cm^2 05/03/21 1338   Change in Wound Size % (l*w) 46.11 05/03/21 1338   Wound Volume (cm^3) 272.08 cm^3 05/03/21 1338   Wound Healing % 79 05/03/21 1338   Post-Procedure Length (cm) 5.5 cm 05/03/21 1412   Post-Procedure Width (cm) 9.7 cm 05/03/21 1412   Post-Procedure Depth (cm) 3.2 cm 03/08/21 1535   Post-Procedure Surface Area (cm^2) 53.35 cm^2 05/03/21 1412   Post-Procedure Volume (cm^3) 144 cm^3 03/08/21 1535   Distance Tunneling (cm) 0 cm 03/29/21 1444   Tunneling Position ___ O'Clock 0 03/29/21 [facility] ___ O'Clock 3 05/03/21 1338   Undermining Ends___ O'Clock 7 05/03/21 1338   Undermining Maxium Distance (cm) 9.4 05/03/21 1338   Wound Assessment Pale granulation tissue;Pink/red;Slough 05/03/21 1338   Drainage Amount Moderate 05/03/21 1338   Drainage Description Serosanguinous 05/03/21 1338   Odor None 05/03/21 1338   Nellie-wound Assessment Excoriated 05/03/21 1338   Margins Attached edges 05/03/21 1338   Number of days: 146       Wound 12/07/20 Ischium Right Wound 6.    Right Ischium (Active)   Wound Image   05/03/21 1338   Wound Etiology Pressure Stage  4 05/03/21 1338   Dressing Status Old drainage noted 05/03/21 1338   Wound Cleansed Soap and water 05/03/21 1338   Dressing/Treatment ABD;Gauze dressing/dressing sponge;Moist to moist;Tape/Soft cloth adhesive tape 04/12/21 1625   Offloading for Diabetic Foot Ulcers Other (comment) 01/18/21 1426   Wound Length (cm) 3.5 cm 05/03/21 1338   Wound Width (cm) 9.2 cm 05/03/21 1338   Wound Depth (cm) 1.6 cm 05/03/21 1338   Wound Surface Area (cm^2) 32.2 cm^2 05/03/21 1338   Change in Wound Size % (l*w) 56.63 05/03/21 1338   Wound Volume (cm^3) 51.52 cm^3 05/03/21 1338   Wound Healing % 83 05/03/21 1338   Post-Procedure Length (cm) 3.5 cm 05/03/21 1412   Post-Procedure Width (cm) 9.2 cm 05/03/21 1412   Post-Procedure Depth (cm) 1.6 cm 05/03/21 1412   Post-Procedure Surface Area (cm^2) 32.2 cm^2 05/03/21 1412   Post-Procedure Volume (cm^3) 51.52 cm^3 05/03/21 1412   Distance Tunneling (cm) 0 cm 05/03/21 1338   Tunneling Position ___ O'Clock 0 05/03/21 1338   Undermining Starts ___ O'Clock 9 05/03/21 1338   Undermining Ends___ O'Clock 2 05/03/21 1338   Undermining Maxium Distance (cm) 3.5 05/03/21 1338   Wound Assessment Pink/red;Slough 05/03/21 1338   Drainage Amount Moderate 05/03/21 1338   Drainage Description Serosanguinous 05/03/21 1338   Odor None 05/03/21 1338   Nellie-wound Assessment Intact 05/03/21 1338   Margins Attached edges 05/03/21 1338 Number of days: 146       Wound 03/08/21 Heel Right Wound 7.  right heel (Active)   Wound Image   05/03/21 1338   Wound Etiology Pressure Stage  4 05/03/21 1338   Dressing Status Old drainage noted 05/03/21 1338   Wound Cleansed Soap and water 05/03/21 1338   Dressing/Treatment Alginate with Ag; Foam 04/12/21 1625   Wound Length (cm) 1 cm 05/03/21 1338   Wound Width (cm) 2.5 cm 05/03/21 1338   Wound Depth (cm) 0.3 cm 05/03/21 1338   Wound Surface Area (cm^2) 2.5 cm^2 05/03/21 1338   Change in Wound Size % (l*w) 85.12 05/03/21 1338   Wound Volume (cm^3) 0.75 cm^3 05/03/21 1338   Wound Healing % 55 05/03/21 1338   Post-Procedure Length (cm) 1 cm 05/03/21 1412   Post-Procedure Width (cm) 2.5 cm 05/03/21 1412   Post-Procedure Depth (cm) 0.3 cm 05/03/21 1412   Post-Procedure Surface Area (cm^2) 2.5 cm^2 05/03/21 1412   Post-Procedure Volume (cm^3) 0.75 cm^3 05/03/21 1412   Distance Tunneling (cm) 0 cm 05/03/21 1338   Tunneling Position ___ O'Clock 0 05/03/21 1338   Undermining Starts ___ O'Clock 0 05/03/21 1338   Undermining Ends___ O'Clock 0 05/03/21 1338   Undermining Maxium Distance (cm) 0 05/03/21 1338   Wound Assessment Slough 05/03/21 1338   Drainage Amount Moderate 05/03/21 1338   Drainage Description Serosanguinous 05/03/21 1338   Odor None 05/03/21 1338   Nellie-wound Assessment Dry/flaky 05/03/21 1338   Margins Defined edges 05/03/21 1338   Number of days: 55       Wound 05/03/21 Foot Left;Lateral;Plantar wound 3a.  left lateral plantar foot (wound has  from wound #3) 5/3/21 (Active)   Wound Image   05/03/21 1338   Wound Etiology Pressure Stage  4 05/03/21 1338   Wound Cleansed Soap and water 05/03/21 1338   Wound Length (cm) 3.2 cm 05/03/21 1338   Wound Width (cm) 1.5 cm 05/03/21 1338   Wound Depth (cm) 0.2 cm 05/03/21 1338   Wound Surface Area (cm^2) 4.8 cm^2 05/03/21 1338   Wound Volume (cm^3) 0.96 cm^3 05/03/21 1338   Post-Procedure Length (cm) 3.2 cm 05/03/21 1412   Post-Procedure Width (cm) 1.5 cm 05/03/21 1412   Post-Procedure Depth (cm) 0.2 cm 05/03/21 1412   Post-Procedure Surface Area (cm^2) 4.8 cm^2 05/03/21 1412   Post-Procedure Volume (cm^3) 0.96 cm^3 05/03/21 1412   Distance Tunneling (cm) 0 cm 05/03/21 1338   Tunneling Position ___ O'Clock 0 05/03/21 1338   Undermining Starts ___ O'Clock 0 05/03/21 1338   Undermining Ends___ O'Clock 0 05/03/21 1338   Undermining Maxium Distance (cm) 0 05/03/21 1338   Wound Assessment Pink/red;Slough 05/03/21 1338   Drainage Amount Moderate 05/03/21 1338   Drainage Description Serosanguinous 05/03/21 1338   Odor None 05/03/21 1338   Margins Defined edges 05/03/21 1338   Number of days: 0             Estimated Blood Loss:  MIN    Hemostasis Achieved:  by pressure    Procedural Pain:  0  / 10     Post Procedural Pain:  0 / 10     Response to treatment:  Well tolerated by patient. Plan:     Problem List Items Addressed This Visit     Decubitus ulcer of ischial area, left, stage IV (HCC) (Chronic)    * (Principal) Decubitus ulcer of ischium, right, stage IV (HCC) (Chronic)    Relevant Orders    Supply: Wound Cleanser    Pressure injury of right heel, stage 3 (HCC) (Chronic)    Severe protein-calorie malnutrition (HCC) (Chronic)    Decubitus ulcer of left heel, stage 4 (HCC) - Primary (Chronic)    Relevant Orders    Supply: Wound Cleanser    Decubitus ulcer of right foot, stage 4 (HCC) (Chronic)    Relevant Orders    Supply: Wound Cleanser    Decubitus ulcer of left leg, stage 4 (HCC) (Chronic)    Relevant Orders    Supply: Wound Cleanser    Venous stasis ulcer of right calf with fat layer exposed without varicose veins (HCC)    Relevant Orders    Supply: Wound Cleanser          cONT CURRENT CARE AND CONT PUSH NUTRITION    Treatment Note please see attached Discharge Instructions    In my professional opinion this patient would benefit from HBO Therapy: No    Written patient dismissal instructions given to patient and signed by patient or POA.          Discharge [address] and Hyperbaric Oxygen Therapy   Physician Orders and Discharge Instructions  [address] [facility]  [place], [address]  Telephone: [phone]    NAME:  Amalia Mcnamara OF BIRTH:  1960  MEDICAL RECORD NUMBER:  [id number]  DATE:  5/3/2021    Discharge condition: Stable    Discharge to: Home    Left via:Private automobile    Accompanied by: Self    HHA: [facility]- To change Coflex 3 times weekly and PRN     Dressing Orders: Right and Left Lower Ext Ulcer  Aquacel Ag to open areas, Cover with Absorbant layer ( drawtex, Optilock)  Copa to pad as needed, Pink Coflex Unnaboot, Keep clean and Dry  Elevate feet to level or above heart 3-4 times daily and as needed to for 30 minutes to reduce swelling  Remove wraps and call office if- Wraps get wet, Cause increased pain, Slide down or you are unable to make  your next scheduled appointment  Multi Vitamin, High Protein diet as tolerated     Dressing Orders: Right and Left Ishium,  Dakins moist roll gauze tucked loosely into wound, cover with dry gauze, ABD pad and tape  Change twice daily     Treatment Orders: Avoid Pressure to wound site. Use ROHO cushion, MARYSOL bed and Heel lift boots, check air in ROHO cushion to make sure its inflated properly  Turn frequently (turn at least every two hours when in bed)  While in chair reposition every 30 minutes  Patient advised to sit up no more than 30minutes at a time for meals ONLY. Please do not elevate the head of the bed higher than 30 degrees. Off-load heels by applying heel-lift boots or \"float\" heels by placing pillows under calves so that heels do not touch  High Protein as Tolerated    [facility] follow up visit ____________3 weeks_________________  (Please note your next appointment above and if you are unable to keep, kindly give a 24 hour notice.  Thank you.)    If you experience any of the following, please call the [address] during business hours:    * Increase in Pain  * Temperature over 101  * Increase in drainage from your wound  * Drainage with a foul odor  * Bleeding  * Increase in swelling  * Need for compression bandage changes due to slippage, breakthrough drainage. If you need medical attention outside of the business hours of the [address] please contact your PCP or go to the nearest emergency room.         Electronically signed by Rod Washington MD on 5/3/2021 at 2:27 PM

## 2021-05-24 ENCOUNTER — HOSPITAL ENCOUNTER (OUTPATIENT)
Dept: WOUND CARE | Age: 61
Discharge: HOME | End: 2021-05-24
Payer: MEDICARE

## 2021-05-24 VITALS
RESPIRATION RATE: 18 BRPM | HEIGHT: 68 IN | DIASTOLIC BLOOD PRESSURE: 72 MMHG | HEART RATE: 95 BPM | SYSTOLIC BLOOD PRESSURE: 143 MMHG | WEIGHT: 180 LBS | BODY MASS INDEX: 27.28 KG/M2 | TEMPERATURE: 96.7 F

## 2021-05-24 DIAGNOSIS — L89.624 DECUBITUS ULCER OF LEFT HEEL, STAGE 4 (HCC): Primary | ICD-10-CM

## 2021-05-24 DIAGNOSIS — I87.2 VENOUS STASIS ULCER OF RIGHT CALF WITH FAT LAYER EXPOSED WITHOUT VARICOSE VEINS (HCC): ICD-10-CM

## 2021-05-24 DIAGNOSIS — L89.613 PRESSURE INJURY OF RIGHT HEEL, STAGE 3 (HCC): Chronic | ICD-10-CM

## 2021-05-24 DIAGNOSIS — L89.894 DECUBITUS ULCER OF LEFT LEG, STAGE 4 (HCC): ICD-10-CM

## 2021-05-24 DIAGNOSIS — L97.212 VENOUS STASIS ULCER OF RIGHT CALF WITH FAT LAYER EXPOSED WITHOUT VARICOSE VEINS (HCC): ICD-10-CM

## 2021-05-24 DIAGNOSIS — L89.314 DECUBITUS ULCER OF ISCHIUM, RIGHT, STAGE IV (HCC): ICD-10-CM

## 2021-05-24 DIAGNOSIS — L89.324 DECUBITUS ULCER OF ISCHIAL AREA, LEFT, STAGE IV (HCC): Chronic | ICD-10-CM

## 2021-05-24 DIAGNOSIS — E43 SEVERE PROTEIN-CALORIE MALNUTRITION (HCC): Chronic | ICD-10-CM

## 2021-05-24 DIAGNOSIS — L89.894 DECUBITUS ULCER OF RIGHT FOOT, STAGE 4 (HCC): ICD-10-CM

## 2021-05-24 DIAGNOSIS — M86.49 CHRONIC OSTEOMYELITIS WITH DRAINING SINUS OF MULTIPLE SITES (HCC): Chronic | ICD-10-CM

## 2021-05-24 PROCEDURE — 11045 DBRDMT SUBQ TISS EACH ADDL: CPT | Performed by: SURGERY

## 2021-05-24 PROCEDURE — 11042 DBRDMT SUBQ TIS 1ST 20SQCM/<: CPT | Performed by: SURGERY

## 2021-05-24 PROCEDURE — 97597 DBRDMT OPN WND 1ST 20 CM/<: CPT | Performed by: SURGERY

## 2021-05-24 PROCEDURE — 11042 DBRDMT SUBQ TIS 1ST 20SQCM/<: CPT

## 2021-05-24 PROCEDURE — 11045 DBRDMT SUBQ TISS EACH ADDL: CPT

## 2021-05-24 PROCEDURE — 97597 DBRDMT OPN WND 1ST 20 CM/<: CPT

## 2021-05-24 ASSESSMENT — PAIN DESCRIPTION - ORIENTATION: ORIENTATION: RIGHT

## 2021-05-24 ASSESSMENT — PAIN DESCRIPTION - LOCATION: LOCATION: SHOULDER

## 2021-05-24 ASSESSMENT — PAIN DESCRIPTION - ONSET: ONSET: ON-GOING

## 2021-05-24 ASSESSMENT — PAIN DESCRIPTION - PAIN TYPE: TYPE: CHRONIC PAIN

## 2021-05-24 ASSESSMENT — PAIN DESCRIPTION - PROGRESSION: CLINICAL_PROGRESSION: NOT CHANGED

## 2021-05-24 NOTE — PLAN OF CARE
[place] Application   Below Knee    NAME:  Paige Lind  YOB: 1960  MEDICAL RECORD NUMBER:  [id number]  DATE:  5/24/2021    Javon Nestlico boot: Appied primary and secondary dressing as ordered. Applied Unna roll from toes to knee overlapping each time. Applied ace wrap or coban from toes to below the knee. Secured with tape and/or metal clips covered with tape. Instructed pt/family/caregiver to report excessive draining, loose bandage, wet dressing, severe pain or tingling in toes. Applied [place] dressing below the knee to right lower leg. Applied [employer] dressing below the knee to left lower leg. Unna Boot(s) were applied per  Guidelines.      Electronically signed by Diya Harrison RN on 5/24/2021 at 4:48 PM

## 2021-05-24 NOTE — PROGRESS NOTES
Mother          of sepsis    Cancer Father         Lung Cancer    Diabetes Paternal Grandmother     Heart Attack Paternal Grandfather        SOCIAL HISTORY    Social History     Tobacco Use    Smoking status: Never Smoker    Smokeless tobacco: Never Used   Vaping Use    Vaping Use: Some days   Substance Use Topics    Alcohol use: Not Currently    Drug use: Never       ALLERGIES    Allergies   Allergen Reactions    Morphine And Related Nausea Only     Caused severe altered mental status       MEDICATIONS    Current Outpatient Medications on File Prior to Encounter   Medication Sig Dispense Refill    ALPRAZolam (XANAX) 1 MG tablet Take 1 mg by mouth 3 times daily as needed for Anxiety.  HYDROcodone-acetaminophen (NORCO)  MG per tablet Take 1 tablet by mouth every 6 hours as needed for Pain.  sodium hypochlorite (DAKINS) 0.125 % SOLN Irrigate with as directed 2 times daily      omeprazole (PRILOSEC) 20 MG delayed release capsule Take 20 mg by mouth daily      zinc 50 MG CAPS Take 1 capsule by mouth daily      baclofen (LIORESAL) 10 MG tablet Take 10 mg by mouth 3 times daily       collagenase (SANTYL) 250 UNIT/GM ointment Apply 1 applicator topically daily       No current facility-administered medications on file prior to encounter. REVIEW OF SYSTEMS    A comprehensive review of systems was negative.     Objective:      BP (!) 143/72   Pulse 95   Temp 96.7 °F (35.9 °C) (Temporal)   Resp 18   Ht 5' 8\" (1.727 m)   Wt 180 lb (81.6 kg)   BMI 27.37 kg/m²     Wt Readings from Last 3 Encounters:   21 180 lb (81.6 kg)   21 190 lb (86.2 kg)   21 190 lb (86.2 kg)       PHYSICAL EXAM    General Appearance: alert and oriented to person, place and time, well developed and well- nourished, in no acute distress  Skin: warm and dry, no rash or erythema  Head: normocephalic and atraumatic  Eyes: pupils equal, round, and reactive to light, extraocular eye movements intact, conjunctivae normal  ENT: tympanic membrane, external ear and ear canal normal bilaterally, nose without deformity, nasal mucosa and turbinates normal without polyps, lips teeth and gums normal  Neck: supple and non-tender without mass, no thyromegaly or thyroid nodules, no cervical lymphadenopathy  Pulmonary/Chest: clear to auscultation bilaterally- no wheezes, rales or rhonchi, normal air movement, no respiratory distress  Cardiovascular: normal rate, regular rhythm, normal S1 and S2, no murmurs, rubs, clicks, or gallops, distal pulses intact, no carotid bruits  Abdomen: soft, non-tender, non-distended, normal bowel sounds, no masses or organomegaly  Extremities: no cyanosis, clubbing or edema      Assessment:      Problem List Items Addressed This Visit     * (Principal) Decubitus ulcer of ischial area, left, stage IV (HCC) (Chronic)    Decubitus ulcer of ischium, right, stage IV (HCC) (Chronic)    Relevant Orders    Supply: Wound Cleanser    Chronic osteomyelitis with draining sinus of multiple sites (HCC) (Chronic)    Pressure injury of right heel, stage 3 (HCC) (Chronic)    Severe protein-calorie malnutrition (HCC) (Chronic)    Decubitus ulcer of left heel, stage 4 (HCC) - Primary (Chronic)    Relevant Orders    Supply: Wound Cleanser    Decubitus ulcer of right foot, stage 4 (HCC) (Chronic)    Relevant Orders    Supply: Wound Cleanser    Decubitus ulcer of left leg, stage 4 (HCC) (Chronic)    Relevant Orders    Supply: Wound Cleanser    Venous stasis ulcer of right calf with fat layer exposed without varicose veins (HCC)    Relevant Orders    Supply: Wound Cleanser           Procedure Note  Indications:  Based on my examination of this patient's wound(s)/ulcer(s) today, debridement is required to promote healing and evaluate the wound base.     Performed by: Pankaj Arreguin MD    Consent obtained:  Yes    Time out taken:  Yes    Pain Control: Anesthetic  Anesthetic:  (pt. declined) Debridement:Excisional Debridement    Using curette the wound(s)/ulcer(s) was/were sharply debrided down through and including the removal of epidermis, dermis and subcutaneous tissue. Devitalized Tissue Debrided:  fibrin, biofilm, slough, necrotic/eschar and exudate      Pre Debridement Measurements:  Are located in the Wound/Ulcer Documentation Flow Sheet    Wound/Ulcer #: 5, 6, 7 and 3a    Percent of Wound(s)/Ulcer(s) Debrided: 100%    Total Surface Area Debrided:  171 sq cm       Diabetic/Pressure/Non Pressure Ulcers only:  Ulcer: Pressure ulcer, Stage 4      Debridement:Non-excisional Debridement    Using curette the wound(s)/ulcer(s) was/were sharply debrided down through and including the removal of epidermis and dermis.         Devitalized Tissue Debrided:  fibrin, biofilm, slough, necrotic/eschar and exudate    Pre Debridement Measurements:  Are located in the Wound/Ulcer Documentation Flow Sheet    Wound/Ulcer #: 1 and 2    Percent of Wound(s)/Ulcer(s) Debrided: 100%    Total Surface Area Debrided:  2 sq cm       Diabetic/Pressure/Non Pressure Ulcers only:  Ulcer: Pressure ulcer, Stage 4                Post Debridement Measurements:    Wound/Ulcer Descriptions are Pre Debridement --EXCEPT MEASUREMENTS    Wound 11/24/20 Leg Right;Plantar wound 1-right plantar foot stage 4 (Active)   Wound Image   05/24/21 1439   Wound Etiology Pressure Stage  4 05/24/21 1439   Dressing Status New dressing applied 05/03/21 1505   Wound Cleansed Soap and water 05/24/21 1439   Dressing/Treatment Alginate with Ag 05/03/21 1505   Offloading for Diabetic Foot Ulcers Other (comment) 01/18/21 1426   Wound Length (cm) 0.8 cm 05/24/21 1439   Wound Width (cm) 2.3 cm 05/24/21 1439   Wound Depth (cm) 0.2 cm 05/24/21 1439   Wound Surface Area (cm^2) 1.84 cm^2 05/24/21 1439   Change in Wound Size % (l*w) 97.21 05/24/21 1439   Wound Volume (cm^3) 0.37 cm^3 05/24/21 1439   Wound Healing % 99 05/24/21 1439   Post-Procedure Length (cm) 0.8 cm 05/24/21 1509   Post-Procedure Width (cm) 2.3 cm 05/24/21 1509   Post-Procedure Depth (cm) 0.2 cm 05/24/21 1509   Post-Procedure Surface Area (cm^2) 1.84 cm^2 05/24/21 1509   Post-Procedure Volume (cm^3) 0.37 cm^3 05/24/21 1509   Distance Tunneling (cm) 0 cm 05/24/21 1439   Tunneling Position ___ O'Clock 0 05/24/21 1439   Undermining Starts ___ O'Clock 0 05/24/21 1439   Undermining Ends___ O'Clock 0 05/24/21 1439   Undermining Maxium Distance (cm) 0 05/24/21 1439   Wound Assessment Pink/red;Slough 05/24/21 1439   Drainage Amount Moderate 05/24/21 1439   Drainage Description Serosanguinous 05/24/21 1439   Odor None 05/24/21 1439   Nellie-wound Assessment Blanchable erythema 05/24/21 1439   Margins Attached edges 05/24/21 1439   Wound Thickness Description not for Pressure Injury Full thickness 11/24/20 1142   Number of days: 181       Wound 11/24/20 Ankle Distal;Right; Outer wound 2- right outer ankle stage 4 (Active)   Wound Image   05/24/21 1439   Wound Etiology Pressure Stage  4 05/24/21 1439   Dressing Status New dressing applied 05/03/21 1505   Wound Cleansed Soap and water 05/24/21 1439   Dressing/Treatment Alginate with Ag; Foam 04/12/21 1625   Offloading for Diabetic Foot Ulcers Other (comment) 01/18/21 1426   Wound Length (cm) 0.5 cm 05/24/21 1439   Wound Width (cm) 0.4 cm 05/24/21 1439   Wound Depth (cm) 0.1 cm 05/24/21 1439   Wound Surface Area (cm^2) 0.2 cm^2 05/24/21 1439   Change in Wound Size % (l*w) 97.04 05/24/21 1439   Wound Volume (cm^3) 0.02 cm^3 05/24/21 1439   Wound Healing % 97 05/24/21 1439   Post-Procedure Length (cm) 0.5 cm 05/24/21 1509   Post-Procedure Width (cm) 0.4 cm 05/24/21 1509   Post-Procedure Depth (cm) 0.1 cm 05/24/21 1509   Post-Procedure Surface Area (cm^2) 0.2 cm^2 05/24/21 1509   Post-Procedure Volume (cm^3) 0.02 cm^3 05/24/21 1509   Distance Tunneling (cm) 0 cm 05/24/21 1439   Tunneling Position ___ O'Clock 0 05/24/21 1439   Undermining Starts ___ O'Clock 0 05/24/21 1439   Undermining Ends___ O'Clock 0 05/24/21 1439   Undermining Maxium Distance (cm) 0 05/24/21 1439   Wound Assessment Pink/red;Slough 05/24/21 1439   Drainage Amount Small 05/24/21 1439   Drainage Description Serous 05/24/21 1439   Odor None 05/24/21 1439   Nellie-wound Assessment Maceration 05/24/21 1439   Margins Attached edges 05/24/21 1439   Wound Thickness Description not for Pressure Injury Full thickness 11/24/20 1142   Number of days: 181       Wound 12/07/20 Left Wound 5.   Left Ischium (Active)   Wound Image   05/24/21 1439   Wound Etiology Pressure Stage  4 05/24/21 1439   Dressing Status New dressing applied 05/03/21 1505   Wound Cleansed Soap and water 05/24/21 1439   Dressing/Treatment Moist to moist;Moisten with saline;ABD;Gauze dressing/dressing sponge;Tape/Soft cloth adhesive tape 05/03/21 1505   Offloading for Diabetic Foot Ulcers Other (comment) 01/18/21 1426   Wound Length (cm) 7.5 cm 05/24/21 1439   Wound Width (cm) 7 cm 05/24/21 1439   Wound Depth (cm) 6.4 cm 05/24/21 1439   Wound Surface Area (cm^2) 52.5 cm^2 05/24/21 1439   Change in Wound Size % (l*w) 46.97 05/24/21 1439   Wound Volume (cm^3) 336 cm^3 05/24/21 1439   Wound Healing % 74 05/24/21 1439   Post-Procedure Length (cm) 7.5 cm 05/24/21 1509   Post-Procedure Width (cm) 7 cm 05/24/21 1509   Post-Procedure Depth (cm) 6.4 cm 05/24/21 1509   Post-Procedure Surface Area (cm^2) 52.5 cm^2 05/24/21 1509   Post-Procedure Volume (cm^3) 336 cm^3 05/24/21 1509   Distance Tunneling (cm) 0 cm 05/24/21 1439   Tunneling Position ___ O'Clock 0 05/24/21 1439   Undermining Starts ___ O'Clock 6 05/24/21 1439   Undermining Ends___ O'Clock 12 05/24/21 1439   Undermining Maxium Distance (cm) 10 05/24/21 1439   Wound Assessment Pale granulation tissue;Pink/red;Slough 05/24/21 1439   Drainage Amount Moderate 05/24/21 1439   Drainage Description Serosanguinous 05/24/21 1439   Odor None 05/24/21 1439   Nellie-wound Assessment Denuded 05/24/21 1439   Margins Attached edges; Unattached edges 05/24/21 1439   Number of days: 167       Wound 12/07/20 Ischium Right Wound 6.    Right Ischium (Active)   Wound Image   05/24/21 1439   Wound Etiology Pressure Stage  4 05/24/21 1439   Dressing Status New dressing applied 05/03/21 1505   Wound Cleansed Soap and water 05/24/21 1439   Dressing/Treatment Moist to moist;Moisten with saline;Gauze dressing/dressing sponge;ABD;Tape/Soft cloth adhesive tape 05/03/21 1505   Offloading for Diabetic Foot Ulcers Other (comment) 01/18/21 1426   Wound Length (cm) 5 cm 05/24/21 1439   Wound Width (cm) 10 cm 05/24/21 1439   Wound Depth (cm) 1 cm 05/24/21 1439   Wound Surface Area (cm^2) 50 cm^2 05/24/21 1439   Change in Wound Size % (l*w) 32.66 05/24/21 1439   Wound Volume (cm^3) 50 cm^3 05/24/21 1439   Wound Healing % 83 05/24/21 1439   Post-Procedure Length (cm) 5 cm 05/24/21 1509   Post-Procedure Width (cm) 10 cm 05/24/21 1509   Post-Procedure Depth (cm) 1 cm 05/24/21 1509   Post-Procedure Surface Area (cm^2) 50 cm^2 05/24/21 1509   Post-Procedure Volume (cm^3) 50 cm^3 05/24/21 1509   Distance Tunneling (cm) 0 cm 05/24/21 1439   Tunneling Position ___ O'Clock 0 05/24/21 1439   Undermining Starts ___ O'Clock 12 05/24/21 1439   Undermining Ends___ O'Clock 2 05/24/21 1439   Undermining Maxium Distance (cm) 4.1 05/24/21 1439   Wound Assessment Pink/red;Slough 05/24/21 1439   Drainage Amount Moderate 05/24/21 1439   Drainage Description Serosanguinous 05/24/21 1439   Odor None 05/24/21 1439   Nellie-wound Assessment Denuded 05/24/21 1439   Margins Attached edges 05/24/21 1439   Number of days: 167       Wound 03/08/21 Heel Right Wound 7.  right heel (Active)   Wound Image   05/24/21 1439   Wound Etiology Pressure Stage  4 05/24/21 1439   Dressing Status New dressing applied 05/03/21 1505   Wound Cleansed Soap and water 05/24/21 1439   Dressing/Treatment Alginate with Ag 05/03/21 1505   Wound Length (cm) 1 cm 05/24/21 1439   Wound Width (cm) 3.1 cm 05/24/21 1439   Wound Depth (cm) 0.4 cm 05/24/21 1439   Wound Surface Area (cm^2) 3.1 cm^2 05/24/21 1439   Change in Wound Size % (l*w) 81.55 05/24/21 1439   Wound Volume (cm^3) 1.24 cm^3 05/24/21 1439   Wound Healing % 26 05/24/21 1439   Post-Procedure Length (cm) 1 cm 05/24/21 1509   Post-Procedure Width (cm) 3.1 cm 05/24/21 1509   Post-Procedure Depth (cm) 0.4 cm 05/24/21 1509   Post-Procedure Surface Area (cm^2) 3.1 cm^2 05/24/21 1509   Post-Procedure Volume (cm^3) 1.24 cm^3 05/24/21 1509   Distance Tunneling (cm) 0 cm 05/24/21 1439   Tunneling Position ___ O'Clock 0 05/24/21 1439   Undermining Starts ___ O'Clock 0 05/24/21 1439   Undermining Ends___ O'Clock 0 05/24/21 1439   Undermining Maxium Distance (cm) 0 05/24/21 1439   Wound Assessment Pink/red;Slough 05/24/21 1439   Drainage Amount Moderate 05/24/21 1439   Drainage Description Serosanguinous 05/24/21 1439   Odor None 05/24/21 1439   Nellie-wound Assessment Dry/flaky 05/24/21 1439   Margins Defined edges 05/24/21 1439   Number of days: 76       Wound 05/03/21 Foot Left;Lateral;Plantar wound 3a.  left lateral plantar foot (wound has  from wound #3) 5/3/21 (Active)   Wound Image   05/24/21 1439   Wound Etiology Pressure Stage  4 05/24/21 1439   Dressing Status New dressing applied 05/03/21 1505   Wound Cleansed Soap and water 05/24/21 1439   Dressing/Treatment Alginate with Ag 05/03/21 1505   Wound Length (cm) 14 cm 05/24/21 1439   Wound Width (cm) 4.7 cm 05/24/21 1439   Wound Depth (cm) 0.6 cm 05/24/21 1439   Wound Surface Area (cm^2) 65.8 cm^2 05/24/21 1439   Change in Wound Size % (l*w) -1270.83 05/24/21 1439   Wound Volume (cm^3) 39.48 cm^3 05/24/21 1439   Wound Healing % -4012 05/24/21 1439   Post-Procedure Length (cm) 14 cm 05/24/21 1509   Post-Procedure Width (cm) 4.7 cm 05/24/21 1509   Post-Procedure Depth (cm) 0.6 cm 05/24/21 1509   Post-Procedure Surface Area (cm^2) 65.8 cm^2 05/24/21 1509   Post-Procedure Volume (cm^3) 39.48 cm^3 05/24/21 patient and signed by patient or POA. Discharge [address] and Hyperbaric Oxygen Therapy   Physician Orders and Discharge Instructions  [id number]  Telephone: [phone]    NAME:  Ruben Arriaza OF BIRTH:  1960  MEDICAL RECORD NUMBER:  [id number]  DATE:  5/24/2021    Discharge condition: Stable    Discharge to: Home    Left via:Private automobile    Accompanied by: Self     HHA: [facility]- To change Coflex 2 times weekly and PRN     Dressing Orders: Right and Left Lower Ext Ulcer  Aquacel Ag to open areas, Cover with Absorbant layer ( drawtex, Optilock)  Copa to pad as needed, Pink Coflex Unnaboot, Keep clean and Dry  Elevate feet to level or above heart 3-4 times daily and as needed to for 30 minutes to reduce swelling  Remove wraps and call office if- Wraps get wet, Cause increased pain, Slide down or you are unable to make  your next scheduled appointment  Multi Vitamin, High Protein diet as tolerated     Dressing Orders: Right and Left Ishium,  Dakins moist roll gauze tucked loosely into wound, cover with dry gauze, ABD pad and tape  Change twice daily     Treatment Orders: Avoid Pressure to wound site. Use ROHO cushion, MARYSOL bed and Heel lift boots, check air in ROHO cushion to make sure its inflated properly  Turn frequently (turn at least every two hours when in bed)  While in chair reposition every 30 minutes  Patient advised to sit up no more than 30minutes at a time for meals ONLY. Please do not elevate the head of the bed higher than 30 degrees. Off-load heels by applying heel-lift boots or \"float\" heels by placing pillows under calves so that heels do not touch  High Protein as Tolerated    [facility] follow up visit _____________2 weeks________________  (Please note your next appointment above and if you are unable to keep, kindly give a 24 hour notice.  Thank you.)    If you experience any of the following, please call the [facility] during business hours:    * Increase in Pain  * Temperature over 101  * Increase in drainage from your wound  * Drainage with a foul odor  * Bleeding  * Increase in swelling  * Need for compression bandage changes due to slippage, breakthrough drainage. If you need medical attention outside of the business hours of the [facility] please contact your PCP or go to the nearest emergency room.         Electronically signed by Lary Rodriguez MD on 5/24/2021 at 3:12 PM

## 2021-05-28 LAB
ANAEROBIC CULTURE: ABNORMAL
GRAM STAIN RESULT: ABNORMAL
ORGANISM: ABNORMAL
WOUND/ABSCESS: ABNORMAL

## 2021-06-07 ENCOUNTER — HOSPITAL ENCOUNTER (OUTPATIENT)
Dept: WOUND CARE | Age: 61
Discharge: HOME | End: 2021-06-07
Payer: MEDICARE

## 2021-06-07 VITALS
DIASTOLIC BLOOD PRESSURE: 66 MMHG | WEIGHT: 180 LBS | HEIGHT: 68 IN | RESPIRATION RATE: 18 BRPM | TEMPERATURE: 97.1 F | BODY MASS INDEX: 27.28 KG/M2 | SYSTOLIC BLOOD PRESSURE: 151 MMHG

## 2021-06-07 DIAGNOSIS — L89.314 DECUBITUS ULCER OF ISCHIUM, RIGHT, STAGE IV (HCC): Chronic | ICD-10-CM

## 2021-06-07 DIAGNOSIS — L89.894 DECUBITUS ULCER OF LEFT LEG, STAGE 4 (HCC): Chronic | ICD-10-CM

## 2021-06-07 DIAGNOSIS — L97.212 VENOUS STASIS ULCER OF RIGHT CALF WITH FAT LAYER EXPOSED WITHOUT VARICOSE VEINS (HCC): ICD-10-CM

## 2021-06-07 DIAGNOSIS — L89.324 DECUBITUS ULCER OF ISCHIAL AREA, LEFT, STAGE IV (HCC): Primary | Chronic | ICD-10-CM

## 2021-06-07 DIAGNOSIS — I87.2 VENOUS STASIS ULCER OF RIGHT CALF WITH FAT LAYER EXPOSED WITHOUT VARICOSE VEINS (HCC): ICD-10-CM

## 2021-06-07 DIAGNOSIS — E43 SEVERE PROTEIN-CALORIE MALNUTRITION (HCC): Chronic | ICD-10-CM

## 2021-06-07 DIAGNOSIS — L89.613 PRESSURE INJURY OF RIGHT HEEL, STAGE 3 (HCC): Chronic | ICD-10-CM

## 2021-06-07 DIAGNOSIS — L89.894 DECUBITUS ULCER OF RIGHT FOOT, STAGE 4 (HCC): Chronic | ICD-10-CM

## 2021-06-07 DIAGNOSIS — L89.624 DECUBITUS ULCER OF LEFT HEEL, STAGE 4 (HCC): Chronic | ICD-10-CM

## 2021-06-07 DIAGNOSIS — M86.49 CHRONIC OSTEOMYELITIS WITH DRAINING SINUS OF MULTIPLE SITES (HCC): Chronic | ICD-10-CM

## 2021-06-07 PROCEDURE — 99214 OFFICE O/P EST MOD 30 MIN: CPT

## 2021-06-07 PROCEDURE — 99212 OFFICE O/P EST SF 10 MIN: CPT | Performed by: SURGERY

## 2021-06-07 ASSESSMENT — PAIN DESCRIPTION - PAIN TYPE: TYPE: CHRONIC PAIN

## 2021-06-07 ASSESSMENT — PAIN DESCRIPTION - PROGRESSION: CLINICAL_PROGRESSION: NOT CHANGED

## 2021-06-07 ASSESSMENT — PAIN SCALES - GENERAL: PAINLEVEL_OUTOF10: 0

## 2021-06-07 ASSESSMENT — PAIN DESCRIPTION - FREQUENCY: FREQUENCY: INTERMITTENT

## 2021-06-07 ASSESSMENT — PAIN DESCRIPTION - ONSET: ONSET: ON-GOING

## 2021-06-07 ASSESSMENT — PAIN DESCRIPTION - ORIENTATION: ORIENTATION: RIGHT

## 2021-06-07 ASSESSMENT — PAIN DESCRIPTION - LOCATION: LOCATION: SHOULDER

## 2021-06-07 ASSESSMENT — PAIN DESCRIPTION - DESCRIPTORS: DESCRIPTORS: ACHING;BURNING

## 2021-06-07 NOTE — PROGRESS NOTES
[address]   Progress Note and Procedure Note      [address] RECORD NUMBER:  [id number]  AGE: 63 y.o. GENDER: female  : 1960  EPISODE DATE:  2021    Subjective:     Chief Complaint   Patient presents with    Wound Check     Pt reports unnaboot soaked through with blood when awakened this am and patient removed it , she states L. wound is bleeding more         HISTORY of PRESENT ILLNESS HPI     Vinicio Rivas is a 63 y.o. female who presents today for wound/ulcer evaluation. History of Wound Context: Pt with significant wounds to B ischial and BLE wounds here for eval/treat  Wound/Ulcer Pain Timing/Severity: none  Quality of pain: N/A  Severity:  0 / 10   Modifying Factors: None  Associated Signs/Symptoms: none    Ulcer Identification:  Ulcer Type: pressure  Contributing Factors: chronic pressure    Wound: pressure and immobility        PAST MEDICAL HISTORY        Diagnosis Date    Blood circulation, collateral     Chronic kidney disease     bladder removed-no kidney problems    GERD (gastroesophageal reflux disease)     History of blood transfusion     History of urostomy     Neuromuscular disorder (HCC)     parapalegic t10-l1    Osteomyelitis (HCC)     Pressure ulcer     to left ischium and bilat heels       PAST SURGICAL HISTORY    Past Surgical History:   Procedure Laterality Date    BACK SURGERY  1979    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  Buttock years ago more than 20 years ago    SMALL INTESTINE SURGERY N/A 2019    DIVERTING LOOP COLOSTOMY performed by Cheryl Morton MD at [address] [place] [address] FAMILY HISTORY    Family History   Problem Relation Age of Onset    Other Mother          of sepsis    Cancer Father         Lung Cancer    Diabetes Paternal Grandmother     Heart Attack Paternal Grandfather        SOCIAL HISTORY    Social History     Tobacco Use    Smoking status: Never Smoker    Smokeless tobacco: Never Used   Vaping Use    Vaping Use: Never used   Substance Use Topics    Alcohol use: Not Currently    Drug use: Never       ALLERGIES    Allergies   Allergen Reactions    Morphine And Related Nausea Only     Caused severe altered mental status       MEDICATIONS    Current Outpatient Medications on File Prior to Encounter   Medication Sig Dispense Refill    ALPRAZolam (XANAX) 1 MG tablet Take 1 mg by mouth 3 times daily as needed for Anxiety.  HYDROcodone-acetaminophen (NORCO)  MG per tablet Take 1 tablet by mouth every 6 hours as needed for Pain.  omeprazole (PRILOSEC) 20 MG delayed release capsule Take 20 mg by mouth daily      zinc 50 MG CAPS Take 1 capsule by mouth daily      baclofen (LIORESAL) 10 MG tablet Take 10 mg by mouth 3 times daily       collagenase (SANTYL) 250 UNIT/GM ointment Apply 1 applicator topically daily      sodium hypochlorite (DAKINS) 0.125 % SOLN Irrigate with as directed 2 times daily       No current facility-administered medications on file prior to encounter. REVIEW OF SYSTEMS    A comprehensive review of systems was negative.     Objective:      BP (!) 151/66   Temp 97.1 °F (36.2 °C) (Oral)   Resp 18   Ht 5' 8\" (1.727 m)   Wt 180 lb (81.6 kg)   BMI 27.37 kg/m²     Wt Readings from Last 3 Encounters:   21 180 lb (81.6 kg)   21 180 lb (81.6 kg)   21 190 lb (86.2 kg)       PHYSICAL EXAM    General Appearance: alert and oriented to person, place and time, well developed and well- nourished, in no acute distress  Skin: warm and dry, no rash or erythema  Head: normocephalic and atraumatic  Eyes: pupils equal, round, and reactive to light, extraocular eye movements intact, conjunctivae normal  ENT: tympanic membrane, external ear and ear canal normal bilaterally, nose without deformity, nasal mucosa and turbinates normal without polyps, lips teeth and gums normal  Neck: supple and non-tender without mass, no thyromegaly or thyroid nodules, no cervical lymphadenopathy  Pulmonary/Chest: clear to auscultation bilaterally- no wheezes, rales or rhonchi, normal air movement, no respiratory distress  Cardiovascular: normal rate, regular rhythm, normal S1 and S2, no murmurs, rubs, clicks, or gallops, distal pulses intact, no carotid bruits  Abdomen: soft, non-tender, non-distended, normal bowel sounds, no masses or organomegaly  Extremities: no cyanosis, clubbing or edema  Musculoskeletal: normal range of motion, no joint swelling, deformity or tenderness      Assessment:      Patient Active Problem List   Diagnosis Code    Paraplegia at T9 level ([facility]) G82.20    Pressure injury of right ischium, stage 4 ([facility]) L89.314    Decubitus ulcer of ischial area, left, stage IV ([facility]) L89.324    Decubitus ulcer of ischium, right, stage IV ([facility]) L89.314    [facility]omyel[facility] with draining sinus of multiple sites [facility] M86.49    Pressure injury of right heel, stage 3 ([facility]) L89.613    Gastrointestinal hemorrhage K92.2    Pressure injury of skin of left heel L89.629    Acute encephalopathy G93.40    Anemia due to blood loss, acute D62    Hypocalcemia E83.51    Severe protein-calorie malnutrition ([facility]) E43    GI bleed K92.2    Anemia D64.9    Decubitus ulcer of left heel, stage 4 (HCC) L89.624    Decubitus ulcer of right foot, stage 4 (HCC) L89.894    Decubitus ulcer of left leg, stage 4 ([facility]) L89.894    Venous stasis ulcer of right calf with fat layer exposed without varicose veins ([facility]) I87.2, L97.212             Wound 11/24/20 Leg Right;Plantar wound 1-right plantar foot stage 4 (Active)   Wound Image   06/07/21 1511   Wound Etiology Pressure Stage  4 06/07/21 1511   Dressing Status Dry 06/07/21 1511   Wound Cleansed Soap and water 06/07/21 1511   Dressing/Treatment Alginate with Ag 05/24/21 1620   Offloading for Diabetic Foot Ulcers Other (comment) 01/18/21 1426   Wound Length (cm) 0 cm 06/07/21 1511   Wound Width (cm) 0 cm 06/07/21 1511   Wound Depth (cm) 0 cm 06/07/21 1511   Wound Surface Area (cm^2) 0 cm^2 06/07/21 1511   Change in Wound Size % (l*w) 100 06/07/21 1511   Wound Volume (cm^3) 0 cm^3 06/07/21 1511   Wound Healing % 100 06/07/21 1511   Post-Procedure Length (cm) 0.8 cm 05/24/21 1509   Post-Procedure Width (cm) 2.3 cm 05/24/21 1509   Post-Procedure Depth (cm) 0.2 cm 05/24/21 1509   Post-Procedure Surface Area (cm^2) 1.84 cm^2 05/24/21 1509   Post-Procedure Volume (cm^3) 0.37 cm^3 05/24/21 1509   Distance Tunneling (cm) 0 cm 06/07/21 1511   Tunneling Position ___ O'Clock 0 06/07/21 1511   Undermining Starts ___ O'Clock 0 06/07/21 1511   Undermining Ends___ O'Clock 0 06/07/21 1511   Undermining Maxium Distance (cm) 0 06/07/21 1511   Wound Assessment Epithelialization 06/07/21 1511   Drainage Amount None 06/07/21 1511   Drainage Description Serosanguinous 05/24/21 1439   Odor None 06/07/21 1511   Nellie-wound Assessment Intact 06/07/21 1511   Margins Attached edges 05/24/21 1439   Wound Thickness Description not for Pressure Injury Full thickness 11/24/20 1142   Number of days: 195       Wound 11/24/20 Ankle Distal;Right; Outer wound 2- right outer ankle stage 4 (Active)   Wound Image   06/07/21 1511   Wound Etiology Pressure Stage  4 06/07/21 1511   Dressing Status Dry 06/07/21 1511   Wound Cleansed Soap and water 05/24/21 1439   Dressing/Treatment Alginate with Ag; Foam 04/12/21 1625   Offloading for Diabetic Foot Ulcers Other (comment) 01/18/21 1426   Wound Length (cm) 0 cm 06/07/21 1511   Wound Width (cm) 0 cm 06/07/21 1511   Wound Depth (cm) 0 cm 06/07/21 1511   Wound Surface Area (cm^2) 0 cm^2 06/07/21 1511 Volume (cm^3) 336 cm^3 05/24/21 1509   Distance Tunneling (cm) 0 cm 06/07/21 1511   Tunneling Position ___ O'Clock 0 06/07/21 1511   Undermining Starts ___ O'Clock 0 06/07/21 1511   Undermining Ends___ O'Clock 0 06/07/21 1511   Undermining Maxium Distance (cm) 0 06/07/21 1511   Wound Assessment Pink/red;Slough 06/07/21 1511   Drainage Amount Large 06/07/21 1511   Drainage Description Serosanguinous 06/07/21 1511   Odor None 06/07/21 1511   Nellie-wound Assessment Blanchable erythema 06/07/21 1511   Margins Attached edges 06/07/21 1511   Number of days: 181       Wound 12/07/20 Ischium Right Wound 6. Right Ischium Stage 4 (Active)   Wound Image   06/07/21 1511   Wound Etiology Pressure Stage  4 06/07/21 1511   Dressing Status Old drainage noted 06/07/21 1511   Wound Cleansed Soap and water 05/24/21 1439   Dressing/Treatment Moist to moist;Moisten with saline; Roll gauze;ABD;Tape/Soft cloth adhesive tape 05/24/21 1620   Offloading for Diabetic Foot Ulcers Other (comment) 01/18/21 1426   Wound Length (cm) 8.8 cm 06/07/21 1511   Wound Width (cm) 2.8 cm 06/07/21 1511   Wound Depth (cm) 4.2 cm 06/07/21 1511   Wound Surface Area (cm^2) 24.64 cm^2 06/07/21 1511   Change in Wound Size % (l*w) 66.81 06/07/21 1511   Wound Volume (cm^3) 103.49 cm^3 06/07/21 1511   Wound Healing % 65 06/07/21 1511   Post-Procedure Length (cm) 5 cm 05/24/21 1509   Post-Procedure Width (cm) 10 cm 05/24/21 1509   Post-Procedure Depth (cm) 1 cm 05/24/21 1509   Post-Procedure Surface Area (cm^2) 50 cm^2 05/24/21 1509   Post-Procedure Volume (cm^3) 50 cm^3 05/24/21 1509   Distance Tunneling (cm) 0 cm 06/07/21 1511   Tunneling Position ___ O'Clock 0 06/07/21 1511   Undermining Starts ___ O'Clock 0 06/07/21 1511   Undermining Ends___ O'Clock 0 06/07/21 1511   Undermining Maxium Distance (cm) 0 06/07/21 1511   Wound Assessment Pink/red;Slough 06/07/21 1511   Drainage Amount Moderate 06/07/21 1511   Drainage Description Serosanguinous 06/07/21 1511   Odor None 06/07/21 1511   Nellie-wound Assessment Blanchable erythema 06/07/21 1511   Margins Attached edges 06/07/21 1511   Number of days: 181       Wound 03/08/21 Heel Right Wound 7.  right heel (Active)   Wound Image    06/07/21 1511   Wound Etiology Pressure Stage  4 06/07/21 1511   Dressing Status Old drainage noted 06/07/21 1511   Wound Cleansed Soap and water 06/07/21 1511   Dressing/Treatment Alginate with Ag 05/24/21 1620   Wound Length (cm) 1.5 cm 06/07/21 1511   Wound Width (cm) 1.8 cm 06/07/21 1511   Wound Depth (cm) 0.4 cm 06/07/21 1511   Wound Surface Area (cm^2) 2.7 cm^2 06/07/21 1511   Change in Wound Size % (l*w) 83.93 06/07/21 1511   Wound Volume (cm^3) 1.08 cm^3 06/07/21 1511   Wound Healing % 36 06/07/21 1511   Post-Procedure Length (cm) 1 cm 05/24/21 1509   Post-Procedure Width (cm) 3.1 cm 05/24/21 1509   Post-Procedure Depth (cm) 0.4 cm 05/24/21 1509   Post-Procedure Surface Area (cm^2) 3.1 cm^2 05/24/21 1509   Post-Procedure Volume (cm^3) 1.24 cm^3 05/24/21 1509   Distance Tunneling (cm) 0 cm 06/07/21 1511   Tunneling Position ___ O'Clock 0 06/07/21 1511   Undermining Starts ___ O'Clock 0 06/07/21 1511   Undermining Ends___ O'Clock 0 06/07/21 1511   Undermining Maxium Distance (cm) 0 06/07/21 1511   Wound Assessment Pink/red;Slough 06/07/21 1511   Drainage Amount Moderate 06/07/21 1511   Drainage Description Serosanguinous 06/07/21 1511   Odor None 06/07/21 1511   Nellie-wound Assessment Blanchable erythema 06/07/21 1511   Margins Attached edges 06/07/21 1511   Number of days: 91       Wound 05/03/21 Foot Left;Lateral;Plantar wound 3a.  left lateral plantar foot (wound has  from wound #3) 5/3/21Pressure Stage 4 (Active)   Wound Image    06/07/21 1511   Wound Etiology Pressure Stage  4 06/07/21 1511   Dressing Status Old drainage noted 06/07/21 1511   Wound Cleansed Soap and water 06/07/21 1511   Dressing/Treatment Alginate with Ag 05/24/21 1620   Wound Length (cm) 12 cm 06/07/21 1511   Wound Width (cm) 4.5 cm 06/07/21 1511   Wound Depth (cm) 0.7 cm 06/07/21 1511   Wound Surface Area (cm^2) 54 cm^2 06/07/21 1511   Change in Wound Size % (l*w) -1025 06/07/21 1511   Wound Volume (cm^3) 37.8 cm^3 06/07/21 1511   Wound Healing % -3838 06/07/21 1511   Post-Procedure Length (cm) 14 cm 05/24/21 1509   Post-Procedure Width (cm) 4.7 cm 05/24/21 1509   Post-Procedure Depth (cm) 0.6 cm 05/24/21 1509   Post-Procedure Surface Area (cm^2) 65.8 cm^2 05/24/21 1509   Post-Procedure Volume (cm^3) 39.48 cm^3 05/24/21 1509   Distance Tunneling (cm) 0 cm 06/07/21 1511   Tunneling Position ___ O'Clock 0 06/07/21 1511   Undermining Starts ___ O'Clock 0 06/07/21 1511   Undermining Ends___ O'Clock 0 06/07/21 1511   Undermining Maxium Distance (cm) 0 06/07/21 1511   Wound Assessment Bleeding;Pink/red;Slough 06/07/21 1511   Drainage Amount Large 06/07/21 1511   Drainage Description Serosanguinous 06/07/21 1511   Odor None 06/07/21 1511   Nellie-wound Assessment Excoriated 06/07/21 1511   Margins Attached edges 06/07/21 1511   Number of days: 35       Wound 06/07/21 Ankle Left; Anterior Wound 8 Venous, L.  Anterior Ankle (Active)   Wound Image    06/07/21 1511   Wound Etiology Venous 06/07/21 1511   Dressing Status Old drainage noted 06/07/21 1511   Wound Cleansed Soap and water 06/07/21 1511   Wound Length (cm) 5 cm 06/07/21 1511   Wound Width (cm) 8.5 cm 06/07/21 1511   Wound Depth (cm) 0.1 cm 06/07/21 1511   Wound Surface Area (cm^2) 42.5 cm^2 06/07/21 1511   Wound Volume (cm^3) 4.25 cm^3 06/07/21 1511   Distance Tunneling (cm) 0 cm 06/07/21 1511   Tunneling Position ___ O'Clock 0 06/07/21 1511   Undermining Starts ___ O'Clock 0 06/07/21 1511   Undermining Ends___ O'Clock 0 06/07/21 1511   Undermining Maxium Distance (cm) 0 06/07/21 1511   Wound Assessment Pink/red;Slough 06/07/21 1511   Drainage Amount Moderate 06/07/21 1511   Drainage Description Serosanguinous 06/07/21 1511   Odor None 06/07/21 1511   Nellie-wound Assessment Excoriated 06/07/21 1511   Margins Attached edges 06/07/21 1511   Wound Thickness Description not for Pressure Injury Full thickness 06/07/21 1511   Number of days: 0             Plan:     Problem List Items Addressed This Visit     Decubitus ulcer of ischial area, left, stage IV (HCC) - Primary (Chronic)    * (Principal) Decubitus ulcer of ischium, right, stage IV (HCC) (Chronic)    Relevant Medications    Lidocaine 2 % GEL    Other Relevant Orders    Supply: Wound Cleanser    Chronic osteomyelitis with draining sinus of multiple sites (HCC) (Chronic)    Pressure injury of right heel, stage 3 (HCC) (Chronic)    Severe protein-calorie malnutrition (HCC) (Chronic)    Decubitus ulcer of left heel, stage 4 (HCC) (Chronic)    Relevant Medications    Lidocaine 2 % GEL    Other Relevant Orders    Supply: Wound Cleanser    Decubitus ulcer of right foot, stage 4 (HCC) (Chronic)    Relevant Medications    Lidocaine 2 % GEL    Other Relevant Orders    Supply: Wound Cleanser    Decubitus ulcer of left leg, stage 4 (HCC) (Chronic)    Relevant Medications    Lidocaine 2 % GEL    Other Relevant Orders    Supply: Wound Cleanser    Venous stasis ulcer of right calf with fat layer exposed without varicose veins (HCC)    Relevant Medications    Lidocaine 2 % GEL    Other Relevant Orders    Supply: Wound Cleanser        Pt wounds are a complicated issue and she will probably not heal her wounds but she is remaining positive in her approach. Her culture returned some minor bacterial growths. I believe these can be controlled with Dakin's . We will try this one week but if they deteriorate she will need IV ABX      Treatment Note please see attached Discharge Instructions    In my professional opinion this patient would benefit from HBO Therapy: No    Written patient dismissal instructions given to patient and signed by patient or POA.          Discharge Instructions         [address] [facility]   Physician Orders and Discharge Instructions  [address] [place] [address]  Telephone: [phone]    NAME:  Jared Mena OF BIRTH:  1960  MEDICAL RECORD NUMBER:  [id number]  DATE:  6/7/2021    Discharge condition: Stable    Discharge to: Home    Left via:Private automobile    Accompanied by: Self     HHA: [facility]     Dressing Orders: Right and Left Lower Ext and Feet Wounds  Dakins moist gauze, Secure with dry gauze, roll gauze and medipore tape  Change twice daily    Dressing Orders: Right and Left Ishium,  Dakins moist roll gauze tucked loosely into wound, cover with dry gauze, ABD pad and tape  Change twice daily    Treatment Orders: Avoid Pressure to wound site. Use ROHO cushion, MARYSOL bed and Heel lift boots, check air in ROHO cushion to make sure its inflated properly  Turn frequently (turn at least every two hours when in bed)  While in chair reposition every 30 minutes  Patient advised to sit up no more than 30minutes at a time for meals ONLY. Please do not elevate the head of the bed higher than 30 degrees. Off-load heels by applying heel-lift boots or \"float\" heels by placing pillows under calves so that heels do not touch  High Protein as Tolerated    [facility] follow up visit ____________1 week_________________  (Please note your next appointment above and if you are unable to keep, kindly give a 24 hour notice. Thank you.)    If you experience any of the following, please call the [facility] during business hours:    * Increase in Pain  * Temperature over 101  * Increase in drainage from your wound  * Drainage with a foul odor  * Bleeding  * Increase in swelling  * Need for compression bandage changes due to slippage, breakthrough drainage. If you need medical attention outside of the business hours of the [facility] please contact your PCP or go to the nearest emergency room.         Electronically signed by Clint Pastor MD on 6/7/2021 at 4:52 PM

## 2021-06-14 ENCOUNTER — HOSPITAL ENCOUNTER (OUTPATIENT)
Dept: WOUND CARE | Age: 61
Discharge: HOME | End: 2021-06-14
Payer: MEDICARE

## 2021-06-14 VITALS
BODY MASS INDEX: 27.28 KG/M2 | HEIGHT: 68 IN | TEMPERATURE: 97.8 F | DIASTOLIC BLOOD PRESSURE: 66 MMHG | RESPIRATION RATE: 18 BRPM | WEIGHT: 180 LBS | SYSTOLIC BLOOD PRESSURE: 138 MMHG | HEART RATE: 75 BPM

## 2021-06-14 DIAGNOSIS — E43 SEVERE PROTEIN-CALORIE MALNUTRITION (HCC): Chronic | ICD-10-CM

## 2021-06-14 DIAGNOSIS — L89.324 DECUBITUS ULCER OF ISCHIAL AREA, LEFT, STAGE IV (HCC): Primary | Chronic | ICD-10-CM

## 2021-06-14 DIAGNOSIS — L89.894 DECUBITUS ULCER OF LEFT LEG, STAGE 4 (HCC): ICD-10-CM

## 2021-06-14 DIAGNOSIS — L89.613 PRESSURE INJURY OF RIGHT HEEL, STAGE 3 (HCC): Chronic | ICD-10-CM

## 2021-06-14 DIAGNOSIS — L97.212 VENOUS STASIS ULCER OF RIGHT CALF WITH FAT LAYER EXPOSED WITHOUT VARICOSE VEINS (HCC): ICD-10-CM

## 2021-06-14 DIAGNOSIS — L89.314 DECUBITUS ULCER OF ISCHIUM, RIGHT, STAGE IV (HCC): ICD-10-CM

## 2021-06-14 DIAGNOSIS — I87.2 VENOUS STASIS ULCER OF RIGHT CALF WITH FAT LAYER EXPOSED WITHOUT VARICOSE VEINS (HCC): ICD-10-CM

## 2021-06-14 DIAGNOSIS — L89.624 DECUBITUS ULCER OF LEFT HEEL, STAGE 4 (HCC): ICD-10-CM

## 2021-06-14 DIAGNOSIS — L89.894 DECUBITUS ULCER OF RIGHT FOOT, STAGE 4 (HCC): ICD-10-CM

## 2021-06-14 DIAGNOSIS — M86.49 CHRONIC OSTEOMYELITIS WITH DRAINING SINUS OF MULTIPLE SITES (HCC): Chronic | ICD-10-CM

## 2021-06-14 PROCEDURE — 97597 DBRDMT OPN WND 1ST 20 CM/<: CPT

## 2021-06-14 PROCEDURE — 11045 DBRDMT SUBQ TISS EACH ADDL: CPT | Performed by: SURGERY

## 2021-06-14 PROCEDURE — 97597 DBRDMT OPN WND 1ST 20 CM/<: CPT | Performed by: SURGERY

## 2021-06-14 PROCEDURE — 11042 DBRDMT SUBQ TIS 1ST 20SQCM/<: CPT

## 2021-06-14 PROCEDURE — 11045 DBRDMT SUBQ TISS EACH ADDL: CPT

## 2021-06-14 PROCEDURE — 11042 DBRDMT SUBQ TIS 1ST 20SQCM/<: CPT | Performed by: SURGERY

## 2021-06-14 ASSESSMENT — PAIN SCALES - GENERAL: PAINLEVEL_OUTOF10: 0

## 2021-06-14 NOTE — PROGRESS NOTES
[address]   Progress Note and Procedure Note      [address] RECORD NUMBER:  [id number]  AGE: 63 y.o. GENDER: female  : 1960  EPISODE DATE:  2021    Subjective:     Chief Complaint   Patient presents with    Wound Check     Foot and Buttocks wounds; Patient denies increased pain at wound site         HISTORY of PRESENT ILLNESS HPI     Brigitte Zhong is a 63 y.o. female who presents today for wound/ulcer evaluation. Wound Context: Pt with R&L ischium and L & R heel wounds here for eval/treat  Wound/Ulcer Pain Timing/Severity: none  Quality of pain: N/A  Severity:  0 / 10   Modifying Factors: None  Associated Signs/Symptoms: none    Ulcer Identification:  Ulcer Type: pressure  Contributing Factors: chronic pressure, decreased mobility and shear force    Wound: pressure        PAST MEDICAL HISTORY        Diagnosis Date    Blood circulation, collateral     Chronic kidney disease     bladder removed-no kidney problems    GERD (gastroesophageal reflux disease)     History of blood transfusion     History of urostomy     Neuromuscular disorder (HCC)     parapalegic t10-l1    Osteomyelitis (HCC)     Pressure ulcer     to left ischium and bilat heels       PAST SURGICAL HISTORY    Past Surgical History:   Procedure Laterality Date    BACK SURGERY  1979    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 Yonathan Willoughby removed part of bone    OTHER SURGICAL HISTORY Left     Skin Flap to L.  Buttock years ago more than 20 years ago    SMALL INTESTINE SURGERY N/A 2019    DIVERTING LOOP COLOSTOMY performed by Arianne Calhoun MD at [address] HISTORY    Family History   Problem Relation Age of Onset    Other Mother          of sepsis    Cancer Father         Lung Cancer    Diabetes Paternal Grandmother     Heart Attack Paternal Grandfather        SOCIAL HISTORY    Social History     Tobacco Use    Smoking status: Never Smoker    Smokeless tobacco: Never Used   Vaping Use    Vaping Use: Never used   Substance Use Topics    Alcohol use: Not Currently    Drug use: Never       ALLERGIES    Allergies   Allergen Reactions    Morphine And Related Nausea Only     Caused severe altered mental status       MEDICATIONS    Current Outpatient Medications on File Prior to Encounter   Medication Sig Dispense Refill    collagenase (SANTYL) 250 UNIT/GM ointment Apply 1 applicator topically daily      ALPRAZolam (XANAX) 1 MG tablet Take 1 mg by mouth 3 times daily as needed for Anxiety.  HYDROcodone-acetaminophen (NORCO)  MG per tablet Take 1 tablet by mouth every 6 hours as needed for Pain.  sodium hypochlorite (DAKINS) 0.125 % SOLN Irrigate with as directed 2 times daily      omeprazole (PRILOSEC) 20 MG delayed release capsule Take 20 mg by mouth daily      zinc 50 MG CAPS Take 1 capsule by mouth daily      baclofen (LIORESAL) 10 MG tablet Take 10 mg by mouth 3 times daily        No current facility-administered medications on file prior to encounter. REVIEW OF SYSTEMS    A comprehensive review of systems was negative.     Objective:      /66   Pulse 75   Temp 97.8 °F (36.6 °C) (Temporal)   Resp 18   Ht 5' 8\" (1.727 m)   Wt 180 lb (81.6 kg)   BMI 27.37 kg/m²     Wt Readings from Last 3 Encounters:   21 180 lb (81.6 kg)   21 180 lb (81.6 kg)   21 180 lb (81.6 kg)       PHYSICAL EXAM    General Appearance: alert and oriented to person, place and time, well developed and well- nourished, in no acute distress  Skin: warm and dry, no rash or erythema  Head: normocephalic and atraumatic  Eyes: pupils equal, round, and reactive to light, extraocular eye movements intact, conjunctivae normal  ENT: tympanic membrane, external ear and ear canal normal bilaterally, nose without deformity, nasal mucosa and turbinates normal without polyps, lips teeth and gums normal  Neck: supple and non-tender without mass, no thyromegaly or thyroid nodules, no cervical lymphadenopathy  Pulmonary/Chest: clear to auscultation bilaterally- no wheezes, rales or rhonchi, normal air movement, no respiratory distress  Cardiovascular: normal rate, regular rhythm, normal S1 and S2, no murmurs, rubs, clicks, or gallops, distal pulses intact, no carotid bruits  Abdomen: soft, non-tender, non-distended, normal bowel sounds, no masses or organomegaly  Extremities: no cyanosis, clubbing or edema  Musculoskeletal: normal range of motion, no joint swelling, deformity or tenderness      Assessment:      Problem List Items Addressed This Visit     * (Principal) Decubitus ulcer of ischial area, left, stage IV (HCC) - Primary (Chronic)    Decubitus ulcer of ischium, right, stage IV (HCC) (Chronic)    Chronic osteomyelitis with draining sinus of multiple sites (HCC) (Chronic)    Pressure injury of right heel, stage 3 (HCC) (Chronic)    Severe protein-calorie malnutrition (HCC) (Chronic)    Decubitus ulcer of left heel, stage 4 (HCC) (Chronic)    Decubitus ulcer of right foot, stage 4 (HCC) (Chronic)    Decubitus ulcer of left leg, stage 4 (HCC) (Chronic)    Venous stasis ulcer of right calf with fat layer exposed without varicose veins (HCC)           Procedure Note  Indications:  Based on my examination of this patient's wound(s)/ulcer(s) today, debridement is required to promote healing and evaluate the wound base.     Performed by: Sonia Bhatti MD    Consent obtained:  Yes    Time out taken:  Yes    Pain Control:         Debridement:Excisional Debridement    Using curette the wound(s)/ulcer(s) was/were sharply debrided down through and including the removal of epidermis, dermis and subcutaneous tissue. Devitalized Tissue Debrided:  fibrin, biofilm, slough, necrotic/eschar and exudate      Pre Debridement Measurements:  Are located in the Wound/Ulcer Documentation Flow Sheet    Wound/Ulcer #: 5, 6 and 3A    Percent of Wound(s)/Ulcer(s) Debrided: 100%    Total Surface Area Debrided:  108 sq cm       Diabetic/Pressure/Non Pressure Ulcers only:  Ulcer: Pressure ulcer, Stage 4      Debridement:Non-excisional Debridement    Using curette the wound(s)/ulcer(s) was/were sharply debrided down through and including the removal of epidermis and dermis. Devitalized Tissue Debrided:  fibrin, biofilm, slough, necrotic/eschar and exudate    Pre Debridement Measurements:  Are located in the Wound/Ulcer Documentation Flow Sheet    Wound/Ulcer #: 7    Percent of Wound(s)/Ulcer(s) Debrided: 100%    Total Surface Area Debrided:  1.5 sq cm       Diabetic/Pressure/Non Pressure Ulcers only:  Ulcer: Pressure ulcer, Stage 3                Post Debridement Measurements:    Wound/Ulcer Descriptions are Pre Debridement --EXCEPT MEASUREMENTS    Wound 12/07/20 Left Wound 5. Left Ischium Pressure Stage 4 (Active)   Wound Image   06/14/21 1356   Wound Etiology Pressure Stage  4 06/14/21 1356   Dressing Status Old drainage noted 06/14/21 1356   Wound Cleansed Soap and water 06/14/21 1356   Dressing/Treatment Moist to moist;Moisten with saline; Roll gauze;ABD;Tape/Soft cloth adhesive tape 05/24/21 1620   Offloading for Diabetic Foot Ulcers Other (comment) 01/18/21 1426   Wound Length (cm) 10 cm 06/14/21 1356   Wound Width (cm) 4 cm 06/14/21 1356   Wound Depth (cm) 9 cm 06/14/21 1356   Wound Surface Area (cm^2) 40 cm^2 06/14/21 1356   Change in Wound Size % (l*w) 59.6 06/14/21 1356   Wound Volume (cm^3) 360 cm^3 06/14/21 1356   Wound Healing % 72 06/14/21 1356   Post-Procedure Length (cm) 10 cm 06/14/21 1435   Post-Procedure Width (cm) 4 cm 06/14/21 1435   Post-Procedure Depth (cm) 9 cm 06/14/21 1435   Post-Procedure Surface Drainage Description Serosanguinous 06/14/21 1356   Odor None 06/14/21 1356   Nellie-wound Assessment Blanchable erythema 06/14/21 1356   Margins Attached edges 06/14/21 1356   Number of days: 188       Wound 03/08/21 Heel Right Wound 7.  right heel (Active)   Wound Image   06/14/21 1356   Wound Etiology Pressure Stage  4 06/14/21 1356   Dressing Status Old drainage noted 06/14/21 1356   Wound Cleansed Soap and water 06/14/21 1356   Dressing/Treatment Alginate with Ag 05/24/21 1620   Wound Length (cm) 1 cm 06/14/21 1356   Wound Width (cm) 1.5 cm 06/14/21 1356   Wound Depth (cm) 0.1 cm 06/14/21 1356   Wound Surface Area (cm^2) 1.5 cm^2 06/14/21 1356   Change in Wound Size % (l*w) 91.07 06/14/21 1356   Wound Volume (cm^3) 0.15 cm^3 06/14/21 1356   Wound Healing % 91 06/14/21 1356   Post-Procedure Length (cm) 1 cm 06/14/21 1435   Post-Procedure Width (cm) 1.5 cm 06/14/21 1435   Post-Procedure Depth (cm) 0.1 cm 06/14/21 1435   Post-Procedure Surface Area (cm^2) 1.5 cm^2 06/14/21 1435   Post-Procedure Volume (cm^3) 0.15 cm^3 06/14/21 1435   Distance Tunneling (cm) 0 cm 06/07/21 1511   Tunneling Position ___ O'Clock 0 06/07/21 1511   Undermining Starts ___ O'Clock 0 06/07/21 1511   Undermining Ends___ O'Clock 0 06/07/21 1511   Undermining Maxium Distance (cm) 0 06/07/21 1511   Wound Assessment Pink/red;Slough 06/14/21 1356   Drainage Amount Small 06/14/21 1356   Drainage Description Serosanguinous 06/14/21 1356   Odor None 06/14/21 1356   Nellie-wound Assessment Intact 06/14/21 1356   Margins Attached edges 06/14/21 1356   Wound Thickness Description not for Pressure Injury Full thickness 06/14/21 1356   Number of days: 97       Wound 05/03/21 Foot Left;Lateral;Plantar wound 3a. left lateral plantar foot (wound has  from wound #3) 5/3/21Pressure Stage 4 (Active)   Wound Image   06/14/21 1356   Wound Etiology Pressure Stage  4 06/14/21 1356   Dressing Status Old drainage noted; Intact 06/14/21 1356   Wound Cleansed Soap and water 06/14/21 1356   Dressing/Treatment Alginate with Ag 05/24/21 1620   Wound Length (cm) 12 cm 06/14/21 1356   Wound Width (cm) 4 cm 06/14/21 1356   Wound Depth (cm) 0.6 cm 06/14/21 1356   Wound Surface Area (cm^2) 48 cm^2 06/14/21 1356   Change in Wound Size % (l*w) -900 06/14/21 1356   Wound Volume (cm^3) 28.8 cm^3 06/14/21 1356   Wound Healing % -2900 06/14/21 1356   Post-Procedure Length (cm) 12 cm 06/14/21 1435   Post-Procedure Width (cm) 4 cm 06/14/21 1435   Post-Procedure Depth (cm) 0.6 cm 06/14/21 1435   Post-Procedure Surface Area (cm^2) 48 cm^2 06/14/21 1435   Post-Procedure Volume (cm^3) 28.8 cm^3 06/14/21 1435   Distance Tunneling (cm) 0 cm 06/07/21 1511   Tunneling Position ___ O'Clock 0 06/07/21 1511   Undermining Starts ___ O'Clock 0 06/07/21 1511   Undermining Ends___ O'Clock 0 06/07/21 1511   Undermining Maxium Distance (cm) 0 06/07/21 1511   Wound Assessment Pink/red;Slough 06/14/21 1356   Drainage Amount Large 06/14/21 1356   Drainage Description Serosanguinous 06/14/21 1356   Odor None 06/14/21 1356   Nellie-wound Assessment Intact 06/14/21 1356   Margins Attached edges 06/14/21 1356   Wound Thickness Description not for Pressure Injury Full thickness 06/14/21 1356   Number of days: 42             Estimated Blood Loss:  Minimal    Hemostasis Achieved:  by pressure    Procedural Pain:  0  / 10     Post Procedural Pain:  0 / 10     Response to treatment:  Well tolerated by patient.          Plan:     Problem List Items Addressed This Visit     * (Principal) Decubitus ulcer of ischial area, left, stage IV (HCC) - Primary (Chronic)    Decubitus ulcer of ischium, right, stage IV (HCC) (Chronic)    Chronic osteomyelitis with draining sinus of multiple sites (HCC) (Chronic)    Pressure injury of right heel, stage 3 (HCC) (Chronic)    Severe protein-calorie malnutrition (HCC) (Chronic)    Decubitus ulcer of left heel, stage 4 (HCC) (Chronic)    [facility] (Chronic)    Decubitus ulcer of left leg, stage 4 (HCC) (Chronic)    Venous stasis ulcer of right calf with fat layer exposed without varicose veins (HCC)          Wounds stable some improved. RTO 2 weeks. [place] call if needed sooner    Treatment Note please see attached Discharge Instructions    In my professional opinion this patient would benefit from HBO Therapy: No    Written patient dismissal instructions given to patient and signed by patient or POA. Discharge [address] and Hyperbaric Oxygen Therapy   Physician Orders and Discharge Instructions  [address]  Telephone: [phone]    NAME:  Leela Berumen OF BIRTH:  1960  MEDICAL RECORD NUMBER:  [id number]  DATE:  6/14/2021    Discharge condition: Stable    Discharge to: Home    Left via:Private automobile    Accompanied by: Self    HHA: [facility]     Dressing Orders: Right and Left Lower Ext and Feet Wounds  Dakins moist gauze, Secure with dry gauze, roll gauze and medipore tape  Change twice daily     Dressing Orders: Right and Left Ishium,  Dakins moist roll gauze tucked loosely into wound, cover with dry gauze, ABD pad and tape  Change twice daily     Treatment Orders: Avoid Pressure to wound site. Use ROHO cushion, MARYSOL bed and Heel lift boots, check air in ROHO cushion to make sure its inflated properly  Turn frequently (turn at least every two hours when in bed)  While in chair reposition every 30 minutes  Patient advised to sit up no more than 30minutes at a time for meals ONLY. Please do not elevate the head of the bed higher than 30 degrees.   Off-load heels by applying heel-lift boots or \"float\" heels by placing pillows under calves so that heels do not touch  High Protein as Tolerated    [facility] follow up visit ______________2 weeks_______________  (Please note your next appointment above and if you are unable to keep, kindly give a 24 hour notice. Thank you.)    If you experience any of the following, please call the [address] during business hours:    * Increase in Pain  * Temperature over 101  * Increase in drainage from your wound  * Drainage with a foul odor  * Bleeding  * Increase in swelling  * Need for compression bandage changes due to slippage, breakthrough drainage. If you need medical attention outside of the business hours of the [address] please contact your PCP or go to the nearest emergency room.         Electronically signed by Logan Ochoa MD on 6/14/2021 at 2:46 PM

## 2021-06-15 RX ORDER — LIDOCAINE HYDROCHLORIDE 20 MG/ML
JELLY TOPICAL PRN
Status: CANCELLED
Start: 2021-06-15

## 2021-07-06 ENCOUNTER — HOSPITAL ENCOUNTER (OUTPATIENT)
Dept: WOUND CARE | Age: 61
Discharge: HOME | End: 2021-07-06
Payer: MEDICARE

## 2021-07-06 VITALS
WEIGHT: 180 LBS | TEMPERATURE: 98.8 F | BODY MASS INDEX: 27.28 KG/M2 | HEIGHT: 68 IN | RESPIRATION RATE: 16 BRPM | HEART RATE: 81 BPM | DIASTOLIC BLOOD PRESSURE: 68 MMHG | SYSTOLIC BLOOD PRESSURE: 133 MMHG

## 2021-07-06 DIAGNOSIS — I87.2 VENOUS STASIS ULCER OF RIGHT CALF WITH FAT LAYER EXPOSED WITHOUT VARICOSE VEINS (HCC): ICD-10-CM

## 2021-07-06 DIAGNOSIS — L89.613 PRESSURE INJURY OF RIGHT HEEL, STAGE 3 (HCC): Chronic | ICD-10-CM

## 2021-07-06 DIAGNOSIS — L89.314 DECUBITUS ULCER OF ISCHIUM, RIGHT, STAGE IV (HCC): ICD-10-CM

## 2021-07-06 DIAGNOSIS — M86.49 CHRONIC OSTEOMYELITIS WITH DRAINING SINUS OF MULTIPLE SITES (HCC): Chronic | ICD-10-CM

## 2021-07-06 DIAGNOSIS — L89.894 DECUBITUS ULCER OF RIGHT FOOT, STAGE 4 (HCC): ICD-10-CM

## 2021-07-06 DIAGNOSIS — L97.212 VENOUS STASIS ULCER OF RIGHT CALF WITH FAT LAYER EXPOSED WITHOUT VARICOSE VEINS (HCC): ICD-10-CM

## 2021-07-06 DIAGNOSIS — L89.324 DECUBITUS ULCER OF ISCHIAL AREA, LEFT, STAGE IV (HCC): Primary | Chronic | ICD-10-CM

## 2021-07-06 DIAGNOSIS — E43 SEVERE PROTEIN-CALORIE MALNUTRITION (HCC): Chronic | ICD-10-CM

## 2021-07-06 DIAGNOSIS — L89.624 DECUBITUS ULCER OF LEFT HEEL, STAGE 4 (HCC): ICD-10-CM

## 2021-07-06 DIAGNOSIS — L89.894 DECUBITUS ULCER OF LEFT LEG, STAGE 4 (HCC): ICD-10-CM

## 2021-07-06 PROCEDURE — 11043 DBRDMT MUSC&/FSCA 1ST 20/<: CPT | Performed by: SURGERY

## 2021-07-06 PROCEDURE — 11046 DBRDMT MUSC&/FSCA EA ADDL: CPT

## 2021-07-06 PROCEDURE — 97597 DBRDMT OPN WND 1ST 20 CM/<: CPT | Performed by: SURGERY

## 2021-07-06 PROCEDURE — 97597 DBRDMT OPN WND 1ST 20 CM/<: CPT

## 2021-07-06 PROCEDURE — 97598 DBRDMT OPN WND ADDL 20CM/<: CPT | Performed by: SURGERY

## 2021-07-06 PROCEDURE — 97598 DBRDMT OPN WND ADDL 20CM/<: CPT

## 2021-07-06 PROCEDURE — 11046 DBRDMT MUSC&/FSCA EA ADDL: CPT | Performed by: SURGERY

## 2021-07-06 PROCEDURE — 11043 DBRDMT MUSC&/FSCA 1ST 20/<: CPT

## 2021-07-06 ASSESSMENT — PAIN SCALES - GENERAL: PAINLEVEL_OUTOF10: 0

## 2021-07-06 NOTE — PROGRESS NOTES
[address]   Progress Note and Procedure Note      [address] RECORD NUMBER:  [id number]  AGE: 63 y.o. GENDER: female  : 1960  EPISODE DATE:  2021    Subjective:     No chief complaint on file. HISTORY of PRESENT ILLNESS HPI     Senait Dye is a 63 y.o. female who presents today for wound/ulcer evaluation. Wound Context: Pt with pressure wounds here for eval/treat  Wound/Ulcer Pain Timing/Severity: none  Quality of pain: N/A  Severity:  0 / 10   Modifying Factors: None  Associated Signs/Symptoms: none    Ulcer Identification:  Ulcer Type: pressure  Contributing Factors: chronic pressure, decreased mobility and shear force    Wound: pressure        PAST MEDICAL HISTORY        Diagnosis Date    Blood circulation, collateral     Chronic kidney disease     bladder removed-no kidney problems    GERD (gastroesophageal reflux disease)     History of blood transfusion     History of urostomy     Neuromuscular disorder (HCC)     parapalegic t10-l1    Osteomyelitis (HCC)     Pressure ulcer     to left ischium and bilat heels       PAST SURGICAL HISTORY    Past Surgical History:   Procedure Laterality Date    BACK SURGERY  1979    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  Buttock years ago more than 20 years ago    SMALL INTESTINE SURGERY N/A 2019    DIVERTING LOOP COLOSTOMY performed by Hi Gamboa MD at [address] [facility] HISTORY    Family History   Problem Relation Age of Onset    Other Mother          of sepsis    Cancer Father         Lung Cancer    Diabetes Paternal Grandmother  Heart Attack Paternal Grandfather        SOCIAL HISTORY    Social History     Tobacco Use    Smoking status: Never Smoker    Smokeless tobacco: Never Used   Vaping Use    Vaping Use: Never used   Substance Use Topics    Alcohol use: Not Currently    Drug use: Never       ALLERGIES    Allergies   Allergen Reactions    Morphine And Related Nausea Only     Caused severe altered mental status       MEDICATIONS    Current Outpatient Medications on File Prior to Encounter   Medication Sig Dispense Refill    collagenase (SANTYL) 250 UNIT/GM ointment Apply 1 applicator topically daily      ALPRAZolam (XANAX) 1 MG tablet Take 1 mg by mouth 3 times daily as needed for Anxiety.  HYDROcodone-acetaminophen (NORCO)  MG per tablet Take 1 tablet by mouth every 6 hours as needed for Pain.  sodium hypochlorite (DAKINS) 0.125 % SOLN Irrigate with as directed 2 times daily      omeprazole (PRILOSEC) 20 MG delayed release capsule Take 20 mg by mouth daily      zinc 50 MG CAPS Take 1 capsule by mouth daily      baclofen (LIORESAL) 10 MG tablet Take 10 mg by mouth 3 times daily        No current facility-administered medications on file prior to encounter. REVIEW OF SYSTEMS    A comprehensive review of systems was negative.     Objective:      /68   Pulse 81   Temp 98.8 °F (37.1 °C) (Temporal)   Resp 16   Ht 5' 8\" (1.727 m)   Wt 180 lb (81.6 kg)   BMI 27.37 kg/m²     Wt Readings from Last 3 Encounters:   07/06/21 180 lb (81.6 kg)   06/14/21 180 lb (81.6 kg)   06/07/21 180 lb (81.6 kg)       PHYSICAL EXAM    General Appearance: alert and oriented to person, place and time, well developed and well- nourished, in no acute distress  Skin: warm and dry, no rash or erythema  Head: normocephalic and atraumatic  Eyes: pupils equal, round, and reactive to light, extraocular eye movements intact, conjunctivae normal  ENT: tympanic membrane, external ear and ear canal normal bilaterally, nose without deformity, nasal mucosa and turbinates normal without polyps, lips teeth and gums normal  Neck: supple and non-tender without mass, no thyromegaly or thyroid nodules, no cervical lymphadenopathy  Pulmonary/Chest: clear to auscultation bilaterally- no wheezes, rales or rhonchi, normal air movement, no respiratory distress  Cardiovascular: normal rate, regular rhythm, normal S1 and S2, no murmurs, rubs, clicks, or gallops, distal pulses intact, no carotid bruits  Abdomen: soft, non-tender, non-distended, normal bowel sounds, no masses or organomegaly  Extremities: no cyanosis, clubbing or edema  Musculoskeletal: normal range of motion, no joint swelling, deformity or tenderness  Neurologic: reflexes normal and symmetric, no cranial nerve deficit, gait, coordination and speech normal, skin sensation normal      Assessment:      Problem List Items Addressed This Visit     * (Principal) Decubitus ulcer of ischial area, left, stage IV (HCC) - Primary (Chronic)    Decubitus ulcer of ischium, right, stage IV (HCC) (Chronic)    Chronic osteomyelitis with draining sinus of multiple sites (HCC) (Chronic)    Pressure injury of right heel, stage 3 (HCC) (Chronic)    Severe protein-calorie malnutrition (HCC) (Chronic)    Decubitus ulcer of left heel, stage 4 (HCC) (Chronic)    Decubitus ulcer of right foot, stage 4 (HCC) (Chronic)    Decubitus ulcer of left leg, stage 4 (HCC)    Venous stasis ulcer of right calf with fat layer exposed without varicose veins (HCC)           Procedure Note  Indications:  Based on my examination of this patient's wound(s)/ulcer(s) today, debridement is required to promote healing and evaluate the wound base.     Performed by: Melchor West MD    Consent obtained:  Yes    Time out taken:  Yes    Pain Control:         Debridement:Excisional Debridement    Using curette the wound(s)/ulcer(s) was/were sharply debrided down through and including the removal of epidermis, dermis, subcutaneous tissue and muscle/fascia. Devitalized Tissue Debrided:  fibrin, biofilm, slough, necrotic/eschar and exudate      Pre Debridement Measurements:  Are located in the Wound/Ulcer Documentation Flow Sheet    Wound/Ulcer #: 5 and 6    Percent of Wound(s)/Ulcer(s) Debrided: 100%    Total Surface Area Debrided:  86 sq cm       Diabetic/Pressure/Non Pressure Ulcers only:  Ulcer: Pressure ulcer, Stage 4      Debridement:Non-excisional Debridement    Using curette the wound(s)/ulcer(s) was/were sharply debrided down through and including the removal of epidermis and dermis. Devitalized Tissue Debrided:  fibrin, biofilm, slough, necrotic/eschar, exudate and callus    Pre Debridement Measurements:  Are located in the Wound/Ulcer Documentation Flow Sheet    Wound/Ulcer #: 3a,7    Percent of Wound(s)/Ulcer(s) Debrided: 100%    Total Surface Area Debrided:  63 sq cm       Diabetic/Pressure/Non Pressure Ulcers only:  Ulcer: Pressure ulcer, Stage 3                Post Debridement Measurements:    Wound/Ulcer Descriptions are Pre Debridement --EXCEPT MEASUREMENTS    Wound 12/07/20 Left Wound 5.   Left Ischium Pressure Stage 4 (Active)   Wound Image   07/06/21 1420   Wound Etiology Pressure Stage  4 07/06/21 1420   Dressing Status New dressing applied 06/14/21 1500   Wound Cleansed Soap and water 07/06/21 1420   Dressing/Treatment Moisten with saline;Moist to dry;ABD;Tape/Soft cloth adhesive tape 07/06/21 1530   Offloading for Diabetic Foot Ulcers Other (comment) 01/18/21 1426   Wound Length (cm) 12.5 cm 07/06/21 1420   Wound Width (cm) 4.5 cm 07/06/21 1420   Wound Depth (cm) 9.5 cm 07/06/21 1420   Wound Surface Area (cm^2) 56.25 cm^2 07/06/21 1420   Change in Wound Size % (l*w) 43.18 07/06/21 1420   Wound Volume (cm^3) 534.375 cm^3 07/06/21 1420   Wound Healing % 58 07/06/21 1420   Post-Procedure Length (cm) 12.5 cm 07/06/21 1449   Post-Procedure Width (cm) 4.5 cm 07/06/21 1449   Post-Procedure Depth (cm) 9.5 cm 07/06/21 1447 Post-Procedure Surface Area (cm^2) 56.25 cm^2 07/06/21 1449   Post-Procedure Volume (cm^3) 534.375 cm^3 07/06/21 1449   Distance Tunneling (cm) 9.5 cm 07/06/21 1420   Tunneling Position ___ O'Clock 5 07/06/21 1420   Undermining Starts ___ O'Clock 0 06/07/21 1511   Undermining Ends___ O'Clock 0 06/07/21 1511   Undermining Maxium Distance (cm) 0 06/07/21 1511   Wound Assessment Pink/red;Slough 07/06/21 1420   Drainage Amount Large 07/06/21 1420   Drainage Description Serosanguinous 07/06/21 1420   Odor None 07/06/21 1420   Nellie-wound Assessment Blanchable erythema 07/06/21 1420   Margins Attached edges 07/06/21 1420   Number of days: 210       Wound 12/07/20 Ischium Right Wound 6.    Right Ischium Stage 4 (Active)   Wound Image   07/06/21 1420   Wound Etiology Pressure Stage  4 07/06/21 1420   Dressing Status New dressing applied 06/14/21 1500   Wound Cleansed Soap and water 07/06/21 1420   Dressing/Treatment Moisten with saline;Moist to dry;ABD;Tape/Soft cloth adhesive tape 07/06/21 1530   Offloading for Diabetic Foot Ulcers Other (comment) 01/18/21 1426   Wound Length (cm) 9 cm 07/06/21 1420   Wound Width (cm) 3.2 cm 07/06/21 1420   Wound Depth (cm) 2.1 cm 07/06/21 1420   Wound Surface Area (cm^2) 28.8 cm^2 07/06/21 1420   Change in Wound Size % (l*w) 61.21 07/06/21 1420   Wound Volume (cm^3) 60.48 cm^3 07/06/21 1420   Wound Healing % 80 07/06/21 1420   Post-Procedure Length (cm) 9 cm 07/06/21 1449   Post-Procedure Width (cm) 3.2 cm 07/06/21 1449   Post-Procedure Depth (cm) 2.1 cm 07/06/21 1449   Post-Procedure Surface Area (cm^2) 28.8 cm^2 07/06/21 1449   Post-Procedure Volume (cm^3) 60.48 cm^3 07/06/21 1449   Distance Tunneling (cm) 0 cm 06/07/21 1511   Tunneling Position ___ O'Clock 0 06/07/21 1511   Undermining Starts ___ O'Clock 0 06/07/21 1511   Undermining Ends___ O'Clock 0 06/07/21 1511   Undermining Maxium Distance (cm) 0 06/07/21 1511   Wound Assessment Pink/red;Slough 07/06/21 1420   Drainage Amount Moderate 07/06/21 1420   Drainage Description Serosanguinous 07/06/21 1420   Odor None 07/06/21 1420   Nellie-wound Assessment Blanchable erythema 07/06/21 1420   Margins Attached edges 07/06/21 1420   Wound Thickness Description not for Pressure Injury Full thickness 07/06/21 1420   Number of days: 210       Wound 03/08/21 Heel Right Wound 7.  right heel (Active)   Wound Image   07/06/21 1420   Wound Etiology Pressure Stage  4 07/06/21 1420   Dressing Status New dressing applied 06/14/21 1500   Wound Cleansed Soap and water 07/06/21 1420   Dressing/Treatment Moisten with saline;Moist to dry; Roll gauze;Tape/Soft cloth adhesive tape 07/06/21 1530   Wound Length (cm) 0.4 cm 07/06/21 1420   Wound Width (cm) 1 cm 07/06/21 1420   Wound Depth (cm) 0.1 cm 07/06/21 1420   Wound Surface Area (cm^2) 0.4 cm^2 07/06/21 1420   Change in Wound Size % (l*w) 97.62 07/06/21 1420   Wound Volume (cm^3) 0.04 cm^3 07/06/21 1420   Wound Healing % 98 07/06/21 1420   Post-Procedure Length (cm) 0.4 cm 07/06/21 1449   Post-Procedure Width (cm) 1 cm 07/06/21 1449   Post-Procedure Depth (cm) 0.1 cm 07/06/21 1449   Post-Procedure Surface Area (cm^2) 0.4 cm^2 07/06/21 1449   Post-Procedure Volume (cm^3) 0.04 cm^3 07/06/21 1449   Distance Tunneling (cm) 0 cm 06/07/21 1511   Tunneling Position ___ O'Clock 0 06/07/21 1511   Undermining Starts ___ O'Clock 0 06/07/21 1511   Undermining Ends___ O'Clock 0 06/07/21 1511   Undermining Maxium Distance (cm) 0 06/07/21 1511   Wound Assessment Pink/red;Slough 07/06/21 1420   Drainage Amount Small 07/06/21 1420   Drainage Description Serosanguinous 07/06/21 1420   Odor None 07/06/21 1420   Nellie-wound Assessment Intact 07/06/21 1420   Margins Attached edges 07/06/21 1420   Wound Thickness Description not for Pressure Injury Full thickness 07/06/21 1420   Number of days: 119       Wound 05/03/21 Foot Left;Lateral;Plantar wound 3a.  left lateral plantar foot (wound has  from wound #3) 5/3/21Pressure Stage 4 (Active)   Wound Image   07/06/21 1420   Wound Etiology Pressure Stage  4 07/06/21 1420   Dressing Status New dressing applied 06/14/21 1500   Wound Cleansed Soap and water 07/06/21 1420   Dressing/Treatment Moisten with saline;Moist to dry; Roll gauze;Tape/Soft cloth adhesive tape 07/06/21 1530   Wound Length (cm) 13.8 cm 07/06/21 1420   Wound Width (cm) 4.5 cm 07/06/21 1420   Wound Depth (cm) 0.2 cm 07/06/21 1420   Wound Surface Area (cm^2) 62.1 cm^2 07/06/21 1420   Change in Wound Size % (l*w) -1193.75 07/06/21 1420   Wound Volume (cm^3) 12.42 cm^3 07/06/21 1420   Wound Healing % -1194 07/06/21 1420   Post-Procedure Length (cm) 13.8 cm 07/06/21 1449   Post-Procedure Width (cm) 4.5 cm 07/06/21 1449   Post-Procedure Depth (cm) 0.2 cm 07/06/21 1449   Post-Procedure Surface Area (cm^2) 62.1 cm^2 07/06/21 1449   Post-Procedure Volume (cm^3) 12.42 cm^3 07/06/21 1449   Distance Tunneling (cm) 0 cm 06/07/21 1511   Tunneling Position ___ O'Clock 0 06/07/21 1511   Undermining Starts ___ O'Clock 0 06/07/21 1511   Undermining Ends___ O'Clock 0 06/07/21 1511   Undermining Maxium Distance (cm) 0 06/07/21 1511   Wound Assessment Pink/red;Slough 07/06/21 1420   Drainage Amount Large 07/06/21 1420   Drainage Description Serosanguinous 07/06/21 1420   Odor None 07/06/21 1420   Nellie-wound Assessment Intact 07/06/21 1420   Margins Attached edges 07/06/21 1420   Wound Thickness Description not for Pressure Injury Full thickness 07/06/21 1420   Number of days: 64       Wound 07/06/21 Leg Right;Lateral right lateral lower ext. (Active)   Wound Image   07/06/21 1420   Wound Etiology Venous 07/06/21 1420   Wound Cleansed Irrigated with saline 07/06/21 1420   Dressing/Treatment Moisten with saline;Moist to dry; Roll gauze;Tape/Soft cloth adhesive tape 07/06/21 1530   Wound Length (cm) 2 cm 07/06/21 1420   Wound Width (cm) 1.4 cm 07/06/21 1420   Wound Depth (cm) 0.1 cm 07/06/21 1420   Wound Surface Area (cm^2) 2.8 cm^2 07/06/21 1420 Wound Volume (cm^3) 0.28 cm^3 07/06/21 1420   Post-Procedure Length (cm) 2 cm 07/06/21 1449   Post-Procedure Width (cm) 1.4 cm 07/06/21 1449   Post-Procedure Depth (cm) 0.1 cm 07/06/21 1449   Post-Procedure Surface Area (cm^2) 2.8 cm^2 07/06/21 1449   Post-Procedure Volume (cm^3) 0.28 cm^3 07/06/21 1449   Wound Assessment Pink/red;Granulation tissue 07/06/21 1420   Drainage Amount Small 07/06/21 1420   Drainage Description Serosanguinous 07/06/21 1420   Odor None 07/06/21 1420   Nellie-wound Assessment Fragile 07/06/21 1420   Margins Attached edges 07/06/21 1420   Number of days: 0             Estimated Blood Loss:  Minimal    Hemostasis Achieved:  by pressure    Procedural Pain:  0  / 10     Post Procedural Pain:  0 / 10     Response to treatment:  Well tolerated by patient. Plan:     Problem List Items Addressed This Visit     * (Principal) Decubitus ulcer of ischial area, left, stage IV (HCC) - Primary (Chronic)    Decubitus ulcer of ischium, right, stage IV (HCC) (Chronic)    Chronic osteomyelitis with draining sinus of multiple sites (HCC) (Chronic)    Pressure injury of right heel, stage 3 (HCC) (Chronic)    Severe protein-calorie malnutrition (HCC) (Chronic)    Decubitus ulcer of left heel, stage 4 (HCC) (Chronic)    Decubitus ulcer of right foot, stage 4 (HCC) (Chronic)    Decubitus ulcer of left leg, stage 4 (HCC)    Venous stasis ulcer of right calf with fat layer exposed without varicose veins ([facility] [address]          Cont current care. RTO 2-3 weeks    Treatment Note please see attached Discharge Instructions    In my professional opinion this patient would benefit from HBO Therapy: No    Written patient dismissal instructions given to patient and signed by patient or POA.          Discharge [address] and Hyperbaric Oxygen Therapy   Physician Orders and Discharge Instructions  [address] [facility] [place]  [place], [address]  Telephone: [phone] FAX [phone]    NAME:  Yari Mathur  YOB: 1960  MEDICAL RECORD NUMBER:  [id number]  DATE:  7/6/2021    Discharge condition: Stable    Discharge to: Home    Left via:public transportation    Accompanied by:  self    ECF/HHA: [facility]     Dressing Orders: Right and Left Lower Ext and Feet Wounds  Dakins moist gauze, Secure with dry gauze, roll gauze and medipore tape  Change twice daily     Dressing Orders: Right and Left Ishium,  Dakins moist roll gauze tucked loosely into wound, cover with dry gauze, ABD pad and tape  Change twice daily     Treatment Orders: Avoid Pressure to wound site. Use ROHO cushion, MARYSOL bed and Heel lift boots, check air in ROHO cushion to make sure its inflated properly  Turn frequently (turn at least every two hours when in bed)  While in chair reposition every 30 minutes  Patient advised to sit up no more than 30minutes at a time for meals ONLY. Please do not elevate the head of the bed higher than 30 degrees. Off-load heels by applying heel-lift boots or \"float\" heels by placing pillows under calves so that heels do not touch  High Protein as Tolerated    [facility] follow up visit ______2 weeks_______________________  (Please note your next appointment above and if you are unable to keep, kindly give a 24 hour notice. Thank you.)          If you experience any of the following, please call the [address] during business hours:    * Increase in Pain  * Temperature over 101  * Increase in drainage from your wound  * Drainage with a foul odor  * Bleeding  * Increase in swelling  * Need for compression bandage changes due to slippage, breakthrough drainage. If you need medical attention outside of the business hours of the [address] please contact your PCP or go to the nearest emergency room.           Electronically signed by Hilario Max MD on 7/6/2021 at 3:34 PM

## 2021-07-09 RX ORDER — SODIUM HYPOCHLORITE 1.25 MG/ML
SOLUTION TOPICAL
Qty: 1 BOTTLE | Refills: 3 | Status: SHIPPED | OUTPATIENT
Start: 2021-07-09

## 2021-07-19 ENCOUNTER — HOSPITAL ENCOUNTER (OUTPATIENT)
Dept: WOUND CARE | Age: 61
Discharge: HOME | End: 2021-07-19
Payer: MEDICARE

## 2021-07-19 VITALS
SYSTOLIC BLOOD PRESSURE: 187 MMHG | RESPIRATION RATE: 18 BRPM | TEMPERATURE: 96.7 F | HEART RATE: 90 BPM | HEIGHT: 68 IN | DIASTOLIC BLOOD PRESSURE: 73 MMHG | WEIGHT: 180 LBS | BODY MASS INDEX: 27.28 KG/M2

## 2021-07-19 DIAGNOSIS — M86.49 CHRONIC OSTEOMYELITIS WITH DRAINING SINUS OF MULTIPLE SITES (HCC): Chronic | ICD-10-CM

## 2021-07-19 DIAGNOSIS — L89.324 DECUBITUS ULCER OF ISCHIAL AREA, LEFT, STAGE IV (HCC): Chronic | ICD-10-CM

## 2021-07-19 DIAGNOSIS — L89.894 DECUBITUS ULCER OF RIGHT FOOT, STAGE 4 (HCC): ICD-10-CM

## 2021-07-19 DIAGNOSIS — L89.314 DECUBITUS ULCER OF ISCHIUM, RIGHT, STAGE IV (HCC): ICD-10-CM

## 2021-07-19 DIAGNOSIS — I87.2 VENOUS STASIS ULCER OF RIGHT CALF WITH FAT LAYER EXPOSED WITHOUT VARICOSE VEINS (HCC): ICD-10-CM

## 2021-07-19 DIAGNOSIS — L97.212 VENOUS STASIS ULCER OF RIGHT CALF WITH FAT LAYER EXPOSED WITHOUT VARICOSE VEINS (HCC): ICD-10-CM

## 2021-07-19 DIAGNOSIS — L89.894 DECUBITUS ULCER OF LEFT LEG, STAGE 4 (HCC): ICD-10-CM

## 2021-07-19 DIAGNOSIS — E43 SEVERE PROTEIN-CALORIE MALNUTRITION (HCC): Chronic | ICD-10-CM

## 2021-07-19 DIAGNOSIS — L89.613 PRESSURE INJURY OF RIGHT HEEL, STAGE 3 (HCC): Chronic | ICD-10-CM

## 2021-07-19 DIAGNOSIS — L89.624 DECUBITUS ULCER OF LEFT HEEL, STAGE 4 (HCC): Primary | ICD-10-CM

## 2021-07-19 PROCEDURE — 99212 OFFICE O/P EST SF 10 MIN: CPT | Performed by: SURGERY

## 2021-07-19 PROCEDURE — 6370000000 HC RX 637 (ALT 250 FOR IP): Performed by: SURGERY

## 2021-07-19 PROCEDURE — 99214 OFFICE O/P EST MOD 30 MIN: CPT

## 2021-07-19 RX ORDER — LIDOCAINE HYDROCHLORIDE 20 MG/ML
JELLY TOPICAL PRN
Status: DISCONTINUED | OUTPATIENT
Start: 2021-07-19 | End: 2021-07-21 | Stop reason: HOSPADM

## 2021-07-19 RX ORDER — LIDOCAINE HYDROCHLORIDE 20 MG/ML
JELLY TOPICAL PRN
Status: CANCELLED
Start: 2021-07-19

## 2021-07-19 RX ADMIN — LIDOCAINE HYDROCHLORIDE: 20 JELLY TOPICAL at 14:17

## 2021-07-19 ASSESSMENT — PAIN SCALES - GENERAL: PAINLEVEL_OUTOF10: 0

## 2021-07-19 NOTE — PROGRESS NOTES
Relation Age of Onset    Other Mother          of sepsis    Cancer Father         Lung Cancer    Diabetes Paternal Grandmother     Heart Attack Paternal Grandfather        SOCIAL HISTORY    Social History     Tobacco Use    Smoking status: Never Smoker    Smokeless tobacco: Never Used   Vaping Use    Vaping Use: Never used   Substance Use Topics    Alcohol use: Not Currently    Drug use: Never       ALLERGIES    Allergies   Allergen Reactions    Morphine And Related Nausea Only     Caused severe altered mental status       MEDICATIONS    Current Outpatient Medications on File Prior to Encounter   Medication Sig Dispense Refill    sodium hypochlorite (DAKINS) 0.125 % SOLN external solution Moisten gauze apply to wound twice daily 1 Bottle 3    collagenase (SANTYL) 250 UNIT/GM ointment Apply 1 applicator topically daily      ALPRAZolam (XANAX) 1 MG tablet Take 1 mg by mouth 3 times daily as needed for Anxiety.  HYDROcodone-acetaminophen (NORCO)  MG per tablet Take 1 tablet by mouth every 6 hours as needed for Pain.  sodium hypochlorite (DAKINS) 0.125 % SOLN Irrigate with as directed 2 times daily      omeprazole (PRILOSEC) 20 MG delayed release capsule Take 20 mg by mouth daily      zinc 50 MG CAPS Take 1 capsule by mouth daily      baclofen (LIORESAL) 10 MG tablet Take 10 mg by mouth 3 times daily        No current facility-administered medications on file prior to encounter. REVIEW OF SYSTEMS    A comprehensive review of systems was negative.     Objective:      BP (!) 187/73   Pulse 90   Temp 96.7 °F (35.9 °C) (Temporal)   Resp 18   Ht 5' 8\" (1.727 m)   Wt 180 lb (81.6 kg)   BMI 27.37 kg/m²     Wt Readings from Last 3 Encounters:   21 180 lb (81.6 kg)   21 180 lb (81.6 kg)   21 180 lb (81.6 kg)       PHYSICAL EXAM    General Appearance: alert and oriented to person, place and time, well developed and well- nourished, in no acute distress  Skin: warm and dry, no rash or erythema  Head: normocephalic and atraumatic  Eyes: pupils equal, round, and reactive to light, extraocular eye movements intact, conjunctivae normal  ENT: tympanic membrane, external ear and ear canal normal bilaterally, nose without deformity, nasal mucosa and turbinates normal without polyps, lips teeth and gums normal  Neck: supple and non-tender without mass, no thyromegaly or thyroid nodules, no cervical lymphadenopathy  Pulmonary/Chest: clear to auscultation bilaterally- no wheezes, rales or rhonchi, normal air movement, no respiratory distress  Cardiovascular: normal rate, regular rhythm, normal S1 and S2, no murmurs, rubs, clicks, or gallops, distal pulses intact, no carotid bruits  Abdomen: soft, non-tender, non-distended, normal bowel sounds, no masses or organomegaly  Extremities: no cyanosis, clubbing or edema  Musculoskeletal: normal range of motion, no joint swelling, deformity or tenderness      Assessment:      Patient Active Problem List   Diagnosis Code    Paraplegia at T9 level ([facility]) G82.20    Pressure injury of right ischium, stage 4 ([facility]) L89.314    Decubitus ulcer of ischial area, left, stage IV ([facility]) L89.324    Decubitus ulcer of ischium, right, stage IV ([facility]) L89.314    [facility]omyel[facility] with draining sinus of [facility] sites [facility] M86.49    Pressure injury of right heel, stage 3 ([facility]) L89.613    Gastrointestinal hemorrhage K92.2    Pressure injury of skin of left heel L89.629    Acute encephalopathy G93.40    Anemia due to blood loss, acute D62    Hypocalcemia E83.51    Severe protein-calorie malnutrition ([facility]) E43    GI bleed K92.2    Anemia D64.9    Decubitus ulcer of left heel, stage 4 ([facility]) L89.624    Decubitus ulcer of right foot, stage 4 ([facility]) L89.894    Decubitus ulcer of left leg, stage 4 ([facility]) L89.894    Venous stasis ulcer of right calf with fat layer exposed without varicose veins ([facility]) I87.2, F36.884             Wound 12/07/20 Left Wound 5.  Left Ischium Pressure Stage 4 (Active)   Wound Image   07/19/21 1406   Wound Etiology Pressure Stage  4 07/19/21 1406   Dressing Status Old drainage noted; Intact 07/19/21 1406   Wound Cleansed Soap and water 07/19/21 1406   Dressing/Treatment Moisten with saline;Moist to dry;ABD;Tape/Soft cloth adhesive tape 07/06/21 1530   Offloading for Diabetic Foot Ulcers No offloading required 07/19/21 1406   Wound Length (cm) 9 cm 07/19/21 1406   Wound Width (cm) 6 cm 07/19/21 1406   Wound Depth (cm) 10.8 cm 07/19/21 1406   Wound Surface Area (cm^2) 54 cm^2 07/19/21 1406   Change in Wound Size % (l*w) 45.45 07/19/21 1406   Wound Volume (cm^3) 583.2 cm^3 07/19/21 1406   Wound Healing % 54 07/19/21 1406   Post-Procedure Length (cm) 12.5 cm 07/06/21 1449   Post-Procedure Width (cm) 4.5 cm 07/06/21 1449   Post-Procedure Depth (cm) 9.5 cm 07/06/21 1449   Post-Procedure Surface Area (cm^2) 56.25 cm^2 07/06/21 1449   Post-Procedure Volume (cm^3) 534.375 cm^3 07/06/21 1449   Distance Tunneling (cm) 9.5 cm 07/06/21 1420   Tunneling Position ___ O'Clock 5 07/06/21 1420   Undermining Starts ___ O'Clock 0 06/07/21 1511   Undermining Ends___ O'Clock 0 06/07/21 1511   Undermining Maxium Distance (cm) 0 06/07/21 1511   Wound Assessment Pink/red;Slough 07/19/21 1406   Drainage Amount Copious 07/19/21 1406   Drainage Description Serosanguinous 07/19/21 1406   Odor None 07/19/21 1406   Nellie-wound Assessment Blanchable erythema; Excoriated 07/19/21 1406   Margins Attached edges 07/19/21 1406   Number of days: 223       Wound 12/07/20 Ischium Right Wound 6. Right Ischium Stage 4 (Active)   Wound Image   07/19/21 1406   Wound Etiology Pressure Stage  4 07/19/21 1406   Dressing Status Old drainage noted; Intact 07/19/21 1406   Wound Cleansed Soap and water 07/19/21 1406   Dressing/Treatment Moisten with saline;Moist to dry;ABD;Tape/Soft cloth adhesive tape 07/06/21 1530   Offloading for Diabetic Foot Ulcers Other (comment) 01/18/21 1426   Wound (cm) 0.1 cm 07/06/21 1449   Post-Procedure Surface Area (cm^2) 0.4 cm^2 07/06/21 1449   Post-Procedure Volume (cm^3) 0.04 cm^3 07/06/21 1449   Distance Tunneling (cm) 0 cm 06/07/21 1511   Tunneling Position ___ O'Clock 0 06/07/21 1511   Undermining Starts ___ O'Clock 0 06/07/21 1511   Undermining Ends___ O'Clock 0 06/07/21 1511   Undermining Maxium Distance (cm) 0 06/07/21 1511   Wound Assessment Pink/red;Slough 07/19/21 1406   Drainage Amount Small 07/19/21 1406   Drainage Description Serosanguinous 07/19/21 1406   Odor None 07/19/21 1406   Nellie-wound Assessment Intact 07/19/21 1406   Margins Attached edges 07/19/21 1406   Wound Thickness Description not for Pressure Injury Full thickness 07/06/21 1420   Number of days: 132       Wound 05/03/21 Foot Left;Lateral;Plantar wound 3a. left lateral plantar foot (wound has  from wound #3) 5/3/21Pressure Stage 4 (Active)   Wound Image   07/19/21 1406   Wound Etiology Pressure Stage  4 07/19/21 1406   Dressing Status Old drainage noted; Intact 07/19/21 1406   Wound Cleansed Soap and water 07/19/21 1406   Dressing/Treatment Moisten with saline;Moist to dry; Roll gauze;Tape/Soft cloth adhesive tape 07/06/21 1530   Wound Length (cm) 13 cm 07/19/21 1406   Wound Width (cm) 5.5 cm 07/19/21 1406   Wound Depth (cm) 0.2 cm 07/19/21 1406   Wound Surface Area (cm^2) 71.5 cm^2 07/19/21 1406   Change in Wound Size % (l*w) -1389.58 07/19/21 1406   Wound Volume (cm^3) 14.3 cm^3 07/19/21 1406   Wound Healing % -1390 07/19/21 1406   Post-Procedure Length (cm) 13.8 cm 07/06/21 1449   Post-Procedure Width (cm) 4.5 cm 07/06/21 1449   Post-Procedure Depth (cm) 0.2 cm 07/06/21 1449   Post-Procedure Surface Area (cm^2) 62.1 cm^2 07/06/21 1449   Post-Procedure Volume (cm^3) 12.42 cm^3 07/06/21 1449   Distance Tunneling (cm) 0 cm 06/07/21 1511   Tunneling Position ___ O'Clock 0 06/07/21 1511   Undermining Starts ___ O'Clock 0 06/07/21 1511   Undermining Ends___ O'Clock 0 06/07/21 1511 Undermining Maxium Distance (cm) 0 06/07/21 1511   Wound Assessment Pink/red;Slough 07/19/21 1406   Drainage Amount Moderate 07/19/21 1406   Drainage Description Serosanguinous 07/19/21 1406   Odor None 07/19/21 1406   Nellie-wound Assessment Intact 07/19/21 1406   Margins Attached edges 07/19/21 1406   Wound Thickness Description not for Pressure Injury Full thickness 07/06/21 1420   Number of days: 77       Wound 07/06/21 Leg Right;Lateral right lateral lower ext. (Active)   Wound Image   07/19/21 1406   Wound Etiology Venous 07/19/21 1406   Dressing Status Old drainage noted; Intact 07/19/21 1406   Wound Cleansed Irrigated with saline 07/19/21 1406   Dressing/Treatment Moisten with saline;Moist to dry; Roll gauze;Tape/Soft cloth adhesive tape 07/06/21 1530   Wound Length (cm) 2.8 cm 07/19/21 1406   Wound Width (cm) 2 cm 07/19/21 1406   Wound Depth (cm) 0.1 cm 07/19/21 1406   Wound Surface Area (cm^2) 5.6 cm^2 07/19/21 1406   Change in Wound Size % (l*w) -100 07/19/21 1406   Wound Volume (cm^3) 0.56 cm^3 07/19/21 1406   Wound Healing % -100 07/19/21 1406   Post-Procedure Length (cm) 2 cm 07/06/21 1449   Post-Procedure Width (cm) 1.4 cm 07/06/21 1449   Post-Procedure Depth (cm) 0.1 cm 07/06/21 1449   Post-Procedure Surface Area (cm^2) 2.8 cm^2 07/06/21 1449   Post-Procedure Volume (cm^3) 0.28 cm^3 07/06/21 1449   Wound Assessment Pink/red;Slough 07/19/21 1406   Drainage Amount Moderate 07/19/21 1406   Drainage Description Serosanguinous 07/19/21 1406   Odor None 07/19/21 1406   Nellie-wound Assessment Fragile 07/19/21 1406   Margins Attached edges 07/19/21 1406   Wound Thickness Description not for Pressure Injury Full thickness 07/19/21 1406   Number of days: 13       Wound 07/19/21 Foot Right;Plantar;Mid WOUND1 RIGHT MID PLANTAR PRESSURE STG 4 (Active)   Wound Image    07/19/21 1406   Wound Etiology Pressure Stage  4 07/19/21 1406   Dressing Status Old drainage noted; Intact 07/19/21 1406   Wound Cleansed Soap and water 07/19/21 1406   Wound Length (cm) 2 cm 07/19/21 1406   Wound Width (cm) 3 cm 07/19/21 1406   Wound Depth (cm) 0.1 cm 07/19/21 1406   Wound Surface Area (cm^2) 6 cm^2 07/19/21 1406   Wound Volume (cm^3) 0.6 cm^3 07/19/21 1406   Wound Assessment Eschar moist 07/19/21 1406   Drainage Amount Moderate 07/19/21 1406   Drainage Description Serosanguinous 07/19/21 1406   Odor None 07/19/21 1406   Nellie-wound Assessment Intact; Blanchable erythema 07/19/21 1406   Margins Attached edges 07/19/21 1406   Number of days: 0             Plan:     Problem List Items Addressed This Visit     * (Principal) Decubitus ulcer of ischial area, left, stage IV (HCC) (Chronic)    Decubitus ulcer of ischium, right, stage IV (HCC) (Chronic)    Relevant Medications    lidocaine (XYLOCAINE) 2 % uro-jet    Other Relevant Orders    Supply: Wound Cleanser    Supply: Nellie Wound    Supply: Wound Dressings    Supply: Pack Wound    Supply: Cover and Secure    Chronic osteomyelitis with draining sinus of multiple sites (HCC) (Chronic)    Pressure injury of right heel, stage 3 (HCC) (Chronic)    Severe protein-calorie malnutrition (HCC) (Chronic)    Decubitus ulcer of left heel, stage 4 (HCC) - Primary (Chronic)    Relevant Medications    lidocaine (XYLOCAINE) 2 % uro-jet    Other Relevant Orders    Supply: Wound Cleanser    Supply: Nellie Wound    Supply: Wound Dressings    Supply: Pack Wound    Supply: Cover and Secure    Decubitus ulcer of right foot, stage 4 (HCC) (Chronic)    Relevant Medications    lidocaine (XYLOCAINE) 2 % uro-jet    Other Relevant Orders    Supply: Wound Cleanser    Supply: Nellie Wound    Supply: Wound Dressings    Supply: Pack Wound    Supply: Cover and Secure    Decubitus ulcer of left leg, stage 4 (HCC)    Relevant Medications    lidocaine (XYLOCAINE) 2 % uro-jet    Other Relevant Orders    Supply: Wound Cleanser    Supply: Nellie Wound    Supply: Wound Dressings    Supply: Pack Wound    Supply: Cover and Secure    Venous stasis ulcer of right calf with fat layer exposed without varicose veins (HCC)    Relevant Medications    lidocaine (XYLOCAINE) 2 % uro-jet    Other Relevant Orders    Supply: Wound Cleanser    Supply: Nellie Wound    Supply: Wound Dressings    Supply: Pack Wound    Supply: Cover and Secure        Pt states that she has been sick with UTI over past 2 weeks. Her wounds today look great no infection noted  Cont current care    Treatment Note please see attached Discharge Instructions    In my professional opinion this patient would benefit from HBO Therapy: No    Written patient dismissal instructions given to patient and signed by patient or POA. Discharge [address] and Hyperbaric Oxygen Therapy   Physician Orders and Discharge Instructions  [address]  Telephone: [phone]    NAME:  Mayte Mays OF BIRTH:  1960  MEDICAL RECORD NUMBER:  [id number]  DATE:  7/19/2021    Discharge condition: Stable    Discharge to: Home    Left via:Private automobile    Accompanied by: Self    [facility]: [facility]     Dressing Orders: Right and Left Lower Ext and Feet Wounds  Dakins moist gauze, Secure with dry gauze, roll gauze and medipore tape  Change twice daily     Dressing Orders: Right and Left Ishium,  Dakins moist roll gauze tucked loosely into wound, cover with dry gauze, ABD pad and tape  Change twice daily     Treatment Orders: Avoid Pressure to wound site. Use ROHO cushion, MARYSOL bed and Heel lift boots, check air in ROHO cushion to make sure its inflated properly  Turn frequently (turn at least every two hours when in bed)  While in chair reposition every 30 minutes  Patient advised to sit up no more than 30minutes at a time for meals ONLY. Please do not elevate the head of the bed higher than 30 degrees.   Off-load heels by applying heel-lift boots or \"float\" heels by placing pillows under calves so that heels do not touch  High Protein as Tolerated    [facility] follow up visit ________2 weeks_____________________  (Please note your next appointment above and if you are unable to keep, kindly give a 24 hour notice. Thank you.)    If you experience any of the following, please call the [facility] during business hours:    * Increase in Pain  * Temperature over 101  * Increase in drainage from your wound  * Drainage with a foul odor  * Bleeding  * Increase in swelling  * Need for compression bandage changes due to slippage, breakthrough drainage. If you need medical attention outside of the business hours of the [address] please contact your PCP or go to the nearest emergency room.         Electronically signed by Trina Chávez MD on 7/19/2021 at 3:23 PM

## 2021-08-09 ENCOUNTER — HOSPITAL ENCOUNTER (OUTPATIENT)
Dept: WOUND CARE | Age: 61
Discharge: HOME | End: 2021-08-09
Payer: MEDICARE

## 2021-08-09 VITALS
TEMPERATURE: 97.6 F | HEART RATE: 78 BPM | HEIGHT: 68 IN | DIASTOLIC BLOOD PRESSURE: 66 MMHG | WEIGHT: 180 LBS | SYSTOLIC BLOOD PRESSURE: 135 MMHG | RESPIRATION RATE: 18 BRPM | BODY MASS INDEX: 27.28 KG/M2

## 2021-08-09 DIAGNOSIS — L89.894 DECUBITUS ULCER OF RIGHT FOOT, STAGE 4 (HCC): ICD-10-CM

## 2021-08-09 DIAGNOSIS — L89.613 PRESSURE INJURY OF RIGHT HEEL, STAGE 3 (HCC): Chronic | ICD-10-CM

## 2021-08-09 DIAGNOSIS — L89.894 DECUBITUS ULCER OF LEFT LEG, STAGE 4 (HCC): ICD-10-CM

## 2021-08-09 DIAGNOSIS — E43 SEVERE PROTEIN-CALORIE MALNUTRITION (HCC): Chronic | ICD-10-CM

## 2021-08-09 DIAGNOSIS — L89.314 DECUBITUS ULCER OF ISCHIUM, RIGHT, STAGE IV (HCC): ICD-10-CM

## 2021-08-09 DIAGNOSIS — L89.624 DECUBITUS ULCER OF LEFT HEEL, STAGE 4 (HCC): ICD-10-CM

## 2021-08-09 DIAGNOSIS — L89.324 DECUBITUS ULCER OF ISCHIAL AREA, LEFT, STAGE IV (HCC): Primary | Chronic | ICD-10-CM

## 2021-08-09 DIAGNOSIS — L97.212 VENOUS STASIS ULCER OF RIGHT CALF WITH FAT LAYER EXPOSED WITHOUT VARICOSE VEINS (HCC): ICD-10-CM

## 2021-08-09 DIAGNOSIS — I87.2 VENOUS STASIS ULCER OF RIGHT CALF WITH FAT LAYER EXPOSED WITHOUT VARICOSE VEINS (HCC): ICD-10-CM

## 2021-08-09 DIAGNOSIS — M86.49 CHRONIC OSTEOMYELITIS WITH DRAINING SINUS OF MULTIPLE SITES (HCC): Chronic | ICD-10-CM

## 2021-08-09 PROCEDURE — 99212 OFFICE O/P EST SF 10 MIN: CPT | Performed by: SURGERY

## 2021-08-09 PROCEDURE — 99214 OFFICE O/P EST MOD 30 MIN: CPT

## 2021-08-09 ASSESSMENT — PAIN SCALES - GENERAL: PAINLEVEL_OUTOF10: 0

## 2021-08-09 NOTE — PLAN OF CARE
Problem: Wound:  Goal: Will show signs of wound healing; wound closure and no evidence of infection  Description: Will show signs of wound healing; wound closure and no evidence of infection  Outcome: Ongoing     Problem: Pressure Ulcer:  Goal: Signs of wound healing will improve  Description: Signs of wound healing will improve  Outcome: Ongoing     Problem: Pressure Ulcer:  Goal: Absence of new pressure ulcer  Description: Absence of new pressure ulcer  Outcome: Ongoing     Problem: Pressure Ulcer:  Goal: Will show no infection signs and symptoms  Description: Will show no infection signs and symptoms  Outcome: Ongoing     Problem: Falls - Risk of:  Goal: Will remain free from falls  Description: Will remain free from falls  Outcome: Ongoing

## 2021-08-09 NOTE — PROGRESS NOTES
[address]   Progress Note and Procedure Note      Karen [address]96 RECORD NUMBER:  [id number]  AGE: 63 y.o. GENDER: female  : 1960  EPISODE DATE:  2021    Subjective:     Chief Complaint   Patient presents with    Wound Check     Foot and buttock wounds; Patient denies increased pain at wound site         HISTORY of PRESENT ILLNESS HPI     Britney Henderson is a 63 y.o. female who presents today for wound/ulcer evaluation. History of Wound Context: Pt with multiple wounds and chronic osteo and refuses definitive therapy. She is here for eval/treat  Wound/Ulcer Pain Timing/Severity: none  Quality of pain: N/A  Severity:  0 / 10   Modifying Factors: None  Associated Signs/Symptoms: none    Ulcer Identification:  Ulcer Type: pressure and non-healing/non-surgical  Contributing Factors: chronic pressure and decreased mobility    Wound: chronic pressure        PAST MEDICAL HISTORY        Diagnosis Date    Blood circulation, collateral     Chronic kidney disease     bladder removed-no kidney problems    GERD (gastroesophageal reflux disease)     History of blood transfusion     History of urostomy     Neuromuscular disorder (HCC)     parapalegic t10-l1    Osteomyelitis (HCC)     Pressure ulcer     to left ischium and bilat heels       PAST SURGICAL HISTORY    Past Surgical History:   Procedure Laterality Date    BACK SURGERY  1979    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  Buttock years ago more than 20 years ago    SMALL INTESTINE SURGERY N/A 2019    DIVERTING LOOP COLOSTOMY performed by Angie Sheth MD at [facility] OR       FAMILY HISTORY    Family History   Problem Relation Age of Onset    Other Mother          of sepsis    Cancer Father         Lung Cancer    Diabetes Paternal Grandmother     Heart Attack Paternal Grandfather        SOCIAL HISTORY    Social History     Tobacco Use    Smoking status: Never Smoker    Smokeless tobacco: Never Used   Vaping Use    Vaping Use: Never used   Substance Use Topics    Alcohol use: Not Currently    Drug use: Never       ALLERGIES    Allergies   Allergen Reactions    Morphine And Related Nausea Only     Caused severe altered mental status       MEDICATIONS    Current Outpatient Medications on File Prior to Encounter   Medication Sig Dispense Refill    sodium hypochlorite (DAKINS) 0.125 % SOLN external solution Moisten gauze apply to wound twice daily 1 Bottle 3    collagenase (SANTYL) 250 UNIT/GM ointment Apply 1 applicator topically daily      ALPRAZolam (XANAX) 1 MG tablet Take 1 mg by mouth 3 times daily as needed for Anxiety.  HYDROcodone-acetaminophen (NORCO)  MG per tablet Take 1 tablet by mouth every 6 hours as needed for Pain.  sodium hypochlorite (DAKINS) 0.125 % SOLN Irrigate with as directed 2 times daily      omeprazole (PRILOSEC) 20 MG delayed release capsule Take 20 mg by mouth daily      zinc 50 MG CAPS Take 1 capsule by mouth daily      baclofen (LIORESAL) 10 MG tablet Take 10 mg by mouth 3 times daily        No current facility-administered medications on file prior to encounter. REVIEW OF SYSTEMS    A comprehensive review of systems was negative.     Objective:      /66   Pulse 78   Temp 97.6 °F (36.4 °C) (Temporal)   Resp 18   Ht 5' 8\" (1.727 m)   Wt 180 lb (81.6 kg)   BMI 27.37 kg/m²     Wt Readings from Last 3 Encounters:   21 180 lb (81.6 kg)   21 180 lb (81.6 kg)   21 180 lb (81.6 kg)       PHYSICAL EXAM    General Appearance: alert and oriented to person, place and time, well developed and well- nourished, in no acute distress  Skin: warm and dry, no rash or erythema  Head: normocephalic and atraumatic  Eyes: pupils equal, round, and reactive to light, extraocular eye movements intact, conjunctivae normal  ENT: tympanic membrane, external ear and ear canal normal bilaterally, nose without deformity, nasal mucosa and turbinates normal without polyps, lips teeth and gums normal  Neck: supple and non-tender without mass, no thyromegaly or thyroid nodules, no cervical lymphadenopathy  Pulmonary/Chest: clear to auscultation bilaterally- no wheezes, rales or rhonchi, normal air movement, no respiratory distress  Cardiovascular: normal rate, regular rhythm, normal S1 and S2, no murmurs, rubs, clicks, or gallops, distal pulses intact, no carotid bruits  Abdomen: soft, non-tender, non-distended, normal bowel sounds, no masses or organomegaly  Extremities: no cyanosis, clubbing or edema  Musculoskeletal: normal range of motion, no joint swelling, deformity or tenderness      Assessment:      Patient Active Problem List   Diagnosis Code    Paraplegia at T9 level ([facility]) G82.20    Pressure injury of right ischium, stage 4 ([facility]) L89.314    Decubitus ulcer of ischial area, left, stage IV ([facility]) L89.324    Decubitus ulcer of ischium, right, stage IV ([facility]) L89.314    Chronic osteomyelitis with draining sinus of multiple sites ([facility] [address] M86.49    Pressure injury of right heel, stage 3 ([facility]) L89.613    Gastrointestinal hemorrhage K92.2    Pressure injury of skin of left heel L89.629    Acute encephalopathy G93.40    Anemia due to blood loss, acute D62    Hypocalcemia E83.51    Severe protein-calorie malnutrition ([facility]) E43    GI bleed K92.2    Anemia D64.9    Decubitus ulcer of left heel, stage 4 ([facility]) I26.291    Decubitus ulcer of right foot, stage 4 ([facility]) L89.894    Decubitus ulcer of left leg, stage 4 ([facility]) I95.111    Venous stasis ulcer of right calf with fat layer exposed without varicose veins ([facility]) I87.2, [id number]             Wound 12/07/20 Left Wound 5. Left Ischium Pressure Stage 4 (Active)   Wound Image   08/09/21 1529   Wound Etiology Pressure Stage  4 08/09/21 1529   Dressing Status Old drainage noted; Intact 08/09/21 1529   Wound Cleansed Soap and water 08/09/21 1529   Dressing/Treatment ABD; Moist to moist;Roll gauze;Tape/Soft cloth adhesive tape 07/19/21 1505   Offloading for Diabetic Foot Ulcers No offloading required 08/09/21 1529   Wound Length (cm) 8.5 cm 08/09/21 1529   Wound Width (cm) 4 cm 08/09/21 1529   Wound Depth (cm) 9 cm 08/09/21 1529   Wound Surface Area (cm^2) 34 cm^2 08/09/21 1529   Change in Wound Size % (l*w) 65.66 08/09/21 1529   Wound Volume (cm^3) 306 cm^3 08/09/21 1529   Wound Healing % 76 08/09/21 1529   Post-Procedure Length (cm) 12.5 cm 07/06/21 1449   Post-Procedure Width (cm) 4.5 cm 07/06/21 1449   Post-Procedure Depth (cm) 9.5 cm 07/06/21 1449   Post-Procedure Surface Area (cm^2) 56.25 cm^2 07/06/21 1449   Post-Procedure Volume (cm^3) 534.375 cm^3 07/06/21 1449   Distance Tunneling (cm) 9.5 cm 07/06/21 1420   Tunneling Position ___ O'Clock 5 07/06/21 1420   Undermining Starts ___ O'Clock 0 06/07/21 1511   Undermining Ends___ O'Clock 0 06/07/21 1511   Undermining Maxium Distance (cm) 0 06/07/21 1511   Wound Assessment [place]/red;Slough 08/09/21 1529   Drainage Amount Copious 08/09/21 1529   Drainage Description Serosanguinous 08/09/21 1529   Odor None 08/09/21 1529   Nellie-wound Assessment Blanchable erythema 08/09/21 1529   Margins Attached edges 08/09/21 1529   Number of days: 244       Wound 12/07/20 Ischium Right Wound 6. Right Ischium Stage 4 (Active)   Wound Image   08/09/21 1529   Wound Etiology Pressure Stage  4 08/09/21 1529   Dressing Status Old drainage noted; Intact 08/09/21 1529   Wound Cleansed Soap and water 08/09/21 1529   Dressing/Treatment ABD; Moist to moist;Roll gauze;Tape/Soft cloth adhesive tape 07/19/21 1505   Offloading for Diabetic Foot Ulcers Other (comment) 01/18/21 1426   Wound Length (cm) 9 cm 08/09/21 1529   Wound Width (cm) 6 cm 08/09/21 1529   Wound Depth (cm) 4.5 cm 08/09/21 1529   Wound Surface Area (cm^2) 54 cm^2 08/09/21 1529   Change in Wound Size % (l*w) 27.27 08/09/21 1529   Wound Volume (cm^3) 243 cm^3 08/09/21 1529   Wound Healing % 18 08/09/21 1529   Post-Procedure Length (cm) 9 cm 07/06/21 1449   Post-Procedure Width (cm) 3.2 cm 07/06/21 1449   Post-Procedure Depth (cm) 2.1 cm 07/06/21 1449   Post-Procedure Surface Area (cm^2) 28.8 cm^2 07/06/21 1449   Post-Procedure Volume (cm^3) 60.48 cm^3 07/06/21 1449   Distance Tunneling (cm) 0 cm 06/07/21 1511   Tunneling Position ___ O'Clock 0 06/07/21 1511   Undermining Starts ___ O'Clock 0 06/07/21 1511   Undermining Ends___ O'Clock 0 06/07/21 1511   Undermining Maxium Distance (cm) 0 06/07/21 1511   Wound Assessment [place]/red;Slough 08/09/21 1529   Drainage Amount Moderate 08/09/21 1529   Drainage Description Serosanguinous 08/09/21 1529   Odor None 08/09/21 1529   Nellie-wound Assessment Blanchable erythema 08/09/21 1529   Margins Attached edges 08/09/21 1529   Wound Thickness Description not for Pressure Injury Full thickness 07/06/21 1420   Number of days: 244       Wound 03/08/21 Heel Right Wound 7.  right heel (Active)   Wound Image   08/09/21 1529   Wound Etiology Pressure Stage  4 08/09/21 1529   Dressing Status Old drainage noted; Intact 08/09/21 1529   Wound Cleansed Soap and water 08/09/21 1529   Dressing/Treatment Gauze dressing/dressing sponge;Moist to moist;Roll gauze;Tape/Soft cloth adhesive tape 07/19/21 1505   Wound Length (cm) 0 cm 08/09/21 1529   Wound Width (cm) 0 cm 08/09/21 1529   Wound Depth (cm) 0 cm 08/09/21 1529   Wound Surface Area (cm^2) 0 cm^2 08/09/21 1529   Change in Wound Size % (l*w) 100 08/09/21 1529   Wound Volume (cm^3) 0 cm^3 08/09/21 1529   Wound Healing % 100 08/09/21 1529   Post-Procedure Length (cm) 0.4 cm 07/06/21 1449   Post-Procedure Width (cm) 1 cm 07/06/21 Undermining Ends___ O'Clock 0 06/07/21 1511   Undermining Maxium Distance (cm) 0 06/07/21 1511   Wound Assessment [place]/red;Slough 08/09/21 1529   Drainage Amount Moderate 08/09/21 1529   Drainage Description Serosanguinous 08/09/21 1529   Odor None 08/09/21 1529   Nellie-wound Assessment Intact 08/09/21 1529   Margins Attached edges 08/09/21 1529   Wound Thickness Description not for Pressure Injury Full thickness 07/06/21 1420   Number of days: 98       Wound 07/06/21 Leg Right;Lateral Wound 2 right lateral lower ext. (Active)   Wound Image   08/09/21 1529   Wound Etiology Venous 08/09/21 1529   Dressing Status Old drainage noted; Intact 08/09/21 1529   Wound Cleansed Soap and water 08/09/21 1529   Dressing/Treatment Gauze dressing/dressing sponge;Moist to moist;Roll gauze;Tape/Soft cloth adhesive tape 07/19/21 1505   Wound Length (cm) 3.8 cm 08/09/21 1529   Wound Width (cm) 2.1 cm 08/09/21 1529   Wound Depth (cm) 0.1 cm 08/09/21 1529   Wound Surface Area (cm^2) 7.98 cm^2 08/09/21 1529   Change in Wound Size % (l*w) -185 08/09/21 1529   Wound Volume (cm^3) 0.798 cm^3 08/09/21 1529   Wound Healing % -185 08/09/21 1529   Post-Procedure Length (cm) 2 cm 07/06/21 1449   Post-Procedure Width (cm) 1.4 cm 07/06/21 1449   Post-Procedure Depth (cm) 0.1 cm 07/06/21 1449   Post-Procedure Surface Area (cm^2) 2.8 cm^2 07/06/21 1449   Post-Procedure Volume (cm^3) 0.28 cm^3 07/06/21 1449   Wound Assessment [place]/red;Slough 08/09/21 1529   Drainage Amount Moderate 08/09/21 1529   Drainage Description Serosanguinous 08/09/21 1529   Odor None 08/09/21 1529   Nellie-wound Assessment Fragile 08/09/21 1529   Margins Attached edges 08/09/21 1529   Wound Thickness Description not for Pressure Injury Full thickness 07/19/21 1406   Number of days: 34       Wound 07/19/21 Foot Right;Plantar;Mid WOUND1 RIGHT MID PLANTAR PRESSURE STG 4 (Active)   Wound Image   08/09/21 1529   Wound Etiology Pressure Stage  4 08/09/21 1529   Dressing Status Old drainage noted; Intact 08/09/21 1529   Wound Cleansed Soap and water 08/09/21 1529   Dressing/Treatment Gauze dressing/dressing sponge;Moist to moist;Roll gauze;Tape/Soft cloth adhesive tape 07/19/21 1505   Wound Length (cm) 1.5 cm 08/09/21 1529   Wound Width (cm) 1 cm 08/09/21 1529   Wound Depth (cm) 0.1 cm 08/09/21 1529   Wound Surface Area (cm^2) 1.5 cm^2 08/09/21 1529   Change in Wound Size % (l*w) 75 08/09/21 1529   Wound Volume (cm^3) 0.15 cm^3 08/09/21 1529   Wound Healing % 75 08/09/21 1529   Wound Assessment [place]/red;Slough 08/09/21 1529   Drainage Amount Moderate 08/09/21 1529   Drainage Description Serosanguinous 08/09/21 1529   Odor None 08/09/21 1529   Nellie-wound Assessment Blanchable erythema 08/09/21 1529   Margins Attached edges 08/09/21 1529   Number of days: 21             Plan:     Problem List Items Addressed This Visit     * (Principal) Decubitus ulcer of ischial area, left, stage IV (HCC) - Primary (Chronic)    Decubitus ulcer of ischium, right, stage IV (HCC) (Chronic)    Chronic osteomyelitis with draining sinus of multiple sites (HCC) (Chronic)    Severe protein-calorie malnutrition (HCC) (Chronic)    Decubitus ulcer of left heel, stage 4 (HCC) (Chronic)    Decubitus ulcer of right foot, stage 4 (HCC) (Chronic)    Pressure injury of right heel, stage 3 (HCC)    Decubitus ulcer of left leg, stage 4 (HCC)    Venous stasis ulcer of right calf with fat layer exposed without varicose veins (HCC)        Pt is conservative wound with chronic non-operative management. Cont HH and current care. RTO 3-4 weeks      Treatment Note please see attached Discharge Instructions    In my professional opinion this patient would benefit from HBO Therapy: No    Written patient dismissal instructions given to patient and signed by patient or POA.          Discharge [address] and Hyperbaric Oxygen Therapy   Physician Orders and Discharge Instructions  [address] [place] [address] [facility]  Telephone: [phone]    NAME:  Harshad Mueller OF BIRTH:  1960  MEDICAL RECORD NUMBER:  [id number]  DATE:  8/9/2021    Discharge condition: Stable    Discharge to: Home    Left via:Private automobile    Accompanied by: Self     ECF/H[facility]: [facility]      Dressing Orders: Right and Left Lower Ext and Feet Wounds  Dakins moist gauze, Secure with dry gauze, roll gauze and medipore tape  Change twice daily     Dressing Orders: Right and Left Ishium,  Dakins moist roll gauze tucked loosely into wound, cover with dry gauze, ABD pad and tape  Change twice daily     Treatment Orders: Avoid Pressure to wound site. Use ROHO cushion, MARYSOL bed and Heel lift boots, check air in ROHO cushion to make sure its inflated properly  Turn frequently (turn at least every two hours when in bed)  While in chair reposition every 30 minutes  Patient advised to sit up no more than 30minutes at a time for meals ONLY. Please do not elevate the head of the bed higher than 30 degrees. Off-load heels by applying heel-lift boots or \"float\" heels by placing pillows under calves so that heels do not touch  High Protein as Tolerated    [facility] follow up visit _____________2 weeks________________  (Please note your next appointment above and if you are unable to keep, kindly give a 24 hour notice. Thank you.)    If you experience any of the following, please call the [facility] during business hours:    * Increase in Pain  * Temperature over 101  * Increase in drainage from your wound  * Drainage with a foul odor  * Bleeding  * Increase in swelling  * Need for compression bandage changes due to slippage, breakthrough drainage. If you need medical attention outside of the business hours of the [facility] please contact your PCP or go to the nearest emergency room.         Electronically signed by Devyn Swift MD on 8/9/2021 at 4:21 PM

## 2021-08-30 ENCOUNTER — HOSPITAL ENCOUNTER (OUTPATIENT)
Dept: WOUND CARE | Age: 61
Discharge: HOME | End: 2021-08-30
Payer: MEDICARE

## 2021-08-30 VITALS
BODY MASS INDEX: 27.28 KG/M2 | RESPIRATION RATE: 18 BRPM | DIASTOLIC BLOOD PRESSURE: 72 MMHG | TEMPERATURE: 96.9 F | WEIGHT: 180 LBS | HEIGHT: 68 IN | HEART RATE: 81 BPM | SYSTOLIC BLOOD PRESSURE: 166 MMHG

## 2021-08-30 DIAGNOSIS — L89.314 DECUBITUS ULCER OF ISCHIUM, RIGHT, STAGE IV (HCC): ICD-10-CM

## 2021-08-30 DIAGNOSIS — L89.894 DECUBITUS ULCER OF LEFT LEG, STAGE 4 (HCC): ICD-10-CM

## 2021-08-30 DIAGNOSIS — L97.212 VENOUS STASIS ULCER OF RIGHT CALF WITH FAT LAYER EXPOSED WITHOUT VARICOSE VEINS (HCC): ICD-10-CM

## 2021-08-30 DIAGNOSIS — M86.49 CHRONIC OSTEOMYELITIS WITH DRAINING SINUS OF MULTIPLE SITES (HCC): Chronic | ICD-10-CM

## 2021-08-30 DIAGNOSIS — L89.624 DECUBITUS ULCER OF LEFT HEEL, STAGE 4 (HCC): ICD-10-CM

## 2021-08-30 DIAGNOSIS — L89.324 DECUBITUS ULCER OF ISCHIAL AREA, LEFT, STAGE IV (HCC): Primary | Chronic | ICD-10-CM

## 2021-08-30 DIAGNOSIS — L89.894 DECUBITUS ULCER OF RIGHT FOOT, STAGE 4 (HCC): ICD-10-CM

## 2021-08-30 DIAGNOSIS — E43 SEVERE PROTEIN-CALORIE MALNUTRITION (HCC): Chronic | ICD-10-CM

## 2021-08-30 DIAGNOSIS — I87.2 VENOUS STASIS ULCER OF RIGHT CALF WITH FAT LAYER EXPOSED WITHOUT VARICOSE VEINS (HCC): ICD-10-CM

## 2021-08-30 PROCEDURE — 99214 OFFICE O/P EST MOD 30 MIN: CPT

## 2021-08-30 PROCEDURE — 99212 OFFICE O/P EST SF 10 MIN: CPT | Performed by: SURGERY

## 2021-08-30 RX ORDER — FERROUS SULFATE 325(65) MG
325 TABLET ORAL
COMMUNITY

## 2021-08-30 RX ORDER — LIDOCAINE HYDROCHLORIDE 20 MG/ML
JELLY TOPICAL PRN
Status: CANCELLED
Start: 2021-08-30

## 2021-08-30 RX ORDER — MAG HYDROX/ALUMINUM HYD/SIMETH 400-400-40
SUSPENSION, ORAL (FINAL DOSE FORM) ORAL
Qty: 1000 ML | Refills: 3 | Status: SHIPPED | OUTPATIENT
Start: 2021-08-30

## 2021-08-30 ASSESSMENT — PAIN DESCRIPTION - PAIN TYPE: TYPE: ACUTE PAIN

## 2021-08-30 ASSESSMENT — PAIN DESCRIPTION - ORIENTATION: ORIENTATION: RIGHT

## 2021-08-30 ASSESSMENT — PAIN SCALES - GENERAL: PAINLEVEL_OUTOF10: 7

## 2021-08-30 ASSESSMENT — PAIN DESCRIPTION - DESCRIPTORS: DESCRIPTORS: ACHING

## 2021-08-30 ASSESSMENT — PAIN DESCRIPTION - PROGRESSION: CLINICAL_PROGRESSION: NOT CHANGED

## 2021-08-30 ASSESSMENT — PAIN DESCRIPTION - ONSET: ONSET: ON-GOING

## 2021-08-30 ASSESSMENT — PAIN DESCRIPTION - LOCATION: LOCATION: SHOULDER

## 2021-08-30 ASSESSMENT — PAIN DESCRIPTION - FREQUENCY: FREQUENCY: INTERMITTENT

## 2021-08-30 NOTE — PROGRESS NOTES
[address]   Progress Note and Procedure Note      [address] RECORD NUMBER:  [id number]  AGE: 63 y.o. GENDER: female  : 1960  EPISODE DATE:  2021    Subjective:     Chief Complaint   Patient presents with    Wound Check     Patient states she was sick last week and her wounds have declined, she states she was freezing and feels her hgb is low         HISTORY of PRESENT ILLNESS HPI     Emilie Darling is a 63 y.o. female who presents today for wound/ulcer evaluation. History of Wound Context: Pt with multiple wounds here for eval/treat  Wound/Ulcer Pain Timing/Severity: none  Quality of pain: N/A  Severity:  0 / 10   Modifying Factors: None  Associated Signs/Symptoms: none    Ulcer Identification:  Ulcer Type: pressure and refractory osteomyelitis  Contributing Factors: chronic pressure, decreased mobility and shear force    Wound: pressure        PAST MEDICAL HISTORY        Diagnosis Date    Blood circulation, collateral     Chronic kidney disease     bladder removed-no kidney problems    GERD (gastroesophageal reflux disease)     History of blood transfusion     History of urostomy     Neuromuscular disorder (HCC)     parapalegic t10-l1    Osteomyelitis (HCC)     Pressure ulcer     to left ischium and bilat heels       PAST SURGICAL HISTORY    Past Surgical History:   Procedure Laterality Date    BACK SURGERY  1979    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  Buttock years ago more than 20 years ago    SMALL INTESTINE SURGERY N/A 2019    DIVERTING LOOP COLOSTOMY performed by Celestine Conway MD at [facility] OR       FAMILY HISTORY    Family History   Problem Relation Age of Onset    Other Mother          of sepsis    Cancer Father         Lung Cancer    Diabetes Paternal Grandmother     Heart Attack Paternal Grandfather        SOCIAL HISTORY    Social History     Tobacco Use    Smoking status: Never Smoker    Smokeless tobacco: Never Used   Vaping Use    Vaping Use: Never used   Substance Use Topics    Alcohol use: Not Currently    Drug use: Never       ALLERGIES    Allergies   Allergen Reactions    Morphine And Related Nausea Only     Caused severe altered mental status       MEDICATIONS    Current Outpatient Medications on File Prior to Encounter   Medication Sig Dispense Refill    ferrous sulfate (IRON 325) 325 (65 Fe) MG tablet Take 325 mg by mouth daily (with breakfast)       ALPRAZolam (XANAX) 1 MG tablet Take 1 mg by mouth 3 times daily as needed for Anxiety.  HYDROcodone-acetaminophen (NORCO)  MG per tablet Take 1 tablet by mouth every 6 hours as needed for Pain.  omeprazole (PRILOSEC) 20 MG delayed release capsule Take 20 mg by mouth daily as needed       zinc 50 MG CAPS Take 1 capsule by mouth daily      baclofen (LIORESAL) 10 MG tablet Take 10 mg by mouth 3 times daily       sodium hypochlorite (DAKINS) 0.125 % SOLN external solution Moisten gauze apply to wound twice daily 1 Bottle 3    collagenase (SANTYL) 250 UNIT/GM ointment Apply 1 applicator topically daily       No current facility-administered medications on file prior to encounter. REVIEW OF SYSTEMS    A comprehensive review of systems was negative.     Objective:      BP (!) 166/72   Pulse 81   Temp 96.9 °F (36.1 °C) (Temporal)   Resp 18   Ht 5' 8\" (1.727 m)   Wt 180 lb (81.6 kg)   BMI 27.37 kg/m²     Wt Readings from Last 3 Encounters:   21 180 lb (81.6 kg)   21 180 lb (81.6 kg)   21 180 lb (81.6 kg)       PHYSICAL EXAM    General Appearance: alert and oriented to person, place and time, well developed and well- nourished, in no acute distress  Skin: warm and dry, no rash or erythema  Head: normocephalic and atraumatic  Eyes: pupils equal, round, and reactive to light, extraocular eye movements intact, conjunctivae normal  ENT: tympanic membrane, external ear and ear canal normal bilaterally, nose without deformity, nasal mucosa and turbinates normal without polyps, lips teeth and gums normal  Neck: supple and non-tender without mass, no thyromegaly or thyroid nodules, no cervical lymphadenopathy  Pulmonary/Chest: clear to auscultation bilaterally- no wheezes, rales or rhonchi, normal air movement, no respiratory distress  Cardiovascular: normal rate, regular rhythm, normal S1 and S2, no murmurs, rubs, clicks, or gallops, distal pulses intact, no carotid bruits  Abdomen: soft, non-tender, non-distended, normal bowel sounds, no masses or organomegaly  Extremities: no cyanosis, clubbing or edema  Musculoskeletal: normal range of motion, no joint swelling, deformity or tenderness      Assessment:      Patient Active Problem List   Diagnosis Code    Paraplegia at T9 level ([facility]) G82.20    Pressure injury of right ischium, stage 4 ([facility]) L89.314    Decubitus ulcer of ischial area, left, stage IV ([facility]) L89.324    Decubitus ulcer of ischium, right, stage IV ([facility]) L89.314    Chronic osteomyelitis with draining sinus of multiple sites ([facility] M86.49    Pressure injury of right heel, stage 3 ([facility]) L89.613    Gastrointestinal hemorrhage K92.2    Pressure injury of skin of left heel L89.629    Acute encephalopathy G93.40    Anemia due to blood loss, acute D62    Hypocalcemia E83.51    Severe protein-calorie malnutrition ([facility]) E43    GI bleed K92.2    Anemia D64.9    Decubitus ulcer of left heel, stage 4 ([facility]) Y25.681    Decubitus ulcer of right foot, stage 4 ([facility]) Z70.436    Decubitus ulcer of left leg, stage 4 ([facility]) B86.774    Venous stasis ulcer of right calf with fat layer exposed without varicose veins (HCC) I87.2, L97.212             Wound 12/07/20 Left Wound 5. Left Ischium Pressure Stage 4 (Active)   Wound Image    08/30/21 1432   Wound Etiology Pressure Stage  4 08/30/21 1432   Dressing Status Old drainage noted 08/30/21 1432   Wound Cleansed Soap and water 08/30/21 1432   Dressing/Treatment ABD; Moist to moist;Roll gauze;Tape/Soft cloth adhesive tape 08/09/21 1625   Offloading for Diabetic Foot Ulcers No offloading required 08/09/21 1529   Wound Length (cm) 10.5 cm 08/30/21 1432   Wound Width (cm) 3.2 cm 08/30/21 1432   Wound Depth (cm) 6.5 cm 08/30/21 1432   Wound Surface Area (cm^2) 33.6 cm^2 08/30/21 1432   Change in Wound Size % (l*w) 66.06 08/30/21 1432   Wound Volume (cm^3) 218.4 cm^3 08/30/21 1432   Wound Healing % 83 08/30/21 1432   Post-Procedure Length (cm) 12.5 cm 07/06/21 1449   Post-Procedure Width (cm) 4.5 cm 07/06/21 1449   Post-Procedure Depth (cm) 9.5 cm 07/06/21 1449   Post-Procedure Surface Area (cm^2) 56.25 cm^2 07/06/21 1449   Post-Procedure Volume (cm^3) 534.375 cm^3 07/06/21 1449   Distance Tunneling (cm) 9.5 cm 08/30/21 1432   Tunneling Position ___ O'Clock 5 08/30/21 1432   Undermining Starts ___ O'Clock 0 08/30/21 1432   Undermining Ends___ O'Clock 0 08/30/21 1432   Undermining Maxium Distance (cm) 0 08/30/21 1432   Wound Assessment Pink/red; Exposed structure muscle;Slough; Purple/maroon 08/30/21 1432   Drainage Amount Large 08/30/21 1432   Drainage Description Serosanguinous 08/30/21 1432   Odor None 08/30/21 1432   Nellie-wound Assessment Blanchable erythema 08/30/21 1432   Margins Unattached edges 08/30/21 1432   Number of days: 265       Wound 12/07/20 Ischium Right Wound 6. Right Ischium Stage 4 (Active)   Wound Image   08/30/21 1432   Wound Etiology Pressure Stage  4 08/30/21 1432   Dressing Status Old drainage noted 08/30/21 1432   Wound Cleansed Soap and water 08/30/21 1432   Dressing/Treatment ABD; Moist to moist;Roll gauze;Tape/Soft cloth adhesive tape 08/09/21 1625   Offloading for Diabetic Foot Ulcers Other (comment) 01/18/21 1426   Wound Length (cm) 10 cm 08/30/21 1432   Wound Width (cm) 3.4 cm 08/30/21 1432   Wound Depth (cm) 1.2 cm 08/30/21 1432   Wound Surface Area (cm^2) 34 cm^2 08/30/21 1432   Change in Wound Size % (l*w) 54.21 08/30/21 1432   Wound Volume (cm^3) 40.8 cm^3 08/30/21 1432   Wound Healing % 86 08/30/21 1432   Post-Procedure Length (cm) 9 cm 07/06/21 1449   Post-Procedure Width (cm) 3.2 cm 07/06/21 1449   Post-Procedure Depth (cm) 2.1 cm 07/06/21 1449   Post-Procedure Surface Area (cm^2) 28.8 cm^2 07/06/21 1449   Post-Procedure Volume (cm^3) 60.48 cm^3 07/06/21 1449   Distance Tunneling (cm) 0 cm 08/30/21 1432   Tunneling Position ___ O'Clock 0 08/30/21 1432   Undermining Starts ___ O'Clock 10 08/30/21 1432   Undermining Ends___ O'Clock 1 08/30/21 1432   Undermining Maxium Distance (cm) 3.8 08/30/21 1432   Wound Assessment Slough;Pink/red 08/30/21 1432   Drainage Amount Large 08/30/21 1432   Drainage Description Serosanguinous 08/30/21 1432   Odor None 08/30/21 1432   Nellie-wound Assessment Blanchable erythema 08/30/21 1432   Margins Unattached edges 08/30/21 1432   Wound Thickness Description not for Pressure Injury Full thickness 07/06/21 1420   Number of days: 265       Wound 05/03/21 Foot Left;Plantar wound 3a.  left lateral plantar foot (wound has  from wound #3) 5/3/21Pressure Stage 4 (Active)   Wound Image    08/30/21 1432   Wound Etiology Pressure Stage  4 08/30/21 1432   Dressing Status Old drainage noted 08/30/21 1432   Wound Cleansed Soap and water 08/30/21 1432   Dressing/Treatment ABD;Gauze dressing/dressing sponge;Moist to moist;Roll gauze;Tape/Soft cloth adhesive tape 08/09/21 1625   Wound Length (cm) 11.3 cm 08/30/21 1432   Wound Width (cm) 4.8 cm 08/30/21 1432   Wound Depth (cm) 1 cm 08/30/21 1432   Wound Surface Area (cm^2) 54.24 cm^2 08/30/21 1432   Change in Wound Size % (l*w) -1030 08/30/21 1432 Wound Volume (cm^3) 54.24 cm^3 08/30/21 1432   Wound Healing % -5550 08/30/21 1432   Post-Procedure Length (cm) 13.8 cm 07/06/21 1449   Post-Procedure Width (cm) 4.5 cm 07/06/21 1449   Post-Procedure Depth (cm) 0.2 cm 07/06/21 1449   Post-Procedure Surface Area (cm^2) 62.1 cm^2 07/06/21 1449   Post-Procedure Volume (cm^3) 12.42 cm^3 07/06/21 1449   Distance Tunneling (cm) 0 cm 08/30/21 1432   Tunneling Position ___ O'Clock 0 08/30/21 1432   Undermining Starts ___ O'Clock 0 08/30/21 1432   Undermining Ends___ O'Clock 0 08/30/21 1432   Undermining Maxium Distance (cm) 0 08/30/21 1432   Wound Assessment Slough;Pink/red;Eschar moist 08/30/21 1432   Drainage Amount Moderate 08/30/21 1432   Drainage Description Serosanguinous 08/30/21 1432   Odor None 08/30/21 1432   Nellie-wound Assessment Blanchable erythema 08/30/21 1432   Margins Attached edges 08/30/21 1432   Wound Thickness Description not for Pressure Injury Full thickness 07/06/21 1420   Number of days: 119       Wound 07/06/21 Leg Right;Lateral Wound 2 right lateral lower ext. (Pressure Stage 3) (Active)   Wound Image    08/30/21 1432   Wound Etiology Pressure Stage  3 08/30/21 1432   Dressing Status Old drainage noted 08/30/21 1432   Wound Cleansed Soap and water 08/30/21 1432   Dressing/Treatment ABD;Gauze dressing/dressing sponge;Moist to moist;Roll gauze;Tape/Soft cloth adhesive tape 08/09/21 1625   Wound Length (cm) 4 cm 08/30/21 1432   Wound Width (cm) 4.5 cm 08/30/21 1432   Wound Depth (cm) 0.4 cm 08/30/21 1432   Wound Surface Area (cm^2) 18 cm^2 08/30/21 1432   Change in Wound Size % (l*w) -542.86 08/30/21 1432   Wound Volume (cm^3) 7.2 cm^3 08/30/21 1432   Wound Healing % -2471 08/30/21 1432   Post-Procedure Length (cm) 2 cm 07/06/21 1449   Post-Procedure Width (cm) 1.4 cm 07/06/21 1449   Post-Procedure Depth (cm) 0.1 cm 07/06/21 1449   Post-Procedure Surface Area (cm^2) 2.8 cm^2 07/06/21 1449   Post-Procedure Volume (cm^3) 0.28 cm^3 07/06/21 1449 Distance Tunneling (cm) 0 cm 08/30/21 1432   Tunneling Position ___ O'Clock 0 08/30/21 1432   Undermining Starts ___ O'Clock 0 08/30/21 1432   Undermining Ends___ O'Clock 0 08/30/21 1432   Undermining Maxium Distance (cm) 0 08/30/21 1432   Wound Assessment Slough;Eschar moist;Pink/red 08/30/21 1432   Drainage Amount Moderate 08/30/21 1432   Drainage Description Serosanguinous 08/30/21 1432   Odor None 08/30/21 1432   Nellie-wound Assessment Blanchable erythema 08/30/21 1432   Margins Attached edges 08/30/21 1432   Wound Thickness Description not for Pressure Injury Full thickness 07/19/21 1406   Number of days: 55       Wound 07/19/21 Foot Right;Plantar;Mid WOUND1 RIGHT MID PLANTAR PRESSURE STG 4 (Active)   Wound Image    08/30/21 1432   Wound Etiology Pressure Stage  4 08/09/21 1529   Dressing Status Old drainage noted 08/30/21 1432   Wound Cleansed Soap and water 08/30/21 1432   Dressing/Treatment ABD;Gauze dressing/dressing sponge;Moist to moist;Roll gauze;Tape/Soft cloth adhesive tape 08/09/21 1625   Wound Length (cm) 3.3 cm 08/30/21 1432   Wound Width (cm) 2.3 cm 08/30/21 1432   Wound Depth (cm) 0.2 cm 08/30/21 1432   Wound Surface Area (cm^2) 7.59 cm^2 08/30/21 1432   Change in Wound Size % (l*w) -26.5 08/30/21 1432   Wound Volume (cm^3) 1.518 cm^3 08/30/21 1432   Wound Healing % -153 08/30/21 1432   Distance Tunneling (cm) 0 cm 08/30/21 1432   Tunneling Position ___ O'Clock 0 08/30/21 1432   Undermining Starts ___ O'Clock 0 08/30/21 1432   Undermining Ends___ O'Clock 0 08/30/21 1432   Undermining Maxium Distance (cm) 0 08/30/21 1432   Wound Assessment Eschar moist;Slough;Pink/red 08/30/21 1432   Drainage Amount Moderate 08/30/21 1432   Drainage Description Serosanguinous 08/30/21 1432   Odor None 08/30/21 1432   Nellie-wound Assessment Blanchable erythema 08/30/21 1432   Margins Attached edges 08/30/21 1432   Number of days: 42       Wound 08/30/21 Hip Right Wound 7, Pressure Stage 3, R.  Hip (Active)   Wound wounds cont to decline she will require surgical clean up. I will refer to Dr. Lilia Alonzo if needed. Pt agrees. Treatment Note please see attached Discharge Instructions    In my professional opinion this patient would benefit from HBO Therapy: No    Written patient dismissal instructions given to patient and signed by patient or POA. Discharge [address] and Hyperbaric Oxygen Therapy   Physician Orders and Discharge Instructions  [address]  Telephone: [phone]    NAME:  Jaswant Lainez OF BIRTH:  1960  MEDICAL RECORD NUMBER:  [id number]  DATE:  8/30/2021    Discharge condition: Stable    Discharge to: Home    Left via:Private automobile    Accompanied by: Self    ECF/HHA: [facility]      Dressing Orders: Right and Left Lower Ext and Feet Wounds  Soap and water wash, Thin layer of Santyl Ointment, Cover with Saline moist gauze, Secure with dry gauze and medipore tape  Change 1-2 times daily     Dressing Orders: Right and Left Ishium,  Soap and water wash, Thin layer of Santyl Ointment, Cover with Saline moist Roll gauze, Secure with dry gauze and medipore tape  Change 1-2 times daily     Dressing Orders: Right Hip  Soap and water wash, Thin layer of Santyl Ointment, Cover with Saline moist gauze, Secure with dry gauze and medipore tape  Change 1-2 times daily      Treatment Orders: Avoid Pressure to wound site. Use ROHO cushion, MARYSOL bed and Heel lift boots, check air in ROHO cushion to make sure its inflated properly  Turn frequently (turn at least every two hours when in bed)  While in chair reposition every 30 minutes  Patient advised to sit up no more than 30minutes at a time for meals ONLY. Please do not elevate the head of the bed higher than 30 degrees.   Off-load heels by applying heel-lift boots or \"float\" heels by placing pillows under calves so that heels do not touch  High Protein as Tolerated    C follow up visit ____________2 weeks_________________  (Please note your next appointment above and if you are unable to keep, kindly give a 24 hour notice. Thank you.)    If you experience any of the following, please call the [facility] during business hours:    * Increase in Pain  * Temperature over 101  * Increase in drainage from your wound  * Drainage with a foul odor  * Bleeding  * Increase in swelling  * Need for compression bandage changes due to slippage, breakthrough drainage. If you need medical attention outside of the business hours of the [facility] please contact your PCP or go to the nearest emergency room.         Electronically signed by Viv Espinosa MD on 8/30/2021 at 3:14 PM

## 2021-09-20 ENCOUNTER — HOSPITAL ENCOUNTER (OUTPATIENT)
Dept: WOUND CARE | Age: 61
Discharge: HOME | End: 2021-09-20
Payer: MEDICARE

## 2021-09-20 VITALS
SYSTOLIC BLOOD PRESSURE: 146 MMHG | WEIGHT: 180 LBS | RESPIRATION RATE: 18 BRPM | BODY MASS INDEX: 27.28 KG/M2 | HEART RATE: 79 BPM | DIASTOLIC BLOOD PRESSURE: 68 MMHG | HEIGHT: 68 IN | TEMPERATURE: 96.4 F

## 2021-09-20 DIAGNOSIS — L89.624 DECUBITUS ULCER OF LEFT HEEL, STAGE 4 (HCC): Primary | ICD-10-CM

## 2021-09-20 DIAGNOSIS — L89.314 DECUBITUS ULCER OF ISCHIUM, RIGHT, STAGE IV (HCC): ICD-10-CM

## 2021-09-20 DIAGNOSIS — M86.49 CHRONIC OSTEOMYELITIS WITH DRAINING SINUS OF MULTIPLE SITES (HCC): Chronic | ICD-10-CM

## 2021-09-20 DIAGNOSIS — E43 SEVERE PROTEIN-CALORIE MALNUTRITION (HCC): Chronic | ICD-10-CM

## 2021-09-20 DIAGNOSIS — I87.2 VENOUS STASIS ULCER OF RIGHT CALF WITH FAT LAYER EXPOSED WITHOUT VARICOSE VEINS (HCC): ICD-10-CM

## 2021-09-20 DIAGNOSIS — L89.324 DECUBITUS ULCER OF ISCHIAL AREA, LEFT, STAGE IV (HCC): Chronic | ICD-10-CM

## 2021-09-20 DIAGNOSIS — L89.894 DECUBITUS ULCER OF RIGHT FOOT, STAGE 4 (HCC): ICD-10-CM

## 2021-09-20 DIAGNOSIS — L97.212 VENOUS STASIS ULCER OF RIGHT CALF WITH FAT LAYER EXPOSED WITHOUT VARICOSE VEINS (HCC): ICD-10-CM

## 2021-09-20 DIAGNOSIS — L89.894 DECUBITUS ULCER OF LEFT LEG, STAGE 4 (HCC): ICD-10-CM

## 2021-09-20 PROCEDURE — 11045 DBRDMT SUBQ TISS EACH ADDL: CPT

## 2021-09-20 PROCEDURE — 11042 DBRDMT SUBQ TIS 1ST 20SQCM/<: CPT | Performed by: SURGERY

## 2021-09-20 PROCEDURE — 97597 DBRDMT OPN WND 1ST 20 CM/<: CPT

## 2021-09-20 PROCEDURE — 11046 DBRDMT MUSC&/FSCA EA ADDL: CPT

## 2021-09-20 PROCEDURE — 11043 DBRDMT MUSC&/FSCA 1ST 20/<: CPT | Performed by: SURGERY

## 2021-09-20 PROCEDURE — 11046 DBRDMT MUSC&/FSCA EA ADDL: CPT | Performed by: SURGERY

## 2021-09-20 PROCEDURE — 11042 DBRDMT SUBQ TIS 1ST 20SQCM/<: CPT

## 2021-09-20 PROCEDURE — 97597 DBRDMT OPN WND 1ST 20 CM/<: CPT | Performed by: SURGERY

## 2021-09-20 PROCEDURE — 11043 DBRDMT MUSC&/FSCA 1ST 20/<: CPT

## 2021-09-20 PROCEDURE — 11045 DBRDMT SUBQ TISS EACH ADDL: CPT | Performed by: SURGERY

## 2021-09-20 RX ORDER — SODIUM HYPOCHLORITE 1.25 MG/ML
SOLUTION TOPICAL
Qty: 1000 ML | Refills: 5 | Status: SHIPPED | OUTPATIENT
Start: 2021-09-20 | End: 2022-01-25

## 2021-09-20 RX ORDER — LIDOCAINE HYDROCHLORIDE 20 MG/ML
JELLY TOPICAL PRN
Status: CANCELLED
Start: 2021-09-20

## 2021-09-20 ASSESSMENT — PAIN DESCRIPTION - FREQUENCY: FREQUENCY: INTERMITTENT

## 2021-09-20 ASSESSMENT — PAIN DESCRIPTION - PROGRESSION: CLINICAL_PROGRESSION: NOT CHANGED

## 2021-09-20 ASSESSMENT — PAIN DESCRIPTION - DESCRIPTORS: DESCRIPTORS: ACHING

## 2021-09-20 ASSESSMENT — PAIN DESCRIPTION - LOCATION: LOCATION: SHOULDER

## 2021-09-20 ASSESSMENT — PAIN SCALES - GENERAL: PAINLEVEL_OUTOF10: 7

## 2021-09-20 ASSESSMENT — PAIN DESCRIPTION - PAIN TYPE: TYPE: ACUTE PAIN

## 2021-09-20 ASSESSMENT — PAIN DESCRIPTION - ONSET: ONSET: ON-GOING

## 2021-09-20 ASSESSMENT — PAIN DESCRIPTION - ORIENTATION: ORIENTATION: RIGHT

## 2021-09-20 NOTE — PROGRESS NOTES
[address]   Progress Note and Procedure Note      [address] RECORD NUMBER:  [id number]  AGE: 63 y.o. GENDER: female  : 1960  EPISODE DATE:  2021    Subjective:     Chief Complaint   Patient presents with    Wound Check         HISTORY of PRESENT ILLNESS MARINA Ortiz is a 63 y.o. female who presents today for wound/ulcer evaluation. Wound Context: Pt with multiple wounds here for eval/treat  Wound/Ulcer Pain Timing/Severity: none  Quality of pain: N/A  Severity:  0 / 10   Modifying Factors: None  Associated Signs/Symptoms: none    Ulcer Identification:  Ulcer Type: pressure  Contributing Factors: chronic pressure and decreased mobility    Wound: pressure        PAST MEDICAL HISTORY        Diagnosis Date    Blood circulation, collateral     Chronic kidney disease     bladder removed-no kidney problems    GERD (gastroesophageal reflux disease)     History of blood transfusion     History of urostomy     Neuromuscular disorder (HCC)     parapalegic t10-l1    Osteomyelitis (HCC)     Pressure ulcer     to left ischium and bilat heels       PAST SURGICAL HISTORY    Past Surgical History:   Procedure Laterality Date    BACK SURGERY  1979    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  Buttock years ago more than 20 years ago    SMALL INTESTINE SURGERY N/A 2019    DIVERTING LOOP COLOSTOMY performed by Peter George MD at [address] HISTORY    Family History   Problem Relation Age of Onset    Other Mother          of sepsis    Cancer Father         Lung Cancer    Diabetes Paternal Grandmother     Heart Attack Paternal Grandfather        SOCIAL HISTORY    Social History     Tobacco Use    Smoking status: Never Smoker    Smokeless tobacco: Never Used   Vaping Use    Vaping Use: Never used   Substance Use Topics    Alcohol use: Not Currently    Drug use: Never       ALLERGIES    Allergies   Allergen Reactions    Morphine And Related Nausea Only     Caused severe altered mental status       MEDICATIONS    Current Outpatient Medications on File Prior to Encounter   Medication Sig Dispense Refill    ferrous sulfate (IRON 325) 325 (65 Fe) MG tablet Take 325 mg by mouth daily (with breakfast)       SODIUM CHLORIDE, EXTERNAL, (SALINE WOUND WASH) 0.9 % SOLN 1000 cc bottle normal saline. Apply as directed 1000 mL 3    sodium hypochlorite (DAKINS) 0.125 % SOLN external solution Moisten gauze apply to wound twice daily 1 Bottle 3    ALPRAZolam (XANAX) 1 MG tablet Take 1 mg by mouth 3 times daily as needed for Anxiety.  HYDROcodone-acetaminophen (NORCO)  MG per tablet Take 1 tablet by mouth every 6 hours as needed for Pain.  omeprazole (PRILOSEC) 20 MG delayed release capsule Take 20 mg by mouth daily as needed       zinc 50 MG CAPS Take 1 capsule by mouth daily      baclofen (LIORESAL) 10 MG tablet Take 10 mg by mouth 3 times daily       collagenase (SANTYL) 250 UNIT/GM ointment Apply 1 applicator topically daily       No current facility-administered medications on file prior to encounter. REVIEW OF SYSTEMS    A comprehensive review of systems was negative.     Objective:      BP (!) 146/68   Pulse 79   Temp 96.4 °F (35.8 °C) (Temporal)   Resp 18   Ht 5' 8\" (1.727 m)   Wt 180 lb (81.6 kg)   BMI 27.37 kg/m²     Wt Readings from Last 3 Encounters:   09/20/21 180 lb (81.6 kg)   08/30/21 180 lb (81.6 kg)   08/09/21 180 lb (81.6 kg)       PHYSICAL EXAM    General Appearance: alert and oriented to person, place and time, well developed and well- nourished, in no acute distress  Skin: warm and dry, no rash or erythema  Head: normocephalic and atraumatic  Eyes: pupils equal, round, and reactive to light, extraocular eye movements intact, conjunctivae normal  ENT: tympanic membrane, external ear and ear canal normal bilaterally, nose without deformity, nasal mucosa and turbinates normal without polyps, lips teeth and gums normal  Neck: supple and non-tender without mass, no thyromegaly or thyroid nodules, no cervical lymphadenopathy  Pulmonary/Chest: clear to auscultation bilaterally- no wheezes, rales or rhonchi, normal air movement, no respiratory distress  Cardiovascular: normal rate, regular rhythm, normal S1 and S2, no murmurs, rubs, clicks, or gallops, distal pulses intact, no carotid bruits  Abdomen: soft, non-tender, non-distended, normal bowel sounds, no masses or organomegaly  Extremities: no cyanosis, clubbing or edema  Musculoskeletal: normal range of motion, no joint swelling, deformity or tenderness      Assessment:      Problem List Items Addressed This Visit     * (Principal) Decubitus ulcer of ischial area, left, stage IV (HCC) (Chronic)    Decubitus ulcer of ischium, right, stage IV (HCC) (Chronic)    Relevant Orders    Supply: Wound Cleanser    Chronic osteomyelitis with draining sinus of multiple sites (HCC) (Chronic)    Severe protein-calorie malnutrition (HCC) (Chronic)    Decubitus ulcer of left heel, stage 4 (HCC) - Primary (Chronic)    Relevant Orders    Supply: Wound Cleanser    Decubitus ulcer of right foot, stage 4 (HCC) (Chronic)    Relevant Orders    Supply: Wound Cleanser    Decubitus ulcer of left leg, stage 4 (HCC)    Relevant Orders    Supply: Wound Cleanser    Venous stasis ulcer of right calf with fat layer exposed without varicose veins (HCC)    Relevant Orders    Supply: Wound Cleanser           Procedure Note  Indications:  Based on my examination of this patient's wound(s)/ulcer(s) today, debridement is required to promote healing and evaluate the wound base. Performed by: Maggy Lynne MD    Consent obtained:  Yes    Time out taken:  Yes    Pain Control: Anesthetic  Anesthetic: None       Debridement:Excisional Debridement    Using curette the wound(s)/ulcer(s) was/were sharply debrided down through and including the removal of epidermis, dermis, subcutaneous tissue and muscle/fascia. Devitalized Tissue Debrided:  fibrin, biofilm, slough, necrotic/eschar and exudate      Pre Debridement Measurements:  Are located in the Wound/Ulcer Documentation Flow Sheet    Wound/Ulcer #: 5 and 6    Percent of Wound(s)/Ulcer(s) Debrided: 100%    Total Surface Area Debrided:  33 sq cm       Diabetic/Pressure/Non Pressure Ulcers only:  Ulcer: Pressure ulcer, Stage 4      Debridement:Excisional Debridement    Using curette the wound(s)/ulcer(s) was/were sharply debrided down through and including the removal of epidermis, dermis and subcutaneous tissue. Devitalized Tissue Debrided:  fibrin, biofilm, slough, necrotic/eschar and exudate    Pre Debridement Measurements:  Are located in the Wound/Ulcer Documentation Flow Sheet    Wound/Ulcer #: 1, 2 and 3A    Percent of Wound(s)/Ulcer(s) Debrided: 100%    Total Surface Area Debrided:  58 sq cm       Diabetic/Pressure/Non Pressure Ulcers only:  Ulcer: Pressure ulcer, Stage 3      Debridement:Non-excisional Debridement    Using curette the wound(s)/ulcer(s) was/were sharply debrided down through and including the removal of epidermis and dermis.         Devitalized Tissue Debrided:  fibrin, biofilm, slough, necrotic/eschar and exudate    Pre Debridement Measurements:  Are located in the Wound/Ulcer Documentation Flow Sheet    Wound/Ulcer #: 7    Percent of Wound(s)/Ulcer(s) Debrided: 100%    Total Surface Area Debrided:  5 sq cm       Diabetic/Pressure/Non Pressure Ulcers only:  Ulcer: Pressure ulcer, Stage 3         Post Debridement Measurements:    Wound/Ulcer Descriptions are Pre Debridement --EXCEPT MEASUREMENTS    Wound 12/07/20 Left Wound 5. Left Ischium Pressure Stage 4 (Active)   Wound Image   09/20/21 1323   Wound Etiology Pressure Stage  4 09/20/21 1323   Dressing Status Old drainage noted 09/20/21 1323   Wound Cleansed Soap and water 09/20/21 1323   Dressing/Treatment ABD; Moist to moist;Roll gauze;Tape/Soft cloth adhesive tape 08/09/21 1625   Offloading for Diabetic Foot Ulcers No offloading required 08/09/21 1529   Wound Length (cm) 10.9 cm 09/20/21 1323   Wound Width (cm) 1.5 cm 09/20/21 1323   Wound Depth (cm) 2.8 cm 09/20/21 1323   Wound Surface Area (cm^2) 16.35 cm^2 09/20/21 1323   Change in Wound Size % (l*w) 83.48 09/20/21 1323   Wound Volume (cm^3) 45.78 cm^3 09/20/21 1323   Wound Healing % 96 09/20/21 1323   Post-Procedure Length (cm) 10.9 cm 09/20/21 1416   Post-Procedure Width (cm) 1.5 cm 09/20/21 1416   Post-Procedure Depth (cm) 2.8 cm 09/20/21 1416   Post-Procedure Surface Area (cm^2) 16.35 cm^2 09/20/21 1416   Post-Procedure Volume (cm^3) 45.78 cm^3 09/20/21 1416   Distance Tunneling (cm) 5.8 cm 09/20/21 1323   Tunneling Position ___ O'Clock 7 09/20/21 1323   Undermining Starts ___ O'Clock 12 09/20/21 1323   Undermining Ends___ O'Clock 6 09/20/21 1323   Undermining Maxium Distance (cm) 2.8 09/20/21 1323   Wound Assessment Exposed structure muscle; Purple/maroon 09/20/21 1323   Drainage Amount Large 09/20/21 1323   Drainage Description Serosanguinous; Yellow 09/20/21 1323   Odor None 09/20/21 1323   Nellie-wound Assessment Blanchable erythema 09/20/21 1323   Margins Unattached edges 09/20/21 1323   Number of days: 286       Wound 12/07/20 Ischium Right Wound 6. Right Ischium Stage 4 (Active)   Wound Image   09/20/21 1323   Wound Etiology Pressure Stage  4 09/20/21 1323   Dressing Status Old drainage noted 09/20/21 1323   Wound Cleansed Soap and water 09/20/21 1323   Dressing/Treatment ABD; Moist to moist;Roll gauze;Tape/Soft cloth adhesive tape 08/09/21 1625   Offloading for Diabetic Foot Ulcers Other (comment) 01/18/21 1426   Wound Length (cm) 11.4 cm 09/20/21 1323   Wound Width (cm) 1.5 cm 09/20/21 1323   Wound Depth (cm) 1.8 cm 09/20/21 1323   Wound Surface Area (cm^2) 17.1 cm^2 09/20/21 1323   Change in Wound Size % (l*w) 76.97 09/20/21 1323   Wound Volume (cm^3) 30.78 cm^3 09/20/21 1323   Wound Healing % 90 09/20/21 1323   Post-Procedure Length (cm) 11.4 cm 09/20/21 1416   Post-Procedure Width (cm) 1.5 cm 09/20/21 1416   Post-Procedure Depth (cm) 1.8 cm 09/20/21 1416   Post-Procedure Surface Area (cm^2) 17.1 cm^2 09/20/21 1416   Post-Procedure Volume (cm^3) 30.78 cm^3 09/20/21 1416   Distance Tunneling (cm) 1.5 cm 09/20/21 1323   Tunneling Position ___ O'Clock 6 09/20/21 1323   Undermining Starts ___ O'Clock 11 09/20/21 1323   Undermining Ends___ O'Clock 5 09/20/21 1323   Undermining Maxium Distance (cm) 1.5 09/20/21 1323   Wound Assessment Pink/red;Slough 09/20/21 1323   Drainage Amount Large 09/20/21 1323   Drainage Description Serosanguinous; Yellow 09/20/21 1323   Odor None 09/20/21 1323   Nellie-wound Assessment Blanchable erythema 09/20/21 1323   Margins Unattached edges 09/20/21 1323   Wound Thickness Description not for Pressure Injury Full thickness 07/06/21 1420   Number of days: 286       Wound 05/03/21 Foot Left;Plantar wound 3a.  left lateral plantar foot (wound has  from wound #3) 5/3/21Pressure Stage 4 (Active)   Wound Image   09/20/21 1323   Wound Etiology Pressure Stage  4 09/20/21 1323   Dressing Status Old drainage noted 09/20/21 1323   Wound Cleansed Soap and water 09/20/21 1323   Dressing/Treatment ABD;Gauze dressing/dressing sponge;Moist to moist;Roll gauze;Tape/Soft cloth adhesive tape 08/09/21 1625   Wound Length (cm) 9.9 cm 09/20/21 1323   Wound Width (cm) 4 cm 09/20/21 1323   Wound Depth (cm) 0.5 cm 09/20/21 1323   Wound Surface Area (cm^2) 39.6 cm^2 09/20/21 1323   Change in Wound Size % (l*w) -725 09/20/21 1323   Wound Volume (cm^3) 19.8 cm^3 09/20/21 1323 Wound Healing % -1960/21 1323   Post-Procedure Length (cm) 9.9 cm 09/20/21 1416   Post-Procedure Width (cm) 4 cm 09/20/21 1416   Post-Procedure Depth (cm) 0.5 cm 09/20/21 1416   Post-Procedure Surface Area (cm^2) 39.6 cm^2 09/20/21 1416   Post-Procedure Volume (cm^3) 19.8 cm^3 09/20/21 1416   Distance Tunneling (cm) 0 cm 09/20/21 1323   Tunneling Position ___ O'Clock 0 09/20/21 1323   Undermining Starts ___ O'Clock 0 09/20/21 1323   Undermining Ends___ O'Clock 0 09/20/21 1323   Undermining Maxium Distance (cm) 0 09/20/21 1323   Wound Assessment Pink/red;Slough 09/20/21 1323   Drainage Amount Moderate 09/20/21 1323   Drainage Description Serosanguinous 09/20/21 1323   Odor None 09/20/21 1323   Nellie-wound Assessment Blanchable erythema 09/20/21 1323   Margins Attached edges 09/20/21 1323   Wound Thickness Description not for Pressure Injury Full thickness 07/06/21 1420   Number of days: 140       Wound 07/06/21 Leg Right;Lateral Wound 2 right lateral lower ext. (Pressure Stage 3) (Active)   Wound Image   09/20/21 1323   Wound Etiology Pressure Stage  3 09/20/21 1323   Dressing Status Old drainage noted 09/20/21 1323   Wound Cleansed Soap and water 09/20/21 1323   Dressing/Treatment ABD;Gauze dressing/dressing sponge;Moist to moist;Roll gauze;Tape/Soft cloth adhesive tape 08/09/21 1625   Wound Length (cm) 3.8 cm 09/20/21 1323   Wound Width (cm) 2.3 cm 09/20/21 1323   Wound Depth (cm) 0.4 cm 09/20/21 1323   Wound Surface Area (cm^2) 8.74 cm^2 09/20/21 1323   Change in Wound Size % (l*w) -212.14 09/20/21 1323   Wound Volume (cm^3) 3.496 cm^3 09/20/21 1323   Wound Healing % -1149 09/20/21 1323   Post-Procedure Length (cm) 3.8 cm 09/20/21 1416   Post-Procedure Width (cm) 2.3 cm 09/20/21 1416   Post-Procedure Depth (cm) 0.4 cm 09/20/21 1416   Post-Procedure Surface Area (cm^2) 8.74 cm^2 09/20/21 1416   Post-Procedure Volume (cm^3) 3.496 cm^3 09/20/21 1416   Distance Tunneling (cm) 0 cm 09/20/21 1323   Tunneling Position ___ O'Clock 0 09/20/21 1323   Undermining Starts ___ O'Clock 0 09/20/21 1323   Undermining Ends___ O'Clock 0 09/20/21 1323   Undermining Maxium Distance (cm) 0 09/20/21 1323   Wound Assessment Pink/red;Slough 09/20/21 1323   Drainage Amount Moderate 09/20/21 1323   Drainage Description Serosanguinous 09/20/21 1323   Odor None 09/20/21 1323   Nellie-wound Assessment Blanchable erythema;Dry/flaky 09/20/21 1323   Margins Attached edges 09/20/21 1323   Wound Thickness Description not for Pressure Injury Full thickness 07/19/21 1406   Number of days: 75       Wound 07/19/21 Foot Right;Plantar;Mid WOUND1 RIGHT MID PLANTAR PRESSURE STG 4 (Active)   Wound Image   09/20/21 1323   Wound Etiology Pressure Stage  4 09/20/21 1323   Dressing Status Old drainage noted 09/20/21 1323   Wound Cleansed Soap and water 09/20/21 1323   Dressing/Treatment ABD;Gauze dressing/dressing sponge;Moist to moist;Roll gauze;Tape/Soft cloth adhesive tape 08/09/21 1625   Wound Length (cm) 3.9 cm 09/20/21 1323   Wound Width (cm) 2.4 cm 09/20/21 1323   Wound Depth (cm) 0.2 cm 09/20/21 1323   Wound Surface Area (cm^2) 9.36 cm^2 09/20/21 1323   Change in Wound Size % (l*w) -56 09/20/21 1323   Wound Volume (cm^3) 1.872 cm^3 09/20/21 1323   Wound Healing % -212 09/20/21 1323   Post-Procedure Length (cm) 3.9 cm 09/20/21 1416   Post-Procedure Width (cm) 2.4 cm 09/20/21 1416   Post-Procedure Depth (cm) 0.2 cm 09/20/21 1416   Post-Procedure Surface Area (cm^2) 9.36 cm^2 09/20/21 1416   Post-Procedure Volume (cm^3) 1.872 cm^3 09/20/21 1416   Distance Tunneling (cm) 0 cm 09/20/21 1323   Tunneling Position ___ O'Clock 0 09/20/21 1323   Undermining Starts ___ O'Clock 0 09/20/21 1323   Undermining Ends___ O'Clock 0 09/20/21 1323   Undermining Maxium Distance (cm) 0 09/20/21 1323   Wound Assessment Eschar moist;Pink/red;Slough 09/20/21 1323   Drainage Amount Moderate 09/20/21 1323   Drainage Description Serosanguinous 09/20/21 1323   Odor None 09/20/21 1323 Nellie-wound Assessment Blanchable erythema 09/20/21 1323   Margins Attached edges 09/20/21 1323   Number of days: 63       Wound 08/30/21 Hip Right Wound 7, Pressure Stage 3, R. Hip (Active)   Wound Image   09/20/21 1323   Wound Etiology Pressure Stage  3 09/20/21 1323   Dressing Status Old drainage noted 09/20/21 1323   Wound Cleansed Soap and water 09/20/21 1323   Wound Length (cm) 3.2 cm 09/20/21 1323   Wound Width (cm) 1.7 cm 09/20/21 1323   Wound Depth (cm) 0.1 cm 09/20/21 1323   Wound Surface Area (cm^2) 5.44 cm^2 09/20/21 1323   Change in Wound Size % (l*w) 1.09 09/20/21 1323   Wound Volume (cm^3) 0.544 cm^3 09/20/21 1323   Wound Healing % 1 09/20/21 1323   Post-Procedure Length (cm) 3.2 cm 09/20/21 1416   Post-Procedure Width (cm) 1.7 cm 09/20/21 1416   Post-Procedure Depth (cm) 0.1 cm 09/20/21 1416   Post-Procedure Surface Area (cm^2) 5.44 cm^2 09/20/21 1416   Post-Procedure Volume (cm^3) 0.544 cm^3 09/20/21 1416   Distance Tunneling (cm) 0 cm 09/20/21 1323   Tunneling Position ___ O'Clock 0 09/20/21 1323   Undermining Starts ___ O'Clock 0 09/20/21 1323   Undermining Ends___ O'Clock 0 09/20/21 1323   Undermining Maxium Distance (cm) 0 09/20/21 1323   Wound Assessment Pink/red;Slough 09/20/21 1323   Drainage Amount Moderate 09/20/21 1323   Drainage Description Serosanguinous; Yellow 09/20/21 1323   Odor None 09/20/21 1323   Nellie-wound Assessment Blanchable erythema 09/20/21 1323   Margins Attached edges 09/20/21 1323   Number of days: 20             Estimated Blood Loss:  minimal  Hemostasis Achieved:  by pressure    Procedural Pain:  0  / 10     Post Procedural Pain:  0 / 10     Response to treatment:  Well tolerated by patient.          Plan:     Problem List Items Addressed This Visit     * (Principal) Decubitus ulcer of ischial area, left, stage IV (HCC) (Chronic)    Decubitus ulcer of ischium, right, stage IV (HCC) (Chronic)    Relevant Orders    Supply: Wound Cleanser    Chronic osteomyelitis with draining sinus of multiple sites (HCC) (Chronic)    Severe protein-calorie malnutrition (HCC) (Chronic)    Decubitus ulcer of left heel, stage 4 (HCC) - Primary (Chronic)    Relevant Orders    Supply: Wound Cleanser    Decubitus ulcer of right foot, stage 4 (HCC) (Chronic)    Relevant Orders    Supply: Wound Cleanser    Decubitus ulcer of left leg, stage 4 (HCC)    Relevant Orders    Supply: Wound Cleanser    Venous stasis ulcer of right calf with fat layer exposed without varicose veins (HCC)    Relevant Orders    Supply: Wound Cleanser          Cont current wounds stable and sl better. RTO 2 weeks    Treatment Note please see attached Discharge Instructions    In my professional opinion this patient would benefit from HBO Therapy: No    Written patient dismissal instructions given to patient and signed by patient or POA.          Discharge [address] and Hyperbaric Oxygen Therapy   Physician Orders and Discharge Instructions  [address]  Telephone: [phone]    NAME:  Austyn Gaffney OF BIRTH:  1960  MEDICAL RECORD NUMBER:  [id number]  DATE:  9/20/2021    Discharge condition: Stable    Discharge to: Home    Left via:Private automobile    Accompanied by: Self     [facility]F/[facility]: [facility]      Dressing Orders: Right and Left Lower Ext and Feet Wounds  Soap and water wash, Thin layer of Santyl Ointment, Cover with Saline moist gauze, Secure with dry gauze and medipore tape  Change 1-2 times daily     Dressing Orders: Right and Left Ishium,  Soap and water wash, Thin layer of Santyl Ointment, Cover with Saline moist Roll gauze, Secure with dry gauze and medipore tape  Change 1-2 times daily      Dressing Orders: Right Hip  Soap and water wash, Thin layer of Santyl Ointment, Cover with Saline moist gauze, Secure with dry gauze and medipore tape  Change 1-2 times daily      Treatment Orders: Avoid Pressure to wound site. Use ROHO cushion, MARYSOL bed and Heel lift boots, check air in ROHO cushion to make sure its inflated properly  Turn frequently (turn at least every two hours when in bed)  While in chair reposition every 30 minutes  Patient advised to sit up no more than 30minutes at a time for meals ONLY. Please do not elevate the head of the bed higher than 30 degrees. Off-load heels by applying heel-lift boots or \"float\" heels by placing pillows under calves so that heels do not touch  High Protein as Tolerated    [facility] follow up visit _____________________________  (Please note your next appointment above and if you are unable to keep, kindly give a 24 hour notice. Thank you.)    If you experience any of the following, please call the [facility] during business hours:    * Increase in Pain  * Temperature over 101  * Increase in drainage from your wound  * Drainage with a foul odor  * Bleeding  * Increase in swelling  * Need for compression bandage changes due to slippage, breakthrough drainage. If you need medical attention outside of the business hours of the [facility] please contact your PCP or go to the nearest emergency room.         Electronically signed by Rocio Cm MD on 9/20/2021 at 2:24 PM

## 2021-10-11 ENCOUNTER — HOSPITAL ENCOUNTER (OUTPATIENT)
Dept: WOUND CARE | Age: 61
Discharge: HOME | End: 2021-10-11
Payer: MEDICARE

## 2021-10-11 VITALS
WEIGHT: 180 LBS | HEART RATE: 82 BPM | RESPIRATION RATE: 18 BRPM | BODY MASS INDEX: 27.28 KG/M2 | TEMPERATURE: 97.3 F | HEIGHT: 68 IN | SYSTOLIC BLOOD PRESSURE: 149 MMHG | DIASTOLIC BLOOD PRESSURE: 69 MMHG

## 2021-10-11 DIAGNOSIS — L89.324 DECUBITUS ULCER OF ISCHIAL AREA, LEFT, STAGE IV (HCC): Chronic | ICD-10-CM

## 2021-10-11 DIAGNOSIS — L89.894 DECUBITUS ULCER OF RIGHT FOOT, STAGE 4 (HCC): ICD-10-CM

## 2021-10-11 DIAGNOSIS — L89.894 DECUBITUS ULCER OF LEFT LEG, STAGE 4 (HCC): ICD-10-CM

## 2021-10-11 DIAGNOSIS — L89.624 DECUBITUS ULCER OF LEFT HEEL, STAGE 4 (HCC): Primary | ICD-10-CM

## 2021-10-11 DIAGNOSIS — E43 SEVERE PROTEIN-CALORIE MALNUTRITION (HCC): Chronic | ICD-10-CM

## 2021-10-11 DIAGNOSIS — M86.49 CHRONIC OSTEOMYELITIS WITH DRAINING SINUS OF MULTIPLE SITES (HCC): Chronic | ICD-10-CM

## 2021-10-11 DIAGNOSIS — I87.2 VENOUS STASIS ULCER OF RIGHT CALF WITH FAT LAYER EXPOSED WITHOUT VARICOSE VEINS (HCC): ICD-10-CM

## 2021-10-11 DIAGNOSIS — L97.212 VENOUS STASIS ULCER OF RIGHT CALF WITH FAT LAYER EXPOSED WITHOUT VARICOSE VEINS (HCC): ICD-10-CM

## 2021-10-11 DIAGNOSIS — L89.314 DECUBITUS ULCER OF ISCHIUM, RIGHT, STAGE IV (HCC): ICD-10-CM

## 2021-10-11 PROCEDURE — 6370000000 HC RX 637 (ALT 250 FOR IP): Performed by: SURGERY

## 2021-10-11 PROCEDURE — 11042 DBRDMT SUBQ TIS 1ST 20SQCM/<: CPT | Performed by: SURGERY

## 2021-10-11 PROCEDURE — 11042 DBRDMT SUBQ TIS 1ST 20SQCM/<: CPT

## 2021-10-11 PROCEDURE — 11045 DBRDMT SUBQ TISS EACH ADDL: CPT | Performed by: SURGERY

## 2021-10-11 PROCEDURE — 11045 DBRDMT SUBQ TISS EACH ADDL: CPT

## 2021-10-11 RX ORDER — LIDOCAINE HYDROCHLORIDE 20 MG/ML
JELLY TOPICAL PRN
Status: DISCONTINUED | OUTPATIENT
Start: 2021-10-11 | End: 2021-10-13 | Stop reason: HOSPADM

## 2021-10-11 RX ORDER — LIDOCAINE HYDROCHLORIDE 20 MG/ML
JELLY TOPICAL PRN
Status: CANCELLED
Start: 2021-10-11

## 2021-10-11 RX ADMIN — LIDOCAINE HYDROCHLORIDE: 20 JELLY TOPICAL at 14:23

## 2021-10-11 ASSESSMENT — PAIN DESCRIPTION - FREQUENCY: FREQUENCY: INTERMITTENT

## 2021-10-11 ASSESSMENT — PAIN DESCRIPTION - LOCATION: LOCATION: SHOULDER

## 2021-10-11 ASSESSMENT — PAIN DESCRIPTION - ONSET: ONSET: ON-GOING

## 2021-10-11 ASSESSMENT — PAIN SCALES - GENERAL: PAINLEVEL_OUTOF10: 6

## 2021-10-11 ASSESSMENT — PAIN DESCRIPTION - PAIN TYPE: TYPE: CHRONIC PAIN

## 2021-10-11 ASSESSMENT — PAIN DESCRIPTION - ORIENTATION: ORIENTATION: RIGHT

## 2021-10-11 ASSESSMENT — PAIN DESCRIPTION - PROGRESSION: CLINICAL_PROGRESSION: NOT CHANGED

## 2021-10-11 ASSESSMENT — PAIN DESCRIPTION - DESCRIPTORS: DESCRIPTORS: ACHING

## 2021-10-11 NOTE — PROGRESS NOTES
[address]   Progress Note and Procedure Note      [address] RECORD NUMBER:  [id number]  AGE: 63 y.o. GENDER: female  : 1960  EPISODE DATE:  10/11/2021    Subjective:     Chief Complaint   Patient presents with    Wound Check     Overall patient thinks wounds are ok, there are a couple of necrotic areas         HISTORY of PRESENT ILLNESS HPI     Chu Encinas is a 63 y.o. female who presents today for wound/ulcer evaluation. Wound Context: Pt with multiple pressure wounds here for eval/treat  Wound/Ulcer Pain Timing/Severity: none  Quality of pain: N/A  Severity:  0 / 10   Modifying Factors: None  Associated Signs/Symptoms: none    Ulcer Identification:  Ulcer Type: pressure  Contributing Factors: chronic pressure, decreased mobility and shear force    Wound: pressure        PAST MEDICAL HISTORY        Diagnosis Date    Blood circulation, collateral     Chronic kidney disease     bladder removed-no kidney problems    GERD (gastroesophageal reflux disease)     History of blood transfusion     History of urostomy     Neuromuscular disorder (HCC)     parapalegic t10-l1    Osteomyelitis (HCC)     Pressure ulcer     to left ischium and bilat heels       PAST SURGICAL HISTORY    Past Surgical History:   Procedure Laterality Date    BACK SURGERY  1979    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  Buttock years ago more than 20 years ago    SMALL INTESTINE SURGERY N/A 2019    DIVERTING LOOP COLOSTOMY performed by Christ Chaparro MD at [address] HISTORY    Family History   Problem Relation Age of Onset    Other Mother          of sepsis    Cancer Father         Lung Cancer    Diabetes Paternal Grandmother     Heart Attack Paternal Grandfather        SOCIAL HISTORY    Social History     Tobacco Use    Smoking status: Never Smoker    Smokeless tobacco: Never Used   Vaping Use    Vaping Use: Never used   Substance Use Topics    Alcohol use: Not Currently    Drug use: Never       ALLERGIES    Allergies   Allergen Reactions    Morphine And Related Nausea Only     Caused severe altered mental status       MEDICATIONS    Current Outpatient Medications on File Prior to Encounter   Medication Sig Dispense Refill    ferrous sulfate (IRON 325) 325 (65 Fe) MG tablet Take 325 mg by mouth daily (with breakfast)       ALPRAZolam (XANAX) 1 MG tablet Take 1 mg by mouth 3 times daily as needed for Anxiety.  HYDROcodone-acetaminophen (NORCO)  MG per tablet Take 1 tablet by mouth every 6 hours as needed for Pain.  omeprazole (PRILOSEC) 20 MG delayed release capsule Take 20 mg by mouth daily as needed       zinc 50 MG CAPS Take 1 capsule by mouth daily      baclofen (LIORESAL) 10 MG tablet Take 10 mg by mouth 3 times daily       sodium hypochlorite (DAKINS) 0.125 % SOLN external solution Moisten gauze apply to wound twice daily 1000 mL 5    SODIUM CHLORIDE, EXTERNAL, (SALINE WOUND WASH) 0.9 % SOLN 1000 cc bottle normal saline. Apply as directed 1000 mL 3    sodium hypochlorite (DAKINS) 0.125 % SOLN external solution Moisten gauze apply to wound twice daily 1 Bottle 3    collagenase (SANTYL) 250 UNIT/GM ointment Apply 1 applicator topically daily       No current facility-administered medications on file prior to encounter. REVIEW OF SYSTEMS    A comprehensive review of systems was negative.     Objective:      BP (!) 149/69   Pulse 82   Temp 97.3 °F (36.3 °C) (Temporal)   Resp 18   Ht 5' 8\" (1.727 m)   Wt 180 lb (81.6 kg)   BMI 27.37 kg/m²     Wt Readings from Last 3 Encounters: 10/11/21 180 lb (81.6 kg)   09/20/21 180 lb (81.6 kg)   08/30/21 180 lb (81.6 kg)       PHYSICAL EXAM    General Appearance: alert and oriented to person, place and time, well developed and well- nourished, in no acute distress  Skin: warm and dry, no rash or erythema  Head: normocephalic and atraumatic  Eyes: pupils equal, round, and reactive to light, extraocular eye movements intact, conjunctivae normal  ENT: tympanic membrane, external ear and ear canal normal bilaterally, nose without deformity, nasal mucosa and turbinates normal without polyps, lips teeth and gums normal  Neck: supple and non-tender without mass, no thyromegaly or thyroid nodules, no cervical lymphadenopathy  Pulmonary/Chest: clear to auscultation bilaterally- no wheezes, rales or rhonchi, normal air movement, no respiratory distress  Cardiovascular: normal rate, regular rhythm, normal S1 and S2, no murmurs, rubs, clicks, or gallops, distal pulses intact, no carotid bruits  Abdomen: soft, non-tender, non-distended, normal bowel sounds, no masses or organomegaly  Extremities: no cyanosis, clubbing or edema  Musculoskeletal: normal range of motion, no joint swelling, deformity or tenderness      Assessment:      Problem List Items Addressed This Visit     Decubitus ulcer of ischial area, left, stage IV (HCC) (Chronic)    Decubitus ulcer of ischium, right, stage IV (HCC) (Chronic)    Relevant Medications    lidocaine (XYLOCAINE) 2 % uro-jet    Other Relevant Orders    Supply: Wound Cleanser    Chronic osteomyelitis with draining sinus of multiple sites (HCC) (Chronic)    Severe protein-calorie malnutrition (HCC) (Chronic)    * (Principal) Decubitus ulcer of left heel, stage 4 (HCC) - Primary (Chronic)    Relevant Medications    lidocaine (XYLOCAINE) 2 % uro-jet    Other Relevant Orders    Supply: Wound Cleanser    Decubitus ulcer of right foot, stage 4 (HCC) (Chronic)    Relevant Medications    lidocaine (XYLOCAINE) 2 % uro-jet    Other Relevant Orders    Supply: Wound Cleanser    Decubitus ulcer of left leg, stage 4 (HCC)    Relevant Medications    lidocaine (XYLOCAINE) 2 % uro-jet    Other Relevant Orders    Supply: Wound Cleanser    Venous stasis ulcer of right calf with fat layer exposed without varicose veins (HCC)    Relevant Medications    lidocaine (XYLOCAINE) 2 % uro-jet    Other Relevant Orders    Supply: Wound Cleanser           Procedure Note  Indications:  Based on my examination of this patient's wound(s)/ulcer(s) today, debridement is required to promote healing and evaluate the wound base. Performed by: Caitlin Yan MD    Consent obtained:  Yes    Time out taken:  Yes    Pain Control: Anesthetic  Anesthetic: 2% Lidocaine Gel Topical       Debridement:Excisional Debridement    Using curette the wound(s)/ulcer(s) was/were sharply debrided down through and including the removal of epidermis, dermis and subcutaneous tissue. Devitalized Tissue Debrided:  fibrin, biofilm, slough, necrotic/eschar, exudate and callus      Pre Debridement Measurements:  Are located in the Wound/Ulcer Documentation Flow Sheet    Wound/Ulcer #: 1, 2, 5, 6, 7 and 3A    Percent of Wound(s)/Ulcer(s) Debrided: 100%    Total Surface Area Debrided:  171 sq cm       Diabetic/Pressure/Non Pressure Ulcers only:  Ulcer: Pressure ulcer, Stage 4             Post Debridement Measurements:    Wound/Ulcer Descriptions are Pre Debridement --EXCEPT MEASUREMENTS    Wound 12/07/20 Left Wound 5. Left Ischium Pressure Stage 4 (Active)   Wound Image    10/11/21 1405   Wound Etiology Pressure Stage  4 10/11/21 1405   Dressing Status Old drainage noted 10/11/21 1405   Wound Cleansed Soap and water 10/11/21 1405   Dressing/Treatment ABD; Moist to moist;Roll gauze;Tape/Soft cloth adhesive tape 08/09/21 1625   Offloading for Diabetic Foot Ulcers No offloading required 08/09/21 1529   Wound Length (cm) 10 cm 10/11/21 1405   Wound Width (cm) 6 cm 10/11/21 1405   Wound Depth (cm) 3.2 cm 10/11/21 1405   Wound Surface Area (cm^2) 60 cm^2 10/11/21 1405   Change in Wound Size % (l*w) 39.39 10/11/21 1405   Wound Volume (cm^3) 192 cm^3 10/11/21 1405   Wound Healing % 85 10/11/21 1405   Post-Procedure Length (cm) 10 cm 10/11/21 1526   Post-Procedure Width (cm) 6 cm 10/11/21 1526   Post-Procedure Depth (cm) 0.2 cm 10/11/21 1526   Post-Procedure Surface Area (cm^2) 60 cm^2 10/11/21 1526   Post-Procedure Volume (cm^3) 12 cm^3 10/11/21 1526   Distance Tunneling (cm) 10.5 cm 10/11/21 1405   Tunneling Position ___ O'Clock 7 10/11/21 1405   Undermining Starts ___ O'Clock 7 10/11/21 1405   Undermining Ends___ O'Clock 10 10/11/21 1405   Undermining Maxium Distance (cm) 6.2 10/11/21 1405   Wound Assessment Exposed structure muscle;Pink/red;Slough 10/11/21 1405   Drainage Amount Large 10/11/21 1405   Drainage Description Serosanguinous 10/11/21 1405   Odor None 10/11/21 1405   Nellie-wound Assessment Blanchable erythema 10/11/21 1405   Margins Unattached edges 10/11/21 1405   Number of days: 307       Wound 12/07/20 Ischium Right Wound 6. Right Ischium Stage 4 (Active)   Wound Image    10/11/21 1405   Wound Etiology Pressure Stage  4 10/11/21 1405   Dressing Status Old drainage noted 10/11/21 1405   Wound Cleansed Soap and water 10/11/21 1405   Dressing/Treatment ABD; Moist to moist;Roll gauze;Tape/Soft cloth adhesive tape 08/09/21 1625   Offloading for Diabetic Foot Ulcers Other (comment) 01/18/21 1426   Wound Length (cm) 10.8 cm 10/11/21 1405   Wound Width (cm) 3.4 cm 10/11/21 1405   Wound Depth (cm) 1.2 cm 10/11/21 1405   Wound Surface Area (cm^2) 36.72 cm^2 10/11/21 1405   Change in Wound Size % (l*w) 50.55 10/11/21 1405   Wound Volume (cm^3) 44. 064 cm^3 10/11/21 1405   Wound Healing % 85 10/11/21 1405   Post-Procedure Length (cm) 10.8 cm 10/11/21 1526   Post-Procedure Width (cm) 3.4 cm 10/11/21 1526   Post-Procedure Depth (cm) 1.2 cm 10/11/21 1526   Post-Procedure Surface Area (cm^2) 36.72 cm^2 10/11/21 1526   Post-Procedure Volume (cm^3) 44. 064 cm^3 10/11/21 1526   Distance Tunneling (cm) 0 cm 10/11/21 1405   Tunneling Position ___ O'Clock 0 10/11/21 1405   Undermining Starts ___ O'Clock 10 10/11/21 1405   Undermining Ends___ O'Clock 1 10/11/21 1405   Undermining Maxium Distance (cm) 4.8 10/11/21 1405   Wound Assessment Pink/red;Slough 10/11/21 1405   Drainage Amount Large 10/11/21 1405   Drainage Description Yellow;Serosanguinous 10/11/21 1405   Odor Moderate 10/11/21 1405   Nellie-wound Assessment Blanchable erythema 09/20/21 1323   Margins Unattached edges 10/11/21 1405   Wound Thickness Description not for Pressure Injury Full thickness 07/06/21 1420   Number of days: 307       Wound 05/03/21 Foot Left;Plantar wound 3a.  left lateral plantar foot (wound has  from wound #3) 5/3/21Pressure Stage 4 (Active)   Wound Image    10/11/21 1405   Wound Etiology Pressure Stage  3 10/11/21 1405   Dressing Status Old drainage noted 10/11/21 1405   Wound Cleansed Not Cleansed 10/11/21 1405   Dressing/Treatment ABD;Gauze dressing/dressing sponge;Moist to moist;Roll gauze;Tape/Soft cloth adhesive tape 08/09/21 1625   Wound Length (cm) 12 cm 10/11/21 1405   Wound Width (cm) 4.8 cm 10/11/21 1405   Wound Depth (cm) 0.8 cm 10/11/21 1405   Wound Surface Area (cm^2) 57.6 cm^2 10/11/21 1405   Change in Wound Size % (l*w) -1100 10/11/21 1405   Wound Volume (cm^3) 46.08 cm^3 10/11/21 1405   Wound Healing % -4700 10/11/21 1405   Post-Procedure Length (cm) 12 cm 10/11/21 1526   Post-Procedure Width (cm) 4.8 cm 10/11/21 1526   Post-Procedure Depth (cm) 0.8 cm 10/11/21 1526   Post-Procedure Surface Area (cm^2) 57.6 cm^2 10/11/21 1526   Post-Procedure Volume (cm^3) 46.08 cm^3 10/11/21 1526   Distance Tunneling (cm) 0 cm 10/11/21 1405   Tunneling Position ___ O'Clock 0 10/11/21 1405   Undermining Starts ___ O'Clock 0 10/11/21 1405   Undermining Ends___ O'Clock 0 10/11/21 1405   Undermining Maxium Distance (cm) 0 10/11/21 1405 Wound Assessment Eschar moist;Pink/red;Slough 10/11/21 1405   Drainage Amount Moderate 10/11/21 1405   Drainage Description Serosanguinous 10/11/21 1405   Odor None 10/11/21 1405   Nellie-wound Assessment Blanchable erythema 10/11/21 1405   Margins Attached edges 10/11/21 1405   Wound Thickness Description not for Pressure Injury Full thickness 07/06/21 1420   Number of days: 161       Wound 07/06/21 Leg Right;Lateral Wound 2 right lateral lower ext. ( changed Pressure Stage 4 10/11/21 exposed structure) (Active)   Wound Image    10/11/21 1405   Wound Etiology Pressure Stage  4 10/11/21 1405   Dressing Status Old drainage noted 10/11/21 1405   Wound Cleansed Not Cleansed 10/11/21 1405   Dressing/Treatment ABD;Gauze dressing/dressing sponge;Moist to moist;Roll gauze;Tape/Soft cloth adhesive tape 08/09/21 1625   Wound Length (cm) 3.7 cm 10/11/21 1405   Wound Width (cm) 2.2 cm 10/11/21 1405   Wound Depth (cm) 0.5 cm 10/11/21 1405   Wound Surface Area (cm^2) 8.14 cm^2 10/11/21 1405   Change in Wound Size % (l*w) -190.71 10/11/21 1405   Wound Volume (cm^3) 4.07 cm^3 10/11/21 1405   Wound Healing % -1354 10/11/21 1405   Post-Procedure Length (cm) 3.7 cm 10/11/21 1526   Post-Procedure Width (cm) 2.2 cm 10/11/21 1526   Post-Procedure Depth (cm) 0.2 cm 10/11/21 1526   Post-Procedure Surface Area (cm^2) 8.14 cm^2 10/11/21 1526   Post-Procedure Volume (cm^3) 1.628 cm^3 10/11/21 1526   Distance Tunneling (cm) 0 cm 10/11/21 1405   Tunneling Position ___ O'Clock 0 10/11/21 1405   Undermining Starts ___ O'Clock 0 10/11/21 1405   Undermining Ends___ O'Clock 0 10/11/21 1405   Undermining Maxium Distance (cm) 0 10/11/21 1405   Wound Assessment Exposed structure tendon;Pink/red;Slough 10/11/21 1405   Drainage Amount Moderate 10/11/21 1405   Drainage Description Serosanguinous 10/11/21 1405   Odor None 10/11/21 1405   Nellie-wound Assessment Blanchable erythema 10/11/21 1405   Margins Attached edges 10/11/21 1405   Wound Thickness Description not for Pressure Injury Full thickness 07/19/21 1406   Number of days: 97       Wound 07/19/21 Foot Right;Plantar;Mid WOUND1 RIGHT MID PLANTAR PRESSURE STG 4 (Active)   Wound Image   10/11/21 1405   Wound Etiology Pressure Stage  4 10/11/21 1405   Dressing Status Old drainage noted 10/11/21 1405   Wound Cleansed Not Cleansed 10/11/21 1405   Dressing/Treatment ABD;Gauze dressing/dressing sponge;Moist to moist;Roll gauze;Tape/Soft cloth adhesive tape 08/09/21 1625   Wound Length (cm) 3 cm 10/11/21 1405   Wound Width (cm) 1.8 cm 10/11/21 1405   Wound Depth (cm) 0.3 cm 10/11/21 1405   Wound Surface Area (cm^2) 5.4 cm^2 10/11/21 1405   Change in Wound Size % (l*w) 10 10/11/21 1405   Wound Volume (cm^3) 1.62 cm^3 10/11/21 1405   Wound Healing % -170 10/11/21 1405   Post-Procedure Length (cm) 3 cm 10/11/21 1526   Post-Procedure Width (cm) 1.8 cm 10/11/21 1526   Post-Procedure Depth (cm) 0.3 cm 10/11/21 1526   Post-Procedure Surface Area (cm^2) 5.4 cm^2 10/11/21 1526   Post-Procedure Volume (cm^3) 1.62 cm^3 10/11/21 1526   Distance Tunneling (cm) 0 cm 10/11/21 1405   Tunneling Position ___ O'Clock 0 10/11/21 1405   Undermining Starts ___ O'Clock 0 10/11/21 1405   Undermining Ends___ O'Clock 0 10/11/21 1405   Undermining Maxium Distance (cm) 0 10/11/21 1405   Wound Assessment Pink/red;Slough 10/11/21 1405   Drainage Amount Moderate 10/11/21 1405   Drainage Description Serosanguinous 10/11/21 1405   Odor None 10/11/21 1405   Nellie-wound Assessment Intact 10/11/21 1405   Margins Attached edges 10/11/21 1405   Number of days: 84       Wound 08/30/21 Hip Right Wound 7, Pressure Stage 3, R.  Hip (Active)   Wound Image    10/11/21 1405   Wound Etiology Pressure Stage  3 10/11/21 1405   Dressing Status Old drainage noted 10/11/21 1405   Wound Cleansed Soap and water 10/11/21 1405   Wound Length (cm) 2.8 cm 10/11/21 1405   Wound Width (cm) 1.2 cm 10/11/21 1405   Wound Depth (cm) 0.2 cm 10/11/21 1405   Wound Surface Area (cm^2) 3.36 cm^2 10/11/21 1405   Change in Wound Size % (l*w) 38.91 10/11/21 1405   Wound Volume (cm^3) 0.672 cm^3 10/11/21 1405   Wound Healing % -22 10/11/21 1405   Post-Procedure Length (cm) 2.8 cm 10/11/21 1526   Post-Procedure Width (cm) 1.2 cm 10/11/21 1526   Post-Procedure Depth (cm) 0.2 cm 10/11/21 1526   Post-Procedure Surface Area (cm^2) 3.36 cm^2 10/11/21 1526   Post-Procedure Volume (cm^3) 0.672 cm^3 10/11/21 1526   Distance Tunneling (cm) 0 cm 10/11/21 1405   Tunneling Position ___ O'Clock 0 10/11/21 1405   Undermining Starts ___ O'Clock 0 10/11/21 1405   Undermining Ends___ O'Clock 0 10/11/21 1405   Undermining Maxium Distance (cm) 0 10/11/21 1405   Wound Assessment Slough;Pink/red 10/11/21 1405   Drainage Amount Small 10/11/21 1405   Drainage Description Serosanguinous 10/11/21 1405   Odor None 10/11/21 1405   Nellie-wound Assessment Intact 10/11/21 1405   Margins Attached edges 10/11/21 1405   Number of days: 42             Estimated Blood Loss:  Minimal    Hemostasis Achieved:  by pressure    Procedural Pain:  0  / 10     Post Procedural Pain:  0 / 10     Response to treatment:  Well tolerated by patient.          Plan:     Problem List Items Addressed This Visit     Decubitus ulcer of ischial area, left, stage IV (HCC) (Chronic)    Decubitus ulcer of ischium, right, stage IV (HCC) (Chronic)    Relevant Medications    lidocaine (XYLOCAINE) 2 % uro-jet    Other Relevant Orders    Supply: Wound Cleanser    Chronic osteomyelitis with draining sinus of multiple sites (HCC) (Chronic)    Severe protein-calorie malnutrition (HCC) (Chronic)    * (Principal) Decubitus ulcer of left heel, stage 4 (HCC) - Primary (Chronic)    Relevant Medications    lidocaine (XYLOCAINE) 2 % uro-jet    Other Relevant Orders    Supply: Wound Cleanser    Decubitus ulcer of right foot, stage 4 (HCC) (Chronic)    Relevant Medications    lidocaine (XYLOCAINE) 2 % uro-jet    Other Relevant Orders    Supply: Wound Cleanser    Decubitus ulcer of left leg, stage 4 (HCC)    Relevant Medications    lidocaine (XYLOCAINE) 2 % uro-jet    Other Relevant Orders    Supply: Wound Cleanser    Venous stasis ulcer of right calf with fat layer exposed without varicose veins (HCC)    Relevant Medications    lidocaine (XYLOCAINE) 2 % uro-jet    Other Relevant Orders    Supply: Wound Cleanser          Pt stable with wounds she will likely never heal.  Cont current care RTO 2 weeks. Treatment Note please see attached Discharge Instructions    In my professional opinion this patient would benefit from HBO Therapy: No    Written patient dismissal instructions given to patient and signed by patient or POA. Discharge [address] and Hyperbaric Oxygen Therapy   Physician Orders and Discharge Instructions  [id number]  Telephone: [phone]    NAME:  Berna Henry OF BIRTH:  1960  MEDICAL RECORD NUMBER:  [id number]  DATE:  10/11/2021    Discharge condition: Stable    Discharge to: Home    Left via:Private automobile    Accompanied by: Self     ECF/[facility]: [facility]      Dressing Orders: Right and Left Lower Ext and Feet Wounds  Soap and water wash, Thin layer of Santyl Ointment, Cover with Saline moist gauze, Secure with dry gauze and medipore tape  Change 1-2 times daily     Dressing Orders: Right and Left Ishium,  Soap and water wash, Thin layer of Santyl Ointment, Cover with Saline moist Roll gauze, Secure with dry gauze and medipore tape  Change 1-2 times daily      Dressing Orders: Right Hip  Soap and water wash, Thin layer of Santyl Ointment, Cover with Saline moist gauze, Secure with dry gauze and medipore tape  Change 1-2 times daily      Treatment Orders: Avoid Pressure to wound site.   Use ROHO cushion, MARYSOL bed and Heel lift boots, check air in ROHO cushion to make sure its inflated properly  Turn frequently (turn at least every two hours when in bed)  While in chair reposition every 30 minutes  Patient advised to sit up no more than 30minutes at a time for meals ONLY. Please do not elevate the head of the bed higher than 30 degrees. Off-load heels by applying heel-lift boots or \"float\" heels by placing pillows under calves so that heels do not touch  High Protein as Tolerated    [facility] follow up visit _____________2 weeks________________  (Please note your next appointment above and if you are unable to keep, kindly give a 24 hour notice. Thank you.)    If you experience any of the following, please call the [facility] during business hours:    * Increase in Pain  * Temperature over 101  * Increase in drainage from your wound  * Drainage with a foul odor  * Bleeding  * Increase in swelling  * Need for compression bandage changes due to slippage, breakthrough drainage. If you need medical attention outside of the business hours of the [facility] please contact your PCP or go to the nearest emergency room.         Electronically signed by Shelly Vail MD on 10/11/2021 at 3:30 PM

## 2021-10-11 NOTE — PLAN OF CARE
Problem: Wound:  Goal: Will show signs of wound healing; wound closure and no evidence of infection  Description: Will show signs of wound healing; wound closure and no evidence of infection  10/11/2021 1504 by Pietro Teixeira RN  Outcome: Ongoing  10/11/2021 1359 by Andrzej Ferraro RN  Outcome: Ongoing     Problem: Pressure Ulcer:  Goal: Signs of wound healing will improve  Description: Signs of wound healing will improve  10/11/2021 1504 by Pietro Teixeira RN  Outcome: Ongoing  10/11/2021 1359 by Andrzej Ferraro RN  Outcome: Ongoing  Goal: Absence of new pressure ulcer  Description: Absence of new pressure ulcer  10/11/2021 1504 by Pietro Teixeira RN  Outcome: Ongoing  10/11/2021 1359 by Andrzej Ferraro RN  Outcome: Ongoing  Goal: Will show no infection signs and symptoms  Description: Will show no infection signs and symptoms  10/11/2021 1504 by Pietro Teixeira RN  Outcome: Ongoing  10/11/2021 1359 by Andrzej Ferraro RN  Outcome: Ongoing     Problem: Falls - Risk of:  Goal: Will remain free from falls  Description: Will remain free from falls  10/11/2021 1504 by Pietro Teixeira RN  Outcome: Ongoing  10/11/2021 1359 by Andrzej Ferraro RN  Outcome: Ongoing

## 2021-11-01 ENCOUNTER — HOSPITAL ENCOUNTER (OUTPATIENT)
Dept: WOUND CARE | Age: 61
Discharge: HOME | End: 2021-11-01
Payer: MEDICARE

## 2021-11-01 VITALS
HEIGHT: 68 IN | RESPIRATION RATE: 18 BRPM | TEMPERATURE: 97.5 F | HEART RATE: 83 BPM | BODY MASS INDEX: 27.28 KG/M2 | WEIGHT: 180 LBS | SYSTOLIC BLOOD PRESSURE: 136 MMHG | DIASTOLIC BLOOD PRESSURE: 73 MMHG

## 2021-11-01 DIAGNOSIS — M86.49 CHRONIC OSTEOMYELITIS WITH DRAINING SINUS OF MULTIPLE SITES (HCC): Chronic | ICD-10-CM

## 2021-11-01 DIAGNOSIS — L89.624 DECUBITUS ULCER OF LEFT HEEL, STAGE 4 (HCC): ICD-10-CM

## 2021-11-01 DIAGNOSIS — L89.894 DECUBITUS ULCER OF RIGHT FOOT, STAGE 4 (HCC): ICD-10-CM

## 2021-11-01 DIAGNOSIS — E43 SEVERE PROTEIN-CALORIE MALNUTRITION (HCC): Chronic | ICD-10-CM

## 2021-11-01 DIAGNOSIS — L97.212 VENOUS STASIS ULCER OF RIGHT CALF WITH FAT LAYER EXPOSED WITHOUT VARICOSE VEINS (HCC): ICD-10-CM

## 2021-11-01 DIAGNOSIS — L89.324 DECUBITUS ULCER OF ISCHIAL AREA, LEFT, STAGE IV (HCC): Primary | Chronic | ICD-10-CM

## 2021-11-01 DIAGNOSIS — L89.314 DECUBITUS ULCER OF ISCHIUM, RIGHT, STAGE IV (HCC): ICD-10-CM

## 2021-11-01 DIAGNOSIS — I87.2 VENOUS STASIS ULCER OF RIGHT CALF WITH FAT LAYER EXPOSED WITHOUT VARICOSE VEINS (HCC): ICD-10-CM

## 2021-11-01 DIAGNOSIS — L89.894 DECUBITUS ULCER OF LEFT LEG, STAGE 4 (HCC): ICD-10-CM

## 2021-11-01 PROCEDURE — 99214 OFFICE O/P EST MOD 30 MIN: CPT

## 2021-11-01 PROCEDURE — 99212 OFFICE O/P EST SF 10 MIN: CPT | Performed by: SURGERY

## 2021-11-01 RX ORDER — CLINDAMYCIN HYDROCHLORIDE 300 MG/1
CAPSULE ORAL
COMMUNITY
Start: 2021-10-18 | End: 2021-12-20 | Stop reason: ALTCHOICE

## 2021-11-01 ASSESSMENT — PAIN DESCRIPTION - PROGRESSION: CLINICAL_PROGRESSION: NOT CHANGED

## 2021-11-01 ASSESSMENT — PAIN DESCRIPTION - DESCRIPTORS: DESCRIPTORS: ACHING

## 2021-11-01 ASSESSMENT — PAIN DESCRIPTION - PAIN TYPE: TYPE: CHRONIC PAIN

## 2021-11-01 ASSESSMENT — PAIN DESCRIPTION - LOCATION: LOCATION: SHOULDER

## 2021-11-01 ASSESSMENT — PAIN SCALES - GENERAL: PAINLEVEL_OUTOF10: 7

## 2021-11-01 ASSESSMENT — PAIN DESCRIPTION - ORIENTATION: ORIENTATION: RIGHT

## 2021-11-01 ASSESSMENT — PAIN DESCRIPTION - FREQUENCY: FREQUENCY: INTERMITTENT

## 2021-11-01 ASSESSMENT — PAIN DESCRIPTION - ONSET: ONSET: ON-GOING

## 2021-11-01 NOTE — PROGRESS NOTES
[address]   Progress Note and Procedure Note      [address] RECORD NUMBER:  [id number]  AGE: 63 y.o. GENDER: female  : 1960  EPISODE DATE:  2021    Subjective:     Chief Complaint   Patient presents with    Wound Check     Buttocks and Bilateral foot wounds; Patient denies increased pain at wound site         HISTORY of PRESENT ILLNESS HPI     Atul Otero is a 63 y.o. female who presents today for wound/ulcer evaluation. History of Wound Context: Pt with Multiple pressure wounds throughout here for eval/treat  Wound/Ulcer Pain Timing/Severity: none  Quality of pain: N/A  Severity:  0 / 10   Modifying Factors: None  Associated Signs/Symptoms: none    Ulcer Identification:  Ulcer Type: pressure  Contributing Factors: chronic pressure, decreased mobility and shear force    Wound: pressure        PAST MEDICAL HISTORY        Diagnosis Date    Blood circulation, collateral     Chronic kidney disease     bladder removed-no kidney problems    GERD (gastroesophageal reflux disease)     History of blood transfusion     History of urostomy     Neuromuscular disorder (HCC)     parapalegic t10-l1    Osteomyelitis (HCC)     Pressure ulcer     to left ischium and bilat heels       PAST SURGICAL HISTORY    Past Surgical History:   Procedure Laterality Date    BACK SURGERY  1979    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  Buttock years ago more than 20 years ago    SMALL INTESTINE SURGERY N/A 2019    DIVERTING LOOP COLOSTOMY performed by Mechelle Wayne MD at [facility] [address]    Family History Problem Relation Age of Onset    Other Mother          of sepsis    Cancer Father         Lung Cancer    Diabetes Paternal Grandmother     Heart Attack Paternal Grandfather        SOCIAL HISTORY    Social History     Tobacco Use    Smoking status: Never Smoker    Smokeless tobacco: Never Used   Vaping Use    Vaping Use: Never used   Substance Use Topics    Alcohol use: Not Currently    Drug use: Never       ALLERGIES    Allergies   Allergen Reactions    Morphine And Related Nausea Only     Caused severe altered mental status       MEDICATIONS    Current Outpatient Medications on File Prior to Encounter   Medication Sig Dispense Refill    clindamycin (CLEOCIN) 300 MG capsule       sodium hypochlorite (DAKINS) 0.125 % SOLN external solution Moisten gauze apply to wound twice daily 1000 mL 5    ferrous sulfate (IRON 325) 325 (65 Fe) MG tablet Take 325 mg by mouth daily (with breakfast)       SODIUM CHLORIDE, EXTERNAL, (SALINE WOUND WASH) 0.9 % SOLN 1000 cc bottle normal saline. Apply as directed 1000 mL 3    sodium hypochlorite (DAKINS) 0.125 % SOLN external solution Moisten gauze apply to wound twice daily 1 Bottle 3    collagenase (SANTYL) 250 UNIT/GM ointment Apply 1 applicator topically daily      ALPRAZolam (XANAX) 1 MG tablet Take 1 mg by mouth 3 times daily as needed for Anxiety.  HYDROcodone-acetaminophen (NORCO)  MG per tablet Take 1 tablet by mouth every 6 hours as needed for Pain.  omeprazole (PRILOSEC) 20 MG delayed release capsule Take 20 mg by mouth daily as needed       zinc 50 MG CAPS Take 1 capsule by mouth daily      baclofen (LIORESAL) 10 MG tablet Take 10 mg by mouth 3 times daily        No current facility-administered medications on file prior to encounter. REVIEW OF SYSTEMS    A comprehensive review of systems was negative.     Objective:      /73   Pulse 83   Temp 97.5 °F (36.4 °C) (Temporal)   Resp 18   Ht 5' 8\" (1.727 m)   Wt 180 lb (81.6 kg)   BMI 27.37 kg/m²     Wt Readings from Last 3 Encounters:   11/01/21 180 lb (81.6 kg)   10/11/21 180 lb (81.6 kg)   09/20/21 180 lb (81.6 kg)       PHYSICAL EXAM    General Appearance: alert and oriented to person, place and time, well developed and well- nourished, in no acute distress  Skin: warm and dry, no rash or erythema  Head: normocephalic and atraumatic  Eyes: pupils equal, round, and reactive to light, extraocular eye movements intact, conjunctivae normal  ENT: tympanic membrane, external ear and ear canal normal bilaterally, nose without deformity, nasal mucosa and turbinates normal without polyps, lips teeth and gums normal  Neck: supple and non-tender without mass, no thyromegaly or thyroid nodules, no cervical lymphadenopathy  Pulmonary/Chest: clear to auscultation bilaterally- no wheezes, rales or rhonchi, normal air movement, no respiratory distress  Cardiovascular: normal rate, regular rhythm, normal S1 and S2, no murmurs, rubs, clicks, or gallops, distal pulses intact, no carotid bruits  Abdomen: soft, non-tender, non-distended, normal bowel sounds, no masses or organomegaly  Extremities: no cyanosis, clubbing or edema  Musculoskeletal: normal range of motion, no joint swelling, deformity or tenderness      Assessment:      Patient Active Problem List   Diagnosis Code    [facility]aplegia at T9 level ([facility] G82.20    Pressure injury of right ischium, stage 4 ([facility]) L89.314    Decubitus ulcer of ischial area, left, stage IV ([facility]) L89.324    Decubitus ulcer of ischium, right, stage IV ([facility]) L89.314    Chronic osteomyelitis with draining sinus of multiple sites ([facility] [address] M86.49    Pressure injury of right heel, stage 3 ([facility]) L89.613    Gastrointestinal hemorrhage K92.2    Pressure injury of skin of left heel L89.629    Acute encephalopathy G93.40    Anemia due to blood loss, acute D62    Hypocalcemia E83.51    Severe protein-calorie malnutrition (HCC) E43    GI bleed K92.2    Anemia D64.9  Decubitus ulcer of left heel, stage 4 ([facility]) L89.624    Decubitus ulcer of right foot, stage 4 (HCC) L89.894    Decubitus ulcer of left leg, stage 4 (HCC) L89.894    Venous stasis ulcer of right calf with fat layer exposed without varicose veins ([facility]) I87.2, L97.212             Wound 12/07/20 Left Wound 5. Left Ischium Pressure Stage 4 (Active)   Wound Image   11/01/21 1334   Wound Etiology Pressure Stage  4 11/01/21 1334   Dressing Status Old drainage noted; Intact 11/01/21 1334   Wound Cleansed Soap and water 11/01/21 1334   Dressing/Treatment Moisten with saline;Moist to moist;Roll gauze;Tape/Soft cloth adhesive tape;Gauze dressing/dressing sponge;ABD 11/01/21 1334   Offloading for Diabetic Foot Ulcers No offloading required 11/01/21 1334   Wound Length (cm) 6 cm 11/01/21 1334   Wound Width (cm) 9 cm 11/01/21 1334   Wound Depth (cm) 2.6 cm 11/01/21 1334   Wound Surface Area (cm^2) 54 cm^2 11/01/21 1334   Change in Wound Size % (l*w) 45.45 11/01/21 1334   Wound Volume (cm^3) 140.4 cm^3 11/01/21 1334   Wound Healing % 89 11/01/21 1334   Post-Procedure Length (cm) 10 cm 10/11/21 1526   Post-Procedure Width (cm) 6 cm 10/11/21 1526   Post-Procedure Depth (cm) 0.2 cm 10/11/21 1526   Post-Procedure Surface Area (cm^2) 60 cm^2 10/11/21 1526   Post-Procedure Volume (cm^3) 12 cm^3 10/11/21 1526   Distance Tunneling (cm) 10.5 cm 10/11/21 1405   Tunneling Position ___ O'Clock 7 10/11/21 1405   Undermining Starts ___ O'Clock 7 10/11/21 1405   Undermining Ends___ O'Clock 10 10/11/21 1405   Undermining Maxium Distance (cm) 6.2 10/11/21 1405   Wound Assessment Exposed structure muscle;Pink/red;Slough 11/01/21 1334   Drainage Amount Large 11/01/21 1334   Drainage Description Serosanguinous 11/01/21 1334   Odor None 11/01/21 1334   Nellie-wound Assessment Blanchable erythema 11/01/21 1334   Margins Unattached edges 11/01/21 1334   Number of days: 328       Wound 12/07/20 Ischium Right Wound 6.    Right Ischium Stage 4 (Active) Wound Image   11/01/21 1334   Wound Etiology Pressure Stage  4 11/01/21 1334   Dressing Status Old drainage noted; Intact 11/01/21 1334   Wound Cleansed Soap and water 11/01/21 1334   Dressing/Treatment Moist to moist;Moisten with saline; Roll gauze;Gauze dressing/dressing sponge;ABD;Tape/Soft cloth adhesive tape 11/01/21 1334   Offloading for Diabetic Foot Ulcers Other (comment) 01/18/21 1426   Wound Length (cm) 9.5 cm 11/01/21 1334   Wound Width (cm) 3 cm 11/01/21 1334   Wound Depth (cm) 1 cm 11/01/21 1334   Wound Surface Area (cm^2) 28.5 cm^2 11/01/21 1334   Change in Wound Size % (l*w) 61.62 11/01/21 1334   Wound Volume (cm^3) 28.5 cm^3 11/01/21 1334   Wound Healing % 90 11/01/21 1334   Post-Procedure Length (cm) 10.8 cm 10/11/21 1526   Post-Procedure Width (cm) 3.4 cm 10/11/21 1526   Post-Procedure Depth (cm) 1.2 cm 10/11/21 1526   Post-Procedure Surface Area (cm^2) 36.72 cm^2 10/11/21 1526   Post-Procedure Volume (cm^3) 44. 064 cm^3 10/11/21 1526   Distance Tunneling (cm) 0 cm 10/11/21 1405   Tunneling Position ___ O'Clock 0 10/11/21 1405   Undermining Starts ___ O'Clock 10 11/01/21 1334   Undermining Ends___ O'Clock 1 11/01/21 1334   Undermining Maxium Distance (cm) 4.0 11/01/21 1334   Wound Assessment Pink/red;Slough 11/01/21 1334   Drainage Amount Large 11/01/21 1334   Drainage Description Yellow;Serosanguinous 11/01/21 1334   Odor Moderate 11/01/21 1334   Nellie-wound Assessment Blanchable erythema 11/01/21 1334   Margins Unattached edges 11/01/21 1334   Wound Thickness Description not for Pressure Injury Full thickness 07/06/21 1420   Number of days: 328       Wound 05/03/21 Foot Left;Plantar wound 3a. left lateral plantar foot (wound has  from wound #3) 5/3/21Pressure Stage 4 (Active)   Wound Image   11/01/21 1334   Wound Etiology Pressure Stage  4 11/01/21 1334   Dressing Status Old drainage noted; Intact 11/01/21 1334   Wound Cleansed Soap and water 11/01/21 1334   Dressing/Treatment Moist to Size % (l*w) -234.29 11/01/21 1334   Wound Volume (cm^3) 6.552 cm^3 11/01/21 1334   Wound Healing % -2240 11/01/21 1334   Post-Procedure Length (cm) 3.7 cm 10/11/21 1526   Post-Procedure Width (cm) 2.2 cm 10/11/21 1526   Post-Procedure Depth (cm) 0.2 cm 10/11/21 1526   Post-Procedure Surface Area (cm^2) 8.14 cm^2 10/11/21 1526   Post-Procedure Volume (cm^3) 1.628 cm^3 10/11/21 1526   Distance Tunneling (cm) 0 cm 10/11/21 1405   Tunneling Position ___ O'Clock 0 10/11/21 1405   Undermining Starts ___ O'Clock 0 10/11/21 1405   Undermining Ends___ O'Clock 0 10/11/21 1405   Undermining Maxium Distance (cm) 0 10/11/21 1405   Wound Assessment Exposed structure tendon;Pink/red;Slough 11/01/21 1334   Drainage Amount Moderate 11/01/21 1334   Drainage Description Serosanguinous 11/01/21 1334   Odor None 11/01/21 1334   Nellie-wound Assessment Blanchable erythema 11/01/21 1334   Margins Attached edges 11/01/21 1334   Wound Thickness Description not for Pressure Injury Full thickness 07/19/21 1406   Number of days: 117       Wound 07/19/21 Foot Right;Plantar;Mid WOUND1 RIGHT MID PLANTAR PRESSURE STG 4 (Active)   Wound Image   11/01/21 1334   Wound Etiology Pressure Stage  4 11/01/21 1334   Dressing Status Old drainage noted; Intact 11/01/21 1334   Wound Cleansed Not Cleansed 11/01/21 1334   Dressing/Treatment Moist to moist;Moisten with saline;Gauze dressing/dressing sponge;ABD;Roll gauze;Tape/Soft cloth adhesive tape 11/01/21 1334   Wound Length (cm) 2.4 cm 11/01/21 1334   Wound Width (cm) 1.5 cm 11/01/21 1334   Wound Depth (cm) 0.7 cm 11/01/21 1334   Wound Surface Area (cm^2) 3.6 cm^2 11/01/21 1334   Change in Wound Size % (l*w) 40 11/01/21 1334   Wound Volume (cm^3) 2.52 cm^3 11/01/21 1334   Wound Healing % -320 11/01/21 1334   Post-Procedure Length (cm) 3 cm 10/11/21 1526   Post-Procedure Width (cm) 1.8 cm 10/11/21 1526   Post-Procedure Depth (cm) 0.3 cm 10/11/21 1526   Post-Procedure Surface Area (cm^2) 5.4 cm^2 10/11/21 1526 Post-Procedure Volume (cm^3) 1.62 cm^3 10/11/21 1526   Distance Tunneling (cm) 0 cm 10/11/21 1405   Tunneling Position ___ O'Clock 0 10/11/21 1405   Undermining Starts ___ O'Clock 0 10/11/21 1405   Undermining Ends___ O'Clock 0 10/11/21 1405   Undermining Maxium Distance (cm) 0 10/11/21 1405   Wound Assessment Pink/red;Slough 11/01/21 1334   Drainage Amount Moderate 11/01/21 1334   Drainage Description Serosanguinous 11/01/21 1334   Odor None 11/01/21 1334   Nellie-wound Assessment Intact 11/01/21 1334   Margins Attached edges 11/01/21 1334   Number of days: 105       Wound 08/30/21 Hip Right Wound 7, Pressure Stage 3, R. Hip (Active)   Wound Image   11/01/21 1334   Wound Etiology Pressure Stage  3 11/01/21 1334   Dressing Status Old drainage noted; Intact 11/01/21 1334   Wound Cleansed Soap and water 11/01/21 1334   Dressing/Treatment Moist to moist;Moisten with saline;Gauze dressing/dressing sponge;Tape/Soft cloth adhesive tape 11/01/21 1334   Wound Length (cm) 1.1 cm 11/01/21 1334   Wound Width (cm) 1.9 cm 11/01/21 1334   Wound Depth (cm) 0.1 cm 11/01/21 1334   Wound Surface Area (cm^2) 2.09 cm^2 11/01/21 1334   Change in Wound Size % (l*w) 62 11/01/21 1334   Wound Volume (cm^3) 0.209 cm^3 11/01/21 1334   Wound Healing % 62 11/01/21 1334   Post-Procedure Length (cm) 2.8 cm 10/11/21 1526   Post-Procedure Width (cm) 1.2 cm 10/11/21 1526   Post-Procedure Depth (cm) 0.2 cm 10/11/21 1526   Post-Procedure Surface Area (cm^2) 3.36 cm^2 10/11/21 1526   Post-Procedure Volume (cm^3) 0.672 cm^3 10/11/21 1526   Distance Tunneling (cm) 0 cm 10/11/21 1405   Tunneling Position ___ O'Clock 0 10/11/21 1405   Undermining Starts ___ O'Clock 0 10/11/21 1405   Undermining Ends___ O'Clock 0 10/11/21 1405   Undermining Maxium Distance (cm) 0 10/11/21 1405   Wound Assessment Slough;Pink/red 11/01/21 1334   Drainage Amount Small 11/01/21 1334   Drainage Description Serosanguinous 11/01/21 1334   Odor None 11/01/21 1334   Nellie-wound Assessment Intact 11/01/21 1334   Margins Attached edges 11/01/21 1334   Number of days: 62             Plan:     Problem List Items Addressed This Visit     Decubitus ulcer of ischial area, left, stage IV (HCC) - Primary (Chronic)    * (Principal) Decubitus ulcer of ischium, right, stage IV (HCC) (Chronic)    Chronic osteomyelitis with draining sinus of multiple sites (HCC) (Chronic)    Severe protein-calorie malnutrition (HCC) (Chronic)    Decubitus ulcer of left heel, stage 4 (HCC) (Chronic)    Decubitus ulcer of right foot, stage 4 (HCC) (Chronic)    Decubitus ulcer of left leg, stage 4 (HCC)    Venous stasis ulcer of right calf with fat layer exposed without varicose veins (HCC)        Pt wounds are stable cont santyl and dakin's  RTO 3 weeks      Treatment Note please see attached Discharge Instructions    In my professional opinion this patient would benefit from HBO Therapy: No    Written patient dismissal instructions given to patient and signed by patient or POA.          Discharge [address] and Hyperbaric Oxygen Therapy   Physician Orders and Discharge Instructions  [address]  Telephone: [phone]    NAME:  Temi Cerda OF BIRTH:  1960  MEDICAL RECORD NUMBER:  [id number]  DATE:  11/1/2021    Discharge condition: Stable    Discharge to: Home    Left via:Private automobile    Accompanied by: Self     ECF/H[facility]: [facility]      Dressing Orders: Right and Left Lower Ext and Feet Wounds  Soap and water wash, Thin layer of Santyl Ointment, Cover with Saline moist gauze, Secure with dry gauze and medipore tape  Change 1-2 times daily     Dressing Orders: Right and Left Ishium,  Soap and water wash, Thin layer of Santyl Ointment, Cover with Saline moist Roll gauze, Secure with dry gauze and medipore tape  Change 1-2 times daily      Dressing Orders: Right Hip  Soap and water wash, Thin layer of Santyl Ointment, Cover with Saline moist gauze, Secure with dry gauze and medipore tape  Change 1-2 times daily      Treatment Orders: Avoid Pressure to wound site. Use ROHO cushion, MARYSOL bed and Heel lift boots, check air in ROHO cushion to make sure its inflated properly  Turn frequently (turn at least every two hours when in bed)  While in chair reposition every 30 minutes  Patient advised to sit up no more than 30minutes at a time for meals ONLY. Please do not elevate the head of the bed higher than 30 degrees. Off-load heels by applying heel-lift boots or \"float\" heels by placing pillows under calves so that heels do not touch  High Protein as Tolerated    [facility] follow up visit ___________2 weeks_______________  (Please note your next appointment above and if you are unable to keep, kindly give a 24 hour notice. Thank you.)    If you experience any of the following, please call the [facility] during business hours:    * Increase in Pain  * Temperature over 101  * Increase in drainage from your wound  * Drainage with a foul odor  * Bleeding  * Increase in swelling  * Need for compression bandage changes due to slippage, breakthrough drainage. If you need medical attention outside of the business hours of the [facility] please contact your PCP or go to the nearest emergency room.         Electronically signed by Sulema Quan MD on 11/1/2021 at 2:19 PM

## 2021-11-15 ENCOUNTER — HOSPITAL ENCOUNTER (OUTPATIENT)
Dept: WOUND CARE | Age: 61
Discharge: HOME | End: 2021-11-15
Payer: MEDICARE

## 2021-11-15 VITALS
HEART RATE: 85 BPM | WEIGHT: 180 LBS | TEMPERATURE: 96.9 F | HEIGHT: 68 IN | BODY MASS INDEX: 27.28 KG/M2 | SYSTOLIC BLOOD PRESSURE: 171 MMHG | DIASTOLIC BLOOD PRESSURE: 71 MMHG | RESPIRATION RATE: 18 BRPM

## 2021-11-15 DIAGNOSIS — M86.49 CHRONIC OSTEOMYELITIS WITH DRAINING SINUS OF MULTIPLE SITES (HCC): Chronic | ICD-10-CM

## 2021-11-15 DIAGNOSIS — L89.624 DECUBITUS ULCER OF LEFT HEEL, STAGE 4 (HCC): Primary | ICD-10-CM

## 2021-11-15 DIAGNOSIS — I87.2 VENOUS STASIS ULCER OF RIGHT CALF WITH FAT LAYER EXPOSED WITHOUT VARICOSE VEINS (HCC): ICD-10-CM

## 2021-11-15 DIAGNOSIS — L89.314 DECUBITUS ULCER OF ISCHIUM, RIGHT, STAGE IV (HCC): ICD-10-CM

## 2021-11-15 DIAGNOSIS — E43 SEVERE PROTEIN-CALORIE MALNUTRITION (HCC): Chronic | ICD-10-CM

## 2021-11-15 DIAGNOSIS — L89.324 DECUBITUS ULCER OF ISCHIAL AREA, LEFT, STAGE IV (HCC): Chronic | ICD-10-CM

## 2021-11-15 DIAGNOSIS — L89.894 DECUBITUS ULCER OF LEFT LEG, STAGE 4 (HCC): ICD-10-CM

## 2021-11-15 DIAGNOSIS — L89.894 DECUBITUS ULCER OF RIGHT FOOT, STAGE 4 (HCC): ICD-10-CM

## 2021-11-15 DIAGNOSIS — L97.212 VENOUS STASIS ULCER OF RIGHT CALF WITH FAT LAYER EXPOSED WITHOUT VARICOSE VEINS (HCC): ICD-10-CM

## 2021-11-15 PROCEDURE — 11042 DBRDMT SUBQ TIS 1ST 20SQCM/<: CPT

## 2021-11-15 PROCEDURE — 11045 DBRDMT SUBQ TISS EACH ADDL: CPT

## 2021-11-15 PROCEDURE — 11045 DBRDMT SUBQ TISS EACH ADDL: CPT | Performed by: SURGERY

## 2021-11-15 PROCEDURE — 11042 DBRDMT SUBQ TIS 1ST 20SQCM/<: CPT | Performed by: SURGERY

## 2021-11-15 RX ORDER — LIDOCAINE HYDROCHLORIDE 20 MG/ML
JELLY TOPICAL PRN
Status: CANCELLED
Start: 2021-11-15

## 2021-11-15 ASSESSMENT — PAIN DESCRIPTION - DESCRIPTORS: DESCRIPTORS: ACHING

## 2021-11-15 ASSESSMENT — PAIN DESCRIPTION - PROGRESSION: CLINICAL_PROGRESSION: NOT CHANGED

## 2021-11-15 ASSESSMENT — PAIN DESCRIPTION - LOCATION: LOCATION: SHOULDER

## 2021-11-15 ASSESSMENT — PAIN DESCRIPTION - ONSET: ONSET: ON-GOING

## 2021-11-15 ASSESSMENT — PAIN SCALES - GENERAL: PAINLEVEL_OUTOF10: 7

## 2021-11-15 ASSESSMENT — PAIN DESCRIPTION - FREQUENCY: FREQUENCY: INTERMITTENT

## 2021-11-15 ASSESSMENT — PAIN DESCRIPTION - ORIENTATION: ORIENTATION: RIGHT

## 2021-11-15 ASSESSMENT — PAIN DESCRIPTION - PAIN TYPE: TYPE: CHRONIC PAIN

## 2021-11-15 NOTE — PROGRESS NOTES
[address]   Progress Note and Procedure Note      [address] RECORD NUMBER:  [id number]  AGE: 63 y.o. GENDER: female  : 1960  EPISODE DATE:  11/15/2021    Subjective:     Chief Complaint   Patient presents with    Wound Check     Right and Left foot and Coccyx wounds; Patient denies increased pain at wound site         HISTORY of PRESENT ILLNESS HPI     Danyel Martinez is a 63 y.o. female who presents today for wound/ulcer evaluation. Wound Context: Pt with multiple sites of wound from pressure that needs eval/treat  Wound/Ulcer Pain Timing/Severity: none  Quality of pain: N/A  Severity:  0 / 10   Modifying Factors: None  Associated Signs/Symptoms: none    Ulcer Identification:  Ulcer Type: pressure  Contributing Factors: chronic pressure, decreased mobility and shear force    Wound: pressure        PAST MEDICAL HISTORY        Diagnosis Date    Blood circulation, collateral     Chronic kidney disease     bladder removed-no kidney problems    GERD (gastroesophageal reflux disease)     History of blood transfusion     History of urostomy     Neuromuscular disorder (HCC)     parapalegic t10-l1    Osteomyelitis (HCC)     Pressure ulcer     to left ischium and bilat heels       PAST SURGICAL HISTORY    Past Surgical History:   Procedure Laterality Date    BACK SURGERY  1979    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  Buttock years ago more than 20 years ago    SMALL INTESTINE SURGERY N/A 2019    DIVERTING LOOP COLOSTOMY performed by Darlyn Ortiz MD at [address]    Family History Problem Relation Age of Onset    Other Mother          of sepsis    Cancer Father         Lung Cancer    Diabetes Paternal Grandmother     Heart Attack Paternal Grandfather        SOCIAL HISTORY    Social History     Tobacco Use    Smoking status: Never Smoker    Smokeless tobacco: Never Used   Vaping Use    Vaping Use: Never used   Substance Use Topics    Alcohol use: Not Currently    Drug use: Never       ALLERGIES    Allergies   Allergen Reactions    Morphine And Related Nausea Only     Caused severe altered mental status       MEDICATIONS    Current Outpatient Medications on File Prior to Encounter   Medication Sig Dispense Refill    clindamycin (CLEOCIN) 300 MG capsule       sodium hypochlorite (DAKINS) 0.125 % SOLN external solution Moisten gauze apply to wound twice daily 1000 mL 5    ferrous sulfate (IRON 325) 325 (65 Fe) MG tablet Take 325 mg by mouth daily (with breakfast)       SODIUM CHLORIDE, EXTERNAL, (SALINE WOUND WASH) 0.9 % SOLN 1000 cc bottle normal saline. Apply as directed 1000 mL 3    sodium hypochlorite (DAKINS) 0.125 % SOLN external solution Moisten gauze apply to wound twice daily 1 Bottle 3    collagenase (SANTYL) 250 UNIT/GM ointment Apply 1 applicator topically daily      ALPRAZolam (XANAX) 1 MG tablet Take 1 mg by mouth 3 times daily as needed for Anxiety.  HYDROcodone-acetaminophen (NORCO)  MG per tablet Take 1 tablet by mouth every 6 hours as needed for Pain.  omeprazole (PRILOSEC) 20 MG delayed release capsule Take 20 mg by mouth daily as needed       zinc 50 MG CAPS Take 1 capsule by mouth daily      baclofen (LIORESAL) 10 MG tablet Take 10 mg by mouth 3 times daily        No current facility-administered medications on file prior to encounter. REVIEW OF SYSTEMS    A comprehensive review of systems was negative.     Objective:      BP (!) 171/71   Pulse 85   Temp 96.9 °F (36.1 °C) (Temporal)   Resp 18   Ht 5' 8\" (1.727 m)   Wt 180 lb (81.6 kg)   BMI 27.37 kg/m²     Wt Readings from Last 3 Encounters:   11/15/21 180 lb (81.6 kg)   11/01/21 180 lb (81.6 kg)   10/11/21 180 lb (81.6 kg)       PHYSICAL EXAM    General Appearance: alert and oriented to person, place and time, well developed and well- nourished, in no acute distress  Skin: warm and dry, no rash or erythema  Head: normocephalic and atraumatic  Eyes: pupils equal, round, and reactive to light, extraocular eye movements intact, conjunctivae normal  ENT: tympanic membrane, external ear and ear canal normal bilaterally, nose without deformity, nasal mucosa and turbinates normal without polyps, lips teeth and gums normal  Neck: supple and non-tender without mass, no thyromegaly or thyroid nodules, no cervical lymphadenopathy  Pulmonary/Chest: clear to auscultation bilaterally- no wheezes, rales or rhonchi, normal air movement, no respiratory distress  Cardiovascular: normal rate, regular rhythm, normal S1 and S2, no murmurs, rubs, clicks, or gallops, distal pulses intact, no carotid bruits  Abdomen: soft, non-tender, non-distended, normal bowel sounds, no masses or organomegaly  Extremities: no cyanosis, clubbing or edema  Musculoskeletal: normal range of motion, no joint swelling, deformity or tenderness      Assessment:      Problem List Items Addressed This Visit     Decubitus ulcer of ischial area, left, stage IV (HCC) (Chronic)    * (Principal) Decubitus ulcer of ischium, right, stage IV (HCC) (Chronic)    Relevant Orders    Supply: Wound Cleanser    Supply: Nellie Wound    Supply: Wound Dressings    Supply: Pack Wound    Supply: Cover and Secure    Chronic osteomyelitis with draining sinus of multiple sites (HCC) (Chronic)    Severe protein-calorie malnutrition (HCC) (Chronic)    Decubitus ulcer of left heel, stage 4 (HCC) - Primary (Chronic)    Relevant Orders    Supply: Wound Cleanser    Supply: Nellie Wound    Supply: Wound Dressings    Supply: Pack Wound    Supply: Cover and Secure Decubitus ulcer of right foot, stage 4 (HCC) (Chronic)    Relevant Orders    Supply: Wound Cleanser    Supply: Nellie Wound    Supply: Wound Dressings    Supply: Pack Wound    Supply: Cover and Secure    Decubitus ulcer of left leg, stage 4 (HCC)    Relevant Orders    Supply: Wound Cleanser    Supply: Nellie Wound    Supply: Wound Dressings    Supply: Pack Wound    Supply: Cover and Secure    Venous stasis ulcer of right calf with fat layer exposed without varicose veins (HCC)    Relevant Orders    Supply: Wound Cleanser    Supply: Nellie Wound    Supply: Wound Dressings    Supply: Pack Wound    Supply: Cover and Secure           Procedure Note  Indications:  Based on my examination of this patient's wound(s)/ulcer(s) today, debridement is required to promote healing and evaluate the wound base. Performed by: Joslyn Dennis MD    Consent obtained:  Yes    Time out taken:  Yes    Pain Control:         Debridement:Excisional Debridement    Using curette the wound(s)/ulcer(s) was/were sharply debrided down through and including the removal of epidermis, dermis and subcutaneous tissue. Devitalized Tissue Debrided:  fibrin, biofilm, slough, necrotic/eschar and exudate      Pre Debridement Measurements:  Are located in the Wound/Ulcer Documentation Flow Sheet    Wound/Ulcer #: 1, 2, 5, 6, 7 and 3a    Percent of Wound(s)/Ulcer(s) Debrided: 100%    Total Surface Area Debrided:  167 sq cm       Diabetic/Pressure/Non Pressure Ulcers only:  Ulcer: Pressure ulcer, Stage 4             Post Debridement Measurements:    Wound/Ulcer Descriptions are Pre Debridement --EXCEPT MEASUREMENTS    Wound 12/07/20 Left Wound 5. Left Ischium Pressure Stage 4 (Active)   Wound Image   11/15/21 1335   Wound Etiology Pressure Stage  4 11/15/21 1335   Dressing Status Old drainage noted;  Intact 11/15/21 1335   Wound Cleansed Soap and water 11/15/21 1335   Dressing/Treatment Gauze dressing/dressing sponge; Moist to moist; Roll gauze; Tape/Soft cloth adhesive tape 11/15/21 1335   Offloading for Diabetic Foot Ulcers No offloading required 11/15/21 1335   Wound Length (cm) 7 cm 11/15/21 1335   Wound Width (cm) 6.5 cm 11/15/21 1335   Wound Depth (cm) 2.6 cm 11/15/21 1335   Wound Surface Area (cm^2) 45.5 cm^2 11/15/21 1335   Change in Wound Size % (l*w) 54.04 11/15/21 1335   Wound Volume (cm^3) 118.3 cm^3 11/15/21 1335   Wound Healing % 91 11/15/21 1335   Post-Procedure Length (cm) 7 cm 11/15/21 1406   Post-Procedure Width (cm) 6.5 cm 11/15/21 1406   Post-Procedure Depth (cm) 2.6 cm 11/15/21 1406   Post-Procedure Surface Area (cm^2) 45.5 cm^2 11/15/21 1406   Post-Procedure Volume (cm^3) 118.3 cm^3 11/15/21 1406   Distance Tunneling (cm) 10.5 cm 10/11/21 1405   Tunneling Position ___ O'Clock 7 11/15/21 1335   Undermining Starts ___ O'Clock 7 11/15/21 1335   Undermining Ends___ O'Clock 8 11/15/21 1335   Undermining Maxium Distance (cm) 3.5 11/15/21 1335   Wound Assessment Exposed structure muscle; [place]/red; [facility] 11/15/21 1335   Drainage Amount Large 11/15/21 1335   Drainage Description Serosanguinous 11/15/21 1335   Odor None 11/15/21 1335   Nellie-wound Assessment Blanchable erythema 11/15/21 1335   Margins Unattached edges 11/15/21 1335   Number of days: 342       Wound 12/07/20 Ischium Right Wound 6. Right Ischium Stage 4 (Active)   Wound Image   11/15/21 1335   Wound Etiology Pressure Stage  4 11/15/21 1335   Dressing Status Old drainage noted;  Intact 11/15/21 1335   Wound Cleansed Soap and water 11/15/21 1335   Dressing/Treatment Gauze dressing/dressing sponge; Moist to moist; Roll gauze; Tape/Soft cloth adhesive tape 11/15/21 1335   Offloading for Diabetic Foot Ulcers Other (comment) 01/18/21 1426   Wound Length (cm) 9 cm 11/15/21 1335   Wound Width (cm) 4 cm 11/15/21 1335   Wound Depth (cm) 1 cm 11/15/21 1335   Wound Surface Area (cm^2) 36 cm^2 11/15/21 1335   Change in Wound Size % (l*w) 51.52 11/15/21 1335   Wound Volume (cm^3) 36 cm^3 11/15/21 1335   Wound Healing % 88 11/15/21 1335   Post-Procedure Length (cm) 9 cm 11/15/21 1406   Post-Procedure Width (cm) 4 cm 11/15/21 1406   Post-Procedure Depth (cm) 1 cm 11/15/21 1406   Post-Procedure Surface Area (cm^2) 36 cm^2 11/15/21 1406   Post-Procedure Volume (cm^3) 36 cm^3 11/15/21 1406   Distance Tunneling (cm) 0 cm 10/11/21 1405   Tunneling Position ___ O'Clock 0 10/11/21 1405   Undermining Starts ___ O'Clock 10 11/15/21 1335   Undermining Ends___ O'Clock 1 11/15/21 1335   Undermining Maxium Distance (cm) 5.1 11/15/21 1335   Wound Assessment [place]/red; [facility] 11/15/21 1335   Drainage Amount Large 11/15/21 1335   Drainage Description Yellow; Serosanguinous 11/15/21 1335   Odor Moderate 11/15/21 1335   Nellie-wound Assessment Blanchable erythema 11/15/21 1335   Margins Unattached edges 11/15/21 1335   Wound Thickness Description not for Pressure Injury Full thickness 07/06/21 1420   Number of days: 342       Wound 05/03/21 Foot Left;Plantar wound 3a. left lateral plantar foot (wound has  from wound #3) 5/3/21Pressure Stage 4 (Active)   Wound Image   11/15/21 1335   Wound Etiology Pressure Stage  4 11/15/21 1335   Dressing Status Old drainage noted;  Intact 11/15/21 1335   Wound Cleansed Soap and water 11/15/21 1335   Dressing/Treatment Gauze dressing/dressing sponge; Moist to moist; Roll gauze; Tape/Soft cloth adhesive tape 11/15/21 1335   Wound Length (cm) 10.8 cm 11/15/21 1335   Wound Width (cm) 6 cm 11/15/21 1335   Wound Depth (cm) 1 cm 11/15/21 1335   Wound Surface Area (cm^2) 64.8 cm^2 11/15/21 1335   Change in Wound Size % (l*w) -1250 11/15/21 1335   Wound Volume (cm^3) 64.8 cm^3 11/15/21 1335   Wound Healing % -6650 11/15/21 1335   Post-Procedure Length (cm) 10.8 cm 11/15/21 1406   Post-Procedure Width (cm) 6 cm 11/15/21 1406   Post-Procedure Depth (cm) 1 cm 11/15/21 1406   Post-Procedure Surface Area (cm^2) 64.8 cm^2 11/15/21 1406   Post-Procedure Volume (cm^3) 64.8 cm^3 11/15/21 1406 Distance Tunneling (cm) 0 cm 10/11/21 1405   Tunneling Position ___ O'Clock 0 10/11/21 1405   Undermining Starts ___ O'Clock 0 10/11/21 1405   Undermining Ends___ O'Clock 0 10/11/21 1405   Undermining Maxium Distance (cm) 0 10/11/21 1405   Wound Assessment Eschar moist; [place]/red; [facility] 11/15/21 1335   Drainage Amount Moderate 11/15/21 1335   Drainage Description Serosanguinous 11/15/21 1335   Odor None 11/15/21 1335   Nellie-wound Assessment Blanchable erythema 11/15/21 1335   Margins Attached edges 11/15/21 1335   Wound Thickness Description not for Pressure Injury Full thickness 07/06/21 1420   Number of days: 196       Wound 07/06/21 Leg Right;Lateral Wound 2 right lateral lower ext. ( changed Pressure Stage 4 10/11/21 exposed structure) (Active)   Wound Image   11/15/21 1335   Wound Etiology Pressure Stage  4 11/15/21 1335   Dressing Status Old drainage noted;  Intact 11/15/21 1335   Wound Cleansed Not Cleansed 11/15/21 1335   Dressing/Treatment Gauze dressing/dressing sponge; Moist to moist; Roll gauze; Tape/Soft cloth adhesive tape 11/15/21 1335   Wound Length (cm) 3.7 cm 11/15/21 1335   Wound Width (cm) 3 cm 11/15/21 1335   Wound Depth (cm) 1 cm 11/15/21 1335   Wound Surface Area (cm^2) 11.1 cm^2 11/15/21 1335   Change in Wound Size % (l*w) -296.43 11/15/21 1335   Wound Volume (cm^3) 11.1 cm^3 11/15/21 1335   Wound Healing % -3864 11/15/21 1335   Post-Procedure Length (cm) 3.7 cm 11/15/21 1406   Post-Procedure Width (cm) 3 cm 11/15/21 1406   Post-Procedure Depth (cm) 1 cm 11/15/21 1406   Post-Procedure Surface Area (cm^2) 11.1 cm^2 11/15/21 1406   Post-Procedure Volume (cm^3) 11.1 cm^3 11/15/21 1406   Distance Tunneling (cm) 0 cm 10/11/21 1405   Tunneling Position ___ O'Clock 0 10/11/21 1405   Undermining Starts ___ O'Clock 0 10/11/21 1405   Undermining Ends___ O'Clock 0 10/11/21 1405   Undermining Maxium Distance (cm) 0 10/11/21 1405   Wound Assessment Exposed structure tendon; [place]/red; [facility] 11/15/21 6335 Drainage Amount Moderate 11/15/21 1335   Drainage Description Serosanguinous 11/15/21 1335   Odor None 11/15/21 1335   Nellie-wound Assessment Blanchable erythema 11/15/21 1335   Margins Attached edges 11/15/21 1335   Wound Thickness Description not for Pressure Injury Full thickness 07/19/21 1406   Number of days: 132       Wound 07/19/21 Foot Right;Plantar;Mid WOUND1 RIGHT MID PLANTAR PRESSURE STG 4 (Active)   Wound Image   11/15/21 1335   Wound Etiology Pressure Stage  4 11/15/21 1335   Dressing Status Old drainage noted; Intact 11/15/21 1335   Wound Cleansed Not Cleansed 11/15/21 1335   Dressing/Treatment Gauze dressing/dressing sponge; Moist to moist; Roll gauze; Tape/Soft cloth adhesive tape 11/15/21 1335   Wound Length (cm) 3.5 cm 11/15/21 1335   Wound Width (cm) 2.5 cm 11/15/21 1335   Wound Depth (cm) 1 cm 11/15/21 1335   Wound Surface Area (cm^2) 8.75 cm^2 11/15/21 1335   Change in Wound Size % (l*w) -45.83 11/15/21 1335   Wound Volume (cm^3) 8.75 cm^3 11/15/21 1335   Wound Healing % -1358 11/15/21 1335   Post-Procedure Length (cm) 3.5 cm 11/15/21 1406   Post-Procedure Width (cm) 2.5 cm 11/15/21 1406   Post-Procedure Depth (cm) 1 cm 11/15/21 1406   Post-Procedure Surface Area (cm^2) 8.75 cm^2 11/15/21 1406   Post-Procedure Volume (cm^3) 8.75 cm^3 11/15/21 1406   Distance Tunneling (cm) 0 cm 10/11/21 1405   Tunneling Position ___ O'Clock 0 10/11/21 1405   Undermining Starts ___ O'Clock 0 10/11/21 1405   Undermining Ends___ O'Clock 0 10/11/21 1405   Undermining Maxium Distance (cm) 0 10/11/21 1405   Wound Assessment [place]/red; [facility] 11/15/21 1335   Drainage Amount Moderate 11/15/21 1335   Drainage Description Serosanguinous 11/15/21 1335   Odor None 11/15/21 1335   Nellie-wound Assessment Intact 11/15/21 1335   Margins Attached edges 11/15/21 1335   Number of days: 119       Wound 08/30/21 Hip Right Wound 7, Pressure Stage 3, R.  Hip (Active)   Wound Image   11/15/21 1335   Wound Etiology Pressure Stage  3 11/15/21 1335   Dressing Status Old drainage noted; Intact 11/15/21 1335   Wound Cleansed Soap and water 11/15/21 1335   Dressing/Treatment Gauze dressing/dressing sponge; Moist to moist; Roll gauze; Tape/Soft cloth adhesive tape 11/15/21 1335   Wound Length (cm) 0.7 cm 11/15/21 1335   Wound Width (cm) 2 cm 11/15/21 1335   Wound Depth (cm) 0.1 cm 11/15/21 1335   Wound Surface Area (cm^2) 1.4 cm^2 11/15/21 1335   Change in Wound Size % (l*w) 74.55 11/15/21 1335   Wound Volume (cm^3) 0.14 cm^3 11/15/21 1335   Wound Healing % 75 11/15/21 1335   Post-Procedure Length (cm) 0.7 cm 11/15/21 1406   Post-Procedure Width (cm) 1.9 cm 11/15/21 1406   Post-Procedure Depth (cm) 0.1 cm 11/15/21 1406   Post-Procedure Surface Area (cm^2) 1.33 cm^2 11/15/21 1406   Post-Procedure Volume (cm^3) 0.133 cm^3 11/15/21 1406   Distance Tunneling (cm) 0 cm 10/11/21 1405   Tunneling Position ___ O'Clock 0 10/11/21 1405   Undermining Starts ___ O'Clock 0 10/11/21 1405   Undermining Ends___ O'Clock 0 10/11/21 1405   Undermining Maxium Distance (cm) 0 10/11/21 1405   Wound Assessment [place]; [place]/red 11/15/21 1335   Drainage Amount Moderate 11/15/21 1335   Drainage Description Serosanguinous 11/15/21 1335   Odor None 11/15/21 1335   Nellie-wound Assessment Intact 11/15/21 1335   Margins Attached edges 11/15/21 1335   Number of days: 77             Estimated Blood Loss:  Minimal    Hemostasis Achieved:  by pressure    Procedural Pain:  0  / 10     Post Procedural Pain:  0 / 10     Response to treatment:  Well tolerated by patient.          Plan:     Problem List Items Addressed This Visit     Decubitus ulcer of ischial area, left, stage IV (HCC) (Chronic)    * (Principal) Decubitus ulcer of ischium, right, stage IV (HCC) (Chronic)    Relevant Orders    Supply: Wound Cleanser    Supply: Nellie Wound    Supply: Wound Dressings    Supply: Pack Wound    Supply: Cover and Secure    Chronic osteomyelitis with draining sinus of multiple sites (HCC) (Chronic) Severe protein-calorie malnutrition (HCC) (Chronic)    Decubitus ulcer of left heel, stage 4 (HCC) - Primary (Chronic)    Relevant Orders    Supply: Wound Cleanser    Supply: Nellie Wound    Supply: Wound Dressings    Supply: Pack Wound    Supply: Cover and Secure    Decubitus ulcer of right foot, stage 4 (HCC) (Chronic)    Relevant Orders    Supply: Wound Cleanser    Supply: Nellie Wound    Supply: Wound Dressings    Supply: Pack Wound    Supply: Cover and Secure    Decubitus ulcer of left leg, stage 4 (HCC)    Relevant Orders    Supply: Wound Cleanser    Supply: Nellie Wound    Supply: Wound Dressings    Supply: Pack Wound    Supply: Cover and Secure    Venous stasis ulcer of right calf with fat layer exposed without varicose veins (HCC)    Relevant Orders    Supply: Wound Cleanser    Supply: Nellie Wound    Supply: Wound Dressings    Supply: Pack Wound    Supply: Cover and Secure          Pt wounds are collectively worse . Pt NOT doing santyl and saline on all wounds. She is seeing Dr. Fred Grover for her feet. Pt is conservative pt for us . I do not feel she will heal.  Cont conservative management. REorder Santyl    Treatment Note please see attached Discharge Instructions    In my professional opinion this patient would benefit from HBO Therapy: No    Written patient dismissal instructions given to patient and signed by patient or POA.          Discharge [address] and Hyperbaric Oxygen Therapy   Physician Orders and Discharge Instructions  [address]  Telephone: [phone]    NAME:  Owen Clarke OF BIRTH:  1960  MEDICAL RECORD NUMBER:  [id number]  DATE:  11/15/2021    Discharge condition: Stable    Discharge to: Home    Left via:Private automobile    Accompanied by: Self    ECF/[facility]: [facility]      Dressing Orders: Right and Left Lower Ext and Feet Wounds  Soap and water wash, Thin layer of Santyl Ointment, Cover with Saline moist gauze, Secure with dry gauze and medipore tape  Change 1-2 times daily     Dressing Orders: Right and Left Ishium,  Soap and water wash, Thin layer of Santyl Ointment, Cover with Saline moist Roll gauze, Secure with dry gauze and medipore tape  Change 1-2 times daily      Dressing Orders: Right Hip  Soap and water wash, Thin layer of Santyl Ointment, Cover with Saline moist gauze, Secure with dry gauze and medipore tape  Change 1-2 times daily      Treatment Orders: Avoid Pressure to wound site. Use ROHO cushion, MARYSOL bed and Heel lift boots, check air in ROHO cushion to make sure its inflated properly  Turn frequently (turn at least every two hours when in bed)  While in chair reposition every 30 minutes  Patient advised to sit up no more than 30minutes at a time for meals ONLY. Please do not elevate the head of the bed higher than 30 degrees. Off-load heels by applying heel-lift boots or \"float\" heels by placing pillows under calves so that heels do not touch  High Protein as Tolerated    [facility] follow up visit ______________1 week_______________  (Please note your next appointment above and if you are unable to keep, kindly give a 24 hour notice. Thank you.)    If you experience any of the following, please call the [email or web address] during business hours:    * Increase in Pain  * Temperature over 101  * Increase in drainage from your wound  * Drainage with a foul odor  * Bleeding  * Increase in swelling  * Need for compression bandage changes due to slippage, breakthrough drainage. If you need medical attention outside of the business hours of the [email or web address] please contact your PCP or go to the nearest emergency room.         Electronically signed by Abhijit Vasquez MD on 11/15/2021 at 2:21 PM

## 2021-12-20 ENCOUNTER — HOSPITAL ENCOUNTER (OUTPATIENT)
Dept: WOUND CARE | Age: 61
Discharge: HOME | End: 2021-12-20
Payer: MEDICARE

## 2021-12-20 VITALS
HEART RATE: 96 BPM | SYSTOLIC BLOOD PRESSURE: 171 MMHG | TEMPERATURE: 98.1 F | DIASTOLIC BLOOD PRESSURE: 74 MMHG | HEIGHT: 68 IN | RESPIRATION RATE: 18 BRPM | WEIGHT: 180 LBS | BODY MASS INDEX: 27.28 KG/M2

## 2021-12-20 DIAGNOSIS — I87.2 VENOUS STASIS ULCER OF RIGHT CALF WITH FAT LAYER EXPOSED WITHOUT VARICOSE VEINS (HCC): ICD-10-CM

## 2021-12-20 DIAGNOSIS — L89.324 DECUBITUS ULCER OF ISCHIAL AREA, LEFT, STAGE IV (HCC): Chronic | ICD-10-CM

## 2021-12-20 DIAGNOSIS — M86.49 CHRONIC OSTEOMYELITIS WITH DRAINING SINUS OF MULTIPLE SITES (HCC): Chronic | ICD-10-CM

## 2021-12-20 DIAGNOSIS — L89.894 DECUBITUS ULCER OF RIGHT FOOT, STAGE 4 (HCC): ICD-10-CM

## 2021-12-20 DIAGNOSIS — E43 SEVERE PROTEIN-CALORIE MALNUTRITION (HCC): Chronic | ICD-10-CM

## 2021-12-20 DIAGNOSIS — L97.212 VENOUS STASIS ULCER OF RIGHT CALF WITH FAT LAYER EXPOSED WITHOUT VARICOSE VEINS (HCC): ICD-10-CM

## 2021-12-20 DIAGNOSIS — L89.314 DECUBITUS ULCER OF ISCHIUM, RIGHT, STAGE IV (HCC): ICD-10-CM

## 2021-12-20 DIAGNOSIS — L89.624 DECUBITUS ULCER OF LEFT HEEL, STAGE 4 (HCC): Primary | ICD-10-CM

## 2021-12-20 DIAGNOSIS — L89.894 DECUBITUS ULCER OF LEFT LEG, STAGE 4 (HCC): ICD-10-CM

## 2021-12-20 PROCEDURE — 99214 OFFICE O/P EST MOD 30 MIN: CPT

## 2021-12-20 PROCEDURE — 6370000000 HC RX 637 (ALT 250 FOR IP): Performed by: SURGERY

## 2021-12-20 PROCEDURE — 99212 OFFICE O/P EST SF 10 MIN: CPT | Performed by: SURGERY

## 2021-12-20 RX ORDER — LIDOCAINE HYDROCHLORIDE 20 MG/ML
JELLY TOPICAL PRN
Status: DISCONTINUED | OUTPATIENT
Start: 2021-12-20 | End: 2021-12-22 | Stop reason: HOSPADM

## 2021-12-20 RX ORDER — LIDOCAINE HYDROCHLORIDE 20 MG/ML
JELLY TOPICAL PRN
Start: 2021-12-20

## 2021-12-20 ASSESSMENT — PAIN DESCRIPTION - PAIN TYPE: TYPE: CHRONIC PAIN

## 2021-12-20 ASSESSMENT — PAIN DESCRIPTION - ORIENTATION: ORIENTATION: RIGHT

## 2021-12-20 ASSESSMENT — PAIN DESCRIPTION - ONSET: ONSET: ON-GOING

## 2021-12-20 ASSESSMENT — PAIN DESCRIPTION - DESCRIPTORS: DESCRIPTORS: THROBBING;BURNING

## 2021-12-20 ASSESSMENT — PAIN SCALES - GENERAL: PAINLEVEL_OUTOF10: 7

## 2021-12-20 ASSESSMENT — PAIN DESCRIPTION - LOCATION: LOCATION: SHOULDER

## 2021-12-20 ASSESSMENT — PAIN DESCRIPTION - FREQUENCY: FREQUENCY: INTERMITTENT

## 2021-12-20 ASSESSMENT — PAIN DESCRIPTION - PROGRESSION: CLINICAL_PROGRESSION: NOT CHANGED

## 2021-12-20 NOTE — PROGRESS NOTES
[address] [address]   Progress Note and Procedure Note      [address] RECORD NUMBER:  [id number]  AGE: 63 y.o. GENDER: female  : 1960  EPISODE DATE:  2021    Subjective:     Chief Complaint   Patient presents with    Wound Check         HISTORY of PRESENT ILLNESS HPI     Trevon Agarwal is a 63 y.o. female who presents today for wound/ulcer evaluation. History of Wound Context: Pt with multiple wounds here for eval/treat  Wound/Ulcer Pain Timing/Severity: none  Quality of pain: N/A  Severity:  0 / 10   Modifying Factors: None  Associated Signs/Symptoms: none    Ulcer Identification:  Ulcer Type: pressure  Contributing Factors: chronic pressure, decreased mobility and shear force    Wound: pressure        PAST MEDICAL HISTORY        Diagnosis Date    Blood circulation, collateral     Chronic kidney disease     bladder removed-no kidney problems    GERD (gastroesophageal reflux disease)     History of blood transfusion     History of urostomy     Neuromuscular disorder (HCC)     parapalegic t10-l1    Osteomyelitis (HCC)     Pressure ulcer     to left ischium and bilat heels       PAST SURGICAL HISTORY    Past Surgical History:   Procedure Laterality Date    BACK SURGERY  1979    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  Buttock years ago more than 20 years ago    SMALL INTESTINE SURGERY N/A 2019    DIVERTING LOOP COLOSTOMY performed by Eb Rowland MD at [address] HISTORY    Family History   Problem Relation Age of Onset    Other Mother          of sepsis    Cancer Father         Lung Cancer    Diabetes Paternal Grandmother     Heart Attack Paternal Grandfather        SOCIAL HISTORY    Social History     Tobacco Use    Smoking status: Never Smoker    Smokeless tobacco: Never Used   Vaping Use    Vaping Use: Never used   Substance Use Topics    Alcohol use: Not Currently    Drug use: Never       ALLERGIES    Allergies   Allergen Reactions    Morphine And Related Nausea Only     Caused severe altered mental status       MEDICATIONS    Current Outpatient Medications on File Prior to Encounter   Medication Sig Dispense Refill    collagenase 250 UNIT/GM ointment Apply topically twice daily. 90 g 5    ferrous sulfate (IRON 325) 325 (65 Fe) MG tablet Take 325 mg by mouth daily (with breakfast)       SODIUM CHLORIDE, EXTERNAL, (SALINE WOUND WASH) 0.9 % SOLN 1000 cc bottle normal saline. Apply as directed 1000 mL 3    sodium hypochlorite (DAKINS) 0.125 % SOLN external solution Moisten gauze apply to wound twice daily 1 Bottle 3    ALPRAZolam (XANAX) 1 MG tablet Take 1 mg by mouth 3 times daily as needed for Anxiety.  HYDROcodone-acetaminophen (NORCO)  MG per tablet Take 1 tablet by mouth every 6 hours as needed for Pain.  omeprazole (PRILOSEC) 20 MG delayed release capsule Take 20 mg by mouth daily as needed       zinc 50 MG CAPS Take 1 capsule by mouth daily      baclofen (LIORESAL) 10 MG tablet Take 10 mg by mouth 3 times daily       sodium hypochlorite (DAKINS) 0.125 % SOLN external solution Moisten gauze apply to wound twice daily 1000 mL 5    collagenase (SANTYL) 250 UNIT/GM ointment Apply 1 applicator topically daily       No current facility-administered medications on file prior to encounter. REVIEW OF SYSTEMS    A comprehensive review of systems was negative.     Objective:      BP (!) 171/74   Pulse 96   Temp 98.1 °F (36.7 °C) (Temporal)   Resp 18   Ht 5' 8\" (1.727 m)   Wt 180 lb (81.6 kg)   BMI 27.37 kg/m²     Wt Readings from Last 3 Encounters: 12/20/21 180 lb (81.6 kg)   11/15/21 180 lb (81.6 kg)   11/01/21 180 lb (81.6 kg)       PHYSICAL EXAM    General Appearance: alert and oriented to person, place and time, well developed and well- nourished, in no acute distress  Skin: warm and dry, no rash or erythema  Head: normocephalic and atraumatic  Eyes: pupils equal, round, and reactive to light, extraocular eye movements intact, conjunctivae normal  ENT: tympanic membrane, external ear and ear canal normal bilaterally, nose without deformity, nasal mucosa and turbinates normal without polyps, lips teeth and gums normal  Neck: supple and non-tender without mass, no thyromegaly or thyroid nodules, no cervical lymphadenopathy  Pulmonary/Chest: clear to auscultation bilaterally- no wheezes, rales or rhonchi, normal air movement, no respiratory distress  Cardiovascular: normal rate, regular rhythm, normal S1 and S2, no murmurs, rubs, clicks, or gallops, distal pulses intact, no carotid bruits  Abdomen: soft, non-tender, non-distended, normal bowel sounds, no masses or organomegaly  Extremities: no cyanosis, clubbing or edema  Musculoskeletal: normal range of motion, no joint swelling, deformity or tenderness      Assessment:      Patient Active Problem List   Diagnosis Code    Paraplegia at T9 level ([facility]) G82.20    Pressure injury of right ischium, stage 4 ([facility]) L89.314    Decubitus ulcer of ischial area, left, stage IV ([facility]) L89.324    Decubitus ulcer of ischium, right, stage IV ([facility]) L89.314    Chronic osteomyelitis with draining sinus of multiple sites ([facility] [address] M86.49    Pressure injury of right heel, stage 3 ([facility]) L89.613    Gastrointestinal hemorrhage K92.2    Pressure injury of skin of left heel L89.629    Acute encephalopathy G93.40    Anemia due to blood loss, acute D62    Hypocalcemia E83.51    Severe protein-calorie malnutrition ([facility]) E43    GI bleed K92.2    Anemia D64.9    Decubitus ulcer of left heel, stage 4 ([facility]) K51.204    Decubitus ulcer of right foot, stage 4 (HCC) L89.894    Decubitus ulcer of left leg, stage 4 (HCC) L89.894    Venous stasis ulcer of right calf with fat layer exposed without varicose veins ([facility]) I87.2, L97.212             Wound 12/07/20 Left Wound 5. Left Ischium Pressure Stage 4 (Active)   Wound Image   12/20/21 1359   Wound Etiology Pressure Stage  4 11/15/21 1335   Dressing Status Old drainage noted; Intact 11/15/21 1335   Wound Cleansed Soap and water 11/15/21 1335   Dressing/Treatment Gauze dressing/dressing sponge;Moist to moist;Roll gauze;Tape/Soft cloth adhesive tape 11/15/21 1504   Offloading for Diabetic Foot Ulcers No offloading required 11/15/21 1335   Wound Length (cm) 9.5 cm 12/20/21 1359   Wound Width (cm) 5.5 cm 12/20/21 1359   Wound Depth (cm) 2.6 cm 12/20/21 1359   Wound Surface Area (cm^2) 52.25 cm^2 12/20/21 1359   Change in Wound Size % (l*w) 47.22 12/20/21 1359   Wound Volume (cm^3) 135.85 cm^3 12/20/21 1359   Wound Healing % 89 12/20/21 1359   Post-Procedure Length (cm) 7 cm 11/15/21 1406   Post-Procedure Width (cm) 6.5 cm 11/15/21 1406   Post-Procedure Depth (cm) 2.6 cm 11/15/21 1406   Post-Procedure Surface Area (cm^2) 45.5 cm^2 11/15/21 1406   Post-Procedure Volume (cm^3) 118.3 cm^3 11/15/21 1406   Distance Tunneling (cm) 8.5 cm 12/20/21 1359   Tunneling Position ___ O'Clock 7 12/20/21 1359   Undermining Starts ___ O'Clock 12 12/20/21 1359   Undermining Ends___ O'Clock 5 12/20/21 1359   Undermining Maxium Distance (cm) 3 12/20/21 1359   Wound Assessment Exposed structure bone;Exposed structure muscle 12/20/21 1359   Drainage Amount Large 12/20/21 1359   Drainage Description Serosanguinous 12/20/21 1359   Odor None 12/20/21 1359   Nellie-wound Assessment Blanchable erythema 12/20/21 1359   Margins Unattached edges 12/20/21 1359   Number of days: 377       Wound 12/07/20 Ischium Right Wound 6.    Right Ischium Stage 4 (Active)   Wound Image   12/20/21 1359   Wound Etiology Pressure Stage  4 12/20/21 1359   Dressing Status Old drainage noted 12/20/21 1359   Wound Cleansed Soap and water 12/20/21 1359   Dressing/Treatment Gauze dressing/dressing sponge;Moist to moist;Roll gauze;Tape/Soft cloth adhesive tape 11/15/21 1504   Offloading for Diabetic Foot Ulcers Yes (type); Other (comment) 12/20/21 1359   Wound Length (cm) 10.2 cm 12/20/21 1359   Wound Width (cm) 2.4 cm 12/20/21 1359   Wound Depth (cm) 2.4 cm 12/20/21 1359   Wound Surface Area (cm^2) 24.48 cm^2 12/20/21 1359   Change in Wound Size % (l*w) 67.03 12/20/21 1359   Wound Volume (cm^3) 58.752 cm^3 12/20/21 1359   Wound Healing % 80 12/20/21 1359   Post-Procedure Length (cm) 9 cm 11/15/21 1406   Post-Procedure Width (cm) 4 cm 11/15/21 1406   Post-Procedure Depth (cm) 1 cm 11/15/21 1406   Post-Procedure Surface Area (cm^2) 36 cm^2 11/15/21 1406   Post-Procedure Volume (cm^3) 36 cm^3 11/15/21 1406   Distance Tunneling (cm) 0 cm 10/11/21 1405   Tunneling Position ___ O'Clock 0 10/11/21 1405   Undermining Starts ___ O'Clock 10 12/20/21 1359   Undermining Ends___ O'Clock 2 12/20/21 1359   Undermining Maxium Distance (cm) 5 12/20/21 1359   Wound Assessment Pink/red;Slough 12/20/21 1359   Drainage Amount Large 12/20/21 1359   Drainage Description Serosanguinous; Yellow 12/20/21 1359   Odor Moderate 12/20/21 1359   Nellie-wound Assessment Blanchable erythema 12/20/21 1359   Margins Unattached edges 12/20/21 1359   Wound Thickness Description not for Pressure Injury Full thickness 07/06/21 1420   Number of days: 377       Wound 05/03/21 Foot Left;Plantar wound 3a.  left lateral plantar foot (wound has  from wound #3) 5/3/21Pressure Stage 4 (Active)   Wound Image   12/20/21 1359   Wound Etiology Pressure Stage  4 12/20/21 1359   Dressing Status Old drainage noted 12/20/21 1359   Wound Cleansed Soap and water 12/20/21 1359   Dressing/Treatment Gauze dressing/dressing sponge;Moist to moist;Roll gauze;Tape/Soft cloth adhesive tape 11/15/21 1504   Wound Length (cm) 9.9 cm 12/20/21 1359   Wound Width (cm) 5.3 cm 12/20/21 1359   Wound Depth (cm) 0.7 cm 12/20/21 1359   Wound Surface Area (cm^2) 52.47 cm^2 12/20/21 1359   Change in Wound Size % (l*w) -993.12 12/20/21 1359   Wound Volume (cm^3) 36.729 cm^3 12/20/21 1359   Wound Healing % -3726 12/20/21 1359   Post-Procedure Length (cm) 10.8 cm 11/15/21 1406   Post-Procedure Width (cm) 6 cm 11/15/21 1406   Post-Procedure Depth (cm) 1 cm 11/15/21 1406   Post-Procedure Surface Area (cm^2) 64.8 cm^2 11/15/21 1406   Post-Procedure Volume (cm^3) 64.8 cm^3 11/15/21 1406   Distance Tunneling (cm) 0 cm 12/20/21 1359   Tunneling Position ___ O'Clock 0 12/20/21 1359   Undermining Starts ___ O'Clock 0 12/20/21 1359   Undermining Ends___ O'Clock 0 12/20/21 1359   Undermining Maxium Distance (cm) 0 12/20/21 1359   Wound Assessment Fibrin;Pink/red;Slough 12/20/21 1359   Drainage Amount Moderate 12/20/21 1359   Drainage Description Serosanguinous 12/20/21 1359   Odor None 12/20/21 1359   Nellie-wound Assessment Blanchable erythema 12/20/21 1359   Margins Attached edges 12/20/21 1359   Wound Thickness Description not for Pressure Injury Full thickness 07/06/21 1420   Number of days: 231       Wound 07/06/21 Leg Right;Lateral Wound 2 right lateral lower ext. ( changed Pressure Stage 4 10/11/21 exposed structure) (Active)   Wound Image   12/20/21 1359   Wound Etiology Pressure Stage  4 12/20/21 1359   Dressing Status Old drainage noted 12/20/21 1359   Wound Cleansed Soap and water 12/20/21 1359   Dressing/Treatment Gauze dressing/dressing sponge;Moist to moist;Roll gauze;Tape/Soft cloth adhesive tape 11/15/21 1504   Wound Length (cm) 3.4 cm 12/20/21 1359   Wound Width (cm) 2.5 cm 12/20/21 1359   Wound Depth (cm) 1.4 cm 12/20/21 1359   Wound Surface Area (cm^2) 8.5 cm^2 12/20/21 1359   Change in Wound Size % (l*w) -203.57 12/20/21 1359   Wound Volume (cm^3) 11.9 cm^3 12/20/21 1359   Wound Healing % -4150 12/20/21 1359   Post-Procedure Length (cm) 3.7 cm 11/15/21 1406   Post-Procedure Width (cm) 3 cm 11/15/21 1406   Post-Procedure Depth (cm) 1 cm 11/15/21 1406   Post-Procedure Surface Area (cm^2) 11.1 cm^2 11/15/21 1406   Post-Procedure Volume (cm^3) 11.1 cm^3 11/15/21 1406   Distance Tunneling (cm) 0 cm 12/20/21 1359   Tunneling Position ___ O'Clock 0 12/20/21 1359   Undermining Starts ___ O'Clock 0 12/20/21 1359   Undermining Ends___ O'Clock 0 12/20/21 1359   Undermining Maxium Distance (cm) 0 12/20/21 1359   Wound Assessment Exposed structure tendon;Pink/red;Slough 12/20/21 1359   Drainage Amount Moderate 12/20/21 1359   Drainage Description Serosanguinous 12/20/21 1359   Odor None 12/20/21 1359   Nellie-wound Assessment Blanchable erythema 12/20/21 1359   Margins Attached edges 12/20/21 1359   Wound Thickness Description not for Pressure Injury Full thickness 07/19/21 1406   Number of days: 167       Wound 07/19/21 Foot Right;Plantar;Mid WOUND1 RIGHT MID PLANTAR PRESSURE STG 4 (Active)   Wound Image   12/20/21 1359   Wound Etiology Pressure Stage  4 12/20/21 1359   Dressing Status Old drainage noted 12/20/21 1359   Wound Cleansed Soap and water 12/20/21 1359   Dressing/Treatment Gauze dressing/dressing sponge;Moist to moist;Roll gauze;Tape/Soft cloth adhesive tape 11/15/21 1504   Wound Length (cm) 2 cm 12/20/21 1359   Wound Width (cm) 2.4 cm 12/20/21 1359   Wound Depth (cm) 0.8 cm 12/20/21 1359   Wound Surface Area (cm^2) 4.8 cm^2 12/20/21 1359   Change in Wound Size % (l*w) 20 12/20/21 1359   Wound Volume (cm^3) 3.84 cm^3 12/20/21 1359   Wound Healing % -540 12/20/21 1359   Post-Procedure Length (cm) 3.5 cm 11/15/21 1406   Post-Procedure Width (cm) 2.5 cm 11/15/21 1406   Post-Procedure Depth (cm) 1 cm 11/15/21 1406   Post-Procedure Surface Area (cm^2) 8.75 cm^2 11/15/21 1406   Post-Procedure Volume (cm^3) 8.75 cm^3 11/15/21 1406   Distance Tunneling (cm) 0 cm 12/20/21 1359   Tunneling Position ___ O'Clock 0 12/20/21 1359   Undermining Starts ___ O'Clock 0 12/20/21 1359   Undermining Ends___ O'Clock 0 12/20/21 1359   Undermining Maxium Distance (cm) 0 12/20/21 1359   Wound Assessment Pink/red;Slough 12/20/21 1359   Drainage Amount Moderate 12/20/21 1359   Drainage Description Serosanguinous 12/20/21 1359   Odor None 12/20/21 1359   Nellie-wound Assessment Dry/flaky;Fragile 12/20/21 1359   Margins Attached edges 12/20/21 1359   Number of days: 154       Wound 08/30/21 Hip Right Wound 7, Pressure Stage 3, R.  Hip (Active)   Wound Image   12/20/21 1359   Wound Etiology Pressure Stage  3 12/20/21 1359   Dressing Status Old drainage noted 12/20/21 1359   Wound Cleansed Soap and water 12/20/21 1359   Dressing/Treatment Gauze dressing/dressing sponge;Moist to moist;Roll gauze;Tape/Soft cloth adhesive tape 11/15/21 1504   Wound Length (cm) 1 cm 12/20/21 1359   Wound Width (cm) 0.8 cm 12/20/21 1359   Wound Depth (cm) 0.2 cm 12/20/21 1359   Wound Surface Area (cm^2) 0.8 cm^2 12/20/21 1359   Change in Wound Size % (l*w) 85.45 12/20/21 1359   Wound Volume (cm^3) 0.16 cm^3 12/20/21 1359   Wound Healing % 71 12/20/21 1359   Post-Procedure Length (cm) 0.7 cm 11/15/21 1406   Post-Procedure Width (cm) 1.9 cm 11/15/21 1406   Post-Procedure Depth (cm) 0.1 cm 11/15/21 1406   Post-Procedure Surface Area (cm^2) 1.33 cm^2 11/15/21 1406   Post-Procedure Volume (cm^3) 0.133 cm^3 11/15/21 1406   Distance Tunneling (cm) 0 cm 10/11/21 1405   Tunneling Position ___ O'Clock 0 10/11/21 1405   Undermining Starts ___ O'Clock 0 10/11/21 1405   Undermining Ends___ O'Clock 0 10/11/21 1405   Undermining Maxium Distance (cm) 0 10/11/21 1405   Wound Assessment Slough;Pink/red 11/15/21 1335   Drainage Amount Moderate 11/15/21 1335   Drainage Description Serosanguinous 11/15/21 1335   Odor None 11/15/21 1335   Nellie-wound Assessment Intact 11/15/21 1335   Margins Attached edges 11/15/21 1335   Number of days: 112             Plan:     Problem List Items Addressed This Visit     Decubitus ulcer of ischial area, left, stage IV (HCC) (Chronic)    * (Principal) Decubitus ulcer of ischium, right, stage IV (HCC) (Chronic)    Relevant Medications    lidocaine (XYLOCAINE) 2 % uro-jet    Other Relevant Orders    Supply: Wound Cleanser    Chronic osteomyelitis with draining sinus of multiple sites (HCC) (Chronic)    Severe protein-calorie malnutrition (HCC) (Chronic)    Decubitus ulcer of left heel, stage 4 (HCC) - Primary (Chronic)    Relevant Medications    lidocaine (XYLOCAINE) 2 % uro-jet    Other Relevant Orders    Supply: Wound Cleanser    Decubitus ulcer of right foot, stage 4 (HCC) (Chronic)    Relevant Medications    lidocaine (XYLOCAINE) 2 % uro-jet    Other Relevant Orders    Supply: Wound Cleanser    Decubitus ulcer of left leg, stage 4 (HCC)    Relevant Medications    lidocaine (XYLOCAINE) 2 % uro-jet    Other Relevant Orders    Supply: Wound Cleanser    Venous stasis ulcer of right calf with fat layer exposed without varicose veins (HCC)    Relevant Medications    lidocaine (XYLOCAINE) 2 % uro-jet    Other Relevant Orders    Supply: Wound Cleanser        Pt very difficult to read. She says she wants to get better but she cannot seem to follow our wishes. Will change to gentamycin dressings BID and work on getting her feet wounds healed. Then we will attack her buttock/ischial wounds. She has to cooperate with the plan or we will fail. RTO 3 weeks      Treatment Note please see attached Discharge Instructions    In my professional opinion this patient would benefit from HBO Therapy: No    Written patient dismissal instructions given to patient and signed by patient or POA.          Discharge [address] and Hyperbaric Oxygen Therapy   Physician Orders and Discharge Instructions  [address] [facility] [place]  [place], [address]  Telephone: [phone]    NAME:  Denise Ackerman OF BIRTH:  1960  MEDICAL RECORD NUMBER: [id number]  DATE:  12/20/2021    Discharge condition: Stable    Discharge to: Home    Left via:Private automobile    Accompanied by: Self    [facility]: [facility]      Dressing Orders: Right and Left Lower Ext and Feet Wounds  Soap and water wash, Thin layer of Santyl Ointment, Cover with Saline moist gauze, Secure with dry gauze and medipore tape  Change 1-2 times daily     Dressing Orders: Right and Left Ishium,  Soap and water wash, Thin layer of Santyl Ointment, Cover with Saline moist Roll gauze, Secure with dry gauze and medipore tape  Change 1-2 times daily      Dressing Orders: Right Hip  Soap and water wash, Thin layer of Santyl Ointment, Cover with Saline moist gauze, Secure with dry gauze and medipore tape  Change 1-2 times daily      Treatment Orders: Avoid Pressure to wound site. Patient refused MARYSOL bed  Use ROHO cushion, Heel lift boots, check air in ROHO cushion to make sure its inflated properly  Turn frequently (turn at least every two hours when in bed)  While in chair reposition every 30 minutes  Patient advised to sit up no more than 30minutes at a time for meals ONLY. Please do not elevate the head of the bed higher than 30 degrees. Off-load heels by applying heel-lift boots or \"float\" heels by placing pillows under calves so that heels do not touch  High Protein as Tolerated    [facility] follow up visit ____________3 weeks________________  (Please note your next appointment above and if you are unable to keep, kindly give a 24 hour notice. Thank you.)    If you experience any of the following, please call the [facility] during business hours:    * Increase in Pain  * Temperature over 101  * Increase in drainage from your wound  * Drainage with a foul odor  * Bleeding  * Increase in swelling  * Need for compression bandage changes due to slippage, breakthrough drainage.     If you need medical attention outside of the business hours of the [facility] please contact your PCP or go to the nearest emergency room.         Electronically signed by Mile Mendez MD on 12/20/2021 at 2:50 PM

## 2021-12-28 NOTE — PROGRESS NOTES
[facility] pharmacy does not have topical gentamicin for patient's wound care orders. I have sent a prescription to [facility]. I left the patient a voicemail to let her know.  Electronically signed by Henna Morton RN on 12/28/2021 at 9:54 AM

## 2022-01-01 NOTE — PROGRESS NOTES
"Pharmacy Dosing Service  Pharmacokinetics  Vancomycin Follow-up Evaluation    Assessment/Action/Plan:  Vancomycin trough=18.40 at 13:33 today. SCr=0.85 (increasing; see below). No changes at this time. Pharmacy will continue to monitor renal function and adjust dose accordingly.     Subjective:  Jessica Alvarenga is a 59 y.o. female currently on Vancomycin 750 mg IV every 24 hours for the treatment of bone/joint infection, day 12 of therapy (Current vancomycin end date: 10-10-19).    Objective:  Ht: 172.7 cm (68\"); Wt: 76.2 kg (168 lb)  Estimated Creatinine Clearance: 87.8 mL/min (by C-G formula based on SCr of 0.85 mg/dL).   Lab Results   Component Value Date    CREATININE 0.85 10/01/2019    CREATININE 0.67 09/30/2019    CREATININE 0.66 09/26/2019    CREATININE 0.6 09/06/2019    CREATININE 0.6 09/05/2019    CREATININE 0.7 09/04/2019      Lab Results   Component Value Date    WBC 6.30 09/30/2019    WBC 6.53 09/26/2019    WBC 5.47 09/23/2019         Lab Results   Component Value Date    VANCOTROUGH 18.40 10/01/2019    VANCORANDOM 17.20 09/26/2019       Culture Results:  Microbiology Results (last 10 days)       ** No results found for the last 240 hours. **            Abhishek Quinones, PharmD   10/01/19 4:08 PM    " all other ROS negative except as per HPI

## 2022-01-10 ENCOUNTER — HOSPITAL ENCOUNTER (OUTPATIENT)
Dept: WOUND CARE | Age: 62
Discharge: HOME | End: 2022-01-10

## 2022-01-25 ENCOUNTER — HOSPITAL ENCOUNTER (OUTPATIENT)
Dept: WOUND CARE | Age: 62
Discharge: HOME | End: 2022-01-25
Payer: MEDICARE

## 2022-01-25 VITALS
HEART RATE: 79 BPM | HEIGHT: 68 IN | DIASTOLIC BLOOD PRESSURE: 82 MMHG | BODY MASS INDEX: 27.28 KG/M2 | WEIGHT: 180 LBS | RESPIRATION RATE: 20 BRPM | TEMPERATURE: 98.2 F | SYSTOLIC BLOOD PRESSURE: 165 MMHG

## 2022-01-25 DIAGNOSIS — L89.624 DECUBITUS ULCER OF LEFT HEEL, STAGE 4 (HCC): ICD-10-CM

## 2022-01-25 DIAGNOSIS — L97.212 VENOUS STASIS ULCER OF RIGHT CALF WITH FAT LAYER EXPOSED WITHOUT VARICOSE VEINS (HCC): ICD-10-CM

## 2022-01-25 DIAGNOSIS — L89.314 DECUBITUS ULCER OF ISCHIUM, RIGHT, STAGE IV (HCC): ICD-10-CM

## 2022-01-25 DIAGNOSIS — I87.2 VENOUS STASIS ULCER OF RIGHT CALF WITH FAT LAYER EXPOSED WITHOUT VARICOSE VEINS (HCC): ICD-10-CM

## 2022-01-25 DIAGNOSIS — L89.894 DECUBITUS ULCER OF LEFT LEG, STAGE 4 (HCC): ICD-10-CM

## 2022-01-25 DIAGNOSIS — L89.894 DECUBITUS ULCER OF RIGHT FOOT, STAGE 4 (HCC): ICD-10-CM

## 2022-01-25 PROCEDURE — 11046 DBRDMT MUSC&/FSCA EA ADDL: CPT | Performed by: SURGERY

## 2022-01-25 PROCEDURE — 11045 DBRDMT SUBQ TISS EACH ADDL: CPT

## 2022-01-25 PROCEDURE — 11045 DBRDMT SUBQ TISS EACH ADDL: CPT | Performed by: SURGERY

## 2022-01-25 PROCEDURE — 11046 DBRDMT MUSC&/FSCA EA ADDL: CPT

## 2022-01-25 PROCEDURE — 11042 DBRDMT SUBQ TIS 1ST 20SQCM/<: CPT | Performed by: SURGERY

## 2022-01-25 PROCEDURE — 11042 DBRDMT SUBQ TIS 1ST 20SQCM/<: CPT

## 2022-01-25 PROCEDURE — 11043 DBRDMT MUSC&/FSCA 1ST 20/<: CPT

## 2022-01-25 PROCEDURE — 11043 DBRDMT MUSC&/FSCA 1ST 20/<: CPT | Performed by: SURGERY

## 2022-01-25 ASSESSMENT — PAIN SCALES - GENERAL: PAINLEVEL_OUTOF10: 7

## 2022-01-25 ASSESSMENT — PAIN DESCRIPTION - ONSET: ONSET: ON-GOING

## 2022-01-25 ASSESSMENT — PAIN DESCRIPTION - FREQUENCY: FREQUENCY: CONTINUOUS

## 2022-01-25 ASSESSMENT — PAIN DESCRIPTION - PROGRESSION: CLINICAL_PROGRESSION: NOT CHANGED

## 2022-01-25 ASSESSMENT — PAIN DESCRIPTION - ORIENTATION: ORIENTATION: RIGHT

## 2022-01-25 ASSESSMENT — PAIN DESCRIPTION - DESCRIPTORS: DESCRIPTORS: ACHING

## 2022-01-25 ASSESSMENT — PAIN - FUNCTIONAL ASSESSMENT: PAIN_FUNCTIONAL_ASSESSMENT: PREVENTS OR INTERFERES SOME ACTIVE ACTIVITIES AND ADLS

## 2022-01-25 ASSESSMENT — PAIN DESCRIPTION - PAIN TYPE: TYPE: CHRONIC PAIN

## 2022-01-25 ASSESSMENT — PAIN DESCRIPTION - LOCATION: LOCATION: SHOULDER

## 2022-01-25 NOTE — PROGRESS NOTES
[address]   Progress Note and Procedure Note      [address] RECORD NUMBER:  [id number]  AGE: 63 y.o. GENDER: female  : 1960  EPISODE DATE:  2022    Subjective:     Chief Complaint   Patient presents with    Wound Check     new skin breakdown RLE          HISTORY of PRESENT ILLNESS HPI     Robel Bridges is a 63 y.o. female who presents today for wound/ulcer evaluation. Wound Context: Pt with multiple wounds here for eval/treat  Wound/Ulcer Pain Timing/Severity: none  Quality of pain: N/A  Severity:  0 / 10   Modifying Factors: None  Associated Signs/Symptoms: none    Ulcer Identification:  Ulcer Type: pressure  Contributing Factors: chronic pressure, decreased mobility and shear force    Wound: pressure        PAST MEDICAL HISTORY        Diagnosis Date    Blood circulation, collateral     Chronic kidney disease     bladder removed-no kidney problems    GERD (gastroesophageal reflux disease)     History of blood transfusion     History of urostomy     Neuromuscular disorder (HCC)     parapalegic t10-l1    Osteomyelitis (HCC)     Pressure ulcer     to left ischium and bilat heels       PAST SURGICAL HISTORY    Past Surgical History:   Procedure Laterality Date    BACK SURGERY  1979    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  Buttock years ago more than 20 years ago    SMALL INTESTINE SURGERY N/A 2019    DIVERTING LOOP COLOSTOMY performed by Paige Bloch, MD at [address] HISTORY    Family History   Problem Relation Age of Onset    Other Mother          of sepsis    Cancer Father         Lung Cancer    Diabetes Paternal Grandmother     Heart Attack Paternal Grandfather        SOCIAL HISTORY    Social History     Tobacco Use    Smoking status: Never Smoker    Smokeless tobacco: Never Used   Vaping Use    Vaping Use: Never used   Substance Use Topics    Alcohol use: Not Currently    Drug use: Never       ALLERGIES    Allergies   Allergen Reactions    Morphine And Related Nausea Only     Caused severe altered mental status       MEDICATIONS    Current Outpatient Medications on File Prior to Encounter   Medication Sig Dispense Refill    gentamicin (GARAMYCIN) 1.6 MG/ mg /L moisten gauze and apply to wound twice daily 50 mL 5    ferrous sulfate (IRON 325) 325 (65 Fe) MG tablet Take 325 mg by mouth daily (with breakfast)       SODIUM CHLORIDE, EXTERNAL, (SALINE WOUND WASH) 0.9 % SOLN 1000 cc bottle normal saline. Apply as directed 1000 mL 3    sodium hypochlorite (DAKINS) 0.125 % SOLN external solution Moisten gauze apply to wound twice daily 1 Bottle 3    ALPRAZolam (XANAX) 1 MG tablet Take 1 mg by mouth 3 times daily as needed for Anxiety.  HYDROcodone-acetaminophen (NORCO)  MG per tablet Take 1 tablet by mouth every 6 hours as needed for Pain.  omeprazole (PRILOSEC) 20 MG delayed release capsule Take 20 mg by mouth daily as needed       zinc 50 MG CAPS Take 1 capsule by mouth daily      baclofen (LIORESAL) 10 MG tablet Take 10 mg by mouth 3 times daily       collagenase (SANTYL) 250 UNIT/GM ointment Apply 1 applicator topically daily       No current facility-administered medications on file prior to encounter. REVIEW OF SYSTEMS    A comprehensive review of systems was negative.     Objective:      BP (!) 165/82   Pulse 79   Temp 98.2 °F (36.8 °C) (Temporal)   Resp 20   Ht 5' 8\" (1.727 m)   Wt 180 lb (81.6 kg)   BMI 27.37 kg/m²     Wt Readings from Last 3 Encounters:   01/25/22 180 lb (81.6 kg)   12/20/21 180 lb (81.6 kg)   11/15/21 180 lb (81.6 kg)       PHYSICAL EXAM    General Appearance: alert and oriented to person, place and time, well developed and well- nourished, in no acute distress  Skin: warm and dry, no rash or erythema  Head: normocephalic and atraumatic  Eyes: pupils equal, round, and reactive to light, extraocular eye movements intact, conjunctivae normal  ENT: tympanic membrane, external ear and ear canal normal bilaterally, nose without deformity, nasal mucosa and turbinates normal without polyps, lips teeth and gums normal  Neck: supple and non-tender without mass, no thyromegaly or thyroid nodules, no cervical lymphadenopathy  Pulmonary/Chest: clear to auscultation bilaterally- no wheezes, rales or rhonchi, normal air movement, no respiratory distress  Cardiovascular: normal rate, regular rhythm, normal S1 and S2, no murmurs, rubs, clicks, or gallops, distal pulses intact, no carotid bruits  Abdomen: soft, non-tender, non-distended, normal bowel sounds, no masses or organomegaly  Extremities: no cyanosis, clubbing or edema  Musculoskeletal: normal range of motion, no joint swelling, deformity or tenderness      Assessment:      Problem List Items Addressed This Visit     Decubitus ulcer of ischium, right, stage IV (HCC) (Chronic)    Decubitus ulcer of left heel, stage 4 (HCC) (Chronic)    Decubitus ulcer of right foot, stage 4 (HCC) (Chronic)    Decubitus ulcer of left leg, stage 4 (HCC)    Venous stasis ulcer of right calf with fat layer exposed without varicose veins (HCC)           Procedure Note  Indications:  Based on my examination of this patient's wound(s)/ulcer(s) today, debridement is required to promote healing and evaluate the wound base.     Performed by: Mayelin Kaba MD    Consent obtained:  Yes    Time out taken:  Yes    Pain Control:         Debridement:Excisional Debridement    Using curette the wound(s)/ulcer(s) was/were sharply debrided down through and including the removal of epidermis, dermis, subcutaneous tissue and muscle/fascia. Devitalized Tissue Debrided:  fibrin, biofilm, slough, necrotic/eschar and exudate      Pre Debridement Measurements:  Are located in the Wound/Ulcer Documentation Flow Sheet    Wound/Ulcer #: 5 and 6    Percent of Wound(s)/Ulcer(s) Debrided: 100%    Total Surface Area Debrided:  77 sq cm       Diabetic/Pressure/Non Pressure Ulcers only:  Ulcer: Pressure ulcer, Stage 4      Debridement:Excisional Debridement    Using curette the wound(s)/ulcer(s) was/were sharply debrided down through and including the removal of epidermis, dermis and subcutaneous tissue. Devitalized Tissue Debrided:  fibrin, biofilm, slough, necrotic/eschar and exudate    Pre Debridement Measurements:  Are located in the Wound/Ulcer Documentation Flow Sheet    Wound/Ulcer #: 1, 2 and 3A    Percent of Wound(s)/Ulcer(s) Debrided: 100%    Total Surface Area Debrided:  86 sq cm       Diabetic/Pressure/Non Pressure Ulcers only:  Ulcer: Pressure ulcer, Stage 4                Post Debridement Measurements:    Wound/Ulcer Descriptions are Pre Debridement --EXCEPT MEASUREMENTS    Wound 12/07/20 Left Wound 5.   Left Ischium Pressure Stage 4 (Active)   Wound Image   01/25/22 1400   Wound Etiology Pressure Stage  4 01/25/22 1400   Dressing Status Old drainage noted 01/25/22 1400   Wound Cleansed Soap and water 01/25/22 1400   Dressing/Treatment Gauze dressing/dressing sponge;Moist to moist;Roll gauze;Tape/Soft cloth adhesive tape 12/20/21 1525   Offloading for Diabetic Foot Ulcers No offloading required 11/15/21 1335   Wound Length (cm) 11.5 cm 01/25/22 1400   Wound Width (cm) 3.5 cm 01/25/22 1400   Wound Depth (cm) 3.2 cm 01/25/22 1400   Wound Surface Area (cm^2) 40.25 cm^2 01/25/22 1400   Change in Wound Size % (l*w) 59.34 01/25/22 1400   Wound Volume (cm^3) 128.8 cm^3 01/25/22 1400   Wound Healing % 90 01/25/22 1400   Post-Procedure Length (cm) 11.5 cm 01/25/22 1456   Post-Procedure Width (cm) 3.5 cm 01/25/22 1456   Post-Procedure Depth (cm) 3.2 cm 01/25/22 1456   Post-Procedure Surface Area (cm^2) 40.25 cm^2 01/25/22 1456   Post-Procedure Volume (cm^3) 128.8 cm^3 01/25/22 1456   Distance Tunneling (cm) 10.5 cm 01/25/22 1400   Tunneling Position ___ O'Clock 7 01/25/22 1400   Undermining Starts ___ O'Clock 12 01/25/22 1400   Undermining Ends___ O'Clock 5 01/25/22 1400   Undermining Maxium Distance (cm) 2.8 01/25/22 1400   Wound Assessment Exposed structure bone;Exposed structure muscle;Pink/red 01/25/22 1400   Drainage Amount Large 01/25/22 1400   Drainage Description Serosanguinous 01/25/22 1400   Odor None 01/25/22 1400   Nellie-wound Assessment Blanchable erythema 01/25/22 1400   Margins Defined edges 01/25/22 1400   Number of days: 413       Wound 12/07/20 Ischium Right Wound 6. Right Ischium Stage 4 (Active)   Wound Image   01/25/22 1400   Wound Etiology Pressure Stage  4 01/25/22 1400   Dressing Status Old drainage noted 01/25/22 1400   Wound Cleansed Soap and water 01/25/22 1400   Dressing/Treatment Gauze dressing/dressing sponge;Moist to moist;Roll gauze;Tape/Soft cloth adhesive tape 12/20/21 1525   Offloading for Diabetic Foot Ulcers Yes (type); Other (comment) 12/20/21 1359   Wound Length (cm) 10.2 cm 01/25/22 1400   Wound Width (cm) 3.6 cm 01/25/22 1400   Wound Depth (cm) 4.5 cm 01/25/22 1400   Wound Surface Area (cm^2) 36.72 cm^2 01/25/22 1400   Change in Wound Size % (l*w) 50.55 01/25/22 1400   Wound Volume (cm^3) 165.24 cm^3 01/25/22 1400   Wound Healing % 44 01/25/22 1400   Post-Procedure Length (cm) 10.2 cm 01/25/22 1456   Post-Procedure Width (cm) 3.6 cm 01/25/22 1456   Post-Procedure Depth (cm) 4.5 cm 01/25/22 1456   Post-Procedure Surface Area (cm^2) 36.72 cm^2 01/25/22 1456   Post-Procedure Volume (cm^3) 165.24 cm^3 01/25/22 1456   Distance Tunneling (cm) 0 cm 10/11/21 1405   Tunneling Position ___ O'Clock 0 10/11/21 1405   Undermining Starts ___ O'Clock 10 01/25/22 1400   Undermining Ends___ O'Clock 2 01/25/22 1400   Undermining Maxium Distance (cm) 3.5 01/25/22 1400   Wound Assessment Pink/red;Slough 01/25/22 1400   Drainage Amount Large 01/25/22 1400   Drainage Description Serosanguinous; Yellow 01/25/22 1400   Odor None 01/25/22 1400   Nellie-wound Assessment Intact 01/25/22 1400   Margins Defined edges 01/25/22 1400   Wound Thickness Description not for Pressure Injury Full thickness 07/06/21 1420   Number of days: 413       Wound 05/03/21 Foot Left;Plantar wound 3a.  left lateral plantar foot (wound has  from wound #3) 5/3/21Pressure Stage 4 (Active)   Wound Image   01/25/22 1400   Wound Etiology Pressure Stage  4 01/25/22 1400   Dressing Status Old drainage noted 01/25/22 1400   Wound Cleansed Soap and water 01/25/22 1400   Dressing/Treatment Gauze dressing/dressing sponge;Moist to moist;Roll gauze;Tape/Soft cloth adhesive tape 12/20/21 1525   Wound Length (cm) 13.8 cm 01/25/22 1400   Wound Width (cm) 5.3 cm 01/25/22 1400   Wound Depth (cm) 1.3 cm 01/25/22 1400   Wound Surface Area (cm^2) 73.14 cm^2 01/25/22 1400   Change in Wound Size % (l*w) -1423.75 01/25/22 1400   Wound Volume (cm^3) 95.082 cm^3 01/25/22 1400   Wound Healing % -9804 01/25/22 1400   Post-Procedure Length (cm) 13.8 cm 01/25/22 1456   Post-Procedure Width (cm) 5.3 cm 01/25/22 1456   Post-Procedure Depth (cm) 1.3 cm 01/25/22 1456   Post-Procedure Surface Area (cm^2) 73.14 cm^2 01/25/22 1456   Post-Procedure Volume (cm^3) 95.082 cm^3 01/25/22 1456   Distance Tunneling (cm) 0 cm 12/20/21 1359   Tunneling Position ___ O'Clock 0 12/20/21 1359   Undermining Starts ___ O'Clock 0 12/20/21 1359   Undermining Ends___ O'Clock 0 12/20/21 1359   Undermining Maxium Distance (cm) 0 12/20/21 1359   Wound Assessment Pink/red;Slough;Eschar moist 01/25/22 1400   Drainage Amount Moderate 01/25/22 1400   Drainage Description Serosanguinous 01/25/22 1400   Odor None 01/25/22 1400   Nellie-wound Assessment Blanchable erythema 01/25/22 1400   Margins Attached edges 01/25/22 1400   Wound Thickness Description not for Pressure Injury Full thickness 07/06/21 1420   Number of days: 267       Wound 07/06/21 Leg Right;Lateral Wound 2 right lateral lower ext. ( changed Pressure Stage 4 10/11/21 exposed structure) (Active)   Wound Image   01/25/22 1400   Wound Etiology Pressure Stage  4 01/25/22 1400   Dressing Status Old drainage noted 01/25/22 1400   Wound Cleansed Soap and water 01/25/22 1400   Dressing/Treatment Gauze dressing/dressing sponge;Moist to moist;Roll gauze;Tape/Soft cloth adhesive tape 12/20/21 1525   Wound Length (cm) 3.2 cm 01/25/22 1400   Wound Width (cm) 2.5 cm 01/25/22 1400   Wound Depth (cm) 0.7 cm 01/25/22 1400   Wound Surface Area (cm^2) 8 cm^2 01/25/22 1400   Change in Wound Size % (l*w) -185.71 01/25/22 1400   Wound Volume (cm^3) 5.6 cm^3 01/25/22 1400   Wound Healing % -1900 01/25/22 1400   Post-Procedure Length (cm) 3.2 cm 01/25/22 1456   Post-Procedure Width (cm) 2.5 cm 01/25/22 1456   Post-Procedure Depth (cm) 0.7 cm 01/25/22 1456   Post-Procedure Surface Area (cm^2) 8 cm^2 01/25/22 1456   Post-Procedure Volume (cm^3) 5.6 cm^3 01/25/22 1456   Distance Tunneling (cm) 0 cm 12/20/21 1359   Tunneling Position ___ O'Clock 0 12/20/21 1359   Undermining Starts ___ O'Clock 0 12/20/21 1359   Undermining Ends___ O'Clock 0 12/20/21 1359   Undermining Maxium Distance (cm) 0 12/20/21 1359   Wound Assessment Pink/red 01/25/22 1400   Drainage Amount Moderate 01/25/22 1400   Drainage Description Serosanguinous 01/25/22 1400   Odor None 01/25/22 1400   Nellie-wound Assessment Intact;Dry/flaky 01/25/22 1400   Margins Attached edges 01/25/22 1400   Wound Thickness Description not for Pressure Injury Full thickness 07/19/21 1406   Number of days: 203       Wound 07/19/21 Foot Right;Plantar;Mid WOUND1 RIGHT MID PLANTAR PRESSURE STG 4 (Active)   Wound Image   01/25/22 1400   Wound Etiology Pressure Stage  4 01/25/22 1400   Dressing Status Old drainage noted 01/25/22 1400   Wound Cleansed Soap and water 01/25/22 1400   Dressing/Treatment Gauze dressing/dressing sponge;Moist to moist;Roll gauze;Tape/Soft cloth adhesive tape 12/20/21 1525   Wound Length (cm) 2.6 cm 01/25/22 1400   Wound Width (cm) 2 cm 01/25/22 1400   Wound Depth (cm) 0.8 cm 01/25/22 1400   Wound Surface Area (cm^2) 5.2 cm^2 01/25/22 1400   Change in Wound Size % (l*w) 13.33 01/25/22 1400   Wound Volume (cm^3) 4.16 cm^3 01/25/22 1400   Wound Healing % -593 01/25/22 1400   Post-Procedure Length (cm) 2.6 cm 01/25/22 1456   Post-Procedure Width (cm) 2 cm 01/25/22 1456   Post-Procedure Depth (cm) 0.8 cm 01/25/22 1456   Post-Procedure Surface Area (cm^2) 5.2 cm^2 01/25/22 1456   Post-Procedure Volume (cm^3) 4.16 cm^3 01/25/22 1456   Distance Tunneling (cm) 0 cm 12/20/21 1359   Tunneling Position ___ O'Clock 0 12/20/21 1359   Undermining Starts ___ O'Clock 0 12/20/21 1359   Undermining Ends___ O'Clock 0 12/20/21 1359   Undermining Maxium Distance (cm) 0 12/20/21 1359   Wound Assessment Pink/red 01/25/22 1400   Drainage Amount Moderate 01/25/22 1400   Drainage Description Serosanguinous 01/25/22 1400   Odor None 01/25/22 1400   Nellie-wound Assessment Intact;Dry/flaky 01/25/22 1400   Margins Attached edges 01/25/22 1400   Number of days: 190             Estimated Blood Loss:  Minimal    Hemostasis Achieved:  by pressure    Procedural Pain:  0  / 10     Post Procedural Pain:  0 / 10     Response to treatment:  Well tolerated by patient. Plan:     Problem List Items Addressed This Visit     Decubitus ulcer of ischium, right, stage IV (HCC) (Chronic)    Decubitus ulcer of left heel, stage 4 (HCC) (Chronic)    Decubitus ulcer of right foot, stage 4 (HCC) (Chronic)    Decubitus ulcer of left leg, stage 4 (HCC)    Venous stasis ulcer of right calf with fat layer exposed without varicose veins (HCC)          Cont gent solution and will try to refer to [facility] when pt has name of provider.   RTO 2 weeks    Treatment Note please see attached Discharge Instructions    In my professional opinion this patient would benefit from HBO Therapy: No    Written patient dismissal instructions given to patient and signed by patient or POA. Discharge [address] and Hyperbaric Oxygen Therapy   Physician Orders and Discharge Instructions  [id number]  Telephone: [phone]    NAME:  Meena Frankel OF BIRTH:  1960  MEDICAL RECORD NUMBER:  [id number]  DATE:  1/25/2022    Discharge condition: Stable    Discharge to: Home    Left via:Private automobile    Accompanied by: Self    [facility]: [facility]      Dressing Orders: Right and Left Lower Ext and Feet Wounds  Soap and water wash, Gentamycin moist gauze to wound bed, Secure with dry gauze (ABD if needed) roll gauze and medipore tape  Change 1-2 times daily     Dressing Orders: Right and Left Ishium,  Soap and water wash, Gentamycin moist roll gauze tucked into the wound, Secure with dry gauze (ABD if needed) and medipore tape  Change 1-2 times daily    Dressing Orders: Right Hip  Healed, Moisturize and Protect     Treatment Orders: Avoid Pressure to wound site. Patient refused MARYSOL bed  Use ROHO cushion, Heel lift boots, check air in ROHO cushion to make sure its inflated properly  Turn frequently (turn at least every two hours when in bed)  While in chair reposition every 30 minutes  Patient advised to sit up no more than 30minutes at a time for meals ONLY. Please do not elevate the head of the bed higher than 30 degrees. Off-load heels by applying heel-lift boots or \"float\" heels by placing pillows under calves so that heels do not touch  High Protein as Tolerated     [facility] follow up visit _____________2 weeks________________  (Please note your next appointment above and if you are unable to keep, kindly give a 24 hour notice.  Thank

## 2022-02-28 ENCOUNTER — HOSPITAL ENCOUNTER (OUTPATIENT)
Dept: WOUND CARE | Age: 62
Discharge: HOME | End: 2022-02-28
Payer: MEDICARE

## 2022-02-28 VITALS
HEART RATE: 76 BPM | TEMPERATURE: 97.3 F | DIASTOLIC BLOOD PRESSURE: 69 MMHG | WEIGHT: 180 LBS | HEIGHT: 68 IN | RESPIRATION RATE: 20 BRPM | BODY MASS INDEX: 27.28 KG/M2 | SYSTOLIC BLOOD PRESSURE: 165 MMHG

## 2022-02-28 DIAGNOSIS — L89.624 DECUBITUS ULCER OF LEFT HEEL, STAGE 4 (HCC): Primary | ICD-10-CM

## 2022-02-28 DIAGNOSIS — L89.314 DECUBITUS ULCER OF ISCHIUM, RIGHT, STAGE IV (HCC): ICD-10-CM

## 2022-02-28 DIAGNOSIS — L89.894 DECUBITUS ULCER OF RIGHT FOOT, STAGE 4 (HCC): ICD-10-CM

## 2022-02-28 DIAGNOSIS — L97.212 VENOUS STASIS ULCER OF RIGHT CALF WITH FAT LAYER EXPOSED WITHOUT VARICOSE VEINS (HCC): ICD-10-CM

## 2022-02-28 DIAGNOSIS — L89.894 DECUBITUS ULCER OF LEFT LEG, STAGE 4 (HCC): ICD-10-CM

## 2022-02-28 DIAGNOSIS — I87.2 VENOUS STASIS ULCER OF RIGHT CALF WITH FAT LAYER EXPOSED WITHOUT VARICOSE VEINS (HCC): ICD-10-CM

## 2022-02-28 PROCEDURE — 11042 DBRDMT SUBQ TIS 1ST 20SQCM/<: CPT

## 2022-02-28 PROCEDURE — 11045 DBRDMT SUBQ TISS EACH ADDL: CPT | Performed by: SURGERY

## 2022-02-28 PROCEDURE — 97597 DBRDMT OPN WND 1ST 20 CM/<: CPT | Performed by: SURGERY

## 2022-02-28 PROCEDURE — 97597 DBRDMT OPN WND 1ST 20 CM/<: CPT

## 2022-02-28 PROCEDURE — 11042 DBRDMT SUBQ TIS 1ST 20SQCM/<: CPT | Performed by: SURGERY

## 2022-02-28 PROCEDURE — 11045 DBRDMT SUBQ TISS EACH ADDL: CPT

## 2022-02-28 RX ORDER — CLINDAMYCIN HYDROCHLORIDE 300 MG/1
300 CAPSULE ORAL 2 TIMES DAILY
COMMUNITY
End: 2022-03-15 | Stop reason: ALTCHOICE

## 2022-02-28 RX ORDER — LIDOCAINE HYDROCHLORIDE 20 MG/ML
JELLY TOPICAL PRN
Start: 2022-02-28

## 2022-02-28 ASSESSMENT — PAIN DESCRIPTION - FREQUENCY: FREQUENCY: INTERMITTENT

## 2022-02-28 ASSESSMENT — PAIN SCALES - GENERAL: PAINLEVEL_OUTOF10: 6

## 2022-02-28 ASSESSMENT — PAIN DESCRIPTION - ORIENTATION: ORIENTATION: RIGHT

## 2022-02-28 ASSESSMENT — PAIN DESCRIPTION - ONSET: ONSET: ON-GOING

## 2022-02-28 ASSESSMENT — PAIN DESCRIPTION - PAIN TYPE: TYPE: CHRONIC PAIN

## 2022-02-28 ASSESSMENT — PAIN DESCRIPTION - DESCRIPTORS: DESCRIPTORS: ACHING

## 2022-02-28 ASSESSMENT — PAIN DESCRIPTION - LOCATION: LOCATION: SHOULDER

## 2022-02-28 ASSESSMENT — PAIN DESCRIPTION - PROGRESSION: CLINICAL_PROGRESSION: NOT CHANGED

## 2022-03-15 ENCOUNTER — HOSPITAL ENCOUNTER (OUTPATIENT)
Dept: WOUND CARE | Age: 62
Discharge: HOME | End: 2022-03-15
Payer: MEDICARE

## 2022-03-15 VITALS
BODY MASS INDEX: 27.28 KG/M2 | HEART RATE: 83 BPM | RESPIRATION RATE: 18 BRPM | HEIGHT: 68 IN | TEMPERATURE: 96.8 F | WEIGHT: 180 LBS

## 2022-03-15 DIAGNOSIS — L89.894 DECUBITUS ULCER OF LEFT LEG, STAGE 4 (HCC): ICD-10-CM

## 2022-03-15 DIAGNOSIS — L97.212 VENOUS STASIS ULCER OF RIGHT CALF WITH FAT LAYER EXPOSED WITHOUT VARICOSE VEINS (HCC): ICD-10-CM

## 2022-03-15 DIAGNOSIS — I87.2 VENOUS STASIS ULCER OF RIGHT CALF WITH FAT LAYER EXPOSED WITHOUT VARICOSE VEINS (HCC): ICD-10-CM

## 2022-03-15 DIAGNOSIS — L89.894 DECUBITUS ULCER OF RIGHT FOOT, STAGE 4 (HCC): ICD-10-CM

## 2022-03-15 DIAGNOSIS — L89.624 DECUBITUS ULCER OF LEFT HEEL, STAGE 4 (HCC): ICD-10-CM

## 2022-03-15 DIAGNOSIS — L89.324 DECUBITUS ULCER OF ISCHIAL AREA, LEFT, STAGE IV (HCC): Primary | Chronic | ICD-10-CM

## 2022-03-15 DIAGNOSIS — L89.314 DECUBITUS ULCER OF ISCHIUM, RIGHT, STAGE IV (HCC): ICD-10-CM

## 2022-03-15 DIAGNOSIS — E43 SEVERE PROTEIN-CALORIE MALNUTRITION (HCC): Chronic | ICD-10-CM

## 2022-03-15 DIAGNOSIS — M86.49 CHRONIC OSTEOMYELITIS WITH DRAINING SINUS OF MULTIPLE SITES (HCC): Chronic | ICD-10-CM

## 2022-03-15 PROCEDURE — 11042 DBRDMT SUBQ TIS 1ST 20SQCM/<: CPT

## 2022-03-15 PROCEDURE — 97598 DBRDMT OPN WND ADDL 20CM/<: CPT

## 2022-03-15 PROCEDURE — 97597 DBRDMT OPN WND 1ST 20 CM/<: CPT | Performed by: SURGERY

## 2022-03-15 PROCEDURE — 11045 DBRDMT SUBQ TISS EACH ADDL: CPT | Performed by: SURGERY

## 2022-03-15 PROCEDURE — 97597 DBRDMT OPN WND 1ST 20 CM/<: CPT

## 2022-03-15 PROCEDURE — 11042 DBRDMT SUBQ TIS 1ST 20SQCM/<: CPT | Performed by: SURGERY

## 2022-03-15 ASSESSMENT — PAIN DESCRIPTION - DESCRIPTORS: DESCRIPTORS: BURNING

## 2022-03-15 ASSESSMENT — PAIN DESCRIPTION - LOCATION: LOCATION: SHOULDER

## 2022-03-15 ASSESSMENT — PAIN DESCRIPTION - PAIN TYPE: TYPE: CHRONIC PAIN

## 2022-03-15 ASSESSMENT — PAIN SCALES - GENERAL: PAINLEVEL_OUTOF10: 7

## 2022-03-15 ASSESSMENT — PAIN - FUNCTIONAL ASSESSMENT: PAIN_FUNCTIONAL_ASSESSMENT: PREVENTS OR INTERFERES SOME ACTIVE ACTIVITIES AND ADLS

## 2022-03-15 ASSESSMENT — PAIN DESCRIPTION - ONSET: ONSET: ON-GOING

## 2022-03-15 ASSESSMENT — PAIN DESCRIPTION - FREQUENCY: FREQUENCY: CONTINUOUS

## 2022-03-15 ASSESSMENT — PAIN DESCRIPTION - PROGRESSION: CLINICAL_PROGRESSION: NOT CHANGED

## 2022-03-15 ASSESSMENT — PAIN DESCRIPTION - ORIENTATION: ORIENTATION: RIGHT

## 2022-03-15 NOTE — PROGRESS NOTES
[address]   Progress Note and Procedure Note      [address] RECORD NUMBER:  [id number]  AGE: 63 y.o. GENDER: female  : 1960  EPISODE DATE:  3/15/2022    Subjective:     Chief Complaint   Patient presents with    Wound Check         HISTORY of PRESENT ILLNESS HPI     Ines Suarez is a 63 y.o. female who presents today for wound/ulcer evaluation. Wound Context: Pt with multiple wounds and chronic osteo  Wound/Ulcer Pain Timing/Severity: none  Quality of pain: N/A  Severity:  0 / 10   Modifying Factors: None  Associated Signs/Symptoms: edema    Ulcer Identification:  Ulcer Type: venous and pressure  Contributing Factors: edema, venous stasis, chronic pressure and decreased mobility    Wound: pressure        PAST MEDICAL HISTORY        Diagnosis Date    Blood circulation, collateral     Chronic kidney disease     bladder removed-no kidney problems    GERD (gastroesophageal reflux disease)     History of blood transfusion     History of urostomy     Neuromuscular disorder (HCC)     parapalegic t10-l1    Osteomyelitis (HCC)     Pressure ulcer     to left ischium and bilat heels       PAST SURGICAL HISTORY    Past Surgical History:   Procedure Laterality Date    BACK SURGERY  1979    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  Buttock years ago more than 20 years ago    SMALL INTESTINE SURGERY N/A 2019    DIVERTING LOOP COLOSTOMY performed by Chasity Weathers MD at [facility] HISTORY    Family History   Problem Relation Age of Onset    Other Mother          of sepsis    Cancer Father Lung Cancer    Diabetes Paternal Grandmother     Heart Attack Paternal Grandfather        SOCIAL HISTORY    Social History     Tobacco Use    Smoking status: Never Smoker    Smokeless tobacco: Never Used   Vaping Use    Vaping Use: Never used   Substance Use Topics    Alcohol use: Not Currently    Drug use: Never       ALLERGIES    Allergies   Allergen Reactions    Morphine And Related Nausea Only     Caused severe altered mental status       MEDICATIONS    Current Outpatient Medications on File Prior to Encounter   Medication Sig Dispense Refill    gentamicin (GARAMYCIN) 1.6 MG/ mg /L moisten gauze and apply to wound twice daily 50 mL 5    ferrous sulfate (IRON 325) 325 (65 Fe) MG tablet Take 325 mg by mouth daily (with breakfast)       HYDROcodone-acetaminophen (NORCO)  MG per tablet Take 1 tablet by mouth every 6 hours as needed for Pain.  omeprazole (PRILOSEC) 20 MG delayed release capsule Take 20 mg by mouth daily as needed       zinc 50 MG CAPS Take 1 capsule by mouth daily      baclofen (LIORESAL) 10 MG tablet Take 10 mg by mouth 3 times daily       SODIUM CHLORIDE, EXTERNAL, (SALINE WOUND WASH) 0.9 % SOLN 1000 cc bottle normal saline. Apply as directed 1000 mL 3    sodium hypochlorite (DAKINS) 0.125 % SOLN external solution Moisten gauze apply to wound twice daily 1 Bottle 3    collagenase (SANTYL) 250 UNIT/GM ointment Apply 1 applicator topically daily      ALPRAZolam (XANAX) 1 MG tablet Take 1 mg by mouth 3 times daily as needed for Anxiety. No current facility-administered medications on file prior to encounter. REVIEW OF SYSTEMS    A comprehensive review of systems was negative.     Objective:      Pulse 83   Temp 96.8 °F (36 °C) (Temporal)   Resp 18   Ht 5' 8\" (1.727 m)   Wt 180 lb (81.6 kg)   BMI 27.37 kg/m²     Wt Readings from Last 3 Encounters:   03/15/22 180 lb (81.6 kg)   02/28/22 180 lb (81.6 kg)   01/25/22 180 lb (81.6 kg)       PHYSICAL EXAM    General Appearance: alert and oriented to person, place and time, well developed and well- nourished, in no acute distress  Skin: warm and dry, no rash or erythema  Head: normocephalic and atraumatic  Eyes: pupils equal, round, and reactive to light, extraocular eye movements intact, conjunctivae normal  ENT: tympanic membrane, external ear and ear canal normal bilaterally, nose without deformity, nasal mucosa and turbinates normal without polyps, lips teeth and gums normal  Neck: supple and non-tender without mass, no thyromegaly or thyroid nodules, no cervical lymphadenopathy  Pulmonary/Chest: clear to auscultation bilaterally- no wheezes, rales or rhonchi, normal air movement, no respiratory distress  Cardiovascular: normal rate, regular rhythm, normal S1 and S2, no murmurs, rubs, clicks, or gallops, distal pulses intact, no carotid bruits  Abdomen: soft, non-tender, non-distended, normal bowel sounds, no masses or organomegaly  Extremities: no cyanosis, clubbing or edema      Assessment:      Problem List Items Addressed This Visit     Decubitus ulcer of ischial area, left, stage IV (HCC) - Primary (Chronic)    * (Principal) Decubitus ulcer of ischium, right, stage IV (HCC) (Chronic)    Chronic osteomyelitis with draining sinus of multiple sites (HCC) (Chronic)    Severe protein-calorie malnutrition (HCC) (Chronic)    Decubitus ulcer of left heel, stage 4 (HCC) (Chronic)    Decubitus ulcer of right foot, stage 4 (HCC) (Chronic)    Decubitus ulcer of left leg, stage 4 (HCC)    Venous stasis ulcer of right calf with fat layer exposed without varicose veins (HCC)           Procedure Note  Indications:  Based on my examination of this patient's wound(s)/ulcer(s) today, debridement is required to promote healing and evaluate the wound base.     Performed by: Maureen Briones MD    Consent obtained:  Yes    Time out taken:  Yes    Pain Control: Anesthetic  Anesthetic: None       Debridement:Excisional Debridement    Using curette the wound(s)/ulcer(s) was/were sharply debrided down through and including the removal of epidermis, dermis and subcutaneous tissue. Devitalized Tissue Debrided:  fibrin, biofilm, slough, necrotic/eschar and exudate      Pre Debridement Measurements:  Are located in the Wound/Ulcer Documentation Flow Sheet    Wound/Ulcer #: 1, 5, 6 and 3A    Percent of Wound(s)/Ulcer(s) Debrided: 100%    Total Surface Area Debrided:  124 sq cm       Diabetic/Pressure/Non Pressure Ulcers only:  Ulcer: Pressure ulcer, Stage 4      Debridement:Non-excisional Debridement    Using curette the wound(s)/ulcer(s) was/were sharply debrided down through and including the removal of epidermis and dermis. Devitalized Tissue Debrided:  fibrin, biofilm, slough, necrotic/eschar and exudate    Pre Debridement Measurements:  Are located in the Wound/Ulcer Documentation Flow Sheet    Wound/Ulcer #: 2 and 7    Percent of Wound(s)/Ulcer(s) Debrided: 100%    Total Surface Area Debrided:  5 sq cm       Diabetic/Pressure/Non Pressure Ulcers only:  Ulcer: Pressure ulcer, Stage 3                Post Debridement Measurements:    Wound/Ulcer Descriptions are Pre Debridement --EXCEPT MEASUREMENTS    Wound 12/07/20 Left Wound 5. Left Ischium Pressure Stage 4 (Active)   Wound Image   03/15/22 1430   Wound Etiology Pressure Stage  4 03/15/22 1430   Dressing Status Old drainage noted 03/15/22 1430   Wound Cleansed Soap and water 03/15/22 1430   Dressing/Treatment ABD; Moist to moist;Roll gauze;Tape/Soft cloth adhesive tape 02/28/22 1500   Offloading for Diabetic Foot Ulcers No offloading required 11/15/21 1335   Wound Length (cm) 10.5 cm 03/15/22 1430   Wound Width (cm) 6.4 cm 03/15/22 1430   Wound Depth (cm) 3.5 cm 03/15/22 1430   Wound Surface Area (cm^2) 67.2 cm^2 03/15/22 1430   Change in Wound Size % (l*w) 32.12 03/15/22 1430   Wound Volume (cm^3) 235.2 cm^3 03/15/22 1430   Wound Healing % 82 03/15/22 1430 Post-Procedure Length (cm) 10.5 cm 03/15/22 1434   Post-Procedure Width (cm) 6.4 cm 03/15/22 1434   Post-Procedure Depth (cm) 3.5 cm 03/15/22 1434   Post-Procedure Surface Area (cm^2) 67.2 cm^2 03/15/22 1434   Post-Procedure Volume (cm^3) 235.2 cm^3 03/15/22 1434   Distance Tunneling (cm) 9 cm 03/15/22 1430   Tunneling Position ___ O'Clock 6 03/15/22 1430   Undermining Starts ___ O'Clock 12 03/15/22 1430   Undermining Ends___ O'Clock 5 03/15/22 1430   Undermining Maxium Distance (cm) 2.3 03/15/22 1430   Wound Assessment [place]/red;Slough 03/15/22 1430   Drainage Amount Large 03/15/22 1430   Drainage Description Serosanguinous 03/15/22 1430   Odor None 03/15/22 1430   Nellie-wound Assessment Blanchable erythema 03/15/22 1430   Margins Defined edges 03/15/22 1430   Number of days: 462       Wound 12/07/20 Ischium Right Wound 6. Right Ischium Stage 4 (Active)   Wound Image   03/15/22 1430   Wound Etiology Pressure Stage  4 03/15/22 1430   Dressing Status Old drainage noted 03/15/22 1430   Wound Cleansed Soap and water 03/15/22 1430   Dressing/Treatment ABD; Moist to moist;Roll gauze;Tape/Soft cloth adhesive tape 02/28/22 1500   Offloading for Diabetic Foot Ulcers Yes (type); Other (comment) 12/20/21 1359   Wound Length (cm) 10 cm 03/15/22 1430   Wound Width (cm) 1.5 cm 03/15/22 1430   Wound Depth (cm) 2.5 cm 03/15/22 1430   Wound Surface Area (cm^2) 15 cm^2 03/15/22 1430   Change in Wound Size % (l*w) 79.8 03/15/22 1430   Wound Volume (cm^3) 37.5 cm^3 03/15/22 1430   Wound Healing % 87 03/15/22 1430   Post-Procedure Length (cm) 10 cm 03/15/22 1434   Post-Procedure Width (cm) 1.5 cm 03/15/22 1434   Post-Procedure Depth (cm) 2.5 cm 03/15/22 1434   Post-Procedure Surface Area (cm^2) 15 cm^2 03/15/22 1434   Post-Procedure Volume (cm^3) 37.5 cm^3 03/15/22 1434   Distance Tunneling (cm) 4.5 cm 02/28/22 1349   Tunneling Position ___ O'Clock 12 02/28/22 1349   Undermining Starts ___ O'Clock 11 03/15/22 1430   Undermining Ends___ O'Clock 1 03/15/22 1430   Undermining Maxium Distance (cm) 4.5 03/15/22 1430   Wound Assessment [place]/red;Slough 03/15/22 1430   Drainage Amount Large 03/15/22 1430   Drainage Description Serosanguinous 03/15/22 1430   Odor None 03/15/22 1430   Nellie-wound Assessment Intact 03/15/22 1430   Margins Defined edges 03/15/22 1430   Wound Thickness Description not for Pressure Injury Full thickness 07/06/21 1420   Number of days: 462       Wound 05/03/21 Foot Left;Plantar wound 3a.  left lateral plantar foot (wound has  from wound #3) 5/3/21Pressure Stage 4 (Active)   Wound Image   03/15/22 1430   Wound Etiology Pressure Stage  4 03/15/22 1430   Dressing Status Old drainage noted 03/15/22 1430   Wound Cleansed Soap and water 03/15/22 1430   Dressing/Treatment ABD;Gauze dressing/dressing sponge;Moist to moist;Roll gauze;Tape/Soft cloth adhesive tape 02/28/22 1500   Wound Length (cm) 13 cm 03/15/22 1430   Wound Width (cm) 6.6 cm 03/15/22 1430   Wound Depth (cm) 0.9 cm 03/15/22 1430   Wound Surface Area (cm^2) 85.8 cm^2 03/15/22 1430   Change in Wound Size % (l*w) -1687.5 03/15/22 1430   Wound Volume (cm^3) 77.22 cm^3 03/15/22 1430   Wound Healing % -7944 03/15/22 1430   Post-Procedure Length (cm) 13 cm 03/15/22 1434   Post-Procedure Width (cm) 2.6 cm 03/15/22 1434   Post-Procedure Depth (cm) 0.9 cm 03/15/22 1434   Post-Procedure Surface Area (cm^2) 33.8 cm^2 03/15/22 1434   Post-Procedure Volume (cm^3) 30.42 cm^3 03/15/22 1434   Distance Tunneling (cm) 0 cm 02/28/22 1349   Tunneling Position ___ O'Clock 0 02/28/22 1349   Undermining Starts ___ O'Clock 0 02/28/22 1349   Undermining Ends___ O'Clock 0 02/28/22 1349   Undermining Maxium Distance (cm) 0 02/28/22 1349   Wound Assessment [place]/red;Slough 03/15/22 1430   Drainage Amount Moderate 03/15/22 1430   Drainage Description Serosanguinous 03/15/22 1430   Odor None 03/15/22 1430   Nellie-wound Assessment Blanchable erythema 03/15/22 1430   Margins Attached edges 03/15/22 1430   Wound Thickness Description not for Pressure Injury Full thickness 07/06/21 1420   Number of days: 316       Wound 07/06/21 Leg Right;Lateral Wound 2 right lateral lower ext. ( changed Pressure Stage 4 10/11/21 exposed structure) (Active)   Wound Image   03/15/22 1430   Wound Etiology Pressure Stage  4 03/15/22 1430   Dressing Status Old drainage noted 03/15/22 1430   Wound Cleansed Soap and water 03/15/22 1430   Dressing/Treatment ABD;Gauze dressing/dressing sponge;Moist to moist;Roll gauze;Tape/Soft cloth adhesive tape 02/28/22 1500   Wound Length (cm) 3 cm 03/15/22 1430   Wound Width (cm) 1.5 cm 03/15/22 1430   Wound Depth (cm) 0.3 cm 03/15/22 1430   Wound Surface Area (cm^2) 4.5 cm^2 03/15/22 1430   Change in Wound Size % (l*w) -60.71 03/15/22 1430   Wound Volume (cm^3) 1.35 cm^3 03/15/22 1430   Wound Healing % -382 03/15/22 1430   Post-Procedure Length (cm) 3 cm 03/15/22 1434   Post-Procedure Width (cm) 1.5 cm 03/15/22 1434   Post-Procedure Depth (cm) 0.3 cm 03/15/22 1434   Post-Procedure Surface Area (cm^2) 4.5 cm^2 03/15/22 1434   Post-Procedure Volume (cm^3) 1.35 cm^3 03/15/22 1434   Distance Tunneling (cm) 0 cm 02/28/22 1349   Tunneling Position ___ O'Clock 0 02/28/22 1349   Undermining Starts ___ O'Clock 0 02/28/22 1349   Undermining Ends___ O'Clock 0 02/28/22 1349   Undermining Maxium Distance (cm) 0 02/28/22 1349   Wound Assessment [place]/red;Slough 03/15/22 1430   Drainage Amount Moderate 03/15/22 1430   Drainage Description Serosanguinous 03/15/22 1430   Odor None 03/15/22 1430   Nellie-wound Assessment Intact 03/15/22 1430   Margins Attached edges 03/15/22 1430   Wound Thickness Description not for Pressure Injury Full thickness 07/19/21 1406   Number of days: 252       Wound 07/19/21 Foot Right;Plantar;Mid WOUND1 RIGHT MID PLANTAR PRESSURE STG 4 (Active)   Wound Image   03/15/22 1430   Wound Etiology Pressure Stage  4 03/15/22 1430   Dressing Status Old drainage noted 03/15/22 1430   Wound Cleansed Soap and water 03/15/22 1430   Dressing/Treatment ABD;Gauze dressing/dressing sponge;Moist to moist;Roll gauze;Tape/Soft cloth adhesive tape 02/28/22 1500   Wound Length (cm) 3 cm 03/15/22 1430   Wound Width (cm) 2.3 cm 03/15/22 1430   Wound Depth (cm) 0.5 cm 03/15/22 1430   Wound Surface Area (cm^2) 6.9 cm^2 03/15/22 1430   Change in Wound Size % (l*w) -15 03/15/22 1430   Wound Volume (cm^3) 3.45 cm^3 03/15/22 1430   Wound Healing % -475 03/15/22 1430   Post-Procedure Length (cm) 3 cm 03/15/22 1434   Post-Procedure Width (cm) 2.3 cm 03/15/22 1434   Post-Procedure Depth (cm) 0.5 cm 03/15/22 1434   Post-Procedure Surface Area (cm^2) 6.9 cm^2 03/15/22 1434   Post-Procedure Volume (cm^3) 3.45 cm^3 03/15/22 1434   Distance Tunneling (cm) 0 cm 02/28/22 1349   Tunneling Position ___ O'Clock 0 02/28/22 1349   Undermining Starts ___ O'Clock 0 02/28/22 1349   Undermining Ends___ O'Clock 0 02/28/22 1349   Undermining Maxium Distance (cm) 0 02/28/22 1349   Wound Assessment [place]/red;Slough 03/15/22 1430   Drainage Amount Moderate 03/15/22 1430   Drainage Description Serosanguinous 03/15/22 1430   Odor None 03/15/22 1430   Nellie-wound Assessment Dry/flaky 03/15/22 1430   Margins Attached edges 03/15/22 1430   Number of days: 239       Wound 02/28/22 Buttocks Right #7 right outer hip (Pressure injury Stage 2) (Active)   Wound Image   03/15/22 1430   Wound Etiology Pressure Stage  2 03/15/22 1430   Dressing Status Old drainage noted 03/15/22 1430   Wound Cleansed Soap and water 03/15/22 1430   Dressing/Treatment Silicone border 18/82/51 1500   Wound Length (cm) 0.4 cm 03/15/22 1430   Wound Width (cm) 1 cm 03/15/22 1430   Wound Depth (cm) 0.1 cm 03/15/22 1430   Wound Surface Area (cm^2) 0.4 cm^2 03/15/22 1430   Change in Wound Size % (l*w) 84 03/15/22 1430   Wound Volume (cm^3) 0.04 cm^3 03/15/22 1430   Wound Healing % 84 03/15/22 1430   Post-Procedure Length (cm) 0.4 cm 03/15/22 1434   Post-Procedure Width (cm) 1 cm 03/15/22 1434 Post-Procedure Depth (cm) 0.1 cm 03/15/22 1434   Post-Procedure Surface Area (cm^2) 0.4 cm^2 03/15/22 1434   Post-Procedure Volume (cm^3) 0.04 cm^3 03/15/22 1434   Distance Tunneling (cm) 0 cm 02/28/22 1349   Tunneling Position ___ O'Clock 0 02/28/22 1349   Undermining Starts ___ O'Clock 0 02/28/22 1349   Undermining Ends___ O'Clock 0 02/28/22 1349   Undermining Maxium Distance (cm) 0 02/28/22 1349   Wound Assessment Pink/red 03/15/22 1430   Drainage Amount Small 03/15/22 1430   Drainage Description Serosanguinous 03/15/22 1430   Odor None 03/15/22 1430   Nellie-wound Assessment Blanchable erythema 02/28/22 1349   Margins Defined edges 03/15/22 1430   Number of days: 15             Estimated Blood Loss:  Minimal    Hemostasis Achieved:  by pressure    Procedural Pain:  0  / 10     Post Procedural Pain:  0 / 10     Response to treatment:  Well tolerated by patient. Plan:     Problem List Items Addressed This Visit     Decubitus ulcer of ischial area, left, stage IV (HCC) - Primary (Chronic)    * (Principal) Decubitus ulcer of ischium, right, stage IV (HCC) (Chronic)    Chronic osteomyelitis with draining sinus of multiple sites (HCC) (Chronic)    Severe protein-calorie malnutrition (HCC) (Chronic)    Decubitus ulcer of left heel, stage 4 (HCC) (Chronic)    Decubitus ulcer of right foot, stage 4 (HCC) (Chronic)    Decubitus ulcer of left leg, stage 4 (HCC)    Venous stasis ulcer of right calf with fat layer exposed without varicose veins (HCC)          Pt wounds are better looking tissue and are stable without obvious infection. Cont current care RTO 2-3 weeks. Pt still looking to go to [address] [place] [address] wound care    Treatment Note please see attached Discharge Instructions    In my professional opinion this patient would benefit from HBO Therapy: No    Written patient dismissal instructions given to patient and signed by patient or POA.          Discharge [address] and Hyperbaric Oxygen Therapy   Physician Orders and Discharge Instructions  [facility]  Telephone: [phone]    NAME:  Ramon العراقي OF BIRTH:  1960  MEDICAL RECORD NUMBER:  [id number]  DATE:  3/15/2022    Discharge condition: Stable    Discharge to: Home    Left via:Private automobile     Accompanied by: Self     ECF/HHA: [facility]      Dressing Orders: Right and Left Lower Ext and Feet Wounds  Soap and water or saline wash, Santyl Ointment to dark grey areas, Cover with Gentamycin moist gauze to wound bed, Secure with dry gauze (ABD if needed) roll gauze and medipore tape  Change 1-2 times daily     Dressing Orders: Right and Left Ish[place],  Soap and water or Saline wash, Santyl Ointment to dark grey areas, Cover with Gentamycin moist roll gauze tucked into the wound, Secure with dry gauze (ABD if needed) and medipore tape  Change 1-2 times daily     Dressing Orders: Right Hip  Mepilex Border, Change every 2-3 days and as needed      Treatment Orders: Avoid Pressure to wound site. Patient refused MARYSOL bed  Use ROHO cushion, Heel lift boots, check air in ROHO cushion to make sure its inflated properly  Turn frequently (turn at least every two hours when in bed)  While in chair reposition every 30 minutes  Patient advised to sit up no more than 30minutes at a time for meals ONLY. Please do not elevate the head of the bed higher than 30 degrees. Off-load heels by applying heel-lift boots or \"float\" heels by placing pillows under calves so that heels do not touch  High Protein as Tolerated      [facility] follow up visit _____________2 weeks________________  (Please note your next appointment above and if you are unable to keep, kindly give a 24 hour notice.  Thank you.)    If you experience any of the following, please call the [facility] [address] during business hours:    * Increase in Pain  * Temperature over 101  * Increase in drainage from your wound  * Drainage with a foul odor  * Bleeding  * Increase in swelling  * Need for compression bandage changes due to slippage, breakthrough drainage. If you need medical attention outside of the business hours of the [address] [place] [address] Road please contact your PCP or go to the nearest emergency room.         Electronically signed by Mariana Hall MD on 3/15/2022 at 3:16 PM

## 2022-08-30 ENCOUNTER — APPOINTMENT (OUTPATIENT)
Dept: WOUND CARE | Facility: HOSPITAL | Age: 62
End: 2022-08-30

## 2022-09-14 ENCOUNTER — OFFICE VISIT (OUTPATIENT)
Dept: WOUND CARE | Facility: HOSPITAL | Age: 62
End: 2022-09-14

## 2022-09-14 DIAGNOSIS — M62.3 IMMOBILITY SYNDROME (PARAPLEGIC): ICD-10-CM

## 2022-09-14 DIAGNOSIS — L24.B3 IRRITANT CONTACT DERMATITIS RELATED TO FECAL OR URINARY STOMA OR FISTULA: ICD-10-CM

## 2022-09-14 DIAGNOSIS — Z93.6 OTHER ARTIFICIAL OPENINGS OF URINARY TRACT STATUS: ICD-10-CM

## 2022-09-14 DIAGNOSIS — K94.19 OTHER COMPLICATIONS OF ENTEROSTOMY: ICD-10-CM

## 2022-09-14 PROCEDURE — 99214 OFFICE O/P EST MOD 30 MIN: CPT | Performed by: NURSE PRACTITIONER

## 2022-09-14 PROCEDURE — G0463 HOSPITAL OUTPT CLINIC VISIT: HCPCS

## 2022-09-27 ENCOUNTER — APPOINTMENT (OUTPATIENT)
Dept: WOUND CARE | Facility: HOSPITAL | Age: 62
End: 2022-09-27

## 2023-07-31 ENCOUNTER — TRANSCRIBE ORDERS (OUTPATIENT)
Dept: ADMINISTRATIVE | Facility: HOSPITAL | Age: 63
End: 2023-07-31
Payer: MEDICARE

## 2023-07-31 DIAGNOSIS — D64.9 ANEMIA, UNSPECIFIED TYPE: Primary | ICD-10-CM

## 2023-08-09 ENCOUNTER — HOSPITAL ENCOUNTER (OUTPATIENT)
Dept: CT IMAGING | Facility: HOSPITAL | Age: 63
Discharge: HOME | End: 2023-08-09
Payer: MEDICARE

## 2023-08-09 RX ORDER — SODIUM CHLORIDE 9 MG/ML
40 INJECTION, SOLUTION INTRAVENOUS AS NEEDED
OUTPATIENT
Start: 2023-08-09

## 2023-08-09 RX ORDER — SODIUM CHLORIDE 0.9 % (FLUSH) 0.9 %
10 SYRINGE (ML) INJECTION AS NEEDED
OUTPATIENT
Start: 2023-08-09

## 2023-08-09 RX ORDER — SODIUM CHLORIDE 0.9 % (FLUSH) 0.9 %
3 SYRINGE (ML) INJECTION EVERY 12 HOURS SCHEDULED
OUTPATIENT
Start: 2023-08-09

## 2024-04-29 ENCOUNTER — HOME HEALTH ADMISSION (OUTPATIENT)
Dept: HOME HEALTH SERVICES | Facility: HOME HEALTHCARE | Age: 64
End: 2024-04-29
Payer: MEDICARE

## 2024-06-14 NOTE — ED PROVIDER NOTES
"Subjective   60-year-old female patient who is been a paraplegic for many years has been dealing with sacral and ischial pressure ulcers for a long time.  She is ended up in a nursing home after her last admission here which she was admitted here for 23 days of days of antibiotics and then she was been in the nursing home for a month.  She also has had some treatment consultation with a plastic surgeon in [place] and she has been there twice for chronic wound care.  Today the caregivers noted that there is increased redness and drainage from the wound and areas that appeared to be coming necrotic.  Patient denies fevers chills nausea or vomiting.          Review of Systems   Constitutional: Negative for chills and fever.   HENT: Negative for congestion, rhinorrhea and sore throat.    Respiratory: Negative for cough, chest tightness and shortness of breath.    Cardiovascular: Negative for chest pain.   Gastrointestinal: Negative for abdominal pain, nausea and vomiting.        Patient has a urostomy bag as well as a colostomy bag.  She states that the stool in the colostomy has been loose since her admission to the hospital.   Genitourinary:        Patient has a urostomy bag that is draining normally.   Musculoskeletal: Negative for back pain.   Skin: Positive for wound.        Patient has very large sacral as well as bilateral ischial pressure ulcers.   Neurological:        Patient is chronically paraplegic but otherwise this is her normal state.   Hematological: Negative.    Psychiatric/Behavioral: Negative.        Past Medical History:   Diagnosis Date   • Decubitus ulcer of both feet    • Intradural extramedullary spinal tumor    • Osteomyelitis (CMS/HCC)    • Paraplegia (CMS/HCC)    • Sacral decubitus ulcer        Allergies   Allergen Reactions   • Morphine Mental Status Change   • Oxycodone Unknown - Low Severity     \"couldn't think straight\"       Past Surgical History:   Procedure Laterality Date   • "  SECTION     • COLOSTOMY     • CYSTOSTOMY     • DEBRIDEMENT OF ISCHEAL ULCER/BUTTOCKS WOUND     • IVC FILTER RETRIEVAL     • MUSCLE FLAP     • SPINE SURGERY     • TRUNK DEBRIDEMENT Bilateral 2020    Procedure: DEBRIDEMENT DECUBITUS ULCER bilateral feet;  Surgeon: Truong Neal MD;  Location: [facility] OR;  Service: General;  Laterality: Bilateral;       Family History   Problem Relation Age of Onset   • Lung cancer Father        Social History     Socioeconomic History   • Marital status: Unknown     Spouse name: Not on file   • Number of children: Not on file   • Years of education: Not on file   • Highest education level: Not on file   Tobacco Use   • Smoking status: Never Smoker   • Smokeless tobacco: Never Used   Substance and Sexual Activity   • Alcohol use: Not Currently   • Drug use: Never   • Sexual activity: Defer           Objective   Physical Exam  Vitals signs and nursing note reviewed. Exam conducted with a chaperone present.   Constitutional:       Appearance: Normal appearance.   HENT:      Head: Normocephalic and atraumatic.      Nose: Nose normal.      Mouth/Throat:      Mouth: Mucous membranes are moist.      Pharynx: Oropharynx is clear. No oropharyngeal exudate or posterior oropharyngeal erythema.   Eyes:      Extraocular Movements: Extraocular movements intact.      Pupils: Pupils are equal, round, and reactive to light.   Neck:      Musculoskeletal: Normal range of motion and neck supple.   Cardiovascular:      Rate and Rhythm: Normal rate and regular rhythm.      Heart sounds: No murmur.   Pulmonary:      Effort: Pulmonary effort is normal.      Breath sounds: Normal breath sounds.   Abdominal:      General: Abdomen is flat.      Palpations: Abdomen is soft.      Tenderness: There is no abdominal tenderness.   Skin:     Comments: Patient was rolled onto her right side and the dressing removed and she was noted to have an 8 cm long and 7 cm wide stage IV decubitus ulcer of the  does have some tunneling.  It does go 4 cm deep.  There was a discharge and that was cultured.  The skin surrounding the the open wound also shows purplish discoloration and redness.  The patient does not have a sensation of tenderness because of her paraplegia and lack of sensation below the umbilicus.   Neurological:      Mental Status: She is alert.   Psychiatric:         Mood and Affect: Mood normal.         Behavior: Behavior normal.         Thought Content: Thought content normal.         Judgment: Judgment normal.         Procedures           ED Course                                           MDM  Number of Diagnoses or Management Options  Diagnosis management comments: I discussed this case with Dr. Britt the hospitalist who is willing to admit the patient but he asked me to talk to our surgeon on call regarding surgical debridement and other treatment.  The case was discussed with Dr. Neal remembers her from her previous admission and feels very strongly that the patient needs to go to a tertiary care center and should not be admitted to our hospital.  He states they can transfer her from the floor and then we should just go ahead and transfer her because she needs a higher level of care for her problems.  I have spoken to Dr. Pollack who is the surgery resident on-call at the [facility] and he is agreed to accept the patient.  He wanted the patient to be sent to the emergency room and they will come and see her there.  I spoke with Dr. Chamberlain who is the emergency physician on call and he agrees to be the accepting physician.       Amount and/or Complexity of Data Reviewed  Decide to obtain previous medical records or to obtain history from someone other than the patient: yes        Final diagnoses:   Pressure injury of sacral region, stage 4 (CMS/HCC)   Sacral osteomyelitis (CMS/[facility])   Bacterial urinary infection            Khanh Cruz DO  09/18/20 1829       Khanh Cruz,  DO  09/18/20 1827     No

## 2024-12-19 ENCOUNTER — HOSPITAL ENCOUNTER (INPATIENT)
Facility: HOSPITAL | Age: 64
LOS: 9 days | Discharge: HOME | DRG: 682 | End: 2024-12-30
Attending: INTERNAL MEDICINE | Admitting: FAMILY MEDICINE
Payer: MEDICARE

## 2024-12-19 DIAGNOSIS — Z74.09 IMPAIRED MOBILITY: Primary | ICD-10-CM

## 2024-12-19 DIAGNOSIS — R11.2 NAUSEA AND VOMITING, UNSPECIFIED VOMITING TYPE: ICD-10-CM

## 2024-12-19 PROBLEM — N17.9 ACUTE KIDNEY INJURY: Status: ACTIVE | Noted: 2024-12-19

## 2024-12-19 LAB
ANION GAP SERPL CALCULATED.3IONS-SCNC: 14 MMOL/L (ref 5–15)
BASOPHILS # BLD AUTO: 0.12 10*3/MM3 (ref 0–0.2)
BASOPHILS NFR BLD AUTO: 1.1 % (ref 0–1.5)
BUN SERPL-MCNC: 59 MG/DL (ref 8–23)
BUN/CREAT SERPL: 18.1 (ref 7–25)
CALCIUM SPEC-SCNC: 8.5 MG/DL (ref 8.6–10.5)
CHLORIDE SERPL-SCNC: 108 MMOL/L (ref 98–107)
CO2 SERPL-SCNC: 17 MMOL/L (ref 22–29)
CREAT SERPL-MCNC: 3.26 MG/DL (ref 0.57–1)
DEPRECATED RDW RBC AUTO: 53.7 FL (ref 37–54)
EGFRCR SERPLBLD CKD-EPI 2021: 15.5 ML/MIN/1.73
EOSINOPHIL # BLD AUTO: 0.26 10*3/MM3 (ref 0–0.4)
EOSINOPHIL NFR BLD AUTO: 2.3 % (ref 0.3–6.2)
ERYTHROCYTE [DISTWIDTH] IN BLOOD BY AUTOMATED COUNT: 16.6 % (ref 12.3–15.4)
GLUCOSE SERPL-MCNC: 128 MG/DL (ref 65–99)
HCT VFR BLD AUTO: 25 % (ref 34–46.6)
HGB BLD-MCNC: 7.4 G/DL (ref 12–15.9)
IMM GRANULOCYTES # BLD AUTO: 0.12 10*3/MM3 (ref 0–0.05)
IMM GRANULOCYTES NFR BLD AUTO: 1.1 % (ref 0–0.5)
LYMPHOCYTES # BLD AUTO: 2.12 10*3/MM3 (ref 0.7–3.1)
LYMPHOCYTES NFR BLD AUTO: 19 % (ref 19.6–45.3)
MCH RBC QN AUTO: 26.1 PG (ref 26.6–33)
MCHC RBC AUTO-ENTMCNC: 29.6 G/DL (ref 31.5–35.7)
MCV RBC AUTO: 88 FL (ref 79–97)
MONOCYTES # BLD AUTO: 1.18 10*3/MM3 (ref 0.1–0.9)
MONOCYTES NFR BLD AUTO: 10.6 % (ref 5–12)
NEUTROPHILS NFR BLD AUTO: 65.9 % (ref 42.7–76)
NEUTROPHILS NFR BLD AUTO: 7.33 10*3/MM3 (ref 1.7–7)
NRBC BLD AUTO-RTO: 0 /100 WBC (ref 0–0.2)
PLATELET # BLD AUTO: 365 10*3/MM3 (ref 140–450)
PMV BLD AUTO: 9.2 FL (ref 6–12)
POTASSIUM SERPL-SCNC: 5.4 MMOL/L (ref 3.5–5.2)
RBC # BLD AUTO: 2.84 10*6/MM3 (ref 3.77–5.28)
SODIUM SERPL-SCNC: 139 MMOL/L (ref 136–145)
WBC NRBC COR # BLD AUTO: 11.13 10*3/MM3 (ref 3.4–10.8)

## 2024-12-19 PROCEDURE — 25010000002 HEPARIN (PORCINE) PER 1000 UNITS: Performed by: INTERNAL MEDICINE

## 2024-12-19 PROCEDURE — 80048 BASIC METABOLIC PNL TOTAL CA: CPT | Performed by: INTERNAL MEDICINE

## 2024-12-19 PROCEDURE — 85025 COMPLETE CBC W/AUTO DIFF WBC: CPT | Performed by: INTERNAL MEDICINE

## 2024-12-19 RX ORDER — HYDROCODONE BITARTRATE AND ACETAMINOPHEN 10; 325 MG/1; MG/1
1 TABLET ORAL EVERY 4 HOURS PRN
Status: DISCONTINUED | OUTPATIENT
Start: 2024-12-19 | End: 2024-12-30 | Stop reason: HOSPADM

## 2024-12-19 RX ORDER — SODIUM HYPOCHLORITE 1.25 MG/ML
SOLUTION TOPICAL DAILY
Status: DISCONTINUED | OUTPATIENT
Start: 2024-12-20 | End: 2024-12-20

## 2024-12-19 RX ORDER — HEPARIN SODIUM 5000 [USP'U]/ML
5000 INJECTION, SOLUTION INTRAVENOUS; SUBCUTANEOUS EVERY 12 HOURS SCHEDULED
Status: DISCONTINUED | OUTPATIENT
Start: 2024-12-19 | End: 2024-12-30 | Stop reason: HOSPADM

## 2024-12-19 RX ORDER — PANTOPRAZOLE SODIUM 40 MG/1
40 TABLET, DELAYED RELEASE ORAL
Status: DISCONTINUED | OUTPATIENT
Start: 2024-12-20 | End: 2024-12-24

## 2024-12-19 RX ORDER — SODIUM CHLORIDE 0.9 % (FLUSH) 0.9 %
10 SYRINGE (ML) INJECTION EVERY 12 HOURS SCHEDULED
Status: DISCONTINUED | OUTPATIENT
Start: 2024-12-19 | End: 2024-12-30 | Stop reason: HOSPADM

## 2024-12-19 RX ORDER — SODIUM CHLORIDE 0.9 % (FLUSH) 0.9 %
10 SYRINGE (ML) INJECTION AS NEEDED
Status: DISCONTINUED | OUTPATIENT
Start: 2024-12-19 | End: 2024-12-30 | Stop reason: HOSPADM

## 2024-12-19 RX ORDER — SODIUM CHLORIDE 9 MG/ML
75 INJECTION, SOLUTION INTRAVENOUS CONTINUOUS
Status: DISPENSED | OUTPATIENT
Start: 2024-12-20 | End: 2024-12-20

## 2024-12-19 RX ORDER — SODIUM CHLORIDE 9 MG/ML
40 INJECTION, SOLUTION INTRAVENOUS AS NEEDED
Status: DISCONTINUED | OUTPATIENT
Start: 2024-12-19 | End: 2024-12-30 | Stop reason: HOSPADM

## 2024-12-19 RX ORDER — CHOLECALCIFEROL (VITAMIN D3) 25 MCG
1000 TABLET ORAL DAILY
Status: DISCONTINUED | OUTPATIENT
Start: 2024-12-20 | End: 2024-12-30 | Stop reason: HOSPADM

## 2024-12-19 RX ORDER — NITROGLYCERIN 0.4 MG/1
0.4 TABLET SUBLINGUAL
Status: DISCONTINUED | OUTPATIENT
Start: 2024-12-19 | End: 2024-12-30 | Stop reason: HOSPADM

## 2024-12-19 RX ORDER — ALPRAZOLAM 0.25 MG/1
0.25 TABLET ORAL NIGHTLY PRN
Status: DISCONTINUED | OUTPATIENT
Start: 2024-12-19 | End: 2024-12-30 | Stop reason: HOSPADM

## 2024-12-19 RX ORDER — ONDANSETRON 4 MG/1
4 TABLET, ORALLY DISINTEGRATING ORAL EVERY 8 HOURS PRN
Status: DISCONTINUED | OUTPATIENT
Start: 2024-12-19 | End: 2024-12-30 | Stop reason: HOSPADM

## 2024-12-19 RX ORDER — ONDANSETRON 2 MG/ML
4 INJECTION INTRAMUSCULAR; INTRAVENOUS EVERY 6 HOURS PRN
Status: DISCONTINUED | OUTPATIENT
Start: 2024-12-19 | End: 2024-12-30 | Stop reason: HOSPADM

## 2024-12-19 RX ORDER — ACETAMINOPHEN 325 MG/1
650 TABLET ORAL EVERY 4 HOURS PRN
Status: DISCONTINUED | OUTPATIENT
Start: 2024-12-19 | End: 2024-12-30 | Stop reason: HOSPADM

## 2024-12-19 RX ORDER — BACLOFEN 10 MG/1
10 TABLET ORAL EVERY 8 HOURS SCHEDULED
Status: DISCONTINUED | OUTPATIENT
Start: 2024-12-19 | End: 2024-12-27

## 2024-12-19 RX ADMIN — SODIUM HYPOCHLORITE: 1.25 SOLUTION TOPICAL at 23:43

## 2024-12-19 RX ADMIN — HEPARIN SODIUM 5000 UNITS: 5000 INJECTION, SOLUTION INTRAVENOUS; SUBCUTANEOUS at 23:42

## 2024-12-20 PROBLEM — N17.9 ACUTE KIDNEY FAILURE, UNSPECIFIED: Status: ACTIVE | Noted: 2024-12-20

## 2024-12-20 LAB
ABO GROUP BLD: NORMAL
ALBUMIN SERPL-MCNC: 2.3 G/DL (ref 3.5–5.2)
ALBUMIN/GLOB SERPL: 0.7 G/DL
ALP SERPL-CCNC: 169 U/L (ref 39–117)
ALT SERPL W P-5'-P-CCNC: 7 U/L (ref 1–33)
ANION GAP SERPL CALCULATED.3IONS-SCNC: 12 MMOL/L (ref 5–15)
AST SERPL-CCNC: 5 U/L (ref 1–32)
BASOPHILS # BLD AUTO: 0.1 10*3/MM3 (ref 0–0.2)
BASOPHILS NFR BLD AUTO: 1.2 % (ref 0–1.5)
BILIRUB SERPL-MCNC: 0.3 MG/DL (ref 0–1.2)
BLD GP AB SCN SERPL QL: NEGATIVE
BUN SERPL-MCNC: 58 MG/DL (ref 8–23)
BUN/CREAT SERPL: 21 (ref 7–25)
CALCIUM SPEC-SCNC: 7.7 MG/DL (ref 8.6–10.5)
CHLORIDE SERPL-SCNC: 110 MMOL/L (ref 98–107)
CO2 SERPL-SCNC: 18 MMOL/L (ref 22–29)
CREAT SERPL-MCNC: 2.76 MG/DL (ref 0.57–1)
DEPRECATED RDW RBC AUTO: 54 FL (ref 37–54)
EGFRCR SERPLBLD CKD-EPI 2021: 18.9 ML/MIN/1.73
EOSINOPHIL # BLD AUTO: 0.34 10*3/MM3 (ref 0–0.4)
EOSINOPHIL NFR BLD AUTO: 4.2 % (ref 0.3–6.2)
ERYTHROCYTE [DISTWIDTH] IN BLOOD BY AUTOMATED COUNT: 16.8 % (ref 12.3–15.4)
GLOBULIN UR ELPH-MCNC: 3.2 GM/DL
GLUCOSE SERPL-MCNC: 112 MG/DL (ref 65–99)
HCT VFR BLD AUTO: 22 % (ref 34–46.6)
HCT VFR BLD AUTO: 24.4 % (ref 34–46.6)
HGB BLD-MCNC: 6.4 G/DL (ref 12–15.9)
HGB BLD-MCNC: 7.3 G/DL (ref 12–15.9)
IMM GRANULOCYTES # BLD AUTO: 0.11 10*3/MM3 (ref 0–0.05)
IMM GRANULOCYTES NFR BLD AUTO: 1.4 % (ref 0–0.5)
LYMPHOCYTES # BLD AUTO: 1.7 10*3/MM3 (ref 0.7–3.1)
LYMPHOCYTES NFR BLD AUTO: 21.1 % (ref 19.6–45.3)
MCH RBC QN AUTO: 25.4 PG (ref 26.6–33)
MCHC RBC AUTO-ENTMCNC: 29.1 G/DL (ref 31.5–35.7)
MCV RBC AUTO: 87.3 FL (ref 79–97)
MONOCYTES # BLD AUTO: 0.78 10*3/MM3 (ref 0.1–0.9)
MONOCYTES NFR BLD AUTO: 9.7 % (ref 5–12)
NEUTROPHILS NFR BLD AUTO: 5.02 10*3/MM3 (ref 1.7–7)
NEUTROPHILS NFR BLD AUTO: 62.4 % (ref 42.7–76)
NRBC BLD AUTO-RTO: 0 /100 WBC (ref 0–0.2)
PLATELET # BLD AUTO: 307 10*3/MM3 (ref 140–450)
PMV BLD AUTO: 9.5 FL (ref 6–12)
POTASSIUM SERPL-SCNC: 4.8 MMOL/L (ref 3.5–5.2)
PROT SERPL-MCNC: 5.5 G/DL (ref 6–8.5)
RBC # BLD AUTO: 2.52 10*6/MM3 (ref 3.77–5.28)
RH BLD: POSITIVE
SODIUM SERPL-SCNC: 140 MMOL/L (ref 136–145)
T&S EXPIRATION DATE: NORMAL
WBC NRBC COR # BLD AUTO: 8.05 10*3/MM3 (ref 3.4–10.8)

## 2024-12-20 PROCEDURE — 86900 BLOOD TYPING SEROLOGIC ABO: CPT | Performed by: INTERNAL MEDICINE

## 2024-12-20 PROCEDURE — 86900 BLOOD TYPING SEROLOGIC ABO: CPT

## 2024-12-20 PROCEDURE — 85025 COMPLETE CBC W/AUTO DIFF WBC: CPT | Performed by: INTERNAL MEDICINE

## 2024-12-20 PROCEDURE — 86850 RBC ANTIBODY SCREEN: CPT | Performed by: INTERNAL MEDICINE

## 2024-12-20 PROCEDURE — 85014 HEMATOCRIT: CPT | Performed by: INTERNAL MEDICINE

## 2024-12-20 PROCEDURE — 85018 HEMOGLOBIN: CPT | Performed by: INTERNAL MEDICINE

## 2024-12-20 PROCEDURE — 80053 COMPREHEN METABOLIC PANEL: CPT | Performed by: INTERNAL MEDICINE

## 2024-12-20 PROCEDURE — G0378 HOSPITAL OBSERVATION PER HR: HCPCS

## 2024-12-20 PROCEDURE — 83540 ASSAY OF IRON: CPT | Performed by: INTERNAL MEDICINE

## 2024-12-20 PROCEDURE — 87205 SMEAR GRAM STAIN: CPT | Performed by: INTERNAL MEDICINE

## 2024-12-20 PROCEDURE — 25010000002 ONDANSETRON PER 1 MG: Performed by: INTERNAL MEDICINE

## 2024-12-20 PROCEDURE — 87070 CULTURE OTHR SPECIMN AEROBIC: CPT | Performed by: INTERNAL MEDICINE

## 2024-12-20 PROCEDURE — P9016 RBC LEUKOCYTES REDUCED: HCPCS

## 2024-12-20 PROCEDURE — 86901 BLOOD TYPING SEROLOGIC RH(D): CPT | Performed by: INTERNAL MEDICINE

## 2024-12-20 PROCEDURE — 86923 COMPATIBILITY TEST ELECTRIC: CPT

## 2024-12-20 PROCEDURE — 82728 ASSAY OF FERRITIN: CPT | Performed by: INTERNAL MEDICINE

## 2024-12-20 PROCEDURE — 25010000002 HEPARIN (PORCINE) PER 1000 UNITS: Performed by: INTERNAL MEDICINE

## 2024-12-20 PROCEDURE — 36430 TRANSFUSION BLD/BLD COMPNT: CPT

## 2024-12-20 PROCEDURE — 87040 BLOOD CULTURE FOR BACTERIA: CPT | Performed by: INTERNAL MEDICINE

## 2024-12-20 PROCEDURE — 84466 ASSAY OF TRANSFERRIN: CPT | Performed by: INTERNAL MEDICINE

## 2024-12-20 PROCEDURE — 87186 SC STD MICRODIL/AGAR DIL: CPT | Performed by: INTERNAL MEDICINE

## 2024-12-20 PROCEDURE — 25810000003 SODIUM CHLORIDE 0.9 % SOLUTION: Performed by: INTERNAL MEDICINE

## 2024-12-20 PROCEDURE — 87147 CULTURE TYPE IMMUNOLOGIC: CPT | Performed by: INTERNAL MEDICINE

## 2024-12-20 RX ORDER — BENZONATATE 200 MG/1
200 CAPSULE ORAL 3 TIMES DAILY PRN
COMMUNITY

## 2024-12-20 RX ORDER — ANASTROZOLE 1 MG/1
1 TABLET ORAL DAILY
COMMUNITY

## 2024-12-20 RX ORDER — DIPHENOXYLATE HYDROCHLORIDE AND ATROPINE SULFATE 2.5; .025 MG/1; MG/1
1 TABLET ORAL 4 TIMES DAILY PRN
COMMUNITY

## 2024-12-20 RX ORDER — SODIUM HYPOCHLORITE 1.25 MG/ML
SOLUTION TOPICAL EVERY 12 HOURS
Status: DISCONTINUED | OUTPATIENT
Start: 2024-12-20 | End: 2024-12-30 | Stop reason: HOSPADM

## 2024-12-20 RX ORDER — FERROUS SULFATE 325(65) MG
325 TABLET ORAL
COMMUNITY

## 2024-12-20 RX ORDER — OMEPRAZOLE 40 MG/1
40 CAPSULE, DELAYED RELEASE ORAL DAILY
COMMUNITY

## 2024-12-20 RX ORDER — BENZONATATE 100 MG/1
200 CAPSULE ORAL 3 TIMES DAILY PRN
Status: DISCONTINUED | OUTPATIENT
Start: 2024-12-20 | End: 2024-12-29

## 2024-12-20 RX ORDER — LOSARTAN POTASSIUM 100 MG/1
50 TABLET ORAL DAILY
COMMUNITY
End: 2024-12-30 | Stop reason: HOSPADM

## 2024-12-20 RX ORDER — LIDOCAINE 40 MG/G
1 CREAM TOPICAL ONCE
Status: DISCONTINUED | OUTPATIENT
Start: 2024-12-20 | End: 2024-12-30 | Stop reason: HOSPADM

## 2024-12-20 RX ADMIN — Medication 10 ML: at 09:48

## 2024-12-20 RX ADMIN — HEPARIN SODIUM 5000 UNITS: 5000 INJECTION, SOLUTION INTRAVENOUS; SUBCUTANEOUS at 09:47

## 2024-12-20 RX ADMIN — HEPARIN SODIUM 5000 UNITS: 5000 INJECTION, SOLUTION INTRAVENOUS; SUBCUTANEOUS at 20:20

## 2024-12-20 RX ADMIN — Medication 1000 UNITS: at 09:47

## 2024-12-20 RX ADMIN — SODIUM HYPOCHLORITE: 1.25 SOLUTION TOPICAL at 13:32

## 2024-12-20 RX ADMIN — PANTOPRAZOLE SODIUM 40 MG: 40 TABLET, DELAYED RELEASE ORAL at 05:50

## 2024-12-20 RX ADMIN — BACLOFEN 10 MG: 10 TABLET ORAL at 13:32

## 2024-12-20 RX ADMIN — SODIUM CHLORIDE 75 ML/HR: 9 INJECTION, SOLUTION INTRAVENOUS at 02:08

## 2024-12-20 RX ADMIN — Medication 10 ML: at 02:09

## 2024-12-20 RX ADMIN — BENZONATATE 200 MG: 100 CAPSULE ORAL at 05:49

## 2024-12-20 RX ADMIN — ONDANSETRON 4 MG: 2 INJECTION INTRAMUSCULAR; INTRAVENOUS at 14:24

## 2024-12-20 RX ADMIN — Medication 10 ML: at 20:20

## 2024-12-20 RX ADMIN — BACLOFEN 10 MG: 10 TABLET ORAL at 18:26

## 2024-12-20 RX ADMIN — BACLOFEN 10 MG: 10 TABLET ORAL at 20:20

## 2024-12-20 RX ADMIN — ONDANSETRON 4 MG: 2 INJECTION INTRAMUSCULAR; INTRAVENOUS at 02:18

## 2024-12-20 NOTE — H&P
[facility] Medicine Services  HISTORY AND PHYSICAL    Date of Admission: 2024  Primary Care Physician: Ponce Orozco MD    Subjective   Primary Historian: Patient    Chief Complaint: Abnormal labs    History of Present Illness  This is a 64-year-old lady with past medical history significant for paraplegia history of hypertension, pressure ulcers, history of suprapubic catheter placement, who presented to an outside facility earlier today with abdominal renal function.  It was found that her BUN and creatinine were both elevated apparently patient had some diarrhea from her ostomy along with some nausea and decreased in p.o. intake.  She denied any fever any chills.  Seen at the other facility where she was found to have a elevated BUN level at 63 and creatinine level at 3.31 also she was found to be hyperkalemic with a potassium level of 5.6 patient received Kayexalate over there and the decision was made to admit to transfer to our facility also patient received some IV fluids.        Review of Systems   Otherwise complete ROS reviewed and negative except as mentioned in the HPI.    Past Medical History:   Past Medical History:   Diagnosis Date    Decubitus ulcer of both feet     Intradural extramedullary spinal tumor     Osteomyelitis     Paraplegia     Sacral decubitus ulcer      Past Surgical History:  Past Surgical History:   Procedure Laterality Date     SECTION      COLOSTOMY      CYSTOSTOMY      DEBRIDEMENT OF ISCHIAL ULCER/BUTTOCKS WOUND      IVC FILTER RETRIEVAL      MUSCLE FLAP      SPINE SURGERY      TRUNK DEBRIDEMENT Bilateral 2020    Procedure: DEBRIDEMENT DECUBITUS ULCER bilateral feet;  Surgeon: Truong Neal MD;  Location: [facility];  Service: General;  Laterality: Bilateral;     Social History:  reports that she has never smoked. She has never used smokeless tobacco. She reports that she does not currently use alcohol. She reports  "that she does not use drugs.    Family History: family history includes Lung cancer in her father.       Allergies:  Allergies   Allergen Reactions    Morphine Mental Status Change    Oxycodone Unknown - Low Severity     \"couldn't think straight\"       Medications:  Prior to Admission medications    Medication Sig Start Date End Date Taking? Authorizing Provider   acetaminophen (TYLENOL) 325 MG tablet Take 2 tablets by mouth Every 4 (Four) Hours As Needed for Mild Pain . 8/18/20   Darshan Nascimento MD   albuterol sulfate HFA (Ventolin HFA) 108 (90 Base) MCG/ACT inhaler Ventolin HFA 90 mcg/actuation aerosol inhaler    ProviderRuddy MD   ALPRAZolam (XANAX) 0.25 MG tablet Take 1 tablet by mouth At Night As Needed for Anxiety. 8/18/20   Darshan Nascimento MD   baclofen (LIORESAL) 10 MG tablet baclofen 10 mg tablet   TAKE 1 TABLET BY MOUTH 3 TIMES A DAY 12/3/18   ProviderRuddy MD   cholecalciferol (VITAMIN D3) 25 MCG (1000 UT) tablet Take 1 tablet by mouth Daily. 8/19/20   Darshan Nascimento MD   diphenhydrAMINE (BENADRYL) 25 mg capsule Take 1 capsule by mouth Every 6 (Six) Hours As Needed for Itching. 8/18/20   Darshan Nascimento MD   HYDROcodone-acetaminophen (NORCO)  MG per tablet Take 1 tablet by mouth Every 4 (Four) Hours As Needed for Severe Pain  for up to 6 doses. 8/18/20   Darshan Nascimento MD   nystatin (MYCOSTATIN) 202314 UNIT/GM powder Apply  topically to the appropriate area as directed Every 12 (Twelve) Hours. 8/18/20   Darshan Nascimento MD   omeprazole OTC (PrilOSEC OTC) 20 MG EC tablet Take 1 tablet by mouth Every Morning Before Breakfast. 8/18/20   Darshan Nascimento MD   ondansetron ODT (Zofran ODT) 4 MG disintegrating tablet Place 1 tablet on the tongue Every 8 (Eight) Hours As Needed for Nausea or Vomiting. 8/18/20   Darshan Nascimento MD   polyethylene glycol (MIRALAX) 17 g packet Take 17 g by mouth Daily As Needed " "(constipation). 8/18/20   Darshan Nascimento MD   sodium hypochlorite (DAKIN'S 1/4 STRENGTH) 0.125 % solution topical solution 0.125% Apply 1,000 mL topically to the appropriate area as directed Daily. 8/18/20   Darshan Nascimento MD     I have utilized all available immediate resources to obtain, update, or review the patient's current medications (including all prescriptions, over-the-counter products, herbals, cannabis/cannabidiol products, and vitamin/mineral/dietary (nutritional) supplements).    Objective     Vital Signs: /66 (BP Location: Right arm, Patient Position: Sitting)   Pulse 100   Temp 98.3 °F (36.8 °C) (Oral)   Resp 16   Ht 172.7 cm (68\")   Wt 89 kg (196 lb 3.4 oz)   SpO2 100%   BMI 29.83 kg/m²   Physical Exam   General: Patient is alert, awake, oriented x 3 in no apparent distress at time of examination  HEENT: Normocephalic, atraumatic, pupils are equal reactive to light and accommodate  Neck: Supple, no JVD, no carotid bruit  CVS: S1, S2, regular rhythm and rate  Lungs: Clear to auscultation bilaterally  Abdomen: soft tender in mid epigastric area  Extremities: Chronic bilateral wound covered  Neurologic: No focal deficits  Psychiatric: Normal affect      Results Reviewed:  Lab Results (last 24 hours)       Procedure Component Value Units Date/Time    CBC Auto Differential [[id number]]  (Abnormal) Collected: 12/19/24 2221    Specimen: Blood Updated: 12/19/24 2237     WBC 11.13 10*3/mm3      RBC 2.84 10*6/mm3      Hemoglobin 7.4 g/dL      Hematocrit 25.0 %      MCV 88.0 fL      MCH 26.1 pg      MCHC 29.6 g/dL      RDW 16.6 %      RDW-SD 53.7 fl      MPV 9.2 fL      Platelets 365 10*3/mm3      Neutrophil % 65.9 %      Lymphocyte % 19.0 %      Monocyte % 10.6 %      Eosinophil % 2.3 %      Basophil % 1.1 %      Immature Grans % 1.1 %      Neutrophils, Absolute 7.33 10*3/mm3      Lymphocytes, Absolute 2.12 10*3/mm3      Monocytes, Absolute 1.18 10*3/mm3      Eosinophils, " Absolute 0.26 10*3/mm3      Basophils, Absolute 0.12 10*3/mm3      Immature Grans, Absolute 0.12 10*3/mm3      nRBC 0.0 /100 WBC     Basic Metabolic Panel [[id number]] Collected: 12/19/24 2221    Specimen: Blood Updated: 12/19/24 2230          Imaging Results (Last 24 Hours)       ** No results found for the last 24 hours. **          I have personally reviewed and interpreted the radiology studies and ECG obtained at time of admission.     Assessment / Plan   Assessment:   Active Hospital Problems    Diagnosis     **Acute kidney injury        Treatment Plan  The patient will be admitted to my service here at Psychiatric.  1.  Acute kidney injury  2.  Intractable nausea  3.  Hyperkalemia  4.  Chronic wounds in lower extremities  5.  Paraplegia      Patient will be admitted, she will be started on gentle hydration with normal saline, will follow-up BMP level, will consult nephrology, also wound care consult placed.    Medical Decision Making  Number and Complexity of problems: 5  Differential Diagnosis: None    Conditions and Status        Condition is unchanged.     MDM Data  External documents reviewed: Yes  Cardiac tracing (EKG, telemetry) interpretation: Sinus rhythm  Radiology interpretation: Reviewed  Labs reviewed: Yes  Any tests that were considered but not ordered: None     Decision rules/scores evaluated (example [id number], Wells, etc): N/A     Discussed with: Patient, nursing staff, son     Care Planning  Shared decision making: Patient  Code status and discussions: Full    Disposition  Social Determinants of Health that impact treatment or disposition: None  Estimated length of stay is 3 days.     I confirmed that the patient's advanced care plan is present, code status is documented, and a surrogate decision maker is listed in the patient's medical record.     The patient's surrogate decision maker is patient.     The patient was seen and examined by me on 12/9/2024 at  2230.    Electronically signed by Thony Otto MD, 12/19/24, 22:51 CST.

## 2024-12-20 NOTE — CASE MANAGEMENT/SOCIAL WORK
Discharge Planning Assessment  [facility]     Patient Name: Jessica Alvarenga  MRN: [id number]  Today's Date: 12/20/2024    Admit Date: 12/19/2024        Discharge Needs Assessment       Row Name 12/20/24 1106       Living Environment    People in Home alone    Current Living Arrangements home    Potentially Unsafe Housing Conditions none    Primary Care Provided by self    Provides Primary Care For no one    Family Caregiver if Needed child(susie), adult    Quality of Family Relationships helpful;involved;supportive    Able to Return to Prior Arrangements yes       Resource/Environmental Concerns    Resource/Environmental Concerns none       Transition Planning    Patient/Family Anticipates Transition to home    Patient/Family Anticipated Services at Transition none    Transportation Anticipated family or friend will provide       Discharge Needs Assessment    Readmission Within the Last 30 Days no previous admission in last 30 days    Equipment Currently Used at Home wheelchair;colostomy/ostomy supplies    Concerns to be Addressed denies needs/concerns at this time    Anticipated Changes Related to Illness none    Equipment Needed After Discharge none    Discharge Coordination/Progress Pt lives at home alone most of the time. Her son stays there when he is not working. Pt plans to return home at d/c. She says she is not able to get home health. One agency in her county does not take her insurance and she said she has had the other two before but they will not take her back. She denies needs at this time. She does have rx coverage for her meds.                   Discharge Plan    No documentation.                 Continued Care and Services - Admitted Since 12/19/2024    No active coordination exists for this encounter.          Demographic Summary    No documentation.                  Functional Status    No documentation.                  Psychosocial    No documentation.                  Abuse/Neglect    No  documentation.                  Legal    No documentation.                  Substance Abuse    No documentation.                  Patient Forms    No documentation.                     LAST Herbert

## 2024-12-20 NOTE — PROGRESS NOTES
1         [facility] Medicine Services  INPATIENT PROGRESS NOTE    Patient Name: Jessica Alvarenga  Date of Admission: 12/19/2024  Today's Date: 12/20/24  Length of Stay: 1  Primary Care Physician: Ponce Orozco MD    Subjective   Chief Complaint: Follow-up  HPI   Patient is a 64-year-old woman who came in yesterday for abnormal laboratory presenting complaint.  She was transferred out from another facility.  Record indicates she has an ostomy and has increased output from it described as diarrhea  She was admitted with diagnosis of acute kidney injury  Intractable nausea  Hyperkalemia  She has chronic wounds in lower extremities.  She is paraplegic.    She has normocytic anemia with hemoglobin of 7.4.  Her potassium was 5.4 while creatinine was 3.26 BUN of 59.  She has acidosis although none anion gap.  She received Kayexalate from outside facility.  Imaging study here in our hospital    She had multiple wound cultures taken including a blood culture labeled as right and left legs as well as wound from the buttocks  Dose as shown below.  Wound care will be consulted.  Will check further recommendation as well.  He may need some form of debridement.                  She told me she normally follows with Thalia Beard/Dr. Recio at pulmonary.  She also told me that she was sent to her oncology clinic office and had blood work.  She was directed to the emergency room because of abnormality in her renal function.  Because they do not have nephrologist at their hospital, she was transferred to our hospital for further care.    I have verified with patient the following.  She does not have diarrhea.  She has a urostomy and a colostomy.  She told me that he does not nor she does not normally put a colostomy bag because her stools are hard and it cakes.  She only covers that with 4 x 4.    She has not noticed any decrease or increased output from her urostomy.    She has been sick for the  "past 6 to 7 days dry heaving.  She has not vomited.  She has no headache, fever or chills.  She has dry nonproductive cough that triggers her dry heaving.  Denies any ill exposures    She told me her right kidney atrophied and that has a single kidney functioning.    Review of Systems   All pertinent negatives and positives are as above. All other systems have been reviewed and are negative unless otherwise stated.     Objective    Temp:  [98.1 °F (36.7 °C)-98.6 °F (37 °C)] 98.1 °F (36.7 °C)  Heart Rate:  [] 91  Resp:  [16] 16  BP: (120-133)/(40-66) 126/51  Physical Exam  GEN: Awake, alert, interactive, in NAD  HEENT: Atraumatic, PERRLA, EOMI, Anicteric, Trachea midline  Lungs: CTAB, no wheezing/rales/rhonchi  Heart: RRR, +S1/s2, no rub  ABD: soft, nt/nd, +BS, no guarding/rebound  Extremities: Left below-knee amputation; multiple areas of wounds as photographed above  Currently covered with clean dressings  I do not know how to describe the skin on her left leg but it looks abnormal to me.  The best way I could describe it is something like café au lait from the limited view exposed.  Neuro: AAOx3, no focal deficits  Psych: normal mood & affect    Results Review:  I have reviewed the labs, radiology results, and diagnostic studies.    Laboratory Data:   Results from last 7 days   Lab Units 12/19/24  2221   WBC 10*3/mm3 11.13*   HEMOGLOBIN g/dL 7.4*   HEMATOCRIT % 25.0*   PLATELETS 10*3/mm3 365        Results from last 7 days   Lab Units 12/19/24  2221   SODIUM mmol/L 139   POTASSIUM mmol/L 5.4*   CHLORIDE mmol/L 108*   CO2 mmol/L 17.0*   BUN mg/dL 59*   CREATININE mg/dL 3.26*   CALCIUM mg/dL 8.5*   GLUCOSE mg/dL 128*       Culture Data:   No results found for: \"BLOODCX\", \"URINECX\", \"WOUNDCX\", \"MRSACX\", \"RESPCX\", \"STOOLCX\"    Radiology Data:   Imaging Results (Last 24 Hours)       ** No results found for the last 24 hours. **            I have reviewed the patient's current medications.     Assessment/Plan " "  Assessment  Active Hospital Problems    Diagnosis     **Acute kidney injury     Acute kidney failure, unspecified              Medical Decision Making  Number and Complexity of problems:   Problem list:  Acute kidney injury-possibly with chronic kidney disease as oncologist described creatinine of 3.65 \"doubled from baseline close \"  Hyperkalemia  Paraplegia-secondary to history of tumor on her spine at age 19  Multiple wounds as photographed above  Ostomy status-urostomy and colostomy  Normocytic anemia-mentioned to be an element of anemia of chronic disease  History of stage IV metastatic right breast cancer as documented from December 19, 2024  Reported PET/CT from February 6, 2024 indicative of positive treatment response; August 2024 PET CT scan described to be stable metastatic disease with low disease burden.  Plan for continued anastrozole as per outpatient oncologist.          Treatment Plan  Nephrology and wound care been consulted   Continue IV fluid  Follow-up renal recovery  Check substrates of anemia  Recheck labs today-these are pending      Medications reviewed  baclofen, 10 mg, Oral, Q8H  cholecalciferol, 1,000 Units, Oral, Daily  heparin (porcine), 5,000 Units, Subcutaneous, Q12H  lidocaine, 1 Application, Topical, Once  pantoprazole, 40 mg, Oral, Q AM  sodium chloride, 10 mL, Intravenous, Q12H  sodium hypochlorite, , Topical, Daily        Conditions and Status  Fair     MDM Data  External documents reviewed: Records from [facility] dated October 28, 2024:  Paraplegic patient with chronic pressure ulcers of both feet and both buttocks. The left foot and buttock wounds both have osteomyelitis in the underlying bones.   Daily dressing changes to buttock wounds with ralph, aquacel ag and ABD pads and alternate with dakins soaked kerlix and abd pads   Daily dressing change to foot wounds with ralph, aquacel ag, and abd pad, wrap with Kerlix and Ace wrap from toes to knee   Stressed the " importance of changing positions every 2 hours and offloading the areas   Left foot wound unlikely to heal, discussed probable amputation, patient will consider   Return for follow-up in 4 weeks   Cardiac tracing (EKG, telemetry) interpretation: Telemetry showed normal sinus rhythm with rate of 98  Radiology interpretation: None available  Labs reviewed: Yes  Any tests that were considered but not ordered: Consider renal ultrasound     Decision rules/scores evaluated (example [id number], Wells, etc): -     Discussed with: Patient and nursing staff     Care Planning  Shared decision making: Patient with consultant/s  Code status and discussions: Full code    Disposition  Social Determinants of Health that impact treatment or disposition: None identified at this time  I expect the patient to be discharged to (?).         Electronically signed by Darshan Nascimento MD, 12/20/24, 08:32 CST.

## 2024-12-20 NOTE — CONSULTS
Nephrology ([facility]) Consult Note      Patient:  Jessica Alvarenga  YOB: 1960  Date of Service: 12/20/2024  MRN: [id number]   Acct: [id number]   Primary Care Physician: Ponce Orozco MD  Advance Directive:   Code Status and Medical Interventions: CPR (Attempt to Resuscitate); Full Support   Ordered at: 12/19/24 4428     Level Of Support Discussed With:    Patient     Code Status (Patient has no pulse and is not breathing):    CPR (Attempt to Resuscitate)     Medical Interventions (Patient has pulse or is breathing):    Full Support     Admit Date: 12/19/2024       Hospital Day: 1  Referring Provider: No Known Provider      Patient personally seen and examined.  Complete chart including Consults, Notes, Operative Reports, Labs, Cardiology, and Radiology studies reviewed as able.        Subjective:  Jessica Alvarenga is a 64 y.o. female for whom we were consulted for evaluation and treatment of acute kidney injury. Baseline chronic kidney disease stage 3b, baseline creatinine approximately 1.5. Follows in our [place] office. History of paraplegia, pressure ulcers, hypertension, suprapubic catheter, colostomy. Presented to outlying facility and was found to have abnormal labs including creatinine 3.3 and potassium 5.6. Transferred to [facility] for nephrology evaluation. She has received IV fluids as well as medical management of hyperkalemia. Currently she is awake and alert. Complaint of fatigue but feeling a little better compared to yesterday. She reports several days of poor PO intake due to nausea recently as well as diarrhea from her ostomy. Denies changes in urine output. Denies NSAID use. Several wounds are present on lower extremities and wound care has evaluated today. Hemoglobin 6.4 on AM labs and she is ordered to get a unit or PRBC    Allergies:  Morphine and Oxycodone    Home Meds:  Medications Prior to Admission   Medication Sig Dispense Refill  Last Dose/Taking    acetaminophen (TYLENOL) 325 MG tablet Take 2 tablets by mouth Every 4 (Four) Hours As Needed for Mild Pain .       albuterol sulfate HFA (Ventolin HFA) 108 (90 Base) MCG/ACT inhaler Ventolin HFA 90 mcg/actuation aerosol inhaler       ALPRAZolam (XANAX) 0.25 MG tablet Take 1 tablet by mouth At Night As Needed for Anxiety. 5 tablet 0     baclofen (LIORESAL) 10 MG tablet baclofen 10 mg tablet   TAKE 1 TABLET BY MOUTH 3 TIMES A DAY       cholecalciferol (VITAMIN D3) 25 MCG (1000 UT) tablet Take 1 tablet by mouth Daily.       diphenhydrAMINE (BENADRYL) 25 mg capsule Take 1 capsule by mouth Every 6 (Six) Hours As Needed for Itching.       HYDROcodone-acetaminophen (NORCO)  MG per tablet Take 1 tablet by mouth Every 4 (Four) Hours As Needed for Severe Pain  for up to 6 doses. 6 tablet 0     nystatin (MYCOSTATIN) 570896 UNIT/GM powder Apply  topically to the appropriate area as directed Every 12 (Twelve) Hours.       omeprazole OTC (PrilOSEC OTC) 20 MG EC tablet Take 1 tablet by mouth Every Morning Before Breakfast.       ondansetron ODT (Zofran ODT) 4 MG disintegrating tablet Place 1 tablet on the tongue Every 8 (Eight) Hours As Needed for Nausea or Vomiting.       polyethylene glycol (MIRALAX) 17 g packet Take 17 g by mouth Daily As Needed (constipation).       sodium hypochlorite (DAKIN'S 1/4 STRENGTH) 0.125 % solution topical solution 0.125% Apply 1,000 mL topically to the appropriate area as directed Daily.          Medicines:  Current Facility-Administered Medications   Medication Dose Route Frequency Provider Last Rate Last Admin    acetaminophen (TYLENOL) tablet 650 mg  650 mg Oral Q4H PRN Thony Otto MD        ALPRAZolam (XANAX) tablet 0.25 mg  0.25 mg Oral Nightly PRN Thony Otto MD        baclofen (LIORESAL) tablet 10 mg  10 mg Oral Q8H Thony Otto MD        benzonatate (TESSALON) capsule 200 mg  200 mg Oral TID PRN Thony Otto MD   200 mg at 12/20/24 0592     cholecalciferol (VITAMIN D3) tablet 1,000 Units  1,000 Units Oral Daily Thony Otto MD   1,000 Units at 24 0947    heparin (porcine) 5000 UNIT/ML injection 5,000 Units  5,000 Units Subcutaneous Q12H Thony Otto MD   5,000 Units at 24 0947    HYDROcodone-acetaminophen (NORCO)  MG per tablet 1 tablet  1 tablet Oral Q4H PRN Thony Otto MD        lidocaine (LMX) 4 % cream 1 Application  1 Application Topical Once Merry Lees APRN        nitroglycerin (NITROSTAT) SL tablet 0.4 mg  0.4 mg Sublingual Q5 Min PRN Thony Otto MD        ondansetron (ZOFRAN) injection 4 mg  4 mg Intravenous Q6H PRN Thony Otto MD   4 mg at 24 0218    ondansetron ODT (ZOFRAN-ODT) disintegrating tablet 4 mg  4 mg Translingual Q8H PRN Thony Otto MD        pantoprazole (PROTONIX) EC tablet 40 mg  40 mg Oral Q AM Thony Otto MD   40 mg at 24 0550    sodium chloride 0.9 % flush 10 mL  10 mL Intravenous Q12H Thony Otto MD   10 mL at 24 0948    sodium chloride 0.9 % flush 10 mL  10 mL Intravenous PRN Thony Otto MD        sodium chloride 0.9 % infusion 40 mL  40 mL Intravenous PRN Thony Otto MD        sodium chloride 0.9 % infusion  75 mL/hr Intravenous Continuous Thony Otto MD 75 mL/hr at 24 0826 75 mL/hr at 24 0826    sodium hypochlorite (DAKIN'S 1/4 STRENGTH) 0.125 % topical solution 0.125% solution   Topical Q12H Merry Lees APRN           Past Medical History:  Past Medical History:   Diagnosis Date    Decubitus ulcer of both feet     Intradural extramedullary spinal tumor     Osteomyelitis     Paraplegia     Sacral decubitus ulcer        Past Surgical History:  Past Surgical History:   Procedure Laterality Date     SECTION      COLOSTOMY      CYSTOSTOMY      DEBRIDEMENT OF ISCHIAL ULCER/BUTTOCKS WOUND      IVC FILTER RETRIEVAL      MUSCLE FLAP      SPINE SURGERY      TRUNK DEBRIDEMENT Bilateral 2020     "Procedure: DEBRIDEMENT DECUBITUS ULCER bilateral feet;  Surgeon: Truong Neal MD;  Location: [facility] OR;  Service: General;  Laterality: Bilateral;       Family History  Family History   Problem Relation Age of Onset    Lung cancer Father        Social History  Social History     Socioeconomic History    Marital status: Unknown   Tobacco Use    Smoking status: Never    Smokeless tobacco: Never   Vaping Use    Vaping status: Never Used   Substance and Sexual Activity    Alcohol use: Not Currently    Drug use: Never    Sexual activity: Defer         Review of Systems:  History obtained from chart review and the patient  General ROS: No fever or chills  Respiratory ROS: No cough, shortness of breath, wheezing  Cardiovascular ROS: No chest pain or palpitations  Gastrointestinal ROS: No abdominal pain or melena  Genito-Urinary ROS: No dysuria or hematuria  Psych ROS: No anxiety and depression  14 point ROS reviewed with the patient and negative except as noted above and in the HPI unless unable to obtain.      Objective:  Patient Vitals for the past 24 hrs:   BP Temp Temp src Pulse Resp SpO2 Height Weight   12/20/24 1052 (!) 131/39 98.3 °F (36.8 °C) Oral 80 16 99 % -- --   12/20/24 0749 126/51 98.1 °F (36.7 °C) Oral 91 16 98 % -- --   12/20/24 0443 120/40 98.6 °F (37 °C) Oral 90 16 98 % -- --   12/19/24 2201 133/66 98.3 °F (36.8 °C) Oral 100 16 100 % 172.7 cm (68\") 89 kg (196 lb 3.4 oz)       Intake/Output Summary (Last 24 hours) at 12/20/2024 1100  Last data filed at 12/20/2024 0443  Gross per 24 hour   Intake --   Output 250 ml   Net -250 ml     General: awake/alert   Chest:  clear to auscultation bilaterally without respiratory distress  CVS: regular rate and rhythm  Abdominal: soft, nontender, positive bowel sounds  Extremities: no cyanosis or edema  Skin: warm and dry without rash      Labs:  Results from last 7 days   Lab Units 12/20/24  0827 12/19/24  2221   WBC 10*3/mm3 8.05 11.13*   HEMOGLOBIN g/dL 6.4* 7.4* " "  HEMATOCRIT % 22.0* 25.0*   PLATELETS 10*3/mm3 307 365         Results from last 7 days   Lab Units 12/20/24  0827 12/19/24  2221   SODIUM mmol/L 140 139   POTASSIUM mmol/L 4.8 5.4*   CHLORIDE mmol/L 110* 108*   CO2 mmol/L 18.0* 17.0*   BUN mg/dL 58* 59*   CREATININE mg/dL 2.76* 3.26*   CALCIUM mg/dL 7.7* 8.5*   EGFR mL/min/1.73 18.9* 15.5*   BILIRUBIN mg/dL 0.3  --    ALK PHOS U/L 169*  --    ALT (SGPT) U/L 7  --    AST (SGOT) U/L 5  --    GLUCOSE mg/dL 112* 128*       Radiology:   Imaging Results (Last 72 Hours)       ** No results found for the last 72 hours. **            Culture:  No results found for: \"BLOODCX\", \"URINECX\", \"WOUNDCX\", \"MRSACX\", \"RESPCX\", \"STOOLCX\"      Assessment    Acute kidney injury, prerenal vs ATN  Baseline chronic kidney disease stage 3b  Hyperkalemia--improved  Metabolic acidosis  Hypertension   History of colostomy and urostomy  Multiple pressure ulcers  Paraplegia     Plan:   Continue IV fluids  Agree with PRBC administration to keep Hgb >7  Monitor labs  Further assessment and plan pending Dr Recio's evaluation of patient      Thank you for the consult, we appreciate the opportunity to provide care to your patients.  Feel free to contact me if I can be of any further assistance.      Chas Mcallister, APRN  12/20/2024  11:00 CST  "

## 2024-12-20 NOTE — CONSULTS
[facility]  INPATIENT WOUND & OSTOMY CONSULTATION    Today's Date: 12/20/24    Patient Name: Jessica Alvarenga  MRN: [id number]  CSN: [id number]  PCP: Ponce Orozco MD  Referring Provider:   Consulting Provider (From admission, onward)      Start Ordered     Status Ordering Provider    12/20/24 0702 12/19/24 2240  Inpatient Wound Care MD Consult  IN AM        Specialty:  Wound Care  Provider:  Merry Lees APRN Acknowledged KOLEILAT, BASSEM           Attending Provider: Darshan Nascimento*  Length of Stay: 1    SUBJECTIVE   Chief Complaint: ***    HPI: Jessica Alvarenga, a 64 y.o.female, presents with a past medical history of ***.  A full past medical history is listed below.  Inpatient wound care consulted due to wounds of buttocks and bilateral lower extremities.  Patient is established with [facility] surgical and wound care clinic in [place] with her last visit on 12/16.  Patient is currently being worked up for possible left lower extremity amputation, unknown if AKA or BKA at this time.    The following are current outpatient wound care orders:  Left lower extremity: Dakins moist to dry cover with ABD, roll gauze, daily  Right lower lateral extremity ulcer: Elizabeth promogran cover with ABD, roll gauze, daily  Right heel: Dakins moist to dry cover with ABD, roll gauze, daily  Buttocks wounds: to the pressure injury please apply zinc cream  To the rest of the buttocks open wounds: Pack with Dakins moist to dry dressing, cover with ABD, daily   Work up ordered for consideration of amputation of left leg.   Patient is currently sitting up in bed with left lower extremity elevated on pillow.  She has ostomy supplies for her urostomy and colostomy at the bedside.    Visit Dx:  No diagnosis found.    Hospital Problem List:     Acute kidney injury      History:   Past Medical History:   Diagnosis Date    Decubitus ulcer of both feet     Intradural  "extramedullary spinal tumor     Osteomyelitis     Paraplegia     Sacral decubitus ulcer      Past Surgical History:   Procedure Laterality Date     SECTION      COLOSTOMY      CYSTOSTOMY      DEBRIDEMENT OF ISCHIAL ULCER/BUTTOCKS WOUND      IVC FILTER RETRIEVAL      MUSCLE FLAP      SPINE SURGERY      TRUNK DEBRIDEMENT Bilateral 2020    Procedure: DEBRIDEMENT DECUBITUS ULCER bilateral feet;  Surgeon: Truong Neal MD;  Location:  [facility] OR;  Service: General;  Laterality: Bilateral;     Social History     Socioeconomic History    Marital status: Unknown   Tobacco Use    Smoking status: Never    Smokeless tobacco: Never   Vaping Use    Vaping status: Never Used   Substance and Sexual Activity    Alcohol use: Not Currently    Drug use: Never    Sexual activity: Defer     Family History   Problem Relation Age of Onset    Lung cancer Father        Allergies:  Allergies   Allergen Reactions    Morphine Mental Status Change    Oxycodone Unknown - Low Severity     \"couldn't think straight\"       Medications:    Current Facility-Administered Medications:     acetaminophen (TYLENOL) tablet 650 mg, 650 mg, Oral, Q4H PRN, Thony Otto MD    ALPRAZolam (XANAX) tablet 0.25 mg, 0.25 mg, Oral, Nightly PRN, Thony Otto MD    baclofen (LIORESAL) tablet 10 mg, 10 mg, Oral, Q8H, Thony Otto MD    benzonatate (TESSALON) capsule 200 mg, 200 mg, Oral, TID PRN, Thony Otto MD, 200 mg at 24 0549    cholecalciferol (VITAMIN D3) tablet 1,000 Units, 1,000 Units, Oral, Daily, Thony Otto MD    heparin (porcine) 5000 UNIT/ML injection 5,000 Units, 5,000 Units, Subcutaneous, Q12H, Thony Otto MD, 5,000 Units at 24 2342    HYDROcodone-acetaminophen (NORCO)  MG per tablet 1 tablet, 1 tablet, Oral, Q4H PRN, Thony Otto MD    nitroglycerin (NITROSTAT) SL tablet 0.4 mg, 0.4 mg, Sublingual, Q5 Min PRN, Thony Otto MD    ondansetron (ZOFRAN) injection 4 mg, 4 mg, " Intravenous, Q6H PRN, Thony Otot MD, 4 mg at 12/20/24 0218    ondansetron ODT (ZOFRAN-ODT) disintegrating tablet 4 mg, 4 mg, Translingual, Q8H PRN, Thony Otto MD    pantoprazole (PROTONIX) EC tablet 40 mg, 40 mg, Oral, Q AM, Thony Otto MD, 40 mg at 12/20/24 0550    sodium chloride 0.9 % flush 10 mL, 10 mL, Intravenous, Q12H, Thony Otto MD, 10 mL at 12/20/24 0209    sodium chloride 0.9 % flush 10 mL, 10 mL, Intravenous, PRN, Thony Otto MD    sodium chloride 0.9 % infusion 40 mL, 40 mL, Intravenous, PRN, Thony Otto MD    sodium chloride 0.9 % infusion, 75 mL/hr, Intravenous, Continuous, Thony Otto MD, Last Rate: 75 mL/hr at 12/20/24 0208, 75 mL/hr at 12/20/24 0208    sodium hypochlorite (DAKIN'S 1/4 STRENGTH) 0.125 % topical solution 0.125% solution, , Topical, Daily, Thony Otto MD, Given at 12/19/24 2343    OBJECTIVE     Vitals:    12/20/24 0443   BP: 120/40   Pulse: 90   Resp: 16   Temp: 98.6 °F (37 °C)   SpO2: 98%       PHYSICAL EXAM: ***  Physical Exam       Results Review:  Lab Results (last 48 hours)       Procedure Component Value Units Date/Time    Wound Culture - Wound, Leg, Left [[id number]] Updated: 12/20/24 0225    Specimen: Wound from Leg, Left     Wound Culture - Wound, Buttock [[id number]] Updated: 12/20/24 0225    Specimen: Wound from Buttock     Wound Culture - Wound, Leg, Right [[id number]] Updated: 12/20/24 0224    Specimen: Wound from Leg, Right     Blood Culture - Blood, Blood, Central Line [[id number]] Collected: 12/20/24 0157    Specimen: Blood, Central Line Updated: 12/20/24 0216    Basic Metabolic Panel [[id number]]  (Abnormal) Collected: 12/19/24 2221    Specimen: Blood Updated: 12/19/24 2252     Glucose 128 mg/dL      BUN 59 mg/dL      Creatinine 3.26 mg/dL      Sodium 139 mmol/L      Potassium 5.4 mmol/L      Chloride 108 mmol/L      CO2 17.0 mmol/L      Calcium 8.5 mg/dL      BUN/Creatinine Ratio 18.1     Anion Gap 14.0 mmol/L      eGFR  15.5 mL/min/1.73     Narrative:      GFR Categories in Chronic Kidney Disease (CKD)      GFR Category          GFR (mL/min/1.73)    Interpretation  G1                     90 or greater         Normal or high (1)  G2                      60-89                Mild decrease (1)  G3a                   45-59                Mild to moderate decrease  G3b                   30-44                Moderate to severe decrease  G4                    15-29                Severe decrease  G5                    14 or less           Kidney failure          (1)In the absence of evidence of kidney disease, neither GFR category G1 or G2 fulfill the criteria for CKD.    eGFR calculation 2021 CKD-EPI creatinine equation, which does not include race as a factor    CBC Auto Differential [[id number]]  (Abnormal) Collected: 12/19/24 2221    Specimen: Blood Updated: 12/19/24 2237     WBC 11.13 10*3/mm3      RBC 2.84 10*6/mm3      Hemoglobin 7.4 g/dL      Hematocrit 25.0 %      MCV 88.0 fL      MCH 26.1 pg      MCHC 29.6 g/dL      RDW 16.6 %      RDW-SD 53.7 fl      MPV 9.2 fL      Platelets 365 10*3/mm3      Neutrophil % 65.9 %      Lymphocyte % 19.0 %      Monocyte % 10.6 %      Eosinophil % 2.3 %      Basophil % 1.1 %      Immature Grans % 1.1 %      Neutrophils, Absolute 7.33 10*3/mm3      Lymphocytes, Absolute 2.12 10*3/mm3      Monocytes, Absolute 1.18 10*3/mm3      Eosinophils, Absolute 0.26 10*3/mm3      Basophils, Absolute 0.12 10*3/mm3      Immature Grans, Absolute 0.12 10*3/mm3      nRBC 0.0 /100 WBC           Imaging Results (Last 72 Hours)       ** No results found for the last 72 hours. **               ASSESSMENT/PLAN       Examination and evaluation of wound(s) was performed.    DIAGNOSIS:   Pressure ulcer of left buttock, stage 4 (HCC)   Pressure ulcer of right buttock, stage 3 (HCC)   Pressure ulcer of left heel, stage 4 (HCC)   Pressure ulcer of right heel, stage 3 (HCC)   Pressure injury of other site, stage 3 (HCC)    Pressure ulcer of right hip, stage 2 (HCC)   Urostomy  Colostomy  Paraplegia      PLAN:   Orders placed for wound care and pressure/moisture management as listed below.       Start     Ordered    12/20/24 2000  Wound Care  Every 12 Hours        Question Answer Comment   Wound Locations Left lower leg wounds, bilateral buttocks, and right foot wounds.    Wound Care Instructions Clean with NS. Apply Dakins moistened gauze to wounds. cover with abd pad, wrap with kerlix    Cleanse Normal Saline    Moistened? Yes    Moisten With Dakins Solution 1/4 Strength    Dressing: Abdominal Pad    Securement Roll Gauze        12/20/24 1038    12/20/24 1038  Wound Care  Daily      Question Answer Comment   Wound Locations Right leg wounds    Wound Care Instructions Clean with NS. Apply Opticell AG to wounds. Wrap with kerlix    Cleanse Normal Saline    Intervention Other    Other Opticell AG    Securement Roll Gauze        12/20/24 1037    12/20/24 1038  Stoma Care - Change Appliance  Per Order Details        Comments: Change every 3 to 5 days or as needed if leaking.  Use patient's own supplies from home per patient request.    12/20/24 1037    12/20/24 1037  Assess Stoma  Every Shift       12/20/24 1037    12/20/24 1037  Empty Pouch When 1/3 Full  Per Order Details        Comments: When 1/3 Full    12/20/24 1037    12/20/24 1035  Turn Patient  Now Then Every 2 Hours         12/20/24 1037    12/20/24 1035  Elevate Heels Off of Bed  Until Discontinued         12/20/24 1037    12/20/24 1035  Use Seat Cushion When Up In Chair  Continuous         12/20/24 1037    12/20/24 1035  Use Repositioning Wedge to Position Patient  Continuous        Comments: Use Comfort Glide repositioning sheet and wedges to position patient.    12/20/24 1037    12/20/24 1035  Specialty Bed SkinGuard Float GINI/AP Mattress  Once        Comments: For Dolphin FIS Mattress, call [employer] to order a Hay bed frame.  If bariatric/bariatric dolphin, [employer] provides  frame, mattress, pump, and trapeze (if needed).  Nurse or HUC is to call [employer], the rental company, to order the equipment, provide patient's name, room number, and if in isolation. [phone].   Question:  Specialty Bed needed  Answer:  SkinGuard Float GINI/AP Mattress    12/20/24 1037    Unscheduled  Wound Care  As Needed      Question:  Wound Care Instructions  Answer:  Apply Moisture Barrier After Any Incontinence    12/20/24 1037    Unscheduled  Empty Pouch As Needed  As Needed       12/20/24 1037    Unscheduled  Change Pouch Immediately if Leaking  As Needed       12/20/24 1037                     Discussed findings and treatment plan including risks, benefits, and treatment options with *** in detail. Patient agreed with treatment plan.      This document has been electronically signed by MAREN Field on 12/20/2024 07:33 CST     leaking.  Use patient's own supplies from home per patient request.    12/20/24 1037    12/20/24 1037  Assess Stoma  Every Shift       12/20/24 1037    12/20/24 1037  Empty Pouch When 1/3 Full  Per Order Details        Comments: When 1/3 Full    12/20/24 1037    12/20/24 1035  Turn Patient  Now Then Every 2 Hours         12/20/24 1037    12/20/24 1035  Elevate Heels Off of Bed  Until Discontinued         12/20/24 1037    12/20/24 1035  Use Seat Cushion When Up In Chair  Continuous         12/20/24 1037    12/20/24 1035  Use Repositioning Wedge to Position Patient  Continuous        Comments: Use Comfort Glide repositioning sheet and wedges to position patient.    12/20/24 1037    12/20/24 1035  Specialty Bed SkinGuard Float GINI/AP Mattress  Once        Comments: For Dolphin FIS Mattress, call [employer] to order a Volance bed frame.  If bariatric/bariatric dolphin, [employer] provides frame, mattress, pump, and trapeze (if needed).  Nurse or HUC is to call [employer], the rental company, to order the equipment, provide patient's name, room number, and if in isolation. [phone].   Question:  Specialty Bed needed  Answer:  SkinGuard Float GINI/AP Mattress    12/20/24 1037    Unscheduled  Wound Care  As Needed      Question:  Wound Care Instructions  Answer:  Apply Moisture Barrier After Any Incontinence    12/20/24 1037    Unscheduled  Empty Pouch As Needed  As Needed       12/20/24 1037    Unscheduled  Change Pouch Immediately if Leaking  As Needed       12/20/24 1037        Discussed findings and treatment plan including risks, benefits, and treatment options with patient in detail. Patient agreed with treatment plan.      This document has been electronically signed by MAREN Field on 12/20/2024 07:33 CST

## 2024-12-20 NOTE — PLAN OF CARE
Goal Outcome Evaluation:  Plan of Care Reviewed With: patient        Progress: improving  Outcome Evaluation: Initial nutrition assessment. Pt transferred to the hospital from another hospital with CORINA. She does report several days of poor po intake secondary to nausea. She has a hx of paraplegia since the age of 19. She has a wound care provider consult d/t multiple non-healing wounds. She has a urostomy and colostomy. She is ordered a renal, low sodium, low potassium, low phosphorous diet. No po intake recorded. Nephrology is following. Spoke to pt on the phone. She uses Jamir (unflavored) mixed in gatorade. She typically consumes small meals and snacks to get in adequate intake because she is unable to consume a large portion at one time. She would like to have between meal snacks, will send snacks with meal trays to have for in between meals. She does not like the flavored Jamir and will resume the Jamir once she gets home. She is very concerned about her energy levels. She has anemia and Hgb is only 6.4. She reports she was started on iron injections and is hoping this will help keep her Hgb above 8. She was also asking questions about electrolytes. She was admitted with hyperkalemia. She was given kayexelate and it is now WDL. Her CORINA and dehydration r/t recent diarrhea via colostomy prior to intractable N/V are what appears to have contributed to electrolyte abnormality and CORINA/dehdration. Electrolytes now WDL. She will likely not need to continue with renal restrictions of low K+ and low PO4 at d/c. Recommend to order a PO4 lab to see if restriction is even warranted at present. Pt reports that per her discussion with MD, she does not think she will be in the hospital very long. Will cont to follow inpt for further nutrition needs.

## 2024-12-20 NOTE — PLAN OF CARE
"Goal Outcome Evaluation:  Plan of Care Reviewed With: patient        Progress: no change                              Patient with CORINA. IVF infusing via port. Port dressing changed. Central line cultures collected and sent. Patient has numerous wound, pictures in chart. Patient refusing to be turn, stating \"I can't lay on my sides' as evident by the wounds on her buttocks and legs. Urostomy and colostomy in place. Neph consult in am. No distress noted.   "

## 2024-12-20 NOTE — PAYOR COMM NOTE
"Jessica Cervantes (64 y.o. Female)       Date of Birth   1960    Social Security Number       Address   [address] [address]    Home Phone   [phone]    MRN   [id number]       Gnosticism   Other    Marital Status   Unknown                            Admission Date   12/19/24    Admission Type   Urgent    Admitting Provider   Darshan Nascimento MD    Attending Provider   Darshan Nascimento MD    Department, Room/Bed   [facility] 3C, 363/1       Discharge Date       Discharge Disposition       Discharge Destination                                 Attending Provider: Darshan Nascimento MD    Allergies: Morphine, Oxycodone    Isolation: Contact   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)   Code Status: CPR    Ht: 172.7 cm (68\")   Wt: 89 kg (196 lb 3.4 oz)    Admission Cmt: None   Principal Problem: Acute kidney injury [N17.9]                   Active Insurance as of 12/19/2024       Primary Coverage       Payor Plan Insurance Group Employer/Plan Group    HUMANA MEDICARE REPLACEMENT HUMANA MED ADV PPO [id number]       Payor Plan Address Payor Plan Phone Number Payor Plan Fax Number Effective Dates    [address] [address] [phone]  1/1/2019 - None Entered    [facility] [id number]         Subscriber Name Subscriber Birth Date Member ID       JESSICA CERVANTES 1960 [id number]                     Emergency Contacts        (Rel.) Home Phone Work Phone Mobile Phone    ROBBY CERVANTES (Relative) -- -- [phone]    Claudia Davis (Sister) [phone] -- --                 History & Physical        Daniel Salazar DO at 12/27/24 1158            H&P reviewed. The patient was examined and there are no changes to the H&P.          Electronically signed by Daniel Salazar DO at 12/27/24 9453   Source Note: H&P (View-Only)       Attestation signed by Daniel Salazar DO at 12/27/24 7018    I have reviewed this documentation and agree.                "   [facility]  Inpatient Progress Note      Date of Admission: 12/19/2024  Date of Service:  12/27/24    Reason for Follow Up: Nausea vomiting    Subjective    Subjective:     Patient lying in bed with son at bedside.  Son reports patient is too weak to suck from a straw or eat anything.  She has had persistent nausea with some emesis.  No signs of hematemesis.  Patient is paralyzed but reports knowing she has pain to her lower back as well as lower abdomen.  This pain started after admission.  She has not had difficulty with diarrhea or constipation.  She does have a history of acid reflux and is compliant with daily omeprazole.  Reports that she had acid reflux a week prior to admission which is not normal for her.       Current Facility-Administered Medications:     acetaminophen (TYLENOL) tablet 650 mg, 650 mg, Oral, Q4H PRN, Thony Otto MD    allopurinol (ZYLOPRIM) tablet 100 mg, 100 mg, Oral, Daily, Darshan Nascimento MD, 100 mg at 12/26/24 0857    ALPRAZolam (XANAX) tablet 0.25 mg, 0.25 mg, Oral, Nightly PRN, Thony Otto MD, 0.25 mg at 12/26/24 2155    baclofen (LIORESAL) tablet 10 mg, 10 mg, Oral, Q8H, Thony Otto MD, 10 mg at 12/26/24 2144    benzonatate (TESSALON) capsule 200 mg, 200 mg, Oral, TID PRN, Thony Otto MD, 200 mg at 12/20/24 0549    cholecalciferol (VITAMIN D3) tablet 1,000 Units, 1,000 Units, Oral, Daily, Thony Otto MD, 1,000 Units at 12/26/24 0856    doxycycline (ADOXA) tablet 100 mg, 100 mg, Oral, Q12H, Justyn Owens MD, 100 mg at 12/26/24 2144    heparin (porcine) 5000 UNIT/ML injection 5,000 Units, 5,000 Units, Subcutaneous, Q12H, Thony Otto MD, 5,000 Units at 12/26/24 2144    HYDROcodone-acetaminophen (NORCO)  MG per tablet 1 tablet, 1 tablet, Oral, Q4H PRN, Thony Otto MD, 1 tablet at 12/25/24 0555    lidocaine (LMX) 4 % cream 1 Application, 1 Application, Topical, Once, Merry Lees, APRN     metoclopramide (REGLAN) injection 5 mg, 5 mg, Intravenous, Q6H, Justyn Owens MD, 5 mg at 12/26/24 1144    nitroglycerin (NITROSTAT) SL tablet 0.4 mg, 0.4 mg, Sublingual, Q5 Min PRN, Thony Otto MD    ondansetron (ZOFRAN) injection 4 mg, 4 mg, Intravenous, Q6H PRN, Thony Otto MD, 4 mg at 12/25/24 2115    ondansetron ODT (ZOFRAN-ODT) disintegrating tablet 4 mg, 4 mg, Translingual, Q8H PRN, Thony Otot MD, 4 mg at 12/25/24 1424    pantoprazole (PROTONIX) injection 40 mg, 40 mg, Intravenous, Q AM, Justyn Owens MD, 40 mg at 12/27/24 0502    sodium bicarbonate tablet 650 mg, 650 mg, Oral, TID, Chas Mcallister APRN, 650 mg at 12/26/24 2144    sodium chloride 0.9 % flush 10 mL, 10 mL, Intravenous, Q12H, Thony Otto MD, 10 mL at 12/26/24 2144    sodium chloride 0.9 % flush 10 mL, 10 mL, Intravenous, PRN, Thony Otto MD    sodium chloride 0.9 % infusion 40 mL, 40 mL, Intravenous, PRN, Thony Otto MD, 40 mL at 12/21/24 0249    sodium chloride 0.9 % infusion, 75 mL/hr, Intravenous, Continuous, Volodymyr Núñez APRN    sodium hypochlorite (DAKIN'S 1/4 STRENGTH) 0.125 % topical solution 0.125% solution, , Topical, Q12H, Merry Lees APRN, Given at 12/26/24 2146    Review of Systems     Constitution:  negative for chills, fatigue and fevers  Eyes:  negative for blurriness and change of vision  ENT:   negative for sore throat and voice change  Respiratory: negative for  cough and shortness of air  Cardiovascular:  Negative for chest pain or palpitations  Gastrointestinal:  negative for  See HPI  Endocrine: negative for   weight loss, unintended          Objective    Vital Signs  Temp:  [98 °F (36.7 °C)-100.4 °F (38 °C)] 100.4 °F (38 °C)  Heart Rate:  [] 110  Resp:  [16-18] 16  BP: (157-174)/(58-63) 157/63  Body mass index is 31.78 kg/m².    Intake/Output Summary (Last 24 hours) at 12/27/2024 1007  Last data filed at 12/27/2024 0348  Gross per 24 hour    Intake --   Output 1125 ml   Net -1125 ml     No intake/output data recorded.       Physical Exam:   General: patient awake, alert and cooperative   Eyes: Normal lids and lashes, no scleral icterus   Neck: supple, normal ROM   Skin: warm and dry, not jaundiced   Cardiovascular: regular rhythm and rate, no murmurs auscultated   Pulm: clear to auscultation bilaterally, regular and unlabored   Abdomen: soft, nontender, nondistended; normal bowel sounds   Rectal: deferred   Extremities: no rash or edema   Psychiatric: Normal mood and behavior; memory intact, flat         Results Review:    I have reviewed all of the patients current test results  Results from last 7 days   Lab Units 12/27/24  0457 12/26/24  0539 12/25/24  0558   WBC 10*3/mm3 6.42 7.18 6.56   HEMOGLOBIN g/dL 8.6* 8.2* 8.2*   HEMATOCRIT % 29.9* 29.1* 28.6*   PLATELETS 10*3/mm3 232 285 312       Results from last 7 days   Lab Units 12/27/24  0348 12/26/24  0539 12/25/24  0558   SODIUM mmol/L 141 145 142   POTASSIUM mmol/L 4.2 4.1 4.1   CHLORIDE mmol/L 115* 113* 111*   CO2 mmol/L 17.0* 21.0* 21.0*   BUN mg/dL 26* 29* 32*   CREATININE mg/dL 1.39* 1.51* 1.56*   CALCIUM mg/dL 7.8* 8.5* 8.8   BILIRUBIN mg/dL 0.3 0.2 0.2   ALK PHOS U/L 120* 139* 139*   ALT (SGPT) U/L 6 8 8   AST (SGOT) U/L 7 6 7   GLUCOSE mg/dL 92 94 101*             Lab Results   Lab Value Date/Time    LIPASE 8 (L) 12/27/2024 0348    LIPASE 8 (L) 08/12/2020 0348    LIPASE 12 (L) 08/18/2019 1222       Radiology:    Imaging Results (Last 24 Hours)       Procedure Component Value Units Date/Time    NM HIDA SCAN WITHOUT PHARMACOLOGICAL INTERVENTION [[id number]] Collected: 12/24/24 1303     Updated: 12/26/24 1326    Narrative:      EXAMINATION: NM HIDA SCAN WITHOUT PHARMACOLOGICAL INTERVENTION-  12/24/2024 1:03 PM     HISTORY: intractable nausea and vomiting; cholelithiasis; possible  biliary colic; Z74.09-Other reduced mobility.     Dose: 5.4 mCi technetium 99 M Choletec intravenously.      COMPARISON: CT abdomen and pelvis 12/23/2024.     REPORT: After administration of the radiopharmaceutical, planar images  were obtained over the right upper quadrant irregular 5-minute intervals  for a total of 40 minutes.     Activity is seen in the gallbladder within 10 minutes indicating patency  of the cystic duct. Activity is seen in the small intestine by 5  minutes. This confirms patency of the common bile duct. No sanford  agent was given due to the history of gallstones and gallbladder  ejection fraction was not calculated.       Impression:      Negative HIDA scan, no evidence of cystic duct obstruction.     This report was signed and finalized on 12/24/2024 1:05 PM by Dr. Cruz Flynn MD.                 Assessment & Plan      Acute kidney injury    Acute kidney failure, unspecified    CORINA (acute kidney injury)    Metabolic acidosis    Pressure ulcers of skin of multiple topographic sites    Paraplegia    Anemia    CKD stage 3b, GFR 30-44 ml/min    Colostomy in place    Presence of urostomy    History of breast cancer    Cholelithiases    Chronic osteomyelitis right and left ischium, left iliac bone    Nausea and vomiting      Impression/Plan    Nausea, vomiting  GERD  CKD, stage III  Paraplegia  Colostomy - 2019    Patient had a normal HIDA scan.  She does not recall if she has ever had EGD in the past.  In 2019 there is documentation of EGD but no report.    Ozempic stopped over one week ago.  2019 Jessica Alvarenga experienced difficulty with nausea/vomitting that was attributed to constipation based on stool observed on imaging.  Jessica Alvarenga and her son deny difficulty with constipation, however, due to paralysis and previous history of constipation this could still be a contributing factor.  CT imaging dated 12/23/24 is not indicative of constipation.   We will plan for EGD today to assess for signs of acid reflux.  I did discuss this with patient and her son and they were both  agreeable to proceed.  I will go ahead and start normal saline infusion and advised patient son she will need to remain n.p.o. until after procedure.   Renal labs improving.   Further recommendation pending finding on EGD.    Electronically signed by MAREN Simental, 12/27/24, 9:41 AM CST.       MAREN Simental  12/27/24  10:07 CST    .    Electronically signed by Daniel Salazar DO at 12/27/24 1158                 Volodymyr Núñez APRN at 12/27/24 0820       Attestation signed by Daniel Salazar DO at 12/27/24 1158    I have reviewed this documentation and agree.                  [facility]  Inpatient Progress Note      Date of Admission: 12/19/2024  Date of Service:  12/27/24    Reason for Follow Up: Nausea vomiting    Subjective    Subjective:     Patient lying in bed with son at bedside.  Son reports patient is too weak to suck from a straw or eat anything.  She has had persistent nausea with some emesis.  No signs of hematemesis.  Patient is paralyzed but reports knowing she has pain to her lower back as well as lower abdomen.  This pain started after admission.  She has not had difficulty with diarrhea or constipation.  She does have a history of acid reflux and is compliant with daily omeprazole.  Reports that she had acid reflux a week prior to admission which is not normal for her.       Current Facility-Administered Medications:     acetaminophen (TYLENOL) tablet 650 mg, 650 mg, Oral, Q4H PRN, Thony Otto MD    allopurinol (ZYLOPRIM) tablet 100 mg, 100 mg, Oral, Daily, Darshan Nascimento MD, 100 mg at 12/26/24 0857    ALPRAZolam (XANAX) tablet 0.25 mg, 0.25 mg, Oral, Nightly PRN, Thony Otto MD, 0.25 mg at 12/26/24 2155    baclofen (LIORESAL) tablet 10 mg, 10 mg, Oral, Q8H, Thony Otto MD, 10 mg at 12/26/24 2144    benzonatate (TESSALON) capsule 200 mg, 200 mg, Oral, TID PRN, Thony Otto MD, 200 mg at 12/20/24 0549     cholecalciferol (VITAMIN D3) tablet 1,000 Units, 1,000 Units, Oral, Daily, Thony Otto MD, 1,000 Units at 12/26/24 0856    doxycycline (ADOXA) tablet 100 mg, 100 mg, Oral, Q12H, Justyn Owens MD, 100 mg at 12/26/24 2144    heparin (porcine) 5000 UNIT/ML injection 5,000 Units, 5,000 Units, Subcutaneous, Q12H, Thony Otto MD, 5,000 Units at 12/26/24 2144    HYDROcodone-acetaminophen (NORCO)  MG per tablet 1 tablet, 1 tablet, Oral, Q4H PRN, Thony Otto MD, 1 tablet at 12/25/24 0555    lidocaine (LMX) 4 % cream 1 Application, 1 Application, Topical, Once, Merry Lees, APRN    metoclopramide (REGLAN) injection 5 mg, 5 mg, Intravenous, Q6H, Justyn Owens MD, 5 mg at 12/26/24 1144    nitroglycerin (NITROSTAT) SL tablet 0.4 mg, 0.4 mg, Sublingual, Q5 Min PRN, Thony Otto MD    ondansetron (ZOFRAN) injection 4 mg, 4 mg, Intravenous, Q6H PRN, Thony Otto MD, 4 mg at 12/25/24 2115    ondansetron ODT (ZOFRAN-ODT) disintegrating tablet 4 mg, 4 mg, Translingual, Q8H PRN, Thony Otto MD, 4 mg at 12/25/24 1424    pantoprazole (PROTONIX) injection 40 mg, 40 mg, Intravenous, Q AM, Jutsyn Owens MD, 40 mg at 12/27/24 0502    sodium bicarbonate tablet 650 mg, 650 mg, Oral, TID, Chas Mcallister, APRN, 650 mg at 12/26/24 2144    sodium chloride 0.9 % flush 10 mL, 10 mL, Intravenous, Q12H, Thony Otto MD, 10 mL at 12/26/24 2144    sodium chloride 0.9 % flush 10 mL, 10 mL, Intravenous, PRN, Thony Otto MD    sodium chloride 0.9 % infusion 40 mL, 40 mL, Intravenous, PRN, Thony Otto MD, 40 mL at 12/21/24 0249    sodium chloride 0.9 % infusion, 75 mL/hr, Intravenous, Continuous, Volodymyr Núñez APRN    sodium hypochlorite (DAKIN'S 1/4 STRENGTH) 0.125 % topical solution 0.125% solution, , Topical, Q12H, Merry Lees, APRSIL, Given at 12/26/24 7742    Review of Systems     Constitution:  negative for chills, fatigue and fevers  Eyes:   negative for blurriness and change of vision  ENT:   negative for sore throat and voice change  Respiratory: negative for  cough and shortness of air  Cardiovascular:  Negative for chest pain or palpitations  Gastrointestinal:  negative for  See HPI  Endocrine: negative for   weight loss, unintended          Objective    Vital Signs  Temp:  [98 °F (36.7 °C)-100.4 °F (38 °C)] 100.4 °F (38 °C)  Heart Rate:  [] 110  Resp:  [16-18] 16  BP: (157-174)/(58-63) 157/63  Body mass index is 31.78 kg/m².    Intake/Output Summary (Last 24 hours) at 12/27/2024 1007  Last data filed at 12/27/2024 0348  Gross per 24 hour   Intake --   Output 1125 ml   Net -1125 ml     No intake/output data recorded.       Physical Exam:   General: patient awake, alert and cooperative   Eyes: Normal lids and lashes, no scleral icterus   Neck: supple, normal ROM   Skin: warm and dry, not jaundiced   Cardiovascular: regular rhythm and rate, no murmurs auscultated   Pulm: clear to auscultation bilaterally, regular and unlabored   Abdomen: soft, nontender, nondistended; normal bowel sounds   Rectal: deferred   Extremities: no rash or edema   Psychiatric: Normal mood and behavior; memory intact, flat         Results Review:    I have reviewed all of the patients current test results  Results from last 7 days   Lab Units 12/27/24  0457 12/26/24  0539 12/25/24  0558   WBC 10*3/mm3 6.42 7.18 6.56   HEMOGLOBIN g/dL 8.6* 8.2* 8.2*   HEMATOCRIT % 29.9* 29.1* 28.6*   PLATELETS 10*3/mm3 232 285 312       Results from last 7 days   Lab Units 12/27/24  0348 12/26/24  0539 12/25/24  0558   SODIUM mmol/L 141 145 142   POTASSIUM mmol/L 4.2 4.1 4.1   CHLORIDE mmol/L 115* 113* 111*   CO2 mmol/L 17.0* 21.0* 21.0*   BUN mg/dL 26* 29* 32*   CREATININE mg/dL 1.39* 1.51* 1.56*   CALCIUM mg/dL 7.8* 8.5* 8.8   BILIRUBIN mg/dL 0.3 0.2 0.2   ALK PHOS U/L 120* 139* 139*   ALT (SGPT) U/L 6 8 8   AST (SGOT) U/L 7 6 7   GLUCOSE mg/dL 92 94 101*             Lab Results   Lab  Value Date/Time    LIPASE 8 (L) 12/27/2024 0348    LIPASE 8 (L) 08/12/2020 0348    LIPASE 12 (L) 08/18/2019 1222       Radiology:    Imaging Results (Last 24 Hours)       Procedure Component Value Units Date/Time    NM HIDA SCAN WITHOUT PHARMACOLOGICAL INTERVENTION [[id number]] Collected: 12/24/24 1303     Updated: 12/26/24 1326    Narrative:      EXAMINATION: NM HIDA SCAN WITHOUT PHARMACOLOGICAL INTERVENTION-  12/24/2024 1:03 PM     HISTORY: intractable nausea and vomiting; cholelithiasis; possible  biliary colic; Z74.09-Other reduced mobility.     Dose: 5.4 mCi technetium 99 M Choletec intravenously.     COMPARISON: CT abdomen and pelvis 12/23/2024.     REPORT: After administration of the radiopharmaceutical, planar images  were obtained over the right upper quadrant irregular 5-minute intervals  for a total of 40 minutes.     Activity is seen in the gallbladder within 10 minutes indicating patency  of the cystic duct. Activity is seen in the small intestine by 5  minutes. This confirms patency of the common bile duct. No sanford  agent was given due to the history of gallstones and gallbladder  ejection fraction was not calculated.       Impression:      Negative HIDA scan, no evidence of cystic duct obstruction.     This report was signed and finalized on 12/24/2024 1:05 PM by Dr. Cruz Flynn MD.                 Assessment & Plan      Acute kidney injury    Acute kidney failure, unspecified    CORINA (acute kidney injury)    Metabolic acidosis    Pressure ulcers of skin of multiple topographic sites    Paraplegia    Anemia    CKD stage 3b, GFR 30-44 ml/min    Colostomy in place    Presence of urostomy    History of breast cancer    Cholelithiases    Chronic osteomyelitis right and left ischium, left iliac bone    Nausea and vomiting      Impression/Plan    Nausea, vomiting  GERD  CKD, stage III  Paraplegia  Colostomy - 2019    Patient had a normal HIDA scan.  She does not recall if she has ever had EGD in  the past.  In 2019 there is documentation of EGD but no report.    Ozempic stopped over one week ago.  2019 Jessica Alvarenga experienced difficulty with nausea/vomitting that was attributed to constipation based on stool observed on imaging.  Jessica Alvarenga and her son deny difficulty with constipation, however, due to paralysis and previous history of constipation this could still be a contributing factor.  CT imaging dated 12/23/24 is not indicative of constipation.   We will plan for EGD today to assess for signs of acid reflux.  I did discuss this with patient and her son and they were both agreeable to proceed.  I will go ahead and start normal saline infusion and advised patient son she will need to remain n.p.o. until after procedure.   Renal labs improving.   Further recommendation pending finding on EGD.    Electronically signed by MAREN Simental, 12/27/24, 9:41 AM CST.       MAREN Simental  12/27/24  10:07 CST    .    Electronically signed by Daniel Salazar DO at 12/27/24 4760       Thony Otto MD at 12/19/24 9124              [facility] Medicine Services  HISTORY AND PHYSICAL    Date of Admission: 12/19/2024  Primary Care Physician: Ponce Orozco MD    Subjective   Primary Historian: Patient    Chief Complaint: Abnormal labs    History of Present Illness  This is a 64-year-old lady with past medical history significant for paraplegia history of hypertension, pressure ulcers, history of suprapubic catheter placement, who presented to an outside facility earlier today with abdominal renal function.  It was found that her BUN and creatinine were both elevated apparently patient had some diarrhea from her ostomy along with some nausea and decreased in p.o. intake.  She denied any fever any chills.  Seen at the other facility where she was found to have a elevated BUN level at 63 and creatinine level at 3.31 also she was found to be  "hyperkalemic with a potassium level of 5.6 patient received Kayexalate over there and the decision was made to admit to transfer to our facility also patient received some IV fluids.        Review of Systems   Otherwise complete ROS reviewed and negative except as mentioned in the HPI.    Past Medical History:   Past Medical History:   Diagnosis Date    Decubitus ulcer of both feet     Intradural extramedullary spinal tumor     Osteomyelitis     Paraplegia     Sacral decubitus ulcer      Past Surgical History:  Past Surgical History:   Procedure Laterality Date     SECTION      COLOSTOMY      CYSTOSTOMY      DEBRIDEMENT OF ISCHIAL ULCER/BUTTOCKS WOUND      IVC FILTER RETRIEVAL      MUSCLE FLAP      SPINE SURGERY      TRUNK DEBRIDEMENT Bilateral 2020    Procedure: DEBRIDEMENT DECUBITUS ULCER bilateral feet;  Surgeon: Truong Neal MD;  Location: [facility] OR;  Service: General;  Laterality: Bilateral;     Social History:  reports that she has never smoked. She has never used smokeless tobacco. She reports that she does not currently use alcohol. She reports that she does not use drugs.    Family History: family history includes Lung cancer in her father.       Allergies:  Allergies   Allergen Reactions    Morphine Mental Status Change    Oxycodone Unknown - Low Severity     \"couldn't think straight\"       Medications:  Prior to Admission medications    Medication Sig Start Date End Date Taking? Authorizing Provider   acetaminophen (TYLENOL) 325 MG tablet Take 2 tablets by mouth Every 4 (Four) Hours As Needed for Mild Pain . 20   Darshan Nascimento MD   albuterol sulfate HFA (Ventolin HFA) 108 (90 Base) MCG/ACT inhaler Ventolin HFA 90 mcg/actuation aerosol inhaler    Provider, MD Ruddy   ALPRAZolam (XANAX) 0.25 MG tablet Take 1 tablet by mouth At Night As Needed for Anxiety. 20   Darshan Nascimento MD   baclofen (LIORESAL) 10 MG tablet baclofen 10 mg tablet   TAKE 1 TABLET " "BY MOUTH 3 TIMES A DAY 12/3/18   Provider, MD Ruddy   cholecalciferol (VITAMIN D3) 25 MCG (1000 UT) tablet Take 1 tablet by mouth Daily. 8/19/20   Darshan Nascimento MD   diphenhydrAMINE (BENADRYL) 25 mg capsule Take 1 capsule by mouth Every 6 (Six) Hours As Needed for Itching. 8/18/20   Darshan Nascimento MD   HYDROcodone-acetaminophen (NORCO)  MG per tablet Take 1 tablet by mouth Every 4 (Four) Hours As Needed for Severe Pain  for up to 6 doses. 8/18/20   Darshan Nascimento MD   nystatin (MYCOSTATIN) 030713 UNIT/GM powder Apply  topically to the appropriate area as directed Every 12 (Twelve) Hours. 8/18/20   Darshan Nascimento MD   omeprazole OTC (PrilOSEC OTC) 20 MG EC tablet Take 1 tablet by mouth Every Morning Before Breakfast. 8/18/20   Darshan Nascimento MD   ondansetron ODT (Zofran ODT) 4 MG disintegrating tablet Place 1 tablet on the tongue Every 8 (Eight) Hours As Needed for Nausea or Vomiting. 8/18/20   Darshan Nascimento MD   polyethylene glycol (MIRALAX) 17 g packet Take 17 g by mouth Daily As Needed (constipation). 8/18/20   Darshan Nascimento MD   sodium hypochlorite (DAKIN'S 1/4 STRENGTH) 0.125 % solution topical solution 0.125% Apply 1,000 mL topically to the appropriate area as directed Daily. 8/18/20   Darshan Nascimento MD     I have utilized all available immediate resources to obtain, update, or review the patient's current medications (including all prescriptions, over-the-counter products, herbals, cannabis/cannabidiol products, and vitamin/mineral/dietary (nutritional) supplements).    Objective     Vital Signs: /66 (BP Location: Right arm, Patient Position: Sitting)   Pulse 100   Temp 98.3 °F (36.8 °C) (Oral)   Resp 16   Ht 172.7 cm (68\")   Wt 89 kg (196 lb 3.4 oz)   SpO2 100%   BMI 29.83 kg/m²   Physical Exam   General: Patient is alert, awake, oriented x 3 in no apparent distress at time of " examination  HEENT: Normocephalic, atraumatic, pupils are equal reactive to light and accommodate  Neck: Supple, no JVD, no carotid bruit  CVS: S1, S2, regular rhythm and rate  Lungs: Clear to auscultation bilaterally  Abdomen: soft tender in mid epigastric area  Extremities: Chronic bilateral wound covered  Neurologic: No focal deficits  Psychiatric: Normal affect      Results Reviewed:  Lab Results (last 24 hours)       Procedure Component Value Units Date/Time    CBC Auto Differential [[id number]]  (Abnormal) Collected: 12/19/24 2221    Specimen: Blood Updated: 12/19/24 2237     WBC 11.13 10*3/mm3      RBC 2.84 10*6/mm3      Hemoglobin 7.4 g/dL      Hematocrit 25.0 %      MCV 88.0 fL      MCH 26.1 pg      MCHC 29.6 g/dL      RDW 16.6 %      RDW-SD 53.7 fl      MPV 9.2 fL      Platelets 365 10*3/mm3      Neutrophil % 65.9 %      Lymphocyte % 19.0 %      Monocyte % 10.6 %      Eosinophil % 2.3 %      Basophil % 1.1 %      Immature Grans % 1.1 %      Neutrophils, Absolute 7.33 10*3/mm3      Lymphocytes, Absolute 2.12 10*3/mm3      Monocytes, Absolute 1.18 10*3/mm3      Eosinophils, Absolute 0.26 10*3/mm3      Basophils, Absolute 0.12 10*3/mm3      Immature Grans, Absolute 0.12 10*3/mm3      nRBC 0.0 /100 WBC     Basic Metabolic Panel [[id number]] Collected: 12/19/24 2221    Specimen: Blood Updated: 12/19/24 2230          Imaging Results (Last 24 Hours)       ** No results found for the last 24 hours. **          I have personally reviewed and interpreted the radiology studies and ECG obtained at time of admission.     Assessment / Plan   Assessment:   Active Hospital Problems    Diagnosis     **Acute kidney injury        Treatment Plan  The patient will be admitted to my service here at [facility].  1.  Acute kidney injury  2.  Intractable nausea  3.  Hyperkalemia  4.  Chronic wounds in lower extremities  5.  Paraplegia      Patient will be admitted, she will be started on gentle hydration with normal  saline, will follow-up BMP level, will consult nephrology, also wound care consult placed.    Medical Decision Making  Number and Complexity of problems: 5  Differential Diagnosis: None    Conditions and Status        Condition is unchanged.     [facility] Data  External documents reviewed: Yes  Cardiac tracing (EKG, telemetry) interpretation: Sinus rhythm  Radiology interpretation: Reviewed  Labs reviewed: Yes  Any tests that were considered but not ordered: None     Decision rules/scores evaluated (example [id number], Wells, etc): N/A     Discussed with: Patient, nursing staff, son     Care Planning  Shared decision making: Patient  Code status and discussions: Full    Disposition  Social Determinants of Health that impact treatment or disposition: None  Estimated length of stay is 3 days.     I confirmed that the patient's advanced care plan is present, code status is documented, and a surrogate decision maker is listed in the patient's medical record.     The patient's surrogate decision maker is patient.     The patient was seen and examined by me on 12/9/2024 at 2230.    Electronically signed by Thony Otto MD, 12/19/24, 22:51 CST.              Electronically signed by hTony Otto MD at 12/19/24 9134

## 2024-12-21 LAB
BH BB BLOOD EXPIRATION DATE: NORMAL
BH BB BLOOD TYPE BARCODE: 6200
BH BB DISPENSE STATUS: NORMAL
BH BB PRODUCT CODE: NORMAL
BH BB UNIT NUMBER: NORMAL
CROSSMATCH INTERPRETATION: NORMAL
FERRITIN SERPL-MCNC: 2441 NG/ML (ref 13–150)
IRON 24H UR-MRATE: 39 MCG/DL (ref 37–145)
IRON SATN MFR SERPL: 34 % (ref 20–50)
PTH-INTACT SERPL-MCNC: 80.3 PG/ML (ref 15–65)
TIBC SERPL-MCNC: 113 MCG/DL (ref 298–536)
TRANSFERRIN SERPL-MCNC: 76 MG/DL (ref 200–360)
UNIT  ABO: NORMAL
UNIT  RH: NORMAL

## 2024-12-21 PROCEDURE — 25810000003 SODIUM CHLORIDE 0.9 % SOLUTION: Performed by: INTERNAL MEDICINE

## 2024-12-21 PROCEDURE — 25810000003 SODIUM CHLORIDE 0.9 % SOLUTION 1,000 ML FLEX CONT: Performed by: INTERNAL MEDICINE

## 2024-12-21 PROCEDURE — 25010000002 HEPARIN (PORCINE) PER 1000 UNITS: Performed by: INTERNAL MEDICINE

## 2024-12-21 PROCEDURE — G0378 HOSPITAL OBSERVATION PER HR: HCPCS

## 2024-12-21 PROCEDURE — 83970 ASSAY OF PARATHORMONE: CPT | Performed by: INTERNAL MEDICINE

## 2024-12-21 RX ORDER — SODIUM CHLORIDE 9 MG/ML
75 INJECTION, SOLUTION INTRAVENOUS CONTINUOUS
Status: DISCONTINUED | OUTPATIENT
Start: 2024-12-21 | End: 2024-12-22

## 2024-12-21 RX ADMIN — Medication 10 ML: at 10:11

## 2024-12-21 RX ADMIN — SODIUM HYPOCHLORITE 1 BOTTLE: 1.25 SOLUTION TOPICAL at 03:02

## 2024-12-21 RX ADMIN — Medication 1000 UNITS: at 10:10

## 2024-12-21 RX ADMIN — SODIUM CHLORIDE 75 ML/HR: 9 INJECTION, SOLUTION INTRAVENOUS at 10:11

## 2024-12-21 RX ADMIN — PANTOPRAZOLE SODIUM 40 MG: 40 TABLET, DELAYED RELEASE ORAL at 04:49

## 2024-12-21 RX ADMIN — HEPARIN SODIUM 5000 UNITS: 5000 INJECTION, SOLUTION INTRAVENOUS; SUBCUTANEOUS at 10:10

## 2024-12-21 RX ADMIN — BACLOFEN 10 MG: 10 TABLET ORAL at 12:36

## 2024-12-21 RX ADMIN — SODIUM HYPOCHLORITE: 1.25 SOLUTION TOPICAL at 12:37

## 2024-12-21 RX ADMIN — SODIUM CHLORIDE 40 ML: 9 INJECTION, SOLUTION INTRAVENOUS at 02:49

## 2024-12-21 RX ADMIN — BACLOFEN 10 MG: 10 TABLET ORAL at 17:44

## 2024-12-21 RX ADMIN — BACLOFEN 10 MG: 10 TABLET ORAL at 22:41

## 2024-12-21 RX ADMIN — HEPARIN SODIUM 5000 UNITS: 5000 INJECTION, SOLUTION INTRAVENOUS; SUBCUTANEOUS at 22:42

## 2024-12-21 NOTE — PROGRESS NOTES
Nephrology ([facility]) Progress Note      Patient:  Jessica Alvarenga  YOB: 1960  Date of Service: 12/21/2024  MRN: [id number]   Acct: [id number]   Primary Care Physician: Ponce Orozco MD  Advance Directive:   Code Status and Medical Interventions: CPR (Attempt to Resuscitate); Full Support   Ordered at: 12/19/24 5780     Level Of Support Discussed With:    Patient     Code Status (Patient has no pulse and is not breathing):    CPR (Attempt to Resuscitate)     Medical Interventions (Patient has pulse or is breathing):    Full Support     Admit Date: 12/19/2024       Hospital Day: 1  Referring Provider: No Known Provider      Patient personally seen and examined.  Complete chart including Consults, Notes, Operative Reports, Labs, Cardiology, and Radiology studies reviewed as able.        Subjective:  Jessica Alvarenga is a 64 y.o. female for whom we were consulted for evaluation and treatment of acute kidney injury. Baseline chronic kidney disease stage 3b, baseline creatinine approximately 1.5. Follows in our [place] office. History of paraplegia, pressure ulcers, hypertension, suprapubic catheter, colostomy. Presented to outlying facility and was found to have abnormal labs including creatinine 3.3 and potassium 5.6. Transferred to [facility] for nephrology evaluation. She has received IV fluids as well as medical management of hyperkalemia. Currently she is awake and alert. Complaint of fatigue but feeling a little better compared to yesterday. She reports several days of poor PO intake due to nausea recently as well as diarrhea from her ostomy. Denies changes in urine output. Denies NSAID use. Several wounds are present on lower extremities and wound care has evaluated today. Hemoglobin 6.4 on AM labs and she is ordered to get a unit or PRBC.      Today, patient is doing better.  She is feeling less dehydrated.  Her oral intake is improved.  Her kidney  function has improved as well.    Allergies:  Morphine and Oxycodone    Home Meds:  Medications Prior to Admission   Medication Sig Dispense Refill Last Dose/Taking    anastrozole (ARIMIDEX) 1 MG tablet Take 1 tablet by mouth Daily.   Taking    baclofen (LIORESAL) 10 MG tablet Take 1 tablet by mouth 3 (Three) Times a Day.   Taking    cholecalciferol (VITAMIN D3) 25 MCG (1000 UT) tablet Take 1 tablet by mouth Daily.   Taking    ferrous sulfate 325 (65 FE) MG tablet Take 1 tablet by mouth Daily With Breakfast.   Taking    losartan (COZAAR) 100 MG tablet Take 0.5 tablets by mouth Daily.   Taking    omeprazole (priLOSEC) 40 MG capsule Take 1 capsule by mouth Daily.   Taking    acetaminophen (TYLENOL) 325 MG tablet Take 2 tablets by mouth Every 4 (Four) Hours As Needed for Mild Pain .       benzonatate (TESSALON) 200 MG capsule Take 1 capsule by mouth 3 (Three) Times a Day As Needed for Cough.       diphenoxylate-atropine (LOMOTIL) 2.5-0.025 MG per tablet Take 1 tablet by mouth 4 (Four) Times a Day As Needed for Diarrhea.       HYDROcodone-acetaminophen (NORCO)  MG per tablet Take 1 tablet by mouth Every 4 (Four) Hours As Needed for Severe Pain  for up to 6 doses. (Patient taking differently: Take 1 tablet by mouth 4 (Four) Times a Day As Needed for Severe Pain.) 6 tablet 0        Medicines:  Current Facility-Administered Medications   Medication Dose Route Frequency Provider Last Rate Last Admin    acetaminophen (TYLENOL) tablet 650 mg  650 mg Oral Q4H PRN Thony Otto MD        ALPRAZolam (XANAX) tablet 0.25 mg  0.25 mg Oral Nightly PRN Thony Otto MD        baclofen (LIORESAL) tablet 10 mg  10 mg Oral Q8H Thony Otto MD   10 mg at 12/21/24 1236    benzonatate (TESSALON) capsule 200 mg  200 mg Oral TID PRN Thony Otto MD   200 mg at 12/20/24 0549    cholecalciferol (VITAMIN D3) tablet 1,000 Units  1,000 Units Oral Daily Thony Otto MD   1,000 Units at 12/21/24 1010    heparin  (porcine) 5000 UNIT/ML injection 5,000 Units  5,000 Units Subcutaneous Q12H Thony Otto MD   5,000 Units at 24 1010    HYDROcodone-acetaminophen (NORCO)  MG per tablet 1 tablet  1 tablet Oral Q4H PRN Thony Otto MD        lidocaine (LMX) 4 % cream 1 Application  1 Application Topical Once Merry Lees APRN        nitroglycerin (NITROSTAT) SL tablet 0.4 mg  0.4 mg Sublingual Q5 Min PRN Thony Otto MD        ondansetron (ZOFRAN) injection 4 mg  4 mg Intravenous Q6H PRN Thony Otto MD   4 mg at 24 1424    ondansetron ODT (ZOFRAN-ODT) disintegrating tablet 4 mg  4 mg Translingual Q8H PRN Thony Otto MD        pantoprazole (PROTONIX) EC tablet 40 mg  40 mg Oral Q AM Thony Otto MD   40 mg at 24 0449    sodium chloride 0.9 % flush 10 mL  10 mL Intravenous Q12H Thony Otto MD   10 mL at 24 1011    sodium chloride 0.9 % flush 10 mL  10 mL Intravenous PRN Thony Otto MD        sodium chloride 0.9 % infusion 40 mL  40 mL Intravenous PRN Thony Otto MD   40 mL at 24 0249    sodium chloride 0.9 % infusion  75 mL/hr Intravenous Continuous Darshan Nascimento MD 75 mL/hr at 24 1216 75 mL/hr at 24 1216    sodium hypochlorite (DAKIN'S 1/4 STRENGTH) 0.125 % topical solution 0.125% solution   Topical Q12H Merry Lees APRN   Given at 24 1237       Past Medical History:  Past Medical History:   Diagnosis Date    Decubitus ulcer of both feet     Intradural extramedullary spinal tumor     Osteomyelitis     Paraplegia     Sacral decubitus ulcer        Past Surgical History:  Past Surgical History:   Procedure Laterality Date     SECTION      COLOSTOMY      CYSTOSTOMY      DEBRIDEMENT OF ISCHIAL ULCER/BUTTOCKS WOUND      IVC FILTER RETRIEVAL      MUSCLE FLAP      SPINE SURGERY      TRUNK DEBRIDEMENT Bilateral 2020    Procedure: DEBRIDEMENT DECUBITUS ULCER bilateral feet;  Surgeon: Truong Neal MD;   Location: [facility] OR;  Service: General;  Laterality: Bilateral;       Family History  Family History   Problem Relation Age of Onset    Lung cancer Father        Social History  Social History     Socioeconomic History    Marital status: Unknown   Tobacco Use    Smoking status: Never    Smokeless tobacco: Never   Vaping Use    Vaping status: Never Used   Substance and Sexual Activity    Alcohol use: Not Currently    Drug use: Never    Sexual activity: Defer         Review of Systems:  History obtained from chart review and the patient  General ROS: No fever or chills  Respiratory ROS: No cough, shortness of breath, wheezing  Cardiovascular ROS: No chest pain or palpitations  Gastrointestinal ROS: No abdominal pain or melena  Genito-Urinary ROS: No dysuria or hematuria  Psych ROS: No anxiety and depression  14 point ROS reviewed with the patient and negative except as noted above and in the HPI unless unable to obtain.      Objective:  Patient Vitals for the past 24 hrs:   BP Temp Temp src Pulse Resp SpO2   12/21/24 1112 123/41 98.2 °F (36.8 °C) Oral 72 16 100 %   12/21/24 0700 129/44 98 °F (36.7 °C) Oral 69 16 99 %   12/21/24 0323 127/40 99 °F (37.2 °C) Oral 78 16 98 %   12/20/24 2359 127/40 98.7 °F (37.1 °C) Oral 78 16 97 %   12/20/24 1939 129/40 99 °F (37.2 °C) Oral 79 16 100 %   12/20/24 1630 123/40 97.4 °F (36.3 °C) Oral 74 16 100 %       Intake/Output Summary (Last 24 hours) at 12/21/2024 1514  Last data filed at 12/21/2024 1112  Gross per 24 hour   Intake 540 ml   Output 975 ml   Net -435 ml     General: awake/alert   Chest:  clear to auscultation bilaterally without respiratory distress  CVS: regular rate and rhythm  Abdominal: soft, nontender, positive bowel sounds  Extremities: no cyanosis or edema  Skin: warm and dry without rash      Labs:  Results from last 7 days   Lab Units 12/20/24 1939 12/20/24 0827 12/19/24  2221   WBC 10*3/mm3  --  8.05 11.13*   HEMOGLOBIN g/dL 7.3* 6.4* 7.4*   HEMATOCRIT %  "24.4* 22.0* 25.0*   PLATELETS 10*3/mm3  --  307 365         Results from last 7 days   Lab Units 12/20/24  0827 12/19/24  2221   SODIUM mmol/L 140 139   POTASSIUM mmol/L 4.8 5.4*   CHLORIDE mmol/L 110* 108*   CO2 mmol/L 18.0* 17.0*   BUN mg/dL 58* 59*   CREATININE mg/dL 2.76* 3.26*   CALCIUM mg/dL 7.7* 8.5*   EGFR mL/min/1.73 18.9* 15.5*   BILIRUBIN mg/dL 0.3  --    ALK PHOS U/L 169*  --    ALT (SGPT) U/L 7  --    AST (SGOT) U/L 5  --    GLUCOSE mg/dL 112* 128*       Radiology:   Imaging Results (Last 72 Hours)       ** No results found for the last 72 hours. **            Culture:  No results found for: \"BLOODCX\", \"URINECX\", \"WOUNDCX\", \"MRSACX\", \"RESPCX\", \"STOOLCX\"      Assessment    Acute kidney injury, ATN  Baseline chronic kidney disease stage 3b  Hyperkalemia--improved  Metabolic acidosis  Hypertension   History of colostomy and urostomy  Multiple pressure ulcers  Paraplegia     Plan:   Continue IV fluids for gentle hydration  Agree with PRBC administration to keep Hgb >7  Monitor labs, will check uric acid an PTH.        Jonathan Recio MD  12/21/2024  15:14 CST  "

## 2024-12-21 NOTE — PLAN OF CARE
Goal Outcome Evaluation:  Plan of Care Reviewed With: patient        Progress: no change  Outcome Evaluation: Pt A&O x4. No c/o pain. VSS. 1 unit PRBCs infused per orders due to critical Hgb. Awaiting H&H redraw. wound care completed per orders. Pt refusing turns. Safety maintained.

## 2024-12-21 NOTE — PROGRESS NOTES
1         [facility]  INPATIENT PROGRESS NOTE    Patient Name: Jessica Alvarenga  Date of Admission: 12/19/2024  Today's Date: 12/21/24  Length of Stay: 1  Primary Care Physician: Ponce Orozco MD    Subjective   Chief Complaint: Follow-up  HPI   Patient is a 64-year-old woman who came in yesterday for abnormal laboratory presenting complaint.  She was transferred out from another facility.  Record indicates she has an ostomy and has increased output from it described as diarrhea  She was admitted with diagnosis of acute kidney injury  Intractable nausea  Hyperkalemia  She has chronic wounds in lower extremities.  She is paraplegic.    She has normocytic anemia with hemoglobin of 7.4.  Her potassium was 5.4 while creatinine was 3.26 BUN of 59.  She has acidosis although none anion gap.  She received Kayexalate from outside facility.  Imaging study here in our hospital    She had multiple wound cultures taken including a blood culture labeled as right and left legs as well as wound from the buttocks  Dose as shown below.  Wound care will be consulted.  Will check further recommendation as well.  He may need some form of debridement.                  She told me she normally follows with Thalia Beard/Dr. Recio at pulmonary.  She also told me that she was sent to her oncology clinic office and had blood work.  She was directed to the emergency room because of abnormality in her renal function.  Because they do not have nephrologist at their hospital, she was transferred to our hospital for further care.    I have verified with patient the following.  She does not have diarrhea.  She has a urostomy and a colostomy.  She told me that he does not nor she does not normally put a colostomy bag because her stools are hard and it cakes.  She only covers that with 4 x 4.    She has not noticed any decrease or increased output from her urostomy.    She has been sick for the  "past 6 to 7 days dry heaving.  She has not vomited.  She has no headache, fever or chills.  She has dry nonproductive cough that triggers her dry heaving.  Denies any ill exposures    She told me her right kidney atrophied and that has a single kidney functioning.      December 21  \"Can I go home?\"  Renal function improving  Received 1 unit of packed RBC transfusion yesterday.  Posttransfusion hemoglobin as expected (7.3)  Wound care did some wound cultures.  Hyperkalemia resolved    Tolerating diet    Review of Systems   All pertinent negatives and positives are as above. All other systems have been reviewed and are negative unless otherwise stated.     Objective    Temp:  [97.4 °F (36.3 °C)-99 °F (37.2 °C)] 98 °F (36.7 °C)  Heart Rate:  [69-82] 69  Resp:  [16-18] 16  BP: (119-131)/(39-44) 129/44  Physical Exam  GEN: Awake, alert, interactive, in NAD  HEENT: Atraumatic, PERRLA, EOMI, Anicteric, Trachea midline  Lungs: CTAB, no wheezing/rales/rhonchi  Heart: RRR, +S1/s2, no rub  ABD: soft, nt/nd, +BS, no guarding/rebound  Extremities: Left below-knee amputation; multiple areas of wounds as photographed above  Currently covered with clean dressings  I do not know how to describe the skin on her left leg but it looks abnormal to me.  The best way I could describe it is something like café au lait from the limited view exposed.  Neuro: AAOx3, no focal deficits  Psych: normal mood & affect    Results Review:  I have reviewed the labs, radiology results, and diagnostic studies.    Laboratory Data:   Results from last 7 days   Lab Units 12/20/24  1939 12/20/24  0827 12/19/24  2221   WBC 10*3/mm3  --  8.05 11.13*   HEMOGLOBIN g/dL 7.3* 6.4* 7.4*   HEMATOCRIT % 24.4* 22.0* 25.0*   PLATELETS 10*3/mm3  --  307 365        Results from last 7 days   Lab Units 12/20/24  0827 12/19/24  2221   SODIUM mmol/L 140 139   POTASSIUM mmol/L 4.8 5.4*   CHLORIDE mmol/L 110* 108*   CO2 mmol/L 18.0* 17.0*   BUN mg/dL 58* 59*   CREATININE " "mg/dL 2.76* 3.26*   CALCIUM mg/dL 7.7* 8.5*   BILIRUBIN mg/dL 0.3  --    ALK PHOS U/L 169*  --    ALT (SGPT) U/L 7  --    AST (SGOT) U/L 5  --    GLUCOSE mg/dL 112* 128*       Culture Data:   No results found for: \"BLOODCX\", \"URINECX\", \"WOUNDCX\", \"MRSACX\", \"RESPCX\", \"STOOLCX\"    Radiology Data:   Imaging Results (Last 24 Hours)       ** No results found for the last 24 hours. **            I have reviewed the patient's current medications.     Assessment/Plan   Assessment  Active Hospital Problems    Diagnosis     **Acute kidney injury     Acute kidney failure, unspecified              Medical Decision Making  Number and Complexity of problems:   Problem list:  Acute kidney injury-possibly with chronic kidney disease as oncologist described creatinine of 3.65 \"doubled from baseline close \"  Hyperkalemia  Paraplegia-secondary to history of tumor on her spine at age 19  Multiple wounds as photographed above; diagnosis/staging per wound care nurse as outlined below  Pressure ulcer of left buttock, stage 4 (HCC)   Pressure ulcer of right buttock, stage 3 (HCC)   Pressure ulcer of left heel, stage 4 (HCC)   Pressure ulcer of right heel, stage 3 (HCC)   Pressure injury of other site, stage 3 (HCC)   Pressure ulcer of right hip, stage 2 (HCC)   Ostomy status-urostomy and colostomy  Normocytic anemia-mentioned to be an element of anemia of chronic disease  History of stage IV metastatic right breast cancer as documented from December 19, 2024  Reported PET/CT from February 6, 2024 indicative of positive treatment response; August 2024 PET CT scan described to be stable metastatic disease with low disease burden.  Plan for continued anastrozole as per outpatient oncologist.          Treatment Plan  Nephrology (input reviewed) and wound care been consulted   Continue IV fluid  Follow-up renal recovery  Check substrates of anemia (requested from samples prior to transfusion)  Recheck labs today-these are " pending        Medications reviewed  baclofen, 10 mg, Oral, Q8H  cholecalciferol, 1,000 Units, Oral, Daily  heparin (porcine), 5,000 Units, Subcutaneous, Q12H  lidocaine, 1 Application, Topical, Once  pantoprazole, 40 mg, Oral, Q AM  sodium chloride, 10 mL, Intravenous, Q12H  sodium hypochlorite, , Topical, Q12H        Conditions and Status  Fair     MDM Data  External documents reviewed: Records from [facility] dated October 28, 2024:  Paraplegic patient with chronic pressure ulcers of both feet and both buttocks. The left foot and buttock wounds both have osteomyelitis in the underlying bones.   Daily dressing changes to buttock wounds with ralph, aquacel ag and ABD pads and alternate with dakins soaked kerlix and abd pads   Daily dressing change to foot wounds with ralph, aquacel ag, and abd pad, wrap with Kerlix and Ace wrap from toes to knee   Stressed the importance of changing positions every 2 hours and offloading the areas   Left foot wound unlikely to heal, discussed probable amputation, patient will consider   Return for follow-up in 4 weeks   Cardiac tracing (EKG, telemetry) interpretation: Telemetry showed normal sinus rhythm with rate of 98  Radiology interpretation: None available  Labs reviewed: Yes  Any tests that were considered but not ordered: Consider renal ultrasound     Decision rules/scores evaluated (example [id number], Wells, etc): -     Discussed with: Patient and nursing staff     Care Planning  Shared decision making: Patient with consultant/s  Code status and discussions: Full code    Disposition  Social Determinants of Health that impact treatment or disposition: None identified at this time  I expect the patient to be discharged to (?).   Renal function slowly improving.  Baseline creatinine probably about 1.8 (described as her creatinine from 3.65 per oncology service)  Other issue: Wound care nurse did want to culture.  Not sure what to do with this information at present  "time.  Patient has not had any recent antibiotics.  Neither is she on any antibiotics right now.  Her symptom is improved (dry heaving).  She is afebrile and has no leukocytosis.  No gross systemic inflammatory response to suggest an infectious process.  She told me that her wound care is following on her in outpatient setting and looking for some form of this \"venous studies\" in the future.  Will likely defer further management to wound care in the outpatient setting.  If creatinine further improves by tomorrow and no other signs of systemic inflammatory response, I anticipate she may possibly be discharged tomorrow    Electronically signed by Darshan Nascimento MD, 12/21/24, 09:00 CST.    "

## 2024-12-21 NOTE — PLAN OF CARE
Goal Outcome Evaluation:  Plan of Care Reviewed With: patient        Progress: improving  Outcome Evaluation: Pt A&O x4. VSS. No c/o pain. Wound care completed per orders. Pt refusing turns. Possible discharge tomorrow. Safety maintained.

## 2024-12-21 NOTE — CONSULTS
Assessed pt port dressing this morning. Pts biopatch is noted to be moving under dressing, not completely over insertion site. Borders of dressing are adhered to skin. Spoke to pt about changing dressing to replace biopatch but she believes she will be discharged soon and doesn't want dressing change at this time. Will reassess as needed.Hopeful per Dr. Nascimento's note that pt will be discharged tomorrow.

## 2024-12-22 PROBLEM — N17.9 AKI (ACUTE KIDNEY INJURY): Status: ACTIVE | Noted: 2024-12-22

## 2024-12-22 LAB
ALBUMIN SERPL-MCNC: 2.1 G/DL (ref 3.5–5.2)
ALBUMIN/GLOB SERPL: 0.7 G/DL
ALP SERPL-CCNC: 140 U/L (ref 39–117)
ALT SERPL W P-5'-P-CCNC: 6 U/L (ref 1–33)
ANION GAP SERPL CALCULATED.3IONS-SCNC: 12 MMOL/L (ref 5–15)
ARTERIAL PATENCY WRIST A: ABNORMAL
AST SERPL-CCNC: <5 U/L (ref 1–32)
ATMOSPHERIC PRESS: 763 MMHG
BASE EXCESS BLDA CALC-SCNC: -11.1 MMOL/L (ref 0–2)
BASOPHILS # BLD AUTO: 0.09 10*3/MM3 (ref 0–0.2)
BASOPHILS NFR BLD AUTO: 1.4 % (ref 0–1.5)
BDY SITE: ABNORMAL
BILIRUB SERPL-MCNC: <0.2 MG/DL (ref 0–1.2)
BODY TEMPERATURE: 37
BUN SERPL-MCNC: 50 MG/DL (ref 8–23)
BUN/CREAT SERPL: 21.3 (ref 7–25)
CALCIUM SPEC-SCNC: 7.6 MG/DL (ref 8.6–10.5)
CHLORIDE SERPL-SCNC: 113 MMOL/L (ref 98–107)
CO2 SERPL-SCNC: 15 MMOL/L (ref 22–29)
COHGB MFR BLD: 1.6 % (ref 0–5)
CREAT SERPL-MCNC: 2.35 MG/DL (ref 0.57–1)
D-LACTATE SERPL-SCNC: 1.1 MMOL/L (ref 0.5–2)
DEPRECATED RDW RBC AUTO: 53.1 FL (ref 37–54)
EGFRCR SERPLBLD CKD-EPI 2021: 22.9 ML/MIN/1.73
EOSINOPHIL # BLD AUTO: 0.38 10*3/MM3 (ref 0–0.4)
EOSINOPHIL NFR BLD AUTO: 5.8 % (ref 0.3–6.2)
ERYTHROCYTE [DISTWIDTH] IN BLOOD BY AUTOMATED COUNT: 16.2 % (ref 12.3–15.4)
GLOBULIN UR ELPH-MCNC: 3.2 GM/DL
GLUCOSE SERPL-MCNC: 126 MG/DL (ref 65–99)
HCO3 BLDA-SCNC: 15.3 MMOL/L (ref 20–26)
HCT VFR BLD AUTO: 25.5 % (ref 34–46.6)
HCT VFR BLD CALC: 25.2 % (ref 38–51)
HGB BLD-MCNC: 7.3 G/DL (ref 12–15.9)
HGB BLDA-MCNC: 8.2 G/DL (ref 12–16)
IMM GRANULOCYTES # BLD AUTO: 0.21 10*3/MM3 (ref 0–0.05)
IMM GRANULOCYTES NFR BLD AUTO: 3.2 % (ref 0–0.5)
LYMPHOCYTES # BLD AUTO: 1.62 10*3/MM3 (ref 0.7–3.1)
LYMPHOCYTES NFR BLD AUTO: 24.6 % (ref 19.6–45.3)
MCH RBC QN AUTO: 25.6 PG (ref 26.6–33)
MCHC RBC AUTO-ENTMCNC: 28.6 G/DL (ref 31.5–35.7)
MCV RBC AUTO: 89.5 FL (ref 79–97)
METHGB BLD QL: 0.6 % (ref 0–3)
MODALITY: ABNORMAL
MONOCYTES # BLD AUTO: 0.68 10*3/MM3 (ref 0.1–0.9)
MONOCYTES NFR BLD AUTO: 10.3 % (ref 5–12)
NEUTROPHILS NFR BLD AUTO: 3.61 10*3/MM3 (ref 1.7–7)
NEUTROPHILS NFR BLD AUTO: 54.7 % (ref 42.7–76)
NRBC BLD AUTO-RTO: 0 /100 WBC (ref 0–0.2)
OXYHGB MFR BLDV: 94.4 % (ref 94–99)
PCO2 BLDA: 35.3 MM HG (ref 35–45)
PCO2 TEMP ADJ BLD: 35.3 MM HG (ref 35–45)
PH BLDA: 7.25 PH UNITS (ref 7.35–7.45)
PH, TEMP CORRECTED: 7.25 PH UNITS (ref 7.35–7.45)
PLATELET # BLD AUTO: 262 10*3/MM3 (ref 140–450)
PMV BLD AUTO: 9 FL (ref 6–12)
PO2 BLDA: 83.7 MM HG (ref 83–108)
PO2 TEMP ADJ BLD: 83.7 MM HG (ref 83–108)
POTASSIUM BLDA-SCNC: 3.5 MMOL/L (ref 3.5–5.2)
POTASSIUM SERPL-SCNC: 3.9 MMOL/L (ref 3.5–5.2)
PROT SERPL-MCNC: 5.3 G/DL (ref 6–8.5)
RBC # BLD AUTO: 2.85 10*6/MM3 (ref 3.77–5.28)
SAO2 % BLDCOA: 96.5 % (ref 94–99)
SODIUM BLDA-SCNC: 142 MMOL/L (ref 136–145)
SODIUM SERPL-SCNC: 140 MMOL/L (ref 136–145)
T4 FREE SERPL-MCNC: 1.26 NG/DL (ref 0.93–1.7)
TSH SERPL DL<=0.05 MIU/L-ACNC: 3.39 UIU/ML (ref 0.27–4.2)
URATE SERPL-MCNC: 11 MG/DL (ref 2.4–5.7)
VENTILATOR MODE: ABNORMAL
WBC NRBC COR # BLD AUTO: 6.59 10*3/MM3 (ref 3.4–10.8)

## 2024-12-22 PROCEDURE — 25010000002 FUROSEMIDE PER 20 MG: Performed by: INTERNAL MEDICINE

## 2024-12-22 PROCEDURE — 82805 BLOOD GASES W/O2 SATURATION: CPT

## 2024-12-22 PROCEDURE — 25010000002 ONDANSETRON PER 1 MG: Performed by: INTERNAL MEDICINE

## 2024-12-22 PROCEDURE — 84550 ASSAY OF BLOOD/URIC ACID: CPT | Performed by: INTERNAL MEDICINE

## 2024-12-22 PROCEDURE — 25810000003 SODIUM CHLORIDE 0.9 % SOLUTION: Performed by: INTERNAL MEDICINE

## 2024-12-22 PROCEDURE — 84439 ASSAY OF FREE THYROXINE: CPT | Performed by: INTERNAL MEDICINE

## 2024-12-22 PROCEDURE — 82375 ASSAY CARBOXYHB QUANT: CPT

## 2024-12-22 PROCEDURE — 84443 ASSAY THYROID STIM HORMONE: CPT | Performed by: INTERNAL MEDICINE

## 2024-12-22 PROCEDURE — 85025 COMPLETE CBC W/AUTO DIFF WBC: CPT | Performed by: INTERNAL MEDICINE

## 2024-12-22 PROCEDURE — 83050 HGB METHEMOGLOBIN QUAN: CPT

## 2024-12-22 PROCEDURE — 80053 COMPREHEN METABOLIC PANEL: CPT | Performed by: INTERNAL MEDICINE

## 2024-12-22 PROCEDURE — 83605 ASSAY OF LACTIC ACID: CPT | Performed by: INTERNAL MEDICINE

## 2024-12-22 PROCEDURE — 25010000002 HEPARIN (PORCINE) PER 1000 UNITS: Performed by: INTERNAL MEDICINE

## 2024-12-22 PROCEDURE — 25010000002 PROCHLORPERAZINE 10 MG/2ML SOLUTION: Performed by: FAMILY MEDICINE

## 2024-12-22 PROCEDURE — 84481 FREE ASSAY (FT-3): CPT | Performed by: INTERNAL MEDICINE

## 2024-12-22 RX ORDER — FUROSEMIDE 10 MG/ML
40 INJECTION INTRAMUSCULAR; INTRAVENOUS ONCE
Status: COMPLETED | OUTPATIENT
Start: 2024-12-22 | End: 2024-12-22

## 2024-12-22 RX ORDER — SODIUM BICARBONATE 650 MG/1
650 TABLET ORAL 3 TIMES DAILY
Status: DISCONTINUED | OUTPATIENT
Start: 2024-12-22 | End: 2024-12-22

## 2024-12-22 RX ORDER — PROCHLORPERAZINE EDISYLATE 5 MG/ML
10 INJECTION INTRAMUSCULAR; INTRAVENOUS EVERY 6 HOURS PRN
Status: DISPENSED | OUTPATIENT
Start: 2024-12-22 | End: 2024-12-22

## 2024-12-22 RX ORDER — ALLOPURINOL 100 MG/1
100 TABLET ORAL DAILY
Status: DISCONTINUED | OUTPATIENT
Start: 2024-12-22 | End: 2024-12-30 | Stop reason: HOSPADM

## 2024-12-22 RX ORDER — AMOXICILLIN 250 MG
2 CAPSULE ORAL ONCE
Status: DISCONTINUED | OUTPATIENT
Start: 2024-12-22 | End: 2024-12-22

## 2024-12-22 RX ADMIN — ALLOPURINOL 100 MG: 100 TABLET ORAL at 15:34

## 2024-12-22 RX ADMIN — HEPARIN SODIUM 5000 UNITS: 5000 INJECTION, SOLUTION INTRAVENOUS; SUBCUTANEOUS at 21:01

## 2024-12-22 RX ADMIN — FUROSEMIDE 40 MG: 10 INJECTION, SOLUTION INTRAVENOUS at 18:14

## 2024-12-22 RX ADMIN — SODIUM BICARBONATE: 84 INJECTION INTRAVENOUS at 15:34

## 2024-12-22 RX ADMIN — BACLOFEN 10 MG: 10 TABLET ORAL at 12:24

## 2024-12-22 RX ADMIN — HEPARIN SODIUM 5000 UNITS: 5000 INJECTION, SOLUTION INTRAVENOUS; SUBCUTANEOUS at 09:00

## 2024-12-22 RX ADMIN — SODIUM HYPOCHLORITE: 1.25 SOLUTION TOPICAL at 06:12

## 2024-12-22 RX ADMIN — Medication 1000 UNITS: at 09:00

## 2024-12-22 RX ADMIN — ONDANSETRON 4 MG: 2 INJECTION INTRAMUSCULAR; INTRAVENOUS at 00:18

## 2024-12-22 RX ADMIN — BACLOFEN 10 MG: 10 TABLET ORAL at 21:01

## 2024-12-22 RX ADMIN — Medication 10 ML: at 09:00

## 2024-12-22 RX ADMIN — BACLOFEN 10 MG: 10 TABLET ORAL at 18:14

## 2024-12-22 RX ADMIN — SODIUM HYPOCHLORITE: 1.25 SOLUTION TOPICAL at 12:24

## 2024-12-22 RX ADMIN — ONDANSETRON 4 MG: 2 INJECTION INTRAMUSCULAR; INTRAVENOUS at 15:34

## 2024-12-22 RX ADMIN — SODIUM CHLORIDE 75 ML/HR: 9 INJECTION, SOLUTION INTRAVENOUS at 01:45

## 2024-12-22 RX ADMIN — PROCHLORPERAZINE EDISYLATE 10 MG: 5 INJECTION INTRAMUSCULAR; INTRAVENOUS at 02:42

## 2024-12-22 RX ADMIN — Medication 10 ML: at 21:01

## 2024-12-22 NOTE — PLAN OF CARE
Goal Outcome Evaluation:  Plan of Care Reviewed With: patient        Progress: no change  Outcome Evaluation: Pt A&O x4. VSS. No c/o pain. C/o nausea. See MAR. Refusing all turns. Wound care per orders. Safety maintained.

## 2024-12-22 NOTE — PLAN OF CARE
Goal Outcome Evaluation:  Plan of Care Reviewed With: patient           Outcome Evaluation: A&Ox4. Room air. C/o nausea and vomiting; see MAR. Patient stated she got hot and had a possible syncopal episode, Dr. Gomez notified, no new orders. Patient placed on telemetry, NSR. Vitals have remained stable. Refusing all turns. Wound care as ordered. Refusing CHG bath- only wanting to be wiped around her port. Renal diet; not tolerating. Denies pain. Heparin SQ for VTE prevention. Call light within reach, safety maintained.

## 2024-12-22 NOTE — PROGRESS NOTES
1         [facility] Medicine Services  INPATIENT PROGRESS NOTE    Patient Name: Jessica Alvarenga  Date of Admission: 12/19/2024  Today's Date: 12/22/24  Length of Stay: 1  Primary Care Physician: Ponce Orozco MD    Subjective   Chief Complaint: Follow-up  HPI   Patient is a 64-year-old woman who came in yesterday for abnormal laboratory presenting complaint.  She was transferred out from another facility.  Record indicates she has an ostomy and has increased output from it described as diarrhea  She was admitted with diagnosis of acute kidney injury  Intractable nausea  Hyperkalemia  She has chronic wounds in lower extremities.  She is paraplegic.    She has normocytic anemia with hemoglobin of 7.4.  Her potassium was 5.4 while creatinine was 3.26 BUN of 59.  She has acidosis although none anion gap.  She received Kayexalate from outside facility.  Imaging study here in our hospital    She had multiple wound cultures taken including a blood culture labeled as right and left legs as well as wound from the buttocks  Dose as shown below.  Wound care will be consulted.  Will check further recommendation as well.  He may need some form of debridement.                  She told me she normally follows with Thalia Beard/Dr. Recoi at pulmonary.  She also told me that she was sent to her oncology clinic office and had blood work.  She was directed to the emergency room because of abnormality in her renal function.  Because they do not have nephrologist at their hospital, she was transferred to our hospital for further care.    I have verified with patient the following.  She does not have diarrhea.  She has a urostomy and a colostomy.  She told me that he does not nor she does not normally put a colostomy bag because her stools are hard and it cakes.  She only covers that with 4 x 4.    She has not noticed any decrease or increased output from her urostomy.    She has been sick for the  "past 6 to 7 days dry heaving.  She has not vomited.  She has no headache, fever or chills.  She has dry nonproductive cough that triggers her dry heaving.  Denies any ill exposures    She told me her right kidney atrophied and that has a single kidney functioning.      December 21  \"Can I go home?\"  Renal function improving  Received 1 unit of packed RBC transfusion yesterday.  Posttransfusion hemoglobin as expected (7.3)  Wound care did some wound cultures.  Hyperkalemia resolved    Tolerating diet      December 22.  Her tone today is different.  She told me that she does not feel good.  She feels nauseated, very tired tired.  \"It is hard to explain.\"    Informed her of the culture results.  She told me that past Monday she just had her wounds debrided.  I revisited wound care nurse recommendation and appears to be simply local treatment  She is afebrile  Maintaining adequate oxygenation.  Her white count is normal and she has no left shift.  She is anemic but adequately transfused at 7.3 from 6.4.  She has elevated ferritin which could be an acute phase reaction from multiple when she has  Her uric acid is 11  Her blood cultures are negative.  Noted that her CO2 is 15.  Review of Systems   All pertinent negatives and positives are as above. All other systems have been reviewed and are negative unless otherwise stated.     Objective    Temp:  [97 °F (36.1 °C)-98.5 °F (36.9 °C)] 97.9 °F (36.6 °C)  Heart Rate:  [66-83] 66  Resp:  [16-18] 16  BP: (131-143)/(43-53) 137/46  Physical Exam  GEN: Awake, alert, interactive, in NAD  HEENT: Atraumatic, PERRLA, EOMI, Anicteric, Trachea midline  Lungs: CTAB, no wheezing/rales/rhonchi  Heart: RRR, +S1/s2, no rub  ABD: soft, nt/nd, +BS, no guarding/rebound  Extremities: Left below-knee amputation; multiple areas of wounds as photographed above  Currently covered with clean dressings  I do not know how to describe the skin on her left leg but it looks abnormal to me.  The best way " "I could describe it is something like café au lait from the limited view exposed.  Neuro: AAOx3, no focal deficits  Psych: normal mood & affect    Results Review:  I have reviewed the labs, radiology results, and diagnostic studies.    Laboratory Data:   Results from last 7 days   Lab Units 12/22/24  0307 12/20/24  1939 12/20/24  0827 12/19/24  2221   WBC 10*3/mm3 6.59  --  8.05 11.13*   HEMOGLOBIN g/dL 7.3* 7.3* 6.4* 7.4*   HEMATOCRIT % 25.5* 24.4* 22.0* 25.0*   PLATELETS 10*3/mm3 262  --  307 365        Results from last 7 days   Lab Units 12/22/24  0238 12/20/24  0827 12/19/24  2221   SODIUM mmol/L 140 140 139   POTASSIUM mmol/L 3.9 4.8 5.4*   CHLORIDE mmol/L 113* 110* 108*   CO2 mmol/L 15.0* 18.0* 17.0*   BUN mg/dL 50* 58* 59*   CREATININE mg/dL 2.35* 2.76* 3.26*   CALCIUM mg/dL 7.6* 7.7* 8.5*   BILIRUBIN mg/dL <0.2 0.3  --    ALK PHOS U/L 140* 169*  --    ALT (SGPT) U/L 6 7  --    AST (SGOT) U/L <5 5  --    GLUCOSE mg/dL 126* 112* 128*       Culture Data:   No results found for: \"BLOODCX\", \"URINECX\", \"WOUNDCX\", \"MRSACX\", \"RESPCX\", \"STOOLCX\"    Radiology Data:   Imaging Results (Last 24 Hours)       ** No results found for the last 24 hours. **            I have reviewed the patient's current medications.     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 -?  Colonization; recent debridement past Monday according to the patient  Pressure ulcer of left buttock, stage 4 (HCC)   Pressure ulcer of right buttock, " stage 3 (HCC)   Pressure ulcer of left heel, stage 4 (HCC)   Pressure ulcer of right heel, stage 3 (HCC)   Pressure injury of other site, stage 3 (HCC)   Pressure ulcer of right hip, stage 2 (HCC)   Ostomy status-urostomy and colostomy  Normocytic anemia-mentioned to be an element of anemia of chronic disease  History of stage IV metastatic right breast cancer as documented from December 19, 2024  Reported PET/CT from February 6, 2024 indicative of positive treatment response; August 2024 PET CT scan described to be stable metastatic disease with low disease burden.  Plan for continued anastrozole as per outpatient oncologist.  Fatigue, general feeling of not being well.          Treatment Plan  Nephrology (input reviewed) and wound care been consulted   Continue IV fluid  Follow-up renal recovery  Check substrates of anemia (requested from samples prior to transfusion)  Recheck labs today-these are pending          December 22  Continue on bicarbonate  Consider checking venous blood gas to check this acidosis particularly now that creatinine is improving.  Encouraged to increase oral intake  Consider cutting back on IV fluid however patient is not feeling well and nauseated.  I noticed that she did not finish her meal.  Not sure if this was her lunch already but it was left behind.  Add allopurinol  Not sure what to make of this MRSA from culture or whether she will need further debridement.  Will defer to wound care.  Plans per nephrology.  Supportive care, as needed antiemetic.  Thyroid profile.  Record from the past noted to have elevated TSH (July 2020).  I did not see that she is on any thyroid hormone    Medications reviewed  baclofen, 10 mg, Oral, Q8H  cholecalciferol, 1,000 Units, Oral, Daily  heparin (porcine), 5,000 Units, Subcutaneous, Q12H  lidocaine, 1 Application, Topical, Once  pantoprazole, 40 mg, Oral, Q AM  sodium bicarbonate, 650 mg, Oral, TID  sodium chloride, 10 mL, Intravenous, Q12H  sodium  hypochlorite, , Topical, Q12H        Conditions and Status  Fair     [facility] Data  External documents reviewed: Records from [facility] dated October 28, 2024:  Paraplegic patient with chronic pressure ulcers of both feet and both buttocks. The left foot and buttock wounds both have osteomyelitis in the underlying bones.   Daily dressing changes to buttock wounds with ralph, aquacel ag and ABD pads and alternate with dakins soaked kerlix and abd pads   Daily dressing change to foot wounds with ralph, aquacel ag, and abd pad, wrap with Kerlix and Ace wrap from toes to knee   Stressed the importance of changing positions every 2 hours and offloading the areas   Left foot wound unlikely to heal, discussed probable amputation, patient will consider   Return for follow-up in 4 weeks   Cardiac tracing (EKG, telemetry) interpretation: Telemetry showed normal sinus rhythm with rate of 98  Radiology interpretation: None available  Labs reviewed: Yes  Any tests that were considered but not ordered: Consider renal ultrasound     Decision rules/scores evaluated (example [id number], Wells, etc): -     Discussed with: Patient and nursing staff     Care Planning  Shared decision making: Patient with consultant/s  Code status and discussions: Full code    Disposition  Social Determinants of Health that impact treatment or disposition: None identified at this time  I expect the patient to be discharged to (?).   Renal function slowly improving.  Baseline creatinine probably about 1.8 (described as her creatinine from 3.65 per oncology service)  Other issue: Wound care nurse did want to culture.  Not sure what to do with this information at present time.  Patient has not had any recent antibiotics.  Neither is she on any antibiotics right now.  Her symptom is improved (dry heaving).  She is afebrile and has no leukocytosis.  No gross systemic inflammatory response to suggest an infectious process.  She told me that her wound  "care is following on her in outpatient setting and looking for some form of this \"venous studies\" in the future.  Will likely defer further management to wound care in the outpatient setting.  If creatinine further improves by tomorrow and no other signs of systemic inflammatory response, I anticipate she may possibly be discharged tomorrow    Electronically signed by Darshan Nascimento MD, 12/22/24, 11:58 CST.    Sample came out as arterial and not vbg -    Reviewed with RT.   Lactic acid ordered. Patient already on bicarbonate.   Will change iv fluid to hco containing ivf  for systemic acidosis - risk of PEA      Electronically signed by Darshan Nascimento MD, 12/22/24, 1:05 PM CST.             "

## 2024-12-22 NOTE — PROGRESS NOTES
Nephrology ([facility]) Progress Note      Patient:  Jessica Alvarenga  YOB: 1960  Date of Service: 12/22/2024  MRN: [id number]   Acct: [id number]   Primary Care Physician: Ponce Orozco MD  Advance Directive:   Code Status and Medical Interventions: CPR (Attempt to Resuscitate); Full Support   Ordered at: 12/19/24 2758     Level Of Support Discussed With:    Patient     Code Status (Patient has no pulse and is not breathing):    CPR (Attempt to Resuscitate)     Medical Interventions (Patient has pulse or is breathing):    Full Support     Admit Date: 12/19/2024       Hospital Day: 1  Referring Provider: No Known Provider      Patient personally seen and examined.  Complete chart including Consults, Notes, Operative Reports, Labs, Cardiology, and Radiology studies reviewed as able.        Subjective:  Jessica Alvarenga is a 64 y.o. female for whom we were consulted for evaluation and treatment of acute kidney injury. Baseline chronic kidney disease stage 3b, baseline creatinine approximately 1.5. Follows in our [place] office. History of paraplegia, pressure ulcers, hypertension, suprapubic catheter, colostomy. Presented to outlying facility and was found to have abnormal labs including creatinine 3.3 and potassium 5.6. Transferred to [facility] for nephrology evaluation. She has received IV fluids as well as medical management of hyperkalemia. Currently she is awake and alert. Complaint of fatigue but feeling a little better compared to yesterday. She reports several days of poor PO intake due to nausea recently as well as diarrhea from her ostomy. Denies changes in urine output. Denies NSAID use. Several wounds are present on lower extremities and wound care has evaluated today. Hemoglobin 6.4 on AM labs and she is ordered to get a unit or PRBC.      Today, patient i has experienced some nausea earlier. Her oral intake has improved.  Her legs are edematous.      Allergies:  Morphine and Oxycodone    Home Meds:  Medications Prior to Admission   Medication Sig Dispense Refill Last Dose/Taking    anastrozole (ARIMIDEX) 1 MG tablet Take 1 tablet by mouth Daily.   Taking    baclofen (LIORESAL) 10 MG tablet Take 1 tablet by mouth 3 (Three) Times a Day.   Taking    cholecalciferol (VITAMIN D3) 25 MCG (1000 UT) tablet Take 1 tablet by mouth Daily.   Taking    ferrous sulfate 325 (65 FE) MG tablet Take 1 tablet by mouth Daily With Breakfast.   Taking    losartan (COZAAR) 100 MG tablet Take 0.5 tablets by mouth Daily.   Taking    omeprazole (priLOSEC) 40 MG capsule Take 1 capsule by mouth Daily.   Taking    acetaminophen (TYLENOL) 325 MG tablet Take 2 tablets by mouth Every 4 (Four) Hours As Needed for Mild Pain .       benzonatate (TESSALON) 200 MG capsule Take 1 capsule by mouth 3 (Three) Times a Day As Needed for Cough.       diphenoxylate-atropine (LOMOTIL) 2.5-0.025 MG per tablet Take 1 tablet by mouth 4 (Four) Times a Day As Needed for Diarrhea.       HYDROcodone-acetaminophen (NORCO)  MG per tablet Take 1 tablet by mouth Every 4 (Four) Hours As Needed for Severe Pain  for up to 6 doses. (Patient taking differently: Take 1 tablet by mouth 4 (Four) Times a Day As Needed for Severe Pain.) 6 tablet 0        Medicines:  Current Facility-Administered Medications   Medication Dose Route Frequency Provider Last Rate Last Admin    acetaminophen (TYLENOL) tablet 650 mg  650 mg Oral Q4H PRN Thony Otto MD        allopurinol (ZYLOPRIM) tablet 100 mg  100 mg Oral Daily Darshan Nascimento MD        ALPRAZolam (XANAX) tablet 0.25 mg  0.25 mg Oral Nightly PRN Thony Otto MD        baclofen (LIORESAL) tablet 10 mg  10 mg Oral Q8H Thony Otto MD   10 mg at 12/22/24 1224    benzonatate (TESSALON) capsule 200 mg  200 mg Oral TID PRN Thony Otto MD   200 mg at 12/20/24 0549    cholecalciferol (VITAMIN D3) tablet 1,000 Units  1,000 Units Oral Daily Clarita  MD Thony   1,000 Units at 24 0900    heparin (porcine) 5000 UNIT/ML injection 5,000 Units  5,000 Units Subcutaneous Q12H Thony Otto MD   5,000 Units at 24 0900    HYDROcodone-acetaminophen (NORCO)  MG per tablet 1 tablet  1 tablet Oral Q4H PRN Thony Otto MD        lidocaine (LMX) 4 % cream 1 Application  1 Application Topical Once Merry Lees APRN        nitroglycerin (NITROSTAT) SL tablet 0.4 mg  0.4 mg Sublingual Q5 Min PRN Thony Otto MD        ondansetron (ZOFRAN) injection 4 mg  4 mg Intravenous Q6H PRN Thony Otto MD   4 mg at 24 0018    ondansetron ODT (ZOFRAN-ODT) disintegrating tablet 4 mg  4 mg Translingual Q8H PRN Thony Otto MD        pantoprazole (PROTONIX) EC tablet 40 mg  40 mg Oral Q AM Thony Otto MD   40 mg at 24 0449    sodium chloride 0.45 % 925 mL with sodium bicarbonate 8.4 % 75 mEq infusion   Intravenous Continuous Darshan Nascimento MD        sodium chloride 0.9 % flush 10 mL  10 mL Intravenous Q12H Thony Otto MD   10 mL at 24 0900    sodium chloride 0.9 % flush 10 mL  10 mL Intravenous PRN Thony Otto MD        sodium chloride 0.9 % infusion 40 mL  40 mL Intravenous PRN Thony Otto MD   40 mL at 24 0249    sodium hypochlorite (DAKIN'S 1/4 STRENGTH) 0.125 % topical solution 0.125% solution   Topical Q12H Merry Lees APRN   Given at 24 1224       Past Medical History:  Past Medical History:   Diagnosis Date    Decubitus ulcer of both feet     Intradural extramedullary spinal tumor     Osteomyelitis     Paraplegia     Sacral decubitus ulcer        Past Surgical History:  Past Surgical History:   Procedure Laterality Date     SECTION      COLOSTOMY      CYSTOSTOMY      DEBRIDEMENT OF ISCHIAL ULCER/BUTTOCKS WOUND      IVC FILTER RETRIEVAL      MUSCLE FLAP      SPINE SURGERY      TRUNK DEBRIDEMENT Bilateral 2020    Procedure: DEBRIDEMENT DECUBITUS ULCER  bilateral feet;  Surgeon: Truong Neal MD;  [facility];  Service: General;  Laterality: Bilateral;       Family History  Family History   Problem Relation Age of Onset    Lung cancer Father        Social History  Social History     Socioeconomic History    Marital status: Unknown   Tobacco Use    Smoking status: Never    Smokeless tobacco: Never   Vaping Use    Vaping status: Never Used   Substance and Sexual Activity    Alcohol use: Not Currently    Drug use: Never    Sexual activity: Defer         Review of Systems:  History obtained from chart review and the patient  General ROS: No fever or chills  Respiratory ROS: No cough, shortness of breath, wheezing  Cardiovascular ROS: No chest pain or palpitations  Gastrointestinal ROS: No abdominal pain or melena  Genito-Urinary ROS: No dysuria or hematuria  Psych ROS: No anxiety and depression  14 point ROS reviewed with the patient and negative except as noted above and in the HPI unless unable to obtain.      Objective:  Patient Vitals for the past 24 hrs:   BP Temp Temp src Pulse Resp SpO2   12/22/24 1146 137/46 97.9 °F (36.6 °C) Oral 66 16 98 %   12/22/24 0826 138/53 97.6 °F (36.4 °C) Oral 66 16 99 %   12/22/24 0330 143/47 98.4 °F (36.9 °C) Oral 77 18 96 %   12/22/24 0148 131/43 97 °F (36.1 °C) Oral 77 18 98 %   12/21/24 2300 141/46 98.5 °F (36.9 °C) Oral 79 18 96 %   12/21/24 1930 142/53 97.6 °F (36.4 °C) Oral 70 18 98 %   12/21/24 1528 142/49 98.1 °F (36.7 °C) Oral 83 18 100 %       Intake/Output Summary (Last 24 hours) at 12/22/2024 1527  Last data filed at 12/22/2024 1146  Gross per 24 hour   Intake 240 ml   Output 1275 ml   Net -1035 ml     General: awake/alert   Chest:  clear to auscultation bilaterally without respiratory distress  CVS: regular rate and rhythm  Abdominal: soft, nontender, positive bowel sounds  Extremities: no cyanosis or edema  Skin: warm and dry without rash      Labs:  Results from last 7 days   Lab Units 12/22/24  6705  "12/20/24  1939 12/20/24  0827 12/19/24  2221   WBC 10*3/mm3 6.59  --  8.05 11.13*   HEMOGLOBIN g/dL 7.3* 7.3* 6.4* 7.4*   HEMATOCRIT % 25.5* 24.4* 22.0* 25.0*   PLATELETS 10*3/mm3 262  --  307 365         Results from last 7 days   Lab Units 12/22/24  1246 12/22/24  0238 12/20/24  0827 12/19/24  2221   SODIUM mmol/L  --  140 140 139   SODIUM, ARTERIAL mmol/L 142  --   --   --    POTASSIUM mmol/L  --  3.9 4.8 5.4*   CHLORIDE mmol/L  --  113* 110* 108*   CO2 mmol/L  --  15.0* 18.0* 17.0*   BUN mg/dL  --  50* 58* 59*   CREATININE mg/dL  --  2.35* 2.76* 3.26*   CALCIUM mg/dL  --  7.6* 7.7* 8.5*   EGFR mL/min/1.73  --  22.9* 18.9* 15.5*   BILIRUBIN mg/dL  --  <0.2 0.3  --    ALK PHOS U/L  --  140* 169*  --    ALT (SGPT) U/L  --  6 7  --    AST (SGOT) U/L  --  <5 5  --    GLUCOSE mg/dL  --  126* 112* 128*       Radiology:   Imaging Results (Last 72 Hours)       ** No results found for the last 72 hours. **            Culture:  No results found for: \"BLOODCX\", \"URINECX\", \"WOUNDCX\", \"MRSACX\", \"RESPCX\", \"STOOLCX\"      Assessment    Acute kidney injury, ATN  Baseline chronic kidney disease stage 3b  Hyperkalemia--improved  Metabolic acidosis  Hypertension   History of colostomy and urostomy  Multiple pressure ulcers  Paraplegia   Hyperuricemia    Plan:   Continue IV fluids for gentle hydration with bicarbonate based fluids  Will offer some diuretics  Continue to follow-up labs  Agree with allopurinol.        Jonathan Recio MD  12/22/2024  15:27 CST  "

## 2024-12-23 ENCOUNTER — APPOINTMENT (OUTPATIENT)
Dept: CT IMAGING | Facility: HOSPITAL | Age: 64
DRG: 682 | End: 2024-12-23
Payer: MEDICARE

## 2024-12-23 PROBLEM — G82.20 PARAPLEGIA: Status: ACTIVE | Noted: 2024-12-23

## 2024-12-23 PROBLEM — N18.32 CKD STAGE 3B, GFR 30-44 ML/MIN: Status: ACTIVE | Noted: 2024-12-23

## 2024-12-23 PROBLEM — Z85.3 HISTORY OF BREAST CANCER: Status: ACTIVE | Noted: 2024-12-23

## 2024-12-23 PROBLEM — Z93.6 PRESENCE OF UROSTOMY: Status: ACTIVE | Noted: 2024-12-23

## 2024-12-23 PROBLEM — Z93.3 COLOSTOMY IN PLACE: Status: ACTIVE | Noted: 2024-12-23

## 2024-12-23 PROBLEM — L89.90 PRESSURE ULCERS OF SKIN OF MULTIPLE TOPOGRAPHIC SITES: Status: ACTIVE | Noted: 2024-12-23

## 2024-12-23 PROBLEM — E87.20 METABOLIC ACIDOSIS: Status: ACTIVE | Noted: 2024-12-23

## 2024-12-23 PROBLEM — D64.9 ANEMIA: Status: ACTIVE | Noted: 2024-12-23

## 2024-12-23 PROBLEM — Z93.59 SUPRAPUBIC CATHETER: Status: ACTIVE | Noted: 2024-12-23

## 2024-12-23 LAB
ANION GAP SERPL CALCULATED.3IONS-SCNC: 12 MMOL/L (ref 5–15)
BACTERIA SPEC AEROBE CULT: ABNORMAL
BUN SERPL-MCNC: 38 MG/DL (ref 8–23)
BUN/CREAT SERPL: 21 (ref 7–25)
CALCIUM SPEC-SCNC: 7.9 MG/DL (ref 8.6–10.5)
CHLORIDE SERPL-SCNC: 111 MMOL/L (ref 98–107)
CO2 SERPL-SCNC: 18 MMOL/L (ref 22–29)
CREAT SERPL-MCNC: 1.81 MG/DL (ref 0.57–1)
EGFRCR SERPLBLD CKD-EPI 2021: 31.3 ML/MIN/1.73
GLUCOSE SERPL-MCNC: 139 MG/DL (ref 65–99)
GRAM STN SPEC: ABNORMAL
IRON 24H UR-MRATE: 30 MCG/DL (ref 37–145)
IRON SATN MFR SERPL: 23 % (ref 20–50)
POTASSIUM SERPL-SCNC: 3.6 MMOL/L (ref 3.5–5.2)
PTH-INTACT SERPL-MCNC: 72.2 PG/ML (ref 15–65)
SODIUM SERPL-SCNC: 141 MMOL/L (ref 136–145)
T3FREE SERPL-MCNC: 2.11 PG/ML (ref 2–4.4)
TIBC SERPL-MCNC: 133 MCG/DL (ref 298–536)
TRANSFERRIN SERPL-MCNC: 89 MG/DL (ref 200–360)
URATE SERPL-MCNC: 10.2 MG/DL (ref 2.4–5.7)

## 2024-12-23 PROCEDURE — 25010000002 HEPARIN (PORCINE) PER 1000 UNITS: Performed by: INTERNAL MEDICINE

## 2024-12-23 PROCEDURE — 25010000002 POTASSIUM CHLORIDE 10 MEQ/100ML SOLUTION: Performed by: INTERNAL MEDICINE

## 2024-12-23 PROCEDURE — 74176 CT ABD & PELVIS W/O CONTRAST: CPT

## 2024-12-23 PROCEDURE — 80048 BASIC METABOLIC PNL TOTAL CA: CPT | Performed by: NURSE PRACTITIONER

## 2024-12-23 PROCEDURE — 25010000002 ONDANSETRON PER 1 MG: Performed by: INTERNAL MEDICINE

## 2024-12-23 PROCEDURE — 83970 ASSAY OF PARATHORMONE: CPT | Performed by: INTERNAL MEDICINE

## 2024-12-23 PROCEDURE — 83540 ASSAY OF IRON: CPT | Performed by: INTERNAL MEDICINE

## 2024-12-23 PROCEDURE — 84550 ASSAY OF BLOOD/URIC ACID: CPT | Performed by: INTERNAL MEDICINE

## 2024-12-23 PROCEDURE — 84466 ASSAY OF TRANSFERRIN: CPT | Performed by: INTERNAL MEDICINE

## 2024-12-23 RX ORDER — SODIUM BICARBONATE 650 MG/1
650 TABLET ORAL 3 TIMES DAILY
Status: DISCONTINUED | OUTPATIENT
Start: 2024-12-23 | End: 2024-12-30 | Stop reason: HOSPADM

## 2024-12-23 RX ORDER — DOXYCYCLINE 100 MG/1
100 TABLET ORAL EVERY 12 HOURS SCHEDULED
Status: DISCONTINUED | OUTPATIENT
Start: 2024-12-23 | End: 2024-12-27

## 2024-12-23 RX ORDER — POTASSIUM CHLORIDE 7.45 MG/ML
10 INJECTION INTRAVENOUS
Status: COMPLETED | OUTPATIENT
Start: 2024-12-23 | End: 2024-12-23

## 2024-12-23 RX ADMIN — SODIUM HYPOCHLORITE: 1.25 SOLUTION TOPICAL at 05:25

## 2024-12-23 RX ADMIN — HYDROCODONE BITARTRATE AND ACETAMINOPHEN 0.5 TABLET: 10; 325 TABLET ORAL at 20:38

## 2024-12-23 RX ADMIN — POTASSIUM CHLORIDE 10 MEQ: 7.46 INJECTION, SOLUTION INTRAVENOUS at 15:57

## 2024-12-23 RX ADMIN — SODIUM HYPOCHLORITE: 1.25 SOLUTION TOPICAL at 23:29

## 2024-12-23 RX ADMIN — Medication 1000 UNITS: at 09:41

## 2024-12-23 RX ADMIN — ALLOPURINOL 100 MG: 100 TABLET ORAL at 09:40

## 2024-12-23 RX ADMIN — BACLOFEN 10 MG: 10 TABLET ORAL at 22:33

## 2024-12-23 RX ADMIN — SODIUM BICARBONATE 650 MG: 650 TABLET ORAL at 15:57

## 2024-12-23 RX ADMIN — HYDROCODONE BITARTRATE AND ACETAMINOPHEN 0.5 TABLET: 10; 325 TABLET ORAL at 10:41

## 2024-12-23 RX ADMIN — ALPRAZOLAM 0.25 MG: 0.25 TABLET ORAL at 23:29

## 2024-12-23 RX ADMIN — HEPARIN SODIUM 5000 UNITS: 5000 INJECTION, SOLUTION INTRAVENOUS; SUBCUTANEOUS at 22:32

## 2024-12-23 RX ADMIN — HEPARIN SODIUM 5000 UNITS: 5000 INJECTION, SOLUTION INTRAVENOUS; SUBCUTANEOUS at 09:40

## 2024-12-23 RX ADMIN — DOXYCYCLINE 100 MG: 100 TABLET, FILM COATED ORAL at 15:57

## 2024-12-23 RX ADMIN — POTASSIUM CHLORIDE 10 MEQ: 7.46 INJECTION, SOLUTION INTRAVENOUS at 14:32

## 2024-12-23 RX ADMIN — BACLOFEN 10 MG: 10 TABLET ORAL at 12:48

## 2024-12-23 RX ADMIN — Medication 10 ML: at 22:34

## 2024-12-23 RX ADMIN — BACLOFEN 10 MG: 10 TABLET ORAL at 17:12

## 2024-12-23 RX ADMIN — ONDANSETRON 4 MG: 2 INJECTION INTRAMUSCULAR; INTRAVENOUS at 20:39

## 2024-12-23 RX ADMIN — SODIUM BICARBONATE: 84 INJECTION INTRAVENOUS at 02:35

## 2024-12-23 RX ADMIN — ONDANSETRON 4 MG: 2 INJECTION INTRAMUSCULAR; INTRAVENOUS at 14:23

## 2024-12-23 RX ADMIN — PANTOPRAZOLE SODIUM 40 MG: 40 TABLET, DELAYED RELEASE ORAL at 07:42

## 2024-12-23 RX ADMIN — SODIUM BICARBONATE 650 MG: 650 TABLET ORAL at 12:48

## 2024-12-23 RX ADMIN — ONDANSETRON 4 MG: 2 INJECTION INTRAMUSCULAR; INTRAVENOUS at 09:40

## 2024-12-23 RX ADMIN — SODIUM BICARBONATE 650 MG: 650 TABLET ORAL at 22:32

## 2024-12-23 NOTE — PROGRESS NOTES
Nephrology ([facility]) Progress Note      Patient:  Jessica Alvarenga  YOB: 1960  Date of Service: 12/23/2024  MRN: [id number]   Acct: [id number]   Primary Care Physician: Ponce Orozco MD  Advance Directive:   Code Status and Medical Interventions: CPR (Attempt to Resuscitate); Full Support   Ordered at: 12/19/24 6558     Level Of Support Discussed With:    Patient     Code Status (Patient has no pulse and is not breathing):    CPR (Attempt to Resuscitate)     Medical Interventions (Patient has pulse or is breathing):    Full Support     Admit Date: 12/19/2024       Hospital Day: 2  Referring Provider: No Known Provider      Patient personally seen and examined.  Complete chart including Consults, Notes, Operative Reports, Labs, Cardiology, and Radiology studies reviewed as able.        Subjective:  Jessica Alvarenga is a 64 y.o. female for whom we were consulted for evaluation and treatment of acute kidney injury. Baseline chronic kidney disease stage 3b, baseline creatinine approximately 1.5. Follows in our [place] office. History of paraplegia, pressure ulcers, hypertension, suprapubic catheter, colostomy. Presented to outlying facility and was found to have abnormal labs including creatinine 3.3 and potassium 5.6. Transferred to [facility] for nephrology evaluation. She has received IV fluids as well as medical management of hyperkalemia. She reports several days of poor PO intake due to nausea recently as well as diarrhea from her ostomy. Denied changes in urine output. Denied NSAID use. Several wounds present on lower extremities and wound care has evaluated. Hemoglobin 6.4 on 12/20 and she is was given a unit of PRBC     Today is awake and alert. Intermittent nausea. Treatment with IV diuretics and IV fluids continues    Allergies:  Morphine and Oxycodone    Home Meds:  Medications Prior to Admission   Medication Sig Dispense Refill Last Dose/Taking     anastrozole (ARIMIDEX) 1 MG tablet Take 1 tablet by mouth Daily.   Taking    baclofen (LIORESAL) 10 MG tablet Take 1 tablet by mouth 3 (Three) Times a Day.   Taking    cholecalciferol (VITAMIN D3) 25 MCG (1000 UT) tablet Take 1 tablet by mouth Daily.   Taking    ferrous sulfate 325 (65 FE) MG tablet Take 1 tablet by mouth Daily With Breakfast.   Taking    losartan (COZAAR) 100 MG tablet Take 0.5 tablets by mouth Daily.   Taking    omeprazole (priLOSEC) 40 MG capsule Take 1 capsule by mouth Daily.   Taking    acetaminophen (TYLENOL) 325 MG tablet Take 2 tablets by mouth Every 4 (Four) Hours As Needed for Mild Pain .       benzonatate (TESSALON) 200 MG capsule Take 1 capsule by mouth 3 (Three) Times a Day As Needed for Cough.       diphenoxylate-atropine (LOMOTIL) 2.5-0.025 MG per tablet Take 1 tablet by mouth 4 (Four) Times a Day As Needed for Diarrhea.       HYDROcodone-acetaminophen (NORCO)  MG per tablet Take 1 tablet by mouth Every 4 (Four) Hours As Needed for Severe Pain  for up to 6 doses. (Patient taking differently: Take 1 tablet by mouth 4 (Four) Times a Day As Needed for Severe Pain.) 6 tablet 0        Medicines:  Current Facility-Administered Medications   Medication Dose Route Frequency Provider Last Rate Last Admin    acetaminophen (TYLENOL) tablet 650 mg  650 mg Oral Q4H PRN Thony Otto MD        allopurinol (ZYLOPRIM) tablet 100 mg  100 mg Oral Daily Darshan Nascimento MD   100 mg at 12/23/24 0940    ALPRAZolam (XANAX) tablet 0.25 mg  0.25 mg Oral Nightly PRN Thony Otto MD        baclofen (LIORESAL) tablet 10 mg  10 mg Oral Q8H Thony Otto MD   10 mg at 12/22/24 2101    benzonatate (TESSALON) capsule 200 mg  200 mg Oral TID PRN Thony Otto MD   200 mg at 12/20/24 0549    cholecalciferol (VITAMIN D3) tablet 1,000 Units  1,000 Units Oral Daily Thony Otto MD   1,000 Units at 12/23/24 0941    heparin (porcine) 5000 UNIT/ML injection 5,000 Units  5,000 Units  Subcutaneous Q12H Thony Otto MD   5,000 Units at 24 0940    HYDROcodone-acetaminophen (NORCO)  MG per tablet 1 tablet  1 tablet Oral Q4H PRN Thony Otto MD   0.5 tablet at 24 1041    lidocaine (LMX) 4 % cream 1 Application  1 Application Topical Once Merry Lees APRN        nitroglycerin (NITROSTAT) SL tablet 0.4 mg  0.4 mg Sublingual Q5 Min PRN Thony Otto MD        ondansetron (ZOFRAN) injection 4 mg  4 mg Intravenous Q6H PRN Thony Otto MD   4 mg at 24 0940    ondansetron ODT (ZOFRAN-ODT) disintegrating tablet 4 mg  4 mg Translingual Q8H PRN Thony Otto MD        pantoprazole (PROTONIX) EC tablet 40 mg  40 mg Oral Q AM Thony Otto MD   40 mg at 24 0742    sodium chloride 0.45 % 925 mL with sodium bicarbonate 8.4 % 75 mEq infusion   Intravenous Continuous Darshan Nascimento  mL/hr at 24 0235 New Bag at 24 0235    sodium chloride 0.9 % flush 10 mL  10 mL Intravenous Q12H Thony Otto MD   10 mL at 24 2101    sodium chloride 0.9 % flush 10 mL  10 mL Intravenous PRN Thony Otto MD        sodium chloride 0.9 % infusion 40 mL  40 mL Intravenous PRN Thony Otto MD   40 mL at 24 0249    sodium hypochlorite (DAKIN'S 1/4 STRENGTH) 0.125 % topical solution 0.125% solution   Topical Q12H Merry Lees APRN   Given at 24 0525       Past Medical History:  Past Medical History:   Diagnosis Date    Decubitus ulcer of both feet     Intradural extramedullary spinal tumor     Osteomyelitis     Paraplegia     Sacral decubitus ulcer        Past Surgical History:  Past Surgical History:   Procedure Laterality Date     SECTION      COLOSTOMY      CYSTOSTOMY      DEBRIDEMENT OF ISCHIAL ULCER/BUTTOCKS WOUND      IVC FILTER RETRIEVAL      MUSCLE FLAP      SPINE SURGERY      TRUNK DEBRIDEMENT Bilateral 2020    Procedure: DEBRIDEMENT DECUBITUS ULCER bilateral feet;  Surgeon: Truong Neal  MD NAY;  Location: [facility] OR;  Service: General;  Laterality: Bilateral;       Family History  Family History   Problem Relation Age of Onset    Lung cancer Father        Social History  Social History     Socioeconomic History    Marital status: Unknown   Tobacco Use    Smoking status: Never    Smokeless tobacco: Never   Vaping Use    Vaping status: Never Used   Substance and Sexual Activity    Alcohol use: Not Currently    Drug use: Never    Sexual activity: Defer       Review of Systems:  History obtained from chart review and the patient  General ROS: No fever or chills  Respiratory ROS: No cough, shortness of breath, wheezing  Cardiovascular ROS: No chest pain or palpitations  Gastrointestinal ROS: No abdominal pain or melena  Genito-Urinary ROS: No dysuria or hematuria  Psych ROS: No anxiety and depression  14 point ROS reviewed with the patient and negative except as noted above and in the HPI unless unable to obtain.    Objective:  Patient Vitals for the past 24 hrs:   BP Temp Temp src Pulse Resp SpO2   12/23/24 0745 140/49 97.7 °F (36.5 °C) Axillary 81 18 97 %   12/23/24 0342 144/50 98.3 °F (36.8 °C) Oral 68 16 96 %   12/22/24 1925 141/46 98 °F (36.7 °C) Oral 88 16 95 %   12/22/24 1610 142/57 98.5 °F (36.9 °C) Oral 81 16 100 %   12/22/24 1146 137/46 97.9 °F (36.6 °C) Oral 66 16 98 %       Intake/Output Summary (Last 24 hours) at 12/23/2024 1102  Last data filed at 12/23/2024 0342  Gross per 24 hour   Intake 360 ml   Output 1575 ml   Net -1215 ml     General: awake/alert   Chest:  clear to auscultation bilaterally without respiratory distress  CVS: regular rate and rhythm  Abdominal: soft, nontender, positive bowel sounds  Extremities: no cyanosis or edema  Skin: warm and dry without rash      Labs:  Results from last 7 days   Lab Units 12/22/24  0307 12/20/24  1939 12/20/24  0827 12/19/24  2221   WBC 10*3/mm3 6.59  --  8.05 11.13*   HEMOGLOBIN g/dL 7.3* 7.3* 6.4* 7.4*   HEMATOCRIT % 25.5* 24.4* 22.0* 25.0*    PLATELETS 10*3/mm3 262  --  307 365         Results from last 7 days   Lab Units 12/22/24  1246 12/22/24  0238 12/20/24  0827 12/19/24  2221   SODIUM mmol/L  --  140 140 139   SODIUM, ARTERIAL mmol/L 142  --   --   --    POTASSIUM mmol/L  --  3.9 4.8 5.4*   CHLORIDE mmol/L  --  113* 110* 108*   CO2 mmol/L  --  15.0* 18.0* 17.0*   BUN mg/dL  --  50* 58* 59*   CREATININE mg/dL  --  2.35* 2.76* 3.26*   CALCIUM mg/dL  --  7.6* 7.7* 8.5*   EGFR mL/min/1.73  --  22.9* 18.9* 15.5*   BILIRUBIN mg/dL  --  <0.2 0.3  --    ALK PHOS U/L  --  140* 169*  --    ALT (SGPT) U/L  --  6 7  --    AST (SGOT) U/L  --  <5 5  --    GLUCOSE mg/dL  --  126* 112* 128*       Radiology:   Imaging Results (Last 72 Hours)       ** No results found for the last 72 hours. **            Culture:  Blood Culture   Date Value Ref Range Status   12/20/2024 No growth at 3 days  Preliminary     Wound Culture   Date Value Ref Range Status   12/20/2024 Heavy growth (4+) Mixed Gram Negative Luciana (A)  Final   12/20/2024 Heavy growth (4+) Staphylococcus aureus, MRSA (A)  Final     Comment:       Methicillin resistant Staphylococcus aureus, Patient may be an isolation risk.   12/20/2024 Heavy growth (4+) Normal Skin Luciana  Final   12/20/2024 Heavy growth (4+) Mixed Gram Negative Luciana (A)  Final   12/20/2024 Heavy growth (4+) Staphylococcus aureus, MRSA (A)  Final     Comment:       Methicillin resistant Staphylococcus aureus, Patient may be an isolation risk.   12/20/2024 Heavy growth (4+) Normal Skin Luciana  Final   12/20/2024 Scant growth (1+) Mixed Gram Negative Luciana (A)  Final   12/20/2024 Heavy growth (4+) Staphylococcus aureus, MRSA (A)  Final     Comment:       Methicillin resistant Staphylococcus aureus, Patient may be an isolation risk.   12/20/2024 Heavy growth (4+) Normal Skin Luciana  Final         Assessment   Acute kidney injury, ATN  Baseline chronic kidney disease stage 3b  Hyperkalemia--improved  Metabolic acidosis  Hypertension   History of  colostomy and urostomy  Multiple pressure ulcers  Paraplegia     Plan:   Hold IV fluids  Begin PO bicarbonate  No AM labs ordered. Will obtain some now and make further adjustments as needed once results available      Chas Mcallister, APRN  12/23/2024  11:02 CST

## 2024-12-23 NOTE — PROGRESS NOTES
"    [facility] Medicine Services  INPATIENT PROGRESS NOTE    Patient Name: Jessica Alvarenga  Date of Admission: 12/19/2024  Today's Date: 12/23/24  Length of Stay: 2  Primary Care Physician: Ponce Orozco MD    Subjective   Chief Complaint: \"I just don't feel good\"  HPI   Patient reported that she does not feel good.  She states that she has been a paraplegic for many, many years, and knows when something is \"just not right.\"  When I asked her to localize an area that does not feel right, she pointed towards her abdomen, slightly left of midline, and above her colostomy.  She reports having ongoing issues with nausea and vomiting.  She does report having some output in her ostomy which has been more solid and formed.  Recently, was having issues with diarrhea coming from her ostomy however those symptoms have resolved.  She describes deformity of her spine and the sensation \"something is pushing or pinching against\" on her insides - again, in the abdominal region.     Review of Systems   All pertinent negatives and positives are as above. All other systems have been reviewed and are negative unless otherwise stated.     Objective    Temp:  [97.7 °F (36.5 °C)-98.5 °F (36.9 °C)] 98.3 °F (36.8 °C)  Heart Rate:  [68-88] 82  Resp:  [16-18] 18  BP: (140-144)/(46-69) 143/69  Physical Exam  Vitals reviewed.   Constitutional:       General: She is not in acute distress.     Appearance: She is not toxic-appearing.   HENT:      Head: Normocephalic.      Mouth/Throat:      Mouth: Mucous membranes are moist.   Cardiovascular:      Rate and Rhythm: Normal rate.   Pulmonary:      Effort: Pulmonary effort is normal. No respiratory distress.      Comments: On room air  Abdominal:      Palpations: Abdomen is soft.      Tenderness: There is abdominal tenderness.      Comments: Colostomy in place (formed stool noted); urostomy with normal appearing UOP; TTP primarily right above (superior) to " colostomy site.     Musculoskeletal:         General: Swelling and deformity present.      Comments: New dressings in place to both lower extremities   Skin:     General: Skin is warm.   Neurological:      Mental Status: She is alert. Mental status is at baseline.   Psychiatric:         Mood and Affect: Mood normal.         Results Review:  I have reviewed the labs, radiology results, and diagnostic studies.    Laboratory Data:   Results from last 7 days   Lab Units 12/22/24  0307 12/20/24  1939 12/20/24  0827 12/19/24  2221   WBC 10*3/mm3 6.59  --  8.05 11.13*   HEMOGLOBIN g/dL 7.3* 7.3* 6.4* 7.4*   HEMATOCRIT % 25.5* 24.4* 22.0* 25.0*   PLATELETS 10*3/mm3 262  --  307 365        Results from last 7 days   Lab Units 12/23/24  1310 12/22/24  1246 12/22/24  0238 12/20/24  0827   SODIUM mmol/L 141  --  140 140   SODIUM, ARTERIAL mmol/L  --  142  --   --    POTASSIUM mmol/L 3.6  --  3.9 4.8   CHLORIDE mmol/L 111*  --  113* 110*   CO2 mmol/L 18.0*  --  15.0* 18.0*   BUN mg/dL 38*  --  50* 58*   CREATININE mg/dL 1.81*  --  2.35* 2.76*   CALCIUM mg/dL 7.9*  --  7.6* 7.7*   BILIRUBIN mg/dL  --   --  <0.2 0.3   ALK PHOS U/L  --   --  140* 169*   ALT (SGPT) U/L  --   --  6 7   AST (SGOT) U/L  --   --  <5 5   GLUCOSE mg/dL 139*  --  126* 112*       Culture Data:   Blood Culture   Date Value Ref Range Status   12/20/2024 No growth at 3 days  Preliminary     Wound Culture   Date Value Ref Range Status   12/20/2024 Heavy growth (4+) Mixed Gram Negative Luciana (A)  Final   12/20/2024 Heavy growth (4+) Staphylococcus aureus, MRSA (A)  Final     Comment:       Methicillin resistant Staphylococcus aureus, Patient may be an isolation risk.   12/20/2024 Heavy growth (4+) Normal Skin Luciana  Final   12/20/2024 Heavy growth (4+) Mixed Gram Negative Luciana (A)  Final   12/20/2024 Heavy growth (4+) Staphylococcus aureus, MRSA (A)  Final     Comment:       Methicillin resistant Staphylococcus aureus, Patient may be an isolation risk.    12/20/2024 Heavy growth (4+) Normal Skin Luciana  Final   12/20/2024 Scant growth (1+) Mixed Gram Negative Luciana (A)  Final   12/20/2024 Heavy growth (4+) Staphylococcus aureus, MRSA (A)  Final     Comment:       Methicillin resistant Staphylococcus aureus, Patient may be an isolation risk.   12/20/2024 Heavy growth (4+) Normal Skin Luciana  Final       Radiology Data:   Imaging Results (Last 24 Hours)       ** No results found for the last 24 hours. **            I have reviewed the patient's current medications.     Assessment/Plan   Assessment  Active Hospital Problems    Diagnosis     **Acute kidney injury     Metabolic acidosis     Pressure ulcers of skin of multiple topographic sites     Paraplegia     Anemia     CKD stage 3b, GFR 30-44 ml/min     Colostomy in place     Presence of urostomy     History of breast cancer     CORINA (acute kidney injury)     Acute kidney failure, unspecified        Treatment Plan  No labs ordered for this morning - STAT metabolic panel now  Patient had been on IV fluids containing sodium bicarbonate-now stopped  Nephrology following and appreciate their assistance  Plan for CT A/P today (abdominal pain, nausea and vomiting) - complete without contrast in setting of renal insufficiency  Wound care  Wound cultures positive for MRSA - susceptible to tetracyclines.  Plan to start treatment with PO Doxycycline for now.  Outside hospital records from [facility] reviewed (see below) and patient does report that since that time she has had follow-up in [place] regarding her wounds - see screenshots below [facility].  Will need close outpatient follow-up with wound care in [place] and her specialists in [place]  Allopurinol added  PO Sodium bicarbonate  Patient did have a hemoglobin of 6.4 status posttransfusion of 1 unit of PRBCs  Will repeat CBC in a.m.  Workup continues  Patient reports she lives at home with her son in [place], [place], and plans to return there at  discharge    Medical Decision Making  Number and Complexity of problems: 2 acute; high complexity      Conditions and Status        Condition is unchanged.     [facility] Data  External documents reviewed:               Cardiac tracing (EKG, telemetry) interpretation: no new EKGs  Radiology interpretation: no new radiology studies  Labs reviewed: pending  Any tests that were considered but not ordered: as above - plan for CT Abd/Pelvis today     Decision rules/scores evaluated (example [id number], Wells, etc): none     Discussed with: patient and son, bedside nurse (Lona)     Care Planning  Shared decision making: Discussed with patient with agreement to proceed with treatment plan as outlined  Code status and discussions: Full code    Disposition  Social Determinants of Health that impact treatment or disposition: paraplegia  I expect the patient to be discharged: to be determined        Electronically signed by Justyn Owens MD, 12/23/24, 14:04 CST.

## 2024-12-23 NOTE — CASE MANAGEMENT/SOCIAL WORK
Continued Stay Note  YASMINE Mims     Patient Name: Jessica Alvarenga  MRN: [id number]  Today's Date: 12/23/2024    Admit Date: 12/19/2024    Plan: Home   Discharge Plan       Row Name 12/23/24 0835       Plan    Plan Home    Patient/Family in Agreement with Plan yes    Plan Comments Pt will return home alone, HH is not an option for her- see previous note for questions.  Son does stay with her at times.  Please inform if SW assist needed.                   Discharge Codes    No documentation.                       DENNIS WellsW

## 2024-12-23 NOTE — PLAN OF CARE
Goal Outcome Evaluation:  Plan of Care Reviewed With: patient, child        Progress: no change    Safety maintained.  Patient with repeated requests for staff support.  Colostomy with good output.  Urostomy with good UO.  RA.  CT abd/pelvis ordered for today - currently drinking contrast.  IID.  Medicated for pain X1 PO.  NSR per telemetry.  Tolerating renal diet.  Heparin SQ.

## 2024-12-23 NOTE — PLAN OF CARE
Goal Outcome Evaluation:  Plan of Care Reviewed With: patient        Progress: improving  Outcome Evaluation: Nutrition follow up. She reports she is still not feeling well, but does say that her colostomy OP is more solid and formed. She has had a good appetite over the weekend. She has continued on a renal, low potassium, low phosphorous, low sodium diet. She has consumed 75% of the last 6 meals. She does cont to complain of intermittent nausea. She is being diuresed and IV fluids are now on hold. She has multiple chronic PI's and refuses to be turned. Per last discussion with pt, have been trying to send an extra snack with meals so that she can keep if needed for in between meals. No new weight for review. Cont to follow.

## 2024-12-24 ENCOUNTER — APPOINTMENT (OUTPATIENT)
Dept: NUCLEAR MEDICINE | Facility: HOSPITAL | Age: 64
DRG: 682 | End: 2024-12-24
Payer: MEDICARE

## 2024-12-24 PROBLEM — K80.20 CHOLELITHIASES: Status: ACTIVE | Noted: 2024-12-24

## 2024-12-24 PROBLEM — M86.60 CHRONIC OSTEOMYELITIS: Status: ACTIVE | Noted: 2024-12-24

## 2024-12-24 PROBLEM — R11.2 NAUSEA AND VOMITING: Status: ACTIVE | Noted: 2024-12-24

## 2024-12-24 LAB
ALBUMIN SERPL-MCNC: 2.6 G/DL (ref 3.5–5.2)
ALBUMIN/GLOB SERPL: 0.7 G/DL
ALP SERPL-CCNC: 152 U/L (ref 39–117)
ALT SERPL W P-5'-P-CCNC: 8 U/L (ref 1–33)
ANION GAP SERPL CALCULATED.3IONS-SCNC: 13 MMOL/L (ref 5–15)
AST SERPL-CCNC: 6 U/L (ref 1–32)
BASOPHILS # BLD AUTO: 0.06 10*3/MM3 (ref 0–0.2)
BASOPHILS NFR BLD AUTO: 1 % (ref 0–1.5)
BILIRUB SERPL-MCNC: 0.2 MG/DL (ref 0–1.2)
BUN SERPL-MCNC: 36 MG/DL (ref 8–23)
BUN/CREAT SERPL: 20.2 (ref 7–25)
CALCIUM SPEC-SCNC: 8.8 MG/DL (ref 8.6–10.5)
CHLORIDE SERPL-SCNC: 109 MMOL/L (ref 98–107)
CO2 SERPL-SCNC: 20 MMOL/L (ref 22–29)
CREAT SERPL-MCNC: 1.78 MG/DL (ref 0.57–1)
DEPRECATED RDW RBC AUTO: 54 FL (ref 37–54)
EGFRCR SERPLBLD CKD-EPI 2021: 32 ML/MIN/1.73
EOSINOPHIL # BLD AUTO: 0.2 10*3/MM3 (ref 0–0.4)
EOSINOPHIL NFR BLD AUTO: 3.5 % (ref 0.3–6.2)
ERYTHROCYTE [DISTWIDTH] IN BLOOD BY AUTOMATED COUNT: 17 % (ref 12.3–15.4)
GLOBULIN UR ELPH-MCNC: 3.7 GM/DL
GLUCOSE SERPL-MCNC: 137 MG/DL (ref 65–99)
HCT VFR BLD AUTO: 30.8 % (ref 34–46.6)
HGB BLD-MCNC: 8.9 G/DL (ref 12–15.9)
IMM GRANULOCYTES # BLD AUTO: 0.11 10*3/MM3 (ref 0–0.05)
IMM GRANULOCYTES NFR BLD AUTO: 1.9 % (ref 0–0.5)
LYMPHOCYTES # BLD AUTO: 0.92 10*3/MM3 (ref 0.7–3.1)
LYMPHOCYTES NFR BLD AUTO: 15.9 % (ref 19.6–45.3)
MCH RBC QN AUTO: 26.2 PG (ref 26.6–33)
MCHC RBC AUTO-ENTMCNC: 28.9 G/DL (ref 31.5–35.7)
MCV RBC AUTO: 90.6 FL (ref 79–97)
MONOCYTES # BLD AUTO: 0.43 10*3/MM3 (ref 0.1–0.9)
MONOCYTES NFR BLD AUTO: 7.4 % (ref 5–12)
NEUTROPHILS NFR BLD AUTO: 4.06 10*3/MM3 (ref 1.7–7)
NEUTROPHILS NFR BLD AUTO: 70.3 % (ref 42.7–76)
NRBC BLD AUTO-RTO: 0 /100 WBC (ref 0–0.2)
PLATELET # BLD AUTO: 268 10*3/MM3 (ref 140–450)
PMV BLD AUTO: 8.9 FL (ref 6–12)
POTASSIUM SERPL-SCNC: 3.8 MMOL/L (ref 3.5–5.2)
PROT SERPL-MCNC: 6.3 G/DL (ref 6–8.5)
RBC # BLD AUTO: 3.4 10*6/MM3 (ref 3.77–5.28)
SODIUM SERPL-SCNC: 142 MMOL/L (ref 136–145)
WBC NRBC COR # BLD AUTO: 5.78 10*3/MM3 (ref 3.4–10.8)

## 2024-12-24 PROCEDURE — 25010000002 HEPARIN (PORCINE) PER 1000 UNITS: Performed by: INTERNAL MEDICINE

## 2024-12-24 PROCEDURE — 25010000002 NA FERRIC GLUC CPLX PER 12.5 MG: Performed by: INTERNAL MEDICINE

## 2024-12-24 PROCEDURE — 97162 PT EVAL MOD COMPLEX 30 MIN: CPT

## 2024-12-24 PROCEDURE — 78226 HEPATOBILIARY SYSTEM IMAGING: CPT

## 2024-12-24 PROCEDURE — 34310000005 TECHNETIUM TC 99M MEBROFENIN KIT: Performed by: INTERNAL MEDICINE

## 2024-12-24 PROCEDURE — 80053 COMPREHEN METABOLIC PANEL: CPT | Performed by: NURSE PRACTITIONER

## 2024-12-24 PROCEDURE — 25810000003 SODIUM CHLORIDE 0.9 % SOLUTION: Performed by: INTERNAL MEDICINE

## 2024-12-24 PROCEDURE — A9537 TC99M MEBROFENIN: HCPCS | Performed by: INTERNAL MEDICINE

## 2024-12-24 PROCEDURE — 25010000002 METOCLOPRAMIDE PER 10 MG: Performed by: INTERNAL MEDICINE

## 2024-12-24 PROCEDURE — 25010000002 ONDANSETRON PER 1 MG: Performed by: INTERNAL MEDICINE

## 2024-12-24 PROCEDURE — 85025 COMPLETE CBC W/AUTO DIFF WBC: CPT | Performed by: INTERNAL MEDICINE

## 2024-12-24 RX ORDER — PANTOPRAZOLE SODIUM 40 MG/10ML
40 INJECTION, POWDER, LYOPHILIZED, FOR SOLUTION INTRAVENOUS
Status: DISCONTINUED | OUTPATIENT
Start: 2024-12-24 | End: 2024-12-27

## 2024-12-24 RX ORDER — KIT FOR THE PREPARATION OF TECHNETIUM TC 99M MEBROFENIN 45 MG/10ML
1 INJECTION, POWDER, LYOPHILIZED, FOR SOLUTION INTRAVENOUS
Status: COMPLETED | OUTPATIENT
Start: 2024-12-24 | End: 2024-12-24

## 2024-12-24 RX ORDER — METOCLOPRAMIDE HYDROCHLORIDE 5 MG/ML
5 INJECTION INTRAMUSCULAR; INTRAVENOUS EVERY 6 HOURS
Status: DISCONTINUED | OUTPATIENT
Start: 2024-12-24 | End: 2024-12-27

## 2024-12-24 RX ADMIN — DOXYCYCLINE 100 MG: 100 TABLET, FILM COATED ORAL at 09:07

## 2024-12-24 RX ADMIN — ALPRAZOLAM 0.25 MG: 0.25 TABLET ORAL at 20:06

## 2024-12-24 RX ADMIN — HYDROCODONE BITARTRATE AND ACETAMINOPHEN 1 TABLET: 10; 325 TABLET ORAL at 05:09

## 2024-12-24 RX ADMIN — Medication 10 ML: at 20:07

## 2024-12-24 RX ADMIN — HEPARIN SODIUM 5000 UNITS: 5000 INJECTION, SOLUTION INTRAVENOUS; SUBCUTANEOUS at 20:07

## 2024-12-24 RX ADMIN — SODIUM CHLORIDE 250 MG: 9 INJECTION, SOLUTION INTRAVENOUS at 13:58

## 2024-12-24 RX ADMIN — ALLOPURINOL 100 MG: 100 TABLET ORAL at 09:07

## 2024-12-24 RX ADMIN — BACLOFEN 10 MG: 10 TABLET ORAL at 16:06

## 2024-12-24 RX ADMIN — SODIUM BICARBONATE 650 MG: 650 TABLET ORAL at 16:06

## 2024-12-24 RX ADMIN — SODIUM BICARBONATE 650 MG: 650 TABLET ORAL at 20:07

## 2024-12-24 RX ADMIN — ONDANSETRON 4 MG: 2 INJECTION INTRAMUSCULAR; INTRAVENOUS at 04:57

## 2024-12-24 RX ADMIN — ONDANSETRON 4 MG: 2 INJECTION INTRAMUSCULAR; INTRAVENOUS at 11:01

## 2024-12-24 RX ADMIN — Medication 1000 UNITS: at 09:07

## 2024-12-24 RX ADMIN — BACLOFEN 10 MG: 10 TABLET ORAL at 22:42

## 2024-12-24 RX ADMIN — METOCLOPRAMIDE 5 MG: 5 INJECTION, SOLUTION INTRAMUSCULAR; INTRAVENOUS at 11:06

## 2024-12-24 RX ADMIN — METOCLOPRAMIDE 5 MG: 5 INJECTION, SOLUTION INTRAMUSCULAR; INTRAVENOUS at 22:42

## 2024-12-24 RX ADMIN — SODIUM BICARBONATE 650 MG: 650 TABLET ORAL at 09:07

## 2024-12-24 RX ADMIN — MEBROFENIN 1 DOSE: 45 INJECTION, POWDER, LYOPHILIZED, FOR SOLUTION INTRAVENOUS at 12:03

## 2024-12-24 RX ADMIN — SODIUM HYPOCHLORITE: 1.25 SOLUTION TOPICAL at 14:25

## 2024-12-24 RX ADMIN — Medication 10 ML: at 09:06

## 2024-12-24 RX ADMIN — DOXYCYCLINE 100 MG: 100 TABLET, FILM COATED ORAL at 20:06

## 2024-12-24 RX ADMIN — METOCLOPRAMIDE 5 MG: 5 INJECTION, SOLUTION INTRAMUSCULAR; INTRAVENOUS at 17:12

## 2024-12-24 RX ADMIN — HEPARIN SODIUM 5000 UNITS: 5000 INJECTION, SOLUTION INTRAVENOUS; SUBCUTANEOUS at 09:06

## 2024-12-24 RX ADMIN — PANTOPRAZOLE SODIUM 40 MG: 40 INJECTION, POWDER, FOR SOLUTION INTRAVENOUS at 11:06

## 2024-12-24 RX ADMIN — SODIUM HYPOCHLORITE: 1.25 SOLUTION TOPICAL at 22:42

## 2024-12-24 NOTE — PLAN OF CARE
Goal Outcome Evaluation:  Plan of Care Reviewed With: patient        Progress: no change  Outcome Evaluation: VSS. Wound care done as ordered. PRN pain meds given as ordered. Safety maintained.

## 2024-12-24 NOTE — PLAN OF CARE
Goal Outcome Evaluation:  Plan of Care Reviewed With: patient           Outcome Evaluation: PT eval completed. Pt sitting up in bed, nsg staff at bedside, AOx4 with complaints of nausea. pt was agreeable to sit EOB as the bed was uncomfortable and required Max A to bring self to EOB due to paraplegia. Once sitting EOB pt tolerated static sitting balance CGA, tolerated for roughly 5 minutes. Throughout session pt reported she was normally indep with t/f to wheelchair and tolerates OOB activity most of the day. Once fatigued pt returned to folwers position with Max A and left with warm blankets. Pt will benefit from skilled PT services to improve bed mobility, t/f, decrease caegiver burden. Recommend SNF vs home with 24/7 assist when medically stable.    Anticipated Discharge Disposition (PT): home with 24/7 care, skilled nursing facility

## 2024-12-24 NOTE — PROGRESS NOTES
[facility] Medicine Services  INPATIENT PROGRESS NOTE    Patient Name: Jessica Alvarenga  Date of Admission: 12/19/2024  Today's Date: 12/24/24  Length of Stay: 3  Primary Care Physician: Ponce Orozco MD    Subjective   Chief Complaint: Nausea and vomiting  HPI   Patient continues to report that she does not feel well.  She states that she is constantly nauseated.  She even had an episode of vomiting during my assessment.  She states that she is unable to eat solid food, as this typically results in vomiting symptoms.  She has difficulty localizing pain and points towards the middle of her abdomen.  She does report having a formed bowel movement from her ostomy earlier today.    Review of Systems   All pertinent negatives and positives are as above. All other systems have been reviewed and are negative unless otherwise stated.     Objective    Temp:  [97.4 °F (36.3 °C)-98.7 °F (37.1 °C)] 98.3 °F (36.8 °C)  Heart Rate:  [67-82] 70  Resp:  [18-20] 18  BP: (143-168)/(47-69) 152/67  Physical Exam  Vitals reviewed.   Constitutional:       General: She is not in acute distress.     Appearance: She is not toxic-appearing.   HENT:      Head: Normocephalic.      Mouth/Throat:      Mouth: Mucous membranes are moist.   Cardiovascular:      Rate and Rhythm: Normal rate.   Pulmonary:      Effort: Pulmonary effort is normal. No respiratory distress.      Comments: On room air  Abdominal:      Palpations: Abdomen is soft.      Tenderness: There is abdominal tenderness.      Comments: Ileal conduit and colostomy in place.  Primarily has tenderness near middle of abdomen, but difficult to localize as patient reports tenderness at multiple sites.     Musculoskeletal:         General: Swelling and deformity present.      Comments: New dressings in place to both lower extremities   Skin:     General: Skin is warm.   Neurological:      Mental Status: She is alert. Mental status is at baseline.    Psychiatric:         Mood and Affect: Mood normal.         Results Review:  I have reviewed the labs, radiology results, and diagnostic studies.    Laboratory Data:   Results from last 7 days   Lab Units 12/24/24  0930 12/22/24  0307 12/20/24  1939 12/20/24  0827   WBC 10*3/mm3 5.78 6.59  --  8.05   HEMOGLOBIN g/dL 8.9* 7.3* 7.3* 6.4*   HEMATOCRIT % 30.8* 25.5* 24.4* 22.0*   PLATELETS 10*3/mm3 268 262  --  307        Results from last 7 days   Lab Units 12/24/24  0930 12/23/24  1310 12/22/24  1246 12/22/24  0238 12/20/24  0827   SODIUM mmol/L 142 141  --  140 140   SODIUM, ARTERIAL mmol/L  --   --  142  --   --    POTASSIUM mmol/L 3.8 3.6  --  3.9 4.8   CHLORIDE mmol/L 109* 111*  --  113* 110*   CO2 mmol/L 20.0* 18.0*  --  15.0* 18.0*   BUN mg/dL 36* 38*  --  50* 58*   CREATININE mg/dL 1.78* 1.81*  --  2.35* 2.76*   CALCIUM mg/dL 8.8 7.9*  --  7.6* 7.7*   BILIRUBIN mg/dL 0.2  --   --  <0.2 0.3   ALK PHOS U/L 152*  --   --  140* 169*   ALT (SGPT) U/L 8  --   --  6 7   AST (SGOT) U/L 6  --   --  <5 5   GLUCOSE mg/dL 137* 139*  --  126* 112*       Culture Data:   Blood Culture   Date Value Ref Range Status   12/20/2024 No growth at 3 days  Preliminary     Wound Culture   Date Value Ref Range Status   12/20/2024 Heavy growth (4+) Mixed Gram Negative Luciana (A)  Final   12/20/2024 Heavy growth (4+) Staphylococcus aureus, MRSA (A)  Final     Comment:       Methicillin resistant Staphylococcus aureus, Patient may be an isolation risk.   12/20/2024 Heavy growth (4+) Normal Skin Luciana  Final   12/20/2024 Heavy growth (4+) Mixed Gram Negative Luciana (A)  Final   12/20/2024 Heavy growth (4+) Staphylococcus aureus, MRSA (A)  Final     Comment:       Methicillin resistant Staphylococcus aureus, Patient may be an isolation risk.   12/20/2024 Heavy growth (4+) Normal Skin Luciana  Final   12/20/2024 Scant growth (1+) Mixed Gram Negative Luciana (A)  Final   12/20/2024 Heavy growth (4+) Staphylococcus aureus, MRSA (A)  Final      Comment:       Methicillin resistant Staphylococcus aureus, Patient may be an isolation risk.   12/20/2024 Heavy growth (4+) Normal Skin Luciana  Final       Radiology Data:   Imaging Results (Last 24 Hours)       Procedure Component Value Units Date/Time    CT Abdomen Pelvis Without Contrast [[id number]] Collected: 12/23/24 1950     Updated: 12/23/24 2000    Narrative:      EXAM: CT ABDOMEN PELVIS WO CONTRAST-      DATE: 12/23/2024 5:29 PM     HISTORY: abdominal pain; nausea and vomiting       COMPARISON: 9/18/2020.     DOSE LENGTH PRODUCT: 886.62 mGy.cm Automatic exposure control was  utilized to make radiation dose as low as reasonably achievable.     TECHNIQUE: Noncontrast axial images of the abdomen and pelvis obtained  with multiplanar reformats.     FINDINGS:  VISUALIZED CHEST: Lung bases are grossly clear. There is coronary artery  calcification. No pericardial or pleural effusion is identified.     LIVER/BILE DUCTS: Unenhanced hepatic parenchyma is unremarkable. There  is cholelithiasis without inflammatory change. Bile ducts are  unremarkable.     KIDNEYS/URETERS: Unremarkable.     ADRENAL: There is right renal atrophy and no hydronephrosis.        SPLEEN: Unremarkable.     PANCREAS: Unremarkable.     MESENTERY: No inflammatory change or free fluid is seen. No  retroperitoneal or mesenteric lymphadenopathy is seen.     VASCULATURE: There is calcification of the abdominal aorta without  aneurysm. There is an IVC filter in the infrarenal IVC.     GI TRACT: There is no bowel obstruction. No inflammatory changes are  identified.     PELVIS: Patient is status post cystectomy. Uterus and adnexa are  unremarkable. Pelvic portion of the GI tract are unremarkable. There is  ileal conduit and urostomy on the right. Chronic ulcers are noted  bilaterally with sclerosis of the left iliac bone likely chronic  osteomyelitis. There is hyperlordosis of the lumbosacral spine and  pelvic floor relaxation.     SOFT TISSUES:  Normal appearance of the overlying abdominal  and pelvic  soft tissues.      BONES: There is chronic left hip dislocation and hip dysplasia.          Impression:      1. Cholelithiasis without definite inflammatory change.  2. Chronic right renal atrophy.  3. Status post cystectomy with ileal conduit and urostomy on the right  with parastomal hernia containing fat.  4. Chronic decubitus ulcers posteriorly and chronic osteomyelitis  involving the right ischium, left ischium, and left iliac bone.        This report was signed and finalized on 12/23/2024 7:57 PM by Everette Rojas.               I have reviewed the patient's current medications.     Assessment/Plan   Assessment  Active Hospital Problems    Diagnosis     **Acute kidney injury     Cholelithiases     Chronic osteomyelitis right and left ischium, left iliac bone     Metabolic acidosis     Pressure ulcers of skin of multiple topographic sites     Paraplegia     Anemia     CKD stage 3b, GFR 30-44 ml/min     Colostomy in place     Presence of urostomy     History of breast cancer     CORINA (acute kidney injury)     Acute kidney failure, unspecified        Treatment Plan  Daily improvement in renal function - creatinine down to 1.78 this AM  Nephrology following and appreciate their assistance  CT A/P reviewed -cholelithiasis with no other inflammatory changes.  LFTs have been unremarkable.  Parastomal hernia containing fat noted.  No other acute findings.  Transition back to clear liquids  Trial of IV PPI  Nuclear medicine HIDA scan  Trial of scheduled Reglan  May require GI consultation for intractable N/V symptoms pending the above.  Continue wound care  Wound cultures positive for MRSA - susceptible to tetracyclines.  Plan to start treatment with PO Doxycycline for now.  [facility] and patient does report that follow-up in [place] regarding her wounds - see screenshots below obtained from  [place].  Will need close outpatient follow-up with wound care in [place] and her specialists in [place]  Allopurinol added  PO Sodium bicarbonate  Patient did have a hemoglobin of 6.4 status posttransfusion of 1 unit of PRBCs.  IV ferric gluconate today  Patient reports she lives at home with her son in [place], [place], and plans to return there at discharge    Medical Decision Making  Number and Complexity of problems: 2 acute; high complexity      Conditions and Status        Condition is unchanged.     [facility] Data  External documents reviewed:               Cardiac tracing (EKG, telemetry) interpretation: no new EKGs  Radiology interpretation: CT A/P results reviewed with patient at bedside this afternoon  Labs reviewed: as above  Any tests that were considered but not ordered: as above: none     Decision rules/scores evaluated (example [id number], [place], etc): none     Discussed with: patient      Care Planning  Shared decision making: Discussed with patient with agreement to proceed with treatment plan as outlined  Code status and discussions: Full code    Disposition  Social Determinants of Health that impact treatment or disposition: paraplegia  I expect the patient to be discharged: to be determined        Electronically signed by Justyn Owens MD, 12/24/24, 10:18 CST.

## 2024-12-24 NOTE — THERAPY EVALUATION
Patient Name: Jessica Alvarenga  : 1960    MRN: [id number]                              Today's Date: 2024       Admit Date: 2024    Visit Dx:     ICD-10-CM ICD-9-CM   1. Impaired mobility [Z74.09]  Z74.09 799.89     Patient Active Problem List   Diagnosis    Skin ulcer of sacrum with necrosis of muscle    Acute osteomyelitis    Chronic osteomyelitis with draining sinus of multiple sites    Anemia    Anxiety    Chronic back pain    Depressive disorder    GERD (gastroesophageal reflux disease)    History of ileal conduit    Essential hypertension    Nasopharyngitis    Paraplegia    Pressure injury of skin of heel    Protein-calorie malnutrition, moderate    Acute kidney injury    Acute kidney failure, unspecified    CORINA (acute kidney injury)    Metabolic acidosis    Pressure ulcers of skin of multiple topographic sites    Paraplegia    Anemia    CKD stage 3b, GFR 30-44 ml/min    Suprapubic catheter    Colostomy in place    Presence of urostomy    History of breast cancer     Past Medical History:   Diagnosis Date    Decubitus ulcer of both feet     Intradural extramedullary spinal tumor     Osteomyelitis     Paraplegia     Sacral decubitus ulcer      Past Surgical History:   Procedure Laterality Date     SECTION      COLOSTOMY      CYSTOSTOMY      DEBRIDEMENT OF ISCHIAL ULCER/BUTTOCKS WOUND      IVC FILTER RETRIEVAL      MUSCLE FLAP      SPINE SURGERY      TRUNK DEBRIDEMENT Bilateral 2020    Procedure: DEBRIDEMENT DECUBITUS ULCER bilateral feet;  Surgeon: Truong Neal MD;  Location[facility] OR;  Service: General;  Laterality: Bilateral;      General Information       Row Name 24 0819          Physical Therapy Time and Intention    Document Type evaluation  presents with abnormal labs Dx; CORINA. H/o paraplegia  -AJ     Mode of Treatment physical therapy  -AJ       Row Name 24 0819          General Information    Patient Profile Reviewed yes  -AJ     Prior Level of Function  independent:;all household mobility;community mobility;home management;bed mobility;ADL's;transfer;w/c or scooter  per pt reports she is ind with t/f to wheelchair with slide baord at home  -AJ     Existing Precautions/Restrictions fall  -     Barriers to Rehab medically complex;previous functional deficit  -       Row Name 12/24/24 0819          Living Environment    People in Home alone;child(susie), adult  had adult son occassionally  -       Row Name 12/24/24 0819          Home Main Entrance    Number of Stairs, Main Entrance none  -AJ       Row Name 12/24/24 0819          Cognition    Orientation Status (Cognition) oriented x 4  -       Row Name 12/24/24 0819          Safety Issues/Impairments Affecting Functional Mobility    Safety Issues Affecting Function (Mobility) safety precaution awareness;safety precautions follow-through/compliance;awareness of need for assistance;insight into deficits/self-awareness;friction/shear risk;sequencing abilities;judgment;ability to follow commands  -     Impairments Affecting Function (Mobility) endurance/activity tolerance;strength;pain;range of motion (ROM);postural/trunk control;cognition  -               User Key  (r) = Recorded By, (t) = Taken By, (c) = Cosigned By      Initials Name Provider Type    Iker Ponce, PT DPT Physical Therapist                   Mobility       Row Name 12/24/24 0819          Bed Mobility    Bed Mobility rolling right;supine-sit;sit-supine  -AJ     Rolling Right [place] (Bed Mobility) minimum assist (75% patient effort);moderate assist (50% patient effort);verbal cues  -AJ     Supine-Sit [place] (Bed Mobility) maximum assist (25% patient effort);1 person assist  -AJ     Sit-Supine [place] (Bed Mobility) maximum assist (25% patient effort);1 person assist  -AJ     Assistive Device (Bed Mobility) head of bed elevated;repositioning sheet  -               User Key  (r) = Recorded By, (t) = Taken By, (c) = Cosigned  By      Initials Name Provider Type    Iker Ponce, SANDRA DPT Physical Therapist                   Obj/Interventions       Row Name 12/24/24 0819          Range of Motion Comprehensive    General Range of Motion lower extremity range of motion deficits identified  -AJ     Comment, General Range of Motion no AROM or strength performed in B LE due to paraplegia  -AJ       Row Name 12/24/24 0819          Strength Comprehensive (MMT)    General Manual Muscle Testing (MMT) Assessment lower extremity strength deficits identified  -AJ     Comment, General Manual Muscle Testing (MMT) Assessment no AROM or strength performed in B LE due to paraplegia  -AJ       Row Name 12/24/24 0819          Motor Skills    Motor Skills functional endurance  -AJ     Functional Endurance tolerated sitting EOB for roughly 5 minutes  -       Row Name 12/24/24 0819          Balance    Balance Assessment sitting static balance;sitting dynamic balance  -AJ     Static Sitting Balance contact guard  -AJ     Dynamic Sitting Balance contact guard  -AJ     Position, Sitting Balance supported;unsupported;sitting edge of bed  -AJ     Balance Interventions sitting;supported;static;dynamic  -AJ               User Key  (r) = Recorded By, (t) = Taken By, (c) = Cosigned By      Initials Name Provider Type    Iker Ponce PT DPT Physical Therapist                   Goals/Plan       Row Name 12/24/24 0819          Bed Mobility Goal 1 (PT)    Activity/Assistive Device (Bed Mobility Goal 1, PT) rolling to left;rolling to right;sit to supine;supine to sit  -HILARIA     [place] Level/Cues Needed (Bed Mobility Goal 1, PT) minimum assist (75% or more patient effort)  -HILARIA     Time Frame (Bed Mobility Goal 1, PT) long term goal (LTG);10 days  -HILARIA     Progress/Outcomes (Bed Mobility Goal 1, PT) new goal  -AJ       Row Name 12/24/24 0819          Transfer Goal 1 (PT)    Activity/Assistive Device (Transfer Goal 1, PT) wheelchair transfer;sliding board  -AJ      "[place] Level/Cues Needed (Transfer Goal 1, PT) moderate assist (50-74% patient effort)  -AJ     Time Frame (Transfer Goal 1, PT) long term goal (LTG);10 days  -AJ     Progress/Outcome (Transfer Goal 1, PT) new goal  -       Row Name 12/24/24 0819          Balance Goal 1 (PT)    Activity/Assistive Device (Balance Goal) sitting static balance;sitting dynamic balance  -AJ     [place] Level/Cues Needed (Balance Goal 1, PT) independent  -AJ     Time Frame (Balance Goal 1, PT) long-term goal (LTG);by discharge  -AJ     Strategies/Barriers (Balance Goal 1, PT) tolerate for 10 minutes  -AJ     Progress/Outcomes (Balance Goal 1, PT) other (see comments)  new goal  -       Row Name 12/24/24 0819          Therapy Assessment/Plan (PT)    Planned Therapy Interventions (PT) balance training;bed mobility training;home exercise program;postural re-education;patient/family education;transfer training  -AJ               User Key  (r) = Recorded By, (t) = Taken By, (c) = Cosigned By      Initials Name Provider Type    Iker Ponce, PT DPT Physical Therapist                   Clinical Impression       Row Name 12/24/24 0819          Pain    Pretreatment Pain Rating 0/10 - no pain  -AJ     Posttreatment Pain Rating 0/10 - no pain  -AJ     Pain Management Interventions nursing notified;exercise or physical activity utilized  -AJ     Response to Pain Interventions no change per patient report  -AJ     Pre/Posttreatment Pain Comment pt complaints of severe nasuea and that she \"going to die\"  -       Row Name 12/24/24 0819          Plan of Care Review    Plan of Care Reviewed With patient  -AJ     Outcome Evaluation PT eval completed. Pt sitting up in bed, nsg staff at bedside, AOx4 with complaints of nausea. pt was agreeable to sit EOB as the bed was uncomfortable and required Max A to bring self to EOB due to paraplegia. Once sitting EOB pt tolerated static sitting balance CGA, tolerated for roughly 5 minutes. " Throughout session pt reported she was normally indep with t/f to wheelchair and tolerates OOB activity most of the day. Once fatigued pt returned to folwers position with Max A and left with warm blankets. Pt will benefit from skilled PT services to improve bed mobility, t/f, decrease caegiver burden. Recommend SNF vs home with 24/7 assist when medically stable.  -       Row Name 12/24/24 0819          Therapy Assessment/Plan (PT)    Patient/Family Therapy Goals Statement (PT) none stated  -AJ     Rehab Potential (PT) fair  -AJ     Criteria for Skilled Interventions Met (PT) yes;meets criteria;skilled treatment is necessary  -AJ     Therapy Frequency (PT) 2 times/day  -AJ     Predicted Duration of Therapy Intervention (PT) until d/c  -AJ       Row Name 12/24/24 0819          Vital Signs    Pre Patient Position Sitting  -AJ     Intra Patient Position Sitting  -AJ     Post Patient Position Supine  -AJ       Row Name 12/24/24 0819          Positioning and Restraints    Pre-Treatment Position in bed  -AJ     Post Treatment Position bed  -AJ     In Bed notified nsg;fowlers;call light within reach;encouraged to call for assist;exit alarm on;side rails up x2  -               User Key  (r) = Recorded By, (t) = Taken By, (c) = Cosigned By      Initials Name Provider Type    Iker Ponce, PT DPT Physical Therapist                   Outcome Measures       Row Name 12/24/24 0819          How much help from another person do you currently need...    Turning from your back to your side while in flat bed without using bedrails? 2  -AJ     Moving from lying on back to sitting on the side of a flat bed without bedrails? 2  -AJ     Moving to and from a bed to a chair (including a wheelchair)? 1  -AJ     Standing up from a chair using your arms (e.g., wheelchair, bedside chair)? 1  -AJ     Climbing 3-5 steps with a railing? 1  -AJ     To walk in hospital room? 1  -AJ     AM-PAC 6 Clicks Score (PT) 8  -AJ     Highest Level of  Mobility Goal 3 --> Sit at edge of bed  -       Row Name 12/24/24 0819          Functional Assessment    Outcome Measure Options AM-PAC 6 Clicks Basic Mobility (PT)  -               User Key  (r) = Recorded By, (t) = Taken By, (c) = Cosigned By      Initials Name Provider Type    AJ Iker King PT DPT Physical Therapist                                 Physical Therapy Education       Title: PT OT SLP Therapies (Done)       Topic: Physical Therapy (Done)       Point: Mobility training (Done)       Learning Progress Summary            Patient Acceptance, E, VU,NR by HILARIA at 12/24/2024 0951    Comment: role of PT, t/f training, high fall risk                      Point: Home exercise program (Done)       Learning Progress Summary            Patient Acceptance, E, VU,NR by  at 12/24/2024 0951    Comment: role of PT, t/f training, high fall risk                      Point: Body mechanics (Done)       Learning Progress Summary            Patient Acceptance, E, VU,NR by HILARIA at 12/24/2024 0951    Comment: role of PT, t/f training, high fall risk                      Point: Precautions (Done)       Learning Progress Summary            Patient Acceptance, E, VU,NR by  at 12/24/2024 0951    Comment: role of PT, t/f training, high fall risk                                      User Key       Initials Effective Dates Name Provider Type Discipline     08/15/24 -  Iker King PT DPT Physical Therapist PT                  PT Recommendation and Plan  Planned Therapy Interventions (PT): balance training, bed mobility training, home exercise program, postural re-education, patient/family education, transfer training  Outcome Evaluation: PT eval completed. Pt sitting up in bed, nsg staff at bedside, AOx4 with complaints of nausea. pt was agreeable to sit EOB as the bed was uncomfortable and required Max A to bring self to EOB due to paraplegia. Once sitting EOB pt tolerated static sitting balance CGA, tolerated for roughly  5 minutes. Throughout session pt reported she was normally indep with t/f to wheelchair and tolerates OOB activity most of the day. Once fatigued pt returned to folwers position with Max A and left with warm blankets. Pt will benefit from skilled PT services to improve bed mobility, t/f, decrease caegiver burden. Recommend SNF vs home with 24/7 assist when medically stable.     Time Calculation:         PT Charges       Row Name 12/24/24 0819             Time Calculation    Start Time 0856  -AJ      Stop Time 0920  -AJ      Time Calculation (min) 24 min  -AJ      PT Received On 12/24/24  -AJ      PT Goal Re-Cert Due Date 01/03/25  -AJ         Untimed Charges    PT Eval/Re-eval Minutes 24  -AJ         Total Minutes    Untimed Charges Total Minutes 24  -AJ       Total Minutes 24  -AJ                User Key  (r) = Recorded By, (t) = Taken By, (c) = Cosigned By      Initials Name Provider Type    AJ Iker King, PT DPT Physical Therapist                  Therapy Charges for Today       Code Description Service Date Service Provider Modifiers Qty    92963086719 HC PT EVAL MOD COMPLEXITY 2 12/24/2024 Iker King, PT DPT GP 1            PT G-Codes  Outcome Measure Options: AM-PAC 6 Clicks Basic Mobility (PT)  AM-PAC 6 Clicks Score (PT): 8  PT Discharge Summary  Anticipated Discharge Disposition (PT): home with 24/7 care, skilled nursing facility    Iker King PT DPT  12/24/2024

## 2024-12-24 NOTE — PROGRESS NOTES
Nephrology ([facility]) Progress Note      Patient:  Jessica Alvarenga  YOB: 1960  Date of Service: 12/24/2024  MRN: [id number]   Acct: [id number]   Primary Care Physician: Ponce Orozco MD  Advance Directive:   Code Status and Medical Interventions: CPR (Attempt to Resuscitate); Full Support   Ordered at: 12/19/24 5133     Level Of Support Discussed With:    Patient     Code Status (Patient has no pulse and is not breathing):    CPR (Attempt to Resuscitate)     Medical Interventions (Patient has pulse or is breathing):    Full Support     Admit Date: 12/19/2024       Hospital Day: 3  Referring Provider: No Known Provider      Patient personally seen and examined.  Complete chart including Consults, Notes, Operative Reports, Labs, Cardiology, and Radiology studies reviewed as able.    Chief complaint: Abnormal labs.    Subjective:  Jessica Alvarenga is a 64 y.o. female for whom we were consulted for evaluation and treatment of acute kidney injury. Baseline chronic kidney disease stage 3b, baseline creatinine approximately 1.5. Follows in our [place] office. History of paraplegia, pressure ulcers, hypertension, suprapubic catheter, colostomy. Presented to outlying facility and was found to have abnormal labs including creatinine 3.3 and potassium 5.6. Transferred to [facility] for nephrology evaluation. She has received IV fluids as well as medical management of hyperkalemia. She reports several days of poor PO intake due to nausea recently as well as diarrhea from her ostomy. Denied changes in urine output. Denied NSAID use. Several wounds present on lower extremities and wound care has evaluated. Hemoglobin 6.4 on 12/20 and she is was given a unit of PRBC     This morning patient is complaining of nausea but no abdominal pain.  She denies any shortness of breath.    Allergies:  Morphine and Oxycodone    Home Meds:  Medications Prior to Admission   Medication Sig  Dispense Refill Last Dose/Taking    anastrozole (ARIMIDEX) 1 MG tablet Take 1 tablet by mouth Daily.   Taking    baclofen (LIORESAL) 10 MG tablet Take 1 tablet by mouth 3 (Three) Times a Day.   Taking    cholecalciferol (VITAMIN D3) 25 MCG (1000 UT) tablet Take 1 tablet by mouth Daily.   Taking    ferrous sulfate 325 (65 FE) MG tablet Take 1 tablet by mouth Daily With Breakfast.   Taking    losartan (COZAAR) 100 MG tablet Take 0.5 tablets by mouth Daily.   Taking    omeprazole (priLOSEC) 40 MG capsule Take 1 capsule by mouth Daily.   Taking    acetaminophen (TYLENOL) 325 MG tablet Take 2 tablets by mouth Every 4 (Four) Hours As Needed for Mild Pain .       benzonatate (TESSALON) 200 MG capsule Take 1 capsule by mouth 3 (Three) Times a Day As Needed for Cough.       diphenoxylate-atropine (LOMOTIL) 2.5-0.025 MG per tablet Take 1 tablet by mouth 4 (Four) Times a Day As Needed for Diarrhea.       HYDROcodone-acetaminophen (NORCO)  MG per tablet Take 1 tablet by mouth Every 4 (Four) Hours As Needed for Severe Pain  for up to 6 doses. (Patient taking differently: Take 1 tablet by mouth 4 (Four) Times a Day As Needed for Severe Pain.) 6 tablet 0        Medicines:  Current Facility-Administered Medications   Medication Dose Route Frequency Provider Last Rate Last Admin    acetaminophen (TYLENOL) tablet 650 mg  650 mg Oral Q4H PRN Thony Otto MD        allopurinol (ZYLOPRIM) tablet 100 mg  100 mg Oral Daily Darshan Nascimento MD   100 mg at 12/24/24 0907    ALPRAZolam (XANAX) tablet 0.25 mg  0.25 mg Oral Nightly PRN Thony Otto MD   0.25 mg at 12/23/24 2329    baclofen (LIORESAL) tablet 10 mg  10 mg Oral Q8H Thony Otto MD   10 mg at 12/23/24 2233    benzonatate (TESSALON) capsule 200 mg  200 mg Oral TID PRN Thony Otto MD   200 mg at 12/20/24 0549    cholecalciferol (VITAMIN D3) tablet 1,000 Units  1,000 Units Oral Daily Thony Otto MD   1,000 Units at 12/24/24 0907    doxycycline  (ADOXA) tablet 100 mg  100 mg Oral Q12H Justyn Owens MD   100 mg at 12/24/24 0907    ferric gluconate (FERRLECIT) 250 MG in sodium chloride 0.9% 250 mL IVPB  250 mg Intravenous Once Justyn Owens MD        heparin (porcine) 5000 UNIT/ML injection 5,000 Units  5,000 Units Subcutaneous Q12H Thony Otto MD   5,000 Units at 12/24/24 0906    HYDROcodone-acetaminophen (NORCO)  MG per tablet 1 tablet  1 tablet Oral Q4H PRN Thony Otto MD   1 tablet at 12/24/24 0509    lidocaine (LMX) 4 % cream 1 Application  1 Application Topical Once Merry Lees APRN        metoclopramide (REGLAN) injection 5 mg  5 mg Intravenous Q6H Justyn Owens MD        nitroglycerin (NITROSTAT) SL tablet 0.4 mg  0.4 mg Sublingual Q5 Min PRN Thony Otto MD        ondansetron (ZOFRAN) injection 4 mg  4 mg Intravenous Q6H PRN Thony Otto MD   4 mg at 12/24/24 0457    ondansetron ODT (ZOFRAN-ODT) disintegrating tablet 4 mg  4 mg Translingual Q8H PRN Thony Otto MD        pantoprazole (PROTONIX) injection 40 mg  40 mg Intravenous Q AM Justyn Owens MD        sodium bicarbonate tablet 650 mg  650 mg Oral TID Chas Mcallister APRN   650 mg at 12/24/24 0907    sodium chloride 0.9 % flush 10 mL  10 mL Intravenous Q12H Thony Otto MD   10 mL at 12/24/24 0906    sodium chloride 0.9 % flush 10 mL  10 mL Intravenous PRN Thony Otto MD        sodium chloride 0.9 % infusion 40 mL  40 mL Intravenous PRN Thony Otto MD   40 mL at 12/21/24 0249    sodium hypochlorite (DAKIN'S 1/4 STRENGTH) 0.125 % topical solution 0.125% solution   Topical Q12H Merry Lees APRN   Given at 12/23/24 2329       Past Medical History:  Past Medical History:   Diagnosis Date    Decubitus ulcer of both feet     Intradural extramedullary spinal tumor     Osteomyelitis     Paraplegia     Sacral decubitus ulcer        Past Surgical History:  Past Surgical History:   Procedure Laterality Date      SECTION      COLOSTOMY      CYSTOSTOMY      DEBRIDEMENT OF ISCHIAL ULCER/BUTTOCKS WOUND      IVC FILTER RETRIEVAL      MUSCLE FLAP      SPINE SURGERY      TRUNK DEBRIDEMENT Bilateral 2020    Procedure: DEBRIDEMENT DECUBITUS ULCER bilateral feet;  Surgeon: Truong Neal MD;  Location: [facility] OR;  Service: General;  Laterality: Bilateral;       Family History  Family History   Problem Relation Age of Onset    Lung cancer Father        Social History  Social History     Socioeconomic History    Marital status: Unknown   Tobacco Use    Smoking status: Never    Smokeless tobacco: Never   Vaping Use    Vaping status: Never Used   Substance and Sexual Activity    Alcohol use: Not Currently    Drug use: Never    Sexual activity: Defer       Review of Systems:  History obtained from chart review and the patient  General ROS: No fever or chills  Respiratory ROS: No cough, shortness of breath, wheezing  Cardiovascular ROS: No chest pain or palpitations  Gastrointestinal ROS: No abdominal pain or melena  Genito-Urinary ROS: No dysuria or hematuria  Psych ROS: No anxiety and depression  14 point ROS reviewed with the patient and negative except as noted above and in the HPI unless unable to obtain.    Objective:  Patient Vitals for the past 24 hrs:   BP Temp Temp src Pulse Resp SpO2 Weight   24 0740 152/67 98.3 °F (36.8 °C) Oral 70 18 100 % --   24 0513 157/47 98.7 °F (37.1 °C) Axillary 69 20 100 % 94.8 kg (209 lb)   24 1933 148/49 98.3 °F (36.8 °C) Oral 76 18 100 % --   24 1638 168/54 97.4 °F (36.3 °C) Oral 67 18 97 % --   24 1206 143/69 98.3 °F (36.8 °C) Oral 82 18 98 % --       Intake/Output Summary (Last 24 hours) at 2024 1048  Last data filed at 2024 0513  Gross per 24 hour   Intake 1660 ml   Output 1350 ml   Net 310 ml     General: awake/alert   HEENT: Normocephalic atraumatic head  Neck: Supple with no JVD or carotid base.  Chest:  clear to auscultation  bilaterally without respiratory distress  CVS: regular rate and rhythm  Abdominal: Soft nondistended, colostomy/urostomy  Extremities: no cyanosis or edema  Skin: warm and dry without rash      Labs:  Results from last 7 days   Lab Units 12/24/24  0930 12/22/24  0307 12/20/24  1939 12/20/24  0827   WBC 10*3/mm3 5.78 6.59  --  8.05   HEMOGLOBIN g/dL 8.9* 7.3* 7.3* 6.4*   HEMATOCRIT % 30.8* 25.5* 24.4* 22.0*   PLATELETS 10*3/mm3 268 262  --  307         Results from last 7 days   Lab Units 12/24/24  0930 12/23/24  1310 12/22/24  1246 12/22/24  0238 12/20/24  0827   SODIUM mmol/L 142 141  --  140 140   SODIUM, ARTERIAL mmol/L  --   --  142  --   --    POTASSIUM mmol/L 3.8 3.6  --  3.9 4.8   CHLORIDE mmol/L 109* 111*  --  113* 110*   CO2 mmol/L 20.0* 18.0*  --  15.0* 18.0*   BUN mg/dL 36* 38*  --  50* 58*   CREATININE mg/dL 1.78* 1.81*  --  2.35* 2.76*   CALCIUM mg/dL 8.8 7.9*  --  7.6* 7.7*   EGFR mL/min/1.73 32.0* 31.3*  --  22.9* 18.9*   BILIRUBIN mg/dL 0.2  --   --  <0.2 0.3   ALK PHOS U/L 152*  --   --  140* 169*   ALT (SGPT) U/L 8  --   --  6 7   AST (SGOT) U/L 6  --   --  <5 5   GLUCOSE mg/dL 137* 139*  --  126* 112*       Radiology:   Imaging Results (Last 72 Hours)       Procedure Component Value Units Date/Time    CT Abdomen Pelvis Without Contrast [[id number]] Collected: 12/23/24 1950     Updated: 12/23/24 2000    Narrative:      EXAM: CT ABDOMEN PELVIS WO CONTRAST-      DATE: 12/23/2024 5:29 PM     HISTORY: abdominal pain; nausea and vomiting       COMPARISON: 9/18/2020.     DOSE LENGTH PRODUCT: 886.62 mGy.cm Automatic exposure control was  utilized to make radiation dose as low as reasonably achievable.     TECHNIQUE: Noncontrast axial images of the abdomen and pelvis obtained  with multiplanar reformats.     FINDINGS:  VISUALIZED CHEST: Lung bases are grossly clear. There is coronary artery  calcification. No pericardial or pleural effusion is identified.     LIVER/BILE DUCTS: Unenhanced hepatic parenchyma  is unremarkable. There  is cholelithiasis without inflammatory change. Bile ducts are  unremarkable.     KIDNEYS/URETERS: Unremarkable.     ADRENAL: There is right renal atrophy and no hydronephrosis.        SPLEEN: Unremarkable.     PANCREAS: Unremarkable.     MESENTERY: No inflammatory change or free fluid is seen. No  retroperitoneal or mesenteric lymphadenopathy is seen.     VASCULATURE: There is calcification of the abdominal aorta without  aneurysm. There is an IVC filter in the infrarenal IVC.     GI TRACT: There is no bowel obstruction. No inflammatory changes are  identified.     PELVIS: Patient is status post cystectomy. Uterus and adnexa are  unremarkable. Pelvic portion of the GI tract are unremarkable. There is  ileal conduit and urostomy on the right. Chronic ulcers are noted  bilaterally with sclerosis of the left iliac bone likely chronic  osteomyelitis. There is hyperlordosis of the lumbosacral spine and  pelvic floor relaxation.     SOFT TISSUES: Normal appearance of the overlying abdominal  and pelvic  soft tissues.      BONES: There is chronic left hip dislocation and hip dysplasia.          Impression:      1. Cholelithiasis without definite inflammatory change.  2. Chronic right renal atrophy.  3. Status post cystectomy with ileal conduit and urostomy on the right  with parastomal hernia containing fat.  4. Chronic decubitus ulcers posteriorly and chronic osteomyelitis  involving the right ischium, left ischium, and left iliac bone.        This report was signed and finalized on 12/23/2024 7:57 PM by Everette Rojas.               Culture:  Blood Culture   Date Value Ref Range Status   12/20/2024 No growth at 3 days  Preliminary     Wound Culture   Date Value Ref Range Status   12/20/2024 Heavy growth (4+) Mixed Gram Negative Luciana (A)  Final   12/20/2024 Heavy growth (4+) Staphylococcus aureus, MRSA (A)  Final     Comment:       Methicillin resistant Staphylococcus aureus, Patient may be an  isolation risk.   12/20/2024 Heavy growth (4+) Normal Skin Luciana  Final   12/20/2024 Heavy growth (4+) Mixed Gram Negative Luciana (A)  Final   12/20/2024 Heavy growth (4+) Staphylococcus aureus, MRSA (A)  Final     Comment:       Methicillin resistant Staphylococcus aureus, Patient may be an isolation risk.   12/20/2024 Heavy growth (4+) Normal Skin Luciana  Final   12/20/2024 Scant growth (1+) Mixed Gram Negative Luciana (A)  Final   12/20/2024 Heavy growth (4+) Staphylococcus aureus, MRSA (A)  Final     Comment:       Methicillin resistant Staphylococcus aureus, Patient may be an isolation risk.   12/20/2024 Heavy growth (4+) Normal Skin Luciana  Final         Assessment   Acute kidney injury/improving  Acute tubular necrosis.  Baseline chronic kidney disease stage 3b  Hyperkalemia--improved  Metabolic acidosis  Benign essential hypertension.  History of colostomy and urostomy  Multiple pressure ulcers  Paraplegia  Iron deficiency anemia    Plan:  Continue p.o. sodium bicarbonate.  Intravenous ferric gluconate.  Monitor renal function closely      Tima Conte MD  12/24/2024  10:48 CST

## 2024-12-25 LAB
ALBUMIN SERPL-MCNC: 2.5 G/DL (ref 3.5–5.2)
ALBUMIN/GLOB SERPL: 0.7 G/DL
ALP SERPL-CCNC: 139 U/L (ref 39–117)
ALT SERPL W P-5'-P-CCNC: 8 U/L (ref 1–33)
ANION GAP SERPL CALCULATED.3IONS-SCNC: 10 MMOL/L (ref 5–15)
AST SERPL-CCNC: 7 U/L (ref 1–32)
BACTERIA SPEC AEROBE CULT: NORMAL
BASOPHILS # BLD AUTO: 0.06 10*3/MM3 (ref 0–0.2)
BASOPHILS NFR BLD AUTO: 0.9 % (ref 0–1.5)
BILIRUB SERPL-MCNC: 0.2 MG/DL (ref 0–1.2)
BUN SERPL-MCNC: 32 MG/DL (ref 8–23)
BUN/CREAT SERPL: 20.5 (ref 7–25)
CALCIUM SPEC-SCNC: 8.8 MG/DL (ref 8.6–10.5)
CHLORIDE SERPL-SCNC: 111 MMOL/L (ref 98–107)
CO2 SERPL-SCNC: 21 MMOL/L (ref 22–29)
CREAT SERPL-MCNC: 1.56 MG/DL (ref 0.57–1)
DEPRECATED RDW RBC AUTO: 54.9 FL (ref 37–54)
EGFRCR SERPLBLD CKD-EPI 2021: 37.4 ML/MIN/1.73
EOSINOPHIL # BLD AUTO: 0.55 10*3/MM3 (ref 0–0.4)
EOSINOPHIL NFR BLD AUTO: 8.4 % (ref 0.3–6.2)
ERYTHROCYTE [DISTWIDTH] IN BLOOD BY AUTOMATED COUNT: 17.2 % (ref 12.3–15.4)
GLOBULIN UR ELPH-MCNC: 3.4 GM/DL
GLUCOSE SERPL-MCNC: 101 MG/DL (ref 65–99)
HCT VFR BLD AUTO: 28.6 % (ref 34–46.6)
HGB BLD-MCNC: 8.2 G/DL (ref 12–15.9)
IMM GRANULOCYTES # BLD AUTO: 0.08 10*3/MM3 (ref 0–0.05)
IMM GRANULOCYTES NFR BLD AUTO: 1.2 % (ref 0–0.5)
LYMPHOCYTES # BLD AUTO: 1.02 10*3/MM3 (ref 0.7–3.1)
LYMPHOCYTES NFR BLD AUTO: 15.5 % (ref 19.6–45.3)
MCH RBC QN AUTO: 25.9 PG (ref 26.6–33)
MCHC RBC AUTO-ENTMCNC: 28.7 G/DL (ref 31.5–35.7)
MCV RBC AUTO: 90.5 FL (ref 79–97)
MONOCYTES # BLD AUTO: 0.38 10*3/MM3 (ref 0.1–0.9)
MONOCYTES NFR BLD AUTO: 5.8 % (ref 5–12)
NEUTROPHILS NFR BLD AUTO: 4.47 10*3/MM3 (ref 1.7–7)
NEUTROPHILS NFR BLD AUTO: 68.2 % (ref 42.7–76)
NRBC BLD AUTO-RTO: 0 /100 WBC (ref 0–0.2)
PLATELET # BLD AUTO: 312 10*3/MM3 (ref 140–450)
PMV BLD AUTO: 9.5 FL (ref 6–12)
POTASSIUM SERPL-SCNC: 4.1 MMOL/L (ref 3.5–5.2)
PROT SERPL-MCNC: 5.9 G/DL (ref 6–8.5)
RBC # BLD AUTO: 3.16 10*6/MM3 (ref 3.77–5.28)
SODIUM SERPL-SCNC: 142 MMOL/L (ref 136–145)
WBC NRBC COR # BLD AUTO: 6.56 10*3/MM3 (ref 3.4–10.8)

## 2024-12-25 PROCEDURE — 63710000001 ONDANSETRON ODT 4 MG TABLET DISPERSIBLE: Performed by: INTERNAL MEDICINE

## 2024-12-25 PROCEDURE — 80053 COMPREHEN METABOLIC PANEL: CPT | Performed by: INTERNAL MEDICINE

## 2024-12-25 PROCEDURE — 25010000002 HEPARIN (PORCINE) PER 1000 UNITS: Performed by: INTERNAL MEDICINE

## 2024-12-25 PROCEDURE — 25010000002 METOCLOPRAMIDE PER 10 MG: Performed by: INTERNAL MEDICINE

## 2024-12-25 PROCEDURE — 25010000002 ONDANSETRON PER 1 MG: Performed by: INTERNAL MEDICINE

## 2024-12-25 PROCEDURE — 85025 COMPLETE CBC W/AUTO DIFF WBC: CPT | Performed by: INTERNAL MEDICINE

## 2024-12-25 RX ADMIN — Medication 10 ML: at 21:16

## 2024-12-25 RX ADMIN — BACLOFEN 10 MG: 10 TABLET ORAL at 17:14

## 2024-12-25 RX ADMIN — HYDROCODONE BITARTRATE AND ACETAMINOPHEN 1 TABLET: 10; 325 TABLET ORAL at 05:55

## 2024-12-25 RX ADMIN — ALLOPURINOL 100 MG: 100 TABLET ORAL at 09:45

## 2024-12-25 RX ADMIN — BACLOFEN 10 MG: 10 TABLET ORAL at 21:14

## 2024-12-25 RX ADMIN — SODIUM BICARBONATE 650 MG: 650 TABLET ORAL at 09:45

## 2024-12-25 RX ADMIN — SODIUM BICARBONATE 650 MG: 650 TABLET ORAL at 09:39

## 2024-12-25 RX ADMIN — ONDANSETRON 4 MG: 4 TABLET, ORALLY DISINTEGRATING ORAL at 14:24

## 2024-12-25 RX ADMIN — METOCLOPRAMIDE 5 MG: 5 INJECTION, SOLUTION INTRAMUSCULAR; INTRAVENOUS at 22:39

## 2024-12-25 RX ADMIN — DOXYCYCLINE 100 MG: 100 TABLET, FILM COATED ORAL at 09:44

## 2024-12-25 RX ADMIN — METOCLOPRAMIDE 5 MG: 5 INJECTION, SOLUTION INTRAMUSCULAR; INTRAVENOUS at 05:21

## 2024-12-25 RX ADMIN — BACLOFEN 10 MG: 10 TABLET ORAL at 09:44

## 2024-12-25 RX ADMIN — ONDANSETRON 4 MG: 2 INJECTION INTRAMUSCULAR; INTRAVENOUS at 21:15

## 2024-12-25 RX ADMIN — ALPRAZOLAM 0.25 MG: 0.25 TABLET ORAL at 21:14

## 2024-12-25 RX ADMIN — METOCLOPRAMIDE 5 MG: 5 INJECTION, SOLUTION INTRAMUSCULAR; INTRAVENOUS at 17:14

## 2024-12-25 RX ADMIN — SODIUM BICARBONATE 650 MG: 650 TABLET ORAL at 21:14

## 2024-12-25 RX ADMIN — PANTOPRAZOLE SODIUM 40 MG: 40 INJECTION, POWDER, FOR SOLUTION INTRAVENOUS at 05:21

## 2024-12-25 RX ADMIN — SODIUM BICARBONATE 650 MG: 650 TABLET ORAL at 17:14

## 2024-12-25 RX ADMIN — METOCLOPRAMIDE 5 MG: 5 INJECTION, SOLUTION INTRAMUSCULAR; INTRAVENOUS at 11:20

## 2024-12-25 RX ADMIN — Medication 1000 UNITS: at 09:45

## 2024-12-25 RX ADMIN — Medication 10 ML: at 09:46

## 2024-12-25 RX ADMIN — HEPARIN SODIUM 5000 UNITS: 5000 INJECTION, SOLUTION INTRAVENOUS; SUBCUTANEOUS at 09:46

## 2024-12-25 RX ADMIN — HEPARIN SODIUM 5000 UNITS: 5000 INJECTION, SOLUTION INTRAVENOUS; SUBCUTANEOUS at 21:14

## 2024-12-25 RX ADMIN — SODIUM HYPOCHLORITE: 1.25 SOLUTION TOPICAL at 11:20

## 2024-12-25 RX ADMIN — DOXYCYCLINE 100 MG: 100 TABLET, FILM COATED ORAL at 21:14

## 2024-12-25 NOTE — PLAN OF CARE
Goal Outcome Evaluation:  Plan of Care Reviewed With: patient        Progress: no change  Outcome Evaluation: DRESSING CHANGES DONE PER ORDER. ISOLATION OBSERVED.  PT. REFUSES TO BE REPOSITIONED. ROOM AIR. UROSTOMY APPLIANCE CHANGED PER PT. THE PT. REFUSES TO WEAR AN OSTOMY APPLIANCE. ROOM AIR. IRON INFUSION GIVEN IV PER ORDER. HIDA SCAN DONE PER ORDER.  PT. CONT. TO C/O NAUSEA AT TIMES. NO DISTRESS NOTED.

## 2024-12-25 NOTE — PROGRESS NOTES
"    [facility]  INPATIENT PROGRESS NOTE    Patient Name: Jessica Alvarenga  Date of Admission: 12/19/2024  Today's Date: 12/25/24  Length of Stay: 4  Primary Care Physician: Ponce Orozco MD    Subjective   Chief Complaint: Nausea and vomiting  HPI   Sister at bedside  She states that she has not sleep well.  I tried to rub her on her right shoulder but did not wake up readily.  She subsequently woke up during my conversation with sister    Sister felt that she is \"foggy\"  She received pain medication.    Patient states that she is not feeling well and not ready to go home.  She felt nauseated and reports had vomiting.  CT of the abdomen and pelvis reviewed.  HIDA scan report still pending.    She has an untouched breakfast meal tray table.      Review of Systems   All pertinent negatives and positives are as above. All other systems have been reviewed and are negative unless otherwise stated.     Objective    Temp:  [97.4 °F (36.3 °C)-98.3 °F (36.8 °C)] 97.5 °F (36.4 °C)  Heart Rate:  [75-82] 76  Resp:  [18] 18  BP: (148-158)/(53-76) 158/53  Physical Exam  Vitals reviewed.   Constitutional:       General: She is not in acute distress.     Appearance: She is not toxic-appearing.   HENT:      Head: Normocephalic.      Mouth/Throat:      Mouth: Mucous membranes are moist.   Cardiovascular:      Rate and Rhythm: Normal rate.   Pulmonary:      Effort: Pulmonary effort is normal. No respiratory distress.      Comments: On room air  Abdominal:      Palpations: Abdomen is soft.      Tenderness: There is abdominal tenderness.      Comments: Ileal conduit and colostomy in place.  Primarily has tenderness near middle of abdomen, but difficult to localize as patient reports tenderness at multiple sites.     Musculoskeletal:         General: Swelling and deformity present.      Comments: New dressings in place to both lower extremities   Skin:     General: Skin is warm. " 12-2-2016   Neurological:      Mental Status: She is alert. Mental status is at baseline.   Psychiatric:         Mood and Affect: Mood normal.     She was slumped forward when when I came in her room.  Sister at bedside  No significant change from exam yesterday.    Results Review:  I have reviewed the labs, radiology results, and diagnostic studies.    Laboratory Data:   Results from last 7 days   Lab Units 12/25/24  0558 12/24/24  0930 12/22/24  0307   WBC 10*3/mm3 6.56 5.78 6.59   HEMOGLOBIN g/dL 8.2* 8.9* 7.3*   HEMATOCRIT % 28.6* 30.8* 25.5*   PLATELETS 10*3/mm3 312 268 262        Results from last 7 days   Lab Units 12/25/24  0558 12/24/24  0930 12/23/24  1310 12/22/24  1246 12/22/24  0238   SODIUM mmol/L 142 142 141  --  140   SODIUM, ARTERIAL   --   --   --    < >  --    POTASSIUM mmol/L 4.1 3.8 3.6  --  3.9   CHLORIDE mmol/L 111* 109* 111*  --  113*   CO2 mmol/L 21.0* 20.0* 18.0*  --  15.0*   BUN mg/dL 32* 36* 38*  --  50*   CREATININE mg/dL 1.56* 1.78* 1.81*  --  2.35*   CALCIUM mg/dL 8.8 8.8 7.9*  --  7.6*   BILIRUBIN mg/dL 0.2 0.2  --   --  <0.2   ALK PHOS U/L 139* 152*  --   --  140*   ALT (SGPT) U/L 8 8  --   --  6   AST (SGOT) U/L 7 6  --   --  <5   GLUCOSE mg/dL 101* 137* 139*  --  126*    < > = values in this interval not displayed.       Culture Data:   Blood Culture   Date Value Ref Range Status   12/20/2024 No growth at 3 days  Preliminary     Wound Culture   Date Value Ref Range Status   12/20/2024 Heavy growth (4+) Mixed Gram Negative Luciana (A)  Final   12/20/2024 Heavy growth (4+) Staphylococcus aureus, MRSA (A)  Final     Comment:       Methicillin resistant Staphylococcus aureus, Patient may be an isolation risk.   12/20/2024 Heavy growth (4+) Normal Skin Luciana  Final   12/20/2024 Heavy growth (4+) Mixed Gram Negative Luciana (A)  Final   12/20/2024 Heavy growth (4+) Staphylococcus aureus, MRSA (A)  Final     Comment:       Methicillin resistant Staphylococcus aureus, Patient may be an isolation risk.    12/20/2024 Heavy growth (4+) Normal Skin Luciana  Final   12/20/2024 Scant growth (1+) Mixed Gram Negative Luciana (A)  Final   12/20/2024 Heavy growth (4+) Staphylococcus aureus, MRSA (A)  Final     Comment:       Methicillin resistant Staphylococcus aureus, Patient may be an isolation risk.   12/20/2024 Heavy growth (4+) Normal Skin Luciana  Final       Radiology Data:   Imaging Results (Last 24 Hours)       Procedure Component Value Units Date/Time    NM HIDA SCAN WITHOUT PHARMACOLOGICAL INTERVENTION [[id number]] Resulted: 12/24/24 1202     Updated: 12/24/24 1202            I have reviewed the patient's current medications.     Assessment/Plan   Assessment      Medical Decision Making  Number and Complexity of problems:  Active Hospital Problems    Diagnosis     **Acute kidney injury     Cholelithiases     Chronic osteomyelitis right and left ischium, left iliac bone     Nausea and vomiting     Metabolic acidosis     Pressure ulcers of skin of multiple topographic sites     Paraplegia     Anemia     CKD stage 3b, GFR 30-44 ml/min     Colostomy in place     Presence of urostomy     History of breast cancer     CORINA (acute kidney injury)     Acute kidney failure, unspecified        Treatment Plan  Daily improvement in renal function - creatinine down to 1.56 this AM.  Improving probably close to baseline  Nephrology following and appreciate their assistance  CT A/P reviewed -cholelithiasis with no other inflammatory changes.  LFTs have been unremarkable.  Parastomal hernia containing fat noted.  No other acute findings.  Transition back to clear liquids  Trial of IV PPI  Nuclear medicine HIDA scan-report pending  Trial of scheduled Reglan  May require GI consultation for intractable N/V symptoms pending the above.  Continue wound care  Wound cultures positive for MRSA - susceptible to tetracyclines.  Plan to start treatment with PO Doxycycline for now.  Outside hospital records from [facility] reviewed (see below)  and patient does report that follow-up in [place] regarding her wounds - [facility]  Will need close outpatient follow-up with wound care in [place] and her specialists in [place]  Allopurinol added  PO Sodium bicarbonate  Patient did have a hemoglobin of 6.4 status posttransfusion of 1 unit of PRBCs.  IV ferric gluconate today  Patient reports she lives at home with her son in [place], [place], and plans to return there at discharge  With nursing staff regarding availability of mattress in reference to wound care    Meds reviewed.  Continue present management  allopurinol, 100 mg, Oral, Daily  baclofen, 10 mg, Oral, Q8H  cholecalciferol, 1,000 Units, Oral, Daily  doxycycline, 100 mg, Oral, Q12H  heparin (porcine), 5,000 Units, Subcutaneous, Q12H  lidocaine, 1 Application, Topical, Once  metoclopramide, 5 mg, Intravenous, Q6H  pantoprazole, 40 mg, Intravenous, Q AM  sodium bicarbonate, 650 mg, Oral, TID  sodium chloride, 10 mL, Intravenous, Q12H  sodium hypochlorite, , Topical, Q12H          Conditions and Status        Condition is unchanged.     [facility] Data  External documents reviewed:               Cardiac tracing (EKG, telemetry) interpretation: no new EKGs  Radiology interpretation: CT A/P results reviewed with patient at bedside this afternoon  Labs reviewed: as above  Any tests that were considered but not ordered: as above: none     Decision rules/scores evaluated (example [id number], [place], etc): none     Discussed with: patient, sister     Care Planning  Shared decision making: Discussed with patient with agreement to proceed with treatment plan as outlined  Code status and discussions: Full code    Disposition  Social Determinants of Health that impact treatment or disposition: paraplegia  I expect the patient to be discharged: to be determined  Follow-up HIDA scan report  Supportive treatment  Nursing staff/wound care regarding request on mattress      Electronically  signed by Darshan Nascimento MD, 12/25/24, 09:36 CST.

## 2024-12-25 NOTE — PLAN OF CARE
Goal Outcome Evaluation:  Plan of Care Reviewed With: patient        Progress: no change     A/O x 4. PT c/o of nausea at times. (Scheduled nausea meds given). No emesis. Port dressing C/D/I. Pt refusing ostomy appliance. Urostomy draining to riddle bag. Denies pain. Pt turned when agreeable. IID. Safety maintained.

## 2024-12-25 NOTE — PROGRESS NOTES
Nephrology ([facility]) Progress Note      Patient:  Jessica Alvarenga  YOB: 1960  Date of Service: 12/25/2024  MRN: [id number]   Acct: [id number]   Primary Care Physician: Ponce Orozco MD  Advance Directive:   Code Status and Medical Interventions: CPR (Attempt to Resuscitate); Full Support   Ordered at: 12/19/24 9165     Level Of Support Discussed With:    Patient     Code Status (Patient has no pulse and is not breathing):    CPR (Attempt to Resuscitate)     Medical Interventions (Patient has pulse or is breathing):    Full Support     Admit Date: 12/19/2024       Hospital Day: 4  Referring Provider: No Known Provider      Patient personally seen and examined.  Complete chart including Consults, Notes, Operative Reports, Labs, Cardiology, and Radiology studies reviewed as able.    Chief complaint: Abnormal labs.    Subjective:  Jessica Alvarenga is a 64 y.o. female for whom we were consulted for evaluation and treatment of acute kidney injury. Baseline chronic kidney disease stage 3b, baseline creatinine approximately 1.5. Follows in our [place] office. History of paraplegia, pressure ulcers, hypertension, suprapubic catheter, colostomy. Presented to outlying facility and was found to have abnormal labs including creatinine 3.3 and potassium 5.6. Transferred to [facility] for nephrology evaluation. She has received IV fluids as well as medical management of hyperkalemia. She reports several days of poor PO intake due to nausea recently as well as diarrhea from her ostomy. Denied changes in urine output. Denied NSAID use. Several wounds present on lower extremities and wound care has evaluated. Hemoglobin 6.4 on 12/20 and she is was given a unit of PRBC     This morning patient still complaining of nausea and vomiting.  HIDA scan was done, pending results.  However she feels nauseated and weak.    Allergies:  Morphine and Oxycodone    Home Meds:  Medications  Prior to Admission   Medication Sig Dispense Refill Last Dose/Taking    anastrozole (ARIMIDEX) 1 MG tablet Take 1 tablet by mouth Daily.   Taking    baclofen (LIORESAL) 10 MG tablet Take 1 tablet by mouth 3 (Three) Times a Day.   Taking    cholecalciferol (VITAMIN D3) 25 MCG (1000 UT) tablet Take 1 tablet by mouth Daily.   Taking    ferrous sulfate 325 (65 FE) MG tablet Take 1 tablet by mouth Daily With Breakfast.   Taking    losartan (COZAAR) 100 MG tablet Take 0.5 tablets by mouth Daily.   Taking    omeprazole (priLOSEC) 40 MG capsule Take 1 capsule by mouth Daily.   Taking    acetaminophen (TYLENOL) 325 MG tablet Take 2 tablets by mouth Every 4 (Four) Hours As Needed for Mild Pain .       benzonatate (TESSALON) 200 MG capsule Take 1 capsule by mouth 3 (Three) Times a Day As Needed for Cough.       diphenoxylate-atropine (LOMOTIL) 2.5-0.025 MG per tablet Take 1 tablet by mouth 4 (Four) Times a Day As Needed for Diarrhea.       HYDROcodone-acetaminophen (NORCO)  MG per tablet Take 1 tablet by mouth Every 4 (Four) Hours As Needed for Severe Pain  for up to 6 doses. (Patient taking differently: Take 1 tablet by mouth 4 (Four) Times a Day As Needed for Severe Pain.) 6 tablet 0        Medicines:  Current Facility-Administered Medications   Medication Dose Route Frequency Provider Last Rate Last Admin    acetaminophen (TYLENOL) tablet 650 mg  650 mg Oral Q4H PRN Thony Otto MD        allopurinol (ZYLOPRIM) tablet 100 mg  100 mg Oral Daily Darshan Nascimento MD   100 mg at 12/25/24 0945    ALPRAZolam (XANAX) tablet 0.25 mg  0.25 mg Oral Nightly PRN Thony Otto MD   0.25 mg at 12/24/24 2006    baclofen (LIORESAL) tablet 10 mg  10 mg Oral Q8H Thony Otto MD   10 mg at 12/25/24 0944    benzonatate (TESSALON) capsule 200 mg  200 mg Oral TID PRN Thony Otto MD   200 mg at 12/20/24 0549    cholecalciferol (VITAMIN D3) tablet 1,000 Units  1,000 Units Oral Daily Thony Otto MD   1,000  Units at 24 0945    doxycycline (ADOXA) tablet 100 mg  100 mg Oral Q12H Justyn Owens MD   100 mg at 24 0944    heparin (porcine) 5000 UNIT/ML injection 5,000 Units  5,000 Units Subcutaneous Q12H Thony Otto MD   5,000 Units at 24 0946    HYDROcodone-acetaminophen (NORCO)  MG per tablet 1 tablet  1 tablet Oral Q4H PRN Thony Otto MD   1 tablet at 24 0555    lidocaine (LMX) 4 % cream 1 Application  1 Application Topical Once Merry Lees APRN        metoclopramide (REGLAN) injection 5 mg  5 mg Intravenous Q6H Justyn Owens MD   5 mg at 24 1120    nitroglycerin (NITROSTAT) SL tablet 0.4 mg  0.4 mg Sublingual Q5 Min PRN Thony Otto MD        ondansetron (ZOFRAN) injection 4 mg  4 mg Intravenous Q6H PRN Thony Otto MD   4 mg at 24 1101    ondansetron ODT (ZOFRAN-ODT) disintegrating tablet 4 mg  4 mg Translingual Q8H PRN Thony Otto MD        pantoprazole (PROTONIX) injection 40 mg  40 mg Intravenous Q AM Justyn Owens MD   40 mg at 24 0521    sodium bicarbonate tablet 650 mg  650 mg Oral TID Chas Mcallister, APRN   650 mg at 24 0945    sodium chloride 0.9 % flush 10 mL  10 mL Intravenous Q12H Thony Otto MD   10 mL at 24 0946    sodium chloride 0.9 % flush 10 mL  10 mL Intravenous PRN Thony Otto MD        sodium chloride 0.9 % infusion 40 mL  40 mL Intravenous PRN Thony Otto MD   40 mL at 24 0249    sodium hypochlorite (DAKIN'S 1/4 STRENGTH) 0.125 % topical solution 0.125% solution   Topical Q12H Merry Lees APRN   Given at 24 1120       Past Medical History:  Past Medical History:   Diagnosis Date    Decubitus ulcer of both feet     Intradural extramedullary spinal tumor     Osteomyelitis     Paraplegia     Sacral decubitus ulcer        Past Surgical History:  Past Surgical History:   Procedure Laterality Date     SECTION      COLOSTOMY       CYSTOSTOMY      DEBRIDEMENT OF ISCHIAL ULCER/BUTTOCKS WOUND      IVC FILTER RETRIEVAL      MUSCLE FLAP      SPINE SURGERY      TRUNK DEBRIDEMENT Bilateral 7/21/2020    Procedure: DEBRIDEMENT DECUBITUS ULCER bilateral feet;  Surgeon: Truong Neal MD;  Location: [facility] OR;  Service: General;  Laterality: Bilateral;       Family History  Family History   Problem Relation Age of Onset    Lung cancer Father        Social History  Social History     Socioeconomic History    Marital status: Unknown   Tobacco Use    Smoking status: Never    Smokeless tobacco: Never   Vaping Use    Vaping status: Never Used   Substance and Sexual Activity    Alcohol use: Not Currently    Drug use: Never    Sexual activity: Defer       Review of Systems:  History obtained from chart review and the patient  General ROS: No fever or chills  Respiratory ROS: No cough, shortness of breath, wheezing  Cardiovascular ROS: No chest pain or palpitations  Gastrointestinal ROS: No abdominal pain or melena  Genito-Urinary ROS: No dysuria or hematuria  Psych ROS: No anxiety and depression  14 point ROS reviewed with the patient and negative except as noted above and in the HPI unless unable to obtain.    Objective:  Patient Vitals for the past 24 hrs:   BP Temp Temp src Pulse Resp SpO2   12/25/24 1227 144/49 97.7 °F (36.5 °C) Oral 75 18 100 %   12/25/24 0738 158/53 97.5 °F (36.4 °C) Oral 76 18 98 %   12/25/24 0302 148/53 97.6 °F (36.4 °C) Oral 82 18 99 %   12/24/24 1936 150/57 97.4 °F (36.3 °C) Oral 75 18 99 %   12/24/24 1540 157/76 98.3 °F (36.8 °C) Oral 81 18 100 %       Intake/Output Summary (Last 24 hours) at 12/25/2024 1421  Last data filed at 12/25/2024 1349  Gross per 24 hour   Intake 360 ml   Output 850 ml   Net -490 ml     General: awake/alert   HEENT: Normocephalic atraumatic head  Neck: Supple with no JVD or carotid base.  Chest:  clear to auscultation bilaterally without respiratory distress  CVS: regular rate and rhythm  Abdominal: Soft  nondistended, colostomy/urostomy  Extremities: no cyanosis or edema  Skin: warm and dry without rash      Labs:  Results from last 7 days   Lab Units 12/25/24  0558 12/24/24  0930 12/22/24  0307   WBC 10*3/mm3 6.56 5.78 6.59   HEMOGLOBIN g/dL 8.2* 8.9* 7.3*   HEMATOCRIT % 28.6* 30.8* 25.5*   PLATELETS 10*3/mm3 312 268 262         Results from last 7 days   Lab Units 12/25/24  0558 12/24/24  0930 12/23/24  1310 12/22/24  1246 12/22/24  0238   SODIUM mmol/L 142 142 141  --  140   SODIUM, ARTERIAL   --   --   --    < >  --    POTASSIUM mmol/L 4.1 3.8 3.6  --  3.9   CHLORIDE mmol/L 111* 109* 111*  --  113*   CO2 mmol/L 21.0* 20.0* 18.0*  --  15.0*   BUN mg/dL 32* 36* 38*  --  50*   CREATININE mg/dL 1.56* 1.78* 1.81*  --  2.35*   CALCIUM mg/dL 8.8 8.8 7.9*  --  7.6*   EGFR mL/min/1.73 37.4* 32.0* 31.3*  --  22.9*   BILIRUBIN mg/dL 0.2 0.2  --   --  <0.2   ALK PHOS U/L 139* 152*  --   --  140*   ALT (SGPT) U/L 8 8  --   --  6   AST (SGOT) U/L 7 6  --   --  <5   GLUCOSE mg/dL 101* 137* 139*  --  126*    < > = values in this interval not displayed.       Radiology:   Imaging Results (Last 72 Hours)       Procedure Component Value Units Date/Time    NM HIDA SCAN WITHOUT PHARMACOLOGICAL INTERVENTION [[id number]] Resulted: 12/24/24 1202     Updated: 12/24/24 1202    CT Abdomen Pelvis Without Contrast [[id number]] Collected: 12/23/24 1950     Updated: 12/23/24 2000    Narrative:      EXAM: CT ABDOMEN PELVIS WO CONTRAST-      DATE: 12/23/2024 5:29 PM     HISTORY: abdominal pain; nausea and vomiting       COMPARISON: 9/18/2020.     DOSE LENGTH PRODUCT: 886.62 mGy.cm Automatic exposure control was  utilized to make radiation dose as low as reasonably achievable.     TECHNIQUE: Noncontrast axial images of the abdomen and pelvis obtained  with multiplanar reformats.     FINDINGS:  VISUALIZED CHEST: Lung bases are grossly clear. There is coronary artery  calcification. No pericardial or pleural effusion is identified.     LIVER/BILE  DUCTS: Unenhanced hepatic parenchyma is unremarkable. There  is cholelithiasis without inflammatory change. Bile ducts are  unremarkable.     KIDNEYS/URETERS: Unremarkable.     ADRENAL: There is right renal atrophy and no hydronephrosis.        SPLEEN: Unremarkable.     PANCREAS: Unremarkable.     MESENTERY: No inflammatory change or free fluid is seen. No  retroperitoneal or mesenteric lymphadenopathy is seen.     VASCULATURE: There is calcification of the abdominal aorta without  aneurysm. There is an IVC filter in the infrarenal IVC.     GI TRACT: There is no bowel obstruction. No inflammatory changes are  identified.     PELVIS: Patient is status post cystectomy. Uterus and adnexa are  unremarkable. Pelvic portion of the GI tract are unremarkable. There is  ileal conduit and urostomy on the right. Chronic ulcers are noted  bilaterally with sclerosis of the left iliac bone likely chronic  osteomyelitis. There is hyperlordosis of the lumbosacral spine and  pelvic floor relaxation.     SOFT TISSUES: Normal appearance of the overlying abdominal  and pelvic  soft tissues.      BONES: There is chronic left hip dislocation and hip dysplasia.          Impression:      1. Cholelithiasis without definite inflammatory change.  2. Chronic right renal atrophy.  3. Status post cystectomy with ileal conduit and urostomy on the right  with parastomal hernia containing fat.  4. Chronic decubitus ulcers posteriorly and chronic osteomyelitis  involving the right ischium, left ischium, and left iliac bone.        This report was signed and finalized on 12/23/2024 7:57 PM by Everette Rojas.               Culture:  Blood Culture   Date Value Ref Range Status   12/20/2024 No growth at 3 days  Preliminary     Wound Culture   Date Value Ref Range Status   12/20/2024 Heavy growth (4+) Mixed Gram Negative Luciana (A)  Final   12/20/2024 Heavy growth (4+) Staphylococcus aureus, MRSA (A)  Final     Comment:       Methicillin resistant  Staphylococcus aureus, Patient may be an isolation risk.   12/20/2024 Heavy growth (4+) Normal Skin Luciana  Final   12/20/2024 Heavy growth (4+) Mixed Gram Negative Luciana (A)  Final   12/20/2024 Heavy growth (4+) Staphylococcus aureus, MRSA (A)  Final     Comment:       Methicillin resistant Staphylococcus aureus, Patient may be an isolation risk.   12/20/2024 Heavy growth (4+) Normal Skin Luciana  Final   12/20/2024 Scant growth (1+) Mixed Gram Negative Luciana (A)  Final   12/20/2024 Heavy growth (4+) Staphylococcus aureus, MRSA (A)  Final     Comment:       Methicillin resistant Staphylococcus aureus, Patient may be an isolation risk.   12/20/2024 Heavy growth (4+) Normal Skin Luciana  Final         Assessment   Acute kidney injury/improving  Acute tubular necrosis.  Baseline chronic kidney disease stage 3b  Hyperkalemia--improved  Metabolic acidosis  Benign essential hypertension.  History of colostomy and urostomy  Multiple pressure ulcers  Paraplegia  Iron deficiency anemia    Plan:  Continue p.o. sodium bicarbonate.  Intravenous ferric gluconate.  Monitor renal function closely      Tima Conte MD  12/25/2024  14:21 CST

## 2024-12-26 LAB
ALBUMIN SERPL-MCNC: 2.3 G/DL (ref 3.5–5.2)
ALBUMIN/GLOB SERPL: 0.7 G/DL
ALP SERPL-CCNC: 139 U/L (ref 39–117)
ALT SERPL W P-5'-P-CCNC: 8 U/L (ref 1–33)
ANION GAP SERPL CALCULATED.3IONS-SCNC: 11 MMOL/L (ref 5–15)
AST SERPL-CCNC: 6 U/L (ref 1–32)
BASOPHILS # BLD AUTO: 0.06 10*3/MM3 (ref 0–0.2)
BASOPHILS NFR BLD AUTO: 0.8 % (ref 0–1.5)
BILIRUB SERPL-MCNC: 0.2 MG/DL (ref 0–1.2)
BUN SERPL-MCNC: 29 MG/DL (ref 8–23)
BUN/CREAT SERPL: 19.2 (ref 7–25)
CALCIUM SPEC-SCNC: 8.5 MG/DL (ref 8.6–10.5)
CHLORIDE SERPL-SCNC: 113 MMOL/L (ref 98–107)
CO2 SERPL-SCNC: 21 MMOL/L (ref 22–29)
CREAT SERPL-MCNC: 1.51 MG/DL (ref 0.57–1)
DEPRECATED RDW RBC AUTO: 55.7 FL (ref 37–54)
EGFRCR SERPLBLD CKD-EPI 2021: 38.9 ML/MIN/1.73
EOSINOPHIL # BLD AUTO: 0.38 10*3/MM3 (ref 0–0.4)
EOSINOPHIL NFR BLD AUTO: 5.3 % (ref 0.3–6.2)
ERYTHROCYTE [DISTWIDTH] IN BLOOD BY AUTOMATED COUNT: 17.3 % (ref 12.3–15.4)
GLOBULIN UR ELPH-MCNC: 3.2 GM/DL
GLUCOSE SERPL-MCNC: 94 MG/DL (ref 65–99)
HCT VFR BLD AUTO: 29.1 % (ref 34–46.6)
HGB BLD-MCNC: 8.2 G/DL (ref 12–15.9)
IMM GRANULOCYTES # BLD AUTO: 0.06 10*3/MM3 (ref 0–0.05)
IMM GRANULOCYTES NFR BLD AUTO: 0.8 % (ref 0–0.5)
LYMPHOCYTES # BLD AUTO: 0.66 10*3/MM3 (ref 0.7–3.1)
LYMPHOCYTES NFR BLD AUTO: 9.2 % (ref 19.6–45.3)
MCH RBC QN AUTO: 25.8 PG (ref 26.6–33)
MCHC RBC AUTO-ENTMCNC: 28.2 G/DL (ref 31.5–35.7)
MCV RBC AUTO: 91.5 FL (ref 79–97)
MONOCYTES # BLD AUTO: 0.52 10*3/MM3 (ref 0.1–0.9)
MONOCYTES NFR BLD AUTO: 7.2 % (ref 5–12)
NEUTROPHILS NFR BLD AUTO: 5.5 10*3/MM3 (ref 1.7–7)
NEUTROPHILS NFR BLD AUTO: 76.7 % (ref 42.7–76)
NRBC BLD AUTO-RTO: 0 /100 WBC (ref 0–0.2)
PLATELET # BLD AUTO: 285 10*3/MM3 (ref 140–450)
PMV BLD AUTO: 9.4 FL (ref 6–12)
POTASSIUM SERPL-SCNC: 4.1 MMOL/L (ref 3.5–5.2)
PROT SERPL-MCNC: 5.5 G/DL (ref 6–8.5)
RBC # BLD AUTO: 3.18 10*6/MM3 (ref 3.77–5.28)
SODIUM SERPL-SCNC: 145 MMOL/L (ref 136–145)
WBC NRBC COR # BLD AUTO: 7.18 10*3/MM3 (ref 3.4–10.8)

## 2024-12-26 PROCEDURE — 80053 COMPREHEN METABOLIC PANEL: CPT | Performed by: INTERNAL MEDICINE

## 2024-12-26 PROCEDURE — 99222 1ST HOSP IP/OBS MODERATE 55: CPT | Performed by: INTERNAL MEDICINE

## 2024-12-26 PROCEDURE — 25010000002 METOCLOPRAMIDE PER 10 MG: Performed by: INTERNAL MEDICINE

## 2024-12-26 PROCEDURE — 97116 GAIT TRAINING THERAPY: CPT

## 2024-12-26 PROCEDURE — 85025 COMPLETE CBC W/AUTO DIFF WBC: CPT | Performed by: INTERNAL MEDICINE

## 2024-12-26 PROCEDURE — 25010000002 HEPARIN (PORCINE) PER 1000 UNITS: Performed by: INTERNAL MEDICINE

## 2024-12-26 RX ADMIN — HEPARIN SODIUM 5000 UNITS: 5000 INJECTION, SOLUTION INTRAVENOUS; SUBCUTANEOUS at 21:44

## 2024-12-26 RX ADMIN — BACLOFEN 10 MG: 10 TABLET ORAL at 11:44

## 2024-12-26 RX ADMIN — HEPARIN SODIUM 5000 UNITS: 5000 INJECTION, SOLUTION INTRAVENOUS; SUBCUTANEOUS at 08:57

## 2024-12-26 RX ADMIN — SODIUM BICARBONATE 650 MG: 650 TABLET ORAL at 21:44

## 2024-12-26 RX ADMIN — ALLOPURINOL 100 MG: 100 TABLET ORAL at 08:57

## 2024-12-26 RX ADMIN — DOXYCYCLINE 100 MG: 100 TABLET, FILM COATED ORAL at 21:44

## 2024-12-26 RX ADMIN — METOCLOPRAMIDE 5 MG: 5 INJECTION, SOLUTION INTRAMUSCULAR; INTRAVENOUS at 11:44

## 2024-12-26 RX ADMIN — Medication 1000 UNITS: at 08:56

## 2024-12-26 RX ADMIN — BACLOFEN 10 MG: 10 TABLET ORAL at 21:44

## 2024-12-26 RX ADMIN — METOCLOPRAMIDE 5 MG: 5 INJECTION, SOLUTION INTRAMUSCULAR; INTRAVENOUS at 05:12

## 2024-12-26 RX ADMIN — DOXYCYCLINE 100 MG: 100 TABLET, FILM COATED ORAL at 08:57

## 2024-12-26 RX ADMIN — SODIUM HYPOCHLORITE: 1.25 SOLUTION TOPICAL at 11:43

## 2024-12-26 RX ADMIN — ALPRAZOLAM 0.25 MG: 0.25 TABLET ORAL at 21:55

## 2024-12-26 RX ADMIN — PANTOPRAZOLE SODIUM 40 MG: 40 INJECTION, POWDER, FOR SOLUTION INTRAVENOUS at 05:12

## 2024-12-26 RX ADMIN — Medication 10 ML: at 21:44

## 2024-12-26 RX ADMIN — SODIUM HYPOCHLORITE: 1.25 SOLUTION TOPICAL at 06:50

## 2024-12-26 RX ADMIN — SODIUM BICARBONATE 650 MG: 650 TABLET ORAL at 08:57

## 2024-12-26 RX ADMIN — SODIUM HYPOCHLORITE: 1.25 SOLUTION TOPICAL at 21:46

## 2024-12-26 RX ADMIN — BACLOFEN 10 MG: 10 TABLET ORAL at 18:26

## 2024-12-26 RX ADMIN — SODIUM BICARBONATE 650 MG: 650 TABLET ORAL at 15:47

## 2024-12-26 RX ADMIN — Medication 10 ML: at 08:57

## 2024-12-26 NOTE — PROGRESS NOTES
Nephrology ([facility]) Progress Note      Patient:  Jessica Alvarenga  YOB: 1960  Date of Service: 12/26/2024  MRN: [id number]   Acct: [id number]   Primary Care Physician: Ponce Orozco MD  Advance Directive:   Code Status and Medical Interventions: CPR (Attempt to Resuscitate); Full Support   Ordered at: 12/19/24 5709     Level Of Support Discussed With:    Patient     Code Status (Patient has no pulse and is not breathing):    CPR (Attempt to Resuscitate)     Medical Interventions (Patient has pulse or is breathing):    Full Support     Admit Date: 12/19/2024       Hospital Day: 5  Referring Provider: No Known Provider      Patient personally seen and examined.  Complete chart including Consults, Notes, Operative Reports, Labs, Cardiology, and Radiology studies reviewed as able.    Chief complaint: Abnormal labs.    Subjective:  Jessica Alvarenga is a 64 y.o. female for whom we were consulted for evaluation and treatment of acute kidney injury. Baseline chronic kidney disease stage 3b, baseline creatinine approximately 1.5. Follows in our [place] office. History of paraplegia, pressure ulcers, hypertension, suprapubic catheter, colostomy. Presented to outlying facility and was found to have abnormal labs including creatinine 3.3 and potassium 5.6. Transferred to [facility] for nephrology evaluation. She has received IV fluids as well as medical management of hyperkalemia. She reports several days of poor PO intake due to nausea recently as well as diarrhea from her ostomy. Denied changes in urine output. Denied NSAID use. Several wounds present on lower extremities and wound care has evaluated. Hemoglobin 6.4 on 12/20 and she is was given a unit of PRBC     This morning patient is still complaining of nausea vomiting.  She is also reporting suprapubic abdominal pain.  Her HIDA scan is normal.  Her renal function continues to improve.    Allergies:  Morphine  and Oxycodone    Home Meds:  Medications Prior to Admission   Medication Sig Dispense Refill Last Dose/Taking    anastrozole (ARIMIDEX) 1 MG tablet Take 1 tablet by mouth Daily.   Taking    baclofen (LIORESAL) 10 MG tablet Take 1 tablet by mouth 3 (Three) Times a Day.   Taking    cholecalciferol (VITAMIN D3) 25 MCG (1000 UT) tablet Take 1 tablet by mouth Daily.   Taking    ferrous sulfate 325 (65 FE) MG tablet Take 1 tablet by mouth Daily With Breakfast.   Taking    losartan (COZAAR) 100 MG tablet Take 0.5 tablets by mouth Daily.   Taking    omeprazole (priLOSEC) 40 MG capsule Take 1 capsule by mouth Daily.   Taking    acetaminophen (TYLENOL) 325 MG tablet Take 2 tablets by mouth Every 4 (Four) Hours As Needed for Mild Pain .       benzonatate (TESSALON) 200 MG capsule Take 1 capsule by mouth 3 (Three) Times a Day As Needed for Cough.       diphenoxylate-atropine (LOMOTIL) 2.5-0.025 MG per tablet Take 1 tablet by mouth 4 (Four) Times a Day As Needed for Diarrhea.       HYDROcodone-acetaminophen (NORCO)  MG per tablet Take 1 tablet by mouth Every 4 (Four) Hours As Needed for Severe Pain  for up to 6 doses. (Patient taking differently: Take 1 tablet by mouth 4 (Four) Times a Day As Needed for Severe Pain.) 6 tablet 0        Medicines:  Current Facility-Administered Medications   Medication Dose Route Frequency Provider Last Rate Last Admin    acetaminophen (TYLENOL) tablet 650 mg  650 mg Oral Q4H PRN Thony Otto MD        allopurinol (ZYLOPRIM) tablet 100 mg  100 mg Oral Daily Darshan Nascimento MD   100 mg at 12/26/24 0857    ALPRAZolam (XANAX) tablet 0.25 mg  0.25 mg Oral Nightly PRN Thony Otto MD   0.25 mg at 12/25/24 2114    baclofen (LIORESAL) tablet 10 mg  10 mg Oral Q8H Thony Otto MD   10 mg at 12/26/24 1144    benzonatate (TESSALON) capsule 200 mg  200 mg Oral TID PRN Thony Otto MD   200 mg at 12/20/24 0549    cholecalciferol (VITAMIN D3) tablet 1,000 Units  1,000 Units  Oral Daily Thony Otto MD   1,000 Units at 12/26/24 0856    doxycycline (ADOXA) tablet 100 mg  100 mg Oral Q12H Justyn Owens MD   100 mg at 12/26/24 0857    heparin (porcine) 5000 UNIT/ML injection 5,000 Units  5,000 Units Subcutaneous Q12H Thony Otto MD   5,000 Units at 12/26/24 0857    HYDROcodone-acetaminophen (NORCO)  MG per tablet 1 tablet  1 tablet Oral Q4H PRN Thony Otto MD   1 tablet at 12/25/24 0555    lidocaine (LMX) 4 % cream 1 Application  1 Application Topical Once Merry Lees APRN        metoclopramide (REGLAN) injection 5 mg  5 mg Intravenous Q6H Justyn Owens MD   5 mg at 12/26/24 1144    nitroglycerin (NITROSTAT) SL tablet 0.4 mg  0.4 mg Sublingual Q5 Min PRN Thony Otto MD        ondansetron (ZOFRAN) injection 4 mg  4 mg Intravenous Q6H PRN Thony Otto MD   4 mg at 12/25/24 2115    ondansetron ODT (ZOFRAN-ODT) disintegrating tablet 4 mg  4 mg Translingual Q8H PRN Thony Otto MD   4 mg at 12/25/24 1424    pantoprazole (PROTONIX) injection 40 mg  40 mg Intravenous Q AM Justyn Owens MD   40 mg at 12/26/24 0512    sodium bicarbonate tablet 650 mg  650 mg Oral TID Chas Mcallister APRN   650 mg at 12/26/24 1547    sodium chloride 0.9 % flush 10 mL  10 mL Intravenous Q12H Thony Otto MD   10 mL at 12/26/24 0857    sodium chloride 0.9 % flush 10 mL  10 mL Intravenous PRN Thony Otto MD        sodium chloride 0.9 % infusion 40 mL  40 mL Intravenous PRN Thony Otto MD   40 mL at 12/21/24 0249    sodium hypochlorite (DAKIN'S 1/4 STRENGTH) 0.125 % topical solution 0.125% solution   Topical Q12H Merry Lees APRN   Given at 12/26/24 1143       Past Medical History:  Past Medical History:   Diagnosis Date    Decubitus ulcer of both feet     Intradural extramedullary spinal tumor     Osteomyelitis     Paraplegia     Sacral decubitus ulcer        Past Surgical History:  Past Surgical History:   Procedure  Laterality Date     SECTION      COLOSTOMY      CYSTOSTOMY      DEBRIDEMENT OF ISCHIAL ULCER/BUTTOCKS WOUND      IVC FILTER RETRIEVAL      MUSCLE FLAP      SPINE SURGERY      TRUNK DEBRIDEMENT Bilateral 2020    Procedure: DEBRIDEMENT DECUBITUS ULCER bilateral feet;  Surgeon: Truong Neal MD;  Location: [facility] OR;  Service: General;  Laterality: Bilateral;       Family History  Family History   Problem Relation Age of Onset    Lung cancer Father        Social History  Social History     Socioeconomic History    Marital status: Unknown   Tobacco Use    Smoking status: Never    Smokeless tobacco: Never   Vaping Use    Vaping status: Never Used   Substance and Sexual Activity    Alcohol use: Not Currently    Drug use: Never    Sexual activity: Defer       Review of Systems:  History obtained from chart review and the patient  General ROS: No fever or chills  Respiratory ROS: No cough, shortness of breath, wheezing  Cardiovascular ROS: No chest pain or palpitations  Gastrointestinal ROS: No abdominal pain or melena  Genito-Urinary ROS: No dysuria or hematuria  Psych ROS: No anxiety and depression  14 point ROS reviewed with the patient and negative except as noted above and in the HPI unless unable to obtain.    Objective:  Patient Vitals for the past 24 hrs:   BP Temp Temp src Pulse Resp SpO2   24 1100 161/62 98.6 °F (37 °C) Oral 106 16 98 %   24 0800 152/63 98 °F (36.7 °C) Oral 78 16 98 %   24 0447 166/59 97.9 °F (36.6 °C) Oral 90 18 97 %   24 2346 158/64 97.8 °F (36.6 °C) Oral 92 16 98 %   24 2043 157/61 97.1 °F (36.2 °C) Oral 88 18 100 %       Intake/Output Summary (Last 24 hours) at 2024 1609  Last data filed at 2024 0447  Gross per 24 hour   Intake --   Output 600 ml   Net -600 ml     General: awake/alert   HEENT: Normocephalic atraumatic head  Neck: Supple with no JVD or carotid base.  Chest:  clear to auscultation bilaterally without respiratory  distress  CVS: regular rate and rhythm  Abdominal: Soft nondistended, colostomy/urostomy  Extremities: no cyanosis or edema  Skin: warm and dry without rash      Labs:  Results from last 7 days   Lab Units 12/26/24  0539 12/25/24  0558 12/24/24  0930   WBC 10*3/mm3 7.18 6.56 5.78   HEMOGLOBIN g/dL 8.2* 8.2* 8.9*   HEMATOCRIT % 29.1* 28.6* 30.8*   PLATELETS 10*3/mm3 285 312 268         Results from last 7 days   Lab Units 12/26/24  0539 12/25/24  0558 12/24/24  0930   SODIUM mmol/L 145 142 142   POTASSIUM mmol/L 4.1 4.1 3.8   CHLORIDE mmol/L 113* 111* 109*   CO2 mmol/L 21.0* 21.0* 20.0*   BUN mg/dL 29* 32* 36*   CREATININE mg/dL 1.51* 1.56* 1.78*   CALCIUM mg/dL 8.5* 8.8 8.8   EGFR mL/min/1.73 38.9* 37.4* 32.0*   BILIRUBIN mg/dL 0.2 0.2 0.2   ALK PHOS U/L 139* 139* 152*   ALT (SGPT) U/L 8 8 8   AST (SGOT) U/L 6 7 6   GLUCOSE mg/dL 94 101* 137*       Radiology:   Imaging Results (Last 72 Hours)       Procedure Component Value Units Date/Time    NM HIDA SCAN WITHOUT PHARMACOLOGICAL INTERVENTION [[id number]] Collected: 12/24/24 1303     Updated: 12/26/24 1326    Narrative:      EXAMINATION: NM HIDA SCAN WITHOUT PHARMACOLOGICAL INTERVENTION-  12/24/2024 1:03 PM     HISTORY: intractable nausea and vomiting; cholelithiasis; possible  biliary colic; Z74.09-Other reduced mobility.     Dose: 5.4 mCi technetium 99 M Choletec intravenously.     COMPARISON: CT abdomen and pelvis 12/23/2024.     REPORT: After administration of the radiopharmaceutical, planar images  were obtained over the right upper quadrant irregular 5-minute intervals  for a total of 40 minutes.     Activity is seen in the gallbladder within 10 minutes indicating patency  of the cystic duct. Activity is seen in the small intestine by 5  minutes. This confirms patency of the common bile duct. No sanford  agent was given due to the history of gallstones and gallbladder  ejection fraction was not calculated.       Impression:      Negative HIDA scan, no  evidence of cystic duct obstruction.     This report was signed and finalized on 12/24/2024 1:05 PM by Dr. Cruz Flynn MD.       CT Abdomen Pelvis Without Contrast [[id number]] Collected: 12/23/24 1950     Updated: 12/23/24 2000    Narrative:      EXAM: CT ABDOMEN PELVIS WO CONTRAST-      DATE: 12/23/2024 5:29 PM     HISTORY: abdominal pain; nausea and vomiting       COMPARISON: 9/18/2020.     DOSE LENGTH PRODUCT: 886.62 mGy.cm Automatic exposure control was  utilized to make radiation dose as low as reasonably achievable.     TECHNIQUE: Noncontrast axial images of the abdomen and pelvis obtained  with multiplanar reformats.     FINDINGS:  VISUALIZED CHEST: Lung bases are grossly clear. There is coronary artery  calcification. No pericardial or pleural effusion is identified.     LIVER/BILE DUCTS: Unenhanced hepatic parenchyma is unremarkable. There  is cholelithiasis without inflammatory change. Bile ducts are  unremarkable.     KIDNEYS/URETERS: Unremarkable.     ADRENAL: There is right renal atrophy and no hydronephrosis.        SPLEEN: Unremarkable.     PANCREAS: Unremarkable.     MESENTERY: No inflammatory change or free fluid is seen. No  retroperitoneal or mesenteric lymphadenopathy is seen.     VASCULATURE: There is calcification of the abdominal aorta without  aneurysm. There is an IVC filter in the infrarenal IVC.     GI TRACT: There is no bowel obstruction. No inflammatory changes are  identified.     PELVIS: Patient is status post cystectomy. Uterus and adnexa are  unremarkable. Pelvic portion of the GI tract are unremarkable. There is  ileal conduit and urostomy on the right. Chronic ulcers are noted  bilaterally with sclerosis of the left iliac bone likely chronic  osteomyelitis. There is hyperlordosis of the lumbosacral spine and  pelvic floor relaxation.     SOFT TISSUES: Normal appearance of the overlying abdominal  and pelvic  soft tissues.      BONES: There is chronic left hip dislocation and  hip dysplasia.          Impression:      1. Cholelithiasis without definite inflammatory change.  2. Chronic right renal atrophy.  3. Status post cystectomy with ileal conduit and urostomy on the right  with parastomal hernia containing fat.  4. Chronic decubitus ulcers posteriorly and chronic osteomyelitis  involving the right ischium, left ischium, and left iliac bone.        This report was signed and finalized on 12/23/2024 7:57 PM by Everette Rojas.               Culture:  Blood Culture   Date Value Ref Range Status   12/20/2024 No growth at 3 days  Preliminary     Wound Culture   Date Value Ref Range Status   12/20/2024 Heavy growth (4+) Mixed Gram Negative Luciana (A)  Final   12/20/2024 Heavy growth (4+) Staphylococcus aureus, MRSA (A)  Final     Comment:       Methicillin resistant Staphylococcus aureus, Patient may be an isolation risk.   12/20/2024 Heavy growth (4+) Normal Skin Luciana  Final   12/20/2024 Heavy growth (4+) Mixed Gram Negative Luciana (A)  Final   12/20/2024 Heavy growth (4+) Staphylococcus aureus, MRSA (A)  Final     Comment:       Methicillin resistant Staphylococcus aureus, Patient may be an isolation risk.   12/20/2024 Heavy growth (4+) Normal Skin Luciana  Final   12/20/2024 Scant growth (1+) Mixed Gram Negative Luciana (A)  Final   12/20/2024 Heavy growth (4+) Staphylococcus aureus, MRSA (A)  Final     Comment:       Methicillin resistant Staphylococcus aureus, Patient may be an isolation risk.   12/20/2024 Heavy growth (4+) Normal Skin Luciana  Final         Assessment   Acute kidney injury/improving  Acute tubular necrosis.  Baseline chronic kidney disease stage 3b  Hyperkalemia--improved  Metabolic acidosis  Benign essential hypertension.  History of colostomy and urostomy  Multiple pressure ulcers  Paraplegia  Iron deficiency anemia    Plan:  Continue p.o. sodium bicarbonate.  Continue to monitor renal function  Plan was discussed with the patient      Tima Conte MD  12/26/2024  16:09  CST

## 2024-12-26 NOTE — THERAPY TREATMENT NOTE
Acute Care - Physical Therapy Treatment Note   Felicita     Patient Name: Jessica Alvarenga  : 1960  MRN: [id number]  Today's Date: 2024      Visit Dx:     ICD-10-CM ICD-9-CM   1. Impaired mobility [Z74.09]  Z74.09 799.89     Patient Active Problem List   Diagnosis    Skin ulcer of sacrum with necrosis of muscle    Acute osteomyelitis    Chronic osteomyelitis with draining sinus of multiple sites    Anemia    Anxiety    Chronic back pain    Depressive disorder    GERD (gastroesophageal reflux disease)    History of ileal conduit    Essential hypertension    Nasopharyngitis    Paraplegia    Pressure injury of skin of heel    Protein-calorie malnutrition, moderate    Acute kidney injury    Acute kidney failure, unspecified    CORINA (acute kidney injury)    Metabolic acidosis    Pressure ulcers of skin of multiple topographic sites    Paraplegia    Anemia    CKD stage 3b, GFR 30-44 ml/min    Suprapubic catheter    Colostomy in place    Presence of urostomy    History of breast cancer    Cholelithiases    Chronic osteomyelitis right and left ischium, left iliac bone    Nausea and vomiting     Past Medical History:   Diagnosis Date    Decubitus ulcer of both feet     Intradural extramedullary spinal tumor     Osteomyelitis     Paraplegia     Sacral decubitus ulcer      Past Surgical History:   Procedure Laterality Date     SECTION      COLOSTOMY      CYSTOSTOMY      DEBRIDEMENT OF ISCHIAL ULCER/BUTTOCKS WOUND      IVC FILTER RETRIEVAL      MUSCLE FLAP      SPINE SURGERY      TRUNK DEBRIDEMENT Bilateral 2020    Procedure: DEBRIDEMENT DECUBITUS ULCER bilateral feet;  Surgeon: Truong Neal MD;  Location: [facility];  Service: General;  Laterality: Bilateral;     PT Assessment (Last 12 Hours)       PT Evaluation and Treatment       Row Name 24 1532          Physical Therapy Time and Intention    Subjective Information complains of  not feeling well  -WK     Document Type therapy note (daily  note)  -WK     Mode of Treatment physical therapy  -WK       Row Name 12/26/24 1532          General Information    Existing Precautions/Restrictions fall  H/o paraplegia  -WK       Row Name 12/26/24 1532          Pain    Pretreatment Pain Rating 0/10 - no pain  -WK     Posttreatment Pain Rating 0/10 - no pain  -WK       Row Name 12/26/24 1532          Bed Mobility    Bed Mobility rolling left;rolling right  -WK     Rolling Left Blaine (Bed Mobility) verbal cues;maximum assist (25% patient effort);2 person assist;dependent (less than 25% patient effort)  -WK     Rolling Right Blaine (Bed Mobility) verbal cues;maximum assist (25% patient effort);dependent (less than 25% patient effort);2 person assist  -WK     Supine-Sit Blaine (Bed Mobility) --  unable to attempt due to weakness.  -WK     Comment, (Bed Mobility) rolled R and L mulitple times to assit with position. Nsg and PCT in room to assist.  -WK       Row Name             Wound 07/17/20 0133 Left medial plantar Pressure Injury    Wound - Properties Group Placement Date: 07/17/20  - Placement Time: 0133  -MC Side: Left  -MC Location: plantar  -MC Primary Wound Type: Pressure inj  -MC Present on Original Admission: Y  -MC    Retired Wound - Properties Group Placement Date: 07/17/20  - Placement Time: 0133  -MC Present on Original Admission: Y  -MC Side: Left  -MC Location: plantar  -MC Primary Wound Type: Pressure inj  -MC    Retired Wound - Properties Group Placement Date: 07/17/20  - Placement Time: 0133  -MC Present on Original Admission: Y  -MC Side: Left  -MC Location: plantar  -MC Primary Wound Type: Pressure inj  -MC    Retired Wound - Properties Group Date first assessed: 07/17/20  - Time first assessed: 0133  -MC Present on Original Admission: Y  -MC Side: Left  -MC Retired Orientation: medial  -MC Location: plantar  -MC Primary Wound Type: Pressure inj  -MC      Row Name             Wound 12/19/24 8153 Right distal calf     Wound - Properties Group Placement Date: 12/19/24  -LL Placement Time: 2355 -LL Side: Right  -LL Orientation: distal  -LL Location: calf  -LL Present on Original Admission: Y  -LL    Retired Wound - Properties Group Placement Date: 12/19/24  -LL Placement Time: 2355  -LL Present on Original Admission: Y  -LL Side: Right  -LL Orientation: distal  -LL Location: calf  -LL    Retired Wound - Properties Group Placement Date: 12/19/24  -LL Placement Time: 2355  -LL Present on Original Admission: Y  -LL Side: Right  -LL Orientation: distal  -LL Location: calf  -LL    Retired Wound - Properties Group Date first assessed: 12/19/24  -LL Time first assessed: 2355  -LL Present on Original Admission: Y  -LL Side: Right  -LL Location: calf  -LL      Row Name             Wound 07/17/20 0132 Left distal;lower calf Pressure Injury    Wound - Properties Group Placement Date: 07/17/20  -MC Placement Time: 0132  -MC Side: Left  -MC Location: calf  -MC Primary Wound Type: Pressure inj  -MC Present on Original Admission: Y  -MC    Retired Wound - Properties Group Placement Date: 07/17/20  -MC Placement Time: 0132  -MC Present on Original Admission: Y  -MC Side: Left  -MC Location: calf  -MC Primary Wound Type: Pressure inj  -MC    Retired Wound - Properties Group Placement Date: 07/17/20  - Placement Time: 0132  -MC Present on Original Admission: Y  -MC Side: Left  -MC Location: calf  -MC Primary Wound Type: Pressure inj  -MC    Retired Wound - Properties Group Date first assessed: 07/17/20  - Time first assessed: 0132  -MC Present on Original Admission: Y  -MC Side: Left  -MC Retired Orientation: distal;lower  -MC Location: calf  -MC Primary Wound Type: Pressure inj  -MC      Row Name             Wound 07/16/20 1526 Left distal calf Pressure Injury    Wound - Properties Group Placement Date: 07/16/20  -KP Placement Time: 1526  -KP, Present on admission   Side: Left  -KP Location: calf  -KP Primary Wound Type: Pressure inj  -KP  Present on Original Admission: Y  -KP    Retired Wound - Properties Group Placement Date: 07/16/20  -KP Placement Time: 1526  -KP, Present on admission   Present on Original Admission: Y  -KP Side: Left  -KP Location: calf  -KP Primary Wound Type: Pressure inj  -KP    Retired Wound - Properties Group Placement Date: 07/16/20  -KP Placement Time: 1526  -KP, Present on admission   Present on Original Admission: Y  -KP Side: Left  -KP Location: calf  -KP Primary Wound Type: Pressure inj  -KP    Retired Wound - Properties Group Date first assessed: 07/16/20  -KP Time first assessed: 1526  -KP, Present on admission   Present on Original Admission: Y  -KP Side: Left  -KP Retired Orientation: distal  -KP Location: calf  -KP Primary Wound Type: Pressure inj  -KP Retired Stage, Pressure Injury : Stage 2  -KP      Row Name             Wound 12/20/24 0001 Left distal leg    Wound - Properties Group Placement Date: 12/20/24  -LL Placement Time: 0001  -LL Side: Left  -LL Orientation: distal  -LL Location: leg  -LL    Retired Wound - Properties Group Placement Date: 12/20/24  -LL Placement Time: 0001  -LL Side: Left  -LL Orientation: distal  -LL Location: leg  -LL    Retired Wound - Properties Group Placement Date: 12/20/24  -LL Placement Time: 0001  -LL Side: Left  -LL Orientation: distal  -LL Location: leg  -LL    Retired Wound - Properties Group Date first assessed: 12/20/24  -LL Time first assessed: 0001  -LL Side: Left  -LL Location: leg  -LL      Row Name             Wound 07/17/20 0220 Right lateral popliteal Pressure Injury    Wound - Properties Group Placement Date: 07/17/20  -MC Placement Time: 0220  -MC Side: Right  -MC Location: popliteal  -MC Primary Wound Type: Pressure inj  -MC Present on Original Admission: Y  -MC    Retired Wound - Properties Group Placement Date: 07/17/20  -MC Placement Time: 0220  -MC Present on Original Admission: Y  -MC Side: Right  -MC Location: popliteal  -MC Primary Wound Type: Pressure  inj  -MC    Retired Wound - Properties Group Placement Date: 07/17/20  - Placement Time: 0220  -MC Present on Original Admission: Y  -MC Side: Right  -MC Location: popliteal  -MC Primary Wound Type: Pressure inj  -MC    Retired Wound - Properties Group Date first assessed: 07/17/20  - Time first assessed: 0220  -MC Present on Original Admission: Y  -MC Side: Right  -MC Retired Orientation: lateral  -MC Location: popliteal  -MC Primary Wound Type: Pressure inj  -MC      Row Name             Wound 07/17/20 0158 Bilateral medial gluteal Pressure Injury    Wound - Properties Group Placement Date: 07/17/20  - Placement Time: 0158  -MC Side: Bilateral  -MC Location: gluteal  -MC Primary Wound Type: Pressure inj  -MC Present on Original Admission: Y  -MC    Retired Wound - Properties Group Placement Date: 07/17/20  - Placement Time: 0158  -MC Present on Original Admission: Y  -MC Side: Bilateral  -MC Location: gluteal  -MC Primary Wound Type: Pressure inj  -MC    Retired Wound - Properties Group Placement Date: 07/17/20  - Placement Time: 0158  -MC Present on Original Admission: Y  -MC Side: Bilateral  -MC Location: gluteal  -MC Primary Wound Type: Pressure inj  -MC    Retired Wound - Properties Group Date first assessed: 07/17/20  - Time first assessed: 0158  -MC Present on Original Admission: Y  -MC Side: Bilateral  -MC Retired Orientation: medial  -MC Location: gluteal  -MC Primary Wound Type: Pressure inj  -MC      Row Name 12/26/24 1532          Plan of Care Review    Plan of Care Reviewed With patient  -WK     Progress declining  -WK     Outcome Evaluation Pt c/o of weakness. Pt was max-dep x 2 to roll R and L. Pt required max-dep assist to reposition multiple times in an attempt to get pt comfortable. Nsg and PCT assisted with rolling and reposition.  -WK       Row Name 12/26/24 1532          Positioning and Restraints    Pre-Treatment Position in bed  -WK     Post Treatment Position bed  -WK     In Bed  side lying right;with nsg;with family/caregiver  and PCT  -WK               User Key  (r) = Recorded By, (t) = Taken By, (c) = Cosigned By      Initials Name Provider Type    Cassy Caraballo, RN Registered Nurse    Nelda Levine PTA Physical Therapist Assistant    Aria Ugalde, RN Registered Nurse    Sade Tanner RN Registered Nurse                    Physical Therapy Education       Title: PT OT SLP Therapies (Done)       Topic: Physical Therapy (Done)       Point: Mobility training (Done)       Learning Progress Summary            Patient Acceptance, E, VU,NR by  at 12/24/2024 0951    Comment: role of PT, t/f training, high fall risk                      Point: Home exercise program (Done)       Learning Progress Summary            Patient Acceptance, E, VU,NR by  at 12/24/2024 0951    Comment: role of PT, t/f training, high fall risk                      Point: Body mechanics (Done)       Learning Progress Summary            Patient Acceptance, E, VU,NR by  at 12/24/2024 0951    Comment: role of PT, t/f training, high fall risk                      Point: Precautions (Done)       Learning Progress Summary            Patient Acceptance, E, VU,NR by  at 12/24/2024 0951    Comment: role of PT, t/f training, high fall risk                                      User Key       Initials Effective Dates Name Provider Type Discipline     08/15/24 -  Iker King PT DPT Physical Therapist PT                  PT Recommendation and Plan     Plan of Care Reviewed With: patient  Progress: declining  Outcome Evaluation: Pt c/o of weakness. Pt was max-dep x 2 to roll R and L. Pt required max-dep assist to reposition multiple times in an attempt to get pt comfortable. Nsg and PCT assisted with rolling and reposition.       Time Calculation:    PT Charges       Row Name 12/26/24 3801             Time Calculation    Start Time 1532  -WK      Stop Time 1556  -WK      Time Calculation (min) 24 min   -WK      PT Received On 12/26/24  -WK         Time Calculation- PT    Total Timed Code Minutes- PT 24 minute(s)  -WK         Timed Charges    08998 - Gait Training Minutes  24  -WK         Total Minutes    Timed Charges Total Minutes 24  -WK       Total Minutes 24  -WK                User Key  (r) = Recorded By, (t) = Taken By, (c) = Cosigned By      Initials Name Provider Type    WK Nelda Trammell PTA Physical Therapist Assistant                  Therapy Charges for Today       Code Description Service Date Service Provider Modifiers Qty    [id number] HC GAIT TRAINING EA 15 MIN 12/26/2024 Nelda Trammell PTA GP 2            PT G-Codes  Outcome Measure Options: AM-PAC 6 Clicks Basic Mobility (PT)  AM-PAC 6 Clicks Score (PT): 8    Nelda Trammell PTA  12/26/2024

## 2024-12-26 NOTE — PLAN OF CARE
Goal Outcome Evaluation:  Plan of Care Reviewed With: patient        Progress: declining  Outcome Evaluation: Pt c/o of weakness. Pt was max-dep x 2 to roll R and L. Pt required max-dep assist to reposition multiple times in an attempt to get pt comfortable. Nsg and PCT assisted with rolling and reposition.

## 2024-12-26 NOTE — CONSULTS
[facility]  Initial Inpatient Consult Note    Referring Provider: No Known Provider    Date of Admission: 2024  Date of Service:  24    Reason for Consultation: Nausea and vomiting     Subjective     History of present illness:   64-year-old lady with history of paraplegia, osteomyelitis, decubitus ulcer, presented with persistent nausea and vomiting associated with dehydration, renal impairment and electrolyte imbalance, patient stated that the only new medication she started was Ozempic the lowest dose a week before her symptoms started, her last dose was 2 weeks ago and she stopped, she had gastroscopy in 2019 showing erosive esophagitis, had also gastric emptying study that was negative      EGD 2019  1. Erosive esophagitis LA classification B, secondary to chronic GERD.  2. No bleeding ongoing in the upper GI tract during the endoscopy.         Past Medical History:  Past Medical History:   Diagnosis Date    Decubitus ulcer of both feet     Intradural extramedullary spinal tumor     Osteomyelitis     Paraplegia     Sacral decubitus ulcer        Past Surgical History:  Past Surgical History:   Procedure Laterality Date     SECTION      COLOSTOMY      CYSTOSTOMY      DEBRIDEMENT OF ISCHIAL ULCER/BUTTOCKS WOUND      IVC FILTER RETRIEVAL      MUSCLE FLAP      SPINE SURGERY      TRUNK DEBRIDEMENT Bilateral 2020    Procedure: DEBRIDEMENT DECUBITUS ULCER bilateral feet;  Surgeon: Truong Neal MD;  Location: [facility];  Service: General;  Laterality: Bilateral;        Social History:   Social History     Tobacco Use    Smoking status: Never    Smokeless tobacco: Never   Substance Use Topics    Alcohol use: Not Currently        Family History:  Family History   Problem Relation Age of Onset    Lung cancer Father        Home Meds:  Medications Prior to Admission   Medication Sig Dispense Refill Last Dose/Taking    anastrozole (ARIMIDEX) 1 MG tablet Take 1  tablet by mouth Daily.   Taking    baclofen (LIORESAL) 10 MG tablet Take 1 tablet by mouth 3 (Three) Times a Day.   Taking    cholecalciferol (VITAMIN D3) 25 MCG (1000 UT) tablet Take 1 tablet by mouth Daily.   Taking    ferrous sulfate 325 (65 FE) MG tablet Take 1 tablet by mouth Daily With Breakfast.   Taking    losartan (COZAAR) 100 MG tablet Take 0.5 tablets by mouth Daily.   Taking    omeprazole (priLOSEC) 40 MG capsule Take 1 capsule by mouth Daily.   Taking    acetaminophen (TYLENOL) 325 MG tablet Take 2 tablets by mouth Every 4 (Four) Hours As Needed for Mild Pain .       benzonatate (TESSALON) 200 MG capsule Take 1 capsule by mouth 3 (Three) Times a Day As Needed for Cough.       diphenoxylate-atropine (LOMOTIL) 2.5-0.025 MG per tablet Take 1 tablet by mouth 4 (Four) Times a Day As Needed for Diarrhea.       HYDROcodone-acetaminophen (NORCO)  MG per tablet Take 1 tablet by mouth Every 4 (Four) Hours As Needed for Severe Pain  for up to 6 doses. (Patient taking differently: Take 1 tablet by mouth 4 (Four) Times a Day As Needed for Severe Pain.) 6 tablet 0        Current Meds:     Current Facility-Administered Medications:     acetaminophen (TYLENOL) tablet 650 mg, 650 mg, Oral, Q4H PRN, Thony Otto MD    allopurinol (ZYLOPRIM) tablet 100 mg, 100 mg, Oral, Daily, Darshan Nascimento MD, 100 mg at 12/26/24 0857    ALPRAZolam (XANAX) tablet 0.25 mg, 0.25 mg, Oral, Nightly PRN, Thony Otto MD, 0.25 mg at 12/25/24 2114    baclofen (LIORESAL) tablet 10 mg, 10 mg, Oral, Q8H, Thony Otto MD, 10 mg at 12/26/24 1144    benzonatate (TESSALON) capsule 200 mg, 200 mg, Oral, TID PRN, Thony Otto MD, 200 mg at 12/20/24 0549    cholecalciferol (VITAMIN D3) tablet 1,000 Units, 1,000 Units, Oral, Daily, Thony Otto MD, 1,000 Units at 12/26/24 0856    doxycycline (ADOXA) tablet 100 mg, 100 mg, Oral, Q12H, Justyn Owens MD, 100 mg at 12/26/24 0857    heparin (porcine) 5000  "UNIT/ML injection 5,000 Units, 5,000 Units, Subcutaneous, Q12H, Thony Otto MD, 5,000 Units at 12/26/24 0857    HYDROcodone-acetaminophen (NORCO)  MG per tablet 1 tablet, 1 tablet, Oral, Q4H PRN, Thony Otto MD, 1 tablet at 12/25/24 0555    lidocaine (LMX) 4 % cream 1 Application, 1 Application, Topical, Once, Merry Lees APRN    metoclopramide (REGLAN) injection 5 mg, 5 mg, Intravenous, Q6H, Justyn Owens MD, 5 mg at 12/26/24 1144    nitroglycerin (NITROSTAT) SL tablet 0.4 mg, 0.4 mg, Sublingual, Q5 Min PRN, Thony Otto MD    ondansetron (ZOFRAN) injection 4 mg, 4 mg, Intravenous, Q6H PRN, Thony Otto MD, 4 mg at 12/25/24 2115    ondansetron ODT (ZOFRAN-ODT) disintegrating tablet 4 mg, 4 mg, Translingual, Q8H PRN, Thony Otto MD, 4 mg at 12/25/24 1424    pantoprazole (PROTONIX) injection 40 mg, 40 mg, Intravenous, Q AM, Justyn Owens MD, 40 mg at 12/26/24 0512    sodium bicarbonate tablet 650 mg, 650 mg, Oral, TID, Chas Mcallister, MAREN, 650 mg at 12/26/24 0857    sodium chloride 0.9 % flush 10 mL, 10 mL, Intravenous, Q12H, Thony Otto MD, 10 mL at 12/26/24 0857    sodium chloride 0.9 % flush 10 mL, 10 mL, Intravenous, PRN, Thony Otto MD    sodium chloride 0.9 % infusion 40 mL, 40 mL, Intravenous, PRN, Thony Otto MD, 40 mL at 12/21/24 0249    sodium hypochlorite (DAKIN'S 1/4 STRENGTH) 0.125 % topical solution 0.125% solution, , Topical, Q12H, Merry Lees APRN, Given at 12/26/24 1143    Allergies:  Allergies   Allergen Reactions    Morphine Mental Status Change    Oxycodone Unknown - Low Severity     \"couldn't think straight\"       Vital Signs  Temp:  [97.1 °F (36.2 °C)-98.6 °F (37 °C)] 98.6 °F (37 °C)  Heart Rate:  [] 106  Resp:  [16-18] 16  BP: (144-166)/(54-64) 161/62  Body mass index is 31.78 kg/m².    Intake/Output Summary (Last 24 hours) at 12/26/2024 1409  Last data filed at 12/26/2024 0447  Gross per 24 hour "   Intake --   Output 600 ml   Net -600 ml     No intake/output data recorded.    Review of Systems     As above      Objective     Physical Exam:  General :    Alert, cooperative, ill looking, in no acute distress   Head:    Normocephalic, without obvious abnormality, atraumatic   Eyes:            Lids and lashes normal, conjunctivae and sclerae normal, no   Icterus, conjunctival pallor   Throat:   No oral lesions, no thrush, oral mucosa moist, posterior oropharynx clear   Neck:   No adenopathy, supple, trachea midline, no thyromegaly, no   carotid bruit, no JVD   Lungs:     Clear to auscultation, respirations regular, even and                   unlabored    Heart:    Regular rhythm and normal rate, normal S1 and S2, no            murmur   Chest Wall:    No abnormalities observed   Abdomen:     Normal bowel sounds, no masses, no organomegaly, soft      abdominal   tenderness, nondistended, no guarding, no rebound                 tenderness, colostomy   Rectal:     Deferred   Extremities:   No edema, no cyanosis   Skin:   No open lesions, bruising or rash   Lymph nodes:   No palpable cervical adenopathy   Psychiatric:  Judgement and insight: normal   Orientation to person place and time: normal   Mood and affect: normal        Results Review:    I have reviewed all of the patients current test results  Results from last 7 days   Lab Units 12/26/24  0539 12/25/24  0558 12/24/24  0930   WBC 10*3/mm3 7.18 6.56 5.78   HEMOGLOBIN g/dL 8.2* 8.2* 8.9*   HEMATOCRIT % 29.1* 28.6* 30.8*   PLATELETS 10*3/mm3 285 312 268       Results from last 7 days   Lab Units 12/26/24  0539 12/25/24  0558 12/24/24  0930   SODIUM mmol/L 145 142 142   POTASSIUM mmol/L 4.1 4.1 3.8   CHLORIDE mmol/L 113* 111* 109*   CO2 mmol/L 21.0* 21.0* 20.0*   BUN mg/dL 29* 32* 36*   CREATININE mg/dL 1.51* 1.56* 1.78*   CALCIUM mg/dL 8.5* 8.8 8.8   BILIRUBIN mg/dL 0.2 0.2 0.2   ALK PHOS U/L 139* 139* 152*   ALT (SGPT) U/L 8 8 8   AST (SGOT) U/L 6 7 6    GLUCOSE mg/dL 94 101* 137*             Lab Results   Lab Value Date/Time    LIPASE 8 (L) 08/12/2020 0348    LIPASE 12 (L) 08/18/2019 1222       Radiology:    Imaging Results (Last 24 Hours)       Procedure Component Value Units Date/Time    NM HIDA SCAN WITHOUT PHARMACOLOGICAL INTERVENTION [[id number]] Collected: 12/24/24 1303     Updated: 12/26/24 1326    Narrative:      EXAMINATION: NM HIDA SCAN WITHOUT PHARMACOLOGICAL INTERVENTION-  12/24/2024 1:03 PM     HISTORY: intractable nausea and vomiting; cholelithiasis; possible  biliary colic; Z74.09-Other reduced mobility.     Dose: 5.4 mCi technetium 99 M Choletec intravenously.     COMPARISON: CT abdomen and pelvis 12/23/2024.     REPORT: After administration of the radiopharmaceutical, planar images  were obtained over the right upper quadrant irregular 5-minute intervals  for a total of 40 minutes.     Activity is seen in the gallbladder within 10 minutes indicating patency  of the cystic duct. Activity is seen in the small intestine by 5  minutes. This confirms patency of the common bile duct. No sanford  agent was given due to the history of gallstones and gallbladder  ejection fraction was not calculated.       Impression:      Negative HIDA scan, no evidence of cystic duct obstruction.     This report was signed and finalized on 12/24/2024 1:05 PM by Dr. Cruz Flynn MD.                 Assessment & Plan       Acute kidney injury    Acute kidney failure, unspecified    CORINA (acute kidney injury)    Metabolic acidosis    Pressure ulcers of skin of multiple topographic sites    Paraplegia    Anemia    CKD stage 3b, GFR 30-44 ml/min    Colostomy in place    Presence of urostomy    History of breast cancer    Cholelithiases    Chronic osteomyelitis right and left ischium, left iliac bone    Nausea and vomiting      Impression/Plan    Severe nausea and vomiting  Most likely related to Ozempic side effect  Discontinue Ozempic, last dose was 2 weeks ago  She  was taking Ozempic for weight loss, no history of diabetes, had negative gastric emptying studies in 2019  Continue IV hydration  Advance to full liquid diet  Antiemetic as needed  If no improvement may consider gastroscopy        Electronically signed by Guilherme Rowland MD, 12/26/24, 2:09 PM CST.         Guilherme Rowland MD  12/26/24  14:09 CST

## 2024-12-26 NOTE — PLAN OF CARE
Goal Outcome Evaluation:  Plan of Care Reviewed With: patient        Progress: no change  Outcome Evaluation: IID; Turn Q2 when patient allows; xanax given x1; complains of pain but refuses pain medication; nausea medication given scheduled and PRN; resting between care; safety maintained

## 2024-12-26 NOTE — PROGRESS NOTES
[facility] Medicine Services  INPATIENT PROGRESS NOTE    Patient Name: Jessica Alvarenga  Date of Admission: 12/19/2024  Today's Date: 12/26/24  Length of Stay: 5  Primary Care Physician: Ponce Orozco MD    Subjective   Chief Complaint: Nausea and vomiting  HPI   Patient continues to report symptoms of nausea and vomiting.  States that she is eating and drinking very, very little.  Her son is at bedside.  She states that the last time she had output from her colostomy was on Tuesday.  Overall, she does not feel well and continues to report ongoing GI complaints.  She indicated to me that she did complete her HIDA scan on 12/24 and inquired about the results-I still do not have them.    Review of Systems   All pertinent negatives and positives are as above. All other systems have been reviewed and are negative unless otherwise stated.     Objective    Temp:  [97.1 °F (36.2 °C)-98.6 °F (37 °C)] 98.6 °F (37 °C)  Heart Rate:  [] 106  Resp:  [16-18] 16  BP: (144-166)/(54-64) 161/62  Physical Exam  Vitals reviewed.   Constitutional:       General: She is not in acute distress.     Appearance: She is ill-appearing. She is not toxic-appearing.   HENT:      Head: Normocephalic.      Mouth/Throat:      Mouth: Mucous membranes are moist.   Cardiovascular:      Rate and Rhythm: Normal rate.   Pulmonary:      Effort: Pulmonary effort is normal. No respiratory distress.      Comments: On room air  Abdominal:      Palpations: Abdomen is soft.      Tenderness: There is abdominal tenderness.      Comments: Ileal conduit and colostomy in place.  Primarily has tenderness near middle of abdomen, but difficult to localize     Musculoskeletal:         General: Swelling and deformity present.   Skin:     General: Skin is warm.   Neurological:      Mental Status: She is alert. Mental status is at baseline.   Psychiatric:         Mood and Affect: Mood normal.         Results Review:  I have  reviewed the labs, radiology results, and diagnostic studies.    Laboratory Data:   Results from last 7 days   Lab Units 12/26/24  0539 12/25/24  0558 12/24/24  0930   WBC 10*3/mm3 7.18 6.56 5.78   HEMOGLOBIN g/dL 8.2* 8.2* 8.9*   HEMATOCRIT % 29.1* 28.6* 30.8*   PLATELETS 10*3/mm3 285 312 268        Results from last 7 days   Lab Units 12/26/24  0539 12/25/24  0558 12/24/24  0930   SODIUM mmol/L 145 142 142   POTASSIUM mmol/L 4.1 4.1 3.8   CHLORIDE mmol/L 113* 111* 109*   CO2 mmol/L 21.0* 21.0* 20.0*   BUN mg/dL 29* 32* 36*   CREATININE mg/dL 1.51* 1.56* 1.78*   CALCIUM mg/dL 8.5* 8.8 8.8   BILIRUBIN mg/dL 0.2 0.2 0.2   ALK PHOS U/L 139* 139* 152*   ALT (SGPT) U/L 8 8 8   AST (SGOT) U/L 6 7 6   GLUCOSE mg/dL 94 101* 137*       Culture Data:   Blood Culture   Date Value Ref Range Status   12/20/2024 No growth at 3 days  Preliminary     Wound Culture   Date Value Ref Range Status   12/20/2024 Heavy growth (4+) Mixed Gram Negative Luciana (A)  Final   12/20/2024 Heavy growth (4+) Staphylococcus aureus, MRSA (A)  Final     Comment:       Methicillin resistant Staphylococcus aureus, Patient may be an isolation risk.   12/20/2024 Heavy growth (4+) Normal Skin Luciana  Final   12/20/2024 Heavy growth (4+) Mixed Gram Negative Luciana (A)  Final   12/20/2024 Heavy growth (4+) Staphylococcus aureus, MRSA (A)  Final     Comment:       Methicillin resistant Staphylococcus aureus, Patient may be an isolation risk.   12/20/2024 Heavy growth (4+) Normal Skin Luciana  Final   12/20/2024 Scant growth (1+) Mixed Gram Negative Luciana (A)  Final   12/20/2024 Heavy growth (4+) Staphylococcus aureus, MRSA (A)  Final     Comment:       Methicillin resistant Staphylococcus aureus, Patient may be an isolation risk.   12/20/2024 Heavy growth (4+) Normal Skin Luciana  Final       Radiology Data:   Imaging Results (Last 24 Hours)       ** No results found for the last 24 hours. **            I have reviewed the patient's current medications.  "    Assessment/Plan   Assessment  Active Hospital Problems    Diagnosis     **Acute kidney injury     Cholelithiases     Chronic osteomyelitis right and left ischium, left iliac bone     Nausea and vomiting     Metabolic acidosis     Pressure ulcers of skin of multiple topographic sites     Paraplegia     Anemia     CKD stage 3b, GFR 30-44 ml/min     Colostomy in place     Presence of urostomy     History of breast cancer     CORINA (acute kidney injury)     Acute kidney failure, unspecified        Treatment Plan  Daily improvement in renal function - creatinine down to 1.51 this AM  Nephrology following and appreciate their assistance  CT A/P reviewed -cholelithiasis with no other inflammatory changes.  LFTs have been unremarkable.  Parastomal hernia containing fat noted.  No other acute findings.  HIDA scan is \"pending\".  Will call radiology now to see if I can update on results as this will determine next steps.  Trial of IV PPI  May require GI consultation for intractable N/V symptoms pending the above.  Continue wound care  Wound cultures positive for MRSA - susceptible to tetracyclines.  Patient has been on PO Doxycycline for now.  Outside hospital records from [facility] reviewed (see below) and patient does report that follow-up in [place] regarding her wounds - see screenshots below obtained from [facility].  Will need close outpatient follow-up with wound care in [place] and her specialists in [place]  PO Sodium bicarbonate  Patient did have a hemoglobin of 6.4 status posttransfusion of 1 unit of PRBCs.  IV ferric gluconate administered.  Hgb stable currently at 8.2  Patient reports she lives at home with her son in [place], [place], and plans to return there at discharge    Addendum: I was able to speak with radiology this afternoon and they did report that her HIDA scan appeared unremarkable.  Given her ongoing symptoms of intractable nausea and vomiting I will add a gastroenterology consultation " today.  Trial of scheduled Reglan.  The son reported to me that patient is also on Ozempic in the outpatient setting, last dose > 1 week ago.  Add amylase/lipase.  Continue PPI for now.    Medical Decision Making  Number and Complexity of problems: 2 acute; high complexity      Conditions and Status        Condition is unchanged.     [facility] Data  External documents reviewed:               Cardiac tracing (EKG, telemetry) interpretation: no new EKGs  Radiology interpretation: HIDA scan results discussed with radiology this afternoon  Labs reviewed: as above  Any tests that were considered but not ordered: as above: none     Decision rules/scores evaluated (example [id number], Wells, etc): none     Discussed with: patient and son     Care Planning  Shared decision making: Discussed with patient with agreement to proceed with treatment plan as outlined  Code status and discussions: Full code    Disposition  Social Determinants of Health that impact treatment or disposition: paraplegia  I expect the patient to be discharged: to be determined        Electronically signed by Justyn Owens MD, 12/26/24, 13:12 CST.

## 2024-12-26 NOTE — PLAN OF CARE
Goal Outcome Evaluation: Pt is alert and oriented, vitals are stable, no s/s of distress, room air, wound care completed at 1700, IV is clean dry intact, pt is a BLE paraplegic, pt has a chronic riddle and refuses a bag to go over her ostomy instead puts a ABD pad

## 2024-12-27 ENCOUNTER — ANESTHESIA EVENT (OUTPATIENT)
Dept: GASTROENTEROLOGY | Facility: HOSPITAL | Age: 64
End: 2024-12-27
Payer: MEDICARE

## 2024-12-27 ENCOUNTER — ANESTHESIA (OUTPATIENT)
Dept: GASTROENTEROLOGY | Facility: HOSPITAL | Age: 64
End: 2024-12-27
Payer: MEDICARE

## 2024-12-27 LAB
ALBUMIN SERPL-MCNC: 1.4 G/DL (ref 3.5–5.2)
ALBUMIN/GLOB SERPL: 0.4 G/DL
ALP SERPL-CCNC: 120 U/L (ref 39–117)
ALT SERPL W P-5'-P-CCNC: 6 U/L (ref 1–33)
AMYLASE SERPL-CCNC: 13 U/L (ref 28–100)
ANION GAP SERPL CALCULATED.3IONS-SCNC: 9 MMOL/L (ref 5–15)
ANISOCYTOSIS BLD QL: ABNORMAL
AST SERPL-CCNC: 7 U/L (ref 1–32)
BASOPHILS # BLD MANUAL: 0 10*3/MM3 (ref 0–0.2)
BASOPHILS NFR BLD MANUAL: 0 % (ref 0–1.5)
BILIRUB SERPL-MCNC: 0.3 MG/DL (ref 0–1.2)
BUN SERPL-MCNC: 26 MG/DL (ref 8–23)
BUN/CREAT SERPL: 18.7 (ref 7–25)
CALCIUM SPEC-SCNC: 7.8 MG/DL (ref 8.6–10.5)
CHLORIDE SERPL-SCNC: 115 MMOL/L (ref 98–107)
CO2 SERPL-SCNC: 17 MMOL/L (ref 22–29)
CREAT SERPL-MCNC: 1.39 MG/DL (ref 0.57–1)
DEPRECATED RDW RBC AUTO: 56 FL (ref 37–54)
EGFRCR SERPLBLD CKD-EPI 2021: 43 ML/MIN/1.73
EOSINOPHIL # BLD MANUAL: 0.32 10*3/MM3 (ref 0–0.4)
EOSINOPHIL NFR BLD MANUAL: 5 % (ref 0.3–6.2)
ERYTHROCYTE [DISTWIDTH] IN BLOOD BY AUTOMATED COUNT: 17.2 % (ref 12.3–15.4)
GLOBULIN UR ELPH-MCNC: 3.3 GM/DL
GLUCOSE BLDC GLUCOMTR-MCNC: 96 MG/DL (ref 70–130)
GLUCOSE SERPL-MCNC: 92 MG/DL (ref 65–99)
HCT VFR BLD AUTO: 29.9 % (ref 34–46.6)
HGB BLD-MCNC: 8.6 G/DL (ref 12–15.9)
LIPASE SERPL-CCNC: 8 U/L (ref 13–60)
LYMPHOCYTES # BLD MANUAL: 0.58 10*3/MM3 (ref 0.7–3.1)
LYMPHOCYTES NFR BLD MANUAL: 5 % (ref 5–12)
MCH RBC QN AUTO: 26.2 PG (ref 26.6–33)
MCHC RBC AUTO-ENTMCNC: 28.8 G/DL (ref 31.5–35.7)
MCV RBC AUTO: 91.2 FL (ref 79–97)
MONOCYTES # BLD: 0.32 10*3/MM3 (ref 0.1–0.9)
NEUTROPHILS # BLD AUTO: 5.14 10*3/MM3 (ref 1.7–7)
NEUTROPHILS NFR BLD MANUAL: 78 % (ref 42.7–76)
NEUTS BAND NFR BLD MANUAL: 2 % (ref 0–5)
OVALOCYTES BLD QL SMEAR: ABNORMAL
PLASMA CELL PREC NFR BLD MANUAL: 1 % (ref 0–0)
PLAT MORPH BLD: NORMAL
PLATELET # BLD AUTO: 232 10*3/MM3 (ref 140–450)
PMV BLD AUTO: 10.5 FL (ref 6–12)
POIKILOCYTOSIS BLD QL SMEAR: ABNORMAL
POLYCHROMASIA BLD QL SMEAR: ABNORMAL
POTASSIUM SERPL-SCNC: 4.2 MMOL/L (ref 3.5–5.2)
PROT SERPL-MCNC: 4.7 G/DL (ref 6–8.5)
RBC # BLD AUTO: 3.28 10*6/MM3 (ref 3.77–5.28)
SODIUM SERPL-SCNC: 141 MMOL/L (ref 136–145)
STOMATOCYTES BLD QL SMEAR: ABNORMAL
VARIANT LYMPHS NFR BLD MANUAL: 9 % (ref 19.6–45.3)
WBC MORPH BLD: NORMAL
WBC NRBC COR # BLD AUTO: 6.42 10*3/MM3 (ref 3.4–10.8)

## 2024-12-27 PROCEDURE — 25010000002 PROPOFOL 10 MG/ML EMULSION: Performed by: NURSE ANESTHETIST, CERTIFIED REGISTERED

## 2024-12-27 PROCEDURE — 82150 ASSAY OF AMYLASE: CPT | Performed by: INTERNAL MEDICINE

## 2024-12-27 PROCEDURE — 85025 COMPLETE CBC W/AUTO DIFF WBC: CPT | Performed by: INTERNAL MEDICINE

## 2024-12-27 PROCEDURE — 0DJ08ZZ INSPECTION OF UPPER INTESTINAL TRACT, VIA NATURAL OR ARTIFICIAL OPENING ENDOSCOPIC: ICD-10-PCS | Performed by: INTERNAL MEDICINE

## 2024-12-27 PROCEDURE — 83690 ASSAY OF LIPASE: CPT | Performed by: INTERNAL MEDICINE

## 2024-12-27 PROCEDURE — 99232 SBSQ HOSP IP/OBS MODERATE 35: CPT | Performed by: NURSE PRACTITIONER

## 2024-12-27 PROCEDURE — 97530 THERAPEUTIC ACTIVITIES: CPT

## 2024-12-27 PROCEDURE — 80053 COMPREHEN METABOLIC PANEL: CPT | Performed by: INTERNAL MEDICINE

## 2024-12-27 PROCEDURE — 25810000003 SODIUM CHLORIDE 0.9 % SOLUTION: Performed by: NURSE PRACTITIONER

## 2024-12-27 PROCEDURE — 25010000002 LIDOCAINE PF 2% 2 % SOLUTION: Performed by: NURSE ANESTHETIST, CERTIFIED REGISTERED

## 2024-12-27 PROCEDURE — 85007 BL SMEAR W/DIFF WBC COUNT: CPT | Performed by: INTERNAL MEDICINE

## 2024-12-27 PROCEDURE — 82948 REAGENT STRIP/BLOOD GLUCOSE: CPT

## 2024-12-27 PROCEDURE — 43235 EGD DIAGNOSTIC BRUSH WASH: CPT | Performed by: INTERNAL MEDICINE

## 2024-12-27 PROCEDURE — 25010000002 HEPARIN (PORCINE) PER 1000 UNITS: Performed by: INTERNAL MEDICINE

## 2024-12-27 RX ORDER — BACLOFEN 10 MG/1
10 TABLET ORAL EVERY 8 HOURS PRN
Status: DISCONTINUED | OUTPATIENT
Start: 2024-12-27 | End: 2024-12-30 | Stop reason: HOSPADM

## 2024-12-27 RX ORDER — LIDOCAINE HYDROCHLORIDE 20 MG/ML
INJECTION, SOLUTION EPIDURAL; INFILTRATION; INTRACAUDAL; PERINEURAL AS NEEDED
Status: DISCONTINUED | OUTPATIENT
Start: 2024-12-27 | End: 2024-12-27 | Stop reason: SURG

## 2024-12-27 RX ORDER — SODIUM CHLORIDE 9 MG/ML
40 INJECTION, SOLUTION INTRAVENOUS AS NEEDED
Status: DISCONTINUED | OUTPATIENT
Start: 2024-12-27 | End: 2024-12-27 | Stop reason: HOSPADM

## 2024-12-27 RX ORDER — SODIUM CHLORIDE 9 MG/ML
75 INJECTION, SOLUTION INTRAVENOUS CONTINUOUS
Status: DISPENSED | OUTPATIENT
Start: 2024-12-27 | End: 2024-12-28

## 2024-12-27 RX ORDER — SODIUM CHLORIDE 0.9 % (FLUSH) 0.9 %
10 SYRINGE (ML) INJECTION AS NEEDED
Status: DISCONTINUED | OUTPATIENT
Start: 2024-12-27 | End: 2024-12-27 | Stop reason: HOSPADM

## 2024-12-27 RX ORDER — SODIUM CHLORIDE 0.9 % (FLUSH) 0.9 %
10 SYRINGE (ML) INJECTION EVERY 12 HOURS SCHEDULED
Status: DISCONTINUED | OUTPATIENT
Start: 2024-12-27 | End: 2024-12-27 | Stop reason: HOSPADM

## 2024-12-27 RX ORDER — PROPOFOL 10 MG/ML
VIAL (ML) INTRAVENOUS AS NEEDED
Status: DISCONTINUED | OUTPATIENT
Start: 2024-12-27 | End: 2024-12-27 | Stop reason: SURG

## 2024-12-27 RX ORDER — SODIUM CHLORIDE 9 MG/ML
75 INJECTION, SOLUTION INTRAVENOUS CONTINUOUS
Status: DISCONTINUED | OUTPATIENT
Start: 2024-12-27 | End: 2024-12-28

## 2024-12-27 RX ADMIN — LIDOCAINE HYDROCHLORIDE 50 MG: 20 INJECTION, SOLUTION EPIDURAL; INFILTRATION; INTRACAUDAL; PERINEURAL at 12:01

## 2024-12-27 RX ADMIN — Medication 1000 UNITS: at 13:40

## 2024-12-27 RX ADMIN — PANTOPRAZOLE SODIUM 40 MG: 40 INJECTION, POWDER, FOR SOLUTION INTRAVENOUS at 05:02

## 2024-12-27 RX ADMIN — HEPARIN SODIUM 5000 UNITS: 5000 INJECTION, SOLUTION INTRAVENOUS; SUBCUTANEOUS at 21:28

## 2024-12-27 RX ADMIN — BACLOFEN 10 MG: 10 TABLET ORAL at 13:40

## 2024-12-27 RX ADMIN — SODIUM HYPOCHLORITE: 1.25 SOLUTION TOPICAL at 12:28

## 2024-12-27 RX ADMIN — ALLOPURINOL 100 MG: 100 TABLET ORAL at 13:40

## 2024-12-27 RX ADMIN — DOXYCYCLINE 100 MG: 100 TABLET, FILM COATED ORAL at 13:40

## 2024-12-27 RX ADMIN — SODIUM HYPOCHLORITE: 1.25 SOLUTION TOPICAL at 21:29

## 2024-12-27 RX ADMIN — SODIUM BICARBONATE 650 MG: 650 TABLET ORAL at 21:28

## 2024-12-27 RX ADMIN — Medication 10 ML: at 21:29

## 2024-12-27 RX ADMIN — PROPOFOL 50 MG: 10 INJECTION, EMULSION INTRAVENOUS at 12:01

## 2024-12-27 RX ADMIN — SODIUM BICARBONATE 650 MG: 650 TABLET ORAL at 17:53

## 2024-12-27 RX ADMIN — SODIUM CHLORIDE 75 ML/HR: 9 INJECTION, SOLUTION INTRAVENOUS at 13:42

## 2024-12-27 RX ADMIN — Medication 10 ML: at 13:41

## 2024-12-27 RX ADMIN — HEPARIN SODIUM 5000 UNITS: 5000 INJECTION, SOLUTION INTRAVENOUS; SUBCUTANEOUS at 13:40

## 2024-12-27 RX ADMIN — PROPOFOL 50 MG: 10 INJECTION, EMULSION INTRAVENOUS at 12:03

## 2024-12-27 NOTE — PROGRESS NOTES
[facility]  Inpatient Progress Note      Date of Admission: 12/19/2024  Date of Service:  12/27/24    Reason for Follow Up: Nausea vomiting    Subjective     Subjective:     Patient lying in bed with son at bedside.  Son reports patient is too weak to suck from a straw or eat anything.  She has had persistent nausea with some emesis.  No signs of hematemesis.  Patient is paralyzed but reports knowing she has pain to her lower back as well as lower abdomen.  This pain started after admission.  She has not had difficulty with diarrhea or constipation.  She does have a history of acid reflux and is compliant with daily omeprazole.  Reports that she had acid reflux a week prior to admission which is not normal for her.       Current Facility-Administered Medications:     acetaminophen (TYLENOL) tablet 650 mg, 650 mg, Oral, Q4H PRN, Thony Otto MD    allopurinol (ZYLOPRIM) tablet 100 mg, 100 mg, Oral, Daily, Darshan Nascimento MD, 100 mg at 12/26/24 0857    ALPRAZolam (XANAX) tablet 0.25 mg, 0.25 mg, Oral, Nightly PRN, Thony Otto MD, 0.25 mg at 12/26/24 2155    baclofen (LIORESAL) tablet 10 mg, 10 mg, Oral, Q8H, Thony Otto MD, 10 mg at 12/26/24 2144    benzonatate (TESSALON) capsule 200 mg, 200 mg, Oral, TID PRN, Thony Otto MD, 200 mg at 12/20/24 0549    cholecalciferol (VITAMIN D3) tablet 1,000 Units, 1,000 Units, Oral, Daily, Thony Otto MD, 1,000 Units at 12/26/24 0856    doxycycline (ADOXA) tablet 100 mg, 100 mg, Oral, Q12H, Justyn Owens MD, 100 mg at 12/26/24 2144    heparin (porcine) 5000 UNIT/ML injection 5,000 Units, 5,000 Units, Subcutaneous, Q12H, Thony Otto MD, 5,000 Units at 12/26/24 2144    HYDROcodone-acetaminophen (NORCO)  MG per tablet 1 tablet, 1 tablet, Oral, Q4H PRN, Thony Otto MD, 1 tablet at 12/25/24 0555    lidocaine (LMX) 4 % cream 1 Application, 1 Application, Topical, Once, Merry Lees, APRN     metoclopramide (REGLAN) injection 5 mg, 5 mg, Intravenous, Q6H, Justyn Owens MD, 5 mg at 12/26/24 1144    nitroglycerin (NITROSTAT) SL tablet 0.4 mg, 0.4 mg, Sublingual, Q5 Min PRN, Thony Otto MD    ondansetron (ZOFRAN) injection 4 mg, 4 mg, Intravenous, Q6H PRN, Thony Otto MD, 4 mg at 12/25/24 2115    ondansetron ODT (ZOFRAN-ODT) disintegrating tablet 4 mg, 4 mg, Translingual, Q8H PRN, Thony Otto MD, 4 mg at 12/25/24 1424    pantoprazole (PROTONIX) injection 40 mg, 40 mg, Intravenous, Q AM, Justyn Owens MD, 40 mg at 12/27/24 0502    sodium bicarbonate tablet 650 mg, 650 mg, Oral, TID, Chas Mcallister APRN, 650 mg at 12/26/24 2144    sodium chloride 0.9 % flush 10 mL, 10 mL, Intravenous, Q12H, Thony Otto MD, 10 mL at 12/26/24 2144    sodium chloride 0.9 % flush 10 mL, 10 mL, Intravenous, PRN, Thony Otto MD    sodium chloride 0.9 % infusion 40 mL, 40 mL, Intravenous, PRN, Thony Otto MD, 40 mL at 12/21/24 0249    sodium chloride 0.9 % infusion, 75 mL/hr, Intravenous, Continuous, Volodymyr Núñez APRN    sodium hypochlorite (DAKIN'S 1/4 STRENGTH) 0.125 % topical solution 0.125% solution, , Topical, Q12H, Merry Lees APRN, Given at 12/26/24 2146    Review of Systems     Constitution:  negative for chills, fatigue and fevers  Eyes:  negative for blurriness and change of vision  ENT:   negative for sore throat and voice change  Respiratory: negative for  cough and shortness of air  Cardiovascular:  Negative for chest pain or palpitations  Gastrointestinal:  negative for  See HPI  Endocrine: negative for   weight loss, unintended          Objective     Vital Signs  Temp:  [98 °F (36.7 °C)-100.4 °F (38 °C)] 100.4 °F (38 °C)  Heart Rate:  [] 110  Resp:  [16-18] 16  BP: (157-174)/(58-63) 157/63  Body mass index is 31.78 kg/m².    Intake/Output Summary (Last 24 hours) at 12/27/2024 1007  Last data filed at 12/27/2024 0348  Gross per 24 hour    Intake --   Output 1125 ml   Net -1125 ml     No intake/output data recorded.       Physical Exam:   General: patient awake, alert and cooperative   Eyes: Normal lids and lashes, no scleral icterus   Neck: supple, normal ROM   Skin: warm and dry, not jaundiced   Cardiovascular: regular rhythm and rate, no murmurs auscultated   Pulm: clear to auscultation bilaterally, regular and unlabored   Abdomen: soft, nontender, nondistended; normal bowel sounds   Rectal: deferred   Extremities: no rash or edema   Psychiatric: Normal mood and behavior; memory intact, flat         Results Review:    I have reviewed all of the patients current test results  Results from last 7 days   Lab Units 12/27/24  0457 12/26/24  0539 12/25/24  0558   WBC 10*3/mm3 6.42 7.18 6.56   HEMOGLOBIN g/dL 8.6* 8.2* 8.2*   HEMATOCRIT % 29.9* 29.1* 28.6*   PLATELETS 10*3/mm3 232 285 312       Results from last 7 days   Lab Units 12/27/24  0348 12/26/24  0539 12/25/24  0558   SODIUM mmol/L 141 145 142   POTASSIUM mmol/L 4.2 4.1 4.1   CHLORIDE mmol/L 115* 113* 111*   CO2 mmol/L 17.0* 21.0* 21.0*   BUN mg/dL 26* 29* 32*   CREATININE mg/dL 1.39* 1.51* 1.56*   CALCIUM mg/dL 7.8* 8.5* 8.8   BILIRUBIN mg/dL 0.3 0.2 0.2   ALK PHOS U/L 120* 139* 139*   ALT (SGPT) U/L 6 8 8   AST (SGOT) U/L 7 6 7   GLUCOSE mg/dL 92 94 101*             Lab Results   Lab Value Date/Time    LIPASE 8 (L) 12/27/2024 0348    LIPASE 8 (L) 08/12/2020 0348    LIPASE 12 (L) 08/18/2019 1222       Radiology:    Imaging Results (Last 24 Hours)       Procedure Component Value Units Date/Time    NM HIDA SCAN WITHOUT PHARMACOLOGICAL INTERVENTION [[id number]] Collected: 12/24/24 1303     Updated: 12/26/24 1326    Narrative:      EXAMINATION: NM HIDA SCAN WITHOUT PHARMACOLOGICAL INTERVENTION-  12/24/2024 1:03 PM     HISTORY: intractable nausea and vomiting; cholelithiasis; possible  biliary colic; Z74.09-Other reduced mobility.     Dose: 5.4 mCi technetium 99 M Choletec intravenously.      COMPARISON: CT abdomen and pelvis 12/23/2024.     REPORT: After administration of the radiopharmaceutical, planar images  were obtained over the right upper quadrant irregular 5-minute intervals  for a total of 40 minutes.     Activity is seen in the gallbladder within 10 minutes indicating patency  of the cystic duct. Activity is seen in the small intestine by 5  minutes. This confirms patency of the common bile duct. No sanford  agent was given due to the history of gallstones and gallbladder  ejection fraction was not calculated.       Impression:      Negative HIDA scan, no evidence of cystic duct obstruction.     This report was signed and finalized on 12/24/2024 1:05 PM by Dr. Cruz Flynn MD.                 Assessment & Plan       Acute kidney injury    Acute kidney failure, unspecified    CORINA (acute kidney injury)    Metabolic acidosis    Pressure ulcers of skin of multiple topographic sites    Paraplegia    Anemia    CKD stage 3b, GFR 30-44 ml/min    Colostomy in place    Presence of urostomy    History of breast cancer    Cholelithiases    Chronic osteomyelitis right and left ischium, left iliac bone    Nausea and vomiting      Impression/Plan    Nausea, vomiting  GERD  CKD, stage III  Paraplegia  Colostomy - 2019    Patient had a normal HIDA scan.  She does not recall if she has ever had EGD in the past.  In 2019 there is documentation of EGD but no report.    Ozempic stopped over one week ago.  2019 Jessica Alvarenga experienced difficulty with nausea/vomitting that was attributed to constipation based on stool observed on imaging.  Jessica Alvarenga and her son deny difficulty with constipation, however, due to paralysis and previous history of constipation this could still be a contributing factor.  CT imaging dated 12/23/24 is not indicative of constipation.   We will plan for EGD today to assess for signs of acid reflux.  I did discuss this with patient and her son and they were both  agreeable to proceed.  I will go ahead and start normal saline infusion and advised patient son she will need to remain n.p.o. until after procedure.   Renal labs improving.   Further recommendation pending finding on EGD.    Electronically signed by MAREN Simental, 12/27/24, 9:41 AM CST.       MAREN Simental  12/27/24  10:07 CST    .

## 2024-12-27 NOTE — PROGRESS NOTES
[facility] Medicine Services  INPATIENT PROGRESS NOTE    Patient Name: Jessica Alvarenga  Date of Admission: 12/19/2024  Today's Date: 12/27/24  Length of Stay: 6  Primary Care Physician: Ponce Orozco MD    Subjective   Chief Complaint: Nausea and vomiting  HPI   Patient continues to report symptoms of nausea.  She reports that she does not feel well.  We discussed the results of her EGD.  We discussed the results of her HIDA scan.  Family at bedside.  They did express concern about the possibility of Reglan causing adverse effect, and what they described was confusion and delirium.  They do report that those symptoms seem to be better today.  She does report having some output via her ostomy today.    Review of Systems   All pertinent negatives and positives are as above. All other systems have been reviewed and are negative unless otherwise stated.     Objective    Temp:  [98 °F (36.7 °C)-100.4 °F (38 °C)] 100.4 °F (38 °C)  Heart Rate:  [] 98  Resp:  [14-22] 18  BP: (154-179)/(58-79) 166/79  Physical Exam  Vitals reviewed.   Constitutional:       General: She is not in acute distress.     Appearance: She is ill-appearing. She is not toxic-appearing.   HENT:      Head: Normocephalic.      Mouth/Throat:      Mouth: Mucous membranes are moist.   Cardiovascular:      Rate and Rhythm: Normal rate.   Pulmonary:      Effort: Pulmonary effort is normal. No respiratory distress.      Comments: On room air  Abdominal:      Palpations: Abdomen is soft.      Tenderness: There is abdominal tenderness.      Comments: Ileal conduit and colostomy in place.  Stool output noted at ostomy site on exam today   Musculoskeletal:         General: Swelling and deformity present.   Skin:     General: Skin is warm.   Neurological:      Mental Status: She is alert. Mental status is at baseline.   Psychiatric:         Mood and Affect: Mood normal.         Results Review:  I have reviewed the  labs, radiology results, and diagnostic studies.    Laboratory Data:   Results from last 7 days   Lab Units 12/27/24  0457 12/26/24  0539 12/25/24  0558   WBC 10*3/mm3 6.42 7.18 6.56   HEMOGLOBIN g/dL 8.6* 8.2* 8.2*   HEMATOCRIT % 29.9* 29.1* 28.6*   PLATELETS 10*3/mm3 232 285 312        Results from last 7 days   Lab Units 12/27/24  0348 12/26/24  0539 12/25/24  0558   SODIUM mmol/L 141 145 142   POTASSIUM mmol/L 4.2 4.1 4.1   CHLORIDE mmol/L 115* 113* 111*   CO2 mmol/L 17.0* 21.0* 21.0*   BUN mg/dL 26* 29* 32*   CREATININE mg/dL 1.39* 1.51* 1.56*   CALCIUM mg/dL 7.8* 8.5* 8.8   BILIRUBIN mg/dL 0.3 0.2 0.2   ALK PHOS U/L 120* 139* 139*   ALT (SGPT) U/L 6 8 8   AST (SGOT) U/L 7 6 7   GLUCOSE mg/dL 92 94 101*       Culture Data:   Blood Culture   Date Value Ref Range Status   12/20/2024 No growth at 3 days  Preliminary     Wound Culture   Date Value Ref Range Status   12/20/2024 Heavy growth (4+) Mixed Gram Negative Luciana (A)  Final   12/20/2024 Heavy growth (4+) Staphylococcus aureus, MRSA (A)  Final     Comment:       Methicillin resistant Staphylococcus aureus, Patient may be an isolation risk.   12/20/2024 Heavy growth (4+) Normal Skin Luciana  Final   12/20/2024 Heavy growth (4+) Mixed Gram Negative Luciana (A)  Final   12/20/2024 Heavy growth (4+) Staphylococcus aureus, MRSA (A)  Final     Comment:       Methicillin resistant Staphylococcus aureus, Patient may be an isolation risk.   12/20/2024 Heavy growth (4+) Normal Skin Luciana  Final   12/20/2024 Scant growth (1+) Mixed Gram Negative Luciana (A)  Final   12/20/2024 Heavy growth (4+) Staphylococcus aureus, MRSA (A)  Final     Comment:       Methicillin resistant Staphylococcus aureus, Patient may be an isolation risk.   12/20/2024 Heavy growth (4+) Normal Skin Luciana  Final       Radiology Data:   Imaging Results (Last 24 Hours)       ** No results found for the last 24 hours. **            I have reviewed the patient's current medications.     Assessment/Plan    Assessment  Active Hospital Problems    Diagnosis     **Acute kidney injury     Cholelithiases     Chronic osteomyelitis right and left ischium, left iliac bone     Nausea and vomiting     Metabolic acidosis     Pressure ulcers of skin of multiple topographic sites     Paraplegia     Anemia     CKD stage 3b, GFR 30-44 ml/min     Colostomy in place     Presence of urostomy     History of breast cancer     CORINA (acute kidney injury)     Acute kidney failure, unspecified        Treatment Plan  Discussed with GI this morning  Results of EGD from this morning reviewed - no acute findings identified.  Normal esophagus, stomach, and duodenum.  No specimens collected.  GI did recommend consideration of stopping her Ozempic moving forward.    Plan to monitor off of Abxs - Doxycycline.  Want to ensure antibiotics are not source of ongoing N/V  Gentle IVFs - will likely d/c tomorrow  Trial of scheduled Reglan - will d/c (family concerned about adverse effect)  Plan to d/c IV PPI today  Daily improvement in renal function - creatinine down to 1.39 this AM  CT A/P reviewed -cholelithiasis with no other inflammatory changes.  LFTs have been unremarkable.  Parastomal hernia containing fat noted.  No other acute findings.  No significant constipation  HIDA scan negative.  Continue wound care  Wound cultures positive for MRSA.  Patient has been on PO Doxycycline but plan to d/c today.  Outside hospital records from [facility] reviewed (see below) and patient does report that follow-up in [place] regarding her wounds - see screenshots below obtained from [facility].  Will need close outpatient follow-up with wound care in [place] and her specialists in [place]  Patient did have a hemoglobin of 6.4 status posttransfusion of 1 unit of PRBCs.  IV ferric gluconate administered.  Hgb stable currently at 8.6  Patient reports she lives at home with her son in [place], [place], and plans to return there at discharge    Medical  Decision Making  Number and Complexity of problems: 2 acute; high complexity      Conditions and Status        Condition is unchanged.     [facility] Data  External documents reviewed:               Cardiac tracing (EKG, telemetry) interpretation: no new EKGs  Radiology interpretation: no new radiology studies  Labs reviewed: as above  Any tests that were considered but not ordered: as above: none     Decision rules/scores evaluated (example [id number], Wells, etc): none     Discussed with: patient and family at bedside     Care Planning  Shared decision making: Discussed with patient and family at bedside with agreement to proceed with treatment plan as outlined  Code status and discussions: Full code    Disposition  Social Determinants of Health that impact treatment or disposition: paraplegia  I expect the patient to be discharged: to be determined        Electronically signed by Justyn Owens MD, 12/27/24, 14:29 CST.

## 2024-12-27 NOTE — THERAPY TREATMENT NOTE
Acute Care - Physical Therapy Treatment Note  [facility]     Patient Name: Jessica Alvarenga  : 1960  MRN: [id number]  Today's Date: 2024      Visit Dx:     ICD-10-CM ICD-9-CM   1. Impaired mobility [Z74.09]  Z74.09 799.89   2. Nausea and vomiting, unspecified vomiting type  R11.2 787.01     Patient Active Problem List   Diagnosis    Skin ulcer of sacrum with necrosis of muscle    Acute osteomyelitis    Chronic osteomyelitis with draining sinus of multiple sites    Anemia    Anxiety    Chronic back pain    Depressive disorder    GERD (gastroesophageal reflux disease)    History of ileal conduit    Essential hypertension    Nasopharyngitis    Paraplegia    Pressure injury of skin of heel    Protein-calorie malnutrition, moderate    Acute kidney injury    Acute kidney failure, unspecified    CORINA (acute kidney injury)    Metabolic acidosis    Pressure ulcers of skin of multiple topographic sites    Paraplegia    Anemia    CKD stage 3b, GFR 30-44 ml/min    Suprapubic catheter    Colostomy in place    Presence of urostomy    History of breast cancer    Cholelithiases    Chronic osteomyelitis right and left ischium, left iliac bone    Nausea and vomiting     Past Medical History:   Diagnosis Date    Decubitus ulcer of both feet     Intradural extramedullary spinal tumor     Osteomyelitis     Paraplegia     Sacral decubitus ulcer      Past Surgical History:   Procedure Laterality Date     SECTION      COLOSTOMY      CYSTOSTOMY      DEBRIDEMENT OF ISCHIAL ULCER/BUTTOCKS WOUND      IVC FILTER RETRIEVAL      MUSCLE FLAP      SPINE SURGERY      TRUNK DEBRIDEMENT Bilateral 2020    Procedure: DEBRIDEMENT DECUBITUS ULCER bilateral feet;  Surgeon: Truong Neal MD;  Location: [facility];  Service: General;  Laterality: Bilateral;     PT Assessment (Last 12 Hours)       PT Evaluation and Treatment       Row Name 24 1545 24 1137       Physical Therapy Time and Intention    Subjective  Information complains of;weakness;fatigue  -YULIYA --    Document Type therapy note (daily note)  -YULIYA --    Mode of Treatment physical therapy  -YULIYA --    Session Not Performed -- patient unavailable for treatment  -NW    Comment, Session Not Performed -- ENDO  -NW      Row Name 12/27/24 1545          General Information    Existing Precautions/Restrictions fall  H/o paraplegia  -YULIYA       Row Name 12/27/24 1545          Pain    Pretreatment Pain Rating 0/10 - no pain  -YULIYA     Posttreatment Pain Rating 0/10 - no pain  -YULIYA       Row Name 12/27/24 1545          Bed Mobility    Supine-Sit [place] (Bed Mobility) maximum assist (25% patient effort)  -YULIYA     Sit-Supine [place] (Bed Mobility) maximum assist (25% patient effort)  -YULIYA       Row Name 12/27/24 1545          Transfers    Comment, (Transfers) asked pt and family to bring cushion for w/c and transfer board to start transfers  -YULIYA       Row Name 12/27/24 1545          Balance    Comment, Balance sitting EOB with CGA, pt leans L due to spine  -YULIYA       Row Name             Wound 07/17/20 0133 Left medial plantar Pressure Injury    Wound - Properties Group Placement Date: 07/17/20  - Placement Time: 0133 -MC Side: Left  -MC Location: plantar  -MC Primary Wound Type: Pressure inj  -MC Present on Original Admission: Y  -MC    Retired Wound - Properties Group Placement Date: 07/17/20  - Placement Time: 0133  -MC Present on Original Admission: Y  -MC Side: Left  -MC Location: plantar  -MC Primary Wound Type: Pressure inj  -MC    Retired Wound - Properties Group Placement Date: 07/17/20  - Placement Time: 0133  -MC Present on Original Admission: Y  -MC Side: Left  -MC Location: plantar  -MC Primary Wound Type: Pressure inj  -MC    Retired Wound - Properties Group Date first assessed: 07/17/20  - Time first assessed: 0133  -MC Present on Original Admission: Y  -MC Side: Left  -MC Retired Orientation: medial  -MC Location: plantar  -MC Primary Wound Type:  Pressure inj  -MC      Row Name             Wound 12/19/24 2355 Right distal calf    Wound - Properties Group Placement Date: 12/19/24  -LL Placement Time: 2355  -LL Side: Right  -LL Orientation: distal  -LL Location: calf  -LL Present on Original Admission: Y  -LL    Retired Wound - Properties Group Placement Date: 12/19/24  -LL Placement Time: 2355  -LL Present on Original Admission: Y  -LL Side: Right  -LL Orientation: distal  -LL Location: calf  -LL    Retired Wound - Properties Group Placement Date: 12/19/24  -LL Placement Time: 2355  -LL Present on Original Admission: Y  -LL Side: Right  -LL Orientation: distal  -LL Location: calf  -LL    Retired Wound - Properties Group Date first assessed: 12/19/24  -LL Time first assessed: 2355  -LL Present on Original Admission: Y  -LL Side: Right  -LL Location: calf  -LL      Row Name             Wound 07/17/20 0132 Left distal;lower calf Pressure Injury    Wound - Properties Group Placement Date: 07/17/20  -MC Placement Time: 0132  -MC Side: Left  -MC Location: calf  -MC Primary Wound Type: Pressure inj  -MC Present on Original Admission: Y  -MC    Retired Wound - Properties Group Placement Date: 07/17/20  -MC Placement Time: 0132  -MC Present on Original Admission: Y  -MC Side: Left  -MC Location: calf  -MC Primary Wound Type: Pressure inj  -MC    Retired Wound - Properties Group Placement Date: 07/17/20  -MC Placement Time: 0132  -MC Present on Original Admission: Y  -MC Side: Left  -MC Location: calf  -MC Primary Wound Type: Pressure inj  -MC    Retired Wound - Properties Group Date first assessed: 07/17/20  -MC Time first assessed: 0132  -MC Present on Original Admission: Y  -MC Side: Left  -MC Retired Orientation: distal;lower  -MC Location: calf  -MC Primary Wound Type: Pressure inj  -MC      Row Name             Wound 07/16/20 1526 Left distal calf Pressure Injury    Wound - Properties Group Placement Date: 07/16/20  -KP Placement Time: 1526  -KP, Present on  admission   Side: Left  -KP Location: calf  -KP Primary Wound Type: Pressure inj  -KP Present on Original Admission: Y  -KP    Retired Wound - Properties Group Placement Date: 07/16/20  -KP Placement Time: 1526  -KP, Present on admission   Present on Original Admission: Y  -KP Side: Left  -KP Location: calf  -KP Primary Wound Type: Pressure inj  -KP    Retired Wound - Properties Group Placement Date: 07/16/20  -KP Placement Time: 1526  -KP, Present on admission   Present on Original Admission: Y  -KP Side: Left  -KP Location: calf  -KP Primary Wound Type: Pressure inj  -KP    Retired Wound - Properties Group Date first assessed: 07/16/20  -KP Time first assessed: 1526 -KP, Present on admission   Present on Original Admission: Y  -KP Side: Left  -KP Retired Orientation: distal  -KP Location: calf  -KP Primary Wound Type: Pressure inj  -KP Retired Stage, Pressure Injury : Stage 2  -KP      Row Name             Wound 12/20/24 0001 Left distal leg    Wound - Properties Group Placement Date: 12/20/24  -LL Placement Time: 0001  -LL Side: Left  -LL Orientation: distal  -LL Location: leg  -LL    Retired Wound - Properties Group Placement Date: 12/20/24  -LL Placement Time: 0001  -LL Side: Left  -LL Orientation: distal  -LL Location: leg  -LL    Retired Wound - Properties Group Placement Date: 12/20/24  -LL Placement Time: 0001  -LL Side: Left  -LL Orientation: distal  -LL Location: leg  -LL    Retired Wound - Properties Group Date first assessed: 12/20/24  -LL Time first assessed: 0001  -LL Side: Left  -LL Location: leg  -LL      Row Name             Wound 07/17/20 0220 Right lateral popliteal Pressure Injury    Wound - Properties Group Placement Date: 07/17/20  -MC Placement Time: 0220  -MC Side: Right  -MC Location: popliteal  -MC Primary Wound Type: Pressure inj  -MC Present on Original Admission: Y  -MC    Retired Wound - Properties Group Placement Date: 07/17/20  -MC Placement Time: 0220  -MC Present on Original  Admission: Y  -MC Side: Right  -MC Location: popliteal  -MC Primary Wound Type: Pressure inj  -MC    Retired Wound - Properties Group Placement Date: 07/17/20  - Placement Time: 0220  -MC Present on Original Admission: Y  -MC Side: Right  -MC Location: popliteal  -MC Primary Wound Type: Pressure inj  -MC    Retired Wound - Properties Group Date first assessed: 07/17/20  - Time first assessed: 0220  -MC Present on Original Admission: Y  -MC Side: Right  -MC Retired Orientation: lateral  -MC Location: popliteal  -MC Primary Wound Type: Pressure inj  -MC      Row Name             Wound 07/17/20 0158 Bilateral medial gluteal Pressure Injury    Wound - Properties Group Placement Date: 07/17/20  - Placement Time: 0158  -MC Side: Bilateral  -MC Location: gluteal  -MC Primary Wound Type: Pressure inj  -MC Present on Original Admission: Y  -MC    Retired Wound - Properties Group Placement Date: 07/17/20  - Placement Time: 0158  -MC Present on Original Admission: Y  -MC Side: Bilateral  -MC Location: gluteal  -MC Primary Wound Type: Pressure inj  -MC    Retired Wound - Properties Group Placement Date: 07/17/20  - Placement Time: 0158  -MC Present on Original Admission: Y  -MC Side: Bilateral  -MC Location: gluteal  -MC Primary Wound Type: Pressure inj  -MC    Retired Wound - Properties Group Date first assessed: 07/17/20  - Time first assessed: 0158  -MC Present on Original Admission: Y  -MC Side: Bilateral  -MC Retired Orientation: medial  -MC Location: gluteal  -MC Primary Wound Type: Pressure inj  -MC      Row Name 12/27/24 1545          Positioning and Restraints    Pre-Treatment Position in bed  -YULIYA     Post Treatment Position bed  -YULIYA     In Bed fowlers;call light within reach;encouraged to call for assist;with family/caregiver;side rails up x2  -YULIYA               User Key  (r) = Recorded By, (t) = Taken By, (c) = Cosigned By      Initials Name Provider Type    aScha Peck PTA Physical Therapist  Assistant    Fouzia Magana, PTA Physical Therapist Assistant    Cassy Caraballo, RN Registered Nurse    Aria Ugalde, RN Registered Nurse    Sade Tanner, RN Registered Nurse                    Physical Therapy Education       Title: PT OT SLP Therapies (Done)       Topic: Physical Therapy (Done)       Point: Mobility training (Done)       Learning Progress Summary            Patient Acceptance, E, VU,NR by  at 12/24/2024 0951    Comment: role of PT, t/f training, high fall risk                      Point: Home exercise program (Done)       Learning Progress Summary            Patient Acceptance, E, VU,NR by  at 12/24/2024 0951    Comment: role of PT, t/f training, high fall risk                      Point: Body mechanics (Done)       Learning Progress Summary            Patient Acceptance, E, VU,NR by  at 12/24/2024 0951    Comment: role of PT, t/f training, high fall risk                      Point: Precautions (Done)       Learning Progress Summary            Patient Acceptance, E, VU,NR by  at 12/24/2024 0951    Comment: role of PT, t/f training, high fall risk                                      User Key       Initials Effective Dates Name Provider Type Discipline     08/15/24 -  Iker King, PT DPT Physical Therapist PT                  PT Recommendation and Plan         Outcome Measures       Row Name 12/27/24 3410             How much help from another person do you currently need...    Turning from your back to your side while in flat bed without using bedrails? 2  -YULIYA      Moving from lying on back to sitting on the side of a flat bed without bedrails? 2  -YULIYA      Moving to and from a bed to a chair (including a wheelchair)? 1  -YULIYA      Standing up from a chair using your arms (e.g., wheelchair, bedside chair)? 1  -YULIYA      Climbing 3-5 steps with a railing? 1  -YULIYA      To walk in hospital room? 1  -YULIYA      AM-PAC 6 Clicks Score (PT) 8  -YULIYA         Functional Assessment     Outcome Measure Options AM-PAC 6 Clicks Basic Mobility (PT)  -YULIYA                User Key  (r) = Recorded By, (t) = Taken By, (c) = Cosigned By      Initials Name Provider Type    Sacha Peck PTA Physical Therapist Assistant                     Time Calculation:    PT Charges       Row Name 12/27/24 1545             Time Calculation    Start Time 1545  -YULIYA      Stop Time 1614  -YULIYA      Time Calculation (min) 29 min  -YULIYA      PT Received On 12/27/24  -YULIYA         Time Calculation- PT    Total Timed Code Minutes- PT 29 minute(s)  -YULIYA         Timed Charges    09702 - PT Therapeutic Activity Minutes 29  -YULIYA         Total Minutes    Timed Charges Total Minutes 29  -YULIYA       Total Minutes 29  -YULIYA                User Key  (r) = Recorded By, (t) = Taken By, (c) = Cosigned By      Initials Name Provider Type    Sacha Peck PTA Physical Therapist Assistant                  Therapy Charges for Today       Code Description Service Date Service Provider Modifiers Qty    79611848995 HC PT THERAPEUTIC ACT EA 15 MIN 12/27/2024 Sacha Chaidez PTA GP 2            PT G-Codes  Outcome Measure Options: AM-PAC 6 Clicks Basic Mobility (PT)  AM-PAC 6 Clicks Score (PT): 8    Sacha Chaidez PTA  12/27/2024

## 2024-12-27 NOTE — ANESTHESIA PREPROCEDURE EVALUATION
Anesthesia Evaluation     Patient summary reviewed   no history of anesthetic complications:   NPO Solid Status: > 8 hours             Airway   Mallampati: II  Dental      Pulmonary    (-) COPD, asthma, sleep apnea, not a smoker  Cardiovascular     (+) hypertension  (-) pacemaker, past MI, angina, cardiac stents      Neuro/Psych  (-) seizures, TIA, CVA  GI/Hepatic/Renal/Endo    (+) obesity, GERD, renal disease- ARF  (-) liver disease, diabetes    Musculoskeletal         ROS comment: paraplegia  Abdominal    Substance History      OB/GYN          Other                      Anesthesia Plan    ASA 3 - emergent     MAC     (Pt can not lay flat due to pain)  intravenous induction     Anesthetic plan, risks, benefits, and alternatives have been provided, discussed and informed consent has been obtained with: patient.    CODE STATUS:    Level Of Support Discussed With: Patient  Code Status (Patient has no pulse and is not breathing): CPR (Attempt to Resuscitate)  Medical Interventions (Patient has pulse or is breathing): Full Support

## 2024-12-27 NOTE — ANESTHESIA POSTPROCEDURE EVALUATION
"Patient: Jessica Alvarenga    Procedure Summary       Date: 12/27/24 Room / Location: [facility] ENDOSCOPY 2 / [facility] ENDOSCOPY    Anesthesia Start: 1159 Anesthesia Stop: 1211    Procedure: ESOPHAGOGASTRODUODENOSCOPY WITH ANESTHESIA Diagnosis:       Nausea and vomiting, unspecified vomiting type      (Nausea and vomiting, unspecified vomiting type [R11.2])    Surgeons: Daniel Salazar DO Provider: Ponce Oglesby CRNA    Anesthesia Type: MAC ASA Status: 3 - Emergent            Anesthesia Type: MAC    Vitals  Vitals Value Taken Time   /79 12/27/24 1255   Temp     Pulse 98 12/27/24 1255   Resp 18 12/27/24 1255   SpO2 98 % 12/27/24 1255           Post Anesthesia Care and Evaluation    Patient location during evaluation: PACU  Patient participation: complete - patient participated  Level of consciousness: awake and alert  Pain management: adequate    Airway patency: patent  Anesthetic complications: No anesthetic complications    Cardiovascular status: acceptable  Respiratory status: acceptable  Hydration status: acceptable    Comments: Blood pressure 166/79, pulse 98, temperature 100.4 °F (38 °C), temperature source Oral, resp. rate 18, height 172.7 cm (68\"), weight 94.8 kg (209 lb), SpO2 98%.    Pt discharged from PACU based on zaid score >8    "

## 2024-12-27 NOTE — PLAN OF CARE
Problem: Adult Inpatient Plan of Care  Goal: Plan of Care Review  Outcome: Progressing  Flowsheets (Taken 12/27/2024 6839)  Progress: no change  Outcome Evaluation: Patient rested well. No complaints of pain at this time. A/Ox4. Dressing changed per order. Patient ran a 100.4 temp but refused tylenol. Otherwise VSS. Safety maintained. Contact precaution maintained.  Plan of Care Reviewed With: patient

## 2024-12-27 NOTE — PLAN OF CARE
Problem: Oral Intake Inadequate  Goal: Improved Oral Intake  Outcome: Not Progressing   Goal Outcome Evaluation: Nutrition follow up. CLD 12/24, FLD 12/27. Upper GI unremarkable. GI REC: stop ozempic. Advance diet as tolerated 12/27; regular diet order is signed and held at this time. Snacks will resume when diet upgrades. PO 25% of three liquid meals, 0% x 1 meal in the last 72 hr. Totaled 360mL oral fluid in the last 72 hr. Ostomy with some output. Nephrology following and rec to continue IVF. Completed IV venofer. Wound care continues. Will monitor while inpatient.

## 2024-12-27 NOTE — PLAN OF CARE
Goal Outcome Evaluation:  Plan of Care Reviewed With: patient        Progress: no change  Outcome Evaluation: Pt A&O x4. VSS. Pt refusing turns. Pt refused dressing change to coccyx/gluteal wounds. Wound care completed per orders to BLE. Pt and family requsting pt not receive reglan due to concerns that it is causing new symptoms. Safety maintained.

## 2024-12-27 NOTE — PROGRESS NOTES
Nephrology ([facility]) Progress Note      Patient:  Jessica Alvarenga  YOB: 1960  Date of Service: 12/27/2024  MRN: [id number]   Acct: [id number]   Primary Care Physician: Ponce Orozco MD  Advance Directive:   Code Status and Medical Interventions: CPR (Attempt to Resuscitate); Full Support   Ordered at: 12/19/24 8266     Level Of Support Discussed With:    Patient     Code Status (Patient has no pulse and is not breathing):    CPR (Attempt to Resuscitate)     Medical Interventions (Patient has pulse or is breathing):    Full Support     Admit Date: 12/19/2024       Hospital Day: 6  Referring Provider: No Known Provider      Patient personally seen and examined.  Complete chart including Consults, Notes, Operative Reports, Labs, Cardiology, and Radiology studies reviewed as able.    Chief complaint: Abnormal labs.    Subjective:  Jessica Alvarenga is a 64 y.o. female for whom we were consulted for evaluation and treatment of acute kidney injury. Baseline chronic kidney disease stage 3b, baseline creatinine approximately 1.5. Follows in our [place] office. History of paraplegia, pressure ulcers, hypertension, suprapubic catheter, colostomy. Presented to outlying facility and was found to have abnormal labs including creatinine 3.3 and potassium 5.6. Transferred to [facility] for nephrology evaluation. She has received IV fluids as well as medical management of hyperkalemia. She reports several days of poor PO intake due to nausea recently as well as diarrhea from her ostomy. Denied changes in urine output. Denied NSAID use. Several wounds present on lower extremities and wound care has evaluated. Hemoglobin 6.4 on 12/20 and she is was given a unit of PRBC     This morning patient is feeling much better.  However she is still complaining of some nausea but no vomiting.  Her renal function continues to improve.    Allergies:  Morphine and Oxycodone    Home  Meds:  Medications Prior to Admission   Medication Sig Dispense Refill Last Dose/Taking    anastrozole (ARIMIDEX) 1 MG tablet Take 1 tablet by mouth Daily.   Taking    baclofen (LIORESAL) 10 MG tablet Take 1 tablet by mouth 3 (Three) Times a Day.   Taking    cholecalciferol (VITAMIN D3) 25 MCG (1000 UT) tablet Take 1 tablet by mouth Daily.   Taking    ferrous sulfate 325 (65 FE) MG tablet Take 1 tablet by mouth Daily With Breakfast.   Taking    losartan (COZAAR) 100 MG tablet Take 0.5 tablets by mouth Daily.   Taking    omeprazole (priLOSEC) 40 MG capsule Take 1 capsule by mouth Daily.   Taking    acetaminophen (TYLENOL) 325 MG tablet Take 2 tablets by mouth Every 4 (Four) Hours As Needed for Mild Pain .       benzonatate (TESSALON) 200 MG capsule Take 1 capsule by mouth 3 (Three) Times a Day As Needed for Cough.       diphenoxylate-atropine (LOMOTIL) 2.5-0.025 MG per tablet Take 1 tablet by mouth 4 (Four) Times a Day As Needed for Diarrhea.       HYDROcodone-acetaminophen (NORCO)  MG per tablet Take 1 tablet by mouth Every 4 (Four) Hours As Needed for Severe Pain  for up to 6 doses. (Patient taking differently: Take 1 tablet by mouth 4 (Four) Times a Day As Needed for Severe Pain.) 6 tablet 0        Medicines:  Current Facility-Administered Medications   Medication Dose Route Frequency Provider Last Rate Last Admin    acetaminophen (TYLENOL) tablet 650 mg  650 mg Oral Q4H PRN Thony Otto MD        allopurinol (ZYLOPRIM) tablet 100 mg  100 mg Oral Daily Darshan Nascimento MD   100 mg at 12/27/24 1340    ALPRAZolam (XANAX) tablet 0.25 mg  0.25 mg Oral Nightly PRN Thony Otto MD   0.25 mg at 12/26/24 2155    baclofen (LIORESAL) tablet 10 mg  10 mg Oral Q8H PRN Justyn Owens MD        benzonatate (TESSALON) capsule 200 mg  200 mg Oral TID PRN Thony Otto MD   200 mg at 12/20/24 0549    cholecalciferol (VITAMIN D3) tablet 1,000 Units  1,000 Units Oral Daily Thony Otto MD    1,000 Units at 24 1340    heparin (porcine) 5000 UNIT/ML injection 5,000 Units  5,000 Units Subcutaneous Q12H Thony Otto MD   5,000 Units at 24 1340    HYDROcodone-acetaminophen (NORCO)  MG per tablet 1 tablet  1 tablet Oral Q4H PRN Thony Otto MD   1 tablet at 24 0555    lidocaine (LMX) 4 % cream 1 Application  1 Application Topical Once Merry Lees APRN        nitroglycerin (NITROSTAT) SL tablet 0.4 mg  0.4 mg Sublingual Q5 Min PRN Thony Otto MD        ondansetron (ZOFRAN) injection 4 mg  4 mg Intravenous Q6H PRN Thony Otto MD   4 mg at 24 2115    ondansetron ODT (ZOFRAN-ODT) disintegrating tablet 4 mg  4 mg Translingual Q8H PRN Thony Otto MD   4 mg at 24 1424    sodium bicarbonate tablet 650 mg  650 mg Oral TID Chas Mcallister APRN   650 mg at 24 2144    sodium chloride 0.9 % flush 10 mL  10 mL Intravenous Q12H Thony Otto MD   10 mL at 24 1341    sodium chloride 0.9 % flush 10 mL  10 mL Intravenous PRN Thony Otto MD        sodium chloride 0.9 % infusion 40 mL  40 mL Intravenous PRN Thony Otto MD   40 mL at 24 0249    sodium chloride 0.9 % infusion  75 mL/hr Intravenous Continuous Justyn Owens MD 75 mL/hr at 24 1342 75 mL/hr at 24 1342    sodium hypochlorite (DAKIN'S 1/4 STRENGTH) 0.125 % topical solution 0.125% solution   Topical Q12H Merry Lees APRN   Given at 24 2146       Past Medical History:  Past Medical History:   Diagnosis Date    Decubitus ulcer of both feet     Intradural extramedullary spinal tumor     Osteomyelitis     Paraplegia     Sacral decubitus ulcer        Past Surgical History:  Past Surgical History:   Procedure Laterality Date     SECTION      COLOSTOMY      CYSTOSTOMY      DEBRIDEMENT OF ISCHIAL ULCER/BUTTOCKS WOUND      IVC FILTER RETRIEVAL      MUSCLE FLAP      SPINE SURGERY      TRUNK DEBRIDEMENT Bilateral 2020     Procedure: DEBRIDEMENT DECUBITUS ULCER bilateral feet;  Surgeon: Truong Neal MD;  Location: [facility] OR;  Service: General;  Laterality: Bilateral;       Family History  Family History   Problem Relation Age of Onset    Lung cancer Father        Social History  Social History     Socioeconomic History    Marital status: Unknown   Tobacco Use    Smoking status: Never    Smokeless tobacco: Never   Vaping Use    Vaping status: Never Used   Substance and Sexual Activity    Alcohol use: Not Currently    Drug use: Never    Sexual activity: Defer       Review of Systems:  History obtained from chart review and the patient  General ROS: No fever or chills  Respiratory ROS: No cough, shortness of breath, wheezing  Cardiovascular ROS: No chest pain or palpitations  Gastrointestinal ROS: No abdominal pain or melena  Genito-Urinary ROS: No dysuria or hematuria  Psych ROS: No anxiety and depression  14 point ROS reviewed with the patient and negative except as noted above and in the HPI unless unable to obtain.    Objective:  Patient Vitals for the past 24 hrs:   BP Temp Temp src Pulse Resp SpO2   12/27/24 1255 166/79 -- -- 98 18 98 %   12/27/24 1245 165/70 -- -- 98 16 96 %   12/27/24 1240 174/63 -- -- 100 22 96 %   12/27/24 1230 179/66 -- -- 99 14 96 %   12/27/24 1225 178/69 -- -- 93 14 96 %   12/27/24 1220 169/75 -- -- 98 20 97 %   12/27/24 1215 166/69 -- -- 105 20 97 %   12/27/24 1210 154/62 -- -- 95 16 97 %   12/27/24 0348 157/63 100.4 °F (38 °C) Oral 110 16 93 %   12/27/24 0135 164/59 98.9 °F (37.2 °C) Oral 100 18 96 %   12/26/24 2322 -- 99.9 °F (37.7 °C) Oral -- -- --   12/26/24 2034 174/58 98 °F (36.7 °C) Oral 94 18 97 %   12/26/24 1612 167/59 98.5 °F (36.9 °C) Oral 98 16 98 %       Intake/Output Summary (Last 24 hours) at 12/27/2024 1457  Last data filed at 12/27/2024 0348  Gross per 24 hour   Intake --   Output 1125 ml   Net -1125 ml     General: awake/alert   HEENT: Normocephalic atraumatic head  Neck: Supple with  no JVD or carotid base.  Chest:  clear to auscultation bilaterally without respiratory distress  CVS: regular rate and rhythm  Abdominal: Soft nondistended, colostomy/urostomy  Extremities: no cyanosis or edema  Skin: warm and dry without rash      Labs:  Results from last 7 days   Lab Units 12/27/24  0457 12/26/24  0539 12/25/24  0558   WBC 10*3/mm3 6.42 7.18 6.56   HEMOGLOBIN g/dL 8.6* 8.2* 8.2*   HEMATOCRIT % 29.9* 29.1* 28.6*   PLATELETS 10*3/mm3 232 285 312         Results from last 7 days   Lab Units 12/27/24  0348 12/26/24  0539 12/25/24  0558   SODIUM mmol/L 141 145 142   POTASSIUM mmol/L 4.2 4.1 4.1   CHLORIDE mmol/L 115* 113* 111*   CO2 mmol/L 17.0* 21.0* 21.0*   BUN mg/dL 26* 29* 32*   CREATININE mg/dL 1.39* 1.51* 1.56*   CALCIUM mg/dL 7.8* 8.5* 8.8   EGFR mL/min/1.73 43.0* 38.9* 37.4*   BILIRUBIN mg/dL 0.3 0.2 0.2   ALK PHOS U/L 120* 139* 139*   ALT (SGPT) U/L 6 8 8   AST (SGOT) U/L 7 6 7   GLUCOSE mg/dL 92 94 101*       Radiology:   Imaging Results (Last 72 Hours)       Procedure Component Value Units Date/Time    NM HIDA SCAN WITHOUT PHARMACOLOGICAL INTERVENTION [[id number]] Collected: 12/24/24 1303     Updated: 12/26/24 1326    Narrative:      EXAMINATION: NM HIDA SCAN WITHOUT PHARMACOLOGICAL INTERVENTION-  12/24/2024 1:03 PM     HISTORY: intractable nausea and vomiting; cholelithiasis; possible  biliary colic; Z74.09-Other reduced mobility.     Dose: 5.4 mCi technetium 99 M Choletec intravenously.     COMPARISON: CT abdomen and pelvis 12/23/2024.     REPORT: After administration of the radiopharmaceutical, planar images  were obtained over the right upper quadrant irregular 5-minute intervals  for a total of 40 minutes.     Activity is seen in the gallbladder within 10 minutes indicating patency  of the cystic duct. Activity is seen in the small intestine by 5  minutes. This confirms patency of the common bile duct. No sanford  agent was given due to the history of gallstones and  gallbladder  ejection fraction was not calculated.       Impression:      Negative HIDA scan, no evidence of cystic duct obstruction.     This report was signed and finalized on 12/24/2024 1:05 PM by Dr. Cruz Flynn MD.               Culture:  Blood Culture   Date Value Ref Range Status   12/20/2024 No growth at 3 days  Preliminary     Wound Culture   Date Value Ref Range Status   12/20/2024 Heavy growth (4+) Mixed Gram Negative Luciana (A)  Final   12/20/2024 Heavy growth (4+) Staphylococcus aureus, MRSA (A)  Final     Comment:       Methicillin resistant Staphylococcus aureus, Patient may be an isolation risk.   12/20/2024 Heavy growth (4+) Normal Skin Luciana  Final   12/20/2024 Heavy growth (4+) Mixed Gram Negative Luciana (A)  Final   12/20/2024 Heavy growth (4+) Staphylococcus aureus, MRSA (A)  Final     Comment:       Methicillin resistant Staphylococcus aureus, Patient may be an isolation risk.   12/20/2024 Heavy growth (4+) Normal Skin Luciana  Final   12/20/2024 Scant growth (1+) Mixed Gram Negative Luciana (A)  Final   12/20/2024 Heavy growth (4+) Staphylococcus aureus, MRSA (A)  Final     Comment:       Methicillin resistant Staphylococcus aureus, Patient may be an isolation risk.   12/20/2024 Heavy growth (4+) Normal Skin Luciana  Final         Assessment   Acute kidney injury/improving  Acute tubular necrosis.  Baseline chronic kidney disease stage 3b  Hyperkalemia--improved  Metabolic acidosis  Benign essential hypertension.  History of colostomy and urostomy  Multiple pressure ulcers  Paraplegia  Iron deficiency anemia    Plan:  Continue p.o. sodium bicarbonate.  Continue IV fluid.  Completed 3 doses of intravenous Venofer.  4.  Plan was discussed with the patient      Tima Conte MD  12/27/2024  14:57 CST

## 2024-12-27 NOTE — H&P (VIEW-ONLY)
[facility]  Inpatient Progress Note      Date of Admission: 12/19/2024  Date of Service:  12/27/24    Reason for Follow Up: Nausea vomiting    Subjective     Subjective:     Patient lying in bed with son at bedside.  Son reports patient is too weak to suck from a straw or eat anything.  She has had persistent nausea with some emesis.  No signs of hematemesis.  Patient is paralyzed but reports knowing she has pain to her lower back as well as lower abdomen.  This pain started after admission.  She has not had difficulty with diarrhea or constipation.  She does have a history of acid reflux and is compliant with daily omeprazole.  Reports that she had acid reflux a week prior to admission which is not normal for her.       Current Facility-Administered Medications:     acetaminophen (TYLENOL) tablet 650 mg, 650 mg, Oral, Q4H PRN, Thony Otto MD    allopurinol (ZYLOPRIM) tablet 100 mg, 100 mg, Oral, Daily, Darhsan Nascimento MD, 100 mg at 12/26/24 0857    ALPRAZolam (XANAX) tablet 0.25 mg, 0.25 mg, Oral, Nightly PRN, Thony Otto MD, 0.25 mg at 12/26/24 2155    baclofen (LIORESAL) tablet 10 mg, 10 mg, Oral, Q8H, Thony Otto MD, 10 mg at 12/26/24 2144    benzonatate (TESSALON) capsule 200 mg, 200 mg, Oral, TID PRN, Thony Otto MD, 200 mg at 12/20/24 0549    cholecalciferol (VITAMIN D3) tablet 1,000 Units, 1,000 Units, Oral, Daily, Thony Otto MD, 1,000 Units at 12/26/24 0856    doxycycline (ADOXA) tablet 100 mg, 100 mg, Oral, Q12H, Justyn Owens MD, 100 mg at 12/26/24 2144    heparin (porcine) 5000 UNIT/ML injection 5,000 Units, 5,000 Units, Subcutaneous, Q12H, Thony Otto MD, 5,000 Units at 12/26/24 2144    HYDROcodone-acetaminophen (NORCO)  MG per tablet 1 tablet, 1 tablet, Oral, Q4H PRN, Thony Otto MD, 1 tablet at 12/25/24 0555    lidocaine (LMX) 4 % cream 1 Application, 1 Application, Topical, Once, Merry Lees, APRN     metoclopramide (REGLAN) injection 5 mg, 5 mg, Intravenous, Q6H, Justyn Owens MD, 5 mg at 12/26/24 1144    nitroglycerin (NITROSTAT) SL tablet 0.4 mg, 0.4 mg, Sublingual, Q5 Min PRN, Thony Otto MD    ondansetron (ZOFRAN) injection 4 mg, 4 mg, Intravenous, Q6H PRN, Thony Otto MD, 4 mg at 12/25/24 2115    ondansetron ODT (ZOFRAN-ODT) disintegrating tablet 4 mg, 4 mg, Translingual, Q8H PRN, Thony Otto MD, 4 mg at 12/25/24 1424    pantoprazole (PROTONIX) injection 40 mg, 40 mg, Intravenous, Q AM, Justyn Owens MD, 40 mg at 12/27/24 0502    sodium bicarbonate tablet 650 mg, 650 mg, Oral, TID, Chas Mcallister APRN, 650 mg at 12/26/24 2144    sodium chloride 0.9 % flush 10 mL, 10 mL, Intravenous, Q12H, Thony Otto MD, 10 mL at 12/26/24 2144    sodium chloride 0.9 % flush 10 mL, 10 mL, Intravenous, PRN, Thony Otto MD    sodium chloride 0.9 % infusion 40 mL, 40 mL, Intravenous, PRN, Thony Otto MD, 40 mL at 12/21/24 0249    sodium chloride 0.9 % infusion, 75 mL/hr, Intravenous, Continuous, Volodymyr Núñez APRN    sodium hypochlorite (DAKIN'S 1/4 STRENGTH) 0.125 % topical solution 0.125% solution, , Topical, Q12H, Merry Lees APRN, Given at 12/26/24 2146    Review of Systems     Constitution:  negative for chills, fatigue and fevers  Eyes:  negative for blurriness and change of vision  ENT:   negative for sore throat and voice change  Respiratory: negative for  cough and shortness of air  Cardiovascular:  Negative for chest pain or palpitations  Gastrointestinal:  negative for  See HPI  Endocrine: negative for   weight loss, unintended          Objective     Vital Signs  Temp:  [98 °F (36.7 °C)-100.4 °F (38 °C)] 100.4 °F (38 °C)  Heart Rate:  [] 110  Resp:  [16-18] 16  BP: (157-174)/(58-63) 157/63  Body mass index is 31.78 kg/m².    Intake/Output Summary (Last 24 hours) at 12/27/2024 1007  Last data filed at 12/27/2024 0348  Gross per 24 hour    Intake --   Output 1125 ml   Net -1125 ml     No intake/output data recorded.       Physical Exam:   General: patient awake, alert and cooperative   Eyes: Normal lids and lashes, no scleral icterus   Neck: supple, normal ROM   Skin: warm and dry, not jaundiced   Cardiovascular: regular rhythm and rate, no murmurs auscultated   Pulm: clear to auscultation bilaterally, regular and unlabored   Abdomen: soft, nontender, nondistended; normal bowel sounds   Rectal: deferred   Extremities: no rash or edema   Psychiatric: Normal mood and behavior; memory intact, flat         Results Review:    I have reviewed all of the patients current test results  Results from last 7 days   Lab Units 12/27/24  0457 12/26/24  0539 12/25/24  0558   WBC 10*3/mm3 6.42 7.18 6.56   HEMOGLOBIN g/dL 8.6* 8.2* 8.2*   HEMATOCRIT % 29.9* 29.1* 28.6*   PLATELETS 10*3/mm3 232 285 312       Results from last 7 days   Lab Units 12/27/24  0348 12/26/24  0539 12/25/24  0558   SODIUM mmol/L 141 145 142   POTASSIUM mmol/L 4.2 4.1 4.1   CHLORIDE mmol/L 115* 113* 111*   CO2 mmol/L 17.0* 21.0* 21.0*   BUN mg/dL 26* 29* 32*   CREATININE mg/dL 1.39* 1.51* 1.56*   CALCIUM mg/dL 7.8* 8.5* 8.8   BILIRUBIN mg/dL 0.3 0.2 0.2   ALK PHOS U/L 120* 139* 139*   ALT (SGPT) U/L 6 8 8   AST (SGOT) U/L 7 6 7   GLUCOSE mg/dL 92 94 101*             Lab Results   Lab Value Date/Time    LIPASE 8 (L) 12/27/2024 0348    LIPASE 8 (L) 08/12/2020 0348    LIPASE 12 (L) 08/18/2019 1222       Radiology:    Imaging Results (Last 24 Hours)       Procedure Component Value Units Date/Time    NM HIDA SCAN WITHOUT PHARMACOLOGICAL INTERVENTION [[id number]] Collected: 12/24/24 1303     Updated: 12/26/24 1326    Narrative:      EXAMINATION: NM HIDA SCAN WITHOUT PHARMACOLOGICAL INTERVENTION-  12/24/2024 1:03 PM     HISTORY: intractable nausea and vomiting; cholelithiasis; possible  biliary colic; Z74.09-Other reduced mobility.     Dose: 5.4 mCi technetium 99 M Choletec intravenously.      COMPARISON: CT abdomen and pelvis 12/23/2024.     REPORT: After administration of the radiopharmaceutical, planar images  were obtained over the right upper quadrant irregular 5-minute intervals  for a total of 40 minutes.     Activity is seen in the gallbladder within 10 minutes indicating patency  of the cystic duct. Activity is seen in the small intestine by 5  minutes. This confirms patency of the common bile duct. No sanford  agent was given due to the history of gallstones and gallbladder  ejection fraction was not calculated.       Impression:      Negative HIDA scan, no evidence of cystic duct obstruction.     This report was signed and finalized on 12/24/2024 1:05 PM by Dr. Cruz Flynn MD.                 Assessment & Plan       Acute kidney injury    Acute kidney failure, unspecified    CORINA (acute kidney injury)    Metabolic acidosis    Pressure ulcers of skin of multiple topographic sites    Paraplegia    Anemia    CKD stage 3b, GFR 30-44 ml/min    Colostomy in place    Presence of urostomy    History of breast cancer    Cholelithiases    Chronic osteomyelitis right and left ischium, left iliac bone    Nausea and vomiting      Impression/Plan    Nausea, vomiting  GERD  CKD, stage III  Paraplegia  Colostomy - 2019    Patient had a normal HIDA scan.  She does not recall if she has ever had EGD in the past.  In 2019 there is documentation of EGD but no report.    Ozempic stopped over one week ago.  2019 Jessica Alvarnega experienced difficulty with nausea/vomitting that was attributed to constipation based on stool observed on imaging.  Jessica Alvarenga and her son deny difficulty with constipation, however, due to paralysis and previous history of constipation this could still be a contributing factor.  CT imaging dated 12/23/24 is not indicative of constipation.   We will plan for EGD today to assess for signs of acid reflux.  I did discuss this with patient and her son and they were both  agreeable to proceed.  I will go ahead and start normal saline infusion and advised patient son she will need to remain n.p.o. until after procedure.   Renal labs improving.   Further recommendation pending finding on EGD.    Electronically signed by MAREN Simental, 12/27/24, 9:41 AM CST.       MAREN Simental  12/27/24  10:07 CST    .

## 2024-12-27 NOTE — PLAN OF CARE
Goal Outcome Evaluation:  Plan of Care Reviewed With: patient        Progress: no change  Outcome Evaluation: Pt A&O x4. VSS. No c/o pain. Pt states she is feeling weak. 2 piece convex ostomy appliance applied to colostomy. Wound care completed per orders. Safety maintained.

## 2024-12-27 NOTE — INTERVAL H&P NOTE
H&P reviewed. The patient was examined and there are no changes to the H&P.         Meds called in per patient request after speaking with wife.

## 2024-12-28 PROCEDURE — 97530 THERAPEUTIC ACTIVITIES: CPT

## 2024-12-28 PROCEDURE — 25810000003 SODIUM CHLORIDE 0.9 % SOLUTION: Performed by: INTERNAL MEDICINE

## 2024-12-28 PROCEDURE — 63710000001 ONDANSETRON ODT 4 MG TABLET DISPERSIBLE: Performed by: INTERNAL MEDICINE

## 2024-12-28 PROCEDURE — 25010000002 HEPARIN (PORCINE) PER 1000 UNITS: Performed by: INTERNAL MEDICINE

## 2024-12-28 PROCEDURE — 99232 SBSQ HOSP IP/OBS MODERATE 35: CPT | Performed by: NURSE PRACTITIONER

## 2024-12-28 RX ORDER — PANTOPRAZOLE SODIUM 40 MG/1
40 TABLET, DELAYED RELEASE ORAL
Status: DISCONTINUED | OUTPATIENT
Start: 2024-12-29 | End: 2024-12-30 | Stop reason: HOSPADM

## 2024-12-28 RX ADMIN — ONDANSETRON 4 MG: 4 TABLET, ORALLY DISINTEGRATING ORAL at 05:52

## 2024-12-28 RX ADMIN — SODIUM CHLORIDE 75 ML/HR: 9 INJECTION, SOLUTION INTRAVENOUS at 01:59

## 2024-12-28 RX ADMIN — ALLOPURINOL 100 MG: 100 TABLET ORAL at 08:49

## 2024-12-28 RX ADMIN — HEPARIN SODIUM 5000 UNITS: 5000 INJECTION, SOLUTION INTRAVENOUS; SUBCUTANEOUS at 20:57

## 2024-12-28 RX ADMIN — SODIUM HYPOCHLORITE: 1.25 SOLUTION TOPICAL at 18:00

## 2024-12-28 RX ADMIN — Medication 10 ML: at 20:58

## 2024-12-28 RX ADMIN — HEPARIN SODIUM 5000 UNITS: 5000 INJECTION, SOLUTION INTRAVENOUS; SUBCUTANEOUS at 08:49

## 2024-12-28 RX ADMIN — Medication 1000 UNITS: at 08:49

## 2024-12-28 RX ADMIN — SODIUM BICARBONATE 650 MG: 650 TABLET ORAL at 18:37

## 2024-12-28 RX ADMIN — SODIUM BICARBONATE 650 MG: 650 TABLET ORAL at 08:49

## 2024-12-28 RX ADMIN — ACETAMINOPHEN 650 MG: 325 TABLET ORAL at 08:53

## 2024-12-28 NOTE — PROGRESS NOTES
[facility]  INPATIENT PROGRESS NOTE    Patient Name: Jessica Alvarenga  Date of Admission: 12/19/2024  Today's Date: 12/28/24  Length of Stay: 7  Primary Care Physician: Ponce Orozco MD    Subjective   Chief Complaint: Nausea and vomiting  HPI   Patient reports that she feels a little bit better today.  She does describe having some occasional spasms in her stomach.  She reports output via her colostomy.  Reports that she feels very, very weak.  In fact, she indicates that she feels so weak that she is unable to feed herself.  Denies any current symptoms of nausea.  No family at bedside this afternoon.    Review of Systems   All pertinent negatives and positives are as above. All other systems have been reviewed and are negative unless otherwise stated.     Objective    Temp:  [97.8 °F (36.6 °C)-100.3 °F (37.9 °C)] 98.3 °F (36.8 °C)  Heart Rate:  [] 72  Resp:  [16-22] 16  BP: (141-174)/(52-79) 141/52  Physical Exam  Vitals reviewed.   Constitutional:       General: She is not in acute distress.     Appearance: She is not toxic-appearing.      Comments: She looks a little better today   HENT:      Head: Normocephalic.      Mouth/Throat:      Mouth: Mucous membranes are moist.   Cardiovascular:      Rate and Rhythm: Normal rate.   Pulmonary:      Effort: Pulmonary effort is normal. No respiratory distress.      Comments: On room air  Abdominal:      Palpations: Abdomen is soft.      Tenderness: There is abdominal tenderness.      Comments: Ileal conduit and colostomy in place.     Musculoskeletal:         General: Swelling and deformity (lower extremities; dressings in place) present.   Skin:     General: Skin is warm.   Neurological:      Mental Status: She is alert. Mental status is at baseline.   Psychiatric:         Mood and Affect: Mood normal.         Results Review:  I have reviewed the labs, radiology results, and diagnostic  studies.    Laboratory Data:   Results from last 7 days   Lab Units 12/27/24  0457 12/26/24  0539 12/25/24  0558   WBC 10*3/mm3 6.42 7.18 6.56   HEMOGLOBIN g/dL 8.6* 8.2* 8.2*   HEMATOCRIT % 29.9* 29.1* 28.6*   PLATELETS 10*3/mm3 232 285 312        Results from last 7 days   Lab Units 12/27/24  0348 12/26/24  0539 12/25/24  0558   SODIUM mmol/L 141 145 142   POTASSIUM mmol/L 4.2 4.1 4.1   CHLORIDE mmol/L 115* 113* 111*   CO2 mmol/L 17.0* 21.0* 21.0*   BUN mg/dL 26* 29* 32*   CREATININE mg/dL 1.39* 1.51* 1.56*   CALCIUM mg/dL 7.8* 8.5* 8.8   BILIRUBIN mg/dL 0.3 0.2 0.2   ALK PHOS U/L 120* 139* 139*   ALT (SGPT) U/L 6 8 8   AST (SGOT) U/L 7 6 7   GLUCOSE mg/dL 92 94 101*       Culture Data:   Blood Culture   Date Value Ref Range Status   12/20/2024 No growth at 3 days  Preliminary     Wound Culture   Date Value Ref Range Status   12/20/2024 Heavy growth (4+) Mixed Gram Negative Luciana (A)  Final   12/20/2024 Heavy growth (4+) Staphylococcus aureus, MRSA (A)  Final     Comment:       Methicillin resistant Staphylococcus aureus, Patient may be an isolation risk.   12/20/2024 Heavy growth (4+) Normal Skin Luciana  Final   12/20/2024 Heavy growth (4+) Mixed Gram Negative Luciana (A)  Final   12/20/2024 Heavy growth (4+) Staphylococcus aureus, MRSA (A)  Final     Comment:       Methicillin resistant Staphylococcus aureus, Patient may be an isolation risk.   12/20/2024 Heavy growth (4+) Normal Skin Luciana  Final   12/20/2024 Scant growth (1+) Mixed Gram Negative Luciana (A)  Final   12/20/2024 Heavy growth (4+) Staphylococcus aureus, MRSA (A)  Final     Comment:       Methicillin resistant Staphylococcus aureus, Patient may be an isolation risk.   12/20/2024 Heavy growth (4+) Normal Skin Luciana  Final       Radiology Data:   Imaging Results (Last 24 Hours)       ** No results found for the last 24 hours. **            I have reviewed the patient's current medications.     Assessment/Plan   Assessment  Active Hospital Problems     Diagnosis     **Acute kidney injury     Cholelithiases     Chronic osteomyelitis right and left ischium, left iliac bone     Nausea and vomiting     Metabolic acidosis     Pressure ulcers of skin of multiple topographic sites     Paraplegia     Anemia     CKD stage 3b, GFR 30-44 ml/min     Colostomy in place     Presence of urostomy     History of breast cancer     CORINA (acute kidney injury)     Acute kidney failure, unspecified        Treatment Plan  GI recommendations reviewed  Results of EGD with no acute findings identified.  Normal esophagus, stomach, and duodenum.  No specimens collected.  GI did recommend consideration of stopping her Ozempic moving forward.    Plan to monitor off of Abxs - Doxycycline.  Want to ensure antibiotics are not source of ongoing N/V  Gentle IVFs - will allow orders for IVFs to  this afternoon and monitor off of IVFs  Plan to d/c IV PPI today  Daily improvement in renal function - lab holiday today and repeat metabolic panel in AM  Advance diet as tolerated - GI soft, low irritant diet for dinner  CT A/P reviewed -cholelithiasis with no other inflammatory changes.  LFTs have been unremarkable.  Parastomal hernia containing fat noted.  No other acute findings.  No significant constipation  HIDA scan negative.  Continue wound care  Wound cultures positive for MRSA.  Patient has been on PO Doxycycline but plan to d/c today.  Outside hospital records from [facility] reviewed (see below) and patient does report that follow-up in [place] regarding her wounds - [facility]  Will need close outpatient follow-up with wound care in [place] and her specialists in [place]  Patient did have a hemoglobin of 6.4 status posttransfusion of 1 unit of PRBCs.  IV ferric gluconate administered.  Hgb has been stable.  Will repeat CBC in AM  Physical therapy - patient reports needing help with regaining some strength in her upper extremities.  She  indicates that she is too weak to even feed herself currently  Patient reports she lives at home with her son in [place], [place], and plans to return there at discharge    Medical Decision Making  Number and Complexity of problems: 2 acute; moderate complexity      Conditions and Status        Condition is unchanged.     [facility] Data  External documents reviewed:               Cardiac tracing (EKG, telemetry) interpretation: no new EKGs  Radiology interpretation: no new radiology studies  Labs reviewed: as above  Any tests that were considered but not ordered: as above: none     Decision rules/scores evaluated (example [id number], [place], etc): none     Discussed with: patient      Care Planning  Shared decision making: Discussed with patient at bedside with agreement to proceed with treatment plan as outlined  Code status and discussions: Full code    Disposition  Social Determinants of Health that impact treatment or disposition: paraplegia  I expect the patient to be discharged: to be determined        Electronically signed by Justyn Owens MD, 12/28/24, 12:38 CST.

## 2024-12-28 NOTE — CASE MANAGEMENT/SOCIAL WORK
Continued Stay Note  YASMINE Mims     Patient Name: Jessica Alvarenga  MRN: [id number]  Today's Date: 12/28/2024    Admit Date: 12/19/2024    Plan: Home   Discharge Plan       Row Name 12/28/24 1114       Plan    Plan Home    Plan Comments SW spoke with patient to confirm dc plan.  Patient declines SNF placement.  Patient states she will return home upon discharge.  Patient seems to be under the impression she cannot receive HH?  Patient states she was discharged by [facility] but is unsure if it was due to staffing or insurance?  Can attempt to arrange HH again if ordered.                   Discharge Codes    No documentation.                 Expected Discharge Date and Time       Expected Discharge Date Expected Discharge Time    Dec 27, 2024               AMMON Murphy

## 2024-12-28 NOTE — PROGRESS NOTES
[facility]  Inpatient Progress Note      Date of Admission: 12/19/2024  Date of Service:  12/28/24    Reason for Follow Up: Nausea    Subjective     Subjective:     Patient lying in bed, no family present at bedside.  She is more verbose today compared to yesterday.  Reports she is able to drink her Sprite today.  She is without complaints of pain.  She has not had any emesis today.  Tolerated endoscopy yesterday without difficulty.        Current Facility-Administered Medications:     acetaminophen (TYLENOL) tablet 650 mg, 650 mg, Oral, Q4H PRN, Thony Otto MD, 650 mg at 12/28/24 0853    allopurinol (ZYLOPRIM) tablet 100 mg, 100 mg, Oral, Daily, Darshan Nascimento MD, 100 mg at 12/28/24 0849    ALPRAZolam (XANAX) tablet 0.25 mg, 0.25 mg, Oral, Nightly PRN, Thony Otto MD, 0.25 mg at 12/26/24 2155    baclofen (LIORESAL) tablet 10 mg, 10 mg, Oral, Q8H PRN, Justyn Owens MD    benzonatate (TESSALON) capsule 200 mg, 200 mg, Oral, TID PRN, Thony Otto MD, 200 mg at 12/20/24 0549    cholecalciferol (VITAMIN D3) tablet 1,000 Units, 1,000 Units, Oral, Daily, Thony Otto MD, 1,000 Units at 12/28/24 0849    heparin (porcine) 5000 UNIT/ML injection 5,000 Units, 5,000 Units, Subcutaneous, Q12H, Thony Otto MD, 5,000 Units at 12/28/24 0849    HYDROcodone-acetaminophen (NORCO)  MG per tablet 1 tablet, 1 tablet, Oral, Q4H PRN, Thony Otto MD, 1 tablet at 12/25/24 0555    lidocaine (LMX) 4 % cream 1 Application, 1 Application, Topical, Once, Merry Lees APRN    nitroglycerin (NITROSTAT) SL tablet 0.4 mg, 0.4 mg, Sublingual, Q5 Min PRN, Thony Otto MD    ondansetron (ZOFRAN) injection 4 mg, 4 mg, Intravenous, Q6H PRN, Thony Otto MD, 4 mg at 12/25/24 5430    ondansetron ODT (ZOFRAN-ODT) disintegrating tablet 4 mg, 4 mg, Translingual, Q8H PRN, Thony Otto MD, 4 mg at 12/28/24 5373    sodium bicarbonate tablet 650 mg, 650 mg,  Oral, TID, Chas Mcallister, MAREN, 650 mg at 12/28/24 0849    sodium chloride 0.9 % flush 10 mL, 10 mL, Intravenous, Q12H, Thony Otto MD, 10 mL at 12/27/24 2129    sodium chloride 0.9 % flush 10 mL, 10 mL, Intravenous, PRN, Thony Otto MD    sodium chloride 0.9 % infusion 40 mL, 40 mL, Intravenous, PRN, Thony Otto MD, 40 mL at 12/21/24 0249    sodium chloride 0.9 % infusion, 75 mL/hr, Intravenous, Continuous, Justyn Owens MD, Last Rate: 75 mL/hr at 12/28/24 0159, 75 mL/hr at 12/28/24 0159    sodium chloride 0.9 % infusion, 75 mL/hr, Intravenous, Continuous, Tima Conte MD    sodium hypochlorite (DAKIN'S 1/4 STRENGTH) 0.125 % topical solution 0.125% solution, , Topical, Q12H, Merry Lees APRN, Given at 12/27/24 2129    Review of Systems     Constitution:  negative for chills, fatigue and fevers  Eyes:  negative for blurriness and change of vision  ENT:   negative for sore throat and voice change  Respiratory: negative for  cough and shortness of air  Cardiovascular:  Negative for chest pain or palpitations  Gastrointestinal:  negative for  See HPI  Endocrine: negative for   weight loss, unintended          Objective     Vital Signs  Temp:  [97.8 °F (36.6 °C)-100.3 °F (37.9 °C)] 98.5 °F (36.9 °C)  Heart Rate:  [] 82  Resp:  [14-22] 16  BP: (146-179)/(54-79) 146/57  Body mass index is 31.78 kg/m².    Intake/Output Summary (Last 24 hours) at 12/28/2024 1112  Last data filed at 12/28/2024 0351  Gross per 24 hour   Intake --   Output 1300 ml   Net -1300 ml     No intake/output data recorded.       Physical Exam:   General: patient awake, alert and cooperative   Eyes: Normal lids and lashes, no scleral icterus   Neck: supple, normal ROM   Skin: warm and dry, not jaundiced   Cardiovascular: regular rhythm and rate, no murmurs auscultated   Pulm: clear to auscultation bilaterally, regular and unlabored   Abdomen: soft, nontender, nondistended; normal bowel sounds   Rectal:  deferred   Extremities: no rash or edema   Psychiatric: Normal mood and behavior; memory intact         Results Review:    I have reviewed all of the patients current test results  Results from last 7 days   Lab Units 12/27/24  0457 12/26/24  0539 12/25/24  0558   WBC 10*3/mm3 6.42 7.18 6.56   HEMOGLOBIN g/dL 8.6* 8.2* 8.2*   HEMATOCRIT % 29.9* 29.1* 28.6*   PLATELETS 10*3/mm3 232 285 312       Results from last 7 days   Lab Units 12/27/24  0348 12/26/24  0539 12/25/24  0558   SODIUM mmol/L 141 145 142   POTASSIUM mmol/L 4.2 4.1 4.1   CHLORIDE mmol/L 115* 113* 111*   CO2 mmol/L 17.0* 21.0* 21.0*   BUN mg/dL 26* 29* 32*   CREATININE mg/dL 1.39* 1.51* 1.56*   CALCIUM mg/dL 7.8* 8.5* 8.8   BILIRUBIN mg/dL 0.3 0.2 0.2   ALK PHOS U/L 120* 139* 139*   ALT (SGPT) U/L 6 8 8   AST (SGOT) U/L 7 6 7   GLUCOSE mg/dL 92 94 101*             Lab Results   Lab Value Date/Time    LIPASE 8 (L) 12/27/2024 0348    LIPASE 8 (L) 08/12/2020 0348    LIPASE 12 (L) 08/18/2019 1222       Radiology:    Imaging Results (Last 24 Hours)       ** No results found for the last 24 hours. **              Assessment & Plan       Acute kidney injury    Acute kidney failure, unspecified    CORINA (acute kidney injury)    Metabolic acidosis    Pressure ulcers of skin of multiple topographic sites    Paraplegia    Anemia    CKD stage 3b, GFR 30-44 ml/min    Colostomy in place    Presence of urostomy    History of breast cancer    Cholelithiases    Chronic osteomyelitis right and left ischium, left iliac bone    Nausea and vomiting      Impression/Plan    Nausea, vomiting  GERD  CKD, stage III  Paraplegia  Colostomy - 2019    No signs of acid reflux on endoscopy yesterday.  Patient has had a normal HIDA scan.  It would be GIs recommendation that she remain off of her Ozempic.  Kidney functions have continued to improve.  Patient looks better today overall compared to yesterday.  She will need to ensure adequate bowel movement, discussion regarding fiber  utilizing MiraLAX as needed.  No further orders from GI perspective at this time, please feel free to reconsult us if needed.    Electronically signed by MAREN Simental, 12/28/24, 11:12 AM CST.       MAREN Simental  12/28/24  11:12 CST

## 2024-12-28 NOTE — THERAPY TREATMENT NOTE
Acute Care - Physical Therapy Treatment Note  [facility]     Patient Name: Jessica Alvarenga  : 1960  MRN: [id number]  Today's Date: 2024      Visit Dx:     ICD-10-CM ICD-9-CM   1. Impaired mobility [Z74.09]  Z74.09 799.89   2. Nausea and vomiting, unspecified vomiting type  R11.2 787.01     Patient Active Problem List   Diagnosis    Skin ulcer of sacrum with necrosis of muscle    Acute osteomyelitis    Chronic osteomyelitis with draining sinus of multiple sites    Anemia    Anxiety    Chronic back pain    Depressive disorder    GERD (gastroesophageal reflux disease)    History of ileal conduit    Essential hypertension    Nasopharyngitis    Paraplegia    Pressure injury of skin of heel    Protein-calorie malnutrition, moderate    Acute kidney injury    Acute kidney failure, unspecified    CORINA (acute kidney injury)    Metabolic acidosis    Pressure ulcers of skin of multiple topographic sites    Paraplegia    Anemia    CKD stage 3b, GFR 30-44 ml/min    Suprapubic catheter    Colostomy in place    Presence of urostomy    History of breast cancer    Cholelithiases    Chronic osteomyelitis right and left ischium, left iliac bone    Nausea and vomiting     Past Medical History:   Diagnosis Date    Decubitus ulcer of both feet     Intradural extramedullary spinal tumor     Osteomyelitis     Paraplegia     Sacral decubitus ulcer      Past Surgical History:   Procedure Laterality Date     SECTION      COLOSTOMY      CYSTOSTOMY      DEBRIDEMENT OF ISCHIAL ULCER/BUTTOCKS WOUND      IVC FILTER RETRIEVAL      MUSCLE FLAP      SPINE SURGERY      TRUNK DEBRIDEMENT Bilateral 2020    Procedure: DEBRIDEMENT DECUBITUS ULCER bilateral feet;  Surgeon: Truong Neal MD;  Location: [facility];  Service: General;  Laterality: Bilateral;     PT Assessment (Last 12 Hours)       PT Evaluation and Treatment       Row Name [id number]          Physical Therapy Time and Intention    Subjective Information  complains of;weakness;fatigue;dizziness  -     Document Type therapy note (daily note)  -     Mode of Treatment physical therapy  -       Row Name 12/28/24 1105          General Information    Existing Precautions/Restrictions fall  H/o paraplegia, urostomy, colostomy  -       Row Name 12/28/24 1105          Pain    Pretreatment Pain Rating 0/10 - no pain  -     Posttreatment Pain Rating 0/10 - no pain  -       Row Name 12/28/24 1105          Bed Mobility    Supine-Sit Millis (Bed Mobility) verbal cues;moderate assist (50% patient effort);maximum assist (25% patient effort)  -     Sit-Supine Millis (Bed Mobility) moderate assist (50% patient effort);maximum assist (25% patient effort)  -     Comment, (Bed Mobility) assist for LE's, pt assisted with upper body  -       Row Name 12/28/24 1105          Transfers    Comment, (Transfers) still waiting for w/c cushion and transfer board  -       Row Name 12/28/24 1105          Balance    Comment, Balance EOB with SBA, pt sits with R lean  -       Row Name             Wound 07/17/20 0133 Left medial plantar Pressure Injury    Wound - Properties Group Placement Date: 07/17/20  - Placement Time: 0133  -MC Side: Left  -MC Location: plantar  -MC Primary Wound Type: Pressure inj  -MC Present on Original Admission: Y  -MC    Retired Wound - Properties Group Placement Date: 07/17/20  - Placement Time: 0133  -MC Present on Original Admission: Y  -MC Side: Left  -MC Location: plantar  -MC Primary Wound Type: Pressure inj  -MC    Retired Wound - Properties Group Placement Date: 07/17/20  - Placement Time: 0133  -MC Present on Original Admission: Y  -MC Side: Left  -MC Location: plantar  -MC Primary Wound Type: Pressure inj  -MC    Retired Wound - Properties Group Date first assessed: 07/17/20  - Time first assessed: 0133  -MC Present on Original Admission: Y  -MC Side: Left  -MC Retired Orientation: medial  -MC Location: plantar  -MC Primary  Wound Type: Pressure inj  -MC      Row Name             Wound 12/19/24 2355 Right distal calf    Wound - Properties Group Placement Date: 12/19/24  -LL Placement Time: 2355  -LL Side: Right  -LL Orientation: distal  -LL Location: calf  -LL Present on Original Admission: Y  -LL    Retired Wound - Properties Group Placement Date: 12/19/24  -LL Placement Time: 2355  -LL Present on Original Admission: Y  -LL Side: Right  -LL Orientation: distal  -LL Location: calf  -LL    Retired Wound - Properties Group Placement Date: 12/19/24  -LL Placement Time: 2355  -LL Present on Original Admission: Y  -LL Side: Right  -LL Orientation: distal  -LL Location: calf  -LL    Retired Wound - Properties Group Date first assessed: 12/19/24  -LL Time first assessed: 2355  -LL Present on Original Admission: Y  -LL Side: Right  -LL Location: calf  -LL      Row Name             Wound 07/17/20 0132 Left distal;lower calf Pressure Injury    Wound - Properties Group Placement Date: 07/17/20  -MC Placement Time: 0132  -MC Side: Left  -MC Location: calf  -MC Primary Wound Type: Pressure inj  -MC Present on Original Admission: Y  -MC    Retired Wound - Properties Group Placement Date: 07/17/20  -MC Placement Time: 0132  -MC Present on Original Admission: Y  -MC Side: Left  -MC Location: calf  -MC Primary Wound Type: Pressure inj  -MC    Retired Wound - Properties Group Placement Date: 07/17/20  -MC Placement Time: 0132  -MC Present on Original Admission: Y  -MC Side: Left  -MC Location: calf  -MC Primary Wound Type: Pressure inj  -MC    Retired Wound - Properties Group Date first assessed: 07/17/20  -MC Time first assessed: 0132  -MC Present on Original Admission: Y  -MC Side: Left  -MC Retired Orientation: distal;lower  -MC Location: calf  -MC Primary Wound Type: Pressure inj  -MC      Row Name             Wound 07/16/20 1526 Left distal calf Pressure Injury    Wound - Properties Group Placement Date: 07/16/20  -KP Placement Time: 1526  -KP,  Present on admission   Side: Left  -KP Location: calf  -KP Primary Wound Type: Pressure inj  -KP Present on Original Admission: Y  -KP    Retired Wound - Properties Group Placement Date: 07/16/20  -KP Placement Time: 1526  -KP, Present on admission   Present on Original Admission: Y  -KP Side: Left  -KP Location: calf  -KP Primary Wound Type: Pressure inj  -KP    Retired Wound - Properties Group Placement Date: 07/16/20  -KP Placement Time: 1526  -KP, Present on admission   Present on Original Admission: Y  -KP Side: Left  -KP Location: calf  -KP Primary Wound Type: Pressure inj  -KP    Retired Wound - Properties Group Date first assessed: 07/16/20  -KP Time first assessed: 1526 -KP, Present on admission   Present on Original Admission: Y  -KP Side: Left  -KP Retired Orientation: distal  -KP Location: calf  -KP Primary Wound Type: Pressure inj  -KP Retired Stage, Pressure Injury : Stage 2  -KP      Row Name             Wound 12/20/24 0001 Left distal leg    Wound - Properties Group Placement Date: 12/20/24  -LL Placement Time: 0001  -LL Side: Left  -LL Orientation: distal  -LL Location: leg  -LL    Retired Wound - Properties Group Placement Date: 12/20/24  -LL Placement Time: 0001  -LL Side: Left  -LL Orientation: distal  -LL Location: leg  -LL    Retired Wound - Properties Group Placement Date: 12/20/24  -LL Placement Time: 0001  -LL Side: Left  -LL Orientation: distal  -LL Location: leg  -LL    Retired Wound - Properties Group Date first assessed: 12/20/24  -LL Time first assessed: 0001  -LL Side: Left  -LL Location: leg  -LL      Row Name             Wound 07/17/20 0220 Right lateral popliteal Pressure Injury    Wound - Properties Group Placement Date: 07/17/20  -MC Placement Time: 0220  -MC Side: Right  -MC Location: popliteal  -MC Primary Wound Type: Pressure inj  -MC Present on Original Admission: Y  -MC    Retired Wound - Properties Group Placement Date: 07/17/20  -MC Placement Time: 0220  -MC Present on  Original Admission: Y  -MC Side: Right  -MC Location: popliteal  -MC Primary Wound Type: Pressure inj  -MC    Retired Wound - Properties Group Placement Date: 07/17/20  - Placement Time: 0220  -MC Present on Original Admission: Y  -MC Side: Right  -MC Location: popliteal  -MC Primary Wound Type: Pressure inj  -MC    Retired Wound - Properties Group Date first assessed: 07/17/20  - Time first assessed: 0220  -MC Present on Original Admission: Y  -MC Side: Right  -MC Retired Orientation: lateral  -MC Location: popliteal  -MC Primary Wound Type: Pressure inj  -MC      Row Name             Wound 07/17/20 0158 Bilateral medial gluteal Pressure Injury    Wound - Properties Group Placement Date: 07/17/20  - Placement Time: 0158  -MC Side: Bilateral  -MC Location: gluteal  -MC Primary Wound Type: Pressure inj  -MC Present on Original Admission: Y  -MC    Retired Wound - Properties Group Placement Date: 07/17/20  - Placement Time: 0158  -MC Present on Original Admission: Y  -MC Side: Bilateral  -MC Location: gluteal  -MC Primary Wound Type: Pressure inj  -MC    Retired Wound - Properties Group Placement Date: 07/17/20  - Placement Time: 0158  -MC Present on Original Admission: Y  -MC Side: Bilateral  -MC Location: gluteal  -MC Primary Wound Type: Pressure inj  -MC    Retired Wound - Properties Group Date first assessed: 07/17/20  - Time first assessed: 0158  -MC Present on Original Admission: Y  -MC Side: Bilateral  -MC Retired Orientation: medial  -MC Location: gluteal  -MC Primary Wound Type: Pressure inj  -MC      Row Name 12/28/24 1105          Positioning and Restraints    Pre-Treatment Position in bed  -YULIYA     Post Treatment Position bed  -YULIYA     In Bed fowlers;call light within reach;encouraged to call for assist;side rails up x2  -YULIYA               User Key  (r) = Recorded By, (t) = Taken By, (c) = Cosigned By      Initials Name Provider Type    Sacha Peck, PTA Physical Therapist Assistant    INDIA  Cassy Ann, RN Registered Nurse    Aria Ugalde RN Registered Nurse    Sade Tanner RN Registered Nurse                    Physical Therapy Education       Title: PT OT SLP Therapies (Done)       Topic: Physical Therapy (Done)       Point: Mobility training (Done)       Learning Progress Summary            Patient Acceptance, E, VU,NR by  at 12/24/2024 0951    Comment: role of PT, t/f training, high fall risk                      Point: Home exercise program (Done)       Learning Progress Summary            Patient Acceptance, E, VU,NR by  at 12/24/2024 0951    Comment: role of PT, t/f training, high fall risk                      Point: Body mechanics (Done)       Learning Progress Summary            Patient Acceptance, E, VU,NR by  at 12/24/2024 0951    Comment: role of PT, t/f training, high fall risk                      Point: Precautions (Done)       Learning Progress Summary            Patient Acceptance, E, VU,NR by  at 12/24/2024 0951    Comment: role of PT, t/f training, high fall risk                                      User Key       Initials Effective Dates Name Provider Type Discipline     08/15/24 -  Iker King, PT DPT Physical Therapist PT                  PT Recommendation and Plan         Outcome Measures       Row Name 12/28/24 1105 12/27/24 1545          How much help from another person do you currently need...    Turning from your back to your side while in flat bed without using bedrails? 2  -YULIYA 2  -YULIYA     Moving from lying on back to sitting on the side of a flat bed without bedrails? 2  -YULIYA 2  -YULIYA     Moving to and from a bed to a chair (including a wheelchair)? 1  -YULIYA 1  -YULIYA     Standing up from a chair using your arms (e.g., wheelchair, bedside chair)? 1  -YULIYA 1  -YULIYA     Climbing 3-5 steps with a railing? 1  -YULIYA 1  -YULIYA     To walk in hospital room? 1  -YULIYA 1  -YULIYA     AM-PAC 6 Clicks Score (PT) 8  -YULIYA 8  -YULIYA        Functional Assessment    Outcome Measure  Options AM-PAC 6 Clicks Basic Mobility (PT)  -YULIYA AM-PAC 6 Clicks Basic Mobility (PT)  -YULIYA               User Key  (r) = Recorded By, (t) = Taken By, (c) = Cosigned By      Initials Name Provider Type    Sacha Peck PTA Physical Therapist Assistant                     Time Calculation:    PT Charges       Row Name 12/28/24 1105             Time Calculation    Start Time 1105  -YULIYA      Stop Time 1145  -YULIYA      Time Calculation (min) 40 min  -YULIYA      PT Received On 12/28/24  -YULIYA         Time Calculation- PT    Total Timed Code Minutes- PT 40 minute(s)  -YULIYA         Timed Charges    52883 - PT Therapeutic Activity Minutes 40  -YULIYA         Total Minutes    Timed Charges Total Minutes 40  -YULIYA       Total Minutes 40  -YULIYA                User Key  (r) = Recorded By, (t) = Taken By, (c) = Cosigned By      Initials Name Provider Type    Sacha Peck PTA Physical Therapist Assistant                  Therapy Charges for Today       Code Description Service Date Service Provider Modifiers Qty    97081306520 HC PT THERAPEUTIC ACT EA 15 MIN 12/27/2024 Sacha Chaidez PTA GP 2    68177389944 HC PT THERAPEUTIC ACT EA 15 MIN 12/28/2024 Sacha Chaidez PTA GP 3            PT G-Codes  Outcome Measure Options: AM-PAC 6 Clicks Basic Mobility (PT)  AM-PAC 6 Clicks Score (PT): 8    Sacha Chaidez PTA  12/28/2024

## 2024-12-28 NOTE — PLAN OF CARE
Problem: Adult Inpatient Plan of Care  Goal: Plan of Care Review  Outcome: Progressing  Flowsheets (Taken 12/28/2024 0606)  Progress: no change  Outcome Evaluation: Patient rested well. No complaints of pain. AOx4. Fluids infusing per order. Wound care completed per orders. VSS. Safety maintained  Plan of Care Reviewed With: patient

## 2024-12-28 NOTE — PROGRESS NOTES
Nephrology ([facility]) Progress Note      Patient:  Jessica Alvarenga  YOB: 1960  Date of Service: 12/28/2024  MRN: [id number]   Acct: [id number]   Primary Care Physician: Ponce Orozco MD  Advance Directive:   Code Status and Medical Interventions: CPR (Attempt to Resuscitate); Full Support   Ordered at: 12/19/24 6636     Level Of Support Discussed With:    Patient     Code Status (Patient has no pulse and is not breathing):    CPR (Attempt to Resuscitate)     Medical Interventions (Patient has pulse or is breathing):    Full Support     Admit Date: 12/19/2024       Hospital Day: 7  Referring Provider: No Known Provider      Patient personally seen and examined.  Complete chart including Consults, Notes, Operative Reports, Labs, Cardiology, and Radiology studies reviewed as able.    Chief complaint: Abnormal labs.    Subjective:  Jessica Alvarenga is a 64 y.o. female for whom we were consulted for evaluation and treatment of acute kidney injury. Baseline chronic kidney disease stage 3b, baseline creatinine approximately 1.5. Follows in our [place] office. History of paraplegia, pressure ulcers, hypertension, suprapubic catheter, colostomy. Presented to outlying facility and was found to have abnormal labs including creatinine 3.3 and potassium 5.6. Transferred to [facility] for nephrology evaluation. She has received IV fluids as well as medical management of hyperkalemia. She reports several days of poor PO intake due to nausea recently as well as diarrhea from her ostomy. Denied changes in urine output. Denied NSAID use. Several wounds present on lower extremities and wound care has evaluated. Hemoglobin 6.4 on 12/20 and she is was given a unit of PRBC     This morning patient is feeling much better.  She denies any nausea or vomiting or abdominal pain.  She was able to eat regular food    Allergies:  Morphine and Oxycodone    Home Meds:  Medications Prior to  Admission   Medication Sig Dispense Refill Last Dose/Taking    anastrozole (ARIMIDEX) 1 MG tablet Take 1 tablet by mouth Daily.   Taking    baclofen (LIORESAL) 10 MG tablet Take 1 tablet by mouth 3 (Three) Times a Day.   Taking    cholecalciferol (VITAMIN D3) 25 MCG (1000 UT) tablet Take 1 tablet by mouth Daily.   Taking    ferrous sulfate 325 (65 FE) MG tablet Take 1 tablet by mouth Daily With Breakfast.   Taking    losartan (COZAAR) 100 MG tablet Take 0.5 tablets by mouth Daily.   Taking    omeprazole (priLOSEC) 40 MG capsule Take 1 capsule by mouth Daily.   Taking    acetaminophen (TYLENOL) 325 MG tablet Take 2 tablets by mouth Every 4 (Four) Hours As Needed for Mild Pain .       benzonatate (TESSALON) 200 MG capsule Take 1 capsule by mouth 3 (Three) Times a Day As Needed for Cough.       diphenoxylate-atropine (LOMOTIL) 2.5-0.025 MG per tablet Take 1 tablet by mouth 4 (Four) Times a Day As Needed for Diarrhea.       HYDROcodone-acetaminophen (NORCO)  MG per tablet Take 1 tablet by mouth Every 4 (Four) Hours As Needed for Severe Pain  for up to 6 doses. (Patient taking differently: Take 1 tablet by mouth 4 (Four) Times a Day As Needed for Severe Pain.) 6 tablet 0        Medicines:  Current Facility-Administered Medications   Medication Dose Route Frequency Provider Last Rate Last Admin    acetaminophen (TYLENOL) tablet 650 mg  650 mg Oral Q4H PRN Thony Otto MD   650 mg at 12/28/24 0853    allopurinol (ZYLOPRIM) tablet 100 mg  100 mg Oral Daily Darshan Nascimento MD   100 mg at 12/28/24 0849    ALPRAZolam (XANAX) tablet 0.25 mg  0.25 mg Oral Nightly PRN Thony Otto MD   0.25 mg at 12/26/24 2155    baclofen (LIORESAL) tablet 10 mg  10 mg Oral Q8H PRN Justyn Owens MD        benzonatate (TESSALON) capsule 200 mg  200 mg Oral TID PRN Thony Otto MD   200 mg at 12/20/24 0549    cholecalciferol (VITAMIN D3) tablet 1,000 Units  1,000 Units Oral Daily Thony Otto MD   1,000  Units at 24 0849    heparin (porcine) 5000 UNIT/ML injection 5,000 Units  5,000 Units Subcutaneous Q12H Thony Otto MD   5,000 Units at 24 0849    HYDROcodone-acetaminophen (NORCO)  MG per tablet 1 tablet  1 tablet Oral Q4H PRN Thony Otto MD   1 tablet at 24 0555    lidocaine (LMX) 4 % cream 1 Application  1 Application Topical Once Merry Lees APRN        nitroglycerin (NITROSTAT) SL tablet 0.4 mg  0.4 mg Sublingual Q5 Min PRN Thony Otto MD        ondansetron (ZOFRAN) injection 4 mg  4 mg Intravenous Q6H PRN Thony Otto MD   4 mg at 24 2115    ondansetron ODT (ZOFRAN-ODT) disintegrating tablet 4 mg  4 mg Translingual Q8H PRN Thony Otto MD   4 mg at 24 0552    [START ON 2024] pantoprazole (PROTONIX) EC tablet 40 mg  40 mg Oral Q AM Justyn Owens MD        sodium bicarbonate tablet 650 mg  650 mg Oral TID Chas Mcallister APRN   650 mg at 24 0849    sodium chloride 0.9 % flush 10 mL  10 mL Intravenous Q12H Thony Otto MD   10 mL at 249    sodium chloride 0.9 % flush 10 mL  10 mL Intravenous PRN Thony Otto MD        sodium chloride 0.9 % infusion 40 mL  40 mL Intravenous PRN Thony Otto MD   40 mL at 24 0249    sodium hypochlorite (DAKIN'S 1/4 STRENGTH) 0.125 % topical solution 0.125% solution   Topical Q12H Merry Lees APRN   Given at 24       Past Medical History:  Past Medical History:   Diagnosis Date    Decubitus ulcer of both feet     Intradural extramedullary spinal tumor     Osteomyelitis     Paraplegia     Sacral decubitus ulcer        Past Surgical History:  Past Surgical History:   Procedure Laterality Date     SECTION      COLOSTOMY      CYSTOSTOMY      DEBRIDEMENT OF ISCHIAL ULCER/BUTTOCKS WOUND      IVC FILTER RETRIEVAL      MUSCLE FLAP      SPINE SURGERY      TRUNK DEBRIDEMENT Bilateral 2020    Procedure: DEBRIDEMENT DECUBITUS ULCER  bilateral feet;  Surgeon: Truong Neal MD;  Location: [facility] OR;  Service: General;  Laterality: Bilateral;       Family History  Family History   Problem Relation Age of Onset    Lung cancer Father        Social History  Social History     Socioeconomic History    Marital status: Unknown   Tobacco Use    Smoking status: Never    Smokeless tobacco: Never   Vaping Use    Vaping status: Never Used   Substance and Sexual Activity    Alcohol use: Not Currently    Drug use: Never    Sexual activity: Defer       Review of Systems:  History obtained from chart review and the patient  General ROS: No fever or chills  Respiratory ROS: No cough, shortness of breath, wheezing  Cardiovascular ROS: No chest pain or palpitations  Gastrointestinal ROS: No abdominal pain or melena  Genito-Urinary ROS: No dysuria or hematuria  Psych ROS: No anxiety and depression  14 point ROS reviewed with the patient and negative except as noted above and in the HPI unless unable to obtain.    Objective:  Patient Vitals for the past 24 hrs:   BP Temp Temp src Pulse Resp SpO2   12/28/24 1132 141/52 98.3 °F (36.8 °C) Oral 72 16 96 %   12/28/24 0803 146/57 98.5 °F (36.9 °C) Oral 82 16 96 %   12/28/24 0351 150/58 98.8 °F (37.1 °C) Oral 92 16 94 %   12/27/24 2316 -- 99.2 °F (37.3 °C) Oral -- -- --   12/27/24 1926 151/56 97.8 °F (36.6 °C) Oral 103 16 95 %   12/27/24 1611 173/60 99.3 °F (37.4 °C) Oral 94 16 98 %   12/27/24 1525 157/54 100.3 °F (37.9 °C) Oral 94 16 94 %       Intake/Output Summary (Last 24 hours) at 12/28/2024 1435  Last data filed at 12/28/2024 0351  Gross per 24 hour   Intake --   Output 1300 ml   Net -1300 ml     General: awake/alert   HEENT: Normocephalic atraumatic head  Neck: Supple with no JVD or carotid base.  Chest:  clear to auscultation bilaterally without respiratory distress  CVS: regular rate and rhythm  Abdominal: Soft nondistended, colostomy/urostomy  Extremities: no cyanosis or edema  Skin: warm and dry without  rash      Labs:  Results from last 7 days   Lab Units 12/27/24  0457 12/26/24  0539 12/25/24  0558   WBC 10*3/mm3 6.42 7.18 6.56   HEMOGLOBIN g/dL 8.6* 8.2* 8.2*   HEMATOCRIT % 29.9* 29.1* 28.6*   PLATELETS 10*3/mm3 232 285 312         Results from last 7 days   Lab Units 12/27/24  0348 12/26/24  0539 12/25/24  0558   SODIUM mmol/L 141 145 142   POTASSIUM mmol/L 4.2 4.1 4.1   CHLORIDE mmol/L 115* 113* 111*   CO2 mmol/L 17.0* 21.0* 21.0*   BUN mg/dL 26* 29* 32*   CREATININE mg/dL 1.39* 1.51* 1.56*   CALCIUM mg/dL 7.8* 8.5* 8.8   EGFR mL/min/1.73 43.0* 38.9* 37.4*   BILIRUBIN mg/dL 0.3 0.2 0.2   ALK PHOS U/L 120* 139* 139*   ALT (SGPT) U/L 6 8 8   AST (SGOT) U/L 7 6 7   GLUCOSE mg/dL 92 94 101*       Radiology:   Imaging Results (Last 72 Hours)       Procedure Component Value Units Date/Time    NM HIDA SCAN WITHOUT PHARMACOLOGICAL INTERVENTION [[id number]] Collected: 12/24/24 1303     Updated: 12/26/24 1326    Narrative:      EXAMINATION: NM HIDA SCAN WITHOUT PHARMACOLOGICAL INTERVENTION-  12/24/2024 1:03 PM     HISTORY: intractable nausea and vomiting; cholelithiasis; possible  biliary colic; Z74.09-Other reduced mobility.     Dose: 5.4 mCi technetium 99 M Choletec intravenously.     COMPARISON: CT abdomen and pelvis 12/23/2024.     REPORT: After administration of the radiopharmaceutical, planar images  were obtained over the right upper quadrant irregular 5-minute intervals  for a total of 40 minutes.     Activity is seen in the gallbladder within 10 minutes indicating patency  of the cystic duct. Activity is seen in the small intestine by 5  minutes. This confirms patency of the common bile duct. No sanford  agent was given due to the history of gallstones and gallbladder  ejection fraction was not calculated.       Impression:      Negative HIDA scan, no evidence of cystic duct obstruction.     This report was signed and finalized on 12/24/2024 1:05 PM by Dr. Cruz Flynn MD.               Culture:  Blood  Culture   Date Value Ref Range Status   12/20/2024 No growth at 3 days  Preliminary     Wound Culture   Date Value Ref Range Status   12/20/2024 Heavy growth (4+) Mixed Gram Negative Luciana (A)  Final   12/20/2024 Heavy growth (4+) Staphylococcus aureus, MRSA (A)  Final     Comment:       Methicillin resistant Staphylococcus aureus, Patient may be an isolation risk.   12/20/2024 Heavy growth (4+) Normal Skin Luciana  Final   12/20/2024 Heavy growth (4+) Mixed Gram Negative Luciana (A)  Final   12/20/2024 Heavy growth (4+) Staphylococcus aureus, MRSA (A)  Final     Comment:       Methicillin resistant Staphylococcus aureus, Patient may be an isolation risk.   12/20/2024 Heavy growth (4+) Normal Skin Luciana  Final   12/20/2024 Scant growth (1+) Mixed Gram Negative Luciana (A)  Final   12/20/2024 Heavy growth (4+) Staphylococcus aureus, MRSA (A)  Final     Comment:       Methicillin resistant Staphylococcus aureus, Patient may be an isolation risk.   12/20/2024 Heavy growth (4+) Normal Skin Luciana  Final         Assessment   Acute kidney injury/improving  Acute tubular necrosis.  Baseline chronic kidney disease stage 3b  Hyperkalemia--improved  Metabolic acidosis  Benign essential hypertension.  History of colostomy and urostomy  Multiple pressure ulcers  Paraplegia   Iron deficiency anemia    Plan:  Discontinue IV fluid  Monitor renal function  P.o. sodium bicarb  4.  Plan was discussed with the patient and family at the bedside      Tima Conte MD  12/28/2024  14:35 CST

## 2024-12-29 ENCOUNTER — APPOINTMENT (OUTPATIENT)
Dept: GENERAL RADIOLOGY | Facility: HOSPITAL | Age: 64
DRG: 682 | End: 2024-12-29
Payer: MEDICARE

## 2024-12-29 LAB
ALBUMIN SERPL-MCNC: 2 G/DL (ref 3.5–5.2)
ALBUMIN/GLOB SERPL: 0.7 G/DL
ALP SERPL-CCNC: 126 U/L (ref 39–117)
ALT SERPL W P-5'-P-CCNC: <5 U/L (ref 1–33)
ANION GAP SERPL CALCULATED.3IONS-SCNC: 8 MMOL/L (ref 5–15)
AST SERPL-CCNC: <5 U/L (ref 1–32)
BASOPHILS # BLD AUTO: 0.04 10*3/MM3 (ref 0–0.2)
BASOPHILS NFR BLD AUTO: 0.5 % (ref 0–1.5)
BILIRUB SERPL-MCNC: 0.3 MG/DL (ref 0–1.2)
BUN SERPL-MCNC: 20 MG/DL (ref 8–23)
BUN/CREAT SERPL: 17.7 (ref 7–25)
CALCIUM SPEC-SCNC: 7.6 MG/DL (ref 8.6–10.5)
CHLORIDE SERPL-SCNC: 110 MMOL/L (ref 98–107)
CO2 SERPL-SCNC: 23 MMOL/L (ref 22–29)
CREAT SERPL-MCNC: 1.13 MG/DL (ref 0.57–1)
CRP SERPL-MCNC: 14.98 MG/DL (ref 0–0.5)
DEPRECATED RDW RBC AUTO: 55.5 FL (ref 37–54)
EGFRCR SERPLBLD CKD-EPI 2021: 55.1 ML/MIN/1.73
EOSINOPHIL # BLD AUTO: 0.58 10*3/MM3 (ref 0–0.4)
EOSINOPHIL NFR BLD AUTO: 6.9 % (ref 0.3–6.2)
ERYTHROCYTE [DISTWIDTH] IN BLOOD BY AUTOMATED COUNT: 16.5 % (ref 12.3–15.4)
GLOBULIN UR ELPH-MCNC: 2.8 GM/DL
GLUCOSE SERPL-MCNC: 87 MG/DL (ref 65–99)
HCT VFR BLD AUTO: 26.2 % (ref 34–46.6)
HGB BLD-MCNC: 7.5 G/DL (ref 12–15.9)
IMM GRANULOCYTES # BLD AUTO: 0.06 10*3/MM3 (ref 0–0.05)
IMM GRANULOCYTES NFR BLD AUTO: 0.7 % (ref 0–0.5)
LYMPHOCYTES # BLD AUTO: 1.34 10*3/MM3 (ref 0.7–3.1)
LYMPHOCYTES NFR BLD AUTO: 16 % (ref 19.6–45.3)
MCH RBC QN AUTO: 26.2 PG (ref 26.6–33)
MCHC RBC AUTO-ENTMCNC: 28.6 G/DL (ref 31.5–35.7)
MCV RBC AUTO: 91.6 FL (ref 79–97)
MONOCYTES # BLD AUTO: 0.92 10*3/MM3 (ref 0.1–0.9)
MONOCYTES NFR BLD AUTO: 11 % (ref 5–12)
NEUTROPHILS NFR BLD AUTO: 5.45 10*3/MM3 (ref 1.7–7)
NEUTROPHILS NFR BLD AUTO: 64.9 % (ref 42.7–76)
NRBC BLD AUTO-RTO: 0 /100 WBC (ref 0–0.2)
PLATELET # BLD AUTO: 164 10*3/MM3 (ref 140–450)
PMV BLD AUTO: 9.8 FL (ref 6–12)
POTASSIUM SERPL-SCNC: 4.4 MMOL/L (ref 3.5–5.2)
PROCALCITONIN SERPL-MCNC: 0.77 NG/ML (ref 0–0.25)
PROT SERPL-MCNC: 4.8 G/DL (ref 6–8.5)
RBC # BLD AUTO: 2.86 10*6/MM3 (ref 3.77–5.28)
SODIUM SERPL-SCNC: 141 MMOL/L (ref 136–145)
WBC NRBC COR # BLD AUTO: 8.39 10*3/MM3 (ref 3.4–10.8)

## 2024-12-29 PROCEDURE — 86140 C-REACTIVE PROTEIN: CPT | Performed by: INTERNAL MEDICINE

## 2024-12-29 PROCEDURE — 25010000002 NA FERRIC GLUC CPLX PER 12.5 MG: Performed by: INTERNAL MEDICINE

## 2024-12-29 PROCEDURE — 25010000002 HEPARIN (PORCINE) PER 1000 UNITS: Performed by: INTERNAL MEDICINE

## 2024-12-29 PROCEDURE — 71045 X-RAY EXAM CHEST 1 VIEW: CPT

## 2024-12-29 PROCEDURE — 85025 COMPLETE CBC W/AUTO DIFF WBC: CPT | Performed by: INTERNAL MEDICINE

## 2024-12-29 PROCEDURE — 84145 PROCALCITONIN (PCT): CPT | Performed by: INTERNAL MEDICINE

## 2024-12-29 PROCEDURE — 80053 COMPREHEN METABOLIC PANEL: CPT | Performed by: INTERNAL MEDICINE

## 2024-12-29 PROCEDURE — 25810000003 SODIUM CHLORIDE 0.9 % SOLUTION: Performed by: INTERNAL MEDICINE

## 2024-12-29 RX ORDER — BENZONATATE 100 MG/1
200 CAPSULE ORAL 3 TIMES DAILY PRN
Status: DISCONTINUED | OUTPATIENT
Start: 2024-12-29 | End: 2024-12-30 | Stop reason: HOSPADM

## 2024-12-29 RX ADMIN — Medication 10 ML: at 21:44

## 2024-12-29 RX ADMIN — ALLOPURINOL 100 MG: 100 TABLET ORAL at 10:32

## 2024-12-29 RX ADMIN — Medication 1000 UNITS: at 10:32

## 2024-12-29 RX ADMIN — PANTOPRAZOLE SODIUM 40 MG: 40 TABLET, DELAYED RELEASE ORAL at 10:50

## 2024-12-29 RX ADMIN — SODIUM CHLORIDE 250 MG: 9 INJECTION, SOLUTION INTRAVENOUS at 10:32

## 2024-12-29 RX ADMIN — HEPARIN SODIUM 5000 UNITS: 5000 INJECTION, SOLUTION INTRAVENOUS; SUBCUTANEOUS at 10:33

## 2024-12-29 RX ADMIN — SODIUM BICARBONATE 650 MG: 650 TABLET ORAL at 21:44

## 2024-12-29 RX ADMIN — HEPARIN SODIUM 5000 UNITS: 5000 INJECTION, SOLUTION INTRAVENOUS; SUBCUTANEOUS at 21:44

## 2024-12-29 RX ADMIN — SODIUM HYPOCHLORITE: 1.25 SOLUTION TOPICAL at 16:00

## 2024-12-29 RX ADMIN — SODIUM BICARBONATE 650 MG: 650 TABLET ORAL at 17:50

## 2024-12-29 RX ADMIN — BENZONATATE 200 MG: 100 CAPSULE ORAL at 10:50

## 2024-12-29 RX ADMIN — SODIUM BICARBONATE 650 MG: 650 TABLET ORAL at 10:33

## 2024-12-29 RX ADMIN — SODIUM HYPOCHLORITE: 1.25 SOLUTION TOPICAL at 00:49

## 2024-12-29 NOTE — PROGRESS NOTES
"    [facility] Medicine Services  INPATIENT PROGRESS NOTE    Patient Name: Jessica Alvarenga  Date of Admission: 12/19/2024  Today's Date: 12/29/24  Length of Stay: 8  Primary Care Physician: Ponce Orozco MD    Subjective   Chief Complaint: \"I'm feeling a little better\"  HPI   Patient indicates that she is feeling a little bit better today.  She states that she has been able to keep down more of her diet, and actually indicated that she felt hungry this morning.  She did request that her telemetry be discontinued.  She does report having a little bit of a cough overnight, and request some Tessalon Perles.  She indicates that her cough is nonproductive.  She states that the abdominal spasm symptoms that she recently had been experiencing have now resolved.  No family at bedside today.    Review of Systems   All pertinent negatives and positives are as above. All other systems have been reviewed and are negative unless otherwise stated.     Objective    Temp:  [98 °F (36.7 °C)-98.5 °F (36.9 °C)] 98.4 °F (36.9 °C)  Heart Rate:  [71-87] 78  Resp:  [16] 16  BP: (127-147)/(43-52) 131/43  Physical Exam  Vitals reviewed.   Constitutional:       General: She is not in acute distress.     Appearance: She is not toxic-appearing.   HENT:      Head: Normocephalic.      Mouth/Throat:      Mouth: Mucous membranes are moist.   Cardiovascular:      Rate and Rhythm: Normal rate.   Pulmonary:      Effort: Pulmonary effort is normal. No respiratory distress.      Comments: Remains on room air; some congestion noted with her cough this morning  Abdominal:      Palpations: Abdomen is soft.      Comments: Ileal conduit and colostomy noted   Musculoskeletal:         General: Swelling and deformity (lower extremities; dressings in place) present.   Skin:     General: Skin is warm.   Neurological:      Mental Status: She is alert.   Psychiatric:         Mood and Affect: Mood normal.         Results " Review:  I have reviewed the labs, radiology results, and diagnostic studies.    Laboratory Data:   Results from last 7 days   Lab Units 12/29/24  0700 12/27/24  0457 12/26/24  0539   WBC 10*3/mm3 8.39 6.42 7.18   HEMOGLOBIN g/dL 7.5* 8.6* 8.2*   HEMATOCRIT % 26.2* 29.9* 29.1*   PLATELETS 10*3/mm3 164 232 285        Results from last 7 days   Lab Units 12/29/24  0700 12/27/24  0348 12/26/24  0539   SODIUM mmol/L 141 141 145   POTASSIUM mmol/L 4.4 4.2 4.1   CHLORIDE mmol/L 110* 115* 113*   CO2 mmol/L 23.0 17.0* 21.0*   BUN mg/dL 20 26* 29*   CREATININE mg/dL 1.13* 1.39* 1.51*   CALCIUM mg/dL 7.6* 7.8* 8.5*   BILIRUBIN mg/dL 0.3 0.3 0.2   ALK PHOS U/L 126* 120* 139*   ALT (SGPT) U/L <5 6 8   AST (SGOT) U/L <5 7 6   GLUCOSE mg/dL 87 92 94       Culture Data:   Blood Culture   Date Value Ref Range Status   12/20/2024 No growth at 3 days  Preliminary     Wound Culture   Date Value Ref Range Status   12/20/2024 Heavy growth (4+) Mixed Gram Negative Luciana (A)  Final   12/20/2024 Heavy growth (4+) Staphylococcus aureus, MRSA (A)  Final     Comment:       Methicillin resistant Staphylococcus aureus, Patient may be an isolation risk.   12/20/2024 Heavy growth (4+) Normal Skin Luciana  Final   12/20/2024 Heavy growth (4+) Mixed Gram Negative Luciana (A)  Final   12/20/2024 Heavy growth (4+) Staphylococcus aureus, MRSA (A)  Final     Comment:       Methicillin resistant Staphylococcus aureus, Patient may be an isolation risk.   12/20/2024 Heavy growth (4+) Normal Skin Luciana  Final   12/20/2024 Scant growth (1+) Mixed Gram Negative Luciana (A)  Final   12/20/2024 Heavy growth (4+) Staphylococcus aureus, MRSA (A)  Final     Comment:       Methicillin resistant Staphylococcus aureus, Patient may be an isolation risk.   12/20/2024 Heavy growth (4+) Normal Skin Luciana  Final       Radiology Data:   Imaging Results (Last 24 Hours)       ** No results found for the last 24 hours. **            I have reviewed the patient's current  medications.     Assessment/Plan   Assessment  Active Hospital Problems    Diagnosis     **Acute kidney injury     Cholelithiases     Chronic osteomyelitis right and left ischium, left iliac bone     Nausea and vomiting     Metabolic acidosis     Pressure ulcers of skin of multiple topographic sites     Paraplegia     Anemia     CKD stage 3b, GFR 30-44 ml/min     Colostomy in place     Presence of urostomy     History of breast cancer     CORINA (acute kidney injury)     Acute kidney failure, unspecified        Treatment Plan  EGD with no acute findings identified.    HIDA negative  GI did recommend consideration of stopping her Ozempic moving forward.    Plan to monitor off of Abxs - Doxycycline.  Want to ensure antibiotics are not source of ongoing N/V and GI upset  Stop IVFs  PO PPI  CXR today  Tessalon Perles PRN  D/C cardiac telemetry (patient requested)  Advance diet as tolerated - GI soft, low irritant diet   Continue wound care  Wound cultures positive for MRSA.  Previous wound cultures dating back to September 2020 also reviewed and were positive for MRSA.  Patient has been on PO Doxycycline but now d/c'd given concern it could be contributing to some of her GI symptoms.  Outside hospital records from [facility] reviewed (see below) and patient does report that follow-up in [place] regarding her wounds - see screenshots below obtained from [employer]. Will need close outpatient follow-up with wound care in [place] and her specialists in [place].  With any signs of new or active infection would consider restarting antibiotic therapy.  Very challenging circumstance.  Her admission blood cultures were negative  Patient did have a hemoglobin of 6.4 status posttransfusion of 1 unit of PRBCs earlier during this hospitalization.  IV ferric gluconate administered and will repeat dose today.  Repeat H/H in AM  Physical therapy   Patient reports she lives at home with her son in [place], [place], and plans to  return there at discharge    Medical Decision Making  Number and Complexity of problems: 2 acute; moderate complexity      Conditions and Status        Condition is unchanged.     [facility] Data  External documents reviewed:               Cardiac tracing (EKG, telemetry) interpretation: no new EKGs  Radiology interpretation: no new radiology studies  Labs reviewed: as above  Any tests that were considered but not ordered: as above: none     Decision rules/scores evaluated (example [id number], Wells, etc): none     Discussed with: patient      Care Planning  Shared decision making: Discussed with patient at bedside with agreement to proceed with treatment plan as outlined  Code status and discussions: Full code    Disposition  Social Determinants of Health that impact treatment or disposition: paraplegia  I expect the patient to be discharged: still to be determined        Electronically signed by Justyn Owens MD, 12/29/24, 11:01 CST.

## 2024-12-29 NOTE — PROGRESS NOTES
Nephrology ([facility]) Progress Note      Patient:  Jessica Alvarenga  YOB: 1960  Date of Service: 12/29/2024  MRN: [id number]   Acct: [id number]   Primary Care Physician: Ponce Orozco MD  Advance Directive:   Code Status and Medical Interventions: CPR (Attempt to Resuscitate); Full Support   Ordered at: 12/19/24 9042     Level Of Support Discussed With:    Patient     Code Status (Patient has no pulse and is not breathing):    CPR (Attempt to Resuscitate)     Medical Interventions (Patient has pulse or is breathing):    Full Support     Admit Date: 12/19/2024       Hospital Day: 8  Referring Provider: No Known Provider      Patient personally seen and examined.  Complete chart including Consults, Notes, Operative Reports, Labs, Cardiology, and Radiology studies reviewed as able.    Chief complaint: Abnormal labs.    Subjective:  Jessica Alvarenga is a 64 y.o. female for whom we were consulted for evaluation and treatment of acute kidney injury. Baseline chronic kidney disease stage 3b, baseline creatinine approximately 1.5. Follows in our [place] office. History of paraplegia, pressure ulcers, hypertension, suprapubic catheter, colostomy. Presented to outlying facility and was found to have abnormal labs including creatinine 3.3 and potassium 5.6. Transferred to [facility] for nephrology evaluation. She has received IV fluids as well as medical management of hyperkalemia. She reports several days of poor PO intake due to nausea recently as well as diarrhea from her ostomy. Denied changes in urine output. Denied NSAID use. Several wounds present on lower extremities and wound care has evaluated. Hemoglobin 6.4 on 12/20 and she is was given a unit of PRBC     This afternoon she feels well.  She denies any nausea or vomiting.  She was able to eat lunch today.  Her renal function continues to improve.    Allergies:  Morphine and Oxycodone    Home  Meds:  Medications Prior to Admission   Medication Sig Dispense Refill Last Dose/Taking    anastrozole (ARIMIDEX) 1 MG tablet Take 1 tablet by mouth Daily.   Taking    baclofen (LIORESAL) 10 MG tablet Take 1 tablet by mouth 3 (Three) Times a Day.   Taking    cholecalciferol (VITAMIN D3) 25 MCG (1000 UT) tablet Take 1 tablet by mouth Daily.   Taking    ferrous sulfate 325 (65 FE) MG tablet Take 1 tablet by mouth Daily With Breakfast.   Taking    losartan (COZAAR) 100 MG tablet Take 0.5 tablets by mouth Daily.   Taking    omeprazole (priLOSEC) 40 MG capsule Take 1 capsule by mouth Daily.   Taking    acetaminophen (TYLENOL) 325 MG tablet Take 2 tablets by mouth Every 4 (Four) Hours As Needed for Mild Pain .       benzonatate (TESSALON) 200 MG capsule Take 1 capsule by mouth 3 (Three) Times a Day As Needed for Cough.       diphenoxylate-atropine (LOMOTIL) 2.5-0.025 MG per tablet Take 1 tablet by mouth 4 (Four) Times a Day As Needed for Diarrhea.       HYDROcodone-acetaminophen (NORCO)  MG per tablet Take 1 tablet by mouth Every 4 (Four) Hours As Needed for Severe Pain  for up to 6 doses. (Patient taking differently: Take 1 tablet by mouth 4 (Four) Times a Day As Needed for Severe Pain.) 6 tablet 0        Medicines:  Current Facility-Administered Medications   Medication Dose Route Frequency Provider Last Rate Last Admin    acetaminophen (TYLENOL) tablet 650 mg  650 mg Oral Q4H PRN Thony Otto MD   650 mg at 12/28/24 0853    allopurinol (ZYLOPRIM) tablet 100 mg  100 mg Oral Daily Darshan Nascimento MD   100 mg at 12/29/24 1032    ALPRAZolam (XANAX) tablet 0.25 mg  0.25 mg Oral Nightly PRN Thony Otto MD   0.25 mg at 12/26/24 2155    baclofen (LIORESAL) tablet 10 mg  10 mg Oral Q8H PRN Justyn Owens MD        benzonatate (TESSALON) capsule 200 mg  200 mg Oral TID PRN Justyn Owens MD        cholecalciferol (VITAMIN D3) tablet 1,000 Units  1,000 Units Oral Daily Thony Otto MD    1,000 Units at 24 1032    heparin (porcine) 5000 UNIT/ML injection 5,000 Units  5,000 Units Subcutaneous Q12H Thony Otto MD   5,000 Units at 24 1033    HYDROcodone-acetaminophen (NORCO)  MG per tablet 1 tablet  1 tablet Oral Q4H PRN Thony Otto MD   1 tablet at 24 0555    lidocaine (LMX) 4 % cream 1 Application  1 Application Topical Once Merry Lees APRN        nitroglycerin (NITROSTAT) SL tablet 0.4 mg  0.4 mg Sublingual Q5 Min PRN Thony Otto MD        ondansetron (ZOFRAN) injection 4 mg  4 mg Intravenous Q6H PRN Thony Otto MD   4 mg at 24 2115    ondansetron ODT (ZOFRAN-ODT) disintegrating tablet 4 mg  4 mg Translingual Q8H PRN Thony Otto MD   4 mg at 24 0552    pantoprazole (PROTONIX) EC tablet 40 mg  40 mg Oral Q AM Justyn Owens MD   40 mg at 24 1050    sodium bicarbonate tablet 650 mg  650 mg Oral TID Chas Mcallister APRN   650 mg at 24 1033    sodium chloride 0.9 % flush 10 mL  10 mL Intravenous Q12H Thony Otto MD   10 mL at 24 2058    sodium chloride 0.9 % flush 10 mL  10 mL Intravenous PRN Thony Otto MD        sodium chloride 0.9 % infusion 40 mL  40 mL Intravenous PRN Thony Otto MD   40 mL at 24 0249    sodium hypochlorite (DAKIN'S 1/4 STRENGTH) 0.125 % topical solution 0.125% solution   Topical Q12H Merry Lees APRN   Given at 24 0049       Past Medical History:  Past Medical History:   Diagnosis Date    Decubitus ulcer of both feet     Intradural extramedullary spinal tumor     Osteomyelitis     Paraplegia     Sacral decubitus ulcer        Past Surgical History:  Past Surgical History:   Procedure Laterality Date     SECTION      COLOSTOMY      CYSTOSTOMY      DEBRIDEMENT OF ISCHIAL ULCER/BUTTOCKS WOUND      IVC FILTER RETRIEVAL      MUSCLE FLAP      SPINE SURGERY      TRUNK DEBRIDEMENT Bilateral 2020    Procedure: DEBRIDEMENT DECUBITUS ULCER  bilateral feet;  Surgeon: Truong Neal MD;  Location: [facility] OR;  Service: General;  Laterality: Bilateral;       Family History  Family History   Problem Relation Age of Onset    Lung cancer Father        Social History  Social History     Socioeconomic History    Marital status: Unknown   Tobacco Use    Smoking status: Never    Smokeless tobacco: Never   Vaping Use    Vaping status: Never Used   Substance and Sexual Activity    Alcohol use: Not Currently    Drug use: Never    Sexual activity: Defer       Review of Systems:  History obtained from chart review and the patient  General ROS: No fever or chills  Respiratory ROS: No cough, shortness of breath, wheezing  Cardiovascular ROS: No chest pain or palpitations  Gastrointestinal ROS: No abdominal pain or melena  Genito-Urinary ROS: No dysuria or hematuria  Psych ROS: No anxiety and depression  14 point ROS reviewed with the patient and negative except as noted above and in the HPI unless unable to obtain.    Objective:  Patient Vitals for the past 24 hrs:   BP Temp Temp src Pulse Resp SpO2   12/29/24 1100 -- 97.9 °F (36.6 °C) Oral 71 16 93 %   12/29/24 0700 131/43 98.4 °F (36.9 °C) Oral 78 16 95 %   12/29/24 0309 147/45 98.5 °F (36.9 °C) Oral 87 16 94 %   12/28/24 2345 146/50 98.3 °F (36.8 °C) Oral 85 16 97 %   12/28/24 1948 136/51 98.1 °F (36.7 °C) Oral 85 16 99 %   12/28/24 1616 127/47 98 °F (36.7 °C) Oral 71 16 96 %       Intake/Output Summary (Last 24 hours) at 12/29/2024 1444  Last data filed at 12/29/2024 0309  Gross per 24 hour   Intake 240 ml   Output 1225 ml   Net -985 ml     General: awake/alert   HEENT: Normocephalic atraumatic head  Neck: Supple with no JVD or carotid base.  Chest:  clear to auscultation bilaterally without respiratory distress  CVS: regular rate and rhythm  Abdominal: Soft nondistended, colostomy/urostomy  Extremities: no cyanosis or edema  Skin: warm and dry without rash      Labs:  Results from last 7 days   Lab Units  12/29/24  0700 12/27/24  0457 12/26/24  0539   WBC 10*3/mm3 8.39 6.42 7.18   HEMOGLOBIN g/dL 7.5* 8.6* 8.2*   HEMATOCRIT % 26.2* 29.9* 29.1*   PLATELETS 10*3/mm3 164 232 285         Results from last 7 days   Lab Units 12/29/24  0700 12/27/24  0348 12/26/24  0539   SODIUM mmol/L 141 141 145   POTASSIUM mmol/L 4.4 4.2 4.1   CHLORIDE mmol/L 110* 115* 113*   CO2 mmol/L 23.0 17.0* 21.0*   BUN mg/dL 20 26* 29*   CREATININE mg/dL 1.13* 1.39* 1.51*   CALCIUM mg/dL 7.6* 7.8* 8.5*   EGFR mL/min/1.73 55.1* 43.0* 38.9*   BILIRUBIN mg/dL 0.3 0.3 0.2   ALK PHOS U/L 126* 120* 139*   ALT (SGPT) U/L <5 6 8   AST (SGOT) U/L <5 7 6   GLUCOSE mg/dL 87 92 94       Radiology:   Imaging Results (Last 72 Hours)       Procedure Component Value Units Date/Time    XR Chest 1 View [[id number]] Collected: 12/29/24 1134     Updated: 12/29/24 1140    Narrative:      EXAM: XR CHEST 1 VW- 12/29/2024 10:15 AM     HISTORY: cough       COMPARISON: 9/18/2020.     TECHNIQUE: Single frontal radiograph of the chest was obtained.     FINDINGS:     Support Devices: Port catheter tip overlies the mid SVC.     Cardiac and Mediastinal Silhouettes: Normal.     Lungs/Pleura: No focal consolidation. No sizable pleural effusion. No  visible pneumothorax.     Osseous structures: No acute osseous finding.     Other: None.       Impression:         No acute cardiopulmonary abnormality.           This report was signed and finalized on 12/29/2024 11:35 AM by Iker Cast.               Culture:  Blood Culture   Date Value Ref Range Status   12/20/2024 No growth at 3 days  Preliminary     Wound Culture   Date Value Ref Range Status   12/20/2024 Heavy growth (4+) Mixed Gram Negative Luciana (A)  Final   12/20/2024 Heavy growth (4+) Staphylococcus aureus, MRSA (A)  Final     Comment:       Methicillin resistant Staphylococcus aureus, Patient may be an isolation risk.   12/20/2024 Heavy growth (4+) Normal Skin Luciana  Final   12/20/2024 Heavy growth (4+) Mixed Gram  Negative Luciana (A)  Final   12/20/2024 Heavy growth (4+) Staphylococcus aureus, MRSA (A)  Final     Comment:       Methicillin resistant Staphylococcus aureus, Patient may be an isolation risk.   12/20/2024 Heavy growth (4+) Normal Skin Luciana  Final   12/20/2024 Scant growth (1+) Mixed Gram Negative Luciana (A)  Final   12/20/2024 Heavy growth (4+) Staphylococcus aureus, MRSA (A)  Final     Comment:       Methicillin resistant Staphylococcus aureus, Patient may be an isolation risk.   12/20/2024 Heavy growth (4+) Normal Skin Luciana  Final         Assessment   Acute kidney injury/improving  Acute tubular necrosis.  Baseline chronic kidney disease stage 3b  Hyperkalemia--improved  Metabolic acidosis  Benign essential hypertension.  History of colostomy and urostomy  Multiple pressure ulcers  Paraplegia   Iron deficiency anemia    Plan:  P.o. sodium bicarbonate  Already received 4 doses of ferric gluconate.  Continue to monitor renal function.  4.  Plan was discussed with the patient and family.      Tima Conte MD  12/29/2024  14:44 CST

## 2024-12-29 NOTE — PLAN OF CARE
Goal Outcome Evaluation:              Outcome Evaluation: Patient resting comfortably. Left port - IID, urostomy, colostomy with formed stool. Refusing turns most of shift. Encourage oral intake. Wound care per orders. Safety maintained

## 2024-12-30 ENCOUNTER — READMISSION MANAGEMENT (OUTPATIENT)
Dept: CALL CENTER | Facility: HOSPITAL | Age: 64
End: 2024-12-30
Payer: MEDICARE

## 2024-12-30 VITALS
SYSTOLIC BLOOD PRESSURE: 148 MMHG | WEIGHT: 209 LBS | BODY MASS INDEX: 31.67 KG/M2 | HEART RATE: 80 BPM | DIASTOLIC BLOOD PRESSURE: 48 MMHG | HEIGHT: 68 IN | RESPIRATION RATE: 18 BRPM | OXYGEN SATURATION: 97 % | TEMPERATURE: 98 F

## 2024-12-30 LAB
ALBUMIN SERPL-MCNC: 2.2 G/DL (ref 3.5–5.2)
ALBUMIN/GLOB SERPL: 0.8 G/DL
ALP SERPL-CCNC: 143 U/L (ref 39–117)
ALT SERPL W P-5'-P-CCNC: <5 U/L (ref 1–33)
ANION GAP SERPL CALCULATED.3IONS-SCNC: 10 MMOL/L (ref 5–15)
AST SERPL-CCNC: 5 U/L (ref 1–32)
BASOPHILS # BLD AUTO: 0.05 10*3/MM3 (ref 0–0.2)
BASOPHILS NFR BLD AUTO: 0.8 % (ref 0–1.5)
BILIRUB SERPL-MCNC: 0.3 MG/DL (ref 0–1.2)
BUN SERPL-MCNC: 20 MG/DL (ref 8–23)
BUN/CREAT SERPL: 18 (ref 7–25)
CALCIUM SPEC-SCNC: 8 MG/DL (ref 8.6–10.5)
CHLORIDE SERPL-SCNC: 112 MMOL/L (ref 98–107)
CO2 SERPL-SCNC: 23 MMOL/L (ref 22–29)
CREAT SERPL-MCNC: 1.11 MG/DL (ref 0.57–1)
DEPRECATED RDW RBC AUTO: 55.4 FL (ref 37–54)
EGFRCR SERPLBLD CKD-EPI 2021: 56.3 ML/MIN/1.73
EOSINOPHIL # BLD AUTO: 0.62 10*3/MM3 (ref 0–0.4)
EOSINOPHIL NFR BLD AUTO: 9.7 % (ref 0.3–6.2)
ERYTHROCYTE [DISTWIDTH] IN BLOOD BY AUTOMATED COUNT: 16.4 % (ref 12.3–15.4)
GLOBULIN UR ELPH-MCNC: 2.9 GM/DL
GLUCOSE SERPL-MCNC: 82 MG/DL (ref 65–99)
HCT VFR BLD AUTO: 29.7 % (ref 34–46.6)
HGB BLD-MCNC: 8.3 G/DL (ref 12–15.9)
IMM GRANULOCYTES # BLD AUTO: 0.05 10*3/MM3 (ref 0–0.05)
IMM GRANULOCYTES NFR BLD AUTO: 0.8 % (ref 0–0.5)
LYMPHOCYTES # BLD AUTO: 1.34 10*3/MM3 (ref 0.7–3.1)
LYMPHOCYTES NFR BLD AUTO: 21 % (ref 19.6–45.3)
MCH RBC QN AUTO: 25.6 PG (ref 26.6–33)
MCHC RBC AUTO-ENTMCNC: 27.9 G/DL (ref 31.5–35.7)
MCV RBC AUTO: 91.7 FL (ref 79–97)
MONOCYTES # BLD AUTO: 0.6 10*3/MM3 (ref 0.1–0.9)
MONOCYTES NFR BLD AUTO: 9.4 % (ref 5–12)
NEUTROPHILS NFR BLD AUTO: 3.73 10*3/MM3 (ref 1.7–7)
NEUTROPHILS NFR BLD AUTO: 58.3 % (ref 42.7–76)
NRBC BLD AUTO-RTO: 0 /100 WBC (ref 0–0.2)
PLATELET # BLD AUTO: 174 10*3/MM3 (ref 140–450)
PMV BLD AUTO: 9.6 FL (ref 6–12)
POTASSIUM SERPL-SCNC: 4.3 MMOL/L (ref 3.5–5.2)
PROT SERPL-MCNC: 5.1 G/DL (ref 6–8.5)
RBC # BLD AUTO: 3.24 10*6/MM3 (ref 3.77–5.28)
SODIUM SERPL-SCNC: 145 MMOL/L (ref 136–145)
WBC NRBC COR # BLD AUTO: 6.39 10*3/MM3 (ref 3.4–10.8)

## 2024-12-30 PROCEDURE — 85025 COMPLETE CBC W/AUTO DIFF WBC: CPT | Performed by: INTERNAL MEDICINE

## 2024-12-30 PROCEDURE — 25010000002 HEPARIN (PORCINE) PER 1000 UNITS: Performed by: INTERNAL MEDICINE

## 2024-12-30 PROCEDURE — 80053 COMPREHEN METABOLIC PANEL: CPT | Performed by: INTERNAL MEDICINE

## 2024-12-30 PROCEDURE — 25010000002 HEPARIN LOCK FLUSH PER 10 UNITS: Performed by: INTERNAL MEDICINE

## 2024-12-30 RX ORDER — ALLOPURINOL 100 MG/1
100 TABLET ORAL DAILY
Qty: 90 TABLET | Refills: 1 | Status: SHIPPED | OUTPATIENT
Start: 2024-12-31

## 2024-12-30 RX ORDER — HEPARIN SODIUM (PORCINE) LOCK FLUSH IV SOLN 100 UNIT/ML 100 UNIT/ML
5 SOLUTION INTRAVENOUS AS NEEDED
Status: DISCONTINUED | OUTPATIENT
Start: 2024-12-30 | End: 2024-12-30 | Stop reason: HOSPADM

## 2024-12-30 RX ORDER — ONDANSETRON 4 MG/1
4 TABLET, ORALLY DISINTEGRATING ORAL EVERY 8 HOURS PRN
Start: 2024-12-30

## 2024-12-30 RX ADMIN — ALLOPURINOL 100 MG: 100 TABLET ORAL at 09:10

## 2024-12-30 RX ADMIN — HEPARIN 500 UNITS: 100 SYRINGE at 13:49

## 2024-12-30 RX ADMIN — SODIUM BICARBONATE 650 MG: 650 TABLET ORAL at 09:10

## 2024-12-30 RX ADMIN — HEPARIN SODIUM 5000 UNITS: 5000 INJECTION, SOLUTION INTRAVENOUS; SUBCUTANEOUS at 09:10

## 2024-12-30 RX ADMIN — Medication 10 ML: at 09:15

## 2024-12-30 RX ADMIN — BACLOFEN 10 MG: 10 TABLET ORAL at 00:34

## 2024-12-30 RX ADMIN — PANTOPRAZOLE SODIUM 40 MG: 40 TABLET, DELAYED RELEASE ORAL at 05:48

## 2024-12-30 RX ADMIN — SODIUM HYPOCHLORITE: 1.25 SOLUTION TOPICAL at 11:39

## 2024-12-30 RX ADMIN — SODIUM HYPOCHLORITE: 1.25 SOLUTION TOPICAL at 00:40

## 2024-12-30 RX ADMIN — Medication 1000 UNITS: at 09:10

## 2024-12-30 RX ADMIN — BENZONATATE 200 MG: 100 CAPSULE ORAL at 09:13

## 2024-12-30 NOTE — PROGRESS NOTES
Nephrology ([facility]) Progress Note      Patient:  Jessica Alvarenga  YOB: 1960  Date of Service: 12/30/2024  MRN: [id number]   Acct: [id number]   Primary Care Physician: Ponce Orozco MD  Advance Directive:   Code Status and Medical Interventions: CPR (Attempt to Resuscitate); Full Support   Ordered at: 12/19/24 2663     Level Of Support Discussed With:    Patient     Code Status (Patient has no pulse and is not breathing):    CPR (Attempt to Resuscitate)     Medical Interventions (Patient has pulse or is breathing):    Full Support     Admit Date: 12/19/2024       Hospital Day: 9  Referring Provider: No Known Provider      Patient personally seen and examined.  Complete chart including Consults, Notes, Operative Reports, Labs, Cardiology, and Radiology studies reviewed as able.        Subjective:  Jessica Alvarenga is a 64 y.o. female for whom we were consulted for evaluation and treatment of acute kidney injury. Baseline chronic kidney disease stage 3b, baseline creatinine approximately 1.5. Follows in our [place] office. History of paraplegia, pressure ulcers, hypertension, suprapubic catheter, colostomy. Presented to outlying facility and was found to have abnormal labs including creatinine 3.3 and potassium 5.6. Transferred to [facility] for nephrology evaluation. She has received IV fluids as well as medical management of hyperkalemia. She reports several days of poor PO intake due to nausea recently as well as diarrhea from her ostomy. Denied changes in urine output. Denied NSAID use. Several wounds present on lower extremities and wound care has evaluated. Hemoglobin 6.4 on 12/20 and she is was given a unit of PRBC     Today is awake and alert. Feeling better and denies nausea. Urine output nonoliguric    Allergies:  Morphine and Oxycodone    Home Meds:  Medications Prior to Admission   Medication Sig Dispense Refill Last Dose/Taking    anastrozole  (ARIMIDEX) 1 MG tablet Take 1 tablet by mouth Daily.   Taking    baclofen (LIORESAL) 10 MG tablet Take 1 tablet by mouth 3 (Three) Times a Day.   Taking    cholecalciferol (VITAMIN D3) 25 MCG (1000 UT) tablet Take 1 tablet by mouth Daily.   Taking    ferrous sulfate 325 (65 FE) MG tablet Take 1 tablet by mouth Daily With Breakfast.   Taking    losartan (COZAAR) 100 MG tablet Take 0.5 tablets by mouth Daily.   Taking    omeprazole (priLOSEC) 40 MG capsule Take 1 capsule by mouth Daily.   Taking    acetaminophen (TYLENOL) 325 MG tablet Take 2 tablets by mouth Every 4 (Four) Hours As Needed for Mild Pain .       benzonatate (TESSALON) 200 MG capsule Take 1 capsule by mouth 3 (Three) Times a Day As Needed for Cough.       diphenoxylate-atropine (LOMOTIL) 2.5-0.025 MG per tablet Take 1 tablet by mouth 4 (Four) Times a Day As Needed for Diarrhea.       HYDROcodone-acetaminophen (NORCO)  MG per tablet Take 1 tablet by mouth Every 4 (Four) Hours As Needed for Severe Pain  for up to 6 doses. (Patient taking differently: Take 1 tablet by mouth 4 (Four) Times a Day As Needed for Severe Pain.) 6 tablet 0        Medicines:  Current Facility-Administered Medications   Medication Dose Route Frequency Provider Last Rate Last Admin    acetaminophen (TYLENOL) tablet 650 mg  650 mg Oral Q4H PRN Thony Otto MD   650 mg at 12/28/24 0853    allopurinol (ZYLOPRIM) tablet 100 mg  100 mg Oral Daily Darshan Nascimento MD   100 mg at 12/30/24 0910    ALPRAZolam (XANAX) tablet 0.25 mg  0.25 mg Oral Nightly PRN Thony Otto MD   0.25 mg at 12/26/24 2155    baclofen (LIORESAL) tablet 10 mg  10 mg Oral Q8H PRN Justyn Owens MD   10 mg at 12/30/24 0034    benzonatate (TESSALON) capsule 200 mg  200 mg Oral TID PRN Justyn Owens MD   200 mg at 12/30/24 0913    cholecalciferol (VITAMIN D3) tablet 1,000 Units  1,000 Units Oral Daily Thony Otto MD   1,000 Units at 12/30/24 0910    heparin (porcine) 5000  UNIT/ML injection 5,000 Units  5,000 Units Subcutaneous Q12H Thony Otto MD   5,000 Units at 24 0910    HYDROcodone-acetaminophen (NORCO)  MG per tablet 1 tablet  1 tablet Oral Q4H PRN Thony Otto MD   1 tablet at 24 0555    lidocaine (LMX) 4 % cream 1 Application  1 Application Topical Once Merry Lees APRN        nitroglycerin (NITROSTAT) SL tablet 0.4 mg  0.4 mg Sublingual Q5 Min PRN Thony Otto MD        ondansetron (ZOFRAN) injection 4 mg  4 mg Intravenous Q6H PRN Thony Otto MD   4 mg at 24 2115    ondansetron ODT (ZOFRAN-ODT) disintegrating tablet 4 mg  4 mg Translingual Q8H PRN Thony Otto MD   4 mg at 24 0552    pantoprazole (PROTONIX) EC tablet 40 mg  40 mg Oral Q AM Justyn Owens MD   40 mg at 24 0548    sodium bicarbonate tablet 650 mg  650 mg Oral TID Chas Mcallister APRN   650 mg at 24 0910    sodium chloride 0.9 % flush 10 mL  10 mL Intravenous Q12H Thony Otto MD   10 mL at 24 0915    sodium chloride 0.9 % flush 10 mL  10 mL Intravenous PRN Thony Otto MD        sodium chloride 0.9 % infusion 40 mL  40 mL Intravenous PRN Thony Otto MD   40 mL at 24 0249    sodium hypochlorite (DAKIN'S 1/4 STRENGTH) 0.125 % topical solution 0.125% solution   Topical Q12H Merry Lees APRN   Given at 24 0040       Past Medical History:  Past Medical History:   Diagnosis Date    Decubitus ulcer of both feet     Intradural extramedullary spinal tumor     Osteomyelitis     Paraplegia     Sacral decubitus ulcer        Past Surgical History:  Past Surgical History:   Procedure Laterality Date     SECTION      COLOSTOMY      CYSTOSTOMY      DEBRIDEMENT OF ISCHIAL ULCER/BUTTOCKS WOUND      ENDOSCOPY N/A 2024    Procedure: ESOPHAGOGASTRODUODENOSCOPY WITH ANESTHESIA;  Surgeon: Daniel Salazar DO;  Location: [facility] ENDOSCOPY;  Service: Gastroenterology;  Laterality: N/A;  pre:  n/v  post: normal  dillon godwinler    IVC FILTER RETRIEVAL      MUSCLE FLAP      SPINE SURGERY      TRUNK DEBRIDEMENT Bilateral 7/21/2020    Procedure: DEBRIDEMENT DECUBITUS ULCER bilateral feet;  Surgeon: Truong Neal MD;  Location: [facility] OR;  Service: General;  Laterality: Bilateral;       Family History  Family History   Problem Relation Age of Onset    Lung cancer Father        Social History  Social History     Socioeconomic History    Marital status: Unknown   Tobacco Use    Smoking status: Never    Smokeless tobacco: Never   Vaping Use    Vaping status: Never Used   Substance and Sexual Activity    Alcohol use: Not Currently    Drug use: Never    Sexual activity: Defer       Review of Systems:  History obtained from chart review and the patient  General ROS: No fever or chills  Respiratory ROS: No cough, shortness of breath, wheezing  Cardiovascular ROS: No chest pain or palpitations  Gastrointestinal ROS: No abdominal pain or melena  Genito-Urinary ROS: No dysuria or hematuria  Psych ROS: No anxiety and depression  14 point ROS reviewed with the patient and negative except as noted above and in the HPI unless unable to obtain.    Objective:  Patient Vitals for the past 24 hrs:   BP Temp Temp src Pulse Resp SpO2   12/30/24 0758 148/48 98 °F (36.7 °C) Oral 80 18 97 %   12/30/24 0252 147/46 97.9 °F (36.6 °C) Oral 84 16 95 %   12/29/24 2311 142/55 98 °F (36.7 °C) Oral 89 18 98 %   12/29/24 1859 157/49 97.3 °F (36.3 °C) Axillary 86 16 97 %   12/29/24 1400 -- 98.1 °F (36.7 °C) Oral 76 16 94 %   12/29/24 1100 -- 97.9 °F (36.6 °C) Oral 71 16 93 %       Intake/Output Summary (Last 24 hours) at 12/30/2024 1044  Last data filed at 12/30/2024 0900  Gross per 24 hour   Intake 240 ml   Output --   Net 240 ml     General: awake/alert   Chest:  clear to auscultation bilaterally without respiratory distress  CVS: regular rate and rhythm  Abdominal: soft, nontender, positive bowel sounds  Extremities: no cyanosis or  edema  Skin: warm and dry without rash      Labs:  Results from last 7 days   Lab Units 12/30/24  0647 12/29/24  0700 12/27/24  0457   WBC 10*3/mm3 6.39 8.39 6.42   HEMOGLOBIN g/dL 8.3* 7.5* 8.6*   HEMATOCRIT % 29.7* 26.2* 29.9*   PLATELETS 10*3/mm3 174 164 232         Results from last 7 days   Lab Units 12/30/24  0647 12/29/24  0700 12/27/24  0348   SODIUM mmol/L 145 141 141   POTASSIUM mmol/L 4.3 4.4 4.2   CHLORIDE mmol/L 112* 110* 115*   CO2 mmol/L 23.0 23.0 17.0*   BUN mg/dL 20 20 26*   CREATININE mg/dL 1.11* 1.13* 1.39*   CALCIUM mg/dL 8.0* 7.6* 7.8*   EGFR mL/min/1.73 56.3* 55.1* 43.0*   BILIRUBIN mg/dL 0.3 0.3 0.3   ALK PHOS U/L 143* 126* 120*   ALT (SGPT) U/L <5 <5 6   AST (SGOT) U/L 5 <5 7   GLUCOSE mg/dL 82 87 92       Radiology:   Imaging Results (Last 72 Hours)       Procedure Component Value Units Date/Time    XR Chest 1 View [[id number]] Collected: 12/29/24 1134     Updated: 12/29/24 1140    Narrative:      EXAM: XR CHEST 1 VW- 12/29/2024 10:15 AM     HISTORY: cough       COMPARISON: 9/18/2020.     TECHNIQUE: Single frontal radiograph of the chest was obtained.     FINDINGS:     Support Devices: Port catheter tip overlies the mid SVC.     Cardiac and Mediastinal Silhouettes: Normal.     Lungs/Pleura: No focal consolidation. No sizable pleural effusion. No  visible pneumothorax.     Osseous structures: No acute osseous finding.     Other: None.       Impression:         No acute cardiopulmonary abnormality.           This report was signed and finalized on 12/29/2024 11:35 AM by Iker Cast.               Culture:  Blood Culture   Date Value Ref Range Status   12/20/2024 No growth at 3 days  Preliminary     Wound Culture   Date Value Ref Range Status   12/20/2024 Heavy growth (4+) Mixed Gram Negative Luciana (A)  Final   12/20/2024 Heavy growth (4+) Staphylococcus aureus, MRSA (A)  Final     Comment:       Methicillin resistant Staphylococcus aureus, Patient may be an isolation risk.   12/20/2024  Heavy growth (4+) Normal Skin Luciana  Final   12/20/2024 Heavy growth (4+) Mixed Gram Negative Luciana (A)  Final   12/20/2024 Heavy growth (4+) Staphylococcus aureus, MRSA (A)  Final     Comment:       Methicillin resistant Staphylococcus aureus, Patient may be an isolation risk.   12/20/2024 Heavy growth (4+) Normal Skin Luciana  Final   12/20/2024 Scant growth (1+) Mixed Gram Negative Luciana (A)  Final   12/20/2024 Heavy growth (4+) Staphylococcus aureus, MRSA (A)  Final     Comment:       Methicillin resistant Staphylococcus aureus, Patient may be an isolation risk.   12/20/2024 Heavy growth (4+) Normal Skin Luciana  Final         Assessment   Acute kidney injury, ATN--resolved  Baseline chronic kidney disease stage 3b  Hyperkalemia--improved  Metabolic acidosis--improved  Hypertension   History of colostomy and urostomy  Multiple pressure ulcers  Paraplegia     Plan:  Encourage PO fluid intake  Monitor labs      Chas Mcallister, APRN  12/30/2024  10:44 CST

## 2024-12-30 NOTE — THERAPY DISCHARGE NOTE
Acute Care - Physical Therapy Discharge Summary  [facility]       Patient Name: Jessica Alvarenga  : 1960  MRN: [id number]    Today's Date: 2024                 Admit Date: 2024      PT Recommendation and Plan    Visit Dx:    ICD-10-CM ICD-9-CM   1. Impaired mobility [Z74.09]  Z74.09 799.89   2. Nausea and vomiting, unspecified vomiting type  R11.2 787.01        Outcome Measures       Row Name 24 1105 24 1545          How much help from another person do you currently need...    Turning from your back to your side while in flat bed without using bedrails? 2  -YULIYA 2  -YULIYA     Moving from lying on back to sitting on the side of a flat bed without bedrails? 2  -YULIYA 2  -YULIYA     Moving to and from a bed to a chair (including a wheelchair)? 1  -YULIYA 1  -YULIYA     Standing up from a chair using your arms (e.g., wheelchair, bedside chair)? 1  -YULIYA 1  -YULIYA     Climbing 3-5 steps with a railing? 1  -YULIYA 1  -YULIYA     To walk in hospital room? 1  -YULIYA 1  -YULIYA     AM-PAC 6 Clicks Score (PT) 8  -YULIYA 8  -YULIYA        Functional Assessment    Outcome Measure Options AM-PAC 6 Clicks Basic Mobility (PT)  -YULIYA AM-PAC 6 Clicks Basic Mobility (PT)  -YULIYA               User Key  (r) = Recorded By, (t) = Taken By, (c) = Cosigned By      Initials Name Provider Type    YULIYA Sacha Chaidez, PTA Physical Therapist Assistant                         PT Rehab Goals       Row Name 24 1529             Bed Mobility Goal 1 (PT)    Activity/Assistive Device (Bed Mobility Goal 1, PT) rolling to left;rolling to right;sit to supine;supine to sit  -YULIYA      [place] Level/Cues Needed (Bed Mobility Goal 1, PT) minimum assist (75% or more patient effort)  -YULIYA      Time Frame (Bed Mobility Goal 1, PT) long term goal (LTG);10 days  -YULIYA      Progress/Outcomes (Bed Mobility Goal 1, PT) goal not met  -YULIYA         Transfer Goal 1 (PT)    Activity/Assistive Device (Transfer Goal 1, PT) wheelchair transfer;sliding board  -YULIYA      [place]  Level/Cues Needed (Transfer Goal 1, PT) moderate assist (50-74% patient effort)  -YULIYA      Time Frame (Transfer Goal 1, PT) long term goal (LTG);10 days  -YULIYA      Progress/Outcome (Transfer Goal 1, PT) goal not met  -YULIYA         Balance Goal 1 (PT)    Activity/Assistive Device (Balance Goal) sitting static balance;sitting dynamic balance  -YULIYA      [place] Level/Cues Needed (Balance Goal 1, PT) independent  -YULIYA      Time Frame (Balance Goal 1, PT) long-term goal (LTG);by discharge  -YULIYA      Strategies/Barriers (Balance Goal 1, PT) tolerate for 10 minutes  -YULIYA      Progress/Outcomes (Balance Goal 1, PT) goal not met  -YULIYA                User Key  (r) = Recorded By, (t) = Taken By, (c) = Cosigned By      Initials Name Provider Type Discipline    Sacha Peck, PTA Physical Therapist Assistant PT                        PT Discharge Summary  Anticipated Discharge Disposition (PT): home with assist  Reason for Discharge: Discharge from facility  Outcomes Achieved: Refer to plan of care for updates on goals achieved  Discharge Destination: Home with assist      Sacha Chaidez PTA   12/30/2024

## 2024-12-30 NOTE — CASE MANAGEMENT/SOCIAL WORK
Continued Stay Note  [facility]     Patient Name: Jessica Alvarenga  MRN: [id number]  Today's Date: 12/30/2024    Admit Date: 12/19/2024    Plan: Home   Discharge Plan       Row Name 12/30/24 1100       Plan    Plan Home    Patient/Family in Agreement with Plan yes    Plan Comments Pt plans to return home alone at d/c. If home health ordered, will attempt to arrange, however, pt states she has not been successful getting it recently. Will follow.                   Discharge Codes    No documentation.                 Expected Discharge Date and Time       Expected Discharge Date Expected Discharge Time    Dec 27, 2024               LAST Herbert

## 2024-12-30 NOTE — DISCHARGE SUMMARY
[facility]  DISCHARGE SUMMARY       Date of Admission: 12/19/2024  Date of Discharge:  12/30/2024  Primary Care Physician: Ponce Orozco MD    Discharge Diagnoses:  Active Hospital Problems    Diagnosis     **Acute kidney injury     Cholelithiases     Chronic osteomyelitis right and left ischium, left iliac bone     Nausea and vomiting     Metabolic acidosis     Pressure ulcers of skin of multiple topographic sites     Paraplegia     Anemia     CKD stage 3b, GFR 30-44 ml/min     Colostomy in place     Presence of urostomy     History of breast cancer     CORINA (acute kidney injury)     Acute kidney failure, unspecified          Presenting Problem/History of Present Illness:  Acute kidney injury [N17.9]  Acute kidney failure, unspecified [N17.9]  CORINA (acute kidney injury) [N17.9]     Chief Complaint on Day of Discharge:   No complaint    History of Present Illness on Day of Discharge:   Patient is feeling better today.  She has been able to eat without significant difficulty.  No complaints are noted today.    Hospital Course  This is a 64-year-old lady with past medical history significant for paraplegia history of hypertension, pressure ulcers, history of suprapubic catheter placement, who presented to an outside facility earlier today with abdominal renal function. It was found that her BUN and creatinine were both elevated apparently patient had some diarrhea from her ostomy along with some nausea and decreased in p.o. intake. She denied any fever any chills. Seen at the other facility where she was found to have a elevated BUN level at 63 and creatinine level at 3.31 also she was found to be hyperkalemic with a potassium level of 5.6 patient received Kayexalate over there and the decision was made to admit to transfer to our facility also patient received some IV fluids.   Patient will be admitted, she will be started on gentle hydration with normal  "saline, will follow-up BMP level, will consult nephrology, also wound care consult placed.     The patient was seen and evaluated by nephrology on the day of admission.  The patient was to continue IV fluids and transfuse for hemoglobin greater than 7.  Multiple cultures were obtained from the patient's wounds.  Ultimately, cultures revealed heavy growth of MRSA which is likely a colonizer rather than an infective agent given the longstanding nature of the patient's wounds.  It is noted that the patient has a wound care specialist in [place] and has provided daily self dressings long-term.  Patient's hyperkalemia and acute kidney injury improved.  He was treated with Lokelma.  Allopurinol was added to the patient's regimen because of hyperuricemia secondary to renal injury.  The patient had been having difficulty with nausea and vomiting likely secondary to the use of Ozempic for weight loss.  She was placed empirically on doxycycline for MRSA treatment.  She developed increased nausea and vomiting and doxycycline was eventually discontinued as a result.  Because of ongoing nausea, HIDA scan was performed and was unremarkable.  Gastroenterology was consulted regarding intractable and continued nausea and vomiting.  Upper GI endoscopy was performed and was completely normal.  Reglan was discontinued because of a complaint of confusion and feeling \"foggy\".  IV PPI was discontinued on 12/27.  IV fluids were discontinued on 12/29.  She has had no further vomiting and renal function is back to baseline.  Cozaar will be discontinued at the time of discharge.  She is to follow-up with her PCP next week or as scheduled in order to reassess blood pressure.  She is also to follow-up with her usual source of wound care in [place].  She is stable for discharge home today.    Procedures Performed:   Upper GI endoscopy    Normal esophagus. - Normal examined duodenum. - Normal stomach. - No specimens collected.    Consults: "   Nephrology:  Assessment    Acute kidney injury, prerenal vs ATN  Baseline chronic kidney disease stage 3b  Hyperkalemia--improved  Metabolic acidosis  Hypertension   History of colostomy and urostomy  Multiple pressure ulcers  Paraplegia      Plan:   Continue IV fluids  Agree with PRBC administration to keep Hgb >7  Monitor labs  Further assessment and plan pending Dr Recio's evaluation of patient        Thank you for the consult, we appreciate the opportunity to provide care to your patients.  Feel free to contact me if I can be of any further assistance.       Chas Mcallister, MAREN    Gastroenterology:  Assessment & Plan    Acute kidney injury    Acute kidney failure, unspecified    CORINA (acute kidney injury)    Metabolic acidosis    Pressure ulcers of skin of multiple topographic sites    Paraplegia    Anemia    CKD stage 3b, GFR 30-44 ml/min    Colostomy in place    Presence of urostomy    History of breast cancer    Cholelithiases    Chronic osteomyelitis right and left ischium, left iliac bone    Nausea and vomiting        Impression/Plan     Severe nausea and vomiting  Most likely related to Ozempic side effect  Discontinue Ozempic, last dose was 2 weeks ago  She was taking Ozempic for weight loss, no history of diabetes, had negative gastric emptying studies in 2019  Continue IV hydration  Advance to full liquid diet  Antiemetic as needed  If no improvement may consider gastroscopy           Electronically signed by Guilherme Rowland MD, 12/26/24, 2:09 PM CST.            Guilherme Rowland MD    Result Review    Result Review:  I have personally reviewed the results from the time of this admission to 12/30/2024 12:38 CST and agree with these findings:  []  Laboratory  []  Microbiology  []  Radiology  []  EKG/Telemetry   []  Cardiology/Vascular   []  Pathology  []  Old records  []  Other:    Condition on Discharge:    Void improved    Physical Exam on Discharge:  /48 (BP Location: Right arm, Patient  "Position: Lying)   Pulse 80   Temp 98 °F (36.7 °C) (Oral)   Resp 18   Ht 172.7 cm (68\")   Wt 94.8 kg (209 lb)   SpO2 97%   BMI 31.78 kg/m²   Physical Exam     Constitutional:       General: She is not in acute distress.     Appearance: She is not toxic-appearing.   HENT:      Head: Normocephalic.      Mouth/Throat:      Mouth: Mucous membranes are moist.   Cardiovascular:      Rate and Rhythm: Normal rate.   Pulmonary:      Effort: Pulmonary effort is normal. No respiratory distress.      Comments: Remains on room air; some congestion noted with her cough this morning  Abdominal:      Palpations: Abdomen is soft.      Comments: Ileal conduit and colostomy noted   Musculoskeletal:         General: Swelling and deformity (lower extremities; dressings in place) present.   Skin:     General: Skin is warm.   Neurological:      Mental Status: She is alert.   Psychiatric:         Mood and Affect: Mood normal.     Discharge Disposition:  Home or Self Care    Discharge Medications:     Discharge Medications        New Medications        Instructions Start Date   allopurinol 100 MG tablet  Commonly known as: ZYLOPRIM   100 mg, Oral, Daily   Start Date: December 31, 2024            Continue These Medications        Instructions Start Date   acetaminophen 325 MG tablet  Commonly known as: TYLENOL   650 mg, Oral, Every 4 Hours PRN      anastrozole 1 MG tablet  Commonly known as: ARIMIDEX   1 mg, Daily      baclofen 10 MG tablet  Commonly known as: LIORESAL   Take 1 tablet by mouth 3 (Three) Times a Day.      benzonatate 200 MG capsule  Commonly known as: TESSALON   200 mg, Oral, 3 Times Daily PRN      cholecalciferol 25 MCG (1000 UT) tablet  Commonly known as: VITAMIN D3   1,000 Units, Oral, Daily      diphenoxylate-atropine 2.5-0.025 MG per tablet  Commonly known as: LOMOTIL   1 tablet, Oral, 4 Times Daily PRN      ferrous sulfate 325 (65 FE) MG tablet   325 mg, Daily With Breakfast      HYDROcodone-acetaminophen  " MG per tablet  Commonly known as: NORCO   1 tablet, Oral, Every 4 Hours PRN      omeprazole 40 MG capsule  Commonly known as: priLOSEC   40 mg, Daily      ondansetron ODT 4 MG disintegrating tablet  Commonly known as: Zofran ODT   4 mg, Translingual, Every 8 Hours PRN             Stop These Medications      losartan 100 MG tablet  Commonly known as: COZAAR              Discharge Diet:   Diet Instructions       Diet: Regular/House Diet; Soft to Chew (NDD 3); Whole Meat; Thin (IDDSI 0)      Discharge Diet: Regular/House Diet    Texture: Soft to Chew (NDD 3)    Soft to Chew: Whole Meat    Fluid Consistency: Thin (IDDSI 0)            Discharge Care Plan / Instructions:   Discharge home    Activity at Discharge:   Activity Instructions       Activity as Tolerated              Follow-up Appointments:  Follow-up with PCP next week as scheduled and with wound care as scheduled.    Electronically signed by Cecilio Haddad DO, 12/30/24, 12:38 CST.    Time: Discharge over 30 min    Part of this note may be an electronic transcription/translation of spoken language to printed text using the Dragon Dictation system.

## 2024-12-31 NOTE — OUTREACH NOTE
Prep Survey      Flowsheet Row Responses   [facility] facility patient discharged from? [facility]   Is LACE score < 7 ? No   Eligibility Readm Mgmt   Discharge diagnosis Acute kidney injury-EGD this visit   Does the patient have one of the following disease processes/diagnoses(primary or secondary)? Other   Does the patient have Home health ordered? No   Is there a DME ordered? No   Prep survey completed? Yes            DARON HOYT - Registered Nurse

## 2025-01-16 ENCOUNTER — READMISSION MANAGEMENT (OUTPATIENT)
Dept: CALL CENTER | Facility: HOSPITAL | Age: 65
End: 2025-01-16
Payer: MEDICARE

## 2025-01-16 NOTE — OUTREACH NOTE
Medical Week 3 Survey      Flowsheet Row Responses   [facility] patient discharged from? [facility]   Does the patient have one of the following disease processes/diagnoses(primary or secondary)? Other   Week 3 attempt successful? Yes   Call start time 1448   Call end time 1455   Discharge diagnosis Acute kidney injury-EGD this visit   Person spoke with today (if not patient) and relationship pt   Meds reviewed with patient/caregiver? Yes   Is the patient having any side effects they believe may be caused by any medication additions or changes? No   Does the patient have all medications ordered at discharge? Yes   Is the patient taking all medications as directed (includes completed medication regime)? Yes   Does the patient have a primary care provider?  Yes   Does the patient have an appointment with their PCP within 7 days of discharge? Yes   Has the patient kept scheduled appointments due by today? Yes   Psychosocial issues? No   Did the patient receive a copy of their discharge instructions? Yes   Nursing interventions Reviewed instructions with patient   What is the patient's perception of their health status since discharge? Same   Is the patient/caregiver able to teach back signs and symptoms related to disease process for when to call PCP? Yes   Is the patient/caregiver able to teach back signs and symptoms related to disease process for when to call 911? Yes   Is the patient/caregiver able to teach back the hierarchy of who to call/visit for symptoms/problems? PCP, Specialist, Home health nurse, Urgent Care, ED, 911 Yes   If the patient is a current smoker, are they able to teach back resources for cessation? Not a smoker   Week 3 Call Completed? Yes   Graduated Yes   Is the patient interested in additional calls from an ambulatory ? Yes   What is the reason the patient needs support from an ACM? Caregiving/Support, Care Coordination, Barriers to Care, Advanced Care Planning, Preventative  Care  [Pt shares she was suppose to get HH/PT but did not hear back from hospital . Patient is paraplegic and is weak in upper body. Pt is needing PT. Pt already discussed with PCP and does not think she qualifies. Pt did have [facility] at one time.]   Would this patient benefit from a Referral to [facility] Social Work? No   Graduated/Revoked comments Pt referred to [facility].   Wrap up additional comments Pt reports having upper body weakness, paraplegic. Pt said she was sent home and was told she was not going to be sent home until she can transfer herself to , and goal was not met. Pt would like to receive HH/PT is at all possible,  referral sent to [facility]. Pt did have [facility] at one time.   Call end time 0293            Preeti CEJA - Registered Nurse

## 2025-01-31 ENCOUNTER — PATIENT OUTREACH (OUTPATIENT)
Dept: CASE MANAGEMENT | Facility: OTHER | Age: 65
End: 2025-01-31
Payer: MEDICARE

## 2025-01-31 NOTE — OUTREACH NOTE
AMBULATORY CASE MANAGEMENT NOTE    Names and Relationships of Patient/Support Persons: Contact: Jessica Alvarenga; Relationship: Self -     Patient Outreach    Received call from patient. Discussed her desire for home health services. She would like to have [facility] and will contact her PCP to request it be ordered. She has hired help at this time for ADL's and wound care. She feels that [facility] would not accept her back and [facility] does not accept her insurance. Discussed contacting her insurance to see if they have in home assistance as a covered benefit on her plan.         Rachel CEJA  Ambulatory Case Management    1/31/2025, 11:45 CST

## (undated) DEVICE — DRAIN PENROSE L18IN 5/8IN PROMOTE DRNAGE IN OPN SURG WND

## (undated) DEVICE — PAD MINOR UNIVERSAL: Brand: MEDLINE INDUSTRIES, INC.

## (undated) DEVICE — MASTISOL ADHESIVE LIQ 2/3ML

## (undated) DEVICE — TIBURON LAPAROTOMY DRAPE: Brand: CONVERTORS

## (undated) DEVICE — CURAVIEW LED LARYNSCP BLDE

## (undated) DEVICE — DRAINBAG,ANTI-REFLUX TOWER,L/F,2000ML,LL: Brand: MEDLINE

## (undated) DEVICE — MARKER SURG SKIN GENTIAN VLT REG TIP W/ 6IN RUL

## (undated) DEVICE — 3M™ WARMING BLANKET, LOWER BODY, 10 PER CASE, 42568: Brand: BAIR HUGGER™

## (undated) DEVICE — SUTURE VCRL SZ 4-0 L27IN ABSRB VLT L17MM RB-1 1/2 CIR J304H

## (undated) DEVICE — ELECTRD BLD EDGE/INSUL1P 2.4X5.1MM STRL

## (undated) DEVICE — GOWN,PREVENTION PLUS,XL,ST,24/CS: Brand: MEDLINE

## (undated) DEVICE — INTEGRA® JARIT® POOLE SUCTION TUBE, 9-1/2", STRAIGHT, 32 FRENCH: Brand: INTEGRA® JARIT®

## (undated) DEVICE — ENDO KIT,LOURDES HOSPITAL: Brand: MEDLINE INDUSTRIES, INC.

## (undated) DEVICE — YANKAUER,BULB TIP WITH VENT: Brand: ARGYLE

## (undated) DEVICE — CONMED SCOPE SAVER BITE BLOCK, 20X27 MM: Brand: SCOPE SAVER

## (undated) DEVICE — CUFF,BP,DISP,1 TUBE,ADULT,HP: Brand: MEDLINE

## (undated) DEVICE — SUT VIC 3/0 RB1 27IN UD VCP215H

## (undated) DEVICE — GLV SURG TRIUMPH MICRO PF LTX 7.5 STRL

## (undated) DEVICE — MAJOR CDS

## (undated) DEVICE — SUTURE VCRL SZ 3-0 L18IN ABSRB UD L26MM SH 1/2 CIR J864D

## (undated) DEVICE — OPTIFOAM GENTLE SA, POSTOP, 4X12: Brand: MEDLINE

## (undated) DEVICE — PACK,UNIVERSAL,NO GOWNS: Brand: MEDLINE

## (undated) DEVICE — Device: Brand: DEFENDO AIR/WATER/SUCTION AND BIOPSY VALVE

## (undated) DEVICE — TUBE ET 7MM NSL ORAL BASIC CUF INTMED MURPHY EYE RADPQ MRK

## (undated) DEVICE — SOLUTION IRRIG 1000ML 09% SOD CHL USP PIC PLAS CONTAINER

## (undated) DEVICE — SUTURE VCRL SZ 0 L27IN ABSRB UD L36MM CT-1 1/2 CIR J260H

## (undated) DEVICE — GOWN,PREVENTION PLUS,2XL,ST,22/CS: Brand: MEDLINE

## (undated) DEVICE — SPNG GZ STRL 2S 4X4 12PLY

## (undated) DEVICE — SUT VIC 4/0 P3 18IN UD VCP494H

## (undated) DEVICE — SENSR O2 OXIMAX FNGR A/ 18IN NONSTR

## (undated) DEVICE — THE CHANNEL CLEANING BRUSH IS A NYLON FLEXI BRUSH ATTACHED TO A FLEXIBLE PLASTIC SHEATH DESIGNED TO SAFELY REMOVE DEBRIS FROM FLEXIBLE ENDOSCOPES.

## (undated) DEVICE — SUTURE ABSORBABLE BRAIDED 0 CT-1 8X27 IN UD VICRYL JJ41G

## (undated) DEVICE — CATHETER THORACENTESIS STR 28 FRX23 IN 6 EYELET TAPR TIP LF

## (undated) DEVICE — STERILE POLYISOPRENE POWDER-FREE SURGICAL GLOVES: Brand: PROTEXIS

## (undated) DEVICE — SHEET, T, LAPAROTOMY, STERILE: Brand: MEDLINE

## (undated) DEVICE — 2-PIECE DRAINABLE OSTOMY POUCH: Brand: NEW IMAGE

## (undated) DEVICE — PK TURNOVER RM ADV

## (undated) DEVICE — TRY PREP SCRB VAG PVP

## (undated) DEVICE — SUTURE PERMAHAND SZ 2-0 L18IN NONABSORBABLE BLK L26MM FS 685G

## (undated) DEVICE — STYLET INTUB 14FR MAL ALUM CVR PLAS SHTH EXT LUBRICATED

## (undated) DEVICE — 3M™ IOBAN™ 2 ANTIMICROBIAL INCISE DRAPE 6650EZ: Brand: IOBAN™ 2

## (undated) DEVICE — 2-PIECE OSTOMY SKIN BARRIER, FORMAFLEX: Brand: NEW IMAGE